# Patient Record
Sex: FEMALE | Race: WHITE | Employment: OTHER | URBAN - METROPOLITAN AREA
[De-identification: names, ages, dates, MRNs, and addresses within clinical notes are randomized per-mention and may not be internally consistent; named-entity substitution may affect disease eponyms.]

---

## 2017-02-06 ENCOUNTER — ANESTHESIA EVENT (OUTPATIENT)
Dept: SURGERY | Age: 72
DRG: 460 | End: 2017-02-06
Payer: COMMERCIAL

## 2017-02-06 ENCOUNTER — HOSPITAL ENCOUNTER (OUTPATIENT)
Dept: SURGERY | Age: 72
Discharge: HOME | End: 2017-02-06
Payer: COMMERCIAL

## 2017-02-06 VITALS
SYSTOLIC BLOOD PRESSURE: 161 MMHG | DIASTOLIC BLOOD PRESSURE: 76 MMHG | OXYGEN SATURATION: 96 % | RESPIRATION RATE: 20 BRPM | TEMPERATURE: 98 F | HEIGHT: 59 IN | HEART RATE: 96 BPM | WEIGHT: 161 LBS | BODY MASS INDEX: 32.46 KG/M2

## 2017-02-06 LAB
ANION GAP BLD CALC-SCNC: 5 MMOL/L (ref 7–16)
APPEARANCE UR: CLEAR
BACTERIA SPEC CULT: NORMAL
BASOPHILS # BLD AUTO: 0 K/UL (ref 0–0.2)
BASOPHILS # BLD: 1 % (ref 0–2)
BILIRUB UR QL: NEGATIVE
BUN SERPL-MCNC: 14 MG/DL (ref 8–23)
CALCIUM SERPL-MCNC: 9 MG/DL (ref 8.3–10.4)
CHLORIDE SERPL-SCNC: 107 MMOL/L (ref 98–107)
CO2 SERPL-SCNC: 33 MMOL/L (ref 21–32)
COLOR UR: YELLOW
CREAT SERPL-MCNC: 1.32 MG/DL (ref 0.6–1)
DIFFERENTIAL METHOD BLD: ABNORMAL
EOSINOPHIL # BLD: 0.3 K/UL (ref 0–0.8)
EOSINOPHIL NFR BLD: 4 % (ref 0.5–7.8)
ERYTHROCYTE [DISTWIDTH] IN BLOOD BY AUTOMATED COUNT: 13.9 % (ref 11.9–14.6)
GLUCOSE SERPL-MCNC: 97 MG/DL (ref 65–100)
GLUCOSE UR STRIP.AUTO-MCNC: NEGATIVE MG/DL
HCT VFR BLD AUTO: 44.2 % (ref 35.8–46.3)
HGB BLD-MCNC: 13.8 G/DL (ref 11.7–15.4)
HGB UR QL STRIP: NEGATIVE
IMM GRANULOCYTES # BLD: 0 K/UL (ref 0–0.5)
IMM GRANULOCYTES NFR BLD AUTO: 0.1 % (ref 0–5)
KETONES UR QL STRIP.AUTO: NEGATIVE MG/DL
LEUKOCYTE ESTERASE UR QL STRIP.AUTO: NEGATIVE
LYMPHOCYTES # BLD AUTO: 40 % (ref 13–44)
LYMPHOCYTES # BLD: 2.7 K/UL (ref 0.5–4.6)
MCH RBC QN AUTO: 28.3 PG (ref 26.1–32.9)
MCHC RBC AUTO-ENTMCNC: 31.2 G/DL (ref 31.4–35)
MCV RBC AUTO: 90.6 FL (ref 79.6–97.8)
MONOCYTES # BLD: 0.4 K/UL (ref 0.1–1.3)
MONOCYTES NFR BLD AUTO: 6 % (ref 4–12)
NEUTS SEG # BLD: 3.4 K/UL (ref 1.7–8.2)
NEUTS SEG NFR BLD AUTO: 49 % (ref 43–78)
NITRITE UR QL STRIP.AUTO: NEGATIVE
PH UR STRIP: 7.5 [PH] (ref 5–9)
PLATELET # BLD AUTO: 208 K/UL (ref 150–450)
PMV BLD AUTO: 10.7 FL (ref 10.8–14.1)
POTASSIUM SERPL-SCNC: 4.6 MMOL/L (ref 3.5–5.1)
PROT UR STRIP-MCNC: NEGATIVE MG/DL
RBC # BLD AUTO: 4.88 M/UL (ref 4.05–5.25)
SERVICE CMNT-IMP: NORMAL
SODIUM SERPL-SCNC: 145 MMOL/L (ref 136–145)
SP GR UR REFRACTOMETRY: 1.01 (ref 1–1.02)
UROBILINOGEN UR QL STRIP.AUTO: 0.2 EU/DL (ref 0.2–1)
WBC # BLD AUTO: 6.9 K/UL (ref 4.3–11.1)

## 2017-02-06 PROCEDURE — 81003 URINALYSIS AUTO W/O SCOPE: CPT | Performed by: ORTHOPAEDIC SURGERY

## 2017-02-06 PROCEDURE — 80048 BASIC METABOLIC PNL TOTAL CA: CPT | Performed by: ORTHOPAEDIC SURGERY

## 2017-02-06 PROCEDURE — 87641 MR-STAPH DNA AMP PROBE: CPT | Performed by: ORTHOPAEDIC SURGERY

## 2017-02-06 PROCEDURE — 77030027138 HC INCENT SPIROMETER -A

## 2017-02-06 PROCEDURE — 85025 COMPLETE CBC W/AUTO DIFF WBC: CPT | Performed by: ORTHOPAEDIC SURGERY

## 2017-02-06 NOTE — PERIOP NOTES
Patient verified name, , and surgery as listed in [facility]. TYPE  CASE:2  Orders per surgeon:  Received  Labs per surgeon:per spine protocol:  Cbc with diff, BMP, UA, nasal swab  Labs per anesthesia protocol: per protocol  EKG  :  Recent in  2016      Patient provided with handouts including guide to surgery , transfusions, pain management and hand hygiene for the family and community. Pt verbalizes understanding of all pre-op instructions . Instructed that family must be present in building at all times. Hibiclens and instructions given per hospital policy. Instructed patient to continue  previous medications as prescribed prior to surgery and  to take the following medications the day of surgery according to anesthesia guidelines : aspirin (was told by surgeon she milagros not have to stop), inhalers and nebulizer treatment (bring inhalesr DOS), omeprazole, zoloft, potassium, gabapentin       Continue all previous medications unless otherwise directed. Instructed patient to hold  the following medications prior to surgery: none      Patient verbalized understanding of all instructions and provided all medical/health information to the best of their ability.

## 2017-02-07 ENCOUNTER — APPOINTMENT (OUTPATIENT)
Dept: GENERAL RADIOLOGY | Age: 72
DRG: 460 | End: 2017-02-07
Attending: ORTHOPAEDIC SURGERY
Payer: COMMERCIAL

## 2017-02-07 ENCOUNTER — ANESTHESIA (OUTPATIENT)
Dept: SURGERY | Age: 72
DRG: 460 | End: 2017-02-07
Payer: COMMERCIAL

## 2017-02-07 ENCOUNTER — HOSPITAL ENCOUNTER (INPATIENT)
Age: 72
LOS: 4 days | Discharge: NURSING, REHAB OR LONG-TERM CARE | DRG: 460 | End: 2017-02-11
Attending: ORTHOPAEDIC SURGERY | Admitting: ORTHOPAEDIC SURGERY
Payer: COMMERCIAL

## 2017-02-07 DIAGNOSIS — K21.9 GASTROESOPHAGEAL REFLUX DISEASE WITHOUT ESOPHAGITIS: Primary | Chronic | ICD-10-CM

## 2017-02-07 DIAGNOSIS — R06.9 RESPIRATORY ABNORMALITY, UNSPECIFIED: ICD-10-CM

## 2017-02-07 DIAGNOSIS — I10 ESSENTIAL HYPERTENSION: Chronic | ICD-10-CM

## 2017-02-07 DIAGNOSIS — M48.062 LUMBAR STENOSIS WITH NEUROGENIC CLAUDICATION: ICD-10-CM

## 2017-02-07 LAB
ABO + RH BLD: NORMAL
BLOOD GROUP ANTIBODIES SERPL: NORMAL
SPECIMEN EXP DATE BLD: NORMAL

## 2017-02-07 PROCEDURE — 74011250636 HC RX REV CODE- 250/636: Performed by: ANESTHESIOLOGY

## 2017-02-07 PROCEDURE — 77030034760 HC NDL BN MAR ASPIR JAMSH STRY -B: Performed by: ORTHOPAEDIC SURGERY

## 2017-02-07 PROCEDURE — 77030032490 HC SLV COMPR SCD KNE COVD -B: Performed by: ORTHOPAEDIC SURGERY

## 2017-02-07 PROCEDURE — 77030031359 HC BLD BN MILL DISP STRY -E: Performed by: ORTHOPAEDIC SURGERY

## 2017-02-07 PROCEDURE — 74011000250 HC RX REV CODE- 250

## 2017-02-07 PROCEDURE — 77010033678 HC OXYGEN DAILY

## 2017-02-07 PROCEDURE — 0ST20ZZ RESECTION OF LUMBAR VERTEBRAL DISC, OPEN APPROACH: ICD-10-PCS | Performed by: INTERNAL MEDICINE

## 2017-02-07 PROCEDURE — 77030030163 HC BN WAX J&J -A: Performed by: ORTHOPAEDIC SURGERY

## 2017-02-07 PROCEDURE — 76060000036 HC ANESTHESIA 2.5 TO 3 HR: Performed by: ORTHOPAEDIC SURGERY

## 2017-02-07 PROCEDURE — 65270000029 HC RM PRIVATE

## 2017-02-07 PROCEDURE — 77030008703 HC TU ET UNCUF COVD -A: Performed by: ANESTHESIOLOGY

## 2017-02-07 PROCEDURE — 77030008477 HC STYL SATN SLP COVD -A: Performed by: ANESTHESIOLOGY

## 2017-02-07 PROCEDURE — 76210000017 HC OR PH I REC 1.5 TO 2 HR: Performed by: ORTHOPAEDIC SURGERY

## 2017-02-07 PROCEDURE — 77030037143 HC ROD SPN HEX XIA TI STRY -D: Performed by: ORTHOPAEDIC SURGERY

## 2017-02-07 PROCEDURE — 94760 N-INVAS EAR/PLS OXIMETRY 1: CPT

## 2017-02-07 PROCEDURE — 77030018836 HC SOL IRR NACL ICUM -A: Performed by: ORTHOPAEDIC SURGERY

## 2017-02-07 PROCEDURE — 77030021668 HC NEB PREFIL KT VYRM -A

## 2017-02-07 PROCEDURE — 74011250637 HC RX REV CODE- 250/637: Performed by: ORTHOPAEDIC SURGERY

## 2017-02-07 PROCEDURE — 77030012894: Performed by: ORTHOPAEDIC SURGERY

## 2017-02-07 PROCEDURE — 36415 COLL VENOUS BLD VENIPUNCTURE: CPT | Performed by: ANESTHESIOLOGY

## 2017-02-07 PROCEDURE — 77030014368: Performed by: ORTHOPAEDIC SURGERY

## 2017-02-07 PROCEDURE — 74011000302 HC RX REV CODE- 302: Performed by: ORTHOPAEDIC SURGERY

## 2017-02-07 PROCEDURE — 74011250636 HC RX REV CODE- 250/636

## 2017-02-07 PROCEDURE — 74011250636 HC RX REV CODE- 250/636: Performed by: ORTHOPAEDIC SURGERY

## 2017-02-07 PROCEDURE — 77030037158 HC BIT DRL SPN XIA DISP STRY -C: Performed by: ORTHOPAEDIC SURGERY

## 2017-02-07 PROCEDURE — C1713 ANCHOR/SCREW BN/BN,TIS/BN: HCPCS | Performed by: ORTHOPAEDIC SURGERY

## 2017-02-07 PROCEDURE — 77030011640 HC PAD GRND REM COVD -A: Performed by: ORTHOPAEDIC SURGERY

## 2017-02-07 PROCEDURE — 72100 X-RAY EXAM L-S SPINE 2/3 VWS: CPT

## 2017-02-07 PROCEDURE — 86900 BLOOD TYPING SEROLOGIC ABO: CPT | Performed by: ANESTHESIOLOGY

## 2017-02-07 PROCEDURE — 77030037157 HC TAP SPN MOD XIA DISP STRY -C: Performed by: ORTHOPAEDIC SURGERY

## 2017-02-07 PROCEDURE — 77030019908 HC STETH ESOPH SIMS -A: Performed by: ANESTHESIOLOGY

## 2017-02-07 PROCEDURE — 76010000172 HC OR TIME 2.5 TO 3 HR INTENSV-TIER 1: Performed by: ORTHOPAEDIC SURGERY

## 2017-02-07 PROCEDURE — 77030025623 HC BUR RND PRECIS STRY -D: Performed by: ORTHOPAEDIC SURGERY

## 2017-02-07 PROCEDURE — 77030037155 HC BLCKR SPN CAP LOK AXI STRY -B: Performed by: ORTHOPAEDIC SURGERY

## 2017-02-07 PROCEDURE — 86580 TB INTRADERMAL TEST: CPT | Performed by: ORTHOPAEDIC SURGERY

## 2017-02-07 PROCEDURE — 77030027138 HC INCENT SPIROMETER -A

## 2017-02-07 PROCEDURE — 77030020407 HC IV BLD WRMR ST 3M -A: Performed by: ANESTHESIOLOGY

## 2017-02-07 PROCEDURE — 07DR3ZZ EXTRACTION OF ILIAC BONE MARROW, PERCUTANEOUS APPROACH: ICD-10-PCS | Performed by: INTERNAL MEDICINE

## 2017-02-07 PROCEDURE — 77030014647 HC SEAL FBRN TISSL BAXT -D: Performed by: ORTHOPAEDIC SURGERY

## 2017-02-07 PROCEDURE — 77030000001 HC SPCR SPN PEEK STRY -I2: Performed by: ORTHOPAEDIC SURGERY

## 2017-02-07 PROCEDURE — 0SG30AJ FUSION OF LUMBOSACRAL JOINT WITH INTERBODY FUSION DEVICE, POSTERIOR APPROACH, ANTERIOR COLUMN, OPEN APPROACH: ICD-10-PCS | Performed by: INTERNAL MEDICINE

## 2017-02-07 PROCEDURE — 0SG30Z1 FUSION OF LUMBOSACRAL JOINT, POST APPR P COL, OPEN APPROACH: ICD-10-PCS | Performed by: INTERNAL MEDICINE

## 2017-02-07 PROCEDURE — 74011000250 HC RX REV CODE- 250: Performed by: ORTHOPAEDIC SURGERY

## 2017-02-07 PROCEDURE — 77030034850: Performed by: ORTHOPAEDIC SURGERY

## 2017-02-07 PROCEDURE — 77030034475 HC MISC IMPL SPN: Performed by: ORTHOPAEDIC SURGERY

## 2017-02-07 PROCEDURE — 77030020782 HC GWN BAIR PAWS FLX 3M -B: Performed by: ANESTHESIOLOGY

## 2017-02-07 PROCEDURE — 77030031139 HC SUT VCRL2 J&J -A: Performed by: ORTHOPAEDIC SURGERY

## 2017-02-07 DEVICE — GRAFT BNE SUB 20CC 2 4MM GRAN GROWTH FACT ALLGRFT OSTEOAMP: Type: IMPLANTABLE DEVICE | Site: SPINE LUMBAR | Status: FUNCTIONAL

## 2017-02-07 DEVICE — VITALLIUM PREBENT AND PRECUT ROD WITH HEX
Type: IMPLANTABLE DEVICE | Site: SPINE LUMBAR | Status: FUNCTIONAL
Brand: XIA 4 5

## 2017-02-07 DEVICE — POLYAXIAL CORTICAL SCREW
Type: IMPLANTABLE DEVICE | Site: SPINE LUMBAR | Status: FUNCTIONAL
Brand: XIA 4.5 SYSTEM -  XIA CT

## 2017-02-07 DEVICE — VERTEBRAL SPACER
Type: IMPLANTABLE DEVICE | Site: SPINE LUMBAR | Status: FUNCTIONAL
Brand: AVS TL

## 2017-02-07 DEVICE — BLOCKER
Type: IMPLANTABLE DEVICE | Site: SPINE LUMBAR | Status: FUNCTIONAL
Brand: XIA 4.5 SYSTEM -  XIA CT

## 2017-02-07 RX ORDER — DOCUSATE SODIUM 100 MG/1
100 CAPSULE, LIQUID FILLED ORAL 2 TIMES DAILY
Status: DISCONTINUED | OUTPATIENT
Start: 2017-02-07 | End: 2017-02-11 | Stop reason: HOSPADM

## 2017-02-07 RX ORDER — FENTANYL CITRATE 50 UG/ML
INJECTION, SOLUTION INTRAMUSCULAR; INTRAVENOUS AS NEEDED
Status: DISCONTINUED | OUTPATIENT
Start: 2017-02-07 | End: 2017-02-07 | Stop reason: HOSPADM

## 2017-02-07 RX ORDER — ACETAMINOPHEN 325 MG/1
650 TABLET ORAL EVERY 6 HOURS
Status: DISCONTINUED | OUTPATIENT
Start: 2017-02-07 | End: 2017-02-11 | Stop reason: HOSPADM

## 2017-02-07 RX ORDER — FAMOTIDINE 20 MG/1
20 TABLET, FILM COATED ORAL EVERY 24 HOURS
Status: DISCONTINUED | OUTPATIENT
Start: 2017-02-07 | End: 2017-02-11 | Stop reason: HOSPADM

## 2017-02-07 RX ORDER — GABAPENTIN 300 MG/1
300 CAPSULE ORAL 3 TIMES DAILY
Status: DISCONTINUED | OUTPATIENT
Start: 2017-02-07 | End: 2017-02-11 | Stop reason: HOSPADM

## 2017-02-07 RX ORDER — ZOLPIDEM TARTRATE 5 MG/1
5 TABLET ORAL
Status: DISCONTINUED | OUTPATIENT
Start: 2017-02-07 | End: 2017-02-11 | Stop reason: HOSPADM

## 2017-02-07 RX ORDER — SODIUM CHLORIDE 0.9 % (FLUSH) 0.9 %
5-10 SYRINGE (ML) INJECTION AS NEEDED
Status: DISCONTINUED | OUTPATIENT
Start: 2017-02-07 | End: 2017-02-11 | Stop reason: HOSPADM

## 2017-02-07 RX ORDER — BACITRACIN 50000 [IU]/1
INJECTION, POWDER, FOR SOLUTION INTRAMUSCULAR AS NEEDED
Status: DISCONTINUED | OUTPATIENT
Start: 2017-02-07 | End: 2017-02-07 | Stop reason: HOSPADM

## 2017-02-07 RX ORDER — IPRATROPIUM BROMIDE AND ALBUTEROL SULFATE 2.5; .5 MG/3ML; MG/3ML
3 SOLUTION RESPIRATORY (INHALATION)
Status: DISCONTINUED | OUTPATIENT
Start: 2017-02-07 | End: 2017-02-11 | Stop reason: HOSPADM

## 2017-02-07 RX ORDER — NITROGLYCERIN 0.4 MG/1
0.4 TABLET SUBLINGUAL
Status: DISCONTINUED | OUTPATIENT
Start: 2017-02-07 | End: 2017-02-11 | Stop reason: HOSPADM

## 2017-02-07 RX ORDER — OXYCODONE HYDROCHLORIDE 5 MG/1
5-10 TABLET ORAL
Status: DISCONTINUED | OUTPATIENT
Start: 2017-02-07 | End: 2017-02-11 | Stop reason: HOSPADM

## 2017-02-07 RX ORDER — OXYCODONE HYDROCHLORIDE 5 MG/1
5 TABLET ORAL
Status: DISCONTINUED | OUTPATIENT
Start: 2017-02-07 | End: 2017-02-07 | Stop reason: HOSPADM

## 2017-02-07 RX ORDER — CEFAZOLIN SODIUM IN 0.9 % NACL 2 G/50 ML
2 INTRAVENOUS SOLUTION, PIGGYBACK (ML) INTRAVENOUS EVERY 8 HOURS
Status: COMPLETED | OUTPATIENT
Start: 2017-02-07 | End: 2017-02-08

## 2017-02-07 RX ORDER — SODIUM CHLORIDE, SODIUM LACTATE, POTASSIUM CHLORIDE, CALCIUM CHLORIDE 600; 310; 30; 20 MG/100ML; MG/100ML; MG/100ML; MG/100ML
75 INJECTION, SOLUTION INTRAVENOUS CONTINUOUS
Status: DISPENSED | OUTPATIENT
Start: 2017-02-07 | End: 2017-02-08

## 2017-02-07 RX ORDER — SODIUM CHLORIDE, SODIUM LACTATE, POTASSIUM CHLORIDE, CALCIUM CHLORIDE 600; 310; 30; 20 MG/100ML; MG/100ML; MG/100ML; MG/100ML
100 INJECTION, SOLUTION INTRAVENOUS CONTINUOUS
Status: DISCONTINUED | OUTPATIENT
Start: 2017-02-07 | End: 2017-02-07 | Stop reason: HOSPADM

## 2017-02-07 RX ORDER — MIDAZOLAM HYDROCHLORIDE 1 MG/ML
2 INJECTION, SOLUTION INTRAMUSCULAR; INTRAVENOUS ONCE
Status: DISCONTINUED | OUTPATIENT
Start: 2017-02-07 | End: 2017-02-07 | Stop reason: HOSPADM

## 2017-02-07 RX ORDER — VANCOMYCIN HYDROCHLORIDE 1 G/20ML
INJECTION, POWDER, LYOPHILIZED, FOR SOLUTION INTRAVENOUS AS NEEDED
Status: DISCONTINUED | OUTPATIENT
Start: 2017-02-07 | End: 2017-02-07 | Stop reason: HOSPADM

## 2017-02-07 RX ORDER — LIDOCAINE HYDROCHLORIDE 20 MG/ML
INJECTION, SOLUTION EPIDURAL; INFILTRATION; INTRACAUDAL; PERINEURAL AS NEEDED
Status: DISCONTINUED | OUTPATIENT
Start: 2017-02-07 | End: 2017-02-07 | Stop reason: HOSPADM

## 2017-02-07 RX ORDER — GLYCOPYRROLATE 0.2 MG/ML
INJECTION INTRAMUSCULAR; INTRAVENOUS AS NEEDED
Status: DISCONTINUED | OUTPATIENT
Start: 2017-02-07 | End: 2017-02-07 | Stop reason: HOSPADM

## 2017-02-07 RX ORDER — POTASSIUM CHLORIDE 20 MEQ/1
20 TABLET, EXTENDED RELEASE ORAL DAILY
Status: DISCONTINUED | OUTPATIENT
Start: 2017-02-08 | End: 2017-02-11 | Stop reason: HOSPADM

## 2017-02-07 RX ORDER — OXYCODONE AND ACETAMINOPHEN 7.5; 325 MG/1; MG/1
1 TABLET ORAL
Qty: 80 TAB | Refills: 0 | Status: SHIPPED | OUTPATIENT
Start: 2017-02-07 | End: 2017-02-21

## 2017-02-07 RX ORDER — SERTRALINE HYDROCHLORIDE 50 MG/1
75 TABLET, FILM COATED ORAL DAILY
Status: DISCONTINUED | OUTPATIENT
Start: 2017-02-08 | End: 2017-02-11 | Stop reason: HOSPADM

## 2017-02-07 RX ORDER — NALOXONE HYDROCHLORIDE 0.4 MG/ML
0.4 INJECTION, SOLUTION INTRAMUSCULAR; INTRAVENOUS; SUBCUTANEOUS
Status: DISCONTINUED | OUTPATIENT
Start: 2017-02-07 | End: 2017-02-11 | Stop reason: HOSPADM

## 2017-02-07 RX ORDER — ROCURONIUM BROMIDE 10 MG/ML
INJECTION, SOLUTION INTRAVENOUS AS NEEDED
Status: DISCONTINUED | OUTPATIENT
Start: 2017-02-07 | End: 2017-02-07 | Stop reason: HOSPADM

## 2017-02-07 RX ORDER — LIDOCAINE HYDROCHLORIDE 10 MG/ML
0.1 INJECTION INFILTRATION; PERINEURAL AS NEEDED
Status: DISCONTINUED | OUTPATIENT
Start: 2017-02-07 | End: 2017-02-07 | Stop reason: HOSPADM

## 2017-02-07 RX ORDER — MORPHINE SULFATE 2 MG/ML
2 INJECTION, SOLUTION INTRAMUSCULAR; INTRAVENOUS
Status: DISCONTINUED | OUTPATIENT
Start: 2017-02-07 | End: 2017-02-11 | Stop reason: HOSPADM

## 2017-02-07 RX ORDER — ALBUTEROL SULFATE 90 UG/1
2 AEROSOL, METERED RESPIRATORY (INHALATION)
Status: DISCONTINUED | OUTPATIENT
Start: 2017-02-07 | End: 2017-02-11 | Stop reason: HOSPADM

## 2017-02-07 RX ORDER — HYDROMORPHONE HYDROCHLORIDE 2 MG/ML
0.5 INJECTION, SOLUTION INTRAMUSCULAR; INTRAVENOUS; SUBCUTANEOUS
Status: COMPLETED | OUTPATIENT
Start: 2017-02-07 | End: 2017-02-07

## 2017-02-07 RX ORDER — FENTANYL CITRATE 50 UG/ML
100 INJECTION, SOLUTION INTRAMUSCULAR; INTRAVENOUS ONCE
Status: DISCONTINUED | OUTPATIENT
Start: 2017-02-07 | End: 2017-02-07 | Stop reason: HOSPADM

## 2017-02-07 RX ORDER — PROPOFOL 10 MG/ML
INJECTION, EMULSION INTRAVENOUS AS NEEDED
Status: DISCONTINUED | OUTPATIENT
Start: 2017-02-07 | End: 2017-02-07 | Stop reason: HOSPADM

## 2017-02-07 RX ORDER — SODIUM CHLORIDE 0.9 % (FLUSH) 0.9 %
5-10 SYRINGE (ML) INJECTION EVERY 8 HOURS
Status: DISCONTINUED | OUTPATIENT
Start: 2017-02-07 | End: 2017-02-11 | Stop reason: HOSPADM

## 2017-02-07 RX ORDER — ONDANSETRON 2 MG/ML
INJECTION INTRAMUSCULAR; INTRAVENOUS AS NEEDED
Status: DISCONTINUED | OUTPATIENT
Start: 2017-02-07 | End: 2017-02-07 | Stop reason: HOSPADM

## 2017-02-07 RX ORDER — CEFAZOLIN SODIUM IN 0.9 % NACL 2 G/50 ML
2 INTRAVENOUS SOLUTION, PIGGYBACK (ML) INTRAVENOUS ONCE
Status: COMPLETED | OUTPATIENT
Start: 2017-02-07 | End: 2017-02-07

## 2017-02-07 RX ORDER — ATORVASTATIN CALCIUM 10 MG/1
20 TABLET, FILM COATED ORAL
Status: DISCONTINUED | OUTPATIENT
Start: 2017-02-07 | End: 2017-02-11 | Stop reason: HOSPADM

## 2017-02-07 RX ORDER — CYCLOBENZAPRINE HCL 10 MG
10 TABLET ORAL
Status: DISCONTINUED | OUTPATIENT
Start: 2017-02-07 | End: 2017-02-11 | Stop reason: HOSPADM

## 2017-02-07 RX ORDER — VALSARTAN 160 MG/1
160 TABLET ORAL DAILY
Status: DISCONTINUED | OUTPATIENT
Start: 2017-02-08 | End: 2017-02-11 | Stop reason: HOSPADM

## 2017-02-07 RX ORDER — DEXAMETHASONE SODIUM PHOSPHATE 4 MG/ML
INJECTION, SOLUTION INTRA-ARTICULAR; INTRALESIONAL; INTRAMUSCULAR; INTRAVENOUS; SOFT TISSUE AS NEEDED
Status: DISCONTINUED | OUTPATIENT
Start: 2017-02-07 | End: 2017-02-07 | Stop reason: HOSPADM

## 2017-02-07 RX ORDER — SODIUM CHLORIDE, SODIUM LACTATE, POTASSIUM CHLORIDE, CALCIUM CHLORIDE 600; 310; 30; 20 MG/100ML; MG/100ML; MG/100ML; MG/100ML
INJECTION, SOLUTION INTRAVENOUS
Status: DISCONTINUED | OUTPATIENT
Start: 2017-02-07 | End: 2017-02-07 | Stop reason: HOSPADM

## 2017-02-07 RX ORDER — FUROSEMIDE 40 MG/1
40 TABLET ORAL DAILY
Status: DISCONTINUED | OUTPATIENT
Start: 2017-02-08 | End: 2017-02-11 | Stop reason: HOSPADM

## 2017-02-07 RX ORDER — ATENOLOL 25 MG/1
12.5 TABLET ORAL
Status: DISCONTINUED | OUTPATIENT
Start: 2017-02-07 | End: 2017-02-11 | Stop reason: HOSPADM

## 2017-02-07 RX ORDER — ONDANSETRON 2 MG/ML
4 INJECTION INTRAMUSCULAR; INTRAVENOUS
Status: DISCONTINUED | OUTPATIENT
Start: 2017-02-07 | End: 2017-02-11 | Stop reason: HOSPADM

## 2017-02-07 RX ORDER — MIDAZOLAM HYDROCHLORIDE 1 MG/ML
2 INJECTION, SOLUTION INTRAMUSCULAR; INTRAVENOUS
Status: DISCONTINUED | OUTPATIENT
Start: 2017-02-07 | End: 2017-02-07 | Stop reason: HOSPADM

## 2017-02-07 RX ORDER — NEOSTIGMINE METHYLSULFATE 1 MG/ML
INJECTION INTRAVENOUS AS NEEDED
Status: DISCONTINUED | OUTPATIENT
Start: 2017-02-07 | End: 2017-02-07 | Stop reason: HOSPADM

## 2017-02-07 RX ADMIN — HYDROMORPHONE HYDROCHLORIDE 0.5 MG: 2 INJECTION, SOLUTION INTRAMUSCULAR; INTRAVENOUS; SUBCUTANEOUS at 17:28

## 2017-02-07 RX ADMIN — SODIUM CHLORIDE, SODIUM LACTATE, POTASSIUM CHLORIDE, AND CALCIUM CHLORIDE 100 ML/HR: 600; 310; 30; 20 INJECTION, SOLUTION INTRAVENOUS at 13:00

## 2017-02-07 RX ADMIN — SODIUM CHLORIDE, SODIUM LACTATE, POTASSIUM CHLORIDE, AND CALCIUM CHLORIDE 75 ML/HR: 600; 310; 30; 20 INJECTION, SOLUTION INTRAVENOUS at 19:56

## 2017-02-07 RX ADMIN — Medication 10 ML: at 23:35

## 2017-02-07 RX ADMIN — PROPOFOL 160 MG: 10 INJECTION, EMULSION INTRAVENOUS at 14:11

## 2017-02-07 RX ADMIN — LIDOCAINE HYDROCHLORIDE 60 MG: 20 INJECTION, SOLUTION EPIDURAL; INFILTRATION; INTRACAUDAL; PERINEURAL at 14:11

## 2017-02-07 RX ADMIN — SODIUM CHLORIDE 500 ML: 900 INJECTION, SOLUTION INTRAVENOUS at 19:56

## 2017-02-07 RX ADMIN — TUBERCULIN PURIFIED PROTEIN DERIVATIVE 5 UNITS: 5 INJECTION INTRADERMAL at 20:49

## 2017-02-07 RX ADMIN — FENTANYL CITRATE 50 MCG: 50 INJECTION, SOLUTION INTRAMUSCULAR; INTRAVENOUS at 16:12

## 2017-02-07 RX ADMIN — NEOSTIGMINE METHYLSULFATE 3 MG: 1 INJECTION INTRAVENOUS at 16:41

## 2017-02-07 RX ADMIN — CEFAZOLIN 2 G: 1 INJECTION, POWDER, FOR SOLUTION INTRAMUSCULAR; INTRAVENOUS; PARENTERAL at 14:16

## 2017-02-07 RX ADMIN — GLYCOPYRROLATE 0.4 MG: 0.2 INJECTION INTRAMUSCULAR; INTRAVENOUS at 16:41

## 2017-02-07 RX ADMIN — FENTANYL CITRATE 50 MCG: 50 INJECTION, SOLUTION INTRAMUSCULAR; INTRAVENOUS at 14:37

## 2017-02-07 RX ADMIN — CEFAZOLIN 2 G: 1 INJECTION, POWDER, FOR SOLUTION INTRAMUSCULAR; INTRAVENOUS; PARENTERAL at 23:34

## 2017-02-07 RX ADMIN — HYDROMORPHONE HYDROCHLORIDE 0.5 MG: 2 INJECTION, SOLUTION INTRAMUSCULAR; INTRAVENOUS; SUBCUTANEOUS at 17:07

## 2017-02-07 RX ADMIN — SODIUM CHLORIDE, SODIUM LACTATE, POTASSIUM CHLORIDE, CALCIUM CHLORIDE: 600; 310; 30; 20 INJECTION, SOLUTION INTRAVENOUS at 14:15

## 2017-02-07 RX ADMIN — FENTANYL CITRATE 50 MCG: 50 INJECTION, SOLUTION INTRAMUSCULAR; INTRAVENOUS at 14:59

## 2017-02-07 RX ADMIN — DOCUSATE SODIUM 100 MG: 100 CAPSULE ORAL at 20:48

## 2017-02-07 RX ADMIN — ROCURONIUM BROMIDE 10 MG: 10 INJECTION, SOLUTION INTRAVENOUS at 15:46

## 2017-02-07 RX ADMIN — HYDROMORPHONE HYDROCHLORIDE 0.5 MG: 2 INJECTION, SOLUTION INTRAMUSCULAR; INTRAVENOUS; SUBCUTANEOUS at 17:13

## 2017-02-07 RX ADMIN — FAMOTIDINE 20 MG: 20 TABLET ORAL at 20:48

## 2017-02-07 RX ADMIN — HYDROMORPHONE HYDROCHLORIDE 0.5 MG: 2 INJECTION, SOLUTION INTRAMUSCULAR; INTRAVENOUS; SUBCUTANEOUS at 17:38

## 2017-02-07 RX ADMIN — ATORVASTATIN CALCIUM 20 MG: 10 TABLET, FILM COATED ORAL at 20:48

## 2017-02-07 RX ADMIN — DEXAMETHASONE SODIUM PHOSPHATE 4 MG: 4 INJECTION, SOLUTION INTRA-ARTICULAR; INTRALESIONAL; INTRAMUSCULAR; INTRAVENOUS; SOFT TISSUE at 14:29

## 2017-02-07 RX ADMIN — FENTANYL CITRATE 100 MCG: 50 INJECTION, SOLUTION INTRAMUSCULAR; INTRAVENOUS at 14:09

## 2017-02-07 RX ADMIN — ONDANSETRON 4 MG: 2 INJECTION INTRAMUSCULAR; INTRAVENOUS at 16:28

## 2017-02-07 RX ADMIN — ROCURONIUM BROMIDE 50 MG: 10 INJECTION, SOLUTION INTRAVENOUS at 14:11

## 2017-02-07 RX ADMIN — ACETAMINOPHEN 650 MG: 325 TABLET, FILM COATED ORAL at 20:48

## 2017-02-07 RX ADMIN — GABAPENTIN 300 MG: 300 CAPSULE ORAL at 23:34

## 2017-02-07 NOTE — PROGRESS NOTES
TRANSFER - IN REPORT:    Verbal report received from [place], RN(name) on Zander Veronica  being received from [facility]) for routine post - op      Report consisted of patients Situation, Background, Assessment and   Recommendations(SBAR). Information from the following report(s) SBAR, Kardex, Intake/Output and MAR was reviewed with the receiving nurse. Opportunity for questions and clarification was provided. Assessment completed upon patients arrival to unit and care assumed.

## 2017-02-07 NOTE — ANESTHESIA PREPROCEDURE EVALUATION
Anesthetic History               Review of Systems / Medical History  Patient summary reviewed and pertinent labs reviewed    Pulmonary          Pneumonia (9/16)  Asthma : well controlled    Comments: Quit smoking 14 yrs ago   Neuro/Psych         Psychiatric history (Fibromyalgia, depression, anxiety)    Comments: neuropathy Cardiovascular    Hypertension          CAD (Minimal CAD on 2011 [facility] for chest pain) and PAD    Exercise tolerance: >4 METS  Comments: Pt has long standing CP. LHC in 2011 revealed minimal disease. Pt able to preform ADLs without difficulty. Pt has a long standing hx/o this pain. Pt has presented to the ER for this several times (per pt) and discharged home   EKG without evidence for new ischemic changes  Able to walk a fair distance without significant CP   GI/Hepatic/Renal     GERD: well controlled           Endo/Other        Obesity and arthritis     Other Findings            Physical Exam    Airway  Mallampati: II  TM Distance: 4 - 6 cm  Neck ROM: normal range of motion   Mouth opening: Normal     Cardiovascular    Rhythm: regular  Rate: normal         Dental    Dentition: Full upper dentures     Pulmonary  Breath sounds clear to auscultation               Abdominal  GI exam deferred       Other Findings            Anesthetic Plan    ASA: 3  Anesthesia type: general          Induction: Intravenous  Anesthetic plan and risks discussed with: Patient and Family      Discussed possible art line, CVP, post-op vent.

## 2017-02-07 NOTE — PROGRESS NOTES
Dual skin assessment completed with Eugenia Ontiveros RN. No areas of breakdown noted. 4x4 and tegaderm to mid back clean, dry and intact with hemovac charged.

## 2017-02-07 NOTE — ANESTHESIA POSTPROCEDURE EVALUATION
Post-Anesthesia Evaluation and Assessment    Patient: Maxwell Trotter MRN: [id number]  SSN: [id number]    YOB: 1945  Age: 66 y.o. Sex: female       Cardiovascular Function/Vital Signs  Visit Vitals    /56    Pulse (!) 51    Temp 37.1 °C (98.8 °F)    Resp 15    Ht 4' 11\" (1.499 m)    Wt 73 kg (161 lb)    SpO2 96%    BMI 32.52 kg/m2       Patient is status post general anesthesia for Procedure(s):  L4-S1 LAMINECTOMY AND FUSION WITH BONE MARROW ASPIRATE, ALLOGRAFT, AND INSTRUMENTATION, TLIF. Nausea/Vomiting: None    Postoperative hydration reviewed and adequate. Pain:  Pain Scale 1: Numeric (0 - 10) (02/07/17 1738)  Pain Intensity 1: 7 (02/07/17 1738)   Managed    Neurological Status:   Neuro (WDL): Exceptions to WDL (02/07/17 1702)  Neuro  Neurologic State: Drowsy (02/07/17 1702)  LUE Motor Response: Purposeful (02/07/17 1702)  LLE Motor Response: Purposeful (02/07/17 1702)  RUE Motor Response: Purposeful (02/07/17 1702)  RLE Motor Response: Purposeful (02/07/17 1702)   At baseline    Mental Status and Level of Consciousness: Arousable    Pulmonary Status:   O2 Device: Nasal cannula (02/07/17 1702)   Adequate oxygenation and airway patent    Complications related to anesthesia: None    Post-anesthesia assessment completed.  No concerns    Signed By: Justin Venegas MD     February 7, 2017

## 2017-02-07 NOTE — H&P
This is the 33-year-old female who I have been seeing for the last several months with a several year history of back pain and radiation to the bilateral lower extremities. This limits her ability to walk and stand. The pain as a deep ache and progresses from her back, buttocks, and into the posterior thighs. Her symptoms are better with sitting and leaning forward. She has tried many conservative efforts without relief. This has included chiropractic care, NSAIDs, pain medication, gabapentin, a lumbar brace, and most recently, epidural injections. Review of systems is negative for fever/chills. Allergies:Lisinopril;Penicillin;Tetracycline HCl    Medications:Aspirin; Atenolol (25 MG); Atorvastatin Calcium (20 MG);EpiPen 2-Jabari (0.3 MG/... MG/0.3ML); Furosemide (40 MG);Gabapentin (300 MG); OxyCODONE HCl (5 MG, Take 2 tablet(s) by mouth every 4 prn pain); Pantoprazole Sodium (40 MG); Sertraline HCl (50 MG); TraMADol HCl (50 MG, Take 1 tablet(s) by mouth 2 times a day as needed [PRN]);Valsartan (160 MG)    Physical Exam: This is a well developed well nourished adult female in no acute distress. Mood and affect are appropriate. Oriented to person, place, and time. Respirations are unlabored and there is no evidence of cyanosis    Inspection of the back reveals no evidence of rash, such as zoster. Examination of the lumbar spine reveals a relative hypolordosis, and no evidence of significant saggital plane deformity. There is exacerbation of symptoms with lumbar extension. There is not significant tenderness to palpation along the spinous processes or paraspinal musculature. The patient ambulates with a slightly crouched posture. Straight leg testing is negative. There is minimal hip irritability with internal or external rotation bilaterally.   Sensory testing reveals intact sensation to light touch and pin prick in the distribution of the L3-S1 dermatomes bilaterally, though there is subjective tingling over the buttocks and bilateral posterior thighs. Reflexes   Right Left   Quadriceps (L4) 2 2   Achilles (S1) 2 2     Ankle jerk is negative for clonus    Strength testing in the lower extremity reveals the following based on the 5 point grading scale:     HF (L2) H Ab (L5) KE (L3/4) ADF (L4) EHL (L5) A Ev (S1) APF (S1)   Right 5 5 5 5 5 5 5   Left 5 5 5 5 5 5 5       Her MRI was reviewed and reveals spondylotic stenosis from L4-S1. There is spondylolisthesis noted on radiographs at both levels. Assessment and plan: She has neurogenic claudication due to spondylolisthesis and stenosis. She has reasonably exhausted conservative efforts and has been dealing with this for several years. She is a candidate for surgery including a lumbar laminectomy and fusion L4-S1. However, she is the primary caregiver for her  and is not able to consider taking time for a surgery at this time. She was given some brochures to review regarding the surgery and we discussed the risks and benefits as is outlined in the brochures. She will discuss this over with her family and see if she can get some temporary care for her . She will let me know if she would like to proceed with surgery as outlined above.       Electronically Signed By Brian Perez MD

## 2017-02-07 NOTE — OP NOTES
[address] [address]   [phone]    OPERATIVE REPORT    Patient Bruce Casey  [id number]  1945  66 y.o. DATE OF SURGERY: 2/7/2017    SURGEON: Miracle Luna MD    ASSISTANT:  Jeanine Carmona MD    A skilled  was deemed necessary for this case due to the intimate proximity of the thecal sac and spinal nerve roots. He was there for the entire duration of the case. PREOP DIAGNOSIS: Lumbar spondylolisthesis and lumbar stenosis. POSTOP DIAGNOSIS: Lumbar spondylolisthesis and lumbar stenosis. PROCEDURE:  1. Lumbar laminectomy L4 through S1  with bilateral foraminotomies. 2. Lumbar posterolateral fusion  L4 through S1 .  3. Cortical screw instrumentation  L4 through S1 .  4. Bone marrow aspirate for allograft  5. Translumbar interbody fusion L4-L5 and L5- S1.  6. Insertion biomechanical device L4-L5 and L5- S1  7. Pace Ge dorsal osteotomy L5    ANESTHESIA: General    ESTIMATED BLOOD LOSS:  500 ml    INTRAOPERATIVE COMPLICATIONS: None. POSTOP CONDITION: Stable. IMPLANTS:   Implant Name Type Inv.  Item Serial No.  Lot No. LRB No. Used Action   bio dbm    [employer] [id number] N/A 1 Implanted   GRAFT BNE GRAN DBM 2-4MM 20ML -- OSTEOAMP - [id number]  GRAFT BNE GRAN DBM 2-4MM 20ML -- OSTEOAMP [id number]--[id number] [employer]  N/A 1 Implanted   BLOCKER SPNE CAP LCK -- MARY 4.5 CT - [id number]  BLOCKER SPNE CAP LCK -- MARY 4.5 CT  CHINYERE SPINE HOWM [id number] N/A 6 Implanted   SCR BNE DAJA POLYAXL 5.5X40MM -- MARY 4.5 CT - [id number]  SCR BNE DAJA POLYAXL 5.5X40MM -- MARY 4.5 CT  WAUKESHA CTY MENTAL HLTH [employer] SPINE HOWM [id number] N/A 1 Implanted   SCR BNE DAJA POLYAXL 5.5X35MM -- MARY 4.5 CT - [id number]  SCR BNE DAJA POLYAXL 5.5X35MM -- MARY 4.5 CT  WAUKESHA CTY MENTAL HLTH CTR SPINE HOWM [id number] N/A 5 Implanted   EVELYN SPNE W/HEX 4.5X60MM VIT -- MARY 4.5 - [id number]  EVELYN SPNE W/HEX 4.5X60MM VIT -- MARY 4.5  CHINYERE SPINE HOW [id number] N/A 2 Implanted SPACER SPNE VERT AVS 4D [id number] --  - [id number]  SPACER SPNE VERT AVS 4D 8X25 --   CHINYERE SPINE HOWM [id number] N/A 1 Implanted   SPACER SPNE VERT AVS 4D 10X30 --  - [id number]   SPACER SPNE VERT AVS 4D 10X30 --    CHINYERE SPINE HOWM [id number] N/A 1 Implanted       INDICATIONS FOR PROCEDURE: Patient has had low back pain with radiation to the buttocks and lower extremities for an extended period of time. The symptoms and exam findings were felt to be consistent with neurogenic claudication. The preoperative radiographs and other imaging confirmed showed spondylolisthesis and stenosis. Conservative measures have been exhausted as outlined. The symptoms progressed to the point where there is difficulty performing any task that requires prolonged standing or walking which interfered with activities of daily living and ability to enjoy life. In the outpatient setting the risks, benefits and potential complications of the above-listed procedure were discussed with her and an informed consent was obtained. DESCRIPTION OF PROCEDURE: After adequate induction of general anesthesia, the patient was positioned prone on the [facility] spinal table. Care was taken to pad all bony prominences. The shoulders and elbows were placed in the 90/90 position. The abdomen was allowed to hang free to decrease intraabdominal and venous pressure. The lumbar area was prepped and draped in the usual sterile fashion. Preoperative antibiotics were administered. A time out was called to confirm appropriate patient, proposed procedure and proposed incision site. With this confirmation, an incision was created in the midline of the back over the lumbar spinous processes. . Dissection was carried down through the skin and subcutaneous tissues to the level of the lumbodorsal fascia. A Kocher clamp was attached to the a spinous process and a cross-table lateral fluoroscopic image was obtained to confirm spinal level.  With this confirmation, the spinous process was marked with a Leksell rongeur and the lumbar dorsal fascia was released off of the spinous processes. The paraspinous musculature was elevated in a subperiosteal fashion in a lateral direction off of the lamina and over the the facet joints to be fused. The L4 through L5 transverse processes were exposed to their lateral tips. The sacral ala was exposed as well. All soft tissue was elevated off of the intertransverse membrane laterally in preparation for a fusion bed. With the posterior lateral dissection completed, attention was directed centrally where a Leksell rongeur was used to resect the spinous processes of L4 through S1. The 4 mm chalino was then used to thin the lamina to an egg shell like thickness. A triple-0 angled curet was used to elevate the ligamentum flavum off of its origin on the caudal surface of the L5 lamina as well as off the cephalad surface of the S1 lamina. The ligamentum flavum was elevated from the thecal sac and a plane was created in the epidural space with a Naila elevator. A 4 mm Kerrison was used to perform a central laminectomy of S1 and this was carried cephalad through L4. The central laminectomy was widened to the medial border of the pedicle at each level. With the central laminectomy completed, a 4 mm Kerrison was used to undercut the lateral recess along the entire length of the laminectomy site. Then using the [facility] elevator to retract the thecal sac and identify each of the nerve roots, partial foraminotomies were performed and each nerve was visualized and decompressed bilaterally. With this, attention was directed toward the  iliac crest where a separate fascial incision was created over the posterior-superior iliac spine. The 8 gauge needle was impacted into the posterior superior iliac spine to a depth of about 2 cm. Bone marrow aspirate was obtained and spread over the allograft bone.   The needle was removed and pressure applied over the posterior superior iliac spine. Attention was re-directed towards the lumbar wound. The rosario nerve root retractor was used to retrace the thecal sac overlying the L4 - L5  disc space. Care was taken to protect the exiting and descending nerve roots at all times. An annulotomy was then performed with a 15-blade. A complete discectomy was performed using a series of curets and rongeurs. The interbody sizing system was brought to the field and the sizing paddles were used sequentially to an appropriate fit. The endplates were prepared for fusion using the rasps. A trial interbody device was sized and inserted. Fluoroscopic images were obtained of the trial in both planes. The final PEEK implant was the opened and packed with local autograft. Additional local bone graft was placed anteriorly in the interbody space. The PEEK cage was then impacted and rotated appropriately. Again fluoroscopy was ws used to confirm cage positioning. The rosario nerve root retractor was used to retrace the thecal sac overlying the L5 - S1  disc space. Care was taken to protect the exiting and descending nerve roots at all times. An annulotomy was then performed with a 15-blade. A complete discectomy was performed using a series of curets and rongeurs. The interbody sizing system was brought to the field and the sizing paddles were used sequentially to an appropriate fit. The endplates were prepared for fusion using the rasps. A trial interbody device was sized and inserted. Fluoroscopic images were obtained of the trial in both planes. The final PEEK implant was the opened and packed with local autograft. Additional local bone graft was placed anteriorly in the interbody space. The PEEK cage was then impacted and rotated appropriately. Again fluoroscopy was ws used to confirm cage positioning.      There was felt to be residual kyphosis after impaction of the interbody cages and the bilateral L5 inferior articular facets were osteotomized completely to create an approximately 1 cm gap for later compression. The 4 mm chalino was used to decorticate the previously exposed transverse processes and lateral aspect of the facet joints and pars intra-articularis. The sacral ala was decorticated as well. The Marcelino Kissousa spinal instrumentation system was brought to the field and using a free hand intersection technique, cortical screws were placed bilaterally from L4 to S1. The medial border of the pedicle was visualized through the spinal canal to confirm no medial or inferior breech. This was felt to be satisfactory. At this point the bone marrow soaked allograft was then packed into the lateral gutters beneath the screw heads, along the decorticated transverse processes and lateral facet joints for the arthrodesis. Appropriately sized rods were then selected and bent into additional lordosis and laid into the pedicle screw heads. Compression was applied across the osteotomy site at L5. The set screws were then applied and tightened to the appropriate torque. C-arm fluoroscopy was brought in and used to obtain images to confirm appropriate hardware level and placement. This was felt to be satisfactory. With this, the wound was liberally irrigated and a hemovac drain was inserted through a separate incision in a subfascial plane. The lumbodorsal fascia was approximated with a # 1 Vicryl suture in an interrupted fashion. The subcutaneous tissue and skin were approximated in a layered fashion. Benzoin and Steri-Strips were applied. Sterile dressings were applied. The patient tolerated the procedure well and was returned to the postanesthesia care unit in stable condition. At the end of the case, all sponge, needle, and instrument counts were correct.       Shereen Mo MD

## 2017-02-07 NOTE — PROGRESS NOTES
Pre-op prayer request received. Prayer and support given to patient and family. Rev.  [employer]

## 2017-02-07 NOTE — PERIOP NOTES
TRANSFER - OUT REPORT:    Verbal report given to [facility] RN(name) on Shaun Scales  being transferred to room 703(unit) for routine post - op       Report consisted of patients Situation, Background, Assessment and   Recommendations(SBAR). Information from the following report(s) SBAR, Kardex, OR Summary, Intake/Output and MAR was reviewed with the receiving nurse. Lines:   Peripheral IV 02/24/16 Right Hand (Active)       Peripheral IV 02/07/17 Left Wrist (Active)   Site Assessment Clean, dry, & intact 2/7/2017  5:02 PM   Phlebitis Assessment 0 2/7/2017  5:02 PM   Infiltration Assessment 0 2/7/2017  5:02 PM   Dressing Status Clean, dry, & intact 2/7/2017  5:02 PM   Dressing Type Transparent;Tape 2/7/2017  5:02 PM   Hub Color/Line Status Pink; Infusing 2/7/2017  5:02 PM       Peripheral IV 02/07/17 Right Hand (Active)   Site Assessment Clean, dry, & intact 2/7/2017  5:02 PM   Phlebitis Assessment 0 2/7/2017  5:02 PM   Infiltration Assessment 0 2/7/2017  5:02 PM   Dressing Status Clean, dry, & intact 2/7/2017  5:02 PM   Dressing Type Transparent;Tape 2/7/2017  5:02 PM   Hub Color/Line Status Green;Flushed;Capped 2/7/2017  5:02 PM        Opportunity for questions and clarification was provided. Patient transported with:   O2 @ 2 liters    VTE prophylaxis orders have been written for Shaun Scales. Patient given room number and nurses name. Family updated re: pt status after security code verified.

## 2017-02-08 LAB
HCT VFR BLD AUTO: 35.8 % (ref 35.8–46.3)
HGB BLD-MCNC: 10.9 G/DL (ref 11.7–15.4)
MM INDURATION POC: 0 MM (ref 0–5)
PPD POC: NEGATIVE NEGATIVE

## 2017-02-08 PROCEDURE — 74011250636 HC RX REV CODE- 250/636: Performed by: ORTHOPAEDIC SURGERY

## 2017-02-08 PROCEDURE — 51798 US URINE CAPACITY MEASURE: CPT

## 2017-02-08 PROCEDURE — 36415 COLL VENOUS BLD VENIPUNCTURE: CPT | Performed by: ORTHOPAEDIC SURGERY

## 2017-02-08 PROCEDURE — 77030027138 HC INCENT SPIROMETER -A

## 2017-02-08 PROCEDURE — 97530 THERAPEUTIC ACTIVITIES: CPT

## 2017-02-08 PROCEDURE — 74011250637 HC RX REV CODE- 250/637: Performed by: ORTHOPAEDIC SURGERY

## 2017-02-08 PROCEDURE — 97166 OT EVAL MOD COMPLEX 45 MIN: CPT

## 2017-02-08 PROCEDURE — 85018 HEMOGLOBIN: CPT | Performed by: ORTHOPAEDIC SURGERY

## 2017-02-08 PROCEDURE — 77010033678 HC OXYGEN DAILY

## 2017-02-08 PROCEDURE — 65270000029 HC RM PRIVATE

## 2017-02-08 PROCEDURE — 97162 PT EVAL MOD COMPLEX 30 MIN: CPT

## 2017-02-08 PROCEDURE — 94760 N-INVAS EAR/PLS OXIMETRY 1: CPT

## 2017-02-08 RX ADMIN — GABAPENTIN 300 MG: 300 CAPSULE ORAL at 05:29

## 2017-02-08 RX ADMIN — DOCUSATE SODIUM 100 MG: 100 CAPSULE ORAL at 16:33

## 2017-02-08 RX ADMIN — OXYCODONE HYDROCHLORIDE 5 MG: 5 TABLET ORAL at 23:32

## 2017-02-08 RX ADMIN — ACETAMINOPHEN 650 MG: 325 TABLET, FILM COATED ORAL at 02:41

## 2017-02-08 RX ADMIN — ACETAMINOPHEN 650 MG: 325 TABLET, FILM COATED ORAL at 21:10

## 2017-02-08 RX ADMIN — SODIUM CHLORIDE 500 ML: 900 INJECTION, SOLUTION INTRAVENOUS at 03:26

## 2017-02-08 RX ADMIN — DOCUSATE SODIUM 100 MG: 100 CAPSULE ORAL at 10:06

## 2017-02-08 RX ADMIN — GABAPENTIN 300 MG: 300 CAPSULE ORAL at 21:12

## 2017-02-08 RX ADMIN — ACETAMINOPHEN 650 MG: 325 TABLET, FILM COATED ORAL at 10:06

## 2017-02-08 RX ADMIN — GABAPENTIN 300 MG: 300 CAPSULE ORAL at 15:19

## 2017-02-08 RX ADMIN — POTASSIUM CHLORIDE 20 MEQ: 20 TABLET, EXTENDED RELEASE ORAL at 10:06

## 2017-02-08 RX ADMIN — ATORVASTATIN CALCIUM 20 MG: 10 TABLET, FILM COATED ORAL at 21:11

## 2017-02-08 RX ADMIN — SERTRALINE HYDROCHLORIDE 75 MG: 50 TABLET ORAL at 10:06

## 2017-02-08 RX ADMIN — CEFAZOLIN 2 G: 1 INJECTION, POWDER, FOR SOLUTION INTRAMUSCULAR; INTRAVENOUS; PARENTERAL at 15:19

## 2017-02-08 RX ADMIN — OXYCODONE HYDROCHLORIDE 5 MG: 5 TABLET ORAL at 17:23

## 2017-02-08 RX ADMIN — OXYCODONE HYDROCHLORIDE 5 MG: 5 TABLET ORAL at 16:33

## 2017-02-08 RX ADMIN — CEFAZOLIN 2 G: 1 INJECTION, POWDER, FOR SOLUTION INTRAMUSCULAR; INTRAVENOUS; PARENTERAL at 05:29

## 2017-02-08 RX ADMIN — ACETAMINOPHEN 650 MG: 325 TABLET, FILM COATED ORAL at 15:19

## 2017-02-08 RX ADMIN — Medication 10 ML: at 21:13

## 2017-02-08 RX ADMIN — SODIUM CHLORIDE, SODIUM LACTATE, POTASSIUM CHLORIDE, AND CALCIUM CHLORIDE 75 ML/HR: 600; 310; 30; 20 INJECTION, SOLUTION INTRAVENOUS at 05:32

## 2017-02-08 RX ADMIN — Medication 10 ML: at 05:29

## 2017-02-08 RX ADMIN — FAMOTIDINE 20 MG: 20 TABLET ORAL at 21:12

## 2017-02-08 NOTE — PROGRESS NOTES
Assessment complete via flow sheet. Patient drowsy and oriented X 4. Respirations even and unlabored. Breath sounds diminished. Abd soft and non tender. Midline incision to back with dry dressing is clean, dry and intact. Hemovac drain to back with serosanguinous drainage. Richard catheter draining clear, yellow urine. Patient denies pain or nausea. IVF bolus infusing with out difficulty. SCD's and WHITNEY hose in place. Family at bedside. Patient instructed to call with needs. Bed low/locked with call light with in reach. Bed alarm in place.

## 2017-02-08 NOTE — PROGRESS NOTES
Problem: Mobility Impaired (Adult and Pediatric)  Goal: *Acute Goals and Plan of Care (Insert Text)  LTG:  (1.)Ms. Macy Runner will move from supine to sit and sit to supine , scoot up and down and roll side to side in bed with SUPERVISION within 7 day(s). (2.)Ms. Macy Runner will transfer from bed to chair and chair to bed with SUPERVISION using the least restrictive device within 7 day(s). (3.)Ms. Macy Runner will ambulate with SUPERVISION for 100 feet with the least restrictive device within 7 day(s). ________________________________________________________________________________________________      PHYSICAL THERAPY: INITIAL ASSESSMENT, AM 2/8/2017  INPATIENT: Hospital Day: 2  Payor: Radha Mir / Plan: Tracie Marrero / Product Type: Medicare /      NAME/AGE/GENDER: Zander Veronica is a 66 y.o. female            PRIMARY DIAGNOSIS: Lumbar spinal stenosis [M48.06]  Spondylolisthesis, unspecified spinal region [M43.10] <principal problem not specified> <principal problem not specified>  Procedure(s) (LRB):  L4-S1 LAMINECTOMY AND FUSION WITH BONE MARROW ASPIRATE, ALLOGRAFT, AND INSTRUMENTATION, TLIF (N/A)  1 Day Post-Op  ICD-10: Treatment Diagnosis:       · Generalized Muscle Weakness (M62.81)  · Difficulty in walking, Not elsewhere classified (R26.2)  · Low Back Pain (M54.5)   Precaution/Allergies:  Bees [hymenoptera allergenic extract]; Adhesive tape-silicones; Lescol [fluvastatin]; Lipitor [atorvastatin]; Lisinopril; Penicillin g; and Tetracycline       ASSESSMENT:      Ms. Macy Runner presents to physical therapy s/p L4-S1 laminectomy. Pt is resting in supine with BP 98/42, HR 61 and 5/10 pain. Pt agreeable to PT assessment. Pt lives in mobile home with  and is his full time caregiver, assisting with all ADLs and IADLs. Pt has rolling walker and uses it occasionally. Able to state spinal precautions without cueing. Pt able to perform log roll techniques with CGA and minimal cueing.  Pt requires minimal assist x2 to sit at EOB; /53 and HR 63. Pt stands with 2 attempts and minimal assist x2 with RW. Reports her legs feel weak. BP 93/48, HR 76 upon standing. Able to take 3 steps sideways towards HOB with RW. Further activity/chair transfers not performed today due to pt with symptomatic hypotension. Pt was returned to bed afterwards with all needs met. Ms. Ana Sharma acknowledges that she may need to go to rehabilitation facility at D/C to return to baseline function. Will continue with acute PT per plan of care. This section established at most recent assessment   PROBLEM LIST (Impairments causing functional limitations):  1. Decreased Strength  2. Decreased ADL/Functional Activities  3. Decreased Transfer Abilities  4. Decreased Ambulation Ability/Technique  5. Decreased Balance  6. Increased Pain  7. Decreased Activity Tolerance    INTERVENTIONS PLANNED: (Benefits and precautions of physical therapy have been discussed with the patient.)  1. Balance Exercise  2. Bed Mobility  3. Family Education  4. Gait Training  5. Therapeutic Activites  6. Therapeutic Exercise/Strengthening  7. Transfer Training  8. Group Therapy      TREATMENT PLAN: Frequency/Duration: twice daily for duration of hospital stay  Rehabilitation Potential For Stated Goals: GOOD      RECOMMENDED REHABILITATION/EQUIPMENT: (at time of discharge pending progress): Continue Skilled Therapy and Rehab. HISTORY:   History of Present Injury/Illness (Reason for Referral):  Per H&P, \"Patient has had low back pain with radiation to the buttocks and lower extremities for an extended period of time. The symptoms and exam findings were felt to be consistent with neurogenic claudication. The preoperative radiographs and other imaging confirmed showed spondylolisthesis and stenosis. Conservative measures have been exhausted as outlined.  The symptoms progressed to the point where there is difficulty performing any task that requires prolonged standing or walking which interfered with activities of daily living and ability to enjoy life. In the outpatient setting the risks, benefits and potential complications of the above-listed procedure were discussed with her and an informed consent was obtained. \"  Past Medical History/Comorbidities:   Ms. Huey Newberry  has a past medical history of Allergic rhinitis, cause unspecified (1/7/2015); Aortic valve disorders (1/7/2015); Arthritis; Arthropathies (1/7/2015); Asthma; Autoimmune disease ([facility] CAD (coronary artery disease); Carotid stenosis (1/7/2015); Carpal tunnel syndrome (01/07/2015); Chronic pain; Contact dermatitis and other eczema, due to unspecified cause (1/7/2015); Depression (6/30/2015); Dysuria (1/7/2015); Esophageal reflux (1/7/2015); Fibromyalgia; Former smoker; GERD (gastroesophageal reflux disease); Heart failure ([facility] Hematuria, unspecified (1/7/2015); Hypertension; Mitral valve disorders (1/7/2015); Mixed hyperlipidemia (4/21/2016); Neuropathy; Occlusion and stenosis of carotid artery without mention of cerebral infarction (1/7/2015); Osteopenia (1/7/2015); Other and unspecified hyperlipidemia (1/7/2015); Other B-complex deficiencies (1/7/2015); Other chest pain (3/7/2011); 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 She also has no past medical history of Unspecified adverse effect of anesthesia.   Ms. Huey Newberry  has a past surgical history that includes hysterectomy; tubal ligation; lap cholecystectomy; colonoscopy; hemorrhoidectomy; bladder suspension; shoulder arthroscopy (Right); gi; gi; and urological.  Social History/Living Environment:   Home Environment: Private residence  # Steps to Enter: 0  Wheelchair Ramp: Yes  One/Two Story Residence: One story  Living Alone: No  Support Systems: Spouse/Significant Other/Partner  Patient Expects to be Discharged to[de-identified] Rehabilitation facility  Current DME Used/Available at Home: Bernard Pako, rolling  Prior Level of Function/Work/Activity:  Ms. Beckie Santacruz is a full-time caregiver for her , helping with all medical needs and assisting with ADLs. Her  is currently in the hospital. Drives. She occasionally used a rolling walker when back pain increased. She has two sons that live in the area and assist as needed. Number of Personal Factors/Comorbidities that affect the Plan of Care:  Full-time caregiver  Low BP  New spinal precautions 3+: HIGH COMPLEXITY   EXAMINATION:   Most Recent Physical Functioning:   Gross Assessment:  AROM: Generally decreased, functional  Strength: Generally decreased, functional  Sensation: Impaired               Posture:  Posture (WDL): Exceptions to WDL  Posture Assessment: Trunk flexion, Rounded shoulders, Forward head  Balance:  Sitting: Impaired  Sitting - Static: Fair (occasional)  Sitting - Dynamic: Fair (occasional)  Standing: Impaired  Standing - Static: Constant support  Standing - Dynamic : Poor Bed Mobility:  Rolling: Contact guard assistance  Supine to Sit: Minimum assistance;Assist x2  Sit to Supine: Minimum assistance;Assist x2  Scooting: Minimum assistance  Wheelchair Mobility:     Transfers:  Sit to Stand: Minimum assistance;Assist x2  Stand to Sit: Minimum assistance  Gait:             Body Structures Involved:  1. Nerves  2. Bones  3. Joints  4. Muscles Body Functions Affected:  1. Sensory/Pain  2. Neuromusculoskeletal  3. Movement Related  4. Skin Related Activities and Participation Affected:  1. General Tasks and Demands  2. Mobility  3. Self Care  4.  Community, Social and [place]   Number of elements that affect the Plan of Care: 4+: HIGH COMPLEXITY   CLINICAL PRESENTATION:   Presentation: Evolving clinical presentation with changing clinical characteristics: MODERATE COMPLEXITY   CLINICAL DECISION MAKING:   [employer] AM[employer]AC[employer] 6 Clicks   Basic Mobility Inpatient Short Form  How much difficulty does the patient currently have. .. Unable A Lot A Little None   1. Turning over in bed (including adjusting bedclothes, sheets and blankets)? [ ] 1   [ ] 2   [X] 3   [ ] 4   2. Sitting down on and standing up from a chair with arms ( e.g., wheelchair, bedside commode, etc.)   [ ] 1   [ ] 2   [X] 3   [ ] 4   3. Moving from lying on back to sitting on the side of the bed? [ ] 1   [ ] 2   [X] 3   [ ] 4   How much help from another person does the patient currently need. .. Total A Lot A Little None   4. Moving to and from a bed to a chair (including a wheelchair)? [ ] 1   [X] 2   [ ] 3   [ ] 4   5. Need to walk in hospital room? [ ] 1   [X] 2   [ ] 3   [ ] 4   6. Climbing 3-5 steps with a railing? [X] 1   [ ] 2   [ ] 3   [ ] 4   © 2007, Trustees of [address] [place] [address] [address] [address], under license to [employer]. All rights reserved    Score:  Initial: 14 Most Recent: 14 (Date: 2/8/17 )     Interpretation of Tool:  Represents activities that are increasingly more difficult (i.e. Bed mobility, Transfers, Gait). Score 24 23 22-20 19-15 14-10 9-7 6       Modifier CH CI CJ CK CL CM CN         · Mobility - Walking and Moving Around:               - CURRENT STATUS:    CL - 60%-79% impaired, limited or restricted               - GOAL STATUS:           CK - 40%-59% impaired, limited or restricted               - D/C STATUS:                       ---------------To be determined---------------  Payor: Leopoldo Mick / Plan: [place] [employer] / Product Type: Medicare /       Medical Necessity:     · Patient is expected to demonstrate progress in strength, balance and functional technique to decrease assistance required with bed mobility, transfers, and ambulation. Reason for Services/Other Comments:  · Patient requires assistance with functional mobility and activity tolerance is limited by low BP. Felice Ovalle    Use of outcome tool(s) and clinical judgement create a POC that gives a: Questionable prediction of patient's progress: MODERATE COMPLEXITY                 TREATMENT:   (In addition to Assessment/Re-Assessment sessions the following treatments were rendered)   Pre-treatment Symptoms/Complaints:  \"I feel a little dizzy\"  Pain: Initial:   Pain Intensity 1: 5 (Unable to take pain meds d/t BP)  Pain Location 1: Back  Pain Orientation 1: Lower  Pain Intervention(s) 1: Nurse notified  Post Session:  6/10         Assessment/Reassessment only, no treatment provided today     Braces/Orthotics/Lines/Etc:   · IV  · mark catheter  · drain BONIFACIO  · O2 Device: Nasal cannula  Treatment/Session Assessment:    · Response to Treatment:  Pt was able to tolerate EOB sitting but drop in BP noted upon standing  · Interdisciplinary Collaboration:  · Physical Therapist  · Registered Nurse  · Discussed with OT  · After treatment position/precautions:  · Supine in bed  · Bed alarm/tab alert on  · Bed/Chair-wheels locked  · Bed in low position  · Call light within reach  · Visitors at bedside  · Compliance with Program/Exercises: compliant all of the time. · Recommendations/Intent for next treatment session: \"Next visit will focus on advancements to more challenging activities and reduction in assistance provided\".   Total Treatment Duration:  PT Patient Time In/Time Out  Time In: 0912  Time Out: [address], DPT

## 2017-02-08 NOTE — PROGRESS NOTES
Pt's BP 87/47. Pt was aroused, more alert. BP now 97/52. UOP increased from approximately 8 ml/hr to approximately 13 ml/hr.

## 2017-02-08 NOTE — PROGRESS NOTES
Problem: Self Care Deficits Care Plan (Adult)  Goal: *Acute Goals and Plan of Care (Insert Text)  1. Patient will verbalize and demonstrate understanding of spinal precautions with 100% accuracy during ADLs. 2. Patient will complete lower body bathing and dressing with set-up and adaptive equipment as needed. 3. Patient will complete functional transfers with supervision and adaptive equipment as needed. 4. Patient will complete toileting and toilet transfer with supervision. 5. Patient will complete functional mobility of household distances with CGA and adaptive equipment as needed. 6. Patient will demonstrate ability to log roll in bed with supervision and no verbal cues from therapist.     Timeframe: 7 visits        OCCUPATIONAL THERAPY: Initial Assessment 2/8/2017  INPATIENT: Hospital Day: 2  Payor: Leopoldo Cantor / Plan: Chay Licona / Product Type: Medicare /      NAME/AGE/GENDER: Darline Alvarez is a 66 y.o. female            PRIMARY DIAGNOSIS:  Lumbar spinal stenosis [M48.06]  Spondylolisthesis, unspecified spinal region [M43.10] Lumbar stenosis with neurogenic claudication Lumbar stenosis with neurogenic claudication  Procedure(s) (LRB):  L4-S1 LAMINECTOMY AND FUSION WITH BONE MARROW ASPIRATE, ALLOGRAFT, AND INSTRUMENTATION, TLIF (N/A)  1 Day Post-Op  ICD-10: Treatment Diagnosis:        · Low Back Pain (M54.5)   Precautions/Allergies:        spinal precautions   Bees [hymenoptera allergenic extract]; Adhesive tape-silicones; Lescol [fluvastatin]; Lipitor [atorvastatin]; Lisinopril; Penicillin g; and Tetracycline       ASSESSMENT:      Ms. Beckie Santacruz is a 66year old female admitted for the above procedure. Patient lives with spouse and is typically independent with ADLs. 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 She has a walker to use as needed for ambulation.  Patient standing at edge of bed with PT upon arrival. Demonstrates fair static/ poor dynamic standing balance. Able to sit edge of bed with CGA and required log roll with maximal verbal cues to return to supine. Patient's BP was 113/53 in sitting and dropped to 93/48 in standing. Patient did c/o dizziness. Patient reports pain 5/10 in back. RN aware but unable to give pain meds due to low BP. Patient is currently functioning below her baseline and is unable to perform her caregiving duties for her spouse. Will plan to follow for duration of hospital stay. This section established at most recent assessment   PROBLEM LIST (Impairments causing functional limitations):  1. Decreased Strength  2. Decreased ADL/Functional Activities  3. Decreased Transfer Abilities  4. Decreased Ambulation Ability/Technique  5. Decreased Balance  6. Increased Pain  7. Decreased Activity Tolerance  8. Increased Fatigue  9. Decreased Flexibility/Joint Mobility    INTERVENTIONS PLANNED: (Benefits and precautions of occupational therapy have been discussed with the patient.)  1. Activities of daily living training  2. Adaptive equipment training  3. Group therapy  4. Theraputic activity  5. Theraputic exercise      TREATMENT PLAN: Frequency/Duration: Follow patient 3x/ week to address above goals. Rehabilitation Potential For Stated Goals: GOOD      RECOMMENDED REHABILITATION/EQUIPMENT: (at time of discharge pending progress): Continue Skilled Therapy. OCCUPATIONAL PROFILE AND HISTORY:   History of Present Injury/Illness (Reason for Referral):  S/p above procedure   Past Medical History/Comorbidities:   Ms. Alem Adler  has a past medical history of Allergic rhinitis, cause unspecified (1/7/2015); Aortic valve disorders (1/7/2015); Arthritis; Arthropathies (1/7/2015); Asthma; Autoimmune disease ([facility] CAD (coronary artery disease); Carotid stenosis (1/7/2015); Carpal tunnel syndrome (01/07/2015);  Chronic pain; Contact dermatitis and other eczema, due to unspecified cause (1/7/2015); Depression (6/30/2015); Dysuria (1/7/2015); Esophageal reflux (1/7/2015); Fibromyalgia; Former smoker; GERD (gastroesophageal reflux disease); Heart failure ([facility] Hematuria, unspecified (1/7/2015); Hypertension; Mitral valve disorders (1/7/2015); Mixed hyperlipidemia (4/21/2016); Neuropathy; Occlusion and stenosis of carotid artery without mention of cerebral infarction (1/7/2015); Osteopenia (1/7/2015); Other and unspecified hyperlipidemia (1/7/2015); Other B-complex deficiencies (1/7/2015); Other chest pain (3/7/2011); 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 She also has no past medical history of Unspecified adverse effect of anesthesia. Ms. Jacinta Durham  has a past surgical history that includes hysterectomy; tubal ligation; lap cholecystectomy; colonoscopy; hemorrhoidectomy; bladder suspension; shoulder arthroscopy (Right); gi; gi; and urological.  Social History/Living Environment:   Home Environment: Private residence  # Steps to Enter: 0  Wheelchair Ramp: Yes  One/Two Story Residence: One story  Living Alone: No  Support Systems: Spouse/Significant Other/Partner  Patient Expects to be Discharged to[de-identified] Rehabilitation facility  Current DME Used/Available at Home: Walker, rolling  Prior Level of Function/Work/Activity:  Patient lives with spouse. 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 She helps with his ADLs including bathing. She is independent with her own ADLs and IADLs. She has a walker to use as needed.    Personal Factors:          Profession:  Caregiver    Number of Personal Factors/Comorbidities that affect the Plan of Care: Extensive review of physical, cognitive, and psychosocial performance (3+):  HIGH COMPLEXITY   ASSESSMENT OF OCCUPATIONAL PERFORMANCE[de-identified]   Activities of Daily Living: Basic ADLs (From Assessment) Complex ADLs (From Assessment)   Basic ADL  Feeding: Setup  Oral Facial Hygiene/Grooming: Setup  Bathing: Moderate assistance  Upper Body Dressing: Minimum assistance  Lower Body Dressing: Maximum assistance  Toileting: Moderate assistance Instrumental ADL  Meal Preparation: Maximum assistance  Homemaking: Maximum assistance   Grooming/Bathing/Dressing Activities of Daily Living     Cognitive Retraining  Safety/Judgement: Awareness of environment; Fall prevention                       Bed/Mat Mobility  Rolling: Contact guard assistance  Supine to Sit: Minimum assistance;Assist x2  Sit to Supine: Minimum assistance;Assist x2  Sit to Stand: Minimum assistance;Assist x2  Scooting: Minimum assistance          Most Recent Physical Functioning:   Gross Assessment:  AROM: Generally decreased, functional (BUE)  Strength: Generally decreased, functional (BUE)               Posture:  Posture (WDL): Exceptions to WDL  Posture Assessment: Trunk flexion, Rounded shoulders, Forward head  Balance:  Sitting: Impaired  Sitting - Static: Fair (occasional)  Sitting - Dynamic: Fair (occasional)  Standing: Impaired  Standing - Static: Constant support  Standing - Dynamic : Poor Bed Mobility:  Rolling: Contact guard assistance  Supine to Sit: Minimum assistance;Assist x2  Sit to Supine: Minimum assistance;Assist x2  Scooting: Minimum assistance  Wheelchair Mobility:     Transfers:  Sit to Stand: Minimum assistance;Assist x2  Stand to Sit: Minimum assistance      Observation/Orthostatic Postural Assessment:          Sittin/53  Standin/48 +dizziness              Patient Vitals for the past 6 hrs:       BP BP Patient Position SpO2 O2 Flow Rate (L/min) Pulse   17 0741 100/48 Sitting 98 % 3 l/min 62   17 0912 98/42 Supine; At rest - - 61   17 0926 113/53 Sitting - - -   17 0930 93/48 Standing - - -   17 1013 98/48 - - - -   17 1145 106/58 Sitting 95 % 3 l/min 62 Mental Status  Neurologic State: Drowsy  Orientation Level: Oriented X4  Cognition: Follows commands  Perception: Appears intact  Perseveration: No perseveration noted  Safety/Judgement: Awareness of environment, Fall prevention                               Physical Skills Involved:  1. Range of Motion  2. Balance  3. Mobility  4. Strength  5. Endurance Cognitive Skills Affected (resulting in the inability to perform in a timely and safe manner):  1. None Psychosocial Skills Affected:  1. Active Use of Coping Strategies  2. Environmental Adaptations   Number of elements that affect the Plan of Care: 5+:  HIGH COMPLEXITY   CLINICAL DECISION MAKIN [facility] Mobility Inpatient Short Form  How much help from another person does the patient currently need. .. Total A Lot A Little None   1. Putting on and taking off regular lower body clothing?   [ ] 1   [X] 2   [ ] 3   [ ] 4   2. Bathing (including washing, rinsing, drying)? [ ] 1   [X] 2   [ ] 3   [ ] 4   3. Toileting, which includes using toilet, bedpan or urinal?   [ ] 1   [X] 2   [ ] 3   [ ] 4   4. Putting on and taking off regular upper body clothing?   [ ] 1   [ ] 2   [X] 3   [ ] 4   5. Taking care of personal grooming such as brushing teeth? [ ] 1   [ ] 2   [X] 3   [ ] 4   6. Eating meals? [ ] 1   [ ] 2   [X] 3   [ ] 4   © , Trustees of [address] [place], [place] [address], under license to [employer]. All rights reserved    Score:  Initial: 15 Most Recent: X (Date: -- )     Interpretation of Tool:  Represents activities that are increasingly more difficult (i.e. Bed mobility, Transfers, Gait).        Score 24 23 22-20 19-15 14-10 9-7 6       Modifier CH CI CJ CK CL CM CN         · Self Care:               - CURRENT STATUS:    CK - 40%-59% impaired, limited or restricted               - GOAL STATUS:           CJ - 20%-39% impaired, limited or restricted               - D/C STATUS: ---------------To be determined---------------  Payor: Jamilah Oswald / Plan: [place] [employer] / Product Type: Medicare /       Medical Necessity:     · Patient demonstrates good rehab potential due to higher previous functional level. Reason for Services/Other Comments:  · Patient continues to require present interventions due to patient's inability to care for self while maintaining spinal precautions. Use of outcome tool(s) and clinical judgement create a POC that gives a: MODERATE COMPLEXITY             TREATMENT:   (In addition to Assessment/Re-Assessment sessions the following treatments were rendered)      Pre-treatment Symptoms/Complaints:  None  Pain: Initial:   Pain Intensity 1: 6  Pain Intervention(s) 1: Nurse notified (unable to have pain meds due to BP)  Post Session:  Same      Assessment/Reassessment only, no treatment provided today     Braces/Orthotics/Lines/Etc:   · IV  · drain    · O2 Device: Nasal cannula  Treatment/Session Assessment:    · Response to Treatment:  Dizziness  · Interdisciplinary Collaboration:  · Occupational Therapist  · Registered Nurse  · After treatment position/precautions:  · Supine in bed  · Bed alarm/tab alert on  · Bed/Chair-wheels locked  · Bed in low position  · Call light within reach  · RN notified  · Compliance with Program/Exercises: compliant all of the time. · Recommendations/Intent for next treatment session: \"Next visit will focus on advancements to more challenging activities and reduction in assistance provided\".   Total Treatment Duration:  OT Patient Time In/Time Out  Time In: 0926  Time Out: [address] [place]/ANUSHKA

## 2017-02-08 NOTE — PROGRESS NOTES
ORTHO PROGRESS NOTE    2017    Admit Date: 2017  Admit Diagnosis: Lumbar spinal stenosis [M48.06]  Spondylolisthesis, unspecified spinal region [M43.10]  Post Op day: 1 Day Post-Op      Subjective:     Sae Goodson is a patient who is now 1 Day Post-Op  and has complaints  of hypotension. Given iV bolus and holding opiates along with BP meds. .       Objective:     PT/OT: slowly progressing        Vital Signs:    Patient Vitals for the past 8 hrs:   BP Temp Pulse Resp SpO2   17 1013 98/48 - - - -   17 0912 98/42 - 61 - -   17 0741 100/48 98 °F (36.7 °C) 62 19 98 %   17 0531 97/52 - (!) 58 - -   17 0528 (!) 87/47 - 63 - -     Temp (24hrs), Av °F (36.7 °C), Min:97.6 °F (36.4 °C), Max:98.8 °F (37.1 °C)      LAB:    Recent Labs      17   0328  17   0955   HGB  10.9*  13.8   WBC   --   6.9   PLT   --   208       I/O:   0701 -  1900  In: 240 [P.O.:240]  Out: -    1901 -  0700  In: 2648 [I.V.:2648]  Out: 4662 [Urine:495; Drains:100]    Physical Exam:    Awake and in no acute distress. Mood and affect appropriate. Respirations unlabored and no evidence cyanosis. Calves nontender. Abdomen soft and nontender. Dressing clean/dry  No new neurologic deficit.     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    Other malaise and fatigue R53.81, R53.83    Allergic rhinitis, cause unspecified J30.9    Aortic valve disorders I35.9    Extrinsic asthma, unspecified J45.909    Respiratory abnormality, unspecified J98.9    Congestive heart failure, unspecified I50.9    Occlusion and stenosis of carotid artery without mention of cerebral infarction I65.29    Carpal tunnel syndrome G56.00    Unspecified urinary incontinence R32    Carotid stenosis I65.29    Renal insufficiency N28.9    Heart disease, unspecified I51.9    Shortness of breath R06.02    Osteopenia M85.80    Depression F32.9    RENEE (stress urinary incontinence, female) N39.3    Rectocele N81.6    Weakening of rectovaginal tissue N81.83    Mixed hyperlipidemia E78.2    Asymptomatic carotid artery stenosis I65.29    Valvular heart disease I38    Coronary artery disease involving native coronary artery of native heart without angina pectoris I25.10    On potassium wasting diuretic therapy Z79.899    Obesity (BMI 30.0-34. 9) E66.9    Lumbar stenosis with neurogenic claudication M48.06       1 Day Post-Op STATUS POST Procedure(s):  L4-S1 LAMINECTOMY AND FUSION WITH BONE MARROW ASPIRATE, ALLOGRAFT, AND INSTRUMENTATION, TLIF      Plan:     Continue PT/OT/Rehab  Discontinue: mark  Consult: none  Anticipate discharge to: HOME vs rehab   May give additional 250cc  NS bolus if BP in low 80s.  Will continue to monitor, HGB stable this am       Signed By: Romana Party, NP

## 2017-02-08 NOTE — PROGRESS NOTES
Patient's BP 87/49. Patient drowsy but arousable. Family at bedside. Dr. Eldridge Libman, on call ortho, called. Order received for H&H in the AM and 500 cc NS bolus and to avoid pain meds until BP gets better.

## 2017-02-08 NOTE — PROGRESS NOTES
Problem: Mobility Impaired (Adult and Pediatric)  Goal: *Acute Goals and Plan of Care (Insert Text)  LTG:  (1.)Ms. Mara Gamez will move from supine to sit and sit to supine , scoot up and down and roll side to side in bed with SUPERVISION within 7 day(s). (2.)Ms. Mara Gamez will transfer from bed to chair and chair to bed with SUPERVISION using the least restrictive device within 7 day(s). (3.)Ms. Mara Gamez will ambulate with SUPERVISION for 100 feet with the least restrictive device within 7 day(s). ________________________________________________________________________________________________      PHYSICAL THERAPY: Daily Note, Treatment Day: Day of Assessment and PM 2/8/2017  INPATIENT: Hospital Day: 2  Payor: Rickie Young / Plan: Easton Patel / Product Type: Medicare /      NAME/AGE/GENDER: Maxwell Trotter is a 66 y.o. female            PRIMARY DIAGNOSIS: Lumbar spinal stenosis [M48.06]  Spondylolisthesis, unspecified spinal region [M43.10] Lumbar stenosis with neurogenic claudication Lumbar stenosis with neurogenic claudication  Procedure(s) (LRB):  L4-S1 LAMINECTOMY AND FUSION WITH BONE MARROW ASPIRATE, ALLOGRAFT, AND INSTRUMENTATION, TLIF (N/A)  1 Day Post-Op  ICD-10: Treatment Diagnosis:       · Generalized Muscle Weakness (M62.81)  · Difficulty in walking, Not elsewhere classified (R26.2)  · Low Back Pain (M54.5)   Precaution/Allergies:  Bees [hymenoptera allergenic extract]; Adhesive tape-silicones; Lescol [fluvastatin]; Lipitor [atorvastatin]; Lisinopril; Penicillin g; and Tetracycline       ASSESSMENT:      Ms. Mara Gamez presents in supine with C/O increased pain 8/10. Pt eager to participate in PT session. Able to state sequencing and perform log roll technique without verbal cueing, but requires MIN assist x2 with bed mobility. Pt reports some increase in dizziness seated EOB but vitals indicate rise in BP. O2 sats 97% with NC, removed for activity.  Pt stands with MIN assist x2 and slight decrease in BP. Pt able to take a few steps towards chair but one LOB noted d/t knees buckling. Pt sat in chair for a few minutes with increased pain due to positioning. O2 sats 94% on room air. Returned pt to bed d/t large drop in BP (85/46) with MIN assist x2. Pt will benefit from continued physical therapy to increase upright tolerance, decrease pain, and improve independence with functional activity prior to D/C to rehab. This section established at most recent assessment   PROBLEM LIST (Impairments causing functional limitations):  1. Decreased Strength  2. Decreased ADL/Functional Activities  3. Decreased Transfer Abilities  4. Decreased Ambulation Ability/Technique  5. Decreased Balance  6. Increased Pain  7. Decreased Activity Tolerance    INTERVENTIONS PLANNED: (Benefits and precautions of physical therapy have been discussed with the patient.)  1. Balance Exercise  2. Bed Mobility  3. Family Education  4. Gait Training  5. Therapeutic Activites  6. Therapeutic Exercise/Strengthening  7. Transfer Training  8. Group Therapy      TREATMENT PLAN: Frequency/Duration: twice daily for duration of hospital stay  Rehabilitation Potential For Stated Goals: GOOD      RECOMMENDED REHABILITATION/EQUIPMENT: (at time of discharge pending progress): Continue Skilled Therapy and Rehab. HISTORY:   History of Present Injury/Illness (Reason for Referral):  Per H&P, \"Patient has had low back pain with radiation to the buttocks and lower extremities for an extended period of time. The symptoms and exam findings were felt to be consistent with neurogenic claudication. The preoperative radiographs and other imaging confirmed showed spondylolisthesis and stenosis. Conservative measures have been exhausted as outlined.  The symptoms progressed to the point where there is difficulty performing any task that requires prolonged standing or walking which interfered with activities of daily living and ability to enjoy life. In the outpatient setting the risks, benefits and potential complications of the above-listed procedure were discussed with her and an informed consent was obtained. \"  Past Medical History/Comorbidities:   Ms. Letha Young  has a past medical history of Allergic rhinitis, cause unspecified (1/7/2015); Aortic valve disorders (1/7/2015); Arthritis; Arthropathies (1/7/2015); Asthma; [facility] CAD (coronary artery disease); Carotid stenosis (1/7/2015); Carpal tunnel syndrome (01/07/2015); Chronic pain; Contact dermatitis and other eczema, due to unspecified cause (1/7/2015); Depression (6/30/2015); Dysuria (1/7/2015); Esophageal reflux (1/7/2015); Fibromyalgia; Former smoker; GERD (gastroesophageal reflux disease); Heart failure ([facility] Hematuria, unspecified (1/7/2015); Hypertension; Mitral valve disorders (1/7/2015); Mixed hyperlipidemia (4/21/2016); Neuropathy; Occlusion and stenosis of carotid artery without mention of cerebral infarction (1/7/2015); Osteopenia (1/7/2015); Other and unspecified hyperlipidemia (1/7/2015); Other B-complex deficiencies (1/7/2015); Other chest pain (3/7/2011); 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 She also has no past medical history of Unspecified adverse effect of anesthesia.   Ms. Letha Young  has a past surgical history that includes hysterectomy; tubal ligation; lap cholecystectomy; colonoscopy; hemorrhoidectomy; bladder suspension; shoulder arthroscopy (Right); gi; gi; and urological.  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 Her  is currently in the hospital. Drives. She occasionally used a rolling walker when back pain increased. She has two sons that live in the area and assist as needed. Number of Personal Factors/Comorbidities that affect the Plan of Care:  Full-time caregiver  Low BP  New spinal precautions 3+: HIGH COMPLEXITY   EXAMINATION:   Most Recent Physical Functioning:   Gross Assessment:  AROM: Generally decreased, functional  Strength: Generally decreased, functional  Sensation: Impaired               Posture:  Posture (WDL): Exceptions to WDL  Posture Assessment: Trunk flexion, Rounded shoulders, Forward head  Balance:  Sitting: Impaired  Sitting - Static: Fair (occasional)  Sitting - Dynamic: Fair (occasional)  Standing: Impaired  Standing - Static: Constant support  Standing - Dynamic : Poor Bed Mobility:  Rolling: Contact guard assistance  Supine to Sit: Minimum assistance;Assist x2  Sit to Supine: Minimum assistance;Assist x2  Scooting: Contact guard assistance  Wheelchair Mobility:     Transfers:  Sit to Stand: Minimum assistance;Assist x2  Stand to Sit: Contact guard assistance  Gait:     Base of Support: Widened  Speed/María: Slow;Shuffled  Gait Abnormalities: Ataxic;Decreased step clearance  Distance (ft): 3 Feet (ft)  Assistive Device: Walker, rolling  Ambulation - Level of Assistance: Moderate assistance;Assist x2       Body Structures Involved:  1. Nerves  2. Bones  3. Joints  4. Muscles Body Functions Affected:  1. Sensory/Pain  2. Neuromusculoskeletal  3. Movement Related  4. Skin Related Activities and Participation Affected:  1. General Tasks and Demands  2. Mobility  3. Self Care  4.  Community, Social and [place]   Number of elements that affect the Plan of Care: 4+: HIGH COMPLEXITY   CLINICAL PRESENTATION:   Presentation: Evolving clinical presentation with changing clinical characteristics: MODERATE COMPLEXITY   CLINICAL DECISION MAKIN [facility] Mobility Inpatient Short Form  How much difficulty does the patient currently have. .. Unable A Lot A Little None   1. Turning over in bed (including adjusting bedclothes, sheets and blankets)? [ ] 1   [ ] 2   [X] 3   [ ] 4   2. Sitting down on and standing up from a chair with arms ( e.g., wheelchair, bedside commode, etc.)   [ ] 1   [ ] 2   [X] 3   [ ] 4   3. Moving from lying on back to sitting on the side of the bed? [ ] 1   [ ] 2   [X] 3   [ ] 4   How much help from another person does the patient currently need. .. Total A Lot A Little None   4. Moving to and from a bed to a chair (including a wheelchair)? [ ] 1   [X] 2   [ ] 3   [ ] 4   5. Need to walk in hospital room? [ ] 1   [X] 2   [ ] 3   [ ] 4   6. Climbing 3-5 steps with a railing? [X] 1   [ ] 2   [ ] 3   [ ] 4   © , Trustees of [address] [place] [address], under license to [employer]. All rights reserved    Score:  Initial: 14 Most Recent: 14 (Date: 17 )     Interpretation of Tool:  Represents activities that are increasingly more difficult (i.e. Bed mobility, Transfers, Gait).        Score 24 23 22-20 19-15 14-10 9-7 6       Modifier CH CI CJ CK CL CM CN         · Mobility - Walking and Moving Around:               - CURRENT STATUS:    CL - 60%-79% impaired, limited or restricted               - GOAL STATUS:           CK - 40%-59% impaired, limited or restricted               - D/C STATUS:                       ---------------To be determined---------------  Payor: Jamilah Oswald / Plan: [place] [employer] / Product Type: Medicare /       Medical Necessity:     · Patient is expected to demonstrate progress in strength, balance and functional technique to decrease assistance required with bed mobility, transfers, and ambulation. Reason for Services/Other Comments:  · Patient requires assistance with functional mobility and activity tolerance is limited by low BP. Albertina Hammer Use of outcome tool(s) and clinical judgement create a POC that gives a: Questionable prediction of patient's progress: MODERATE COMPLEXITY                 TREATMENT:   (In addition to Assessment/Re-Assessment sessions the following treatments were rendered)   Pre-treatment Symptoms/Complaints:  \"I'm hurting a lot more but they can't give me any medicine\"  Pain: Initial:   Pain Intensity 1: 8  Pain Location 1: Back  Pain Orientation 1: Lower  Pain Intervention(s) 1: Repositioned, Nurse notified  Post Session:  8/10         Therapeutic Activity: ( 20  ):  Therapeutic activities including Bed transfers, Chair transfers, Ambulation on level ground to improve mobility, strength and balance. Required minimal tactile cues   to promote dynamic balance in standing with RW during AMB. Braces/Orthotics/Lines/Etc:   · IV, drain hemovac and room air  Treatment/Session Assessment:    · Response to Treatment:  Pt was able to tolerate EOB sitting but drop in BP noted upon standing  · Interdisciplinary Collaboration:  · Physical Therapist and Registered Nurse  · After treatment position/precautions:  · Supine in bed, Bed alarm/tab alert on, Bed/Chair-wheels locked, Bed in low position, Call light within reach and RN notified  · Compliance with Program/Exercises: compliant all of the time. · Recommendations/Intent for next treatment session: \"Next visit will focus on advancements to more challenging activities and reduction in assistance provided\".   Total Treatment Duration:  PT Patient Time In/Time Out  Time In: 1450  Time Out: 8[address] [facility], [facility]

## 2017-02-08 NOTE — PROGRESS NOTES
Dr. Erwin Ji and Ctahryn Borrero, DAJA notified of patient's BP and low urine output. No new orders received at this time.

## 2017-02-09 LAB
MM INDURATION POC: NORMAL MM (ref 0–5)
PPD POC: NORMAL NEGATIVE

## 2017-02-09 PROCEDURE — 97150 GROUP THERAPEUTIC PROCEDURES: CPT

## 2017-02-09 PROCEDURE — 51798 US URINE CAPACITY MEASURE: CPT

## 2017-02-09 PROCEDURE — 65270000029 HC RM PRIVATE

## 2017-02-09 PROCEDURE — 74011250637 HC RX REV CODE- 250/637: Performed by: ORTHOPAEDIC SURGERY

## 2017-02-09 PROCEDURE — 77030034849

## 2017-02-09 PROCEDURE — 97530 THERAPEUTIC ACTIVITIES: CPT

## 2017-02-09 PROCEDURE — 99221 1ST HOSP IP/OBS SF/LOW 40: CPT | Performed by: PHYSICAL MEDICINE & REHABILITATION

## 2017-02-09 PROCEDURE — 74011250636 HC RX REV CODE- 250/636: Performed by: ORTHOPAEDIC SURGERY

## 2017-02-09 RX ORDER — POLYETHYLENE GLYCOL 3350 17 G/17G
17 POWDER, FOR SOLUTION ORAL DAILY
Status: DISCONTINUED | OUTPATIENT
Start: 2017-02-10 | End: 2017-02-11 | Stop reason: HOSPADM

## 2017-02-09 RX ORDER — FACIAL-BODY WIPES
10 EACH TOPICAL DAILY PRN
Status: DISCONTINUED | OUTPATIENT
Start: 2017-02-09 | End: 2017-02-11 | Stop reason: HOSPADM

## 2017-02-09 RX ORDER — SENNOSIDES 8.6 MG/1
1 TABLET ORAL DAILY
Status: DISCONTINUED | OUTPATIENT
Start: 2017-02-10 | End: 2017-02-11 | Stop reason: HOSPADM

## 2017-02-09 RX ADMIN — ACETAMINOPHEN 650 MG: 325 TABLET, FILM COATED ORAL at 09:14

## 2017-02-09 RX ADMIN — POTASSIUM CHLORIDE 20 MEQ: 20 TABLET, EXTENDED RELEASE ORAL at 09:14

## 2017-02-09 RX ADMIN — ATORVASTATIN CALCIUM 20 MG: 10 TABLET, FILM COATED ORAL at 22:21

## 2017-02-09 RX ADMIN — DOCUSATE SODIUM 100 MG: 100 CAPSULE ORAL at 09:14

## 2017-02-09 RX ADMIN — SERTRALINE HYDROCHLORIDE 75 MG: 50 TABLET ORAL at 09:14

## 2017-02-09 RX ADMIN — OXYCODONE HYDROCHLORIDE 5 MG: 5 TABLET ORAL at 18:23

## 2017-02-09 RX ADMIN — ACETAMINOPHEN 650 MG: 325 TABLET, FILM COATED ORAL at 04:12

## 2017-02-09 RX ADMIN — GABAPENTIN 300 MG: 300 CAPSULE ORAL at 13:19

## 2017-02-09 RX ADMIN — ACETAMINOPHEN 650 MG: 325 TABLET, FILM COATED ORAL at 22:21

## 2017-02-09 RX ADMIN — GABAPENTIN 300 MG: 300 CAPSULE ORAL at 22:21

## 2017-02-09 RX ADMIN — OXYCODONE HYDROCHLORIDE 10 MG: 5 TABLET ORAL at 13:19

## 2017-02-09 RX ADMIN — Medication 10 ML: at 22:00

## 2017-02-09 RX ADMIN — FUROSEMIDE 40 MG: 40 TABLET ORAL at 09:14

## 2017-02-09 RX ADMIN — ACETAMINOPHEN 650 MG: 325 TABLET, FILM COATED ORAL at 15:00

## 2017-02-09 RX ADMIN — ONDANSETRON 4 MG: 2 INJECTION INTRAMUSCULAR; INTRAVENOUS at 02:21

## 2017-02-09 RX ADMIN — OXYCODONE HYDROCHLORIDE 5 MG: 5 TABLET ORAL at 09:14

## 2017-02-09 RX ADMIN — GABAPENTIN 300 MG: 300 CAPSULE ORAL at 05:48

## 2017-02-09 RX ADMIN — FAMOTIDINE 20 MG: 20 TABLET ORAL at 22:21

## 2017-02-09 RX ADMIN — DOCUSATE SODIUM 100 MG: 100 CAPSULE ORAL at 18:23

## 2017-02-09 NOTE — PROGRESS NOTES
Met with pt at bedside, pt alert and oriented x3, prior to admission pt was independent with   ADLs, driving, lives with spouse who is also currently in hospital. Has walker and cane available at home if needed, no current HH, Insurance confirmed [employer], PCP confirmed. PPD, PT/OT working with pt    [facility] consulted and Dr Chrissy Soriano in to see pt today,  Pt agreeable to [facility] will send information to start precert to [facility]. Pt ask to see if insurance with cover admission to 9th floor before making any further referrals. Care Management Interventions  PCP Verified by CM:  Yes (Dr Jerome Degree)  Mode of Transport at Discharge:  (family)  Transition of Care Consult (CM Consult): SNF, Discharge Planning  Physical Therapy Consult: Yes  Occupational Therapy Consult: Yes  Current Support Network: Lives with Spouse, Own Home  Confirm Follow Up Transport: Family  Plan discussed with Pt/Family/Caregiver: Yes  Freedom of Choice Offered: Yes  Discharge Location  Discharge Placement: Skilled nursing facility

## 2017-02-09 NOTE — DISCHARGE INSTRUCTIONS
MAY shower (may shower with dressing on)-->NO tub baths    LEAVE dressing on incision for 3 DAYS-->then may remove   (If dressing starts to fall off may re-secure with tape)    NO lifting anything heavier than 5LBS (or heavier than a gallon of milk!)    NO Bending, Lifting or Twisting    WEAR your brace if prescribed at all times EXCEPT when in bed, just going to the   bathroom, or showering    Avoid sitting more than 20 - 30 minutes at a time    NO driving until directed by your doctor    DO NOT take any NSAIDS (either prescribed or over the counter until directed    (Aleve, Ibuprofen, Mobic, etc) as this will interfere with bone healing    CALL the doctor if:  Fever >100.5  ([phone])                 Incision becomes red/ swollen/ opens up               Incision has yellow, thick drainage or an odor               Pain is not managed with prescribed medications               Excessive nausea and/or vomiting      DISCHARGE SUMMARY from Nurse    The following personal items are in your possession at time of discharge:    Dental Appliances: Uppers  Visual Aid: Glasses        Jewelry: None  Clothing: Shirt, Pants, Footwear, Undergarments  Other Valuables: None             PATIENT INSTRUCTIONS:    After general anesthesia or intravenous sedation, for 24 hours or while taking prescription Narcotics:  · Limit your activities  · Do not drive and operate hazardous machinery  · Do not make important personal or business decisions  · Do  not drink alcoholic beverages  · If you have not urinated within 8 hours after discharge, please contact your surgeon on call.     Report the following to your surgeon:  · Excessive pain, swelling, redness or odor of or around the surgical area  · Temperature over 100.5  · Nausea and vomiting lasting longer than 4 hours or if unable to take medications  · Any signs of decreased circulation or nerve impairment to extremity: change in color, persistent  numbness, tingling, coldness or increase pain  · Any questions        What to do at Home:  Recommended activity: see discharge instructions    If you experience any of the following symptoms see discharge instructions, please follow up with surgeon. *  Please give a list of your current medications to your Primary Care Provider. *  Please update this list whenever your medications are discontinued, doses are      changed, or new medications (including over-the-counter products) are added. *  Please carry medication information at all times in case of emergency situations. These are general instructions for a healthy lifestyle:    No smoking/ No tobacco products/ Avoid exposure to second hand smoke    Surgeon General's Warning:  Quitting smoking now greatly reduces serious risk to your health. Obesity, smoking, and sedentary lifestyle greatly increases your risk for illness    A healthy diet, regular physical exercise & weight monitoring are important for maintaining a healthy lifestyle    You may be retaining fluid if you have a history of heart failure or if you experience any of the following symptoms:  Weight gain of 3 pounds or more overnight or 5 pounds in a week, increased swelling in our hands or feet or shortness of breath while lying flat in bed. Please call your doctor as soon as you notice any of these symptoms; do not wait until your next office visit. Recognize signs and symptoms of STROKE:    F-face looks uneven    A-arms unable to move or move unevenly    S-speech slurred or non-existent    T-time-call 911 as soon as signs and symptoms begin-DO NOT go       Back to bed or wait to see if you get better-TIME IS BRAIN. Warning Signs of HEART ATTACK     Call 911 if you have these symptoms:   Chest discomfort. Most heart attacks involve discomfort in the center of the chest that lasts more than a few minutes, or that goes away and comes back.  It can feel like uncomfortable pressure, squeezing, fullness, or pain.   Discomfort in other areas of the upper body. Symptoms can include pain or discomfort in one or both arms, the back, neck, jaw, or stomach.  Shortness of breath with or without chest discomfort.  Other signs may include breaking out in a cold sweat, nausea, or lightheadedness. Don't wait more than five minutes to call 911 - MINUTES MATTER! Fast action can save your life. Calling 911 is almost always the fastest way to get lifesaving treatment. Emergency Medical Services staff can begin treatment when they arrive -- up to an hour sooner than if someone gets to the hospital by car. The discharge information has been reviewed with the patient. The patient verbalized understanding. Discharge medications reviewed with the patient and appropriate educational materials and side effects teaching were provided.

## 2017-02-09 NOTE — PROGRESS NOTES
Problem: Mobility Impaired (Adult and Pediatric)  Goal: *Acute Goals and Plan of Care (Insert Text)  LTG:  (1.)Ms. Richard Dahl will move from supine to sit and sit to supine , scoot up and down and roll side to side in bed with SUPERVISION within 7 day(s). (2.)Ms. Richard Dahl will transfer from bed to chair and chair to bed with SUPERVISION using the least restrictive device within 7 day(s). (3.)Ms. Richard Dahl will ambulate with SUPERVISION for 100 feet with the least restrictive device within 7 day(s). ________________________________________________________________________________________________      PHYSICAL THERAPY: Daily Note, Treatment Day: 1st and PM 2/9/2017  INPATIENT: Hospital Day: 3  Payor: [employer] / Plan: Colette Hua / Product Type: Medicare /      NAME/AGE/GENDER: Sae Goodson is a 66 y.o. female            PRIMARY DIAGNOSIS: Lumbar spinal stenosis [M48.06]  Spondylolisthesis, unspecified spinal region [M43.10] Lumbar stenosis with neurogenic claudication Lumbar stenosis with neurogenic claudication  Procedure(s) (LRB):  L4-S1 LAMINECTOMY AND FUSION WITH BONE MARROW ASPIRATE, ALLOGRAFT, AND INSTRUMENTATION, TLIF (N/A)  2 Days Post-Op  ICD-10: Treatment Diagnosis:       · Generalized Muscle Weakness (M62.81)  · Difficulty in walking, Not elsewhere classified (R26.2)  · Low Back Pain (M54.5)   Precaution/Allergies:  Bees [hymenoptera allergenic extract]; Adhesive tape-silicones; Lescol [fluvastatin]; Lipitor [atorvastatin]; Lisinopril; Penicillin g; and Tetracycline       ASSESSMENT:      Ms. Richard Dahl presents in supine. Pt eager to participate in PT session. Able to state sequencing and perform log roll technique without verbal cueing, but requires MIN assist with bed mobility. Pt stood and took several steps to the [facility] where she was able to void. She then stood and amb ~ 7' around the bed to the chair. She was instructed to call for assist back to bed in ~ 30 min.  Pt will benefit from continued physical therapy to increase upright tolerance, decrease pain, and improve independence with functional activity prior to D/C to rehab. This section established at most recent assessment   PROBLEM LIST (Impairments causing functional limitations):  1. Decreased Strength  2. Decreased ADL/Functional Activities  3. Decreased Transfer Abilities  4. Decreased Ambulation Ability/Technique  5. Decreased Balance  6. Increased Pain  7. Decreased Activity Tolerance    INTERVENTIONS PLANNED: (Benefits and precautions of physical therapy have been discussed with the patient.)  1. Balance Exercise  2. Bed Mobility  3. Family Education  4. Gait Training  5. Therapeutic Activites  6. Therapeutic Exercise/Strengthening  7. Transfer Training  8. Group Therapy      TREATMENT PLAN: Frequency/Duration: twice daily for duration of hospital stay  Rehabilitation Potential For Stated Goals: GOOD      RECOMMENDED REHABILITATION/EQUIPMENT: (at time of discharge pending progress): Continue Skilled Therapy and Rehab. HISTORY:   History of Present Injury/Illness (Reason for Referral):  Per H&P, \"Patient has had low back pain with radiation to the buttocks and lower extremities for an extended period of time. The symptoms and exam findings were felt to be consistent with neurogenic claudication. The preoperative radiographs and other imaging confirmed showed spondylolisthesis and stenosis. Conservative measures have been exhausted as outlined. The symptoms progressed to the point where there is difficulty performing any task that requires prolonged standing or walking which interfered with activities of daily living and ability to enjoy life. In the outpatient setting the risks, benefits and potential complications of the above-listed procedure were discussed with her and an informed consent was obtained. \"  Past Medical History/Comorbidities:   Ms. Carrie Yañez  has a past medical history of Allergic rhinitis, cause unspecified (1/7/2015); Aortic valve disorders (1/7/2015); Arthritis; Arthropathies (1/7/2015); Asthma; Autoimmune disease ([facility] 75[facility] CAD (coronary artery disease); Carotid stenosis (1/7/2015); Carpal tunnel syndrome (01/07/2015); Chronic pain; Contact dermatitis and other eczema, due to unspecified cause (1/7/2015); Depression (6/30/2015); Dysuria (1/7/2015); Esophageal reflux (1/7/2015); Fibromyalgia; Former smoker; GERD (gastroesophageal reflux disease); Heart failure ([facility] Hematuria, unspecified (1/7/2015); Hypertension; Mitral valve disorders (1/7/2015); Mixed hyperlipidemia (4/21/2016); Neuropathy; Occlusion and stenosis of carotid artery without mention of cerebral infarction (1/7/2015); Osteopenia (1/7/2015); Other and unspecified hyperlipidemia (1/7/2015); Other B-complex deficiencies (1/7/2015); Other chest pain (3/7/2011); 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 She also has no past medical history of Unspecified adverse effect of anesthesia. Ms. Abhay Leo  has a past surgical history that includes hysterectomy; tubal ligation; lap cholecystectomy; colonoscopy; hemorrhoidectomy; bladder suspension; shoulder arthroscopy (Right); gi; gi; and urological.  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 Her  is currently in the hospital. Drives.  She occasionally used a rolling walker when back pain increased. She has two sons that live in the area and assist as needed. Number of Personal Factors/Comorbidities that affect the Plan of Care:  Full-time caregiver  Low BP  New spinal precautions 3+: HIGH COMPLEXITY   EXAMINATION:   Most Recent Physical Functioning:   Gross Assessment:                  Posture:  Posture (WDL): Exceptions to WDL  Posture Assessment: Trunk flexion  Balance:  Sitting: Impaired  Sitting - Static: Fair (occasional)  Sitting - Dynamic: Fair (occasional)  Standing: Impaired  Standing - Static: Constant support  Standing - Dynamic : Fair (-) Bed Mobility:  Rolling: Contact guard assistance  Supine to Sit: Minimum assistance  Scooting: Minimum assistance  Wheelchair Mobility:     Transfers:  Sit to Stand: Minimum assistance  Stand to Sit: Contact guard assistance  Gait:     Base of Support: Widened  Speed/María: Shuffled; Slow  Gait Abnormalities: Ataxic;Decreased step clearance  Distance (ft): 7 Feet (ft)  Assistive Device: Walker, rolling  Ambulation - Level of Assistance: Moderate assistance       Body Structures Involved:  1. Nerves  2. Bones  3. Joints  4. Muscles Body Functions Affected:  1. Sensory/Pain  2. Neuromusculoskeletal  3. Movement Related  4. Skin Related Activities and Participation Affected:  1. General Tasks and Demands  2. Mobility  3. Self Care  4. Community, Social and Perry Vowinckel   Number of elements that affect the Plan of Care: 4+: HIGH COMPLEXITY   CLINICAL PRESENTATION:   Presentation: Evolving clinical presentation with changing clinical characteristics: MODERATE COMPLEXITY   CLINICAL DECISION MAKIN [facility] Mobility Inpatient Short Form  How much difficulty does the patient currently have. .. Unable A Lot A Little None   1. Turning over in bed (including adjusting bedclothes, sheets and blankets)? [ ] 1   [ ] 2   [X] 3   [ ] 4   2.   Sitting down on and standing up from a chair with arms ( e.g., wheelchair, bedside commode, etc.)   [ ] 1   [ ] 2   [X] 3   [ ] 4   3. Moving from lying on back to sitting on the side of the bed? [ ] 1   [ ] 2   [X] 3   [ ] 4   How much help from another person does the patient currently need. .. Total A Lot A Little None   4. Moving to and from a bed to a chair (including a wheelchair)? [ ] 1   [X] 2   [ ] 3   [ ] 4   5. Need to walk in hospital room? [ ] 1   [X] 2   [ ] 3   [ ] 4   6. Climbing 3-5 steps with a railing? [X] 1   [ ] 2   [ ] 3   [ ] 4   © 2007, Trustees of [address] [place] [address] [address] [id number], under license to [employer]. All rights reserved    Score:  Initial: 14 Most Recent: 14 (Date: 2/8/17 )     Interpretation of Tool:  Represents activities that are increasingly more difficult (i.e. Bed mobility, Transfers, Gait). Score 24 23 22-20 19-15 14-10 9-7 6       Modifier CH CI CJ CK CL CM CN         · Mobility - Walking and Moving Around:               - CURRENT STATUS:    CL - 60%-79% impaired, limited or restricted               - GOAL STATUS:           CK - 40%-59% impaired, limited or restricted               - D/C STATUS:                       ---------------To be determined---------------  Payor: Annmarie Brooks / Plan: [place] [employer] / Product Type: Medicare /       Medical Necessity:     · Patient is expected to demonstrate progress in strength, balance and functional technique to decrease assistance required with bed mobility, transfers, and ambulation. Reason for Services/Other Comments:  · Patient requires assistance with functional mobility and activity tolerance is limited by low BP. Tahir Marie    Use of outcome tool(s) and clinical judgement create a POC that gives a: Questionable prediction of patient's progress: MODERATE COMPLEXITY                 TREATMENT:      Pre-treatment Symptoms/Complaints:  \"I'm hurting a lot more but they can't give me any medicine\"  Pain: Initial:      Post Session:  /10         Therapeutic Activity: ( 10  ): Therapeutic activities including Bed transfers, Chair transfers, Ambulation on level ground to improve mobility, strength and balance. Required minimal tactile cues   to promote dynamic balance in standing with RW during AMB. Therapeutic group Exercise: ( ):  Exercises per grid below to improve mobility, strength and balance. Required min visual and verbal cues to promote proper body alignment. Progressed repetitions as indicated. Braces/Orthotics/Lines/Etc:   · IV, drain hemovac and room air  Treatment/Session Assessment:    · Response to Treatment:  Pt was able to tolerate EOB sitting but drop in BP noted upon standing  · Interdisciplinary Collaboration:  · Physical Therapy Assistant and Registered Nurse  · After treatment position/precautions:  · Up in chair, Bed alarm/tab alert on, Bed/Chair-wheels locked, Bed in low position, Call light within reach and RN notified  · Compliance with Program/Exercises: compliant all of the time. · Recommendations/Intent for next treatment session: \"Next visit will focus on advancements to more challenging activities and reduction in assistance provided\".   Total Treatment Duration:  PT Patient Time In/Time Out  Time In: 1510  Time Out: [address], PTA

## 2017-02-09 NOTE — PROGRESS NOTES
[facility] POST OP PROGRESS NOTE    2017  Admit Date: 2017  Admit Diagnosis: Lumbar spinal stenosis [M48.06]  Spondylolisthesis, unspecified spinal region [M43.10]  Procedure: Procedure(s):  L4-S1 LAMINECTOMY AND FUSION WITH BONE MARROW ASPIRATE, ALLOGRAFT, AND INSTRUMENTATION, TLIF  Post Op day: 2 Days Post-Op      Subjective:     Art Escobedo is a patient who has no complaints. Blood pressure improved. No BM or flatus. Has not ambulated yet. Objective:     Vital Signs:    Blood pressure 122/58, pulse 71, temperature 98.7 °F (37.1 °C), resp. rate 20, height 4' 11\" (1.499 m), weight 78.9 kg (174 lb), SpO2 96 %. Temp (24hrs), Av.5 °F (36.9 °C), Min:98 °F (36.7 °C), Max:99.3 °F (37.4 °C)       07 -  1900  In: 120 [P.O.:120]  Out: -    190 -  0700  In: 6095 [P.O.:360; I.V.:2498]  Out: 505 [Urine:425; Drains:80]    LAB:    Recent Labs      17   0328  17   0955   HGB  10.9*  13.8   WBC   --   6.9   PLT   --   208       Physical Exam    General:   Alert and oriented. No acute distress  Lungs:  Respirations unlabored. Extremities: No evidence of cyanosis. Calves soft, nontender. Moves both upper and lower extremities.    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    Other malaise and fatigue R53.81, R53.83    Allergic rhinitis, cause unspecified J30.9    Aortic valve disorders I35.9    Extrinsic asthma, unspecified J45.909    Respiratory abnormality, unspecified J98.9    Congestive heart failure, unspecified I50.9    Occlusion and stenosis of carotid artery without mention of cerebral infarction I65.29    Carpal tunnel syndrome G56.00    Unspecified urinary incontinence R32    Carotid stenosis I65.29    Renal insufficiency N28.9    Heart disease, unspecified I51.9    Shortness of breath R06.02    Osteopenia M85.80    Depression F32.9    RENEE (stress urinary incontinence, female) N39.3    Rectocele N81.6    Weakening of rectovaginal tissue N81.83    Mixed hyperlipidemia E78.2    Asymptomatic carotid artery stenosis I65.29    Valvular heart disease I38    Coronary artery disease involving native coronary artery of native heart without angina pectoris I25.10    On potassium wasting diuretic therapy Z79.899    Obesity (BMI 30.0-34. 9) E66.9    Lumbar stenosis with neurogenic claudication M48.06       Plan:     Continue PT - ambulate  Discontinue: drain    Consult: none   Resume Lasix.  Monitor Urine OP    Anticipate Discharge To: HOME       Signed By: Franko Montesinos PA-C

## 2017-02-09 NOTE — CONSULTS
PM&R Rehab Consult    Subjective:     Date of Consultation:  February 9, 2017    Referring Physician: Dr. Hardik Rodriguez    Patient is a 66 y.o. female who is being seen for rehab recommendations due to lumbar stenosis with neurogenic claudication and decline in mobility and self care    HPI: Mrs Ana Sharma is a pleasant debilitated 67 yo WF with long standing hx of LBP due to spinal stenosis with radiation into bilateral LEs. Her symptoms were somewhat alleviated with sitting and leaning forward but aggravated by standing and walking. She has tried several alternative treatments including NSAIDs, narcotics, bracing, SAM and chiropractic care. Imaging studies confirmed lumbar spondylolithesis as well as stenosis thus she was admitted on 2/7 and underwent;   PROCEDURE:  1. Lumbar laminectomy L4 through S1 with bilateral foraminotomies. 2. Lumbar posterolateral fusion L4 through S1 .  3. Cortical screw instrumentation L4 through S1 .  4. Bone marrow aspirate for allograft  5. Translumbar interbody fusion L4-L5 and L5- S1.  6. Insertion biomechanical device L4-L5 and L5- S1  7. Wilene Pop dorsal osteotomy L5  Performed by Dr. Edilson Metzger and Dr. Demian rGiffith. Postop she has had improvement in her radicular pain. Her current functional status includes;   Rolling: Contact guard assistance  Supine to Sit: Minimum assistance;Assist x2  Sit to Supine: Minimum assistance;Assist x2  Scooting: Contact guard assistance  Wheelchair Mobility:   Transfers:  Sit to Stand: Minimum assistance;Assist x2  Stand to Sit: Contact guard assistance  Gait:  Base of Support: Widened  Speed/María: Slow;Shuffled  Gait Abnormalities: Ataxic;Decreased step clearance  Distance (ft): 3 Feet (ft)  Assistive Device: Walker, rolling  Ambulation - Level of Assistance: Moderate assistance;Assist x2    Basic ADLs (From Assessment) Complex ADLs (From Assessment)   Basic ADL  Feeding: Setup  Oral Facial Hygiene/Grooming: Setup  Bathing:  Moderate assistance  Upper Body Dressing: Minimum assistance  Lower Body Dressing: Maximum assistance  Toileting: Moderate assistance Instrumental ADL  Meal Preparation: Maximum assistance  Homemaking: Maximum assistance       Principal Problem:    Lumbar stenosis with neurogenic claudication (2/7/2017)      Past Medical History   Diagnosis Date    Allergic rhinitis, cause unspecified 1/7/2015     pt states not bad    Aortic valve disorders 1/7/2015     pt states does not know about this diagnosis    Arthritis     Arthropathies 1/7/2015     Multiple sites    Asthma      uses inhaler prn    Autoimmune disease ([facility] [address] [facility]      fibromyalgia    CAD (coronary artery disease)      mild,  treated with med    Carotid stenosis 1/7/2015    Carpal tunnel syndrome 01/07/2015     bilat wrists-no issue now    Chronic pain      due to fibromyalga & arthritis    Contact dermatitis and other eczema, due to unspecified cause 1/7/2015     pt states no current issues    Depression 6/30/2015     managed w/med    Dysuria 1/7/2015     hx of; pt states no current problems    Esophageal reflux 1/7/2015     managed w/med    Fibromyalgia     Former smoker      1 ppd for 14 yrs; quit smoking 1981    GERD (gastroesophageal reflux disease)     Heart failure ([facility] [address]      pt states no current issues    Hematuria, unspecified 1/7/2015     hx of; pt states no current problems    Hypertension      managed w/med    Mitral valve disorders 1/7/2015     pt states has mitral valve prolapse    Mixed hyperlipidemia 4/21/2016    Neuropathy      BLE    Occlusion and stenosis of carotid artery without mention of cerebral infarction 1/7/2015     pt states not aware of this diagnosis    Osteopenia 1/7/2015    Other and unspecified hyperlipidemia 1/7/2015     managed w/med    Other B-complex deficiencies 1/7/2015     pt states does not have this diagnosis    Other chest pain 3/7/2011     pt states chest pain comes & goes.  s/p cardiac cath 2011; no stents     Other congenital anomaly of spine 2015     pt states has spinal stenosis    Other malaise and fatigue 2015    PUD (peptic ulcer disease)      years ago   [facility] Renal insufficiency 2015     pt states no current issues    Shortness of breath 2015     pt reports SOB w/exertion    Unspecified urinary incontinence 2015      Family History   Problem Relation Age of Onset    Diabetes Mother     Heart Attack Mother     Diabetes Sister     Breast Cancer Sister 79    Diabetes Sister     Heart Attack Father     Diabetes Sister     Heart Attack Sister       from heart issue age 54    Arthritis-rheumatoid Paternal Grandmother     Heart Disease Maternal Grandmother       Social History   Substance Use Topics    Smoking status: Former Smoker     Packs/day: 1.00     Years: 14.00    Smokeless tobacco: Never Used      Comment: quit smoking     Alcohol use No   Social History/Living Environment:   Home Environment: Private residence  # Steps to Enter: 0  Wheelchair Ramp: Yes  One/Two Story Residence: One story  Living Alone: No  Support Systems: Spouse/Significant Other/Partner  Patient Expects to be Discharged to[de-identified] Rehabilitation facility  Current DME Used/Available at Home: Walker, rolling  Prior Level of Function/Work/Activity:  Patient lives with spouse. 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 She helps with his ADLs including bathing. She is independent with her own ADLs and IADLs. She has a walker to use as needed. Past Surgical History   Procedure Laterality Date    Hx hysterectomy      Hx tubal ligation      Hx lap cholecystectomy      Hx colonoscopy      Hx hemorrhoidectomy      Hx bladder suspension      Hx shoulder arthroscopy Right      rotator cuff repair    Hx gi       esophageal dilatation    Hx gi       rectocele    Hx urological       bladder tac      Prior to Admission medications    Medication Sig Start Date End Date Taking?  Authorizing Provider oxyCODONE-acetaminophen (PERCOCET 7.5) 7.5-325 mg per tablet Take 1 Tab by mouth every four (4) hours as needed for Pain. Max Daily Amount: 6 Tabs. 2/7/17  Yes Soila Turpin MD   furosemide (LASIX) 40 mg tablet take 1 tablet by mouth every morning 11/7/16  Yes Sabiha Krishnamurthy NP   omeprazole (PRILOSEC) 40 mg capsule Take 40 mg by mouth every morning. Yes Historical Provider   valsartan (DIOVAN) 160 mg tablet take 1 tablet by mouth once daily  Patient taking differently: every morning. take 1 tablet by mouth once daily 11/4/16  Yes Hipolito Shelton MD   sertraline (ZOLOFT) 50 mg tablet Take 1.5 Tabs by mouth every morning. 11/4/16  Yes Hipolito Shelton MD   gabapentin (NEURONTIN) 300 mg capsule Take 1 Cap by mouth three (3) times daily. 11/4/16  Yes Hipolito Shelton MD   atenolol (TENORMIN) 25 mg tablet Take 0.5 Tabs by mouth nightly. 11/4/16  Yes Hipolito Shelton MD   aspirin delayed-release 81 mg tablet Take 81 mg by mouth every morning. Instructed to take DOS per Anesthesia guidelines. Yes Historical Provider   atorvastatin (LIPITOR) 20 mg tablet Take 1 Tab by mouth daily. Patient taking differently: Take 20 mg by mouth nightly. 1/14/16  Yes Sabiha Krishnamurthy NP   potassium chloride (K-DUR, KLOR-CON) 20 mEq tablet Take 1 Tab by mouth every morning. Patient taking differently: Take 20 mEq by mouth daily. Indications: HYPOKALEMIA PREVENTION 1/14/16  Yes Sabiha Krishnamurthy NP   albuterol-ipratropium (DUO-NEB) 2.5 mg-0.5 mg/3 ml nebu 3 mL by Nebulization route every six (6) hours as needed. 9/28/16   Sabiha Krishnamurthy NP   albuterol (PROVENTIL HFA, VENTOLIN HFA, PROAIR HFA) 90 mcg/actuation inhaler Take 2 Puffs by inhalation every six (6) hours as needed for Wheezing. Indications: ACUTE ASTHMA ATTACK 9/28/16   Sabiha Krishnamurthy NP   HYDROcodone-acetaminophen (NORCO) 5-325 mg per tablet Take 1 Tab by mouth every six (6) hours as needed for Pain. Max Daily Amount: 4 Tabs.  7/28/16   Hipolito Shelton MD zinc oxide-cod liver oil 40 % oint 30 g, vitamin a & d oint 30 g, nystatin 100,000 unit/gram crea 30 g, compound tincture of benzoin tinc 0.5 mL Apply 1 Each to affected area two (2) times a day. Patient taking differently: Apply 1 Each to affected area as needed. 16   Suzanne Segovia NP   nitroglycerin (NITROSTAT) 0.4 mg SL tablet 1 Tab by SubLINGual route every five (5) minutes as needed for Chest Pain. Patient taking differently: 0.4 mg by SubLINGual route every five (5) minutes as needed for Chest Pain. States last dose 2 months ago 3/7/11   FACUNDO Amador     Allergies   Allergen Reactions    Bees [Hymenoptera Allergenic Extract] Anaphylaxis    Adhesive Tape-Silicones Rash     Ok to use paper tape    Lescol [Fluvastatin] Cough    Lipitor [Atorvastatin] Other (comments)     High doses cause leg cramps. Able to tolerate low doses    Lisinopril Cough           Penicillin G Rash    Tetracycline Other (comments)     ULCERS IN MOUTH        Review of Systems:  A comprehensive review of systems was negative except for that written in the HPI. She denies cp, sob, n/v. Worried about  in hospital with COPD; causing anxiety. Not sleeping well. Has not had a BM post op, no abd pain  Objective:     Vitals:  Blood pressure 106/47, pulse 77, temperature 98 °F (36.7 °C), resp. rate 20, height 4' 11\" (1.499 m), weight 174 lb (78.9 kg), SpO2 98 %. Temp (24hrs), Av.5 °F (36.9 °C), Min:98 °F (36.7 °C), Max:99.3 °F (37.4 °C)      Intake and Output:   1901 -  0700  In: 2858 [P.O.:360; I.V.:2498]  Out: 505 [Urine:425; Drains:80]    Physical Exam:  General:  Alert, oriented and mood affect appropriate   Lungs:   Clear to auscultation bilaterally. Heart:  Regular rate and rhythm, S1, S2 stable, no murmur, click, rub or gallop. Abdomen:   Soft, non-tender. Bowel sounds present. No masses,  No organomegaly.    Genitourinary: Continent and PVR is stable   Neuro Muscular: NVI, strength symm 5/5, sensation intact, brisk patellar reflexes, toes down   Skin:  No rashes, lesions, or signs/symptoms or infection. Labs/Studies:  Recent Results (from the past 72 hour(s))   TYPE & SCREEN    Collection Time: 02/07/17  1:50 PM   Result Value Ref Range    Crossmatch Expiration 02/10/2017     ABO/Rh(D) A POSITIVE     Antibody screen NEG    HGB & HCT    Collection Time: 02/08/17  3:28 AM   Result Value Ref Range    HGB 10.9 (L) 11.7 - 15.4 g/dL    HCT 35.8 35.8 - 46.3 %   PLEASE READ & DOCUMENT PPD TEST IN 24 HRS    Collection Time: 02/08/17  9:29 PM   Result Value Ref Range    PPD negative Negative    mm Induration 0 mm           Bed Mobility:       Functional Assessment:  Functional Assessment  Fall in Past 12 Months: Yes  Fall With Injury: No  Decline in Gait/Transfer/Balance: Yes (comment)  Decline in Capacity to Feed/Dress/Bathe: No  Developmental Delay: No  Chewing/Swallowing Problems: No  Difficulty with Secretions: No  Speech Slurred/Thick/Garbled: No     Moses Score:        Speech Assessment:                     Ambulation:  Activity and Safety  Activity Level: Up with Assistance, Logroll  Activity: In bed  Activity Assistance: Partial (one person)  Weight Bearing Status: WBAT (Weight Bearing as Tolerated)  Repositioned: Head of bed elevated (degrees)  Patient Turned: Turns self  Assistive Device: Fall prevention device  Safety Measures: Bed/Chair alarm on, Bed/Chair-Wheels locked, Bed in low position, Call light within reach, Gripper socks, Side rails X2     Impression/Plan:     Principal Problem:    Lumbar stenosis with neurogenic claudication (2/7/2017)     Lumbar stenosis and spondylolithesis    Recommendations: Continue Acute Rehab Program  Coordination of rehab/medical care  Counseling of PM & R care issues management  Monitoring and management of medical conditions per plan of care/orders   - appropriate for [facility] but would have a $1500 copay. Patient does not believe that she can afford this.  Her situation is complicated by the fact that her  is hospitalized here at [facility].  Case management assisting  -will add Miralax and senna, suppos prn  Discussion with Family/Caregiver/Staff  Reviewed Therapies/Labs/Meds/Records    Signed By:  Malachi Sotomayor MD     February 9, 2017

## 2017-02-09 NOTE — PROGRESS NOTES
Callharinder spoke with Yaz Emery with [address] [phone], talked about co-pay for [facility] states would be approx $500.00/day for the 1st 3 days and no co-pay for additional days, pt made aware, agreeable to cont with pre cert. Therapy notes faxed to [facility] at [phone]. Will wait to hear back from Insurance. [facility] SW aware of pending insurance pre cert. Will follow up with pt and insurance in am 2/10/17.

## 2017-02-09 NOTE — PROGRESS NOTES
Problem: Mobility Impaired (Adult and Pediatric)  Goal: *Acute Goals and Plan of Care (Insert Text)  LTG:  (1.)Ms. Makenzie Kinney will move from supine to sit and sit to supine , scoot up and down and roll side to side in bed with SUPERVISION within 7 day(s). (2.)Ms. Makenzie Kinney will transfer from bed to chair and chair to bed with SUPERVISION using the least restrictive device within 7 day(s). (3.)Ms. Makenzie Kinney will ambulate with SUPERVISION for 100 feet with the least restrictive device within 7 day(s). ________________________________________________________________________________________________      PHYSICAL THERAPY: Daily Note, Treatment Day: 1st and AM 2/9/2017  INPATIENT: Hospital Day: 3  Payor: Kaleigh Oscar / Plan: Dannielle Batista / Product Type: Medicare /      NAME/AGE/GENDER: Virginia Vann is a 66 y.o. female            PRIMARY DIAGNOSIS: Lumbar spinal stenosis [M48.06]  Spondylolisthesis, unspecified spinal region [M43.10] Lumbar stenosis with neurogenic claudication Lumbar stenosis with neurogenic claudication  Procedure(s) (LRB):  L4-S1 LAMINECTOMY AND FUSION WITH BONE MARROW ASPIRATE, ALLOGRAFT, AND INSTRUMENTATION, TLIF (N/A)  2 Days Post-Op  ICD-10: Treatment Diagnosis:       · Generalized Muscle Weakness (M62.81)  · Difficulty in walking, Not elsewhere classified (R26.2)  · Low Back Pain (M54.5)   Precaution/Allergies:  Bees [hymenoptera allergenic extract]; Adhesive tape-silicones; Lescol [fluvastatin]; Lipitor [atorvastatin]; Lisinopril; Penicillin g; and Tetracycline       ASSESSMENT:      Ms. Makenzie Kinney presents in supine. Pt eager to participate in PT session. Able to state sequencing and perform log roll technique without verbal cueing, but requires MIN assist with bed mobility. Pt stood and took several steps to chair where she sat. She c/o dizziness and BP was taken 126/56. She was taken to the gym and participated in group activities. Returned to room by tech.  Pt will benefit from continued physical therapy to increase upright tolerance, decrease pain, and improve independence with functional activity prior to D/C to rehab. This section established at most recent assessment   PROBLEM LIST (Impairments causing functional limitations):  1. Decreased Strength  2. Decreased ADL/Functional Activities  3. Decreased Transfer Abilities  4. Decreased Ambulation Ability/Technique  5. Decreased Balance  6. Increased Pain  7. Decreased Activity Tolerance    INTERVENTIONS PLANNED: (Benefits and precautions of physical therapy have been discussed with the patient.)  1. Balance Exercise  2. Bed Mobility  3. Family Education  4. Gait Training  5. Therapeutic Activites  6. Therapeutic Exercise/Strengthening  7. Transfer Training  8. Group Therapy      TREATMENT PLAN: Frequency/Duration: twice daily for duration of hospital stay  Rehabilitation Potential For Stated Goals: GOOD      RECOMMENDED REHABILITATION/EQUIPMENT: (at time of discharge pending progress): Continue Skilled Therapy and Rehab. HISTORY:   History of Present Injury/Illness (Reason for Referral):  Per H&P, \"Patient has had low back pain with radiation to the buttocks and lower extremities for an extended period of time. The symptoms and exam findings were felt to be consistent with neurogenic claudication. The preoperative radiographs and other imaging confirmed showed spondylolisthesis and stenosis. Conservative measures have been exhausted as outlined. The symptoms progressed to the point where there is difficulty performing any task that requires prolonged standing or walking which interfered with activities of daily living and ability to enjoy life. In the outpatient setting the risks, benefits and potential complications of the above-listed procedure were discussed with her and an informed consent was obtained. \"  Past Medical History/Comorbidities:   Ms. Osito Ashley  has a past medical history of Allergic rhinitis, cause unspecified (1/7/2015); Aortic valve disorders (1/7/2015); Arthritis; Arthropathies (1/7/2015); Asthma; Autoimmune disease ([facility] CAD (coronary artery disease); Carotid stenosis (1/7/2015); Carpal tunnel syndrome (01/07/2015); Chronic pain; Contact dermatitis and other eczema, due to unspecified cause (1/7/2015); Depression (6/30/2015); Dysuria (1/7/2015); Esophageal reflux (1/7/2015); Fibromyalgia; Former smoker; GERD (gastroesophageal reflux disease); [facility] failure ([facility] Hematuria, unspecified (1/7/2015); Hypertension; Mitral valve disorders (1/7/2015); Mixed hyperlipidemia (4/21/2016); Neuropathy; Occlusion and stenosis of carotid artery without mention of cerebral infarction (1/7/2015); Osteopenia (1/7/2015); Other and unspecified hyperlipidemia (1/7/2015); Other B-complex deficiencies (1/7/2015); Other chest pain (3/7/2011); 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 She also has no past medical history of Unspecified adverse effect of anesthesia. Ms. Celestine Smith  has a past surgical history that includes hysterectomy; tubal ligation; lap cholecystectomy; colonoscopy; hemorrhoidectomy; bladder suspension; shoulder arthroscopy (Right); gi; gi; and urological.  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 Her  is currently in the hospital. Drives.  She occasionally used a rolling walker when back pain increased. She has two sons that live in the area and assist as needed. Number of Personal Factors/Comorbidities that affect the Plan of Care:  Full-time caregiver  Low BP  New spinal precautions 3+: HIGH COMPLEXITY   EXAMINATION:   Most Recent Physical Functioning:   Gross Assessment:                  Posture:  Posture (WDL): Exceptions to WDL  Posture Assessment: Trunk flexion  Balance:  Sitting: Impaired  Sitting - Static: Fair (occasional)  Sitting - Dynamic: Fair (occasional)  Standing: Impaired  Standing - Static: Constant support  Standing - Dynamic : Fair (-) Bed Mobility:  Rolling: Contact guard assistance  Supine to Sit: Minimum assistance  Scooting: Minimum assistance  Wheelchair Mobility:     Transfers:  Sit to Stand: Minimum assistance  Stand to Sit: Contact guard assistance  Gait:     Base of Support: Widened  Speed/María: Shuffled; Slow  Gait Abnormalities: Ataxic;Decreased step clearance  Distance (ft): 5 Feet (ft)  Assistive Device: Walker, rolling  Ambulation - Level of Assistance: Moderate assistance       Body Structures Involved:  1. Nerves  2. Bones  3. Joints  4. Muscles Body Functions Affected:  1. Sensory/Pain  2. Neuromusculoskeletal  3. Movement Related  4. Skin Related Activities and Participation Affected:  1. General Tasks and Demands  2. Mobility  3. Self Care  4. Community, Social and [place]   Number of elements that affect the Plan of Care: 4+: HIGH COMPLEXITY   CLINICAL PRESENTATION:   Presentation: Evolving clinical presentation with changing clinical characteristics: MODERATE COMPLEXITY   CLINICAL DECISION MAKIN [facility] Mobility Inpatient Short Form  How much difficulty does the patient currently have. .. Unable A Lot A Little None   1. Turning over in bed (including adjusting bedclothes, sheets and blankets)? [ ] 1   [ ] 2   [X] 3   [ ] 4   2.   Sitting down on and standing up from a chair with arms ( e.g., wheelchair, bedside commode, etc.)   [ ] 1   [ ] 2   [X] 3   [ ] 4   3. Moving from lying on back to sitting on the side of the bed? [ ] 1   [ ] 2   [X] 3   [ ] 4   How much help from another person does the patient currently need. .. Total A Lot A Little None   4. Moving to and from a bed to a chair (including a wheelchair)? [ ] 1   [X] 2   [ ] 3   [ ] 4   5. Need to walk in hospital room? [ ] 1   [X] 2   [ ] 3   [ ] 4   6. Climbing 3-5 steps with a railing? [X] 1   [ ] 2   [ ] 3   [ ] 4   © 2007, Trustees of [address] [place] [address] [address] [id number], under license to [employer]. All rights reserved    Score:  Initial: 14 Most Recent: 14 (Date: 2/8/17 )     Interpretation of Tool:  Represents activities that are increasingly more difficult (i.e. Bed mobility, Transfers, Gait). Score 24 23 22-20 19-15 14-10 9-7 6       Modifier CH CI CJ CK CL CM CN         · Mobility - Walking and Moving Around:               - CURRENT STATUS:    CL - 60%-79% impaired, limited or restricted               - GOAL STATUS:           CK - 40%-59% impaired, limited or restricted               - D/C STATUS:                       ---------------To be determined---------------  Payor: Radha Mir / Plan: [employer] / Product Type: Medicare /       Medical Necessity:     · Patient is expected to demonstrate progress in strength, balance and functional technique to decrease assistance required with bed mobility, transfers, and ambulation. Reason for Services/Other Comments:  · Patient requires assistance with functional mobility and activity tolerance is limited by low BP. [facility]    Use of outcome tool(s) and clinical judgement create a POC that gives a: Questionable prediction of patient's progress: MODERATE COMPLEXITY                 TREATMENT:      Pre-treatment Symptoms/Complaints:  \"I'm hurting a lot more but they can't give me any medicine\"  Pain: Initial:      Post Session:  /10         Therapeutic Activity: ( 10  ): Therapeutic activities including Bed transfers, Chair transfers, Ambulation on level ground to improve mobility, strength and balance. Required minimal tactile cues   to promote dynamic balance in standing with RW during AMB. Therapeutic group Exercise: ( 10):  Exercises per grid below to improve mobility, strength and balance. Required min visual and verbal cues to promote proper body alignment. Progressed repetitions as indicated. Braces/Orthotics/Lines/Etc:   · IV, drain hemovac and room air  Treatment/Session Assessment:    · Response to Treatment:  Pt was able to tolerate EOB sitting but drop in BP noted upon standing  · Interdisciplinary Collaboration:  · Physical Therapy Assistant and Registered Nurse  · After treatment position/precautions:  · Supine in bed, Bed alarm/tab alert on, Bed/Chair-wheels locked, Bed in low position, Call light within reach and RN notified  · Compliance with Program/Exercises: compliant all of the time. · Recommendations/Intent for next treatment session: \"Next visit will focus on advancements to more challenging activities and reduction in assistance provided\".   Total Treatment Duration:  PT Patient Time In/Time Out  Time In: 1105 (1130)  Time Out: 1115 (1140)     Glennda Sandhoff, PTA

## 2017-02-09 NOTE — ROUTINE PROCESS
Patient vomit x 1 undigested food. Zofran was given. Patient stated,  \"I feel better. \"     Patient has not voided. Bladder scanned twice. Less than 400 ml scanned. Patient voided 150 ml at 0618    Patient has no IV. A total of three RN attempted IV.  Will notify the IV team in the AM

## 2017-02-10 PROCEDURE — 97150 GROUP THERAPEUTIC PROCEDURES: CPT

## 2017-02-10 PROCEDURE — 74011250637 HC RX REV CODE- 250/637: Performed by: ORTHOPAEDIC SURGERY

## 2017-02-10 PROCEDURE — 65270000029 HC RM PRIVATE

## 2017-02-10 PROCEDURE — 99231 SBSQ HOSP IP/OBS SF/LOW 25: CPT | Performed by: PHYSICAL MEDICINE & REHABILITATION

## 2017-02-10 PROCEDURE — 97530 THERAPEUTIC ACTIVITIES: CPT

## 2017-02-10 PROCEDURE — 74011250637 HC RX REV CODE- 250/637: Performed by: PHYSICAL MEDICINE & REHABILITATION

## 2017-02-10 PROCEDURE — 77030012341 HC CHMB SPCR OPTC MDI VYRM -A

## 2017-02-10 RX ORDER — ATENOLOL 25 MG/1
12.5 TABLET ORAL
Status: CANCELLED | OUTPATIENT
Start: 2017-02-10

## 2017-02-10 RX ORDER — ATORVASTATIN CALCIUM 10 MG/1
20 TABLET, FILM COATED ORAL
Status: CANCELLED | OUTPATIENT
Start: 2017-02-10

## 2017-02-10 RX ORDER — IPRATROPIUM BROMIDE AND ALBUTEROL SULFATE 2.5; .5 MG/3ML; MG/3ML
3 SOLUTION RESPIRATORY (INHALATION)
Status: CANCELLED | OUTPATIENT
Start: 2017-02-10

## 2017-02-10 RX ORDER — ALBUTEROL SULFATE 90 UG/1
2 AEROSOL, METERED RESPIRATORY (INHALATION)
Status: CANCELLED | OUTPATIENT
Start: 2017-02-10

## 2017-02-10 RX ORDER — SODIUM CHLORIDE 0.9 % (FLUSH) 0.9 %
5-10 SYRINGE (ML) INJECTION AS NEEDED
Status: CANCELLED | OUTPATIENT
Start: 2017-02-10

## 2017-02-10 RX ORDER — POTASSIUM CHLORIDE 20 MEQ/1
20 TABLET, EXTENDED RELEASE ORAL DAILY
Status: CANCELLED | OUTPATIENT
Start: 2017-02-11

## 2017-02-10 RX ORDER — SENNOSIDES 8.6 MG/1
1 TABLET ORAL DAILY
Status: CANCELLED | OUTPATIENT
Start: 2017-02-11

## 2017-02-10 RX ORDER — OXYCODONE HYDROCHLORIDE 5 MG/1
5-10 TABLET ORAL
Status: CANCELLED | OUTPATIENT
Start: 2017-02-10

## 2017-02-10 RX ORDER — POLYETHYLENE GLYCOL 3350 17 G/17G
17 POWDER, FOR SOLUTION ORAL DAILY
Status: CANCELLED | OUTPATIENT
Start: 2017-02-11

## 2017-02-10 RX ORDER — FACIAL-BODY WIPES
10 EACH TOPICAL DAILY PRN
Status: CANCELLED | OUTPATIENT
Start: 2017-02-10

## 2017-02-10 RX ORDER — NALOXONE HYDROCHLORIDE 0.4 MG/ML
0.4 INJECTION, SOLUTION INTRAMUSCULAR; INTRAVENOUS; SUBCUTANEOUS
Status: CANCELLED | OUTPATIENT
Start: 2017-02-10

## 2017-02-10 RX ORDER — VALSARTAN 160 MG/1
160 TABLET ORAL DAILY
Status: CANCELLED | OUTPATIENT
Start: 2017-02-11

## 2017-02-10 RX ORDER — SODIUM CHLORIDE 0.9 % (FLUSH) 0.9 %
5-10 SYRINGE (ML) INJECTION EVERY 8 HOURS
Status: CANCELLED | OUTPATIENT
Start: 2017-02-10

## 2017-02-10 RX ORDER — NITROGLYCERIN 0.4 MG/1
0.4 TABLET SUBLINGUAL
Status: CANCELLED | OUTPATIENT
Start: 2017-02-10

## 2017-02-10 RX ORDER — FUROSEMIDE 40 MG/1
40 TABLET ORAL DAILY
Status: CANCELLED | OUTPATIENT
Start: 2017-02-11

## 2017-02-10 RX ORDER — CYCLOBENZAPRINE HCL 10 MG
10 TABLET ORAL
Status: CANCELLED | OUTPATIENT
Start: 2017-02-10

## 2017-02-10 RX ORDER — SERTRALINE HYDROCHLORIDE 50 MG/1
75 TABLET, FILM COATED ORAL DAILY
Status: CANCELLED | OUTPATIENT
Start: 2017-02-11

## 2017-02-10 RX ORDER — FAMOTIDINE 20 MG/1
20 TABLET, FILM COATED ORAL EVERY 24 HOURS
Status: CANCELLED | OUTPATIENT
Start: 2017-02-10

## 2017-02-10 RX ORDER — ZOLPIDEM TARTRATE 5 MG/1
5 TABLET ORAL
Status: CANCELLED | OUTPATIENT
Start: 2017-02-10

## 2017-02-10 RX ORDER — GABAPENTIN 300 MG/1
300 CAPSULE ORAL 3 TIMES DAILY
Status: CANCELLED | OUTPATIENT
Start: 2017-02-10

## 2017-02-10 RX ORDER — ACETAMINOPHEN 325 MG/1
650 TABLET ORAL EVERY 6 HOURS
Status: CANCELLED | OUTPATIENT
Start: 2017-02-10

## 2017-02-10 RX ORDER — DOCUSATE SODIUM 100 MG/1
100 CAPSULE, LIQUID FILLED ORAL 2 TIMES DAILY
Status: CANCELLED | OUTPATIENT
Start: 2017-02-11

## 2017-02-10 RX ADMIN — Medication 10 ML: at 04:53

## 2017-02-10 RX ADMIN — GABAPENTIN 300 MG: 300 CAPSULE ORAL at 22:25

## 2017-02-10 RX ADMIN — OXYCODONE HYDROCHLORIDE 5 MG: 5 TABLET ORAL at 15:12

## 2017-02-10 RX ADMIN — POLYETHYLENE GLYCOL 3350 17 G: 17 POWDER, FOR SOLUTION ORAL at 08:57

## 2017-02-10 RX ADMIN — CYCLOBENZAPRINE HYDROCHLORIDE 10 MG: 10 TABLET, FILM COATED ORAL at 08:56

## 2017-02-10 RX ADMIN — DOCUSATE SODIUM 100 MG: 100 CAPSULE ORAL at 17:34

## 2017-02-10 RX ADMIN — ACETAMINOPHEN 650 MG: 325 TABLET, FILM COATED ORAL at 15:12

## 2017-02-10 RX ADMIN — POTASSIUM CHLORIDE 20 MEQ: 20 TABLET, EXTENDED RELEASE ORAL at 08:56

## 2017-02-10 RX ADMIN — ATORVASTATIN CALCIUM 20 MG: 10 TABLET, FILM COATED ORAL at 22:25

## 2017-02-10 RX ADMIN — Medication 10 ML: at 22:00

## 2017-02-10 RX ADMIN — SENNOSIDES 8.6 MG: 8.6 TABLET, FILM COATED ORAL at 08:56

## 2017-02-10 RX ADMIN — GABAPENTIN 300 MG: 300 CAPSULE ORAL at 04:53

## 2017-02-10 RX ADMIN — ACETAMINOPHEN 650 MG: 325 TABLET, FILM COATED ORAL at 03:00

## 2017-02-10 RX ADMIN — FUROSEMIDE 40 MG: 40 TABLET ORAL at 08:56

## 2017-02-10 RX ADMIN — FAMOTIDINE 20 MG: 20 TABLET ORAL at 22:25

## 2017-02-10 RX ADMIN — SERTRALINE HYDROCHLORIDE 75 MG: 50 TABLET ORAL at 08:56

## 2017-02-10 RX ADMIN — Medication 5 ML: at 14:00

## 2017-02-10 RX ADMIN — ACETAMINOPHEN 650 MG: 325 TABLET, FILM COATED ORAL at 08:37

## 2017-02-10 RX ADMIN — VALSARTAN 160 MG: 160 TABLET, FILM COATED ORAL at 08:56

## 2017-02-10 RX ADMIN — OXYCODONE HYDROCHLORIDE 10 MG: 5 TABLET ORAL at 08:56

## 2017-02-10 RX ADMIN — ACETAMINOPHEN 650 MG: 325 TABLET, FILM COATED ORAL at 22:26

## 2017-02-10 RX ADMIN — DOCUSATE SODIUM 100 MG: 100 CAPSULE ORAL at 08:56

## 2017-02-10 NOTE — PROGRESS NOTES
PM&R Consult Progress Note      Patient: Alberto Cornejo  Admit Date: 2/7/2017  Admit Diagnosis: Lumbar spinal stenosis [M48.06]; Spondylolisthesis, unspecif*  Recommendations: Continue Acute Rehab Program, Coordination of rehab/medical care, Counseling of PM & R care issues management, Subacute Rehab    History/Subjective/Complaint:     C/o abdominal cramping and need for a BM. \"they gave me medicines to make me go\" . Back pain controlled, participating in group therapy    Pain 1  Pain Scale 1: Numeric (0 - 10) (Denies pain and discomfort this shift. ) (02/10/17 0518)  Pain Intensity 1: 0 (02/10/17 0518)  Patient Stated Pain Goal: 0 (02/09/17 1821)  Pain Reassessment 1: Patient sleeping (02/09/17 1430)  Pain Onset 1: post op (02/09/17 1821)  Pain Location 1: Back (02/09/17 1821)  Pain Orientation 1: Lower (02/09/17 1821)  Pain Description 1: Aching (02/09/17 1821)  Pain Intervention(s) 1: Medication (see MAR) (02/09/17 1821)     Objective:     Vitals:  Patient Vitals for the past 8 hrs:   BP Temp Pulse Resp SpO2   02/10/17 0723 123/60 98.4 °F (36.9 °C) 75 18 93 %   02/10/17 0411 113/56 97.6 °F (36.4 °C) 74 17 96 %      Intake and Output:  02/08 1901 - 02/10 0700  In: 240 [P.O.:240]  Out: 660 [Urine:600; Drains:60]    Allergies   Allergen Reactions    Bees [Hymenoptera Allergenic Extract] Anaphylaxis    Adhesive Tape-Silicones Rash     Ok to use paper tape    Lescol [Fluvastatin] Cough    Lipitor [Atorvastatin] Other (comments)     High doses cause leg cramps.  Able to tolerate low doses    Lisinopril Cough           Penicillin G Rash    Tetracycline Other (comments)     ULCERS IN MOUTH     Current Facility-Administered Medications   Medication Dose Route Frequency    bisacodyl (DULCOLAX) suppository 10 mg  10 mg Rectal DAILY PRN    senna (SENOKOT) tablet 8.6 mg  1 Tab Oral DAILY    polyethylene glycol (MIRALAX) packet 17 g  17 g Oral DAILY    albuterol (PROVENTIL HFA, VENTOLIN HFA, PROAIR HFA) inhaler 2 Puff  2 Puff Inhalation Q6H PRN    albuterol-ipratropium (DUO-NEB) 2.5 MG-0.5 MG/3 ML  3 mL Nebulization Q6H PRN    atenolol (TENORMIN) tablet 12.5 mg  12.5 mg Oral QHS    atorvastatin (LIPITOR) tablet 20 mg  20 mg Oral QHS    furosemide (LASIX) tablet 40 mg  40 mg Oral DAILY    gabapentin (NEURONTIN) capsule 300 mg  300 mg Oral TID    nitroglycerin (NITROSTAT) tablet 0.4 mg  0.4 mg SubLINGual Q5MIN PRN    potassium chloride (K-DUR, KLOR-CON) SR tablet 20 mEq  20 mEq Oral DAILY    sertraline (ZOLOFT) tablet 75 mg  75 mg Oral DAILY    valsartan (DIOVAN) tablet 160 mg  160 mg Oral DAILY    sodium chloride (NS) flush 5-10 mL  5-10 mL IntraVENous Q8H    sodium chloride (NS) flush 5-10 mL  5-10 mL IntraVENous PRN    naloxone (NARCAN) injection 0.4 mg  0.4 mg IntraVENous EVERY 2 MINUTES AS NEEDED    famotidine (PEPCID) tablet 20 mg  20 mg Oral Q24H    cyclobenzaprine (FLEXERIL) tablet 10 mg  10 mg Oral TID PRN    zolpidem (AMBIEN) tablet 5 mg  5 mg Oral QHS PRN    acetaminophen (TYLENOL) tablet 650 mg  650 mg Oral Q6H    oxyCODONE IR (ROXICODONE) tablet 5-10 mg  5-10 mg Oral Q4H PRN    morphine injection 2 mg  2 mg IntraVENous Q4H PRN    ondansetron (ZOFRAN) injection 4 mg  4 mg IntraVENous Q4H PRN    docusate sodium (COLACE) capsule 100 mg  100 mg Oral BID       Physical Exam:awake and alert  CV; rrr no m  LUNGS; cta b  ABD; soft nt bs+, no distention  EXT; no edema  Neuro; motor and sensory intact    Incision(s)/Wound(s): Wound Shoulder Right (Active)   Number of days:602       Wound Perineum (Active)   Number of days:352       Wound Perineum Left (Active)   Number of days:352       Wound Perineum Right (Active)   Number of days:352       Wound Back Mid;Lower (Active)   DRESSING STATUS Clean, dry, and intact 2/9/2017  7:15 PM   DRESSING TYPE 4 x 4;Adhesive wound closure strips (Steri-Strips) 2/9/2017  7:15 PM   Incision site well approximated?  Yes 2/9/2017 10:45 AM   Drainage Amount  Small 2/9/2017  7:15 PM   Drainage Color Serosanguinous 2/9/2017  7:15 PM   Wound Odor None 2/9/2017  7:15 PM   Periwound Skin Condition Intact; Ecchymosis 2/9/2017 10:45 AM   Dressing Type Applied 4 x 4;Transparent film 2/9/2017 10:45 AM   Procedure Tolerated Well 2/9/2017 10:45 AM   Number of days:3              Functional Assessment:  Gross Assessment  AROM: Generally decreased, functional (02/08/17 1000)  Strength: Generally decreased, functional (02/08/17 1000)  Sensation: Impaired (02/08/17 1000)     Gait  Base of Support: Widened (02/09/17 1500)  Speed/María: Shuffled; Slow (02/09/17 1500)  Gait Abnormalities: Ataxic;Decreased step clearance (02/09/17 1500)  Ambulation - Level of Assistance:  Moderate assistance (02/09/17 1500)  Distance (ft): 7 Feet (ft) (02/09/17 1600)  Assistive Device: Walker, rolling (02/09/17 1500)     Bed Mobility  Rolling: Contact guard assistance (02/09/17 1500)  Supine to Sit: Minimum assistance (02/09/17 1500)  Sit to Supine: Minimum assistance;Assist x2 (02/08/17 1500)  Scooting: Minimum assistance (02/09/17 1500)     Balance  Sitting: Impaired (02/09/17 1500)  Sitting - Static: Fair (occasional) (02/09/17 1500)  Sitting - Dynamic: Fair (occasional) (02/09/17 1500)  Standing: Impaired (02/09/17 1500)  Standing - Static: Constant support (02/09/17 1500)  Standing - Dynamic : Fair (-) (02/09/17 1500)                       Bed/Mat Mobility  Rolling: Contact guard assistance (02/09/17 1500)  Supine to Sit: Minimum assistance (02/09/17 1500)  Sit to Supine: Minimum assistance;Assist x2 (02/08/17 1500)  Sit to Stand: Minimum assistance (02/09/17 1500)  Scooting: Minimum assistance (02/09/17 1500)     Labs/Studies:  Recent Results (from the past 72 hour(s))   TYPE & SCREEN    Collection Time: 02/07/17  1:50 PM   Result Value Ref Range    Crossmatch Expiration 02/10/2017     ABO/Rh(D) A POSITIVE     Antibody screen NEG    HGB & HCT    Collection Time: 02/08/17  3:28 AM   Result Value Ref Range HGB 10.9 (L) 11.7 - 15.4 g/dL    HCT 35.8 35.8 - 46.3 %   PLEASE READ & DOCUMENT PPD TEST IN 24 HRS    Collection Time: 02/08/17  9:29 PM   Result Value Ref Range    PPD negative Negative    mm Induration 0 mm   PLEASE READ & DOCUMENT PPD TEST IN 48 HRS    Collection Time: 02/09/17  8:45 PM   Result Value Ref Range    PPD  Negative    mm Induration  mm        Assessment:     Principal Problem:    Lumbar stenosis with neurogenic claudication (2/7/2017)        Plan:     Recommendations: Continue Acute Rehab Program  Coordination of rehab/medical care  Counseling of PM & R care issues management  Monitoring and management of medical conditions per plan of care/orders; Patient is being referred out to SNFs of her choice; she cannot pay the [facility] copay required by her insurance. She has excellent rehab potential to return to independent functioning, as well as, significant reduction in pain.   Discussion with Family/Caregiver/Staff  Reviewed Therapies/Labs/Medications/Records    Signed By:  Dk Prieto MD     February 10, 2017

## 2017-02-10 NOTE — PROGRESS NOTES
Problem: Mobility Impaired (Adult and Pediatric)  Goal: *Acute Goals and Plan of Care (Insert Text)  LTG:  (1.)Ms. Tory Hopper will move from supine to sit and sit to supine , scoot up and down and roll side to side in bed with SUPERVISION within 7 day(s). (2.)Ms. Tory Hopper will transfer from bed to chair and chair to bed with SUPERVISION using the least restrictive device within 7 day(s). (3.)Ms. Tory Hopper will ambulate with SUPERVISION for 100 feet with the least restrictive device within 7 day(s). ________________________________________________________________________________________________      PHYSICAL THERAPY: Daily Note, Treatment Day: 2nd and PM 2/10/2017  INPATIENT: Hospital Day: 4  Payor: Aparna Vázquez / Plan: Mario Hernandez / Product Type: Medicare /      NAME/AGE/GENDER: Miguel Rolle is a 66 y.o. female            PRIMARY DIAGNOSIS: Lumbar spinal stenosis [M48.06]  Spondylolisthesis, unspecified spinal region [M43.10] Lumbar stenosis with neurogenic claudication Lumbar stenosis with neurogenic claudication  Procedure(s) (LRB):  L4-S1 LAMINECTOMY AND FUSION WITH BONE MARROW ASPIRATE, ALLOGRAFT, AND INSTRUMENTATION, TLIF (N/A)  3 Days Post-Op  ICD-10: Treatment Diagnosis:       · Generalized Muscle Weakness (M62.81)  · Difficulty in walking, Not elsewhere classified (R26.2)  · Low Back Pain (M54.5)   Precaution/Allergies:  Bees [hymenoptera allergenic extract]; Adhesive tape-silicones; Lescol [fluvastatin]; Lipitor [atorvastatin]; Lisinopril; Penicillin g; and Tetracycline       ASSESSMENT:      Ms. Tory Hopper presents in supine. Pt agreeable to participate in PT session. Able to state sequencing and perform log roll technique without verbal cueing, but requires MIN assist with bed mobility. Pt stood and took several steps to the [facility] where she was able to void. She then stood and amb ~ 30' and returned to the chair. She named 2/3 back precautions. She performed bilateral AP's and LAQ's.  All movements are still slow. She was instructed to call for assist back to bed in ~ 30 min. Pt will benefit from continued physical therapy to increase upright tolerance, decrease pain, and improve independence with functional activity prior to D/C to rehab. This section established at most recent assessment   PROBLEM LIST (Impairments causing functional limitations):  1. Decreased Strength  2. Decreased ADL/Functional Activities  3. Decreased Transfer Abilities  4. Decreased Ambulation Ability/Technique  5. Decreased Balance  6. Increased Pain  7. Decreased Activity Tolerance    INTERVENTIONS PLANNED: (Benefits and precautions of physical therapy have been discussed with the patient.)  1. Balance Exercise  2. Bed Mobility  3. Family Education  4. Gait Training  5. Therapeutic Activites  6. Therapeutic Exercise/Strengthening  7. Transfer Training  8. Group Therapy      TREATMENT PLAN: Frequency/Duration: twice daily for duration of hospital stay  Rehabilitation Potential For Stated Goals: GOOD      RECOMMENDED REHABILITATION/EQUIPMENT: (at time of discharge pending progress): Continue Skilled Therapy and Rehab. HISTORY:   History of Present Injury/Illness (Reason for Referral):  Per H&P, \"Patient has had low back pain with radiation to the buttocks and lower extremities for an extended period of time. The symptoms and exam findings were felt to be consistent with neurogenic claudication. The preoperative radiographs and other imaging confirmed showed spondylolisthesis and stenosis. Conservative measures have been exhausted as outlined. The symptoms progressed to the point where there is difficulty performing any task that requires prolonged standing or walking which interfered with activities of daily living and ability to enjoy life.  In the outpatient setting the risks, benefits and potential complications of the above-listed procedure were discussed with her and an informed consent was obtained. \"  Past Medical History/Comorbidities:   Ms. Giovanna Fraire  has a past medical history of Allergic rhinitis, cause unspecified (1/7/2015); Aortic valve disorders (1/7/2015); Arthritis; Arthropathies (1/7/2015); Asthma; Autoimmune disease ([facility] CAD (coronary artery disease); Carotid stenosis (1/7/2015); Carpal tunnel syndrome (01/07/2015); Chronic pain; Contact dermatitis and other eczema, due to unspecified cause (1/7/2015); Depression (6/30/2015); Dysuria (1/7/2015); Esophageal reflux (1/7/2015); Fibromyalgia; Former smoker; GERD (gastroesophageal reflux disease); Heart failure ([facility] Hematuria, unspecified (1/7/2015); Hypertension; Mitral valve disorders (1/7/2015); Mixed hyperlipidemia (4/21/2016); Neuropathy; Occlusion and stenosis of carotid artery without mention of cerebral infarction (1/7/2015); Osteopenia (1/7/2015); Other and unspecified hyperlipidemia (1/7/2015); Other B-complex deficiencies (1/7/2015); Other chest pain (3/7/2011); 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 She also has no past medical history of Unspecified adverse effect of anesthesia.   Ms. Giovanna Fraire  has a past surgical history that includes hysterectomy; tubal ligation; lap cholecystectomy; colonoscopy; hemorrhoidectomy; bladder suspension; shoulder arthroscopy (Right); gi; gi; and urological.  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 Her  is currently in the hospital. Drives. She occasionally used a rolling walker when back pain increased. She has two sons that live in the area and assist as needed. Number of Personal Factors/Comorbidities that affect the Plan of Care:  Full-time caregiver  Low BP  New spinal precautions 3+: HIGH COMPLEXITY   EXAMINATION:   Most Recent Physical Functioning:   Gross Assessment:                  Posture:  Posture (WDL): Exceptions to WDL  Posture Assessment: Trunk flexion  Balance:  Sitting: Intact  Sitting - Static: Good (unsupported)  Sitting - Dynamic: Fair (occasional)  Standing: Impaired  Standing - Static: Fair  Standing - Dynamic : Fair Bed Mobility:  Rolling: Contact guard assistance  Supine to Sit: Minimum assistance  Wheelchair Mobility:     Transfers:  Sit to Stand: Contact guard assistance  Stand to Sit: Contact guard assistance  Gait:     Base of Support: Widened  Speed/María: Shuffled; Slow  Gait Abnormalities: Ataxic;Decreased step clearance  Distance (ft): 30 Feet (ft)  Assistive Device: Walker, rolling  Ambulation - Level of Assistance: Minimal assistance       Body Structures Involved:  1. Nerves  2. Bones  3. Joints  4. Muscles Body Functions Affected:  1. Sensory/Pain  2. Neuromusculoskeletal  3. Movement Related  4. Skin Related Activities and Participation Affected:  1. General Tasks and Demands  2. Mobility  3. Self Care  4. Community, Social and [place]   Number of elements that affect the Plan of Care: 4+: HIGH COMPLEXITY   CLINICAL PRESENTATION:   Presentation: Evolving clinical presentation with changing clinical characteristics: MODERATE COMPLEXITY   CLINICAL DECISION MAKIN [facility] Mobility Inpatient Short Form  How much difficulty does the patient currently have. .. Unable A Lot A Little None   1. Turning over in bed (including adjusting bedclothes, sheets and blankets)? [ ] 1   [ ] 2   [X] 3   [ ] 4   2. Sitting down on and standing up from a chair with arms ( e.g., wheelchair, bedside commode, etc.)   [ ] 1   [ ] 2   [X] 3   [ ] 4   3. Moving from lying on back to sitting on the side of the bed? [ ] 1   [ ] 2   [X] 3   [ ] 4   How much help from another person does the patient currently need. .. Total A Lot A Little None   4. Moving to and from a bed to a chair (including a wheelchair)? [ ] 1   [X] 2   [ ] 3   [ ] 4   5. Need to walk in hospital room? [ ] 1   [X] 2   [ ] 3   [ ] 4   6. Climbing 3-5 steps with a railing? [X] 1   [ ] 2   [ ] 3   [ ] 4   © 2007, Trustees of [address] [place] [address] [address] [id number], under license to [employer]. All rights reserved    Score:  Initial: 14 Most Recent: 14 (Date: 2/8/17 )     Interpretation of Tool:  Represents activities that are increasingly more difficult (i.e. Bed mobility, Transfers, Gait). Score 24 23 22-20 19-15 14-10 9-7 6       Modifier CH CI CJ CK CL CM CN         · Mobility - Walking and Moving Around:               - CURRENT STATUS:    CL - 60%-79% impaired, limited or restricted               - GOAL STATUS:           CK - 40%-59% impaired, limited or restricted               - D/C STATUS:                       ---------------To be determined---------------  Payor: Pernell Rinne / Plan: [place] [employer] / Product Type: Medicare /       Medical Necessity:     · Patient is expected to demonstrate progress in strength, balance and functional technique to decrease assistance required with bed mobility, transfers, and ambulation. Reason for Services/Other Comments:  · Patient requires assistance with functional mobility and activity tolerance is limited by low BP. Mahamed Edmond    Use of outcome tool(s) and clinical judgement create a POC that gives a: Questionable prediction of patient's progress: MODERATE COMPLEXITY                 TREATMENT:      Pre-treatment Symptoms/Complaints:  \"I'm hurting a lot more but they can't give me any medicine\"  Pain: Initial: 2     Post Session:  6/10         Therapeutic Activity: ( 23 min  ):  Therapeutic activities including Bed transfers, Chair transfers, Ambulation on level ground to improve mobility, strength and balance. Required minimal tactile cues   to promote dynamic balance in standing with RW during AMB. Therapeutic group Exercise: ( ):  Exercises per grid below to improve mobility, strength and balance. Required min visual and verbal cues to promote proper body alignment. Progressed repetitions as indicated. Braces/Orthotics/Lines/Etc:   · IV and room air  Treatment/Session Assessment:    · Response to Treatment:  Pt was able to tolerate EOB sitting but drop in BP noted upon standing  · Interdisciplinary Collaboration:  · Physical Therapy Assistant and Registered Nurse  · After treatment position/precautions:  · Up in chair, Bed alarm/tab alert on, Bed/Chair-wheels locked, Bed in low position, Call light within reach, RN notified and Family at bedside  · Compliance with Program/Exercises: compliant all of the time. · Recommendations/Intent for next treatment session: \"Next visit will focus on advancements to more challenging activities and reduction in assistance provided\".   Total Treatment Duration:  PT Patient Time In/Time Out  Time In: 2447  Time Out: 19004 [facility]

## 2017-02-10 NOTE — PROGRESS NOTES
Problem: Mobility Impaired (Adult and Pediatric)  Goal: *Acute Goals and Plan of Care (Insert Text)  LTG:  (1.)Ms. Isamar Merritt will move from supine to sit and sit to supine , scoot up and down and roll side to side in bed with SUPERVISION within 7 day(s). (2.)Ms. Isamar Merritt will transfer from bed to chair and chair to bed with SUPERVISION using the least restrictive device within 7 day(s). (3.)Ms. Isamar Merritt will ambulate with SUPERVISION for 100 feet with the least restrictive device within 7 day(s). ________________________________________________________________________________________________      PHYSICAL THERAPY: Daily Note, Treatment Day: 2nd and AM 2/10/2017  INPATIENT: Hospital Day: 4  Payor: Bishop Figueroa / Plan: Queenie Cooper / Product Type: Medicare /      NAME/AGE/GENDER: Miracle Hernandez is a 66 y.o. female            PRIMARY DIAGNOSIS: Lumbar spinal stenosis [M48.06]  Spondylolisthesis, unspecified spinal region [M43.10] Lumbar stenosis with neurogenic claudication Lumbar stenosis with neurogenic claudication  Procedure(s) (LRB):  L4-S1 LAMINECTOMY AND FUSION WITH BONE MARROW ASPIRATE, ALLOGRAFT, AND INSTRUMENTATION, TLIF (N/A)  3 Days Post-Op  ICD-10: Treatment Diagnosis:       · Generalized Muscle Weakness (M62.81)  · Difficulty in walking, Not elsewhere classified (R26.2)  · Low Back Pain (M54.5)   Precaution/Allergies:  Bees [hymenoptera allergenic extract]; Adhesive tape-silicones; Lescol [fluvastatin]; Lipitor [atorvastatin]; Lisinopril; Penicillin g; and Tetracycline       ASSESSMENT:      Ms. Isamar Merritt presents in supine. Pt agreeable to participate in PT session. Able to state sequencing and perform log roll technique without verbal cueing, but requires MIN assist with bed mobility. Pt stood and ambulated around the bed to the w/c. She was taken to the gym and participated in group activities.  Returned to room by w/c and transferred to bed with min assist. Pt will benefit from continued physical therapy to increase upright tolerance, decrease pain, and improve independence with functional activity prior to D/C to rehab. This section established at most recent assessment   PROBLEM LIST (Impairments causing functional limitations):  1. Decreased Strength  2. Decreased ADL/Functional Activities  3. Decreased Transfer Abilities  4. Decreased Ambulation Ability/Technique  5. Decreased Balance  6. Increased Pain  7. Decreased Activity Tolerance    INTERVENTIONS PLANNED: (Benefits and precautions of physical therapy have been discussed with the patient.)  1. Balance Exercise  2. Bed Mobility  3. Family Education  4. Gait Training  5. Therapeutic Activites  6. Therapeutic Exercise/Strengthening  7. Transfer Training  8. Group Therapy      TREATMENT PLAN: Frequency/Duration: twice daily for duration of hospital stay  Rehabilitation Potential For Stated Goals: GOOD      RECOMMENDED REHABILITATION/EQUIPMENT: (at time of discharge pending progress): Continue Skilled Therapy and Rehab. HISTORY:   History of Present Injury/Illness (Reason for Referral):  Per H&P, \"Patient has had low back pain with radiation to the buttocks and lower extremities for an extended period of time. The symptoms and exam findings were felt to be consistent with neurogenic claudication. The preoperative radiographs and other imaging confirmed showed spondylolisthesis and stenosis. Conservative measures have been exhausted as outlined. The symptoms progressed to the point where there is difficulty performing any task that requires prolonged standing or walking which interfered with activities of daily living and ability to enjoy life. In the outpatient setting the risks, benefits and potential complications of the above-listed procedure were discussed with her and an informed consent was obtained. \"  Past Medical History/Comorbidities:   Ms. Judah Butterfield  has a past medical history of Allergic rhinitis, cause unspecified (1/7/2015); Aortic valve disorders (1/7/2015); Arthritis; Arthropathies (1/7/2015); Asthma; Autoimmune disease ([facility] [address] CAD (coronary artery disease); Carotid stenosis (1/7/2015); Carpal tunnel syndrome (01/07/2015); Chronic pain; Contact dermatitis and other eczema, due to unspecified cause (1/7/2015); Depression (6/30/2015); Dysuria (1/7/2015); Esophageal reflux (1/7/2015); Fibromyalgia; Former smoker; GERD (gastroesophageal reflux disease); Heart failure ([facility] [address] Hematuria, unspecified (1/7/2015); Hypertension; Mitral valve disorders (1/7/2015); Mixed hyperlipidemia (4/21/2016); Neuropathy; Occlusion and stenosis of carotid artery without mention of cerebral infarction (1/7/2015); Osteopenia (1/7/2015); Other and unspecified hyperlipidemia (1/7/2015); Other B-complex deficiencies (1/7/2015); Other chest pain (3/7/2011); 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 She also has no past medical history of Unspecified adverse effect of anesthesia. Ms. Mayra Rod  has a past surgical history that includes hysterectomy; tubal ligation; lap cholecystectomy; colonoscopy; hemorrhoidectomy; bladder suspension; shoulder arthroscopy (Right); gi; gi; and urological.  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 Her  is currently in the hospital. Drives.  She occasionally used a rolling walker when back pain increased. She has two sons that live in the area and assist as needed. Number of Personal Factors/Comorbidities that affect the Plan of Care:  Full-time caregiver  Low BP  New spinal precautions 3+: HIGH COMPLEXITY   EXAMINATION:   Most Recent Physical Functioning:   Gross Assessment:                  Posture:  Posture (WDL): Exceptions to WDL  Posture Assessment: Trunk flexion  Balance:  Sitting: Intact  Sitting - Static: Good (unsupported)  Sitting - Dynamic: Fair (occasional)  Standing: Impaired  Standing - Static: Fair  Standing - Dynamic : Fair Bed Mobility:  Rolling: Contact guard assistance  Supine to Sit: Minimum assistance  Wheelchair Mobility:     Transfers:  Sit to Stand: Contact guard assistance  Stand to Sit: Contact guard assistance  Gait:     Base of Support: Widened  Speed/María: Shuffled; Slow  Gait Abnormalities: Ataxic;Decreased step clearance  Distance (ft): 10 Feet (ft)  Assistive Device: Walker, rolling  Ambulation - Level of Assistance: Minimal assistance       Body Structures Involved:  1. Nerves  2. Bones  3. Joints  4. Muscles Body Functions Affected:  1. Sensory/Pain  2. Neuromusculoskeletal  3. Movement Related  4. Skin Related Activities and Participation Affected:  1. General Tasks and Demands  2. Mobility  3. Self Care  4. Community, Social and [place]   Number of elements that affect the Plan of Care: 4+: HIGH COMPLEXITY   CLINICAL PRESENTATION:   Presentation: Evolving clinical presentation with changing clinical characteristics: MODERATE COMPLEXITY   CLINICAL DECISION MAKIN [facility] Inpatient Short Form  How much difficulty does the patient currently have. .. Unable A Lot A Little None   1. Turning over in bed (including adjusting bedclothes, sheets and blankets)? [ ] 1   [ ] 2   [X] 3   [ ] 4   2.   Sitting down on and standing up from a chair with arms ( e.g., wheelchair, bedside commode, etc.)   [ ] 1   [ ] 2   [X] 3 [ ] 4   3. Moving from lying on back to sitting on the side of the bed? [ ] 1   [ ] 2   [X] 3   [ ] 4   How much help from another person does the patient currently need. .. Total A Lot A Little None   4. Moving to and from a bed to a chair (including a wheelchair)? [ ] 1   [X] 2   [ ] 3   [ ] 4   5. Need to walk in hospital room? [ ] 1   [X] 2   [ ] 3   [ ] 4   6. Climbing 3-5 steps with a railing? [X] 1   [ ] 2   [ ] 3   [ ] 4   © 2007, Trustees of [address] [place] [address] [address] [address], under license to [employer]. All rights reserved    Score:  Initial: 14 Most Recent: 14 (Date: 2/8/17 )     Interpretation of Tool:  Represents activities that are increasingly more difficult (i.e. Bed mobility, Transfers, Gait). Score 24 23 22-20 19-15 14-10 9-7 6       Modifier CH CI CJ CK CL CM CN         · Mobility - Walking and Moving Around:               - CURRENT STATUS:    CL - 60%-79% impaired, limited or restricted               - GOAL STATUS:           CK - 40%-59% impaired, limited or restricted               - D/C STATUS:                       ---------------To be determined---------------  Payor: Jessy Vernon / Plan: [place] [employer] / Product Type: Medicare /       Medical Necessity:     · Patient is expected to demonstrate progress in strength, balance and functional technique to decrease assistance required with bed mobility, transfers, and ambulation. Reason for Services/Other Comments:  · Patient requires assistance with functional mobility and activity tolerance is limited by low BP. Michael Livingston    Use of outcome tool(s) and clinical judgement create a POC that gives a: Questionable prediction of patient's progress: MODERATE COMPLEXITY                 TREATMENT:      Pre-treatment Symptoms/Complaints:  \"I'm hurting a lot more but they can't give me any medicine\"  Pain: Initial:      Post Session:  /10         Therapeutic Activity: ( 12 min  ):  Therapeutic activities including Bed transfers, Chair transfers, Ambulation on level ground to improve mobility, strength and balance. Required minimal tactile cues   to promote dynamic balance in standing with RW during AMB. Therapeutic group Exercise: ( 10):  Exercises per grid below to improve mobility, strength and balance. Required min visual and verbal cues to promote proper body alignment. Progressed repetitions as indicated. Date:  2/10/17 Date:   Date:     Activity/Exercise Parameters Parameters Parameters   AP's X 15 B     LAQ's X 15 B     Hip flex; marching X 15 B     Hip ABD/ADD X 15 B                             Braces/Orthotics/Lines/Etc:   · IV, drain hemovac and room air  Treatment/Session Assessment:    · Response to Treatment:  Pt was able to tolerate EOB sitting but drop in BP noted upon standing  · Interdisciplinary Collaboration:  · Physical Therapy Assistant and Registered Nurse  · After treatment position/precautions:  · Supine in bed, Bed alarm/tab alert on, Bed/Chair-wheels locked, Bed in low position, Call light within reach and RN notified  · Compliance with Program/Exercises: compliant all of the time. · Recommendations/Intent for next treatment session: \"Next visit will focus on advancements to more challenging activities and reduction in assistance provided\".   Total Treatment Duration:  PT Patient Time In/Time Out  Time In: 1048 (1020)  Time Out: 1100 (1030)     Leanna Leigh PTA

## 2017-02-10 NOTE — PROGRESS NOTES
Pt with family at bedside has decided to transition to [facility] 9th floor acute rehab and we have insurance approval for same. They can accept pt for care tomorrow Sat 2/11/17.   Pt/family are aware of insurance copay and decided to proceed with this plan of care

## 2017-02-10 NOTE — PROGRESS NOTES
New IV placement attempted by multiple RNs and vascular access RN. No success. FACUNDO Simeon notified. Patient okay to not have IV access.

## 2017-02-10 NOTE — PROGRESS NOTES
ORTHO PROGRESS NOTE    February 10, 2017    Admit Date: 2017  Admit Diagnosis: Lumbar spinal stenosis [M48.06]  Spondylolisthesis, unspecified spinal region [M43.10]  Post Op day: 3 Days Post-Op      Subjective:     Ana Al is a patient who is now 3 Days Post-Op  and has no complaints. Objective:     PT/OT: very slow to progress       Vital Signs:    Patient Vitals for the past 8 hrs:   BP Temp Pulse Resp SpO2   02/10/17 0723 123/60 98.4 °F (36.9 °C) 75 18 93 %   02/10/17 0411 113/56 97.6 °F (36.4 °C) 74 17 96 %   02/10/17 0051 95/46 98.5 °F (36.9 °C) 72 18 93 %     Temp (24hrs), Av.2 °F (36.8 °C), Min:97.6 °F (36.4 °C), Max:98.9 °F (37.2 °C)      LAB:    Recent Labs      17   0328   HGB  10.9*       I/O:      1901 - 02/10 0700  In: 240 [P.O.:240]  Out: 660 [Urine:600; Drains:60]    Physical Exam:    Awake and in no acute distress. Mood and affect appropriate. Respirations unlabored and no evidence cyanosis. Calves nontender. Abdomen soft and nontender. Dressing clean/dry  No new neurologic deficit.     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    Other malaise and fatigue R53.81, R53.83    Allergic rhinitis, cause unspecified J30.9    Aortic valve disorders I35.9    Extrinsic asthma, unspecified J45.909    Respiratory abnormality, unspecified J98.9    Congestive heart failure, unspecified I50.9    Occlusion and stenosis of carotid artery without mention of cerebral infarction I65.29    Carpal tunnel syndrome G56.00    Unspecified urinary incontinence R32    Carotid stenosis I65.29    Renal insufficiency N28.9    Heart disease, unspecified I51.9    Shortness of breath R06.02    Osteopenia M85.80    Depression F32.9    RENEE (stress urinary incontinence, female) N39.3    Rectocele N81.6    Weakening of rectovaginal tissue N81.83    Mixed hyperlipidemia E78.2    Asymptomatic carotid artery stenosis I65.29    Valvular heart disease I38    Coronary artery disease involving native coronary artery of native heart without angina pectoris I25.10    On potassium wasting diuretic therapy Z79.899    Obesity (BMI 30.0-34. 9) E66.9    Lumbar stenosis with neurogenic claudication M48.06       3 Days Post-Op STATUS POST Procedure(s):  L4-S1 LAMINECTOMY AND FUSION WITH BONE MARROW ASPIRATE, ALLOGRAFT, AND INSTRUMENTATION, TLIF      Plan:     Continue PT/OT/Rehab  Discontinue:    Consult: none  Anticipate discharge to: SNF- awaiting approval       Signed By: Erickson Pemberton NP

## 2017-02-10 NOTE — DISCHARGE SUMMARY
Discharge Summary    Patient Cris Doty  [id number]  1945  66 y.o. Admit date: 2/7/2017    Discharge date:2/10/2017    Admitting Physician: Chrissy Gaona MD    Discharge Physician: Chrissy Gaona MD    Reason for Admission: spinal stenosis,    Discharge Diagnoses:  Patient Active Problem List   Diagnosis Code    GERD (gastroesophageal reflux disease) K21.9    Asthma J45.909    Other chest pain R07.89    Contact dermatitis and other eczema, due to unspecified cause L25.9    Essential hypertension I10    Other and unspecified hyperlipidemia E78.5    Mitral valve disorders I05.9    Hematuria, unspecified R31.9    Dysuria R30.0    Anemia, unspecified D64.9    Other B-complex deficiencies E53.8    Arthropathies M12.9    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    Other malaise and fatigue R53.81, R53.83    Allergic rhinitis, cause unspecified J30.9    Aortic valve disorders I35.9    Extrinsic asthma, unspecified J45.909    Respiratory abnormality, unspecified J98.9    Congestive heart failure, unspecified I50.9    Occlusion and stenosis of carotid artery without mention of cerebral infarction I65.29    Carpal tunnel syndrome G56.00    Unspecified urinary incontinence R32    Carotid stenosis I65.29    Renal insufficiency N28.9    Heart disease, unspecified I51.9    Shortness of breath R06.02    Osteopenia M85.80    Depression F32.9    RENEE (stress urinary incontinence, female) N39.3    Rectocele N81.6    Weakening of rectovaginal tissue N81.83    Mixed hyperlipidemia E78.2    Asymptomatic carotid artery stenosis I65.29    Valvular heart disease I38    Coronary artery disease involving native coronary artery of native heart without angina pectoris I25.10    On potassium wasting diuretic therapy Z79.899    Obesity (BMI 30.0-34. 9) E66.9    Lumbar stenosis with neurogenic claudication M48.06          Surgeon: Chrissy Gaona MD    Procedure:   1. Lumbar laminectomy L4 through S1 with bilateral foraminotomies. 2. Lumbar posterolateral fusion L4 through S1 .  3. Cortical screw instrumentation L4 through S1 .  4. Bone marrow aspirate for allograft  5. Translumbar interbody fusion L4-L5 and L5- S1.  6. Insertion biomechanical device L4-L5 and L5- S1  7. Vanessa Finley dorsal osteotomy L5          Post Op complications: none    HBG at Discharge:   Recent Labs      02/08/17   0328   HGB  10.9*         Indications for admission/procedure:  Patient has had low back pain with radiation to the buttocks and lower extremities for an extended period of time. The symptoms and exam findings were felt to be consistent with neurogenic claudication. The preoperative radiographs and MRI/CT showed  stenosis. Conservative measures have been exhausted. The symptoms progressed to the point where there is difficulty performing any task that requires prolonged standing or walking. In the outpatient setting the risks, benefits and potential complications of the above-listed procedure were discussed with her and an informed consent was obtained. Hospital Course: Patient admitted to ortho floor. Antibiotics were given postop. SCD and sandra hose were in place for DVT prophylaxis. Richard catheter was discontinued on POD # 1. Patient voided normally. Patient did / did not receive blood transfusion. Patient tolerated pain medications and po diet. Issues with hypotension post operatively but improved with fluid boluses. Hemovac drain was removed on POD # 2. Dressing remained clean, dry, and intact. Physical Therapy started on the day following surgery and progressed to  assisted ambulation. Patient very slow to progress with PT, requiring additional acute rehab. Accepted to SNF. Patient remained neurologically stable throughout hospital course. Reports improvement of preoperative pain. At the time of discharge, had understanding of precautions needed following surgery.       Discharged to: SNF    Condition: Stable:    New Medications: Norco    Follow up: 2 weeks      Discharge instructions:    -Resume pre hospital diet            -Resume home medications per medical continuation form     -Follow up in office as scheduled   -Call doctor immediately if T>100.5, increased pain, swelling, drainage.   -If shortness of breath or chest pain, immediately go to ER  -Post surgical instruction sheet given to patient    Signed:  Marely Mcginnis NP  2/10/2017

## 2017-02-11 ENCOUNTER — HOSPITAL ENCOUNTER (INPATIENT)
Age: 72
LOS: 10 days | Discharge: HOME HEALTH | DRG: 092 | End: 2017-02-21
Attending: PHYSICAL MEDICINE & REHABILITATION | Admitting: PHYSICAL MEDICINE & REHABILITATION
Payer: COMMERCIAL

## 2017-02-11 VITALS
TEMPERATURE: 98.5 F | WEIGHT: 171.58 LBS | RESPIRATION RATE: 18 BRPM | BODY MASS INDEX: 34.59 KG/M2 | DIASTOLIC BLOOD PRESSURE: 49 MMHG | OXYGEN SATURATION: 97 % | SYSTOLIC BLOOD PRESSURE: 118 MMHG | HEART RATE: 68 BPM | HEIGHT: 59 IN

## 2017-02-11 DIAGNOSIS — M48.062 LUMBAR STENOSIS WITH NEUROGENIC CLAUDICATION: ICD-10-CM

## 2017-02-11 DIAGNOSIS — I10 ESSENTIAL HYPERTENSION: Chronic | ICD-10-CM

## 2017-02-11 DIAGNOSIS — K59.00 CONSTIPATION DUE TO NEUROGENIC BOWEL: Primary | ICD-10-CM

## 2017-02-11 DIAGNOSIS — D64.9 ANEMIA, UNSPECIFIED: ICD-10-CM

## 2017-02-11 DIAGNOSIS — M48.061 SPINAL STENOSIS OF LUMBAR REGION AT MULTIPLE LEVELS: ICD-10-CM

## 2017-02-11 DIAGNOSIS — Z98.1 S/P LAMINECTOMY WITH SPINAL FUSION: ICD-10-CM

## 2017-02-11 DIAGNOSIS — J45.30 MILD PERSISTENT ASTHMA WITHOUT COMPLICATION: ICD-10-CM

## 2017-02-11 DIAGNOSIS — K59.2 CONSTIPATION DUE TO NEUROGENIC BOWEL: Primary | ICD-10-CM

## 2017-02-11 DIAGNOSIS — R19.7 DIARRHEA, UNSPECIFIED TYPE: ICD-10-CM

## 2017-02-11 DIAGNOSIS — N28.9 RENAL INSUFFICIENCY: ICD-10-CM

## 2017-02-11 DIAGNOSIS — K21.9 GASTROESOPHAGEAL REFLUX DISEASE WITHOUT ESOPHAGITIS: Chronic | ICD-10-CM

## 2017-02-11 DIAGNOSIS — R06.9 RESPIRATORY ABNORMALITY, UNSPECIFIED: ICD-10-CM

## 2017-02-11 PROCEDURE — 74011250637 HC RX REV CODE- 250/637: Performed by: ORTHOPAEDIC SURGERY

## 2017-02-11 PROCEDURE — 65310000000 HC RM PRIVATE REHAB

## 2017-02-11 PROCEDURE — 74011250637 HC RX REV CODE- 250/637: Performed by: PHYSICAL MEDICINE & REHABILITATION

## 2017-02-11 PROCEDURE — 97530 THERAPEUTIC ACTIVITIES: CPT

## 2017-02-11 PROCEDURE — 94760 N-INVAS EAR/PLS OXIMETRY 1: CPT

## 2017-02-11 PROCEDURE — 77010033678 HC OXYGEN DAILY

## 2017-02-11 PROCEDURE — 99223 1ST HOSP IP/OBS HIGH 75: CPT | Performed by: PHYSICAL MEDICINE & REHABILITATION

## 2017-02-11 RX ORDER — ATENOLOL 25 MG/1
12.5 TABLET ORAL
Status: DISCONTINUED | OUTPATIENT
Start: 2017-02-11 | End: 2017-02-21 | Stop reason: HOSPADM

## 2017-02-11 RX ORDER — ATORVASTATIN CALCIUM 10 MG/1
20 TABLET, FILM COATED ORAL
Status: DISCONTINUED | OUTPATIENT
Start: 2017-02-11 | End: 2017-02-21 | Stop reason: HOSPADM

## 2017-02-11 RX ORDER — ALBUTEROL SULFATE 90 UG/1
2 AEROSOL, METERED RESPIRATORY (INHALATION)
Status: DISCONTINUED | OUTPATIENT
Start: 2017-02-11 | End: 2017-02-21 | Stop reason: HOSPADM

## 2017-02-11 RX ORDER — ZOLPIDEM TARTRATE 5 MG/1
5 TABLET ORAL
Status: DISCONTINUED | OUTPATIENT
Start: 2017-02-11 | End: 2017-02-21 | Stop reason: HOSPADM

## 2017-02-11 RX ORDER — NALOXONE HYDROCHLORIDE 0.4 MG/ML
0.4 INJECTION, SOLUTION INTRAMUSCULAR; INTRAVENOUS; SUBCUTANEOUS
Status: DISCONTINUED | OUTPATIENT
Start: 2017-02-11 | End: 2017-02-21 | Stop reason: HOSPADM

## 2017-02-11 RX ORDER — FUROSEMIDE 40 MG/1
40 TABLET ORAL DAILY
Status: DISCONTINUED | OUTPATIENT
Start: 2017-02-12 | End: 2017-02-21 | Stop reason: HOSPADM

## 2017-02-11 RX ORDER — IPRATROPIUM BROMIDE AND ALBUTEROL SULFATE 2.5; .5 MG/3ML; MG/3ML
3 SOLUTION RESPIRATORY (INHALATION)
Status: DISCONTINUED | OUTPATIENT
Start: 2017-02-11 | End: 2017-02-21 | Stop reason: HOSPADM

## 2017-02-11 RX ORDER — NITROGLYCERIN 0.4 MG/1
0.4 TABLET SUBLINGUAL
Status: DISCONTINUED | OUTPATIENT
Start: 2017-02-11 | End: 2017-02-21 | Stop reason: HOSPADM

## 2017-02-11 RX ORDER — OXYCODONE HYDROCHLORIDE 5 MG/1
5-10 TABLET ORAL
Status: DISCONTINUED | OUTPATIENT
Start: 2017-02-11 | End: 2017-02-21 | Stop reason: HOSPADM

## 2017-02-11 RX ORDER — POTASSIUM CHLORIDE 20 MEQ/1
20 TABLET, EXTENDED RELEASE ORAL DAILY
Status: DISCONTINUED | OUTPATIENT
Start: 2017-02-12 | End: 2017-02-21 | Stop reason: HOSPADM

## 2017-02-11 RX ORDER — ACETAMINOPHEN 325 MG/1
650 TABLET ORAL EVERY 6 HOURS
Status: DISCONTINUED | OUTPATIENT
Start: 2017-02-11 | End: 2017-02-21 | Stop reason: HOSPADM

## 2017-02-11 RX ORDER — SODIUM CHLORIDE 0.9 % (FLUSH) 0.9 %
5-10 SYRINGE (ML) INJECTION AS NEEDED
Status: DISCONTINUED | OUTPATIENT
Start: 2017-02-11 | End: 2017-02-11

## 2017-02-11 RX ORDER — GABAPENTIN 300 MG/1
300 CAPSULE ORAL 3 TIMES DAILY
Status: DISCONTINUED | OUTPATIENT
Start: 2017-02-11 | End: 2017-02-21 | Stop reason: HOSPADM

## 2017-02-11 RX ORDER — FAMOTIDINE 20 MG/1
20 TABLET, FILM COATED ORAL EVERY 24 HOURS
Status: DISCONTINUED | OUTPATIENT
Start: 2017-02-11 | End: 2017-02-14

## 2017-02-11 RX ORDER — FACIAL-BODY WIPES
10 EACH TOPICAL DAILY PRN
Status: DISCONTINUED | OUTPATIENT
Start: 2017-02-11 | End: 2017-02-21 | Stop reason: HOSPADM

## 2017-02-11 RX ORDER — DOCUSATE SODIUM 100 MG/1
100 CAPSULE, LIQUID FILLED ORAL 2 TIMES DAILY
Status: DISCONTINUED | OUTPATIENT
Start: 2017-02-11 | End: 2017-02-21 | Stop reason: HOSPADM

## 2017-02-11 RX ORDER — CYCLOBENZAPRINE HCL 10 MG
10 TABLET ORAL
Status: DISCONTINUED | OUTPATIENT
Start: 2017-02-11 | End: 2017-02-21 | Stop reason: HOSPADM

## 2017-02-11 RX ORDER — SODIUM CHLORIDE 0.9 % (FLUSH) 0.9 %
5-10 SYRINGE (ML) INJECTION EVERY 8 HOURS
Status: DISCONTINUED | OUTPATIENT
Start: 2017-02-11 | End: 2017-02-11

## 2017-02-11 RX ORDER — VALSARTAN 80 MG/1
160 TABLET ORAL DAILY
Status: DISCONTINUED | OUTPATIENT
Start: 2017-02-12 | End: 2017-02-21 | Stop reason: HOSPADM

## 2017-02-11 RX ORDER — SENNOSIDES 8.6 MG/1
1 TABLET ORAL DAILY
Status: DISCONTINUED | OUTPATIENT
Start: 2017-02-12 | End: 2017-02-14

## 2017-02-11 RX ORDER — POLYETHYLENE GLYCOL 3350 17 G/17G
17 POWDER, FOR SOLUTION ORAL DAILY
Status: DISCONTINUED | OUTPATIENT
Start: 2017-02-12 | End: 2017-02-13 | Stop reason: SDUPTHER

## 2017-02-11 RX ORDER — SERTRALINE HYDROCHLORIDE 50 MG/1
75 TABLET, FILM COATED ORAL DAILY
Status: DISCONTINUED | OUTPATIENT
Start: 2017-02-12 | End: 2017-02-21 | Stop reason: HOSPADM

## 2017-02-11 RX ADMIN — SENNOSIDES 8.6 MG: 8.6 TABLET, FILM COATED ORAL at 09:10

## 2017-02-11 RX ADMIN — DOCUSATE SODIUM 100 MG: 100 CAPSULE ORAL at 09:10

## 2017-02-11 RX ADMIN — ATENOLOL 12.5 MG: 25 TABLET ORAL at 22:39

## 2017-02-11 RX ADMIN — POLYETHYLENE GLYCOL 3350 17 G: 17 POWDER, FOR SOLUTION ORAL at 09:11

## 2017-02-11 RX ADMIN — SERTRALINE HYDROCHLORIDE 75 MG: 50 TABLET ORAL at 09:10

## 2017-02-11 RX ADMIN — ATORVASTATIN CALCIUM 20 MG: 10 TABLET, FILM COATED ORAL at 22:39

## 2017-02-11 RX ADMIN — POTASSIUM CHLORIDE 20 MEQ: 20 TABLET, EXTENDED RELEASE ORAL at 09:10

## 2017-02-11 RX ADMIN — GABAPENTIN 300 MG: 300 CAPSULE ORAL at 15:26

## 2017-02-11 RX ADMIN — GABAPENTIN 300 MG: 300 CAPSULE ORAL at 22:38

## 2017-02-11 RX ADMIN — VALSARTAN 160 MG: 160 TABLET, FILM COATED ORAL at 09:10

## 2017-02-11 RX ADMIN — Medication 5 ML: at 09:11

## 2017-02-11 RX ADMIN — ACETAMINOPHEN 650 MG: 325 TABLET, FILM COATED ORAL at 15:26

## 2017-02-11 RX ADMIN — OXYCODONE HYDROCHLORIDE 5 MG: 5 TABLET ORAL at 05:43

## 2017-02-11 RX ADMIN — ACETAMINOPHEN 650 MG: 325 TABLET, FILM COATED ORAL at 03:00

## 2017-02-11 RX ADMIN — GABAPENTIN 300 MG: 300 CAPSULE ORAL at 04:58

## 2017-02-11 RX ADMIN — FAMOTIDINE 20 MG: 20 TABLET ORAL at 22:39

## 2017-02-11 RX ADMIN — ACETAMINOPHEN 650 MG: 325 TABLET, FILM COATED ORAL at 09:10

## 2017-02-11 RX ADMIN — FUROSEMIDE 40 MG: 40 TABLET ORAL at 09:10

## 2017-02-11 RX ADMIN — ACETAMINOPHEN 650 MG: 325 TABLET, FILM COATED ORAL at 22:39

## 2017-02-11 RX ADMIN — Medication 10 ML: at 04:58

## 2017-02-11 RX ADMIN — OXYCODONE HYDROCHLORIDE 5 MG: 5 TABLET ORAL at 09:09

## 2017-02-11 NOTE — IP AVS SNAPSHOT
Current Discharge Medication List  
  
Take these medications at their scheduled times Dose & Instructions Dispensing Information Comments Morning Noon Evening Bedtime  
 aspirin delayed-release 81 mg tablet Your next dose is: Today, Tomorrow Other:  ____________ Dose:  81 mg Take 81 mg by mouth every morning. Instructed to take DOS per Anesthesia guidelines. Refills:  0  
     
   
   
   
  
 atenolol 25 mg tablet Commonly known as:  TENORMIN Your next dose is: Today, Tomorrow Other:  ____________ Dose:  12.5 mg Take 0.5 Tabs by mouth nightly. Quantity:  45 Tab Refills:  3  
     
   
   
   
  
 atorvastatin 20 mg tablet Commonly known as:  LIPITOR Your next dose is: Today, Tomorrow Other:  ____________ Dose:  20 mg Take 1 Tab by mouth daily. Quantity:  90 Tab Refills:  3  
     
   
   
   
  
 gabapentin 300 mg capsule Commonly known as:  NEURONTIN Your next dose is: Today, Tomorrow Other:  ____________ Dose:  300 mg Take 1 Cap by mouth three (3) times daily. Quantity:  270 Cap Refills:  3  
     
   
   
   
  
 omeprazole 40 mg capsule Commonly known as:  PRILOSEC Your next dose is: Today, Tomorrow Other:  ____________ Dose:  40 mg Take 40 mg by mouth every morning. Refills:  0  
     
   
   
   
  
 potassium chloride 20 mEq tablet Commonly known as:  K-DUR, KLOR-CON Your next dose is: Today, Tomorrow Other:  ____________ Dose:  20 mEq Take 1 Tab by mouth every morning. Quantity:  90 Tab Refills:  3  
     
   
   
   
  
 sertraline 50 mg tablet Commonly known as:  ZOLOFT Your next dose is: Today, Tomorrow Other:  ____________ Dose:  75 mg Take 1.5 Tabs by mouth every morning. Quantity:  135 Tab Refills:  3  
     
   
   
   
  
 traMADol 50 mg tablet Commonly known as:  ULTRAM  
   
Your next dose is: Today, Tomorrow Other:  ____________ Dose:  50 mg Take 1 Tab by mouth four (4) times daily. Max Daily Amount: 200 mg. Quantity:  120 Tab Refills:  0  
     
   
   
   
  
 zinc oxide-cod liver oil 40 % oint 30 g, vitamin a & d oint 30 g, nystatin 100,000 unit/gram crea 30 g, compound tincture of benzoin tinc 0.5 mL Your next dose is: Today, Tomorrow Other:  ____________ Dose:  1 Each Apply 1 Each to affected area two (2) times a day. Quantity:  500 g Refills:  3 Take these medications as needed Dose & Instructions Dispensing Information Comments Morning Noon Evening Bedtime  
 albuterol 90 mcg/actuation inhaler Commonly known as:  PROVENTIL HFA, VENTOLIN HFA, PROAIR HFA Your next dose is: Today, Tomorrow Other:  ____________ Dose:  2 Puff Take 2 Puffs by inhalation every six (6) hours as needed for Wheezing. Indications: ACUTE ASTHMA ATTACK Quantity:  1 Inhaler Refills:  3  
     
   
   
   
  
 albuterol-ipratropium 2.5 mg-0.5 mg/3 ml Nebu Commonly known as:  Latrice Foot Your next dose is: Today, Tomorrow Other:  ____________ Dose:  3 mL  
3 mL by Nebulization route every six (6) hours as needed. Quantity:  100 Nebule Refills:  1  
     
   
   
   
  
 cyclobenzaprine 10 mg tablet Commonly known as:  FLEXERIL Your next dose is: Today, Tomorrow Other:  ____________ Dose:  5 mg Take 0.5 Tabs by mouth three (3) times daily as needed for Muscle Spasm(s). Quantity:  90 Tab Refills:  0  
     
   
   
   
  
 nitroglycerin 0.4 mg SL tablet Commonly known as:  NITROSTAT Your next dose is: Today, Tomorrow Other:  ____________ Dose:  0.4 mg  
1 Tab by SubLINGual route every five (5) minutes as needed for Chest Pain. Quantity:  4 Bottle Refills:  6 oxyCODONE IR 5 mg immediate release tablet Commonly known as:  B[place]don [place]ocks Your next dose is: Today, Tomorrow Other:  ____________ Dose:  5-10 mg Take 1-2 Tabs by mouth every four (4) hours as needed. Max Daily Amount: 60 mg.  
 Quantity:  120 Tab Refills:  0 Take these medications as directed Dose & Instructions Dispensing Information Comments Morning Noon Evening Bedtime  
 furosemide 40 mg tablet Commonly known as:  LASIX Your next dose is: Today, Tomorrow Other:  ____________  
   
   
 take 1 tablet by mouth every morning Quantity:  90 Tab Refills:  3  
     
   
   
   
  
 valsartan 160 mg tablet Commonly known as:  DIOVAN Your next dose is: Today, Tomorrow Other:  ____________  
   
   
 take 1 tablet by mouth once daily Quantity:  90 Tab Refills:  3 Where to Get Your Medications These medications were sent to [address]  [address], [address] Phone:  [phone]  
  cyclobenzaprine 10 mg tablet Information about where to get these medications is not yet available ! Ask your nurse or doctor about these medications  
  oxyCODONE IR 5 mg immediate release tablet  
 traMADol 50 mg tablet

## 2017-02-11 NOTE — PROGRESS NOTES
Report called to [facility]  Discharged to [facility] for rehab  [phone]

## 2017-02-11 NOTE — PROGRESS NOTES
Got call from 9th floor rehab  \" pt is supposed to be discharged to 917\"  No D/C order , but d/c summary dictated prior  Call for d/c order  Call report to  [address] [place] [address] St

## 2017-02-11 NOTE — PROGRESS NOTES
TRANSFER - IN REPORT:    Verbal report received from Al, RN on Salbador Taylor  being received from [facility] for routine progression of care      Report consisted of patients Situation, Background, Assessment and   Recommendations(SBAR). Information from the following report(s) SBAR was reviewed with the receiving nurse. Opportunity for questions and clarification was provided. Assessment completed upon patients arrival to unit and care assumed. Admitted to room 917. A/O, No acute distress noted. Instructed to call for assistance prior to getting up. Verbalized understanding. Skin assessment completed. Small tape burns noted around incision site and multiple bruises on left arm. All other skin is intact. Denies needs or c/o at this time.

## 2017-02-11 NOTE — IP AVS SNAPSHOT
Summary of Care Report The Summary of Care report has been created to help improve care coordination. Users with access to [employer] or [employer] (Web-based application) may access additional patient information including the Discharge Summary. If you are not currently a [facility] user and need more information, please call the number listed below in the [place] [address] section and ask to be connected with Medical Records. Facility Information Name Address Phone [address] [address] [address] [phone] Patient Information Patient Name Sex  Jeffery Ramos ([id number]) Female 1945 Discharge Information Admitting Provider Service Area Unit [address] , MD / [facility] / [phone] Discharge Provider Discharge Date/Time Discharge Disposition Destination (none) 2017 Midday (Pending) [facility] (none) Patient Language Language ENGLISH [13] Problem List as of 2017  Date Reviewed: 10/5/2016 Codes Priority Class Noted - Resolved GERD (gastroesophageal reflux disease) (Chronic) ICD-10-CM: K21.9 ICD-9-CM: 530.81   3/4/2011 - Present Asthma (Chronic) ICD-10-CM: J45.909 ICD-9-CM: 493.90   3/4/2011 - Present Other chest pain ICD-10-CM: R07.89 ICD-9-CM: 786.59   3/7/2011 - Present Contact dermatitis and other eczema, due to unspecified cause ICD-10-CM: L25.9 ICD-9-CM: 692.9   2015 - Present Essential hypertension (Chronic) ICD-10-CM: I10 
ICD-9-CM: 401.9   2015 - Present Other and unspecified hyperlipidemia ICD-10-CM: E78.5 ICD-9-CM: 272.4   2015 - Present Mitral valve disorders ICD-10-CM: I05.9 ICD-9-CM: 424.0   2015 - Present Hematuria, unspecified ICD-10-CM: R31.9 ICD-9-CM: 599.70   1/7/2015 - Present Dysuria ICD-10-CM: R30.0 ICD-9-CM: 788.1   1/7/2015 - Present Anemia, unspecified ICD-10-CM: D64.9 ICD-9-CM: 285.9   1/7/2015 - Present Other B-complex deficiencies ICD-10-CM: E53.8 ICD-9-CM: 266.2   1/7/2015 - Present Arthropathies (Chronic) ICD-10-CM: M12.9 ICD-9-CM: 716.90   1/7/2015 - Present Overview Signed 1/7/2015 10:11 AM by Tony Hilario Multiple sites 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 
ICD-9-CM: 756.19   1/7/2015 - Present Other malaise and fatigue ICD-10-CM: R53.81, R53.83 ICD-9-CM: 780.79   1/7/2015 - Present Allergic rhinitis, cause unspecified ICD-10-CM: J30.9 ICD-9-CM: 477.9   1/7/2015 - Present Aortic valve disorders ICD-10-CM: I35.9 ICD-9-CM: 424.1   1/7/2015 - Present Extrinsic asthma, unspecified (Chronic) ICD-10-CM: J45.909 ICD-9-CM: 493.00   1/7/2015 - Present Respiratory abnormality, unspecified ICD-10-CM: J98.9 ICD-9-CM: 786.00   1/7/2015 - Present Congestive heart failure, unspecified ICD-10-CM: I50.9 ICD-9-CM: 428.0   1/7/2015 - Present Occlusion and stenosis of carotid artery without mention of cerebral infarction ICD-10-CM: I65.29 ICD-9-CM: 433.10   1/7/2015 - Present Carpal tunnel syndrome ICD-10-CM: G56.00 
ICD-9-CM: 354.0   1/7/2015 - Present Unspecified urinary incontinence ICD-10-CM: R32 
ICD-9-CM: 788.30   1/7/2015 - Present Carotid stenosis ICD-10-CM: I65.29 ICD-9-CM: 433.10   1/7/2015 - Present Renal insufficiency ICD-10-CM: N28.9 ICD-9-CM: 593.9   1/7/2015 - Present Heart disease, unspecified ICD-10-CM: I51.9 ICD-9-CM: 429.9   1/7/2015 - Present Shortness of breath ICD-10-CM: R06.02 
ICD-9-CM: 786.05   1/7/2015 - Present Osteopenia ICD-10-CM: M85.80 ICD-9-CM: 733.90   1/7/2015 - Present Depression (Chronic) ICD-10-CM: F32.9 ICD-9-CM: 086   6/30/2015 - Present RENEE (stress urinary incontinence, female) (Chronic) ICD-10-CM: N39.3 ICD-9-CM: 625.6   2/24/2016 - Present Rectocele ICD-10-CM: N81.6 ICD-9-CM: 618.04   2/24/2016 - Present Weakening of rectovaginal tissue ICD-10-CM: N81.83 ICD-9-CM: 618.82   2/24/2016 - Present Mixed hyperlipidemia (Chronic) ICD-10-CM: P38.4 ICD-9-CM: 272.2   4/21/2016 - Present Asymptomatic carotid artery stenosis (Chronic) ICD-10-CM: T50.06 ICD-9-CM: 433.10   11/4/2016 - Present Valvular heart disease (Chronic) ICD-10-CM: I38 
ICD-9-CM: 424.90   11/4/2016 - Present Coronary artery disease involving native coronary artery of native heart without angina pectoris (Chronic) ICD-10-CM: I25.10 ICD-9-CM: 414.01   11/4/2016 - Present On potassium wasting diuretic therapy (Chronic) ICD-10-CM: A94.277 ICD-9-CM: V58.69   11/4/2016 - Present Obesity (BMI 30.0-34.9) (Chronic) ICD-10-CM: Y91.9 ICD-9-CM: 278.00   11/4/2016 - Present Lumbar stenosis with neurogenic claudication ICD-10-CM: M48.06 
ICD-9-CM: 724.03   2/7/2017 - Present * (Principal)S/P laminectomy with spinal fusion ICD-10-CM: Z98.1 ICD-9-CM: V45.4   2/11/2017 - Present Spinal stenosis of lumbar region at multiple levels ICD-10-CM: M48.06 
ICD-9-CM: 724.02   2/11/2017 - Present You are allergic to the following Allergen Reactions Bees (Hymenoptera Allergenic Extract) Anaphylaxis Adhesive Tape-Silicones Rash Ok to use paper tape Lescol (Fluvastatin) Cough Lipitor (Atorvastatin) Other (comments) High doses cause leg cramps. Able to tolerate low doses Lisinopril Cough Penicillin G Rash Tetracycline Other (comments) ULCERS IN MOUTH Current Discharge Medication List  
  
START taking these medications Dose & Instructions Dispensing Information Comments  
 cyclobenzaprine 10 mg tablet Commonly known as:  FLEXERIL Dose:  5 mg Take 0.5 Tabs by mouth three (3) times daily as needed for Muscle Spasm(s). Quantity:  90 Tab Refills:  0 oxyCODONE IR 5 mg immediate release tablet Commonly known as:  Genoa Salen Dose:  5-10 mg Take 1-2 Tabs by mouth every four (4) hours as needed. Max Daily Amount: 60 mg.  
 Quantity:  120 Tab Refills:  0  
   
 traMADol 50 mg tablet Commonly known as:  ULTRAM  
 Dose:  50 mg Take 1 Tab by mouth four (4) times daily. Max Daily Amount: 200 mg. Quantity:  120 Tab Refills:  0 CONTINUE these medications which have CHANGED Dose & Instructions Dispensing Information Comments  
 atorvastatin 20 mg tablet Commonly known as:  LIPITOR What changed:  when to take this Dose:  20 mg Take 1 Tab by mouth daily. Quantity:  90 Tab Refills:  3  
   
 nitroglycerin 0.4 mg SL tablet Commonly known as:  NITROSTAT What changed:  additional instructions Dose:  0.4 mg  
1 Tab by SubLINGual route every five (5) minutes as needed for Chest Pain. Quantity:  4 Bottle Refills:  6  
   
 potassium chloride 20 mEq tablet Commonly known as:  K-DUR, KLOR-CON What changed:  when to take this Dose:  20 mEq Take 1 Tab by mouth every morning. Quantity:  90 Tab Refills:  3  
   
 valsartan 160 mg tablet Commonly known as:  DIOVAN What changed:   
- when to take this 
- additional instructions  
 take 1 tablet by mouth once daily Quantity:  90 Tab Refills:  3  
   
 zinc oxide-cod liver oil 40 % oint 30 g, vitamin a & d oint 30 g, nystatin 100,000 unit/gram crea 30 g, compound tincture of benzoin tinc 0.5 mL What changed:   
- when to take this 
- reasons to take this Dose:  1 Each Apply 1 Each to affected area two (2) times a day. Quantity:  500 g Refills:  3 CONTINUE these medications which have NOT CHANGED Dose & Instructions Dispensing Information Comments  
 albuterol 90 mcg/actuation inhaler Commonly known as:  PROVENTIL HFA, VENTOLIN HFA, PROAIR HFA Dose:  2 Puff Take 2 Puffs by inhalation every six (6) hours as needed for Wheezing. Indications: ACUTE ASTHMA ATTACK Quantity:  1 Inhaler Refills:  3  
   
 albuterol-ipratropium 2.5 mg-0.5 mg/3 ml Nebu Commonly known as:  Darshan Leeey Dose:  3 mL  
3 mL by Nebulization route every six (6) hours as needed. Quantity:  100 Nebule Refills:  1  
   
 aspirin delayed-release 81 mg tablet Dose:  81 mg Take 81 mg by mouth every morning. Instructed to take DOS per Anesthesia guidelines. Refills:  0  
   
 atenolol 25 mg tablet Commonly known as:  TENORMIN Dose:  12.5 mg Take 0.5 Tabs by mouth nightly. Quantity:  45 Tab Refills:  3  
   
 furosemide 40 mg tablet Commonly known as:  LASIX  
 take 1 tablet by mouth every morning Quantity:  90 Tab Refills:  3  
   
 gabapentin 300 mg capsule Commonly known as:  NEURONTIN Dose:  300 mg Take 1 Cap by mouth three (3) times daily. Quantity:  270 Cap Refills:  3  
   
 omeprazole 40 mg capsule Commonly known as:  PRILOSEC Dose:  40 mg Take 40 mg by mouth every morning. Refills:  0  
   
 sertraline 50 mg tablet Commonly known as:  ZOLOFT Dose:  75 mg Take 1.5 Tabs by mouth every morning. Quantity:  135 Tab Refills:  3 STOP taking these medications Comments  
 oxyCODONE-acetaminophen 7.5-325 mg per tablet Commonly known as:  PERCOCET 7.5 Current Immunizations Name Date Influenza Vaccine 9/4/2015, 9/29/2014 Pneumococcal Conjugate (PCV-13) 9/4/2015 TB Skin Test (PPD) Intradermal 2/7/2017 Tdap 1/1/2004 Follow-up Information Follow up With Details Comments Contact Info Mathias Dakin, NP On 3/7/2017 at 11:30am [address] [address] [phone] Emerson Gupta MD On 2/24/2017 at 9:15am [place] [address] [employer] [facility] [address] 
[phone] Discharge Instructions -no driving 
-no lifting , pushing or pulling more than 5 lbs until cleared by Dr. Page Wise assistance is you develop increasing weakness, pain, loss of bowel or bladder control, fever or chills Chart Review Routing History No Routing History on File

## 2017-02-11 NOTE — IP AVS SNAPSHOT
Jayson Contreras 
 
 
 [address] 
[phone] Patient: Ana Al MRN: [id number] ICY:6/04/5493 You are allergic to the following Allergen Reactions Bees (Hymenoptera Allergenic Extract) Anaphylaxis Adhesive Tape-Silicones Rash Ok to use paper tape Lescol (Fluvastatin) Cough Lipitor (Atorvastatin) Other (comments) High doses cause leg cramps. Able to tolerate low doses Lisinopril Cough Penicillin G Rash Tetracycline Other (comments) ULCERS IN MOUTH Recent Documentation Breastfeeding? OB Status Smoking Status No Hysterectomy Former Smoker Emergency Contacts Name Discharge Info Relation Home Work Mobile Jacquelyn Reyes  Spouse [3] [phone] MCKAYLA (CREEK) [facility] DISCHARGE CAREGIVER [3] Child [2] [phone] Sharon Nails  Other Relative [6] [phone] About your hospitalization You were admitted on:  February 11, 2017 You last received care in the:  [facility] INPATIENT REHAB UNIT You were discharged on:  February 21, 2017 Unit phone number:  [phone] Why you were hospitalized Your primary diagnosis was:  S/P Laminectomy With Spinal Fusion Your diagnoses also included:  Spinal Stenosis Of Lumbar Region At Multiple Levels, Asthma, Gerd (Gastroesophageal Reflux Disease), Essential Hypertension, On Potassium Wasting Diuretic Therapy Providers Seen During Your Hospitalizations Provider Role Specialty Primary office phone Morgan Radford MD Attending Provider Physical Medicine and Rehab [phone] Your Primary Care Physician (PCP) Primary Care Physician Office Phone Office Fax [address] [facility] [phone] Follow-up Information Follow up With Details Comments Contact Info Elif Diaz NP On 3/7/2017 at 11:30am [address] 
[address] [place] [id number] 
[phone] Safia Grady MD On 2/24/2017 at 9:15am [place] [address] [employer] [facility] [address] 
[phone] Your Appointments Tuesday March 07, 2017 11:30 AM EST Office Visit with Elif Diaz NP Internal Medicine and Diagnostics ([address]) [facility] [place]  
[address] [place] [id number] [phone] Current Discharge Medication List  
  
START taking these medications Dose & Instructions Dispensing Information Comments Morning Noon Evening Bedtime  
 cyclobenzaprine 10 mg tablet Commonly known as:  FLEXERIL Your next dose is: Today, Tomorrow Other:  _________ Dose:  5 mg Take 0.5 Tabs by mouth three (3) times daily as needed for Muscle Spasm(s). Quantity:  90 Tab Refills:  0  
     
   
   
   
  
 oxyCODONE IR 5 mg immediate release tablet Commonly known as:  Nallely Vanessa Your next dose is: Today, Tomorrow Other:  _________ Dose:  5-10 mg Take 1-2 Tabs by mouth every four (4) hours as needed. Max Daily Amount: 60 mg.  
 Quantity:  120 Tab Refills:  0  
     
   
   
   
  
 traMADol 50 mg tablet Commonly known as:  ULTRAM  
   
Your next dose is: Today, Tomorrow Other:  _________ Dose:  50 mg Take 1 Tab by mouth four (4) times daily. Max Daily Amount: 200 mg. Quantity:  120 Tab Refills:  0 CONTINUE these medications which have CHANGED Dose & Instructions Dispensing Information Comments Morning Noon Evening Bedtime  
 atorvastatin 20 mg tablet Commonly known as:  LIPITOR What changed:  when to take this Your next dose is: Today, Tomorrow Other:  _________ Dose:  20 mg Take 1 Tab by mouth daily. Quantity:  90 Tab Refills:  3  
     
   
   
   
  
 nitroglycerin 0.4 mg SL tablet Commonly known as:  NITROSTAT What changed:  additional instructions Your next dose is: Today, Tomorrow Other:  _________ Dose:  0.4 mg  
1 Tab by SubLINGual route every five (5) minutes as needed for Chest Pain. Quantity:  4 Bottle Refills:  6  
     
   
   
   
  
 potassium chloride 20 mEq tablet Commonly known as:  K-DUR, HARI-VINCENT What changed:  when to take this Your next dose is: Today, Tomorrow Other:  _________ Dose:  20 mEq Take 1 Tab by mouth every morning. Quantity:  90 Tab Refills:  3  
     
   
   
   
  
 valsartan 160 mg tablet Commonly known as:  DIOVAN What changed:   
- when to take this 
- additional instructions Your next dose is: Today, Tomorrow Other:  _________  
   
   
 take 1 tablet by mouth once daily Quantity:  90 Tab Refills:  3  
     
   
   
   
  
 zinc oxide-cod liver oil 40 % oint 30 g, vitamin a & d oint 30 g, nystatin 100,000 unit/gram crea 30 g, compound tincture of benzoin tinc 0.5 mL What changed:   
- when to take this 
- reasons to take this Your next dose is: Today, Tomorrow Other:  _________ Dose:  1 Each Apply 1 Each to affected area two (2) times a day. Quantity:  500 g Refills:  3 CONTINUE these medications which have NOT CHANGED Dose & Instructions Dispensing Information Comments Morning Noon Evening Bedtime  
 albuterol 90 mcg/actuation inhaler Commonly known as:  PROVENTIL HFA, VENTOLIN HFA, PROAIR HFA Your next dose is: Today, Tomorrow Other:  _________ Dose:  2 Puff Take 2 Puffs by inhalation every six (6) hours as needed for Wheezing. Indications: ACUTE ASTHMA ATTACK Quantity:  1 Inhaler Refills:  3  
     
   
   
   
  
 albuterol-ipratropium 2.5 mg-0.5 mg/3 ml Nebu Commonly known as:  Holly Short Your next dose is: Today, Tomorrow Other:  _________ Dose:  3 mL  
3 mL by Nebulization route every six (6) hours as needed. Quantity:  100 Nebule Refills:  1  
     
   
   
   
  
 aspirin delayed-release 81 mg tablet Your next dose is: Today, Tomorrow Other:  _________ Dose:  81 mg Take 81 mg by mouth every morning. Instructed to take DOS per Anesthesia guidelines. Refills:  0  
     
   
   
   
  
 atenolol 25 mg tablet Commonly known as:  TENORMIN Your next dose is: Today, Tomorrow Other:  _________ Dose:  12.5 mg Take 0.5 Tabs by mouth nightly. Quantity:  45 Tab Refills:  3  
     
   
   
   
  
 furosemide 40 mg tablet Commonly known as:  LASIX Your next dose is: Today, Tomorrow Other:  _________  
   
   
 take 1 tablet by mouth every morning Quantity:  90 Tab Refills:  3  
     
   
   
   
  
 gabapentin 300 mg capsule Commonly known as:  NEURONTIN Your next dose is: Today, Tomorrow Other:  _________ Dose:  300 mg Take 1 Cap by mouth three (3) times daily. Quantity:  270 Cap Refills:  3  
     
   
   
   
  
 omeprazole 40 mg capsule Commonly known as:  PRILOSEC Your next dose is: Today, Tomorrow Other:  _________ Dose:  40 mg Take 40 mg by mouth every morning. Refills:  0  
     
   
   
   
  
 sertraline 50 mg tablet Commonly known as:  ZOLOFT Your next dose is: Today, Tomorrow Other:  _________ Dose:  75 mg Take 1.5 Tabs by mouth every morning. Quantity:  135 Tab Refills:  3 STOP taking these medications   
 oxyCODONE-acetaminophen 7.5-325 mg per tablet Commonly known as:  PERCOCET 7.5 Where to Get Your Medications These medications were sent to [address]  [place], [place] - [address] [place] [address], [address] Evelina Garcia Phone:  [phone]  
  cyclobenzaprine 10 mg tablet Information on where to get these meds will be given to you by the nurse or doctor. ! Ask your nurse or doctor about these medications  
  oxyCODONE IR 5 mg immediate release tablet  
 traMADol 50 mg tablet Discharge Instructions -no driving 
-no lifting , pushing or pulling more than 5 lbs until cleared by Dr. Rivka Tom assistance is you develop increasing weakness, pain, loss of bowel or bladder control, fever or chills Discharge Orders None revoPT Announcement We are excited to announce that we are making your provider's discharge notes available to you in revoPT. You will see these notes when they are completed and signed by the physician that discharged you from your recent hospital stay. If you have any questions or concerns about any information you see in revoPT, please call the Health Information Department where you were seen or reach out to your Primary Care Provider for more information about your plan of care. Introducing [facility]! Dear Sami Mitchell: 
Thank you for requesting a revoPT account. Our records indicate that you already have an active revoPT account. You can access your account anytime at [email or web address] Did you know that you can access your hospital and ER discharge instructions at any time in revoPT? You can also review all of your test results from your hospital stay or ER visit. Additional Information If you have questions, please visit the Frequently Asked Questions section of the revoPT website at [email or web address] Remember, revoPT is NOT to be used for urgent needs. For medical emergencies, dial 911. Now available from your iPhone and Android! General Information Please provide this summary of care documentation to your next provider. Patient Signature:  ____________________________________________________________ Date:  ____________________________________________________________  
  
Gianna Coni Provider Signature:  ____________________________________________________________ Date:  ____________________________________________________________

## 2017-02-12 LAB
ERYTHROCYTE [DISTWIDTH] IN BLOOD BY AUTOMATED COUNT: 13.6 % (ref 11.9–14.6)
HCT VFR BLD AUTO: 32.7 % (ref 35.8–46.3)
HGB BLD-MCNC: 10 G/DL (ref 11.7–15.4)
MAGNESIUM SERPL-MCNC: 2.4 MG/DL (ref 1.8–2.4)
MCH RBC QN AUTO: 27.9 PG (ref 26.1–32.9)
MCHC RBC AUTO-ENTMCNC: 30.6 G/DL (ref 31.4–35)
MCV RBC AUTO: 91.3 FL (ref 79.6–97.8)
PLATELET # BLD AUTO: 244 K/UL (ref 150–450)
PMV BLD AUTO: 10.5 FL (ref 10.8–14.1)
RBC # BLD AUTO: 3.58 M/UL (ref 4.05–5.25)
WBC # BLD AUTO: 6.3 K/UL (ref 4.3–11.1)

## 2017-02-12 PROCEDURE — 83735 ASSAY OF MAGNESIUM: CPT | Performed by: PHYSICAL MEDICINE & REHABILITATION

## 2017-02-12 PROCEDURE — 74011250637 HC RX REV CODE- 250/637: Performed by: PHYSICAL MEDICINE & REHABILITATION

## 2017-02-12 PROCEDURE — 99232 SBSQ HOSP IP/OBS MODERATE 35: CPT | Performed by: PHYSICAL MEDICINE & REHABILITATION

## 2017-02-12 PROCEDURE — 77030032490 HC SLV COMPR SCD KNE COVD -B

## 2017-02-12 PROCEDURE — 85027 COMPLETE CBC AUTOMATED: CPT | Performed by: PHYSICAL MEDICINE & REHABILITATION

## 2017-02-12 PROCEDURE — 65310000000 HC RM PRIVATE REHAB

## 2017-02-12 PROCEDURE — 36415 COLL VENOUS BLD VENIPUNCTURE: CPT | Performed by: PHYSICAL MEDICINE & REHABILITATION

## 2017-02-12 RX ADMIN — ATORVASTATIN CALCIUM 20 MG: 10 TABLET, FILM COATED ORAL at 21:32

## 2017-02-12 RX ADMIN — ACETAMINOPHEN 650 MG: 325 TABLET, FILM COATED ORAL at 08:23

## 2017-02-12 RX ADMIN — OXYCODONE HYDROCHLORIDE 5 MG: 5 TABLET ORAL at 21:34

## 2017-02-12 RX ADMIN — ACETAMINOPHEN 650 MG: 325 TABLET, FILM COATED ORAL at 05:55

## 2017-02-12 RX ADMIN — FUROSEMIDE 40 MG: 40 TABLET ORAL at 08:23

## 2017-02-12 RX ADMIN — GABAPENTIN 300 MG: 300 CAPSULE ORAL at 05:55

## 2017-02-12 RX ADMIN — POLYETHYLENE GLYCOL 3350 17 G: 17 POWDER, FOR SOLUTION ORAL at 08:24

## 2017-02-12 RX ADMIN — SERTRALINE HYDROCHLORIDE 75 MG: 50 TABLET ORAL at 08:24

## 2017-02-12 RX ADMIN — FAMOTIDINE 20 MG: 20 TABLET ORAL at 21:32

## 2017-02-12 RX ADMIN — ACETAMINOPHEN 650 MG: 325 TABLET, FILM COATED ORAL at 21:31

## 2017-02-12 RX ADMIN — VALSARTAN 160 MG: 80 TABLET, FILM COATED ORAL at 08:23

## 2017-02-12 RX ADMIN — ACETAMINOPHEN 650 MG: 325 TABLET, FILM COATED ORAL at 15:00

## 2017-02-12 RX ADMIN — DOCUSATE SODIUM 100 MG: 100 CAPSULE, LIQUID FILLED ORAL at 16:00

## 2017-02-12 RX ADMIN — POTASSIUM CHLORIDE 20 MEQ: 20 TABLET, EXTENDED RELEASE ORAL at 08:23

## 2017-02-12 RX ADMIN — OXYCODONE HYDROCHLORIDE 5 MG: 5 TABLET ORAL at 00:02

## 2017-02-12 RX ADMIN — ZOLPIDEM TARTRATE 5 MG: 5 TABLET ORAL at 21:32

## 2017-02-12 RX ADMIN — DOCUSATE SODIUM 100 MG: 100 CAPSULE, LIQUID FILLED ORAL at 08:24

## 2017-02-12 RX ADMIN — ATENOLOL 12.5 MG: 25 TABLET ORAL at 21:32

## 2017-02-12 RX ADMIN — GABAPENTIN 300 MG: 300 CAPSULE ORAL at 14:00

## 2017-02-12 RX ADMIN — GABAPENTIN 300 MG: 300 CAPSULE ORAL at 21:31

## 2017-02-12 RX ADMIN — SENNOSIDES 8.6 MG: 8.6 TABLET, FILM COATED ORAL at 08:23

## 2017-02-12 NOTE — PROGRESS NOTES
[address] [facility], [address] [facility]  Tel: [phone]       SFD PROGRESS NOTE    Jesus Dugan Date: 2/11/2017    Chief Complaint : Gait dysfunction secondary to below. Admit Diagnosis: trauna to spinal core;Spinal stenosis of lumbar region at m*  Lumbar spondylolisthesis and lumbar stenosis. PROCEDURE:  1. Lumbar laminectomy L4 through S1 with bilateral foraminotomies. 2. Lumbar posterolateral fusion L4 through S1 .  3. Cortical screw instrumentation L4 through S1 .  4. Bone marrow aspirate for allograft  5. Translumbar interbody fusion L4-L5 and L5- S1.  6. Insertion biomechanical device L4-L5 and L5- S1  7. [facility] dorsal osteotomy L5  Elevated creatinine/ Renal insufficiency  Post op acute blood loss anemia  Hx of CHF  CAD (coronary artery disease)  Arthropathies  Hypertension  PUD (peptic ulcer disease)  DVT risk  pain  Acute Rehab Dx:  Gait impairment/ gait dysfunction  Debility   Mobility and ambulation deficits  Self Care/ADL deficits  Subjective     Patient seen and examined. Vss. No acute complaints. PT, OT well tolerated. Steady gains made. No new barrier to progress noted. Ambulated with CGA/ SBA, doing well. Continent of B/B.      Objective:     Current Facility-Administered Medications   Medication Dose Route Frequency    acetaminophen (TYLENOL) tablet 650 mg  650 mg Oral Q6H    cyclobenzaprine (FLEXERIL) tablet 10 mg  10 mg Oral TID PRN    docusate sodium (COLACE) capsule 100 mg  100 mg Oral BID    famotidine (PEPCID) tablet 20 mg  20 mg Oral Q24H    naloxone (NARCAN) injection 0.4 mg  0.4 mg IntraVENous EVERY 2 MINUTES AS NEEDED    oxyCODONE IR (ROXICODONE) tablet 5-10 mg  5-10 mg Oral Q4H PRN    zolpidem (AMBIEN) tablet 5 mg  5 mg Oral QHS PRN    albuterol (PROVENTIL HFA, VENTOLIN HFA, PROAIR HFA) inhaler 2 Puff  2 Puff Inhalation Q6H PRN    albuterol-ipratropium (DUO-NEB) 2.5 MG-0.5 MG/3 ML  3 mL Nebulization Q6H PRN  atenolol (TENORMIN) tablet 12.5 mg  12.5 mg Oral QHS    atorvastatin (LIPITOR) tablet 20 mg  20 mg Oral QHS    bisacodyl (DULCOLAX) suppository 10 mg  10 mg Rectal DAILY PRN    furosemide (LASIX) tablet 40 mg  40 mg Oral DAILY    gabapentin (NEURONTIN) capsule 300 mg  300 mg Oral TID    nitroglycerin (NITROSTAT) tablet 0.4 mg  0.4 mg SubLINGual Q5MIN PRN    polyethylene glycol (MIRALAX) packet 17 g  17 g Oral DAILY    potassium chloride (K-DUR, KLOR-CON) SR tablet 20 mEq  20 mEq Oral DAILY    senna (SENOKOT) tablet 8.6 mg  1 Tab Oral DAILY    sertraline (ZOLOFT) tablet 75 mg  75 mg Oral DAILY    valsartan (DIOVAN) tablet 160 mg  160 mg Oral DAILY     Review of Systems:Denies chest pain, shortness of breath, cough, headache, visual problems, abdominal pain, dysurea, calf pain. Pertinent positives are as noted in the medical records and unremarkable otherwise. Visit Vitals    /72    Pulse 73    Temp 97.8 °F (36.6 °C)    Resp 18    SpO2 98%    Breastfeeding No      Physical Exam:   Psych: Patient was oriented to person, place, and time. Without hallucinations, abnormal affect or abnormal behaviors during the examination. Patient is not suicidal.   General:  Alert, appears stated age, No acute distress. HEENT:  Normocephalic, EOMI, facial symmetry Intact. Oral mucosa pink and moist. No nasal discharge. Lungs:  CTA Bilaterally,Respiration even and unlabored   No apparent use of accessory muscles for respiration. No nasal alar flaring. Heart:  Regular rate and rhythm, S1, S2, No obvious murmur, click, rub or gallop. Genitourinary: defered   Abdomen:  Soft, non-tender to palpation in all four quadrants. Bowel sounds present. No obvious masses palpated.     Neuromuscular:  PERRL, EOMI  LUE Shoulder abduction 5/5  Elbow flexion: 5/5   Wrist extension: 5 /5  Finger flexion; 5 /5  ADMQ: 5 /5  RUE Shoulder abduction: 5 /5   Elbow flexion: 5 /5   Wrist extension: 5/5  Finger flexion: 5/5  ADMQ: /5  LLE Hip flexion: 5- /5  Knee extension: 5-/5   Ankle dorsiflexion: 5 /5  EHL: 5 /5  Ankle plantarflexion: 5/5      RLE Hip flexion: 5- /5  Knee extension: 5- /5   Ankle dorsiflexion: 5/5  EHL: 5/5  Ankle plantarflexion: 5 /5  Sensory - intact to soft touch BLE  Plantars - down going  DTR Right B 2, T 2 Knee 2, Ach 1  Left B 2, T 2, Knee 2, Ach 1   Skin:  Intact, dry, good skin turgor, age related changes present   Edema: trace BLE edema.     Incision:  covered, clean, dry, and intact                                                                                     Functional Assessment:                                [facility] Fall Risk Assessment:  Maranda Medina Fall Risk  Mobility: Ambulates or transfers with assist devices or assistance/unsteady gait (02/11/17 2018)  Mentation: Alert, oriented x 3 (02/11/17 2018)  Medication: Patient receiving anticonvulsants, sedatives(tranquilizers), psychotropics or hypnotics, hypoglycemics, narcotics, sleep aids, antihypertensives, laxatives, or diuretics (02/11/17 2018)  Elimination: Needs assistance with toileting (02/11/17 2018)  Fall History in the Past 3 Months: Before admission (02/11/17 2018)  Total Score: 4 (02/11/17 2018)     Speech Assessment:         Ambulation:        Labs/Studies:  Recent Results (from the past 72 hour(s))   PLEASE READ & DOCUMENT PPD TEST IN 48 HRS    Collection Time: 02/09/17  8:45 PM   Result Value Ref Range    PPD  Negative    mm Induration  mm   CBC W/O DIFF    Collection Time: 02/12/17  7:50 AM   Result Value Ref Range    WBC 6.3 4.3 - 11.1 K/uL    RBC 3.58 (L) 4.05 - 5.25 M/uL    HGB 10.0 (L) 11.7 - 15.4 g/dL    HCT 32.7 (L) 35.8 - 46.3 %    MCV 91.3 79.6 - 97.8 FL    MCH 27.9 26.1 - 32.9 PG    MCHC 30.6 (L) 31.4 - 35.0 g/dL    RDW 13.6 11.9 - 14.6 %    PLATELET 259 842 - 895 K/uL    MPV 10.5 (L) 10.8 - 14.1 FL   MAGNESIUM    Collection Time: 02/12/17  7:50 AM   Result Value Ref Range    Magnesium 2.4 1.8 - 2.4 mg/dL Assessment:     Problem List as of 2/12/2017  Date Reviewed: 10/5/2016          Codes Class Noted - Resolved    S/P laminectomy with spinal fusion ICD-10-CM: Z98.1  ICD-9-CM: V45.4  2/11/2017 - Present        Spinal stenosis of lumbar region at multiple levels ICD-10-CM: M48.06  ICD-9-CM: 724.02  2/11/2017 - Present        Lumbar stenosis with neurogenic claudication ICD-10-CM: M48.06  ICD-9-CM: 724.03  2/7/2017 - Present        Asymptomatic carotid artery stenosis (Chronic) ICD-10-CM: I65.29  ICD-9-CM: 433.10  11/4/2016 - Present        Valvular heart disease (Chronic) ICD-10-CM: I38  ICD-9-CM: 424.90  11/4/2016 - Present        Coronary artery disease involving native coronary artery of native heart without angina pectoris (Chronic) ICD-10-CM: I25.10  ICD-9-CM: 414.01  11/4/2016 - Present        On potassium wasting diuretic therapy (Chronic) ICD-10-CM: F47.501  ICD-9-CM: V58.69  11/4/2016 - Present        Obesity (BMI 30.0-34.9) (Chronic) ICD-10-CM: E66.9  ICD-9-CM: 278.00  11/4/2016 - Present        Mixed hyperlipidemia (Chronic) ICD-10-CM: E78.2  ICD-9-CM: 272.2  4/21/2016 - Present        RENEE (stress urinary incontinence, female) (Chronic) ICD-10-CM: N39.3  ICD-9-CM: 625.6  2/24/2016 - Present        Rectocele ICD-10-CM: N81.6  ICD-9-CM: 618.04  2/24/2016 - Present        Weakening of rectovaginal tissue ICD-10-CM: N81.83  ICD-9-CM: 618.82  2/24/2016 - Present        Depression (Chronic) ICD-10-CM: F32.9  ICD-9-CM: 673  6/30/2015 - Present        Contact dermatitis and other eczema, due to unspecified cause ICD-10-CM: L25.9  ICD-9-CM: 692.9  1/7/2015 - Present        Essential hypertension (Chronic) ICD-10-CM: I10  ICD-9-CM: 401.9  1/7/2015 - Present        Other and unspecified hyperlipidemia ICD-10-CM: E78.5  ICD-9-CM: 272.4  1/7/2015 - Present        Mitral valve disorders ICD-10-CM: I05.9  ICD-9-CM: 424.0  1/7/2015 - Present        Hematuria, unspecified ICD-10-CM: R31.9  ICD-9-CM: 599.70  1/7/2015 - Present        Dysuria ICD-10-CM: R30.0  ICD-9-CM: 788.1  1/7/2015 - Present        Anemia, unspecified ICD-10-CM: D64.9  ICD-9-CM: 285.9  1/7/2015 - Present        Other B-complex deficiencies ICD-10-CM: E53.8  ICD-9-CM: 266.2  1/7/2015 - Present        Arthropathies (Chronic) ICD-10-CM: M12.9  ICD-9-CM: 716.90  1/7/2015 - Present    Overview Signed 1/7/2015 10:11 AM by Stanley Herbert     Multiple sites             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  ICD-9-CM: 756.19  1/7/2015 - Present        Other malaise and fatigue ICD-10-CM: R53.81, R53.83  ICD-9-CM: 780.79  1/7/2015 - Present        Allergic rhinitis, cause unspecified ICD-10-CM: J30.9  ICD-9-CM: 477.9  1/7/2015 - Present        Aortic valve disorders ICD-10-CM: I35.9  ICD-9-CM: 424.1  1/7/2015 - Present        Extrinsic asthma, unspecified (Chronic) ICD-10-CM: J45.909  ICD-9-CM: 493.00  1/7/2015 - Present        Respiratory abnormality, unspecified ICD-10-CM: J98.9  ICD-9-CM: 786.00  1/7/2015 - Present        Congestive heart failure, unspecified ICD-10-CM: I50.9  ICD-9-CM: 428.0  1/7/2015 - Present        Occlusion and stenosis of carotid artery without mention of cerebral infarction ICD-10-CM: I65.29  ICD-9-CM: 433.10  1/7/2015 - Present        Carpal tunnel syndrome ICD-10-CM: G56.00  ICD-9-CM: 354.0  1/7/2015 - Present        Unspecified urinary incontinence ICD-10-CM: R32  ICD-9-CM: 788.30  1/7/2015 - Present        Carotid stenosis ICD-10-CM: I65.29  ICD-9-CM: 433.10  1/7/2015 - Present        Renal insufficiency ICD-10-CM: N28.9  ICD-9-CM: 593.9  1/7/2015 - Present        Heart disease, unspecified ICD-10-CM: I51.9  ICD-9-CM: 429.9  1/7/2015 - Present        Shortness of breath ICD-10-CM: R06.02  ICD-9-CM: 786.05  1/7/2015 - Present        Osteopenia ICD-10-CM: M85.80  ICD-9-CM: 733.90  1/7/2015 - Present        Other chest pain ICD-10-CM: R07.89  ICD-9-CM: 786.59  3/7/2011 - Present        GERD (gastroesophageal reflux disease) (Chronic) ICD-10-CM: K21.9  ICD-9-CM: 530.81  3/4/2011 - Present        Asthma (Chronic) ICD-10-CM: J45.909  ICD-9-CM: 493.90  3/4/2011 - Present              Plan:     s/p L4-S1 lumbar laminectomy, L4-5 fusion and instrumentation.   - lumbar spine precautions. TLFO when out of bed.  - continue to monitor wound for infection, bleeding, wound dehiscence.      Elevated creatinine/ Renal insufficiency- monitor     Post op acute blood loss anemia - monitor     Hx of CHF -continued on lasix, KCl. Asymptomatic, no signs of decompensation.     CAD (coronary artery disease) -      Hypertension - well controlled SBP  120s. - continue diovan, atenolol. adjust as needed.      PUD (peptic ulcer disease)- continue pepcid.      Pneumonia prophylaxis- Insentive spirometer every hour while awake     DVT risk / DVT Prophylaxis- Will require daily physician exam to assess for signs and symptoms as patient is at increased risk for of thromboembolism. Mobilization as tolerated. Intermittent pneumatic compression devices when in bed Thigh-high or knee-high thromboembolic deterrent hose when out of bed. Holding anticoagulation until ok per spine ortho.      Pain Control: stable, mild-to-moderate joint symptoms intermittently, reasonably well controlled by PRN meds. Will require regular pain assessment and comprenhensive pain management. Continue scheduled tylenol. Continued on roxicodone, neurontin. . Prn flexaril.      Wound Care: Monitor wound status daily per staff and physician. At risk for failure. Will require 24/7 rehab nursing. Keep wound clean and dry.     Potential urinary retention/ neurogenic bladder - schedule voids q6-8 hrs. Check post-void residual every shift; In and Out catheterize if post-void residual is more than 400 cc.     bowel program - senna, colace. Monitor.      GERD - resume pepcid. Time spent was 25 minutes with over 1/2 in direct patient care/examination, consultation and coordination of care.      Signed By: Silvia Wilder MD     February 12, 2017

## 2017-02-12 NOTE — PROGRESS NOTES
Care Management Interventions  PCP Verified by CM: Yes (Dr Sonia Montanez)  Mode of Transport at Discharge:  (family)  Transition of Care Consult (CM Consult):  Other ( 9th floor Acute Rehab)  Discharge Durable Medical Equipment: No  Physical Therapy Consult: Yes  Occupational Therapy Consult: Yes  Current Support Network: Lives with Spouse, Family Lives Nearby  Confirm Follow Up Transport: Family  Plan discussed with Pt/Family/Caregiver: Yes  Freedom of Choice Offered: Yes  Discharge Location  Discharge Placement: Rehab hospital/unit acute (Pt transferred to 9th floor for rehab on 2/11/17 as planned)

## 2017-02-12 NOTE — PROGRESS NOTES
Problem: Mobility Impaired (Adult and Pediatric)  Goal: *Acute Goals and Plan of Care (Insert Text)  LTG:  (1.)Ms. Huey Newberry will move from supine to sit and sit to supine , scoot up and down and roll side to side in bed with SUPERVISION within 7 day(s). (2.)Ms. Huey Newberry will transfer from bed to chair and chair to bed with SUPERVISION using the least restrictive device within 7 day(s). (3.)Ms. Huey Newberry will ambulate with SUPERVISION for 100 feet with the least restrictive device within 7 day(s). ________________________________________________________________________________________________      PHYSICAL THERAPY: Daily Note, Treatment Day: 2nd and PM 2/11/2017  INPATIENT: Hospital Day: 5  Payor: Steffen Palomo / Plan: [employer] / Product Type: Medicare /      NAME/AGE/GENDER: Rika Friedman is a 66 y.o. female            PRIMARY DIAGNOSIS: Lumbar spinal stenosis [M48.06]  Spondylolisthesis, unspecified spinal region [M43.10] Lumbar stenosis with neurogenic claudication Lumbar stenosis with neurogenic claudication  Procedure(s) (LRB):  L4-S1 LAMINECTOMY AND FUSION WITH BONE MARROW ASPIRATE, ALLOGRAFT, AND INSTRUMENTATION, TLIF (N/A)  4 Days Post-Op  ICD-10: Treatment Diagnosis:       · Generalized Muscle Weakness (M62.81)  · Difficulty in walking, Not elsewhere classified (R26.2)  · Low Back Pain (M54.5)   Precaution/Allergies:  Bees [hymenoptera allergenic extract]; Adhesive tape-silicones; Lescol [fluvastatin]; Lipitor [atorvastatin]; Lisinopril; Penicillin g; and Tetracycline       ASSESSMENT:      Ms. Huey Newberry presents in supine. Pt agreeable to participate in PT session. Able to state sequencing and perform log roll technique without verbal cueing, but requires CGA x 1 assist with bed mobility. Pt stood with CGA x 1 and ambulated for 150 feet in the room and cleaning. She named 2/3 back precautions. She returned to room in preparation for home discharge. Discharge home.       This section established at most recent assessment   PROBLEM LIST (Impairments causing functional limitations):  1. Decreased Strength  2. Decreased ADL/Functional Activities  3. Decreased Transfer Abilities  4. Decreased Ambulation Ability/Technique  5. Decreased Balance  6. Increased Pain  7. Decreased Activity Tolerance    INTERVENTIONS PLANNED: (Benefits and precautions of physical therapy have been discussed with the patient.)  1. Balance Exercise  2. Bed Mobility  3. Family Education  4. Gait Training  5. Therapeutic Activites  6. Therapeutic Exercise/Strengthening  7. Transfer Training  8. Group Therapy      TREATMENT PLAN: Frequency/Duration: twice daily for duration of hospital stay  Rehabilitation Potential For Stated Goals: GOOD      RECOMMENDED REHABILITATION/EQUIPMENT: (at time of discharge pending progress): Continue Skilled Therapy and Rehab. HISTORY:   History of Present Injury/Illness (Reason for Referral):  Per H&P, \"Patient has had low back pain with radiation to the buttocks and lower extremities for an extended period of time. The symptoms and exam findings were felt to be consistent with neurogenic claudication. The preoperative radiographs and other imaging confirmed showed spondylolisthesis and stenosis. Conservative measures have been exhausted as outlined. The symptoms progressed to the point where there is difficulty performing any task that requires prolonged standing or walking which interfered with activities of daily living and ability to enjoy life. In the outpatient setting the risks, benefits and potential complications of the above-listed procedure were discussed with her and an informed consent was obtained. \"  Past Medical History/Comorbidities:   Ms. Heron Krishnamurthy  has a past medical history of Allergic rhinitis, cause unspecified (1/7/2015); Aortic valve disorders (1/7/2015); Arthritis; Arthropathies (1/7/2015); Asthma;  [facility] CAD (coronary artery disease); Carotid stenosis (1/7/2015); Carpal tunnel syndrome (01/07/2015); Chronic pain; Contact dermatitis and other eczema, due to unspecified cause (1/7/2015); Depression (6/30/2015); Dysuria (1/7/2015); Esophageal reflux (1/7/2015); Fibromyalgia; Former smoker; GERD (gastroesophageal reflux disease); [facility] Hematuria, unspecified (1/7/2015); Hypertension; Mitral valve disorders (1/7/2015); Mixed hyperlipidemia (4/21/2016); Neuropathy; Occlusion and stenosis of carotid artery without mention of cerebral infarction (1/7/2015); Osteopenia (1/7/2015); Other and unspecified hyperlipidemia (1/7/2015); Other B-complex deficiencies (1/7/2015); Other chest pain (3/7/2011); 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 She also has no past medical history of Unspecified adverse effect of anesthesia. Ms. Tyron Lentz  has a past surgical history that includes hysterectomy; tubal ligation; lap cholecystectomy; colonoscopy; hemorrhoidectomy; bladder suspension; shoulder arthroscopy (Right); gi; gi; and urological.  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 Her  is currently in the hospital. Drives. She occasionally used a rolling walker when back pain increased. She has two sons that live in the area and assist as needed.        Number of Personal Factors/Comorbidities that affect the Plan of Care:  Full-time caregiver  Low BP  New spinal precautions 3+: HIGH COMPLEXITY   EXAMINATION:   Most Recent Physical Functioning:   Gross Assessment:                  Posture:     Balance:  Sitting: Impaired  Sitting - Static: Good (unsupported)  Sitting - Dynamic: Fair (occasional)  Standing: Impaired  Standing - Static: Fair  Standing - Dynamic : Fair Bed Mobility:  Rolling: Contact guard assistance  Supine to Sit: Contact guard assistance  Sit to Supine: Contact guard assistance  Scooting: Contact guard assistance  Wheelchair Mobility:     Transfers:  Sit to Stand: Contact guard assistance  Stand to Sit: Contact guard assistance  Bed to Chair: Contact guard assistance  Gait:     Base of Support: Center of gravity altered  Speed/María: Fluctuations; Pace decreased (<100 feet/min); Shuffled; Slow  Step Length: Left shortened;Right shortened  Swing Pattern: Left asymmetrical;Right asymmetrical  Stance: Left decreased;Right decreased;Time;Weight shift  Gait Abnormalities: Decreased step clearance;Shuffling gait; Steppage gait; Step to gait;Trunk sway increased  Distance (ft): 150 Feet (ft)  Assistive Device: Walker, rolling  Ambulation - Level of Assistance: Stand-by asssistance  Interventions: Manual cues; Safety awareness training; Tactile cues; Verbal cues; Visual/Demos       Body Structures Involved:  1. Nerves  2. Bones  3. Joints  4. Muscles Body Functions Affected:  1. Sensory/Pain  2. Neuromusculoskeletal  3. Movement Related  4. Skin Related Activities and Participation Affected:  1. General Tasks and Demands  2. Mobility  3. Self Care  4.  Community, Social and [place]   Number of elements that affect the Plan of Care: 4+: HIGH COMPLEXITY   CLINICAL PRESENTATION:   Presentation: Evolving clinical presentation with changing clinical characteristics: MODERATE COMPLEXITY   CLINICAL DECISION MAKIN [facility] Inpatient Short Form  How much difficulty does the patient currently have. .. Unable A Lot A Little None   1. Turning over in bed (including adjusting bedclothes, sheets and blankets)? [ ] 1   [ ] 2   [X] 3   [ ] 4   2. Sitting down on and standing up from a chair with arms ( e.g., wheelchair, bedside commode, etc.)   [ ] 1   [ ] 2   [X] 3   [ ] 4   3. Moving from lying on back to sitting on the side of the bed? [ ] 1   [ ] 2   [X] 3   [ ] 4   How much help from another person does the patient currently need. .. Total A Lot A Little None   4. Moving to and from a bed to a chair (including a wheelchair)? [ ] 1   [X] 2   [ ] 3   [ ] 4   5. Need to walk in hospital room? [ ] 1   [X] 2   [ ] 3   [ ] 4   6. Climbing 3-5 steps with a railing? [X] 1   [ ] 2   [ ] 3   [ ] 4   © 2007, Trustees of [address] [place] [address] [address] [address], under license to [employer]. All rights reserved    Score:  Initial: 14 Most Recent: 14 (Date: 2/8/17 )     Interpretation of Tool:  Represents activities that are increasingly more difficult (i.e. Bed mobility, Transfers, Gait). Score 24 23 22-20 19-15 14-10 9-7 6       Modifier CH CI CJ CK CL CM CN         · Mobility - Walking and Moving Around:               - CURRENT STATUS:    CL - 60%-79% impaired, limited or restricted               - GOAL STATUS:           CK - 40%-59% impaired, limited or restricted               - D/C STATUS:                       ---------------To be determined---------------  Payor: Aparna Vázquez / Plan: [employer] / Product Type: Medicare /       Medical Necessity:     · Patient is expected to demonstrate progress in strength, balance and functional technique to decrease assistance required with bed mobility, transfers, and ambulation. Reason for Services/Other Comments:  · Patient requires assistance with functional mobility and activity tolerance is limited by low BP. Mauri Hastings    Use of outcome tool(s) and clinical judgement create a POC that gives a: Questionable prediction of patient's progress: MODERATE COMPLEXITY                 TREATMENT:      Pre-treatment Symptoms/Complaints:  \"I'm ready to go home now. \"  Pain: Initial: 2  Pain Intensity 1: 0 (no specific pain complaints )  Post Session: 0/10 no specific pain complaints          Therapeutic Activity: ( 23 min  ):  Therapeutic activities including Bed transfers, Chair transfers, Ambulation on level ground to improve mobility, strength and balance. Required minimal tactile cues Manual cues; Safety awareness training; Tactile cues; Verbal cues; Visual/Demos to promote dynamic balance in standing with RW during AMB. Therapeutic group Exercise: ( ):  Exercises per grid below to improve mobility, strength and balance. Required min visual and verbal cues to promote proper body alignment. Progressed repetitions as indicated. Braces/Orthotics/Lines/Etc:   · IV and room air  Treatment/Session Assessment:    · Response to Treatment:  Improving mobility, transfers and ambulation   · Interdisciplinary Collaboration:  · Physical Therapist  · After treatment position/precautions:  · Supine in bed, Bed alarm/tab alert on, Bed/Chair-wheels locked, Bed in low position, Call light within reach, RN notified, Family at bedside and Side rails x 3  · Compliance with Program/Exercises: compliant all of the time. · Recommendations/Intent for next treatment session: \"Next visit will focus on advancements to more challenging activities and reduction in assistance provided\".   Total Treatment Duration:  PT Patient Time In/Time Out  Time In: 1430  Time Out: [facility]

## 2017-02-12 NOTE — H&P
[address] [place] [address], [address] [facility]  Tel: [phone]  Fax: [phone]      HISTORY AND PHYSICAL  IRC       Admit Date: 2/11/2017  Surgeon:  Edmonia Soulier, MD  Primary Care Physician: Huong Frias MD  Specialty Group:  Ortho/spine surgery, PMR    Chief Complaint : Gait dysfunction secondary to below. Admit Diagnosis: trauna to spinal core;Spinal stenosis of lumbar region at m*  Lumbar spondylolisthesis and lumbar stenosis. PROCEDURE:  1. Lumbar laminectomy L4 through S1 with bilateral foraminotomies. 2. Lumbar posterolateral fusion L4 through S1 .  3. Cortical screw instrumentation L4 through S1 .  4. Bone marrow aspirate for allograft  5. Translumbar interbody fusion L4-L5 and L5- S1.  6. Insertion biomechanical device L4-L5 and L5- S1  7.  Vicky Police dorsal osteotomy L5  Elevated creatinine/ Renal insufficiency  Post op acute blood loss anemia  Hx of CHF  CAD (coronary artery disease)  Arthropathies  Hypertension  PUD (peptic ulcer disease)  DVT risk  pain  Acute Rehab Dx:  Gait impairment/ gait dysfunction  Debility    Mobility and ambulation deficits  Self Care/ADL deficits    Medical Dx:  Past Medical History   Diagnosis Date    Allergic rhinitis, cause unspecified 1/7/2015     pt states not bad    Aortic valve disorders 1/7/2015     pt states does not know about this diagnosis    Arthritis     Arthropathies 1/7/2015     Multiple sites    Asthma      uses inhaler prn    Autoimmune disease ([facility] [address]      fibromyalgia    CAD (coronary artery disease)      mild,  treated with med    Carotid stenosis 1/7/2015    Carpal tunnel syndrome 01/07/2015     bilat wrists-no issue now    Chronic pain      due to fibromyalga & arthritis    Contact dermatitis and other eczema, due to unspecified cause 1/7/2015     pt states no current issues    Depression 6/30/2015     managed w/med    Dysuria 1/7/2015     hx of; pt states no current problems  Esophageal reflux 1/7/2015     managed w/med    Fibromyalgia     Former smoker      1 ppd for 14 yrs; quit smoking 1981    GERD (gastroesophageal reflux disease)     Heart failure ([facility] [address]      pt states no current issues    Hematuria, unspecified 1/7/2015     hx of; pt states no current problems    Hypertension      managed w/med    Mitral valve disorders 1/7/2015     pt states has mitral valve prolapse    Mixed hyperlipidemia 4/21/2016    Neuropathy      BLE    Occlusion and stenosis of carotid artery without mention of cerebral infarction 1/7/2015     pt states not aware of this diagnosis    Osteopenia 1/7/2015    Other and unspecified hyperlipidemia 1/7/2015     managed w/med    Other B-complex deficiencies 1/7/2015     pt states does not have this diagnosis    Other chest pain 3/7/2011     pt states chest pain comes & goes. 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 female patient at [address] [place] [address] who was admitted on 2/11/2017  by Jagjit Luther MD with below mentioned medical history ,is being seen for Physical Medicine and Rehabilitation. Maximo Varner has had progressively worsening low back pain, radiating to bilateral lower extremities due to lumbar spondylolisthesis and lumbar stenosis.  She failed conservative management and underwent a lumbar laminectomy L4 through S1 with bilateral foraminotomies, lumbar posterolateral fusion L4 through S1, cortical screw instrumentation L4 through S1, bone marrow aspirate for allograft, translumbar interbody fusion L4-L5 and L5- S1, insertion biomechanical device L4-L5 and L5- S1 and Pace Ge dorsal osteotomy L5 per Dr. Prema Villalpando MD on 2/11/2017. The patient's post operative course has been complicated by  Post op patient made WBAT BLE. Lumbar spine precautions to be followed. Patient starting to stand, taking steps, however shows significant functional deficits due to knee surgery. Physical therapy was initiated and patient was starting to mobilize. However, she shows significant functional deficits due to prolonged immobility and hospitalization. Our service was consulted for rehab needs and we recommended inpatient hospital rehabilitation is reasonable and necessary due to ongoing acute medical issues which have not changed since initial pre-admission evaluation. and rehab needs still likely best managed in IRU setting. The patient was evaluated by [facility] admissions coordinators. I reviewed the preadmission screening and have approved for admission to the [facility]. The patient was cleared for transfer to rehab today. Patient continues to have ongoing  medical issues outlined above requiring physician medical supervision and functional deficits which would benefit from continued intensive therapies. 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 Her  is currently in the hospital. Drives. She occasionally used a rolling walker when back pain increased. She has two sons that live in the area and assist as needed.        Past Medical History Diagnosis Date    Allergic rhinitis, cause unspecified 1/7/2015     pt states not bad    Aortic valve disorders 1/7/2015     pt states does not know about this diagnosis    Arthritis     Arthropathies 1/7/2015     Multiple sites    Asthma      uses inhaler prn    Autoimmune disease ([facility] [address]      fibromyalgia    CAD (coronary artery disease)      mild,  treated with med    Carotid stenosis 1/7/2015    Carpal tunnel syndrome 01/07/2015     bilat wrists-no issue now    Chronic pain      due to fibromyalga & arthritis    Contact dermatitis and other eczema, due to unspecified cause 1/7/2015     pt states no current issues    Depression 6/30/2015     managed w/med    Dysuria 1/7/2015     hx of; pt states no current problems    Esophageal reflux 1/7/2015     managed w/med    Fibromyalgia     Former smoker      1 ppd for 14 yrs; quit smoking 1981    GERD (gastroesophageal reflux disease)     Heart failure ([facility] [address]      pt states no current issues    Hematuria, unspecified 1/7/2015     hx of; pt states no current problems    Hypertension      managed w/med    Mitral valve disorders 1/7/2015     pt states has mitral valve prolapse    Mixed hyperlipidemia 4/21/2016    Neuropathy      BLE    Occlusion and stenosis of carotid artery without mention of cerebral infarction 1/7/2015     pt states not aware of this diagnosis    Osteopenia 1/7/2015    Other and unspecified hyperlipidemia 1/7/2015     managed w/med    Other B-complex deficiencies 1/7/2015     pt states does not have this diagnosis    Other chest pain 3/7/2011     pt states chest pain comes & goes.  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     pt states has spinal stenosis    Other malaise and fatigue 1/7/2015    PUD (peptic ulcer disease)      years ago   [facility] Renal insufficiency 1/7/2015     pt states no current issues    Shortness of breath 1/7/2015     pt reports SOB w/exertion    Unspecified urinary incontinence 2015      Past Surgical History   Procedure Laterality Date    Hx hysterectomy      Hx tubal ligation      Hx lap cholecystectomy      Hx colonoscopy      Hx hemorrhoidectomy      Hx bladder suspension      Hx shoulder arthroscopy Right      rotator cuff repair    Hx gi       esophageal dilatation    Hx gi       rectocele    Hx urological       bladder tac     Allergies   Allergen Reactions    Bees [Hymenoptera Allergenic Extract] Anaphylaxis    Adhesive Tape-Silicones Rash     Ok to use paper tape    Lescol [Fluvastatin] Cough    Lipitor [Atorvastatin] Other (comments)     High doses cause leg cramps.  Able to tolerate low doses    Lisinopril Cough           Penicillin G Rash    Tetracycline Other (comments)     ULCERS IN MOUTH      Family History   Problem Relation Age of Onset    Diabetes Mother     Heart Attack Mother     Diabetes Sister     Breast Cancer Sister 79    Diabetes Sister     Heart Attack Father     Diabetes Sister     Heart Attack Sister       from heart issue age 54    Arthritis-rheumatoid Paternal Grandmother     Heart Disease Maternal Grandmother       Social History   Substance Use Topics    Smoking status: Former Smoker     Packs/day: 1.00     Years: 14.00    Smokeless tobacco: Never Used      Comment: quit smoking 1981    Alcohol use No      Current Facility-Administered Medications   Medication Dose Route Frequency    acetaminophen (TYLENOL) tablet 650 mg  650 mg Oral Q6H    cyclobenzaprine (FLEXERIL) tablet 10 mg  10 mg Oral TID PRN    docusate sodium (COLACE) capsule 100 mg  100 mg Oral BID    famotidine (PEPCID) tablet 20 mg  20 mg Oral Q24H    naloxone (NARCAN) injection 0.4 mg  0.4 mg IntraVENous EVERY 2 MINUTES AS NEEDED    oxyCODONE IR (ROXICODONE) tablet 5-10 mg  5-10 mg Oral Q4H PRN    zolpidem (AMBIEN) tablet 5 mg  5 mg Oral QHS PRN    albuterol (PROVENTIL HFA, VENTOLIN HFA, PROAIR HFA) inhaler 2 Puff  2 Puff Inhalation Q6H PRN    albuterol-ipratropium (DUO-NEB) 2.5 MG-0.5 MG/3 ML  3 mL Nebulization Q6H PRN    atenolol (TENORMIN) tablet 12.5 mg  12.5 mg Oral QHS    atorvastatin (LIPITOR) tablet 20 mg  20 mg Oral QHS    bisacodyl (DULCOLAX) suppository 10 mg  10 mg Rectal DAILY PRN    [START ON 2/12/2017] furosemide (LASIX) tablet 40 mg  40 mg Oral DAILY    gabapentin (NEURONTIN) capsule 300 mg  300 mg Oral TID    nitroglycerin (NITROSTAT) tablet 0.4 mg  0.4 mg SubLINGual Q5MIN PRN    [START ON 2/12/2017] polyethylene glycol (MIRALAX) packet 17 g  17 g Oral DAILY    [START ON 2/12/2017] potassium chloride (K-DUR, KLOR-CON) SR tablet 20 mEq  20 mEq Oral DAILY    [START ON 2/12/2017] senna (SENOKOT) tablet 8.6 mg  1 Tab Oral DAILY    [START ON 2/12/2017] sertraline (ZOLOFT) tablet 75 mg  75 mg Oral DAILY    [START ON 2/12/2017] valsartan (DIOVAN) tablet 160 mg  160 mg Oral DAILY       Review of Systems:   + low back pain. Denies: fevers, chills, sweats, fatigue, malaise, anorexia, weight loss   Denies: blurry vision, loss of vision, eye pain, photophobia   Denies: hearing loss, ringing in the ears, earache, epistaxis   Denies: chest pain, palpitations, syncope, orthopnea, paroxysmal nocturnal dyspnea, claudication   Denies: dysphagia, odynophagia, nausea, vomiting, diarrhea, constipation, abdominal pain, jaundice, melena   Denies: frequency, dysuria, nocturia, urinary incontinence, stones, hematuria   Denies: polydipsia/polyuria, skin changes, temperature intolerance, unexpected weight gain   Denies:  joint swelling, muscle pain  Denies: bleeding problems, blood transfusions, bruising, pallor, swollen lymph nodes   Denies: headache, dysarthria, blurred vision, diplopia,seizure, focal deficits.       Objective:     Visit Vitals    /63 (BP 1 Location: Left arm)    Pulse 73    Temp 98 °F (36.7 °C)    Resp 18    SpO2 95%    Breastfeeding No      Intake and Output:       Physical Exam:   Psych: Patient was oriented to person, place, and time. Without hallucinations, abnormal affect or abnormal behaviors during the examination. Patient is not suicidal.   General:   Alert, appears stated age, No acute distress. HEENT:  Normocephalic, EOMI, facial symmetry  Intact. Oral mucosa pink and moist. No nasal discharge. Lungs:  CTA Bilaterally,Respiration even and unlabored   No apparent use of accessory muscles for respiration. No nasal alar flaring. Heart:  Regular rate and rhythm, S1, S2, No obvious murmur, click, rub or gallop. Genitourinary: defered   Abdomen:  Soft, non-tender to palpation in all four quadrants. Bowel sounds present. No obvious masses palpated. Neuromuscular:  PERRL, EOMI  LUE     Shoulder abduction   5/5              Elbow flexion:   5/5               Wrist extension:  5 /5              Finger flexion;  5 /5   ADMQ:  5 /5  RUE    Shoulder abduction: 5 /5                Elbow flexion: 5  /5    Wrist extension:  5/5   Finger flexion:   5/5   ADMQ:   /5  LLE     Hip flexion: 5-  /5              Knee extension:   5-/5    Ankle dorsiflexion:  5 /5   EHL:  5 /5   Ankle plantarflexion:   5/5        RLE     Hip flexion:  5- /5   Knee extension:  5- /5    Ankle dorsiflexion:   5/5   EHL:   5/5   Ankle plantarflexion:  5 /5  Sensory - intact to soft touch BLE  Plantars - down going  DTR Right  B 2, T 2  Knee 2, Ach 1           Left    B 2, T 2, Knee 2, Ach 1     Skin:  Intact, dry, good skin turgor, age related changes present   Edema: trace BLE edema.     Incision:  covered, clean, dry, and intact        Lab Review:    Recent Results (from the past 72 hour(s))   PLEASE READ & DOCUMENT PPD TEST IN 24 HRS    Collection Time: 02/08/17  9:29 PM   Result Value Ref Range    PPD negative Negative    mm Induration 0 mm   PLEASE READ & DOCUMENT PPD TEST IN 48 HRS    Collection Time: 02/09/17  8:45 PM   Result Value Ref Range    PPD  Negative    mm Induration  mm     Functional Assessment: Speech Assessment:         Ambulation:          Condition on Admission: Good      Assessment: This is an 66 YOF with PMH of debilitatin lumbar spondylolisthesis/stenosis with neurogenic claudication, is s/p L4-S1 lumbar laminectomy, L4-5 fusion and instrumentation. Patient admitted to IRU to continue acute medical rehab program.    The Post Assessment Physician Evaluation (ELVIRA) found the current functional status to be comparable with the Pre-admission Screening. The Patient is a good candidate for acute inpatient rehabilitation. Nothing since the Pre-admission screen has changed that determination. Rehabilitation Plan  The patient has shown the ability to tolerate and benefit from 3 hours of therapy daily and is being admitted to a comprehensive acute inpatient rehabilitation program consisting of at least 3 hours of combined physical and occupational therapies. Begin intensive Physical Therapy for a minimum of 1.5 hours a day, at least 5 out of 7 days per week to address bed mobility, transfers, ambulation, strengthening, balance, and endurance. Begin intensive Occupational Therapy for a minimum of 1.5 hours a day, at least 5 out of 7 days per week to address ADL ( bathing, LE dressing, toileting) and adaptive equipment as needed. The patient will also require 24-hour skilled rehabilitation nursing for bowel and bladder management, skin care for decubitus ulcer prevention , pain management and ongoing medication administration     Continue daily physician medical management:  s/p L4-S1 lumbar laminectomy, L4-5 fusion and instrumentation.   - lumbar spine precautions. TLFO when out of bed.  - continue to monitor wound for infection, bleeding, wound dehiscence. Elevated creatinine/ Renal insufficiency- monitor    Post op acute blood loss anemia - monitor    Hx of CHF -continued on lasix, KCl    CAD (coronary artery disease)    Hypertension - continue diovan, atenolol. adjust as needed.      PUD (peptic ulcer disease)- continue pepcid. Pneumonia prophylaxis- Insentive spirometer every hour while awake    DVT risk / DVT Prophylaxis- Will require daily physician exam to assess for signs and symptoms as patient is at increased risk for of thromboembolism. Mobilization as tolerated. Intermittent pneumatic compression devices when in bed Thigh-high or knee-high thromboembolic deterrent hose when out of bed. Holding anticoagulation until ok per spine ortho. Pain Control: stable, mild-to-moderate joint symptoms intermittently, reasonably well controlled by PRN meds. Will require regular pain assessment and comprenhensive pain management. Continue scheduled tylenol. Continued on roxicodone, neurontin. . Prn flexaril. Wound Care: Monitor wound status daily per staff and physician. At risk for failure. Will require 24/7 rehab nursing. Keep wound clean and dry. Potential urinary retention/ neurogenic bladder - schedule voids q6-8 hrs. Check post-void residual every shift; In and Out catheterize if post-void residual is more than 400 cc.    bowel program - senna, colace. Monitor. GERD - resume pepcid. The patient's prognosis for significant practical improvement within a reasonable period of time appears good and the estimated length of stay is 14 days and he is expected to return home with spouse and continued rehabilitation with home health therapy. Given the patient's complex neurologic/medical condition and the risk of further medical complications including: DVT, PE, skin breakdown, pneumonia due to decreased mobility, infection at surgical site , CVA, MI, cardiac arrhythmias due to HTN, increased risk of thromboembolism secondary to decreased blood volume and increased energy expenditure in a patient with known acute blood loss could potentially impact the IRF program with decreased cardiovascular efficiency, postural hypotension and cardiac arrhythmia.  For these ongoing medical issues, rehabilitation services could not be safely provided at a lower level of care such as a skilled nursing facility or nursing home.         Signed By: Silvia Wilder MD     February 11, 2017

## 2017-02-13 PROCEDURE — 74011250637 HC RX REV CODE- 250/637: Performed by: PHYSICAL MEDICINE & REHABILITATION

## 2017-02-13 PROCEDURE — 97116 GAIT TRAINING THERAPY: CPT

## 2017-02-13 PROCEDURE — 97110 THERAPEUTIC EXERCISES: CPT

## 2017-02-13 PROCEDURE — 97163 PT EVAL HIGH COMPLEX 45 MIN: CPT

## 2017-02-13 PROCEDURE — 97530 THERAPEUTIC ACTIVITIES: CPT

## 2017-02-13 PROCEDURE — 65310000000 HC RM PRIVATE REHAB

## 2017-02-13 PROCEDURE — 97535 SELF CARE MNGMENT TRAINING: CPT

## 2017-02-13 PROCEDURE — 99232 SBSQ HOSP IP/OBS MODERATE 35: CPT | Performed by: PHYSICAL MEDICINE & REHABILITATION

## 2017-02-13 PROCEDURE — 97166 OT EVAL MOD COMPLEX 45 MIN: CPT

## 2017-02-13 RX ORDER — POLYETHYLENE GLYCOL 3350 17 G/17G
17 POWDER, FOR SOLUTION ORAL DAILY
Status: DISCONTINUED | OUTPATIENT
Start: 2017-02-13 | End: 2017-02-14

## 2017-02-13 RX ADMIN — ATENOLOL 12.5 MG: 25 TABLET ORAL at 21:16

## 2017-02-13 RX ADMIN — GABAPENTIN 300 MG: 300 CAPSULE ORAL at 06:20

## 2017-02-13 RX ADMIN — GABAPENTIN 300 MG: 300 CAPSULE ORAL at 21:15

## 2017-02-13 RX ADMIN — ATORVASTATIN CALCIUM 20 MG: 10 TABLET, FILM COATED ORAL at 21:15

## 2017-02-13 RX ADMIN — ACETAMINOPHEN 650 MG: 325 TABLET, FILM COATED ORAL at 14:44

## 2017-02-13 RX ADMIN — VALSARTAN 160 MG: 80 TABLET, FILM COATED ORAL at 09:09

## 2017-02-13 RX ADMIN — POTASSIUM CHLORIDE 20 MEQ: 20 TABLET, EXTENDED RELEASE ORAL at 09:07

## 2017-02-13 RX ADMIN — SENNOSIDES 8.6 MG: 8.6 TABLET, FILM COATED ORAL at 09:07

## 2017-02-13 RX ADMIN — DOCUSATE SODIUM 100 MG: 100 CAPSULE, LIQUID FILLED ORAL at 09:08

## 2017-02-13 RX ADMIN — SERTRALINE HYDROCHLORIDE 75 MG: 50 TABLET ORAL at 09:08

## 2017-02-13 RX ADMIN — FUROSEMIDE 40 MG: 40 TABLET ORAL at 09:09

## 2017-02-13 RX ADMIN — ZOLPIDEM TARTRATE 5 MG: 5 TABLET ORAL at 21:15

## 2017-02-13 RX ADMIN — GABAPENTIN 300 MG: 300 CAPSULE ORAL at 14:44

## 2017-02-13 RX ADMIN — ACETAMINOPHEN 650 MG: 325 TABLET, FILM COATED ORAL at 09:09

## 2017-02-13 RX ADMIN — ACETAMINOPHEN 650 MG: 325 TABLET, FILM COATED ORAL at 21:15

## 2017-02-13 RX ADMIN — ACETAMINOPHEN 650 MG: 325 TABLET, FILM COATED ORAL at 03:00

## 2017-02-13 RX ADMIN — FAMOTIDINE 20 MG: 20 TABLET ORAL at 21:15

## 2017-02-13 NOTE — PROGRESS NOTES
Patient lives in her own home with her spouse. She has been his caregiver. The couple have two son who live locally and are supportive. SW to follow for needs.   Likely HH at [place]

## 2017-02-13 NOTE — PROGRESS NOTES
[facility] OCCUPATIONAL THERAPY INITIAL EVALUATION    Time In: 6546  Time Out: 3548    Precautions: Falls and Spinal    Vitals: Patient Vitals for the past 8 hrs:   Temp Pulse Resp BP SpO2   02/13/17 0810 99.3 °F (37.4 °C) 67 18 152/82 97 %         Pain: Patient rated pain 0 out of 10. History of Presenting Illness (per previous reports): Patrick Hopper has had progressively worsening low back pain, radiating to bilateral lower extremities due to lumbar spondylolisthesis and lumbar stenosis.  She failed conservative management and underwent a lumbar laminectomy L4 through S1 with bilateral foraminotomies, lumbar posterolateral fusion L4 through S1, cortical screw instrumentation L4 through S1, bone marrow aspirate for allograft, translumbar interbody fusion L4-L5 and L5- S1, insertion biomechanical device L4-L5 and L5- S1 and P[place] Ge dorsal osteotomy L5 per Dr. Latisha Mcgee MD on 2/11/2017\"    Past Medical History/ Co-morbidities:   Past Medical History   Diagnosis Date    Allergic rhinitis, cause unspecified 1/7/2015     pt states not bad    Aortic valve disorders 1/7/2015     pt states does not know about this diagnosis    Arthritis     Arthropathies 1/7/2015     Multiple sites    Asthma      uses inhaler prn    Autoimmune disease ([facility] [address]      fibromyalgia    CAD (coronary artery disease)      mild,  treated with med    Carotid stenosis 1/7/2015    Carpal tunnel syndrome 01/07/2015     bilat wrists-no issue now    Chronic pain      due to fibromyalga & arthritis    Contact dermatitis and other eczema, due to unspecified cause 1/7/2015     pt states no current issues    Depression 6/30/2015     managed w/med    Dysuria 1/7/2015     hx of; pt states no current problems    Esophageal reflux 1/7/2015     managed w/med    Fibromyalgia     Former smoker      1 ppd for 14 yrs; quit smoking 1981    GERD (gastroesophageal reflux disease)     Heart failure ([address]      pt states no current issues    Hematuria, unspecified 1/7/2015     hx of; pt states no current problems    Hypertension      managed w/med    Mitral valve disorders 1/7/2015     pt states has mitral valve prolapse    Mixed hyperlipidemia 4/21/2016    Neuropathy      BLE    Occlusion and stenosis of carotid artery without mention of cerebral infarction 1/7/2015     pt states not aware of this diagnosis    Osteopenia 1/7/2015    Other and unspecified hyperlipidemia 1/7/2015     managed w/med    Other B-complex deficiencies 1/7/2015     pt states does not have this diagnosis    Other chest pain 3/7/2011     pt states chest pain comes & goes. 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 \"    Previous Level of Function: Prior to surgery, patient was independent with all self care tasks; primary caregiver for spouse who is currently in the hospital and uses a w/c. Patient reports that she intermittently used a R/W or  SPC when back pain increased.  Patient was independent with all IADLs, driving, community tasks, medication/financial management    Home Situation    Environment Comments   House Situation Trailer/mobile home    Lives Alone No    Support Systems Child(leslie), Spouse/Significant Other/Partner    Shower Situation Tub/Shower combination    Current DME Walker, rolling, Cane, straight, Shower chair, Raised toilet seat, Commode, bedside    Lift Chair      Stairs to [employer] [address]       Rails         W/C Ramp Yes    Interior Steps        Upper Extremity Function   LUE RUE   [address]  Intact  Intact   [facility]  Intact  Intact   Light Touch       Sharp/Dull Discrimination       Hot/Cold       Proprioception       Stereognosis       9 Hole Peg Test       General Comments        KASSI LIN   General Evalutaion       AROM       Shoulder Flexion       Shoulder Extension        Shoulder Abduction       Shoulder Adduction       Elbow Flexion       Elbow Extension        Wrist Flexion/Extension              General Comments     0/5 No palpable muscle contraction  1/5 Palpable muscle contraction, no joint movement  2-/5 Less than full range of motion in gravity eliminated position  2/5 Able to complete full range of motion in gravity eliminated position  2+/5 Able to initiate movement against gravity  3-/5 More than half but not full range of motion against gravity  3/5 Able to complete full range of motion against gravity  3+/5 Completes full range of motion against gravity with minimal resistance  4-/5 Completes full range of motion against gravity with minimal-moderate resistance  4/5 Completes full range of motion against gravity with moderate resistance  4+/5 Completes full range of motion against gravity with moderate-maximum resistance  5/5 Completes full range of motion against gravity with maximum resistance      Functional Mobility   Performance Comments   Sitting: Static Good (unsupported)    Sitting: Dynamic Good (unsupported)    Standing: Static Fair    Standing: Dynamic Fair (occasional)    Supine to Sit 4 (Contact guard assistance)    Sit to Stand Assistance Contact guard assistance    Transfer Assist Score 4    Transfer Type SPT with walker      Cognition   Performance Comments   Orientation Level Oriented X4    Comprehension Level 6 Mode: Auditory  Hearing Aid:None  Glasses:Reading glasses   Expression (Native Language) 7 Mode: Verbal      Social Interaction/Pragmatics 6 medication for depression   Problem Solving 4 solves basic problems 75% of the time   Memory 4 recall 2/3 spinal precautions   Comments         Activities of Daily Living    Score Comments Self-Feeding 6 dentures   Grooming 5 Tasks completed by patient: Brushed teeth  setup   Bathing 4 Body Parts Bathe: Abdomen, Arm, left, Arm, right, Buttocks, Chest, Thigh, right, Ivy area, Thigh, left  A for B distal LE   Tub/Shower Transfer Bryan 4 Type of Shower: Shower  Adaptive  Equipment:Tub transfer bench, Grab bars and Walker   Upper Body  Dressing/Undressing 5 Items Applied:Bra (3 steps), Pullover (4 steps)  setup   Lower Body Dressing/Undressing 2 Items Applied:Elastic waist pants (3 steps), Shoe, left (1 step), Shoe, right (1 step), Sock, left (1 step), Sock, right (1 step), Underpants (3 steps)  A to thread BLE into pants/underwear and B socks and shoesoes   Toileting 3 assist for pants up   Toilet Transfer 4 CGA      Vision/Perception: Intact    Instrumental Activities of Daily Living   Performance Comments   Meal Preperation Maximum assistance    Homemaking Total assistance    Medication Management Maximum assistance    Financial Management Maximum assistance      Session: Patient sitting on toilet at start of session; agreeable to OT evaluation; patient ambulated around room and in bathroom with R/W with CGA and cues for hand placement; see FIM scores above for ADL. Provided patient with AE for spinal precautions (reacher, LH sponge, sock aid and shoe horn). Demonstrated use to patient; patient verbalized and demonstrated understanding. Interdisciplinary Communication: updated interdisciplinary communication board regarding DEE for self care tasks    Patient/Family Education: Pt was/were educated on the role of occupational therapy, on POC and on [facility] expectations. Problem List: Activity Tolerance, Safety Awareness, Strength, Sitting Balance and Abnormal Muscle Tone    Functional Limitations: ADL, IADL, Functional Transfers and Functional Mobility    Goals: Please see Care Plan    OT order received and chart reviewed. OT orders have been acknowledged.  Patient will benefit from skilled OT services to address ADL, functional transfers, UE strength, balance and activity tolerance to maximize functional performance with daily self-care tasks and functional mobility. Treatment is likely to include ADL, Balance, Strength, Activity tolerance, DME, AE and Family  training to increase independence with self-care. Patient will be seen for 1.5-2 hours of skilled OT services 5-6 days a week.      [employer], OTR/L  2/13/2017

## 2017-02-13 NOTE — PROGRESS NOTES
Josr Snowden MD,   Medical Director  [address] [facility], [address] [facility]  Tel: [phone]       [facility] PROGRESS NOTE    Dora Meneses Date: 2/11/2017  Admit Diagnosis: trauna to spinal core;Spinal stenosis of lumbar region at m*    Subjective     Feeling well. No cp/sob. No nausea but is having some abd cramping and pain. Reports +flatus but no bm since surgery ? ?? Eating well.  Back pain 4/10    Objective:     Current Facility-Administered Medications   Medication Dose Route Frequency    acetaminophen (TYLENOL) tablet 650 mg  650 mg Oral Q6H    cyclobenzaprine (FLEXERIL) tablet 10 mg  10 mg Oral TID PRN    docusate sodium (COLACE) capsule 100 mg  100 mg Oral BID    famotidine (PEPCID) tablet 20 mg  20 mg Oral Q24H    naloxone (NARCAN) injection 0.4 mg  0.4 mg IntraVENous EVERY 2 MINUTES AS NEEDED    oxyCODONE IR (ROXICODONE) tablet 5-10 mg  5-10 mg Oral Q4H PRN    zolpidem (AMBIEN) tablet 5 mg  5 mg Oral QHS PRN    albuterol (PROVENTIL HFA, VENTOLIN HFA, PROAIR HFA) inhaler 2 Puff  2 Puff Inhalation Q6H PRN    albuterol-ipratropium (DUO-NEB) 2.5 MG-0.5 MG/3 ML  3 mL Nebulization Q6H PRN    atenolol (TENORMIN) tablet 12.5 mg  12.5 mg Oral QHS    atorvastatin (LIPITOR) tablet 20 mg  20 mg Oral QHS    bisacodyl (DULCOLAX) suppository 10 mg  10 mg Rectal DAILY PRN    furosemide (LASIX) tablet 40 mg  40 mg Oral DAILY    gabapentin (NEURONTIN) capsule 300 mg  300 mg Oral TID    nitroglycerin (NITROSTAT) tablet 0.4 mg  0.4 mg SubLINGual Q5MIN PRN    polyethylene glycol (MIRALAX) packet 17 g  17 g Oral DAILY    potassium chloride (K-DUR, KLOR-CON) SR tablet 20 mEq  20 mEq Oral DAILY    senna (SENOKOT) tablet 8.6 mg  1 Tab Oral DAILY    sertraline (ZOLOFT) tablet 75 mg  75 mg Oral DAILY    valsartan (DIOVAN) tablet 160 mg  160 mg Oral DAILY     Review of Systems:Denies chest pain, shortness of breath, cough, headache, visual problems, dysurea, calf pain. +abd cramping, constipation. Pertinent positives are as noted in the medical records and unremarkable otherwise. Visit Vitals    /63    Pulse 72    Temp 98.5 °F (36.9 °C)    Resp 20    SpO2 98%    Breastfeeding No        Physical Exam:   General: Alert and age appropriately oriented. No acute cardio respiratory distress. HEENT: Normocephalic,no scleral icterus  Oral mucosa moist without cyanosis   Lungs: Clear to auscultation  bilaterally. Respiration even and unlabored   Heart: Regular rate and rhythm, S1, S2   No  murmurs, clicks, rub or gallops   Abdomen: Soft, mildly-tender, nondistended. Bowel sounds present but diminished  No organomegaly. Genitourinary: Benign . Neuromuscular:      Grossly no focal motor deficits noted. Moves ankles. Ankle dorsiflexion 5/5  Ankle plantarflexion 5/5  No sensory deficits distally. Exam limited by pain. Skin/extremity: No rashes, no erythema. No calf tenderness BLE  Wound covered.                                                                             Functional Assessment:                                Ward Yanez Fall Risk Assessment:  Ward Yanez Fall Risk  Mobility: Ambulates or transfers with assist devices or assistance/unsteady gait (02/12/17 1413)  Mentation: Alert, oriented x 3 (02/12/17 1413)  Medication: Patient receiving anticonvulsants, sedatives(tranquilizers), psychotropics or hypnotics, hypoglycemics, narcotics, sleep aids, antihypertensives, laxatives, or diuretics (02/12/17 1413)  Elimination: Needs assistance with toileting (02/12/17 1413)  Fall History in the Past 3 Months: Before admission (02/12/17 1413)  Total Score: 4 (02/12/17 1413)         Labs/Studies:  Recent Results (from the past 72 hour(s))   CBC W/O DIFF    Collection Time: 02/12/17  7:50 AM   Result Value Ref Range    WBC 6.3 4.3 - 11.1 K/uL    RBC 3.58 (L) 4.05 - 5.25 M/uL    HGB 10.0 (L) 11.7 - 15.4 g/dL    HCT 32.7 (L) 35.8 - 46.3 %    MCV 91.3 79.6 - 97.8 FL    MCH 27.9 26.1 - 32.9 PG    MCHC 30.6 (L) 31.4 - 35.0 g/dL    RDW 13.6 11.9 - 14.6 %    PLATELET 636 699 - 787 K/uL    MPV 10.5 (L) 10.8 - 14.1 FL   MAGNESIUM    Collection Time: 02/12/17  7:50 AM   Result Value Ref Range    Magnesium 2.4 1.8 - 2.4 mg/dL       Assessment:     Problem List as of 2/13/2017  Date Reviewed: 10/5/2016          Codes Class Noted - Resolved    S/P laminectomy with spinal fusion ICD-10-CM: Z98.1  ICD-9-CM: V45.4  2/11/2017 - Present        Spinal stenosis of lumbar region at multiple levels ICD-10-CM: M48.06  ICD-9-CM: 724.02  2/11/2017 - Present        Lumbar stenosis with neurogenic claudication ICD-10-CM: M48.06  ICD-9-CM: 724.03  2/7/2017 - Present        Asymptomatic carotid artery stenosis (Chronic) ICD-10-CM: I65.29  ICD-9-CM: 433.10  11/4/2016 - Present        Valvular heart disease (Chronic) ICD-10-CM: I38  ICD-9-CM: 424.90  11/4/2016 - Present        Coronary artery disease involving native coronary artery of native heart without angina pectoris (Chronic) ICD-10-CM: I25.10  ICD-9-CM: 414.01  11/4/2016 - Present        On potassium wasting diuretic therapy (Chronic) ICD-10-CM: H17.203  ICD-9-CM: V58.69  11/4/2016 - Present        Obesity (BMI 30.0-34.9) (Chronic) ICD-10-CM: E66.9  ICD-9-CM: 278.00  11/4/2016 - Present        Mixed hyperlipidemia (Chronic) ICD-10-CM: E78.2  ICD-9-CM: 272.2  4/21/2016 - Present        RENEE (stress urinary incontinence, female) (Chronic) ICD-10-CM: N39.3  ICD-9-CM: 625.6  2/24/2016 - Present        Rectocele ICD-10-CM: N81.6  ICD-9-CM: 618.04  2/24/2016 - Present        Weakening of rectovaginal tissue ICD-10-CM: N81.83  ICD-9-CM: 618.82  2/24/2016 - Present        Depression (Chronic) ICD-10-CM: F32.9  ICD-9-CM: 530  6/30/2015 - Present        Contact dermatitis and other eczema, due to unspecified cause ICD-10-CM: L25.9  ICD-9-CM: 692.9  1/7/2015 - Present        Essential hypertension (Chronic) ICD-10-CM: I10  ICD-9-CM: 401.9  1/7/2015 - Present        Other and unspecified hyperlipidemia ICD-10-CM: E78.5  ICD-9-CM: 272.4  1/7/2015 - Present        Mitral valve disorders ICD-10-CM: I05.9  ICD-9-CM: 424.0  1/7/2015 - Present        Hematuria, unspecified ICD-10-CM: R31.9  ICD-9-CM: 599.70  1/7/2015 - Present        Dysuria ICD-10-CM: R30.0  ICD-9-CM: 788.1  1/7/2015 - Present        Anemia, unspecified ICD-10-CM: D64.9  ICD-9-CM: 285.9  1/7/2015 - Present        Other B-complex deficiencies ICD-10-CM: E53.8  ICD-9-CM: 266.2  1/7/2015 - Present        Arthropathies (Chronic) ICD-10-CM: M12.9  ICD-9-CM: 716.90  1/7/2015 - Present    Overview Signed 1/7/2015 10:11 AM by Shruti Matthew     Multiple sites             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  ICD-9-CM: 756.19  1/7/2015 - Present        Other malaise and fatigue ICD-10-CM: R53.81, R53.83  ICD-9-CM: 780.79  1/7/2015 - Present        Allergic rhinitis, cause unspecified ICD-10-CM: J30.9  ICD-9-CM: 477.9  1/7/2015 - Present        Aortic valve disorders ICD-10-CM: I35.9  ICD-9-CM: 424.1  1/7/2015 - Present        Extrinsic asthma, unspecified (Chronic) ICD-10-CM: J45.909  ICD-9-CM: 493.00  1/7/2015 - Present        Respiratory abnormality, unspecified ICD-10-CM: J98.9  ICD-9-CM: 786.00  1/7/2015 - Present        Congestive heart failure, unspecified ICD-10-CM: I50.9  ICD-9-CM: 428.0  1/7/2015 - Present        Occlusion and stenosis of carotid artery without mention of cerebral infarction ICD-10-CM: I65.29  ICD-9-CM: 433.10  1/7/2015 - Present        Carpal tunnel syndrome ICD-10-CM: G56.00  ICD-9-CM: 354.0  1/7/2015 - Present        Unspecified urinary incontinence ICD-10-CM: R32  ICD-9-CM: 788.30  1/7/2015 - Present        Carotid stenosis ICD-10-CM: I65.29  ICD-9-CM: 433.10  1/7/2015 - Present        Renal insufficiency ICD-10-CM: N28.9  ICD-9-CM: 593.9  1/7/2015 - Present        Heart disease, unspecified ICD-10-CM: I51.9  ICD-9-CM: 429.9  1/7/2015 - Present        Shortness of breath ICD-10-CM: R06.02  ICD-9-CM: 786.05  1/7/2015 - Present        Osteopenia ICD-10-CM: M85.80  ICD-9-CM: 733.90  1/7/2015 - Present        Other chest pain ICD-10-CM: R07.89  ICD-9-CM: 786.59  3/7/2011 - Present        GERD (gastroesophageal reflux disease) (Chronic) ICD-10-CM: K21.9  ICD-9-CM: 530.81  3/4/2011 - Present        Asthma (Chronic) ICD-10-CM: J45.909  ICD-9-CM: 493.90  3/4/2011 - Present              Plan:   Assessment: This is an 66 YOF with PMH of debilitatin lumbar spondylolisthesis/stenosis with neurogenic claudication, is s/p L4-S1 lumbar laminectomy, L4-5 fusion and instrumentation. Patient admitted to IRU to continue acute medical rehab program.     Continue daily physician medical management:  s/p L4-S1 lumbar laminectomy, L4-5 fusion and instrumentation.   - lumbar spine precautions. TLFO when out of bed.  - continue to monitor wound for infection, bleeding, wound dehiscence.      Elevated creatinine/ Renal insufficiency- monitor; check BMP 2/14     Post op acute blood loss anemia - monitor; h/h is stable     Hx of CHF -continued on lasix, KCl     CAD (coronary artery disease)     Hypertension - continue diovan, atenolol. adjust as needed.      PUD (peptic ulcer disease)- continue pepcid.      Pneumonia prophylaxis- Insentive spirometer every hour while awake     DVT risk / DVT Prophylaxis- Will require daily physician exam to assess for signs and symptoms as patient is at increased risk for of thromboembolism. Mobilization as tolerated. Intermittent pneumatic compression devices when in bed Thigh-high or knee-high thromboembolic deterrent hose when out of bed. Holding anticoagulation until ok per spine ortho.      Pain Control: stable, mild-to-moderate joint symptoms intermittently, reasonably well controlled by PRN meds. Will require regular pain assessment and comprenhensive pain management. Continue scheduled tylenol. Continued on roxicodone, neurontin. . Prn flexaril.      Wound Care: Monitor wound status daily per staff and physician. At risk for failure. Will require 24/7 rehab nursing. Keep wound clean and dry.     Potential urinary retention/ neurogenic bladder - schedule voids q6-8 hrs. Check post-void residual every shift; In and Out catheterize if post-void residual is more than 400 cc.     bowel program - senna, colace. Monitor. 2/13 reports no Bm for over a week. Add Lactulose and miralax. Needs bm ASAP. No evidence clinically of ileus     GERD - resume pepcid. Time spent was 25 minutes with over 1/2 in direct patient care/examination, consultation and coordination of care.      Signed By: Traci Shane MD     February 13, 2017

## 2017-02-13 NOTE — PROGRESS NOTES
Dressing to lower back CDI. No drainage noted. Gait steady with walker. Assist x 1. Continent of bowel and bladder. Denies pain or discomfort at this time. Call bell within reach.

## 2017-02-13 NOTE — PROGRESS NOTES
PHYSICAL THERAPY EXAMINATION  Time In: 1110  Time Out: 1153      Patient Name: Jodene Severs  Patient Age: 66 y.o. Past Medical History:   Past Medical History   Diagnosis Date    Allergic rhinitis, cause unspecified 1/7/2015     pt states not bad    Aortic valve disorders 1/7/2015     pt states does not know about this diagnosis    Arthritis     Arthropathies 1/7/2015     Multiple sites    Asthma      uses inhaler prn    Autoimmune disease ([facility] [address]      fibromyalgia    CAD (coronary artery disease)      mild,  treated with med    Carotid stenosis 1/7/2015    Carpal tunnel syndrome 01/07/2015     bilat wrists-no issue now    Chronic pain      due to fibromyalga & arthritis    Contact dermatitis and other eczema, due to unspecified cause 1/7/2015     pt states no current issues    Depression 6/30/2015     managed w/med    Dysuria 1/7/2015     hx of; pt states no current problems    Esophageal reflux 1/7/2015     managed w/med    Fibromyalgia     Former smoker      1 ppd for 14 yrs; quit smoking 1981    GERD (gastroesophageal reflux disease)     Heart failure ([facility] [address]      pt states no current issues    Hematuria, unspecified 1/7/2015     hx of; pt states no current problems    Hypertension      managed w/med    Mitral valve disorders 1/7/2015     pt states has mitral valve prolapse    Mixed hyperlipidemia 4/21/2016    Neuropathy      BLE    Occlusion and stenosis of carotid artery without mention of cerebral infarction 1/7/2015     pt states not aware of this diagnosis    Osteopenia 1/7/2015    Other and unspecified hyperlipidemia 1/7/2015     managed w/med    Other B-complex deficiencies 1/7/2015     pt states does not have this diagnosis    Other chest pain 3/7/2011     pt states chest pain comes & goes.  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     pt states has spinal stenosis    Other malaise and fatigue 1/7/2015    PUD (peptic ulcer disease) years ago    Renal insufficiency 1/7/2015     pt states no current issues    Shortness of breath 1/7/2015     pt reports SOB w/exertion    Unspecified urinary incontinence 1/7/2015       Medical Diagnosis:  trauna to spinal core  Spinal stenosis of lumbar region at multiple levels  S/P laminectomy with spinal fusion S/P laminectomy with spinal fusion    Precautions at Admission: Spinal;Other (comment) (falls risk)    Therapy Diagnosis:   Difficulty with bed mobility  [x]     Difficulty with functional transfers  [x]     Difficulty with ambulation  [x]     Difficulty with stair negotiations  [x]       Problem List:    Decreased strength B LE  [x]     Decreased strength trunk/core  [x]     Decreased AROM   []     Decreased PROM  []    Decreased endurance  []     Decreased balance sitting  []     Decreased balance standing  [x]     Pain   [x]     Slow ambulation velocity  [x]    Decreased coordination  [x]    Decreased safety awareness  [x]      Functional Limitations:   Decreased independence with bed mobility  [x]     Decreased independence with functional transfers  [x]     Decreased independence with ambulation  [x]     Decreased independence with stair negotiation  [x]       Previous Functional Level: Prior to surgery, patient was independent with all self care tasks; primary caregiver for spouse who is currently in the hospital and uses a w/c. Patient reports that she intermittently used a R/W or  SPC when back pain increased.  Patient was independent with all IADLs, driving, community tasks, medication/financial management    Home Environment: Home Environment: Private residence  # Steps to Enter: 0  Rails to Enter: No  Wheelchair Ramp: Yes  One/Two Story Residence: One story  Living Alone: Yes  Support Systems: Family member(s)  Patient Expects to be Discharged to[de-identified] Private residence  Current DME Used/Available at Home: Walker, rolling  Tub or Shower Type: Tub/Shower combination       Patient seated in w/c upon PT arrival to begin treatment.      Outcome Measures: Vital Signs:   Patient Vitals for the past 8 hrs:   Temp Pulse Resp BP SpO2   02/13/17 0810 99.3 °F (37.4 °C) 67 18 152/82 97 %       Pain level: 5 or 6 out of 10  Pain location: back and R hip/glute  Pain interventions: patient reported that she took pain medication prior to beginning PT, pt educated on log rolling technique for bed mobility    Patient education: gait training and transfer training with RW,  Bed mobility, spinal precautions     Interdisciplinary Communication: collaborated with OT to discuss patient's goals              MMT Initial Asssessment   Right Lower Extremity Left Lower Extremity   Hip Flexion 4- 4-   Knee Extension 4 4   Knee Flexion 4- 4   Ankle Dorsiflexion 4 4+   0/5 No palpable muscle contraction  1/5 Palpable muscle contraction, no joint movement  2-/5 Less than full range of motion in gravity eliminated position  2/5 Able to complete full range of motion in gravity eliminated position  2+/5 Able to initiate movement against gravity  3-/5 More than half but not full range of motion against gravity  3/5 Able to complete full range of motion against gravity  3+/5 Completes full range of motion against gravity with minimal resistance  4-/5 Completes full range of motion against gravity with minimal-moderate resistance  4/5 Completes full range of motion against gravity with moderate resistance  4+/5 Completes full range of motion against gravity with moderate-maximum resistance  5/5 Completes full range of motion against gravity with maximum resistance     AROM: B LEs WFL    FIM SCORES Initial Assessment   Bed/Chair/Wheelchair Transfers 4   Wheelchair Mobility  (NT - primary mode of locomotion is ambulation)   Walking Lewis 2   Steps/Stairs  (NT secondary to fatigue with level ground ambulation)   PRIMARY MODE OF LOCOMOTION: Ambulation  Please see IRC Interdisciplinary Eval: Coordination/Balance Section for details regarding FIM score description. BED/CHAIR/WHEELCHAIR TRANSFERS Initial Assessment   Rolling Right 4 (Contact guard assistance)   Rolling Left 4 (Contact guard assistance)   Supine to Sit 4 (Contact guard assistance)   Sit to Stand Contact guard assistance   Sit to Supine 4 (Minimal assistance)   Transfer Assist Score 4   Transfer Type SPT with walker   Comments Patient able to perform bed mobility with log rolling secondary to spinal precautions   Car Transfer Not tested   Car Type         WHEELCHAIR MOBILITY/MANAGEMENT Initial Assessment   Able to Propel 0 feet   Functional Level  (NT - primary mode of locomotion is ambulation)   Curbs/ramps assistance required 0 (Not tested)   Wheelchair set up assistance required 0 (Not tested)   Wheelchair management         WALKING INDEPENDENCE Initial Assessment   Assistive device Walker, rolling   Ambulation assistance - level surface 4 (Contact guard assistance)   Distance 100 Feet (ft)   Functional Level 2   Comments Patient ambulated with reduced gait speed and upright posture. Patient fatigued following 100 feet of gait training. Ambulation assistance - unlevel surface  (NT secondary to increased fatigue with level ground gait)       STEPS/STAIRS Initial Assessment   Steps/Stairs ambulated 0   Rail Use     Functional Level  (NT secondary to fatigue with level ground ambulation)   Comments     Curbs/Ramps  (NT secondary to fatigue with level ground ambulation)          PHYSICAL THERAPY PLAN OF CARE    Therapy Diagnosis:   Please see table above    Order received from MD for physical therapy services and chart reviewed. Pt to be seen at least 5 times per week for at least 1.5 hours of physical therapy per day for 2 weeks. Thank you for the referral.    LTGs:  LTG 1. Patient transfer supine<>sit with  in 2 weeks  LTG 2. Patient transfer sit<>stand and perform stand pivot transfer with LRAD and modified independence/supervision in 2 weeks  LTG 3.  Patient ambulate 200 feet with LRAD and mod I  in 2 weeks  LTG 4. Patient ambulate up/down 4  steps with bilateral handrails and mod I assist in 2 weeks        Pt would benefit from skilled physical therapy in order to improve independent functional mobility within the home. Interventions may include range of motion (AROM, PROM B LE/trunk), motor function (B LE/trunk strengthening/coordination), activity tolerance (vitals, oxygen saturation levels), bed mobility training, balance activities, gait training (progressive ambulation program), and functional transfer training. Please see IRC; Interdisciplinary Eval, Care Plan, and Patient Education for further information regarding physical therapy examination and plan of care. Patient return to room at end of treatment and remained up in wheelchair with needs in reach.       Nguyen Blank, PT  2/13/2017

## 2017-02-13 NOTE — PROGRESS NOTES
PHYSICAL THERAPY DAILY NOTE   Time In: 6410  Time Out: 1432      Subjective: \"I'm motivated to get better. \" Agreeable to PT       Patient seated in w/c in therapy gym following OT awaiting PT upon PT arrival to begin treatment. Objective:  Vital Signs:   Patient Vitals for the past 8 hrs:   Temp Pulse Resp BP SpO2   02/13/17 0810 99.3 °F (37.4 °C) 67 18 152/82 97 %         Pain level: 7/10   Pain location: Bilateral hips and gluteal region, pain also along left posterior thigh. Pain interventions: Increased rest time between exercises, exercised in position with back supported. Patient education: Reviewed with patient spinal precautions. Interdisciplinary Communication: Consulted with OT about length of stay and patient's progression with ambulation. Patient left in bed with call bell in reach. Spinal, Other (comment) (fall risks)      BED/MAT MOBILITY Daily Assessment   For safety and cueing for following spinal precautions. Rolling Right : 5 (Supervision)  Rolling Left : 4 (Contact guard assistance)  Supine to Sit : 5 (Supervision)  Sit to Supine : 5 (Supervision)       TRANSFERS Daily Assessment   For safety. Transfer Type: SPT with walker  Transfer Assistance : 5 (Supervision/setup)  Sit to Stand Assistance: Contact guard assistance  Car Transfers: Not tested       GAIT Daily Assessment   Pt. increased distance of ambulation in a safe manner and managed rolling walker correctly.  Amount of Assistance: 4 (Contact guard assistance)  Distance (ft): 300 Feet (ft)  Assistive Device: Walker, rolling       BALANCE Daily Assessment    Sitting - Static: Good (unsupported)  Sitting - Dynamic: Good (unsupported)  Standing - Static: Good       LOWER EXTREMITY EXERCISES Daily Assessment    Extremity: Both  Exercise Type #1: Seated lower extremity strengthening  Sets Performed: 2  Reps Performed: 15  Level of Assist: Supervision  Exercise Type #2: Supine lower extremity strengthening  Sets Performed: 1  Reps Performed: 15  Level of Assist: Supervision     SUPINE EXERCISES Sets Reps Comments   Ankle Pumps 1 15    Heel Slides 1 15    Hip Abduction 1 15    Short Arc Quad 1 15      SEATED EXERCISES Sets Reps Comments   Ankle Pumps 2 15    Long Arc Quads 2 15    Hip Adduction/Ball Squeeze 2 15    Hip Abduction with Green Theraband 2 15    Hamstring Curls with Green Theraband 2 15             Assessment: Pt. able to increase distance with ambulation. Pt. continues to experience bouts of pain with supine exercise, pain is managed by PT assisting with altering body position prior to continuation of exercises. Pt. Will benefit from further PT services for increased tolerance to independent household ambulation. Patient returned to room at end of treatment. Patient supine in bed with head of bed elevated and bed rails up x 2. Needs placed in reach of patient. Plan of Care: Continue with POC and progress as tolerated.        Mya Bliss  2/13/2017

## 2017-02-13 NOTE — PROGRESS NOTES
OT Daily Note  Time In 1318   Time Out 1345   From 13:00-13:18; this writer transported patient via w/c to room 835 to visit with spouse; waited outside of 835 and transported patient back to 9th floor for therapy. Subjective: \"You are really working my arms. \" Agreeable to therapy. Pain: Patient did not report pain during session. Education: wheelchair parts management, medication management, UE strengthening exercises  Interdisciplinary Communication: handoff to Carrie Vallejo PT. Precautions: Spinal, Other (comment) (falls risk)    Self-Care Daily Assessment   Patient completed simulated medication management therapeutic activity; patient able to independently setup a weekly pillbox of 6 medications. Patient completed without A; not indicated to reassess. Strengthening Daily Assessment   While wearing B 1.5 lb wrist weights, patient utilized horizontal pipe tree x 10 reps x 10 sets on B UE for UE strengthening and overall activity tolerance. Patient required increased time to complete due to frequent rest breaks due to decreased UE strengthening. Continue therapeutic exercise to facilitate UE strengthening to increase independence and safety with self care tasks and functional transfers. Assessment: Patient tolerated session well, but patient is still limited by decreased activity tolerance, standing balance and BUE strength which requires skilled facilitation to successfully and safely complete ADL's and transfers. Plan: Continue OT POC. Patient ended session in w/c with Carrie Vallejo PT.      [employer], OTR/L  2/13/2017

## 2017-02-14 PROCEDURE — 99232 SBSQ HOSP IP/OBS MODERATE 35: CPT | Performed by: PHYSICAL MEDICINE & REHABILITATION

## 2017-02-14 PROCEDURE — 97110 THERAPEUTIC EXERCISES: CPT

## 2017-02-14 PROCEDURE — 97150 GROUP THERAPEUTIC PROCEDURES: CPT

## 2017-02-14 PROCEDURE — 97530 THERAPEUTIC ACTIVITIES: CPT

## 2017-02-14 PROCEDURE — 97535 SELF CARE MNGMENT TRAINING: CPT

## 2017-02-14 PROCEDURE — 65310000000 HC RM PRIVATE REHAB

## 2017-02-14 PROCEDURE — 74011250637 HC RX REV CODE- 250/637: Performed by: PHYSICAL MEDICINE & REHABILITATION

## 2017-02-14 PROCEDURE — 97116 GAIT TRAINING THERAPY: CPT

## 2017-02-14 PROCEDURE — 74011250636 HC RX REV CODE- 250/636: Performed by: PHYSICAL MEDICINE & REHABILITATION

## 2017-02-14 RX ORDER — TRAMADOL HYDROCHLORIDE 50 MG/1
50 TABLET ORAL
Status: DISCONTINUED | OUTPATIENT
Start: 2017-02-14 | End: 2017-02-17

## 2017-02-14 RX ORDER — HEPARIN SODIUM 5000 [USP'U]/ML
5000 INJECTION, SOLUTION INTRAVENOUS; SUBCUTANEOUS EVERY 8 HOURS
Status: DISCONTINUED | OUTPATIENT
Start: 2017-02-14 | End: 2017-02-21 | Stop reason: HOSPADM

## 2017-02-14 RX ORDER — LOPERAMIDE HYDROCHLORIDE 2 MG/1
2 CAPSULE ORAL
Status: DISCONTINUED | OUTPATIENT
Start: 2017-02-14 | End: 2017-02-21 | Stop reason: HOSPADM

## 2017-02-14 RX ORDER — PANTOPRAZOLE SODIUM 40 MG/1
40 TABLET, DELAYED RELEASE ORAL
Status: DISCONTINUED | OUTPATIENT
Start: 2017-02-15 | End: 2017-02-21 | Stop reason: HOSPADM

## 2017-02-14 RX ADMIN — VALSARTAN 160 MG: 80 TABLET, FILM COATED ORAL at 08:38

## 2017-02-14 RX ADMIN — GABAPENTIN 300 MG: 300 CAPSULE ORAL at 22:01

## 2017-02-14 RX ADMIN — SERTRALINE HYDROCHLORIDE 75 MG: 50 TABLET ORAL at 08:38

## 2017-02-14 RX ADMIN — POTASSIUM CHLORIDE 20 MEQ: 20 TABLET, EXTENDED RELEASE ORAL at 08:38

## 2017-02-14 RX ADMIN — TRAMADOL HYDROCHLORIDE 50 MG: 50 TABLET, FILM COATED ORAL at 15:37

## 2017-02-14 RX ADMIN — ACETAMINOPHEN 650 MG: 325 TABLET, FILM COATED ORAL at 08:37

## 2017-02-14 RX ADMIN — ATORVASTATIN CALCIUM 20 MG: 10 TABLET, FILM COATED ORAL at 22:00

## 2017-02-14 RX ADMIN — GABAPENTIN 300 MG: 300 CAPSULE ORAL at 05:43

## 2017-02-14 RX ADMIN — ACETAMINOPHEN 650 MG: 325 TABLET, FILM COATED ORAL at 22:04

## 2017-02-14 RX ADMIN — HEPARIN SODIUM 5000 UNITS: 5000 INJECTION, SOLUTION INTRAVENOUS; SUBCUTANEOUS at 23:11

## 2017-02-14 RX ADMIN — HEPARIN SODIUM 5000 UNITS: 5000 INJECTION, SOLUTION INTRAVENOUS; SUBCUTANEOUS at 17:39

## 2017-02-14 RX ADMIN — ACETAMINOPHEN 650 MG: 325 TABLET, FILM COATED ORAL at 15:02

## 2017-02-14 RX ADMIN — ATENOLOL 12.5 MG: 25 TABLET ORAL at 22:01

## 2017-02-14 RX ADMIN — FUROSEMIDE 40 MG: 40 TABLET ORAL at 08:38

## 2017-02-14 RX ADMIN — CYCLOBENZAPRINE HYDROCHLORIDE 10 MG: 10 TABLET, FILM COATED ORAL at 23:15

## 2017-02-14 RX ADMIN — TRAMADOL HYDROCHLORIDE 50 MG: 50 TABLET, FILM COATED ORAL at 22:00

## 2017-02-14 RX ADMIN — ACETAMINOPHEN 650 MG: 325 TABLET, FILM COATED ORAL at 05:43

## 2017-02-14 RX ADMIN — GABAPENTIN 300 MG: 300 CAPSULE ORAL at 15:02

## 2017-02-14 RX ADMIN — HEPARIN SODIUM 5000 UNITS: 5000 INJECTION, SOLUTION INTRAVENOUS; SUBCUTANEOUS at 08:39

## 2017-02-14 NOTE — PROGRESS NOTES
Juliana Vaughn MD,   Medical Director  [facility], [address] [facility]  Tel: [phone]       SFD PROGRESS NOTE    Tori Cargo Date: 2/11/2017  Admit Diagnosis: trauna to spinal core;Spinal stenosis of lumbar region at m*    Subjective     Very large BM yesterday. Feels much better. Pain is well controlled. Appetite poor per pt. Spirits good. Got to go visit  on 8th floor yesterday.     Objective:     Current Facility-Administered Medications   Medication Dose Route Frequency    polyethylene glycol (MIRALAX) packet 17 g  17 g Oral DAILY    sodium phosphate (FLEET'S) enema 118 mL  1 Enema Rectal PRN    acetaminophen (TYLENOL) tablet 650 mg  650 mg Oral Q6H    cyclobenzaprine (FLEXERIL) tablet 10 mg  10 mg Oral TID PRN    docusate sodium (COLACE) capsule 100 mg  100 mg Oral BID    famotidine (PEPCID) tablet 20 mg  20 mg Oral Q24H    naloxone (NARCAN) injection 0.4 mg  0.4 mg IntraVENous EVERY 2 MINUTES AS NEEDED    oxyCODONE IR (ROXICODONE) tablet 5-10 mg  5-10 mg Oral Q4H PRN    zolpidem (AMBIEN) tablet 5 mg  5 mg Oral QHS PRN    albuterol (PROVENTIL HFA, VENTOLIN HFA, PROAIR HFA) inhaler 2 Puff  2 Puff Inhalation Q6H PRN    albuterol-ipratropium (DUO-NEB) 2.5 MG-0.5 MG/3 ML  3 mL Nebulization Q6H PRN    atenolol (TENORMIN) tablet 12.5 mg  12.5 mg Oral QHS    atorvastatin (LIPITOR) tablet 20 mg  20 mg Oral QHS    bisacodyl (DULCOLAX) suppository 10 mg  10 mg Rectal DAILY PRN    furosemide (LASIX) tablet 40 mg  40 mg Oral DAILY    gabapentin (NEURONTIN) capsule 300 mg  300 mg Oral TID    nitroglycerin (NITROSTAT) tablet 0.4 mg  0.4 mg SubLINGual Q5MIN PRN    potassium chloride (K-DUR, KLOR-CON) SR tablet 20 mEq  20 mEq Oral DAILY    senna (SENOKOT) tablet 8.6 mg  1 Tab Oral DAILY    sertraline (ZOLOFT) tablet 75 mg  75 mg Oral DAILY    valsartan (DIOVAN) tablet 160 mg  160 mg Oral DAILY     Review of Systems:Denies chest pain, shortness of breath, cough, headache, visual problems, abdominal pain, dysurea, calf pain. Pertinent positives are as noted in the medical records and unremarkable otherwise. Visit Vitals    /73 (BP 1 Location: Right arm, BP Patient Position: At rest)    Pulse 84    Temp 98.3 °F (36.8 °C)    Resp 16    SpO2 95%    Breastfeeding No        Physical Exam:   General: Alert and age appropriately oriented. No acute cardio respiratory distress. HEENT: Normocephalic,no scleral icterus  Oral mucosa moist without cyanosis   Lungs: Clear to auscultation  bilaterally. Respiration even and unlabored   Heart: Regular rate and rhythm, S1, S2   No  murmurs, clicks, rub or gallops   Abdomen: Soft, non-tender, nondistended. Bowel sounds present. No organomegaly. Genitourinary: Benign . Neuromuscular:      Grossly no focal motor deficits noted. Moves ankles. Ankle dorsiflexion 5/5  Ankle plantarflexion 5/5  No sensory deficits distally. Exam limited by pain. Skin/extremity: No rashes, no erythema. No calf tenderness BLE  Wound covered.                                                                             Functional Assessment:          Balance  Sitting - Static: Good (unsupported) (02/13/17 1500)  Sitting - Dynamic: Good (unsupported) (02/13/17 1500)  Standing - Static: Good (02/13/17 1500)                     Joel Serrato Fall Risk Assessment:  Joel Serrato Fall Risk  Mobility: Ambulates or transfers with assist devices or assistance/unsteady gait (02/13/17 2130)  Mentation: Alert, oriented x 3 (02/13/17 2130)  Medication: Patient receiving anticonvulsants, sedatives(tranquilizers), psychotropics or hypnotics, hypoglycemics, narcotics, sleep aids, antihypertensives, laxatives, or diuretics (02/13/17 2130)  Elimination: Needs assistance with toileting (02/13/17 2130)  Fall History in the Past 3 Months: Before admission (02/13/17 2130)  Total Score: 4 (02/13/17 2130)     Speech Assessment: Ambulation:  Gait  Distance (ft): 300 Feet (ft) (02/13/17 1500)  Assistive Device: Walker, rolling (02/13/17 1500)     Labs/Studies:  Recent Results (from the past 72 hour(s))   CBC W/O DIFF    Collection Time: 02/12/17  7:50 AM   Result Value Ref Range    WBC 6.3 4.3 - 11.1 K/uL    RBC 3.58 (L) 4.05 - 5.25 M/uL    HGB 10.0 (L) 11.7 - 15.4 g/dL    HCT 32.7 (L) 35.8 - 46.3 %    MCV 91.3 79.6 - 97.8 FL    MCH 27.9 26.1 - 32.9 PG    MCHC 30.6 (L) 31.4 - 35.0 g/dL    RDW 13.6 11.9 - 14.6 %    PLATELET 368 572 - 004 K/uL    MPV 10.5 (L) 10.8 - 14.1 FL   MAGNESIUM    Collection Time: 02/12/17  7:50 AM   Result Value Ref Range    Magnesium 2.4 1.8 - 2.4 mg/dL       Assessment:     Problem List as of 2/14/2017  Date Reviewed: 10/5/2016          Codes Class Noted - Resolved    * (Principal)S/P laminectomy with spinal fusion ICD-10-CM: Z98.1  ICD-9-CM: V45.4  2/11/2017 - Present        Spinal stenosis of lumbar region at multiple levels ICD-10-CM: M48.06  ICD-9-CM: 724.02  2/11/2017 - Present        Lumbar stenosis with neurogenic claudication ICD-10-CM: M48.06  ICD-9-CM: 724.03  2/7/2017 - Present        Asymptomatic carotid artery stenosis (Chronic) ICD-10-CM: I65.29  ICD-9-CM: 433.10  11/4/2016 - Present        Valvular heart disease (Chronic) ICD-10-CM: I38  ICD-9-CM: 424.90  11/4/2016 - Present        Coronary artery disease involving native coronary artery of native heart without angina pectoris (Chronic) ICD-10-CM: I25.10  ICD-9-CM: 414.01  11/4/2016 - Present        On potassium wasting diuretic therapy (Chronic) ICD-10-CM: P05.140  ICD-9-CM: V58.69  11/4/2016 - Present        Obesity (BMI 30.0-34.9) (Chronic) ICD-10-CM: E66.9  ICD-9-CM: 278.00  11/4/2016 - Present        Mixed hyperlipidemia (Chronic) ICD-10-CM: J02.2  ICD-9-CM: 272.2  4/21/2016 - Present        RENEE (stress urinary incontinence, female) (Chronic) ICD-10-CM: N39.3  ICD-9-CM: 625.6  2/24/2016 - Present        Rectocele ICD-10-CM: N81.6  ICD-9-CM: 618.04  2/24/2016 - Present        Weakening of rectovaginal tissue ICD-10-CM: N81.83  ICD-9-CM: 618.82  2/24/2016 - Present        Depression (Chronic) ICD-10-CM: F32.9  ICD-9-CM: 162  6/30/2015 - Present        Contact dermatitis and other eczema, due to unspecified cause ICD-10-CM: L25.9  ICD-9-CM: 692.9  1/7/2015 - Present        Essential hypertension (Chronic) ICD-10-CM: I10  ICD-9-CM: 401.9  1/7/2015 - Present        Other and unspecified hyperlipidemia ICD-10-CM: E78.5  ICD-9-CM: 272.4  1/7/2015 - Present        Mitral valve disorders ICD-10-CM: I05.9  ICD-9-CM: 424.0  1/7/2015 - Present        Hematuria, unspecified ICD-10-CM: R31.9  ICD-9-CM: 599.70  1/7/2015 - Present        Dysuria ICD-10-CM: R30.0  ICD-9-CM: 788.1  1/7/2015 - Present        Anemia, unspecified ICD-10-CM: D64.9  ICD-9-CM: 285.9  1/7/2015 - Present        Other B-complex deficiencies ICD-10-CM: E53.8  ICD-9-CM: 266.2  1/7/2015 - Present        Arthropathies (Chronic) ICD-10-CM: M12.9  ICD-9-CM: 716.90  1/7/2015 - Present    Overview Signed 1/7/2015 10:11 AM by So Pelayo     Multiple sites             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  ICD-9-CM: 756.19  1/7/2015 - Present        Other malaise and fatigue ICD-10-CM: R53.81, R53.83  ICD-9-CM: 780.79  1/7/2015 - Present        Allergic rhinitis, cause unspecified ICD-10-CM: J30.9  ICD-9-CM: 477.9  1/7/2015 - Present        Aortic valve disorders ICD-10-CM: I35.9  ICD-9-CM: 424.1  1/7/2015 - Present        Extrinsic asthma, unspecified (Chronic) ICD-10-CM: J45.909  ICD-9-CM: 493.00  1/7/2015 - Present        Respiratory abnormality, unspecified ICD-10-CM: J98.9  ICD-9-CM: 786.00  1/7/2015 - Present        Congestive heart failure, unspecified ICD-10-CM: I50.9  ICD-9-CM: 428.0  1/7/2015 - Present        Occlusion and stenosis of carotid artery without mention of cerebral infarction ICD-10-CM: I65.29  ICD-9-CM: 433.10  1/7/2015 - Present        Carpal tunnel syndrome ICD-10-CM: G56.00  ICD-9-CM: 354.0  1/7/2015 - Present        Unspecified urinary incontinence ICD-10-CM: R32  ICD-9-CM: 788.30  1/7/2015 - Present        Carotid stenosis ICD-10-CM: I65.29  ICD-9-CM: 433.10  1/7/2015 - Present        Renal insufficiency ICD-10-CM: N28.9  ICD-9-CM: 593.9  1/7/2015 - Present        Heart disease, unspecified ICD-10-CM: I51.9  ICD-9-CM: 429.9  1/7/2015 - Present        Shortness of breath ICD-10-CM: R06.02  ICD-9-CM: 786.05  1/7/2015 - Present        Osteopenia ICD-10-CM: M85.80  ICD-9-CM: 733.90  1/7/2015 - Present        Other chest pain ICD-10-CM: R07.89  ICD-9-CM: 786.59  3/7/2011 - Present        GERD (gastroesophageal reflux disease) (Chronic) ICD-10-CM: K21.9  ICD-9-CM: 530.81  3/4/2011 - Present        Asthma (Chronic) ICD-10-CM: J45.909  ICD-9-CM: 493.90  3/4/2011 - Present               Plan:   Assessment: This is an 66 YOF with PMH of debilitatin lumbar spondylolisthesis/stenosis with neurogenic claudication, is s/p L4-S1 lumbar laminectomy, L4-5 fusion and instrumentation. Patient admitted to IRU to continue acute medical rehab program.      Continue daily physician medical management:  s/p L4-S1 lumbar laminectomy, L4-5 fusion and instrumentation.   - lumbar spine precautions. TLFO when out of bed.  - continue to monitor wound for infection, bleeding, wound dehiscence.       Elevated creatinine/ Renal insufficiency- monitor; check BMP 2/14      Post op acute blood loss anemia - monitor; h/h is stable      Hx of CHF -continued on lasix, KCl      CAD (coronary artery disease)      Hypertension - continue diovan, atenolol. adjust as needed.       PUD (peptic ulcer disease)- continue pepcid.       Pneumonia prophylaxis- Insentive spirometer every hour while awake      DVT risk / DVT Prophylaxis- Will require daily physician exam to assess for signs and symptoms as patient is at increased risk for of thromboembolism. Mobilization as tolerated.  Intermittent pneumatic compression devices when in bed Thigh-high or knee-high thromboembolic deterrent hose when out of bed. Holding anticoagulation until ok per spine ortho. Pt POD #7, will start sc Hep      Pain Control: stable, mild-to-moderate joint symptoms intermittently, reasonably well controlled by PRN meds. Will require regular pain assessment and comprenhensive pain management. Continue scheduled tylenol. Continued on roxicodone, neurontin. . Prn flexeril.       Wound Care: Monitor wound status daily per staff and physician. At risk for failure. Will require 24/7 rehab nursing. Keep wound clean and dry.      Potential urinary retention/ neurogenic bladder - schedule voids q6-8 hrs. Check post-void residual every shift; In and Out catheterize if post-void residual is more than 400 cc.; 2/14 no residuals      bowel program - senna, colace. Monitor. 2/13 reports no Bm for over a week. Add Lactulose and miralax. Needs bm ASAP. No evidence clinically of ileus      GERD - resume pepcid.             Time spent was 25 minutes with over 1/2 in direct patient care/examination, consultation and coordination of care.      Signed By: Luis Fernando Choi MD     February 14, 2017

## 2017-02-14 NOTE — PROGRESS NOTES
OT Daily Note  Time In 1116   Time Out 1157     Subjective: Patient reports doing fine; agreeable to therapy. Pain: Patient did not report pain during session. Education: wheelchair parts management, transfer training  Interdisciplinary Communication: handoff from Dahiana Doran, [id number]  [facility]. Precautions: Spinal, Other (comment) (falls risk)    Strengthening Daily Assessment   Using 5 lb aileen, patient completed 3 sets x 20 reps of I, O and V patterns with rest breaks between all sets. Patient required frequent rest breaks due to UE strengthening. Cognition Daily Assessment   Patient completed simple table top problem solving tasks; patient completed without cues or extra time. No issues noted. Self-Care Daily Assessment   Self Care Task: Toileting  Level of Assist: 4 (Minimal assistance) (CGA)  Adaptive Equipment: Grab bars        Assessment: Patient tolerated session well, but patient is still limited by decreased activity tolerance, dynamic standing balance and BUE strength which requires skilled facilitation to successfully and safely complete ADL's and transfers. Plan: Continue OT POC. Patient ended session in recliner with call remote and phone within reach.      [employer], OTR/L  2/14/2017

## 2017-02-14 NOTE — PROGRESS NOTES
PHYSICAL THERAPY DAILY NOTE  Time In: 1000  Time Out: 1046  Patient Seen For: AM;Balance activities;Gait training;Patient education; Therapeutic exercise;Transfer training    Subjective: Pt agreeable to PT. Stated that she has been nauseated and did not eat much breakfast this morning. Denies pain this AM.       Patient seated in w/c in therapy gym upon PT arrival to begin treatment. Objective: Vital Signs:   Patient Vitals for the past 8 hrs:   Temp Pulse Resp BP SpO2   02/14/17 0748 - 68 18 - -   02/14/17 0732 98.3 °F (36.8 °C) 84 16 139/73 95 %       Pain level: 0 out of 10  Pain location: n/a  Pain interventions: n/a    Patient education: gait safety when using RW, hand placement for transfers, performance of therapeutic exercises    Interdisciplinary Communication: collaborated with LASHANDA Villarreal to hand patient off to begin PT          Spinal, Other (comment) (fall risks)  GROSS ASSESSMENT Daily Assessment            BED/MAT MOBILITY Daily Assessment    Rolling Right : 0 (Not tested)  Rolling Left : 0 (Not tested)  Supine to Sit : 0 (Not tested)  Sit to Supine : 0 (Not tested)       TRANSFERS Daily Assessment    Transfer Type: SPT with walker  Transfer Assistance : 5 (Supervision/setup)  Sit to Stand Assistance: Contact guard assistance  Car Transfers: Not tested       GAIT Daily Assessment   Pt turned to look over left shoulder while conversating during ambulation, and experienced loss of balance which required physical assistance to recover from in order to prevent fall.  Amount of Assistance: 4 (Contact guard assistance)  Distance (ft): 150 Feet (ft)  Assistive Device: Walker, rolling       STEPS or STAIRS Daily Assessment    Steps/Stairs Ambulated (#): 0  Level of Assist : 0 (Not tested)       BALANCE Daily Assessment    Sitting - Static: Good (unsupported)  Sitting - Dynamic: Good (unsupported)  Standing - Static: Fair  Standing - Dynamic : Impaired       WHEELCHAIR MOBILITY Daily Assessment    Able to Propel (ft): 0 feet  Curbs/Ramps Assist Required (FIM Score): 0 (Not tested)  Wheelchair Setup Assist Required : 0 (Not tested)     LOWER EXTREMITY EXERCISES Daily Assessment    Extremity: Both  Exercise Type #1: Seated lower extremity strengthening  Sets Performed: 2  Reps Performed: 15  Level of Assist: Supervision  Exercise Type #2: Other (comment) ([employer] x 10 minutes)  Level of Assist: Supervision     SEATED EXERCISES Sets Reps Comments   Ankle Pumps 1 20    Hip Flexion 2 15    Long Arc Quads 2 15 3# cuff weights on each LE   Hip Adduction/Ball Squeeze 2 20    Hip Abduction with Blue Theraband 2 20    Hamstring Curls with Blue Theraband 2 15    Hip Extension with Blue Theraband 2 10             Assessment: Patient limited by fatigue and nausea this AM. Patient unable to ambulate equal distance as at PM treatment on 2/13/17, and required assistance to recover from stagger while ambulating with PT. Patient handed off to [facility] with patient seated in w/c in therapy gym. Plan of Care: Continue PT for continued progress.     Eden Lord, PT  2/14/2017

## 2017-02-14 NOTE — PROGRESS NOTES
02/14/17 0834   Time Spent With Patient   Time In 0834   Time Out 1001   Patient Seen For: AM;ADLs;Other (see progress notes)   Grooming   Grooming Assistance  S   Comments setup to brush teeth   Upper Body Bathing   Bathing Assistance, Upper S   Position Performed Other (comment)  (seated on tub transfer bench)   Adaptive Equipment Tub bench   Comments setup   Lower Body Bathing   Bathing Assistance, Lower  SBA   Adaptive Equipment Grab bar;Long handled sponge   Position Performed Standing; Other (comment)  (seated on tub transfer bench)   Adaptive Equipment Tub bench   Comments SBA with patient in stance for pericare   Toileting   Toileting Assistance (FIM Score) Min A   Cues Other (comment)  (assist with hygiene for thoroughness)   Adaptive Equipment Grab bars   Upper Body [place] opening bra   Comments setup   Lower Body Dressing    Dressing Assistance  CGA   Leg Crossed Method Used No   Position Performed Seated edge of bed;Standing   Adaptive Equipment Used Long handled shoe horn;Reacher;Sock aid; Walker   Don/Doff Anti-Embolic Stockings NA   Comments CGA while standing for clothing management   Functional Transfers   Toilet Transfer  Stand pivot transfer with walker;Grab bars   Amount of Assistance Required CGA   Tub or Shower Type Shower   Amount of Assistance Required CGA   Adaptive Equipment Grab bars; Tub transfer bench;Walker (comment)     S: Patient reports doing fine; Agreeable to therapy. Patient sitting EOB at start of session; performed sit<>stand from EOB with R/W with cue for hand placement with CGA; ambulated into bathroom with cues to not bear weight thru BUE on R/W; patient reported intermittent pain in pectoralis/chest region while ambulating due to increased weight bearing. Focus of session was on increasing independence and safety with self care tasks and functional transfers (see FIM scores above).   While in shower, patient reported urgency for BM; with patient standing in shower; replaced tub transfer bench with BSC; patient continent of BM and reported that she had gone several times in the last 24 hours due to laxatives. Patient did not report pain during session. Transported patient via w/c to rehab gym; patient utilized UBE x 2 sets x 5 minutes in B directions with minimal resistance with a 1 minute rest break between sets. Patient completed sustained/alternating attention task [facility] for sequencing 1-60) modified to increase complexity; patient completed without cueing while wearing B 1.5 lb wrist weights for UE strengthening. Collaborated with PT and confirmed patient is on track to reach goals as documented in the care plan. Patient tolerated session well, but activity tolerance, standing balance and BUE strength are still below baseline and requires skilled facilitation to successfully and safely complete ADL's and transfers. Patient ended session in w/c with Marilia Montana, ALEXI.       [employer], OTR/L  2/14/2017

## 2017-02-14 NOTE — PROGRESS NOTES
Subjective \"I want to get to where I can take care of myself. \"   Activity Evaluation   Strength/Endurance Patient was with no c/o tiredness or fatigue during the session. She was active prior admit. Balance Sit<->stand:  CGA with RW   Social Interaction Patient was sociable and friendly during the session. This therapist took her to visit her  on the 8th floor. Cognitive A&O X4   Comments Patient was handed off to Carlos Garcia at the end of the session. Patient's Recreational Therapy evaluation completed under the [facility] navigation tool on 2/14/17. Please see for specifics regarding plan of care and goals. Patient prefers to be called \"Pat. \" Thank you for the referral.  Adrien Yen, MIGNONS

## 2017-02-15 PROCEDURE — 74011250636 HC RX REV CODE- 250/636: Performed by: PHYSICAL MEDICINE & REHABILITATION

## 2017-02-15 PROCEDURE — 97530 THERAPEUTIC ACTIVITIES: CPT

## 2017-02-15 PROCEDURE — 74011250637 HC RX REV CODE- 250/637: Performed by: PHYSICAL MEDICINE & REHABILITATION

## 2017-02-15 PROCEDURE — 65310000000 HC RM PRIVATE REHAB

## 2017-02-15 PROCEDURE — 97116 GAIT TRAINING THERAPY: CPT

## 2017-02-15 PROCEDURE — 97535 SELF CARE MNGMENT TRAINING: CPT

## 2017-02-15 PROCEDURE — 97110 THERAPEUTIC EXERCISES: CPT

## 2017-02-15 PROCEDURE — 99232 SBSQ HOSP IP/OBS MODERATE 35: CPT | Performed by: PHYSICAL MEDICINE & REHABILITATION

## 2017-02-15 RX ADMIN — VALSARTAN 160 MG: 80 TABLET, FILM COATED ORAL at 08:30

## 2017-02-15 RX ADMIN — GABAPENTIN 300 MG: 300 CAPSULE ORAL at 20:00

## 2017-02-15 RX ADMIN — SERTRALINE HYDROCHLORIDE 75 MG: 50 TABLET ORAL at 08:31

## 2017-02-15 RX ADMIN — POTASSIUM CHLORIDE 20 MEQ: 20 TABLET, EXTENDED RELEASE ORAL at 08:33

## 2017-02-15 RX ADMIN — HEPARIN SODIUM 5000 UNITS: 5000 INJECTION, SOLUTION INTRAVENOUS; SUBCUTANEOUS at 22:55

## 2017-02-15 RX ADMIN — GABAPENTIN 300 MG: 300 CAPSULE ORAL at 06:35

## 2017-02-15 RX ADMIN — ATORVASTATIN CALCIUM 20 MG: 10 TABLET, FILM COATED ORAL at 20:00

## 2017-02-15 RX ADMIN — OXYCODONE HYDROCHLORIDE 10 MG: 5 TABLET ORAL at 10:47

## 2017-02-15 RX ADMIN — ACETAMINOPHEN 650 MG: 325 TABLET, FILM COATED ORAL at 14:40

## 2017-02-15 RX ADMIN — TRAMADOL HYDROCHLORIDE 50 MG: 50 TABLET, FILM COATED ORAL at 06:34

## 2017-02-15 RX ADMIN — ACETAMINOPHEN 650 MG: 325 TABLET, FILM COATED ORAL at 20:02

## 2017-02-15 RX ADMIN — HEPARIN SODIUM 5000 UNITS: 5000 INJECTION, SOLUTION INTRAVENOUS; SUBCUTANEOUS at 16:56

## 2017-02-15 RX ADMIN — FUROSEMIDE 40 MG: 40 TABLET ORAL at 08:33

## 2017-02-15 RX ADMIN — ACETAMINOPHEN 650 MG: 325 TABLET, FILM COATED ORAL at 03:00

## 2017-02-15 RX ADMIN — TRAMADOL HYDROCHLORIDE 50 MG: 50 TABLET, FILM COATED ORAL at 22:50

## 2017-02-15 RX ADMIN — ATENOLOL 12.5 MG: 25 TABLET ORAL at 22:00

## 2017-02-15 RX ADMIN — GABAPENTIN 300 MG: 300 CAPSULE ORAL at 13:21

## 2017-02-15 RX ADMIN — ACETAMINOPHEN 650 MG: 325 TABLET, FILM COATED ORAL at 08:32

## 2017-02-15 RX ADMIN — CYCLOBENZAPRINE HYDROCHLORIDE 10 MG: 10 TABLET, FILM COATED ORAL at 22:50

## 2017-02-15 RX ADMIN — HEPARIN SODIUM 5000 UNITS: 5000 INJECTION, SOLUTION INTRAVENOUS; SUBCUTANEOUS at 08:25

## 2017-02-15 RX ADMIN — OXYCODONE HYDROCHLORIDE 10 MG: 5 TABLET ORAL at 20:00

## 2017-02-15 RX ADMIN — PANTOPRAZOLE SODIUM 40 MG: 40 TABLET, DELAYED RELEASE ORAL at 06:40

## 2017-02-15 NOTE — PROGRESS NOTES
PHYSICAL THERAPY DAILY NOTE   Time in: 1303  Time out: 1346    Subjective: \"I'm feeling better than this morning, but still a little nauseous. \" Pt. agreeable to therapy. Objective:    Pain level: 5/10  Pain location: right posterior hip while lying supine  Pain interventions: Added pillow under bottom while performing supine exercises which caused pain to subside. Patient education: Pt. educated on safety with transfers and holding stable surface rather than rolling walker to rise to standing when performing sit to stand. Interdisciplinary Communication: Consulted with RN about pt's pain medication schedule. Spinal, Other (comment) (fall risks)  GROSS ASSESSMENT Daily Assessment            BED/MAT MOBILITY Daily Assessment   For Safety purposes. Rolling Right: CGA  Rolling Left: CGA  Supine to Sit: CGA  Sit to Supine: [facility]       TRANSFERS Daily Assessment   For safety purposes. Transfer Type: SPT with walker  Transfer Assistance: [facility]  Sit to Stand Assistance: [facility]       GAIT Daily Assessment   Pt. ambulated 100 ft CGA w/ rolling walker then requested a rest break. Pt. rested in wheelchair for 2 minutes then proceeded to walk another 100 ft before requesting final rest break.    Amount of Assitance: [facility]  Distance: 200 ft  Assistive Device: Walker, rolling        BALANCE Daily Assessment     Sitting-Static: Good  Sitting-Dynamic: Good  Standing-Static: Good         LOWER EXTREMITY EXERCISES Daily Assessment     Extremity: Both  Supine Lower Extremity strengthening exercises, Sets: 1  Reps: 15  Seated Lower Extremity strengthening exercises, Sets: 2  Reps: 15  Level of Assist: Supervision     SUPINE EXERCISES Sets Reps Comments   Ankle Pumps 1 15    Hip Abduction 1 15    Short Arc Quad 1 15    Glut sets 1 15      SEATED EXERCISES Sets Reps Comments   Ankle Pumps 2 15    Long Arc Quads 2 15    Hip Adduction/Ball Squeeze 2 15    Hip Abduction with Green Theraband 2 15    Hamstring Curls with Robin Quigley Lior 2 15          Pt. Was received from room sitting in bedside chair and returned to room upon completion of therapy and left supine in bed with call bell and needs in reach. Assessment: Pt. Intermittently demonstrated poor safety awareness with transfers. Pt. Attempted two times to perform sit-to-stand while holding rolling walker, PT corrected pt.'s approach and pt. Was able to perform transfer successfully and verbalized understanding of safety precaution. Pt. Shows improvement with ambulation and standing tolerance. Pt. Will benefit from further therapy to increase safety awareness and improve tolerance of household ambulation. Plan of Care: Continue with POC and progress as tolerated.        Grant Stevens  2/15/2017

## 2017-02-15 NOTE — PROGRESS NOTES
PHYSICAL THERAPY DAILY NOTE  Time In: 0938  Time Out: 4147  Patient Seen For: Gait training; Therapeutic exercise    Subjective: \"I didn't sleep well last night. \" Pt. agreeable to therapy. Treatment performed by Naila Scherer SPT with Dakotah Guo, PT supervising. Objective:Vital Signs: not assessed this PM by PT        Pain level: 4/10  Pain location: Lumbar spine and bilateral posterior hips. Pain interventions: Pt. Reported mild pain towards the end of ambulation which resulted in sitting in wheelchair and ceasing ambulation for this day. Pain subsided with sitting and resting. Patient education: Pt. Was educated on standing hip exercises that will strengthen hips and allow for more stability with household ambulation. Interdisciplinary Communication: Consulted with RN about pt's pain medication schedule. Pt. Received from room lying supine in bed, and returned upon completion of therapy session to her room and lying supine. Pt. Left supine with all needs in reach. Spinal, Other (comment) (fall risks)      BED/MAT MOBILITY Daily Assessment   For safety. Rolling Right : 5 (Stand-by assistance)  Rolling Left : 0 (Not tested)  Supine to Sit : 5 (Stand-by assistance)  Sit to Supine : 4 (Minimal assistance)       TRANSFERS Daily Assessment   For safety. Transfer Type: SPT with walker  Other: s  Transfer Assistance : 4 (Contact guard assistance)  Sit to Stand Assistance: Contact guard assistance  Car Transfers: Not tested       GAIT Daily Assessment   Pt. Able to ascend/descend first level of ramp in gym to prepare for home entrance. Amount of Assistance: 4 (Contact guard assistance)  Distance (ft): 175 Feet (ft)  Assistive Device: Walker, rolling       STEPS or STAIRS Daily Assessment    Steps/Stairs Ambulated (#): 0  Level of Assist : 0 (Not tested)       BALANCE Daily Assessment   Pt. Demonstrated good balance with all transfers and gait training.  Sitting - Static: Good (unsupported)  Sitting - Dynamic: Good (unsupported)  Standing - Static: Good         WHEELCHAIR MOBILITY Daily Assessment    Curbs/Ramps Assist Required (FIM Score): 0 (Not tested)  Wheelchair Setup Assist Required : 0 (Not tested)       LOWER EXTREMITY EXERCISES Daily Assessment   Pt. Performed side-stepping in parallel bars 4x each way. Extremity: Both  Exercise Type #1: Seated lower extremity strengthening  Sets Performed: 2  Reps Performed: 15  Level of Assist: Modified independent  Exercise Type #2: Standing lower extremity strengthening  Sets Performed: 4  Level of Assist: Contact guard assistance     SEATED EXERCISES Sets Reps Comments   Ankle Pumps 2 15    Long Arc Quads 2 15    Hip Adduction/Ball Squeeze 2 15    Hip Abduction with Green Theraband 2 15    Hamstring Curls with Green Theraband 2 15           Assessment: Pt. demonstrated improved tolerance to exercise this PM. Pt. had no complaints of pain with exercise in sitting this PM. Pt. tolerated side-stepping well and verbalized understanding of the benefits of strengthening of stabilization musculature. Pt. will benefit from further therapy to improve independence of household ambulation. Patient returned to room at end of treatment. Patient supine in bed with head of bed elevated and bed rails up x 2. Needs placed in reach of patient. Plan of Care: Continue with POC and progress as tolerated.        Divina Graves  2/15/2017

## 2017-02-15 NOTE — PROGRESS NOTES
Bedside shift change report given to oncoming nurse, Otoniel Carlson RN. Pt resting in bed. Assessment unchanged. Pt received Ultram 50mg po this afternoon for pain \"7\" that decreased it to a \"1\". Pt reported having 3 or 4 loose stools today. Miralax, senokot and colace were held. Pt has prn order for Imodium if needed. Dressing was changed to lower back incision today and incision line is well approximated with steristrips intact, no redness or drainage. Pt complains of some indigestion, pepcid was changed to protonix.

## 2017-02-15 NOTE — PROGRESS NOTES
02/15/17 0703   Time Spent With Patient   Time In 0703   Time Out 0734   Patient Seen For: AM;ADLs   Grooming   Grooming Assistance  S   Comments setup to comb hair and brush teeth   Upper Body Bathing   Bathing Assistance, Upper S   Position Performed Other (comment)  (seated on tub transfer bench)   Adaptive Equipment Tub bench   Comments performs while seated; set up for supplies   Lower Body Bathing   Bathing Assistance, [address] bar;Long handled sponge   Position Performed Standing; Other (comment)  (seated on tub transfer bench)   Adaptive Equipment Tub bench   Comments S while in stance to perform pericare   Upper Body [place] opening bra   Comments setup   Lower Body Dressing    Dressing Assistance  CGA   Leg Crossed Method Used No   Position Performed Seated in chair;Standing   Adaptive Equipment Used Walker;Reacher;Sock aid   Don/Doff Anti-Embolic Stockings NA   Comments intermittent CGA while in stance to ensure balance maintained   Functional Transfers   Tub or Shower Type Shower   Amount of Assistance Required SBA   Adaptive Equipment Grab bars; Tub transfer bench;Walker (comment)     S: \"I didn't sleep well last night, my pain kept me awake some. \" Agreeable to therapy. Patient semi-fowlers in bed at start of session; transitioned to sitting EOB without A; sit<>stand from EOB with R/W with S; ambulated into bathroom with close S to perform self care tasks (see FIM scores above). Focus of session was on increasing independence and safety with self care tasks and functional transfers. Patient was able to ambulate ~10 feet using a R/W with S.   Patient did not report pain during session. Collaborated with PT and confirmed patient is on track to reach goals as documented in the care plan.    Patient tolerated session well, but activity tolerance and dynamic standing balance are still below baseline and requires skilled facilitation to successfully and safely complete ADL's and transfers. Patient ended session in w/c with call remote and phone within reach.      [employer], OTR/L  2/15/2017

## 2017-02-15 NOTE — PROGRESS NOTES
Traci Shane MD,   Medical Director  [address] [facility], [address] [facility]  Tel: [phone]       [facility] PROGRESS NOTE    Americo Stake Date: 2/11/2017  Admit Diagnosis: trauna to spinal core;Spinal stenosis of lumbar region at m*    Subjective     Having more low back and buttocks pain bilaterally today. Alternating boo with ultram now which is effective.  No cp, sob, n/v    Objective:     Current Facility-Administered Medications   Medication Dose Route Frequency    heparin (porcine) injection 5,000 Units  5,000 Units SubCUTAneous Q8H    traMADol (ULTRAM) tablet 50 mg  50 mg Oral Q6H PRN    pantoprazole (PROTONIX) tablet 40 mg  40 mg Oral ACB    loperamide (IMODIUM) capsule 2 mg  2 mg Oral Q4H PRN    sodium phosphate (FLEET'S) enema 118 mL  1 Enema Rectal PRN    acetaminophen (TYLENOL) tablet 650 mg  650 mg Oral Q6H    cyclobenzaprine (FLEXERIL) tablet 10 mg  10 mg Oral TID PRN    docusate sodium (COLACE) capsule 100 mg  100 mg Oral BID    naloxone (NARCAN) injection 0.4 mg  0.4 mg IntraVENous EVERY 2 MINUTES AS NEEDED    oxyCODONE IR (ROXICODONE) tablet 5-10 mg  5-10 mg Oral Q4H PRN    zolpidem (AMBIEN) tablet 5 mg  5 mg Oral QHS PRN    albuterol (PROVENTIL HFA, VENTOLIN HFA, PROAIR HFA) inhaler 2 Puff  2 Puff Inhalation Q6H PRN    albuterol-ipratropium (DUO-NEB) 2.5 MG-0.5 MG/3 ML  3 mL Nebulization Q6H PRN    atenolol (TENORMIN) tablet 12.5 mg  12.5 mg Oral QHS    atorvastatin (LIPITOR) tablet 20 mg  20 mg Oral QHS    bisacodyl (DULCOLAX) suppository 10 mg  10 mg Rectal DAILY PRN    furosemide (LASIX) tablet 40 mg  40 mg Oral DAILY    gabapentin (NEURONTIN) capsule 300 mg  300 mg Oral TID    nitroglycerin (NITROSTAT) tablet 0.4 mg  0.4 mg SubLINGual Q5MIN PRN    potassium chloride (K-DUR, KLOR-CON) SR tablet 20 mEq  20 mEq Oral DAILY    sertraline (ZOLOFT) tablet 75 mg  75 mg Oral DAILY    valsartan (DIOVAN) tablet 160 mg 160 mg Oral DAILY     Review of Systems:Denies chest pain, shortness of breath, cough, headache, visual problems, abdominal pain, dysurea, calf pain. Pertinent positives are as noted in the medical records and unremarkable otherwise. Visit Vitals    /78    Pulse 65    Temp 98.7 °F (37.1 °C)    Resp 19    SpO2 95%    Breastfeeding No        Physical Exam:   General: Alert and age appropriately oriented. No acute cardio respiratory distress. HEENT: Normocephalic,no scleral icterus  Oral mucosa moist without cyanosis   Lungs: Clear to auscultation  bilaterally. Respiration even and unlabored   Heart: Regular rate and rhythm, S1, S2   No  murmurs, clicks, rub or gallops   Abdomen: Soft, non-tender, nondistended. Bowel sounds present. No organomegaly. Genitourinary: Benign . Neuromuscular:      Grossly no focal motor deficits noted. Moves ankles. Ankle dorsiflexion 5/5  Ankle plantarflexion 5/5  No sensory deficits distally. Exam limited by pain. Skin/extremity: No rashes, no erythema. No calf tenderness BLE  Wound covered. Functional Assessment:          Balance  Sitting - Static: Good (unsupported) (02/14/17 1000)  Sitting - Dynamic: Good (unsupported) (02/14/17 1000)  Standing - Static: Fair (02/14/17 1000)  Standing - Dynamic : Impaired (02/14/17 1000)           Toileting  Cues:  Other (comment) (assist with hygiene for thoroughness) (02/14/17 0834)  Adaptive Equipment: Grab bars (02/14/17 0834)         Maranda Medina Fall Risk Assessment:  Robyn Ray Risk  Mobility: Ambulates or transfers with assist devices or assistance/unsteady gait (02/14/17 2010)  Mentation: Alert, oriented x 3 (02/14/17 2010)  Medication: Patient receiving anticonvulsants, sedatives(tranquilizers), psychotropics or hypnotics, hypoglycemics, narcotics, sleep aids, antihypertensives, laxatives, or diuretics (02/14/17 2010)  Elimination: Needs assistance with toileting (02/14/17 2010)  Fall History in the Past 3 Months: Before admission (02/14/17 2010)  Total Score: 4 (02/14/17 2010)     Speech Assessment:         Ambulation:  Gait  Distance (ft): 150 Feet (ft) (02/14/17 1000)  Assistive Device: Walker, rolling (02/14/17 1000)     Labs/Studies:  No results found for this or any previous visit (from the past 67 hour(s)).     Assessment:     Problem List as of 2/15/2017  Date Reviewed: 10/5/2016          Codes Class Noted - Resolved    * (Principal)S/P laminectomy with spinal fusion ICD-10-CM: Z98.1  ICD-9-CM: V45.4  2/11/2017 - Present        Spinal stenosis of lumbar region at multiple levels ICD-10-CM: M48.06  ICD-9-CM: 724.02  2/11/2017 - Present        Lumbar stenosis with neurogenic claudication ICD-10-CM: M48.06  ICD-9-CM: 724.03  2/7/2017 - Present        Asymptomatic carotid artery stenosis (Chronic) ICD-10-CM: I65.29  ICD-9-CM: 433.10  11/4/2016 - Present        Valvular heart disease (Chronic) ICD-10-CM: I38  ICD-9-CM: 424.90  11/4/2016 - Present        Coronary artery disease involving native coronary artery of native heart without angina pectoris (Chronic) ICD-10-CM: I25.10  ICD-9-CM: 414.01  11/4/2016 - Present        On potassium wasting diuretic therapy (Chronic) ICD-10-CM: H63.497  ICD-9-CM: V58.69  11/4/2016 - Present        Obesity (BMI 30.0-34.9) (Chronic) ICD-10-CM: E66.9  ICD-9-CM: 278.00  11/4/2016 - Present        Mixed hyperlipidemia (Chronic) ICD-10-CM: E78.2  ICD-9-CM: 272.2  4/21/2016 - Present        RENEE (stress urinary incontinence, female) (Chronic) ICD-10-CM: N39.3  ICD-9-CM: 625.6  2/24/2016 - Present        Rectocele ICD-10-CM: N81.6  ICD-9-CM: 618.04  2/24/2016 - Present        Weakening of rectovaginal tissue ICD-10-CM: N81.83  ICD-9-CM: 618.82  2/24/2016 - Present        Depression (Chronic) ICD-10-CM: F32.9  ICD-9-CM: 396  6/30/2015 - Present        Contact dermatitis and other eczema, due to unspecified cause ICD-10-CM: L25.9  ICD-9-CM: 692.9  1/7/2015 - Present        Essential hypertension (Chronic) ICD-10-CM: I10  ICD-9-CM: 401.9  1/7/2015 - Present        Other and unspecified hyperlipidemia ICD-10-CM: E78.5  ICD-9-CM: 272.4  1/7/2015 - Present        Mitral valve disorders ICD-10-CM: I05.9  ICD-9-CM: 424.0  1/7/2015 - Present        Hematuria, unspecified ICD-10-CM: R31.9  ICD-9-CM: 599.70  1/7/2015 - Present        Dysuria ICD-10-CM: R30.0  ICD-9-CM: 788.1  1/7/2015 - Present        Anemia, unspecified ICD-10-CM: D64.9  ICD-9-CM: 285.9  1/7/2015 - Present        Other B-complex deficiencies ICD-10-CM: E53.8  ICD-9-CM: 266.2  1/7/2015 - Present        Arthropathies (Chronic) ICD-10-CM: M12.9  ICD-9-CM: 716.90  1/7/2015 - Present    Overview Signed 1/7/2015 10:11 AM by Sommer Tiwari     Multiple sites             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  ICD-9-CM: 756.19  1/7/2015 - Present        Other malaise and fatigue ICD-10-CM: R53.81, R53.83  ICD-9-CM: 780.79  1/7/2015 - Present        Allergic rhinitis, cause unspecified ICD-10-CM: J30.9  ICD-9-CM: 477.9  1/7/2015 - Present        Aortic valve disorders ICD-10-CM: I35.9  ICD-9-CM: 424.1  1/7/2015 - Present        Extrinsic asthma, unspecified (Chronic) ICD-10-CM: J45.909  ICD-9-CM: 493.00  1/7/2015 - Present        Respiratory abnormality, unspecified ICD-10-CM: J98.9  ICD-9-CM: 786.00  1/7/2015 - Present        Congestive heart failure, unspecified ICD-10-CM: I50.9  ICD-9-CM: 428.0  1/7/2015 - Present        Occlusion and stenosis of carotid artery without mention of cerebral infarction ICD-10-CM: I65.29  ICD-9-CM: 433.10  1/7/2015 - Present        Carpal tunnel syndrome ICD-10-CM: G56.00  ICD-9-CM: 354.0  1/7/2015 - Present        Unspecified urinary incontinence ICD-10-CM: R32  ICD-9-CM: 788.30  1/7/2015 - Present        Carotid stenosis ICD-10-CM: I65.29  ICD-9-CM: 433.10  1/7/2015 - Present        Renal insufficiency ICD-10-CM: N28.9  ICD-9-CM: 593.9 1/7/2015 - Present        Heart disease, unspecified ICD-10-CM: I51.9  ICD-9-CM: 429.9  1/7/2015 - Present        Shortness of breath ICD-10-CM: R06.02  ICD-9-CM: 786.05  1/7/2015 - Present        Osteopenia ICD-10-CM: M85.80  ICD-9-CM: 733.90  1/7/2015 - Present        Other chest pain ICD-10-CM: R07.89  ICD-9-CM: 786.59  3/7/2011 - Present        GERD (gastroesophageal reflux disease) (Chronic) ICD-10-CM: K21.9  ICD-9-CM: 530.81  3/4/2011 - Present        Asthma (Chronic) ICD-10-CM: J45.909  ICD-9-CM: 493.90  3/4/2011 - Present            Plan:   Assessment: This is an 66 YOF with PMH of debilitatin lumbar spondylolisthesis/stenosis with neurogenic claudication, is s/p L4-S1 lumbar laminectomy, L4-5 fusion and instrumentation. Patient admitted to IRU to continue acute medical rehab program.      Continue daily physician medical management:  s/p L4-S1 lumbar laminectomy, L4-5 fusion and instrumentation.   - lumbar spine precautions. TLFO when out of bed.  - continue to monitor wound for infection, bleeding, wound dehiscence.       Elevated creatinine/ Renal insufficiency- monitor; check BMP 2/16      Post op acute blood loss anemia - monitor; h/h is stable; recheck 2/16      Hx of CHF -continued on lasix, KCl      CAD (coronary artery disease)      Hypertension - continue diovan, atenolol. adjust as needed.       PUD (peptic ulcer disease)- continue pepcid.       Pneumonia prophylaxis- Insentive spirometer every hour while awake      DVT risk / DVT Prophylaxis- Will require daily physician exam to assess for signs and symptoms as patient is at increased risk for of thromboembolism. Mobilization as tolerated. Intermittent pneumatic compression devices when in bed Thigh-high or knee-high thromboembolic deterrent hose when out of bed. Holding anticoagulation until ok per spine ortho.  Pt POD #7, will start sc Hep 2/14      Pain Control: stable, mild-to-moderate joint symptoms intermittently, reasonably well controlled by PRN meds. Will require regular pain assessment and comprenhensive pain management. Continue scheduled tylenol. Continued on roxicodone, neurontin. . Prn flexeril. 2/15 added ultram yesterday and have asked nursing to alternate with boo      Wound Care: Monitor wound status daily per staff and physician. At risk for failure. Will require 24/7 rehab nursing. Keep wound clean and dry.      Potential urinary retention/ neurogenic bladder - schedule voids q6-8 hrs. Check post-void residual every shift; In and Out catheterize if post-void residual is more than 400 cc.; 2/14 no residuals      bowel program - senna, colace. Monitor. 2/13 reports no Bm for over a week. Add Lactulose and miralax. Needs bm ASAP. No evidence clinically of ileus; 2/15 lg BM x 3 yesterday.      GERD - resume pepcid.                      Time spent was 25 minutes with over 1/2 in direct patient care/examination, consultation and coordination of care.      Signed By: Brooke Garza MD     February 15, 2017

## 2017-02-15 NOTE — PROGRESS NOTES
Assessment completed, respiration even and unlabored, instructed to call for needs or assist , educated on risk and needs not to get up without assist, call light in reach, denies needs at present

## 2017-02-15 NOTE — PROGRESS NOTES
Patient resting up in bed. Alert and oriented with pleasant affect. Derma Crain with steri strips to back. Edema to lower extrmities +1 to +2. Lung sounds clear. S1S2, bowel sounds active. Small bruise to left ankle. Admits to numbness to toes. No complaint of pain or discomfort will continue monitor for pain. See MAR. No other verbalized needs at present time. Assessment completed. See doc flow sheet for further assessments.

## 2017-02-15 NOTE — PROGRESS NOTES
PHYSICAL THERAPY DAILY NOTE  Time In: 1030  Time Out: 1111  Patient Seen For: AM;Gait training;Patient education; Therapeutic exercise;Transfer training; Other (see progress notes)    Subjective: patient reporting she thinks she is due for pain medication. Reports she has a ramp to get into her home         Objective:Vital Signs:  Patient Vitals for the past 12 hrs:   Temp Pulse Resp BP SpO2   02/15/17 0715 98.7 °F (37.1 °C) 65 19 152/78 95 %     Pain level: 4 to 7 out of 10  Pain location:low back  Pain interventions:Pain medication, rest, positioning,body mechanics    Patient education:Bed mobility training,transfer training, gait training, fall precautions, activity pacing, spinal precautions,body mechanics Patient verbalizing understanding and demonstrating partial understanding of patient education. Recommend follow up education. Interdisciplinary Communication:Spoke with RN regarding request for pain medication. RN issued pain medication to patient    Spinal, Other (comment) (fall risks)  GROSS ASSESSMENT Daily Assessment     NA       BED/MAT MOBILITY Daily Assessment   Verbal cues for body mechanics, Increased time and effort to complete.  Increased back pain during supine<>sidelying<>sit Rolling Right : 5 (Stand-by assistance)  Rolling Left : 0 (Not tested)  Supine to Sit : 5 (Stand-by assistance)  Sit to Supine : 4 (Minimal assistance) (with LEs)       TRANSFERS Daily Assessment   Verbal cues for body mechanics Transfer Type: SPT with walker  Other: s  Transfer Assistance : 5 (Stand-by assistance)  Sit to Stand Assistance: Stand-by assistance  Car Transfers: Not tested       GAIT Daily Assessment    Amount of Assistance: 5 (Stand-by assistance)  Distance (ft): 150 Feet (ft)  Assistive Device: Walker, rolling   Gait training up/down 10 ft ramp with RW and CGA    STEPS or STAIRS Daily Assessment    Steps/Stairs Ambulated (#): 0  Level of Assist : 0 (Not tested)       BALANCE Daily Assessment    Sitting - Static: Good (unsupported)  Sitting - Dynamic: Good (unsupported)  Standing - Static: Good (with RW)  Standing - Dynamic : Impaired       WHEELCHAIR MOBILITY Daily Assessment    Curbs/Ramps Assist Required (FIM Score): 0 (Not tested)  Wheelchair Setup Assist Required : 0 (Not tested)       LOWER EXTREMITY EXERCISES Daily Assessment    Extremity: Both  Exercise Type #1: Supine lower extremity strengthening  Sets Performed: 2  Reps Performed: 10  Level of Assist: Minimal assistance (with heel slides)          Assessment: Progressing towards goals. Body mechanics with bed mobility and transfers improving. Patient to OT at end of treatment    Plan of Care: Continue with POC and progress as tolerated.      Felipe Reyes, PT  2/15/2017

## 2017-02-15 NOTE — PROGRESS NOTES
OT Daily Note  Time In 1117   Time Out 1202     Subjective: \"I'm not too good at thinking sometimes. \" Agreeable to therapy. Pain: Patient did not report pain during session  Education: wheelchair parts management, attention to task  Interdisciplinary Communication: updated interdisciplinary board  Precautions: Spinal    Activity Tolerance Daily Assessment   Patient utilized UBE x 2 sets x 5 minutes in B directions with a 2 minute rest break between sets for UE strengthening and overall activity tolerance. Continue therapeutic exercise for UE strengthening and decreased activity tolerance. Visual-Perceptual Daily Assessment   Patient completed two, simple, design replication tasks. Patient required significant time and assist to complete for accuracy. Discussed possible SLP evaluation for higher level cognitive deficits; patient agreeable. Assessment: Patient tolerated session well, but patient is still limited by decreased activity tolerance, standing balance/tolerance and BUE strength which requires skilled facilitation to successfully and safely complete ADL's and transfers. Plan: Continue OT POC. Patient ended session in recliner with call remote and phone within reach.      [employer], OTR/L  2/15/2017

## 2017-02-15 NOTE — PROGRESS NOTES
Subjective \"I am ready to work with you. \"   Activity [place]   Strength/Endurance Patient was with no c/o tiredness or fatigue during the session. Balance Sit<->stand:  CGA with RW; Patient lost her balance when she became distracted by looking over to her side. She was able to catch herself with the assistance of this therapist.   Social Interaction Patient was friendly and conversational during the session. Cognitive A&O X4   Comments Patient was assisted to her room and was left seated in the recliner with all needs within reach.      Jennifer Barrios, CTRS

## 2017-02-16 LAB
ANION GAP BLD CALC-SCNC: 7 MMOL/L (ref 7–16)
BUN SERPL-MCNC: 12 MG/DL (ref 8–23)
CALCIUM SERPL-MCNC: 8.4 MG/DL (ref 8.3–10.4)
CHLORIDE SERPL-SCNC: 109 MMOL/L (ref 98–107)
CO2 SERPL-SCNC: 29 MMOL/L (ref 21–32)
CREAT SERPL-MCNC: 0.98 MG/DL (ref 0.6–1)
ERYTHROCYTE [DISTWIDTH] IN BLOOD BY AUTOMATED COUNT: 13.5 % (ref 11.9–14.6)
GLUCOSE SERPL-MCNC: 94 MG/DL (ref 65–100)
HCT VFR BLD AUTO: 29.5 % (ref 35.8–46.3)
HEMOCCULT STL QL: NEGATIVE
HGB BLD-MCNC: 9.1 G/DL (ref 11.7–15.4)
MCH RBC QN AUTO: 27.8 PG (ref 26.1–32.9)
MCHC RBC AUTO-ENTMCNC: 30.8 G/DL (ref 31.4–35)
MCV RBC AUTO: 90.2 FL (ref 79.6–97.8)
PLATELET # BLD AUTO: 314 K/UL (ref 150–450)
PMV BLD AUTO: 9.6 FL (ref 10.8–14.1)
POTASSIUM SERPL-SCNC: 4 MMOL/L (ref 3.5–5.1)
RBC # BLD AUTO: 3.27 M/UL (ref 4.05–5.25)
SODIUM SERPL-SCNC: 145 MMOL/L (ref 136–145)
WBC # BLD AUTO: 7.2 K/UL (ref 4.3–11.1)

## 2017-02-16 PROCEDURE — 97530 THERAPEUTIC ACTIVITIES: CPT

## 2017-02-16 PROCEDURE — 65310000000 HC RM PRIVATE REHAB

## 2017-02-16 PROCEDURE — 80048 BASIC METABOLIC PNL TOTAL CA: CPT | Performed by: PHYSICAL MEDICINE & REHABILITATION

## 2017-02-16 PROCEDURE — 74011250636 HC RX REV CODE- 250/636: Performed by: PHYSICAL MEDICINE & REHABILITATION

## 2017-02-16 PROCEDURE — 74011250637 HC RX REV CODE- 250/637: Performed by: PHYSICAL MEDICINE & REHABILITATION

## 2017-02-16 PROCEDURE — 96125 COGNITIVE TEST BY HC PRO: CPT

## 2017-02-16 PROCEDURE — 97535 SELF CARE MNGMENT TRAINING: CPT

## 2017-02-16 PROCEDURE — 36415 COLL VENOUS BLD VENIPUNCTURE: CPT | Performed by: PHYSICAL MEDICINE & REHABILITATION

## 2017-02-16 PROCEDURE — 97116 GAIT TRAINING THERAPY: CPT

## 2017-02-16 PROCEDURE — 85027 COMPLETE CBC AUTOMATED: CPT | Performed by: PHYSICAL MEDICINE & REHABILITATION

## 2017-02-16 PROCEDURE — 97110 THERAPEUTIC EXERCISES: CPT

## 2017-02-16 PROCEDURE — 82272 OCCULT BLD FECES 1-3 TESTS: CPT | Performed by: PHYSICAL MEDICINE & REHABILITATION

## 2017-02-16 PROCEDURE — 99232 SBSQ HOSP IP/OBS MODERATE 35: CPT | Performed by: PHYSICAL MEDICINE & REHABILITATION

## 2017-02-16 RX ORDER — LANOLIN ALCOHOL/MO/W.PET/CERES
1 CREAM (GRAM) TOPICAL 2 TIMES DAILY WITH MEALS
Status: DISCONTINUED | OUTPATIENT
Start: 2017-02-16 | End: 2017-02-21 | Stop reason: HOSPADM

## 2017-02-16 RX ADMIN — GABAPENTIN 300 MG: 300 CAPSULE ORAL at 14:39

## 2017-02-16 RX ADMIN — OXYCODONE HYDROCHLORIDE 10 MG: 5 TABLET ORAL at 18:00

## 2017-02-16 RX ADMIN — POTASSIUM CHLORIDE 20 MEQ: 20 TABLET, EXTENDED RELEASE ORAL at 08:21

## 2017-02-16 RX ADMIN — OXYCODONE HYDROCHLORIDE 5 MG: 5 TABLET ORAL at 12:10

## 2017-02-16 RX ADMIN — ACETAMINOPHEN 650 MG: 325 TABLET, FILM COATED ORAL at 08:20

## 2017-02-16 RX ADMIN — VALSARTAN 160 MG: 80 TABLET, FILM COATED ORAL at 08:23

## 2017-02-16 RX ADMIN — GABAPENTIN 300 MG: 300 CAPSULE ORAL at 21:17

## 2017-02-16 RX ADMIN — ATENOLOL 12.5 MG: 25 TABLET ORAL at 21:17

## 2017-02-16 RX ADMIN — HEPARIN SODIUM 5000 UNITS: 5000 INJECTION, SOLUTION INTRAVENOUS; SUBCUTANEOUS at 08:17

## 2017-02-16 RX ADMIN — SERTRALINE HYDROCHLORIDE 75 MG: 50 TABLET ORAL at 08:21

## 2017-02-16 RX ADMIN — ATORVASTATIN CALCIUM 20 MG: 10 TABLET, FILM COATED ORAL at 21:16

## 2017-02-16 RX ADMIN — FERROUS SULFATE TAB 325 MG (65 MG ELEMENTAL FE) 325 MG: 325 (65 FE) TAB at 08:27

## 2017-02-16 RX ADMIN — PANTOPRAZOLE SODIUM 40 MG: 40 TABLET, DELAYED RELEASE ORAL at 05:46

## 2017-02-16 RX ADMIN — OXYCODONE HYDROCHLORIDE 5 MG: 5 TABLET ORAL at 05:52

## 2017-02-16 RX ADMIN — FERROUS SULFATE TAB 325 MG (65 MG ELEMENTAL FE) 325 MG: 325 (65 FE) TAB at 18:07

## 2017-02-16 RX ADMIN — ACETAMINOPHEN 650 MG: 325 TABLET, FILM COATED ORAL at 21:16

## 2017-02-16 RX ADMIN — HEPARIN SODIUM 5000 UNITS: 5000 INJECTION, SOLUTION INTRAVENOUS; SUBCUTANEOUS at 15:09

## 2017-02-16 RX ADMIN — ACETAMINOPHEN 650 MG: 325 TABLET, FILM COATED ORAL at 15:08

## 2017-02-16 RX ADMIN — DOCUSATE SODIUM 100 MG: 100 CAPSULE, LIQUID FILLED ORAL at 08:23

## 2017-02-16 RX ADMIN — GABAPENTIN 300 MG: 300 CAPSULE ORAL at 05:45

## 2017-02-16 RX ADMIN — ACETAMINOPHEN 650 MG: 325 TABLET, FILM COATED ORAL at 02:40

## 2017-02-16 RX ADMIN — FUROSEMIDE 40 MG: 40 TABLET ORAL at 08:23

## 2017-02-16 NOTE — PROGRESS NOTES
OT Daily Note  Time In 1300   Time Out 1343     Pain: Pt had no complaints of pain. Functional Mobility   Pt walked with S to and from the bathroom. Self-Care   Pt toileted with S. Activity Tolerance   Pt completed 5 minutes on the ergometer frontwards and backwards with moderate resistance to increase UB strength and activity tolerance for integration into ADL. Pt completed prolonged shoulder stabilization task to promote UB strength and activity tolerance for integration into IADL. Pt stood for 10 minutes and 5 minutes while engaging in a complex problem solving task. Pt was S with no LOB. Education   Hand placement during transfers     Interdisciplinary Communication: JOSHUA Art on pt's performance    Plan: Continue with POC. Pt was left with Rubens LEWIS.      Diann Rutledge OTR/L  2/16/2017

## 2017-02-16 NOTE — PROGRESS NOTES
Subjective \"My back is hurting and it hasn't been until now. \"   Activity Standing basketball toss   Strength/Endurance Patient with no c/o tiredness or fatigue. She participated well with appropriate rest breaks taken. Balance Sit<->stand:  SBA with RW   Social Interaction Patient was friendly and conversational during the session. Cognitive A&O X4   Comments Patient was handed off to [facility]  at the end of the session.      Cosme Ross, MIGNONS

## 2017-02-16 NOTE — PROGRESS NOTES
02/16/17 1320   Time Spent With Patient   Time In 0937   Time Out 1000   Mental Status   Neurologic State Alert   Orientation Level Oriented X4   Cognition Appropriate decision making   Perception Appears intact   Perseveration No perseveration noted   Safety/Judgement Fall prevention   Psychosocial   Patient Behaviors Calm   Reading Comprehension   Visual Impairment No impairment   Overall Impairment Severity None   Written Expression   Pre-Morbid Dominant Hand Unknown/unable to assess      02/16/17 1321   Verbal Expression   Primary Mode of Expression Verbal   Initiation No impairment   Automatic Speech Task No impairment   Repetition No impairment   Naming Impaired   Conversation No impairment   Speech Characteristics Word retrieval   Interfering Components Impaired thought organization   Effective Techniques Cueing;Provide extra time   Overall Impairment Mild   FIM Score (Verbal Expression) 4   Oral-Motor Structure/Motor Speech   Face No impairment   Labial No impairment   Lingual No impairment   Apraxic Characteristics None   Dysarthric Characteristics None   Intelligibility No impairment   Overall Impairment Severity None   Neuro-Linguistics/Cognitive Function   Reasoning  Abstract reasoning; Stone reasoning;Convergent thinking;Divergent thinking; Inductive reasoning;Executive function;Deductive reasoning   Organizational No impairment   Problem Solving Functional;Complex   FIM Score (Problem Solving) 5   Mathematics No impairment   FIM Score (Memory) 4   Attention  No impairment   Overall Impairment Severity Mild   Pragmatics   Pragmatics Impairment No impairment   Pragmatics Impairment Severity None   FIM Score (Pragmatics/Social Interaction) 7   Pt completed basic cognitive-linguistic evaluation with performance as follows: simple yes/no questions 5/5, complex yes/no questions 5/5, 1-2 step commands 10/10, 3 step commands 5/5, named 8 items per minute in food & animal categories, problem solving questions 4/5, reasoning questions 4/5 and recalled 2/3 items in short term memory task. Pt presents with noted cognitive-linguistic deficits in memory. 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 She will be discharged with family after discharged. Spoke with MD, she reports no further ST indicated.       Dani Jordan MA/CCC/SLP

## 2017-02-16 NOTE — PROGRESS NOTES
Matt Bolden MD,   Medical Director  [address] [facility], [address] [facility]  Tel: [phone]       SFD PROGRESS NOTE    Alok Painter Date: 2/11/2017  Admit Diagnosis: trauna to spinal core;Spinal stenosis of lumbar region at m*    Subjective     Doing well. Pain this a.m but just took pain meds. No cp, sob, n/v. Good spirits.  Bowels improved    Objective:     Current Facility-Administered Medications   Medication Dose Route Frequency    heparin (porcine) injection 5,000 Units  5,000 Units SubCUTAneous Q8H    traMADol (ULTRAM) tablet 50 mg  50 mg Oral Q6H PRN    pantoprazole (PROTONIX) tablet 40 mg  40 mg Oral ACB    loperamide (IMODIUM) capsule 2 mg  2 mg Oral Q4H PRN    sodium phosphate (FLEET'S) enema 118 mL  1 Enema Rectal PRN    acetaminophen (TYLENOL) tablet 650 mg  650 mg Oral Q6H    cyclobenzaprine (FLEXERIL) tablet 10 mg  10 mg Oral TID PRN    docusate sodium (COLACE) capsule 100 mg  100 mg Oral BID    naloxone (NARCAN) injection 0.4 mg  0.4 mg IntraVENous EVERY 2 MINUTES AS NEEDED    oxyCODONE IR (ROXICODONE) tablet 5-10 mg  5-10 mg Oral Q4H PRN    zolpidem (AMBIEN) tablet 5 mg  5 mg Oral QHS PRN    albuterol (PROVENTIL HFA, VENTOLIN HFA, PROAIR HFA) inhaler 2 Puff  2 Puff Inhalation Q6H PRN    albuterol-ipratropium (DUO-NEB) 2.5 MG-0.5 MG/3 ML  3 mL Nebulization Q6H PRN    atenolol (TENORMIN) tablet 12.5 mg  12.5 mg Oral QHS    atorvastatin (LIPITOR) tablet 20 mg  20 mg Oral QHS    bisacodyl (DULCOLAX) suppository 10 mg  10 mg Rectal DAILY PRN    furosemide (LASIX) tablet 40 mg  40 mg Oral DAILY    gabapentin (NEURONTIN) capsule 300 mg  300 mg Oral TID    nitroglycerin (NITROSTAT) tablet 0.4 mg  0.4 mg SubLINGual Q5MIN PRN    potassium chloride (K-DUR, KLOR-CON) SR tablet 20 mEq  20 mEq Oral DAILY    sertraline (ZOLOFT) tablet 75 mg  75 mg Oral DAILY    valsartan (DIOVAN) tablet 160 mg  160 mg Oral DAILY     Review of Systems:Denies chest pain, shortness of breath, cough, headache, visual problems, abdominal pain, dysurea, calf pain. Pertinent positives are as noted in the medical records and unremarkable otherwise. Visit Vitals    /62    Pulse 64    Temp 98.2 °F (36.8 °C)    Resp 18    SpO2 94%    Breastfeeding No        Physical Exam:   General: Alert and age appropriately oriented. No acute cardio respiratory distress. HEENT: Normocephalic,no scleral icterus  Oral mucosa moist without cyanosis   Lungs: Clear to auscultation  bilaterally. Respiration even and unlabored   Heart: Regular rate and rhythm, S1, S2   No  murmurs, clicks, rub or gallops   Abdomen: Soft, non-tender, nondistended. Bowel sounds present. No organomegaly. Genitourinary: Benign . Neuromuscular:      Grossly no focal motor deficits noted. Moves ankles. Ankle dorsiflexion 5/5  Ankle plantarflexion 5/5  No sensory deficits distally. Exam limited by pain. Skin/extremity: No rashes, no erythema. No calf tenderness BLE  Wound healing well. Functional Assessment:          Balance  Sitting - Static: Good (unsupported) (02/15/17 1500)  Sitting - Dynamic: Good (unsupported) (02/15/17 1500)  Standing - Static: Good (02/15/17 1500)  Standing - Dynamic : Impaired (02/15/17 1100)           Toileting  Cues:  Other (comment) (assist with hygiene for thoroughness) (02/14/17 0834)  Adaptive Equipment: Grab bars (02/15/17 1117)         [facility]:  [facility] Tom Fall Risk  Mobility: Ambulates or transfers with assist devices or assistance/unsteady gait (02/15/17 2016)  Mentation: Alert, oriented x 3 (02/15/17 2016)  Medication: Patient receiving anticonvulsants, sedatives(tranquilizers), psychotropics or hypnotics, hypoglycemics, narcotics, sleep aids, antihypertensives, laxatives, or diuretics (02/15/17 2016)  Elimination: Needs assistance with toileting (02/15/17 2016)  Fall History in the Past 3 Months: Before admission (02/15/17 2016)  Total Score: 4 (02/15/17 2016)     Speech Assessment:         Ambulation:  Gait  Distance (ft): 175 Feet (ft) (02/15/17 1500)  Assistive Device: Walker, rolling (02/15/17 1500)     Labs/Studies:  Recent Results (from the past 72 hour(s))   CBC W/O DIFF    Collection Time: 02/16/17  6:15 AM   Result Value Ref Range    WBC 7.2 4.3 - 11.1 K/uL    RBC 3.27 (L) 4.05 - 5.25 M/uL    HGB 9.1 (L) 11.7 - 15.4 g/dL    HCT 29.5 (L) 35.8 - 46.3 %    MCV 90.2 79.6 - 97.8 FL    MCH 27.8 26.1 - 32.9 PG    MCHC 30.8 (L) 31.4 - 35.0 g/dL    RDW 13.5 11.9 - 14.6 %    PLATELET 947 275 - 757 K/uL    MPV 9.6 (L) 10.8 - 09.9 FL   METABOLIC PANEL, BASIC    Collection Time: 02/16/17  6:15 AM   Result Value Ref Range    Sodium 145 136 - 145 mmol/L    Potassium 4.0 3.5 - 5.1 mmol/L    Chloride 109 (H) 98 - 107 mmol/L    CO2 29 21 - 32 mmol/L    Anion gap 7 7 - 16 mmol/L    Glucose 94 65 - 100 mg/dL    BUN 12 8 - 23 MG/DL    Creatinine 0.98 0.6 - 1.0 MG/DL    GFR est AA >60 >60 ml/min/1.73m2    GFR est non-AA 59 (L) >60 ml/min/1.73m2    Calcium 8.4 8.3 - 10.4 MG/DL       Assessment:     Problem List as of 2/16/2017  Date Reviewed: 10/5/2016          Codes Class Noted - Resolved    * (Principal)S/P laminectomy with spinal fusion ICD-10-CM: Z98.1  ICD-9-CM: V45.4  2/11/2017 - Present        Spinal stenosis of lumbar region at multiple levels ICD-10-CM: M48.06  ICD-9-CM: 724.02  2/11/2017 - Present        Lumbar stenosis with neurogenic claudication ICD-10-CM: M48.06  ICD-9-CM: 724.03  2/7/2017 - Present        Asymptomatic carotid artery stenosis (Chronic) ICD-10-CM: O24.01  ICD-9-CM: 433.10  11/4/2016 - Present        Valvular heart disease (Chronic) ICD-10-CM: I38  ICD-9-CM: 424.90  11/4/2016 - Present        Coronary artery disease involving native coronary artery of native heart without angina pectoris (Chronic) ICD-10-CM: I25.10  ICD-9-CM: 414.01 11/4/2016 - Present        On potassium wasting diuretic therapy (Chronic) ICD-10-CM: G17.338  ICD-9-CM: V58.69  11/4/2016 - Present        Obesity (BMI 30.0-34.9) (Chronic) ICD-10-CM: E66.9  ICD-9-CM: 278.00  11/4/2016 - Present        Mixed hyperlipidemia (Chronic) ICD-10-CM: E78.2  ICD-9-CM: 272.2  4/21/2016 - Present        RENEE (stress urinary incontinence, female) (Chronic) ICD-10-CM: N39.3  ICD-9-CM: 625.6  2/24/2016 - Present        Rectocele ICD-10-CM: N81.6  ICD-9-CM: 618.04  2/24/2016 - Present        Weakening of rectovaginal tissue ICD-10-CM: N81.83  ICD-9-CM: 618.82  2/24/2016 - Present        Depression (Chronic) ICD-10-CM: F32.9  ICD-9-CM: 281  6/30/2015 - Present        Contact dermatitis and other eczema, due to unspecified cause ICD-10-CM: L25.9  ICD-9-CM: 692.9  1/7/2015 - Present        Essential hypertension (Chronic) ICD-10-CM: I10  ICD-9-CM: 401.9  1/7/2015 - Present        Other and unspecified hyperlipidemia ICD-10-CM: E78.5  ICD-9-CM: 272.4  1/7/2015 - Present        Mitral valve disorders ICD-10-CM: I05.9  ICD-9-CM: 424.0  1/7/2015 - Present        Hematuria, unspecified ICD-10-CM: R31.9  ICD-9-CM: 599.70  1/7/2015 - Present        Dysuria ICD-10-CM: R30.0  ICD-9-CM: 788.1  1/7/2015 - Present        Anemia, unspecified ICD-10-CM: D64.9  ICD-9-CM: 285.9  1/7/2015 - Present        Other B-complex deficiencies ICD-10-CM: E53.8  ICD-9-CM: 266.2  1/7/2015 - Present        Arthropathies (Chronic) ICD-10-CM: M12.9  ICD-9-CM: 716.90  1/7/2015 - Present    Overview Signed 1/7/2015 10:11 AM by Manpreet French     Multiple sites             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  ICD-9-CM: 756.19  1/7/2015 - Present        Other malaise and fatigue ICD-10-CM: R53.81, R53.83  ICD-9-CM: 780.79  1/7/2015 - Present        Allergic rhinitis, cause unspecified ICD-10-CM: J30.9  ICD-9-CM: 477.9  1/7/2015 - Present        Aortic valve disorders ICD-10-CM: I35.9  ICD-9-CM: 424.1  1/7/2015 - Present Extrinsic asthma, unspecified (Chronic) ICD-10-CM: J45.909  ICD-9-CM: 493.00  1/7/2015 - Present        Respiratory abnormality, unspecified ICD-10-CM: J98.9  ICD-9-CM: 786.00  1/7/2015 - Present        Congestive heart failure, unspecified ICD-10-CM: I50.9  ICD-9-CM: 428.0  1/7/2015 - Present        Occlusion and stenosis of carotid artery without mention of cerebral infarction ICD-10-CM: I65.29  ICD-9-CM: 433.10  1/7/2015 - Present        Carpal tunnel syndrome ICD-10-CM: G56.00  ICD-9-CM: 354.0  1/7/2015 - Present        Unspecified urinary incontinence ICD-10-CM: R32  ICD-9-CM: 788.30  1/7/2015 - Present        Carotid stenosis ICD-10-CM: I65.29  ICD-9-CM: 433.10  1/7/2015 - Present        Renal insufficiency ICD-10-CM: N28.9  ICD-9-CM: 593.9  1/7/2015 - Present        Heart disease, unspecified ICD-10-CM: I51.9  ICD-9-CM: 429.9  1/7/2015 - Present        Shortness of breath ICD-10-CM: R06.02  ICD-9-CM: 786.05  1/7/2015 - Present        Osteopenia ICD-10-CM: M85.80  ICD-9-CM: 733.90  1/7/2015 - Present        Other chest pain ICD-10-CM: R07.89  ICD-9-CM: 786.59  3/7/2011 - Present        GERD (gastroesophageal reflux disease) (Chronic) ICD-10-CM: K21.9  ICD-9-CM: 530.81  3/4/2011 - Present        Asthma (Chronic) ICD-10-CM: J45.909  ICD-9-CM: 493.90  3/4/2011 - Present                       Plan:   Assessment: This is an 66 YOF with PMH of debilitatin lumbar spondylolisthesis/stenosis with neurogenic claudication, is s/p L4-S1 lumbar laminectomy, L4-5 fusion and instrumentation. Patient admitted to IRU to continue acute medical rehab program.      Continue daily physician medical management:  s/p L4-S1 lumbar laminectomy, L4-5 fusion and instrumentation.   - lumbar spine precautions. TLFO when out of bed.  - continue to monitor wound for infection, bleeding, wound dehiscence.       Elevated creatinine/ Renal insufficiency- monitor; check BMP 2/16; creatinine down from 1.32 to . 80      Post op acute blood loss anemia - monitor; h/h is stable; recheck 2/16; hbg down to 9.1 (14 preop); add iron and monitor      Hx of CHF -continued on lasix, KCl      CAD (coronary artery disease)      Hypertension - continue diovan, atenolol. adjust as needed.       PUD (peptic ulcer disease)- continue pepcid.       Pneumonia prophylaxis- Insentive spirometer every hour while awake      DVT risk / DVT Prophylaxis- Will require daily physician exam to assess for signs and symptoms as patient is at increased risk for of thromboembolism. Mobilization as tolerated. Intermittent pneumatic compression devices when in bed Thigh-high or knee-high thromboembolic deterrent hose when out of bed. Holding anticoagulation until ok per spine ortho. Pt POD #7, will start sc Hep 2/14      Pain Control: stable, mild-to-moderate joint symptoms intermittently, reasonably well controlled by PRN meds. Will require regular pain assessment and comprenhensive pain management. Continue scheduled tylenol. Continued on roxicodone, neurontin. . Prn flexeril. 2/15 added ultram yesterday and have asked nursing to alternate with boo; 2/16 improved pain control      Wound Care: Monitor wound status daily per staff and physician. At risk for failure. Will require 24/7 rehab nursing. Keep wound clean and dry.      Potential urinary retention/ neurogenic bladder - schedule voids q6-8 hrs. Check post-void residual every shift; In and Out catheterize if post-void residual is more than 400 cc.; 2/14 no residuals      bowel program - senna, colace. Monitor. 2/13 reports no Bm for over a week. Add Lactulose and miralax. Needs bm ASAP. No evidence clinically of ileus; 2/15 lg BM x 3 .      GERD - resume pepcid.         Time spent was 25 minutes with over 1/2 in direct patient care/examination, consultation and coordination of care.      Signed By: Zafar Quiroga MD     February 16, 2017

## 2017-02-16 NOTE — PROGRESS NOTES
Patient resting up in bed. Alert and oriented, but drowsy with pleasant affect. Moving all extremities. Lung sounds clear. S1S2, bowel sounds active. Assisted up to toilet. Fairly tolerated. Edema to lower extremities. Continues to admit to numbness in toes. No complaint of discomfort at present time. Repositioned up for breakfast tray. Assessment completed. No other verbalized needs at present time. See doc flow sheet for further assessments.

## 2017-02-16 NOTE — PROGRESS NOTES
PHYSICAL THERAPY DAILY NOTE  Time In: 1346  Time Out: 0270  Patient Seen For: Gait training; Therapeutic exercise    Subjective: \"My pain has decreased since earlier. \" Pt. Agreeable to therapy. Objective: Vital Signs:   Patient Vitals for the past 8 hrs:   Temp Pulse BP SpO2   02/16/17 0805 97.2 °F (36.2 °C) 64 132/60 98 %           Pain level: None reported at this time. Patient education: Patient was educated on proper technique of stair ascend/descend. Interdisciplinary Communication: Consulted with Bimal Diallo about pt's improved standing tolerance. Spinal, Other (comment) (fall risks)      BED/MAT MOBILITY Daily Assessment    Rolling Right : 0 (Not tested)  Rolling Left : 0 (Not tested)  Supine to Sit : 0 (Not tested)  Sit to Supine : 0 (Not tested)       TRANSFERS Daily Assessment   Required for safety. Transfer Type: SPT with walker  Transfer Assistance : 5 (Supervision/setup)  Sit to Stand Assistance: Supervision       GAIT Daily Assessment   Ambulation distance limited this PM due to patient reported fatigue. Amount of Assistance: 5 (Stand-by assistance)  Distance (ft): 150 Feet (ft)  Assistive Device: Walker, rolling       STEPS or STAIRS Daily Assessment   Required for safety. Pt. Demonstrated reciprocal pattern both ascending and descending stairs.   Steps/Stairs Ambulated (#): 4  Level of Assist : 4 (Contact guard assistance)  Rail Use: Both       BALANCE Daily Assessment    Sitting - Static: Good (unsupported)  Sitting - Dynamic: Good (unsupported)  Standing - Static: Good       LOWER EXTREMITY EXERCISES Daily Assessment    Extremity: Both  Exercise Type #1: Seated lower extremity strengthening  Sets Performed: 2  Reps Performed: 15  Level of Assist: Modified independent  Exercise Type #2: Standing lower extremity strengthening  Sets Performed: 4  Reps Performed: 10  Level of Assist: Contact guard assistance     Patient performed the following bilateral lower extremity exercises as follows:  SEATED EXERCISES Sets Reps Comments   Ankle Pumps 2 15    Long Arc Quads 2 15    Hip Adduction/Ball Squeeze 2 15    Hip Abduction with Green Theraband 2 15    Hamstring Curls with Green Theraband 2 15      STANDING EXERCISES Sets Reps Comments   Heel Raises 3 15    Toe Raises 3 15    Hip Abduction 3 10 Side-Stepping     Patient received from Karina Cannon in therapy gym and returned to room sitting in wheelchair with needs in reach. Assessment: Patient demonstrated improvement with ascend/descend ramp and ascended/descended 4 stairs for the first time. Patient has made good progress with standing tolerance and will benefit from further therapy to practice car transfers, ambulation over various surfaces, and improvement with independence with household ambulation. Plan of Care: Continue with POC and progress as tolerated.        Mya Bliss  2/16/2017

## 2017-02-16 NOTE — PROGRESS NOTES
02/16/17 0832   Time Spent With Patient   Time In 0832   Time Out 0913   Patient Seen For: AM;ADLs   Grooming   Grooming Assistance  S   Comments setup to brush teeth   Upper Body Bathing   Bathing Assistance, Upper S   Position Performed Other (comment)  (seated on tub transfer bench)   Adaptive Equipment Tub bench   Comments performs on tub transfer bench; set up [address] bar;Long handled sponge   Position Performed Standing; Other (comment)  (seated on tub transfer bench)   Adaptive Equipment Tub bench   Comments setup for supplies; supervision while in stance   29 Krystal Day (FIM Score) S   Cues Verbal cues provided   Adaptive Equipment Elevated seat;Grab bars   Upper Body Dressing    Dressing Assistance  S   Adaptive Equipment Front opening bra   Comments setup   Lower Body Dressing    Dressing Assistance  SBA   Leg Crossed Method Used No   Position Performed Seated edge of bed;Standing   Adaptive Equipment Used Reacher;Sock aid   Don/Doff Anti-Embolic Stockings NA   Comments SBA while standing for clothing management   Functional Transfers   Toilet Transfer  Elevated seat;Grab bars;Stand pivot transfer with walker   Amount of Assistance Required S   Tub or Shower Type Shower   Amount of Assistance Required S   Adaptive Equipment Grab bars; Tub transfer bench;Walker (comment)     S: \"I feel more steady today. \" Agreeable to therapy. Patient seated EOB at start of session; ambulated to bathroom with R/W with S. Focus of session was on increasing independence and safety with self care tasks and functional transfers (see FIM scores above). Patient was able to ambulate ~15 feet using a R/W with S.   Patient did not report pain during session. Collaborated with PT and confirmed patient is on track to reach goals as documented in the care plan.    Patient tolerated session well, but activity tolerance, dynamic standing balance and BUE strength are still below baseline and requires skilled facilitation to successfully and safely complete ADL's and transfers. Patient ended session in recliner with call remote and phone within reach.      [employer], OTR/L  2/16/2017

## 2017-02-16 NOTE — PROGRESS NOTES
PHYSICAL THERAPY DAILY NOTE  Time In: 1116  Time Out: 7744  Patient Seen For: Gait training; Therapeutic exercise    Subjective: \"I've been hurting some, and then it stopped, and now it has started again. \" Pt. Agreeable to therapy. Objective: Vital Signs:   Patient Vitals for the past 8 hrs:   Temp Pulse BP SpO2   02/16/17 0805 97.2 °F (36.2 °C) 64 132/60 98 %           Pain level: 4/10  Pain location: Posterior left hip  Pain interventions: Altered position of exercise this AM. Consulted RN about pain medication. Patient education: Educated patient on benefits of cardiovascular exercise after surgery to allow for return to pre-morbid level. Interdisciplinary Communication: Consulted with RN about pt's pain level after therapy session. Spinal, Other (comment) (fall risks)      BED/MAT MOBILITY Daily Assessment    Rolling Right : 0 (Not tested)  Rolling Left : 0 (Not tested)  Supine to Sit : 0 (Not tested)  Sit to Supine : 0 (Not tested)       TRANSFERS Daily Assessment   Required for safety. Transfer Type: SPT with walker  Transfer Assistance : 5 (Supervision/setup)  Sit to Stand Assistance: Supervision       GAIT Daily Assessment   Pt. Did not wish to ambulate further secondary to pain in left hip. Amount of Assistance: 4 (Contact guard assistance)  Distance (ft): 50 Feet (ft)  Assistive Device: Walker, rolling       BALANCE Daily Assessment    Sitting - Static: Good (unsupported)  Sitting - Dynamic: Good (unsupported)  Standing - Static: Good         LOWER EXTREMITY EXERCISES Daily Assessment   Pt. Also performed Dennise-Med for 10 minutes on level 2. Extremity: Both  Exercise Type #1: Seated lower extremity strengthening  Sets Performed: 2  Reps Performed: 15  Level of Assist: Modified independent  Exercise Type #2: Standing lower extremity strengthening  Sets Performed: 3  Reps Performed: 10  Level of Assist: Contact guard assistance     Pt.  Performed the following bilateral lower extremity exercises:  SEATED EXERCISES Sets Reps Comments   Ankle Pumps 2 15    Long Arc Quads 2 15    Hip Adduction/Ball Squeeze 2 15    Hip Abduction with Green Theraband 2 15    Hamstring Curls with Green Theraband 2 15      STANDING EXERCISES Sets Reps Comments   Heel Raises 3 15    Toe Raises 3 15    Hip Abduction 3 10 Side-stepping         Pt. Received from Recreational Therapist in therapy gym and after treatment session was returned to room and left in wheelchair with all needs in reach. Assessment: Pt. Limited by pain this treatment session. Body position and exercises were altered to allow for decreased pain. Pt. Demonstrated improvement with cardiovascular endurance and will benefit from further therapy to improve independence with safe household ambulation. Plan of Care: Continue with POC and progress as tolerated.        Misbah Ramon  2/16/2017

## 2017-02-16 NOTE — PROGRESS NOTES
Team conference; discharge scheduled for Tuesday, 2/21 with home health PT. Discussed with patient; agreeable.

## 2017-02-17 PROCEDURE — 97530 THERAPEUTIC ACTIVITIES: CPT

## 2017-02-17 PROCEDURE — 97535 SELF CARE MNGMENT TRAINING: CPT

## 2017-02-17 PROCEDURE — 65310000000 HC RM PRIVATE REHAB

## 2017-02-17 PROCEDURE — 77030011256 HC DRSG MEPILEX <16IN NO BORD MOLN -A

## 2017-02-17 PROCEDURE — 99232 SBSQ HOSP IP/OBS MODERATE 35: CPT | Performed by: PHYSICAL MEDICINE & REHABILITATION

## 2017-02-17 PROCEDURE — 97116 GAIT TRAINING THERAPY: CPT

## 2017-02-17 PROCEDURE — 74011250637 HC RX REV CODE- 250/637: Performed by: PHYSICAL MEDICINE & REHABILITATION

## 2017-02-17 PROCEDURE — 74011250636 HC RX REV CODE- 250/636: Performed by: PHYSICAL MEDICINE & REHABILITATION

## 2017-02-17 PROCEDURE — 97110 THERAPEUTIC EXERCISES: CPT

## 2017-02-17 RX ORDER — TRAMADOL HYDROCHLORIDE 50 MG/1
50 TABLET ORAL 4 TIMES DAILY
Status: DISCONTINUED | OUTPATIENT
Start: 2017-02-17 | End: 2017-02-21 | Stop reason: HOSPADM

## 2017-02-17 RX ADMIN — TRAMADOL HYDROCHLORIDE 50 MG: 50 TABLET, FILM COATED ORAL at 05:20

## 2017-02-17 RX ADMIN — ATENOLOL 12.5 MG: 25 TABLET ORAL at 21:38

## 2017-02-17 RX ADMIN — TRAMADOL HYDROCHLORIDE 50 MG: 50 TABLET, FILM COATED ORAL at 21:39

## 2017-02-17 RX ADMIN — TRAMADOL HYDROCHLORIDE 50 MG: 50 TABLET, FILM COATED ORAL at 12:15

## 2017-02-17 RX ADMIN — FUROSEMIDE 40 MG: 40 TABLET ORAL at 08:49

## 2017-02-17 RX ADMIN — CYCLOBENZAPRINE HYDROCHLORIDE 10 MG: 10 TABLET, FILM COATED ORAL at 18:18

## 2017-02-17 RX ADMIN — FERROUS SULFATE TAB 325 MG (65 MG ELEMENTAL FE) 325 MG: 325 (65 FE) TAB at 17:22

## 2017-02-17 RX ADMIN — SERTRALINE HYDROCHLORIDE 75 MG: 50 TABLET ORAL at 08:48

## 2017-02-17 RX ADMIN — ATORVASTATIN CALCIUM 20 MG: 10 TABLET, FILM COATED ORAL at 21:37

## 2017-02-17 RX ADMIN — GABAPENTIN 300 MG: 300 CAPSULE ORAL at 05:20

## 2017-02-17 RX ADMIN — ACETAMINOPHEN 650 MG: 325 TABLET, FILM COATED ORAL at 14:34

## 2017-02-17 RX ADMIN — DOCUSATE SODIUM 100 MG: 100 CAPSULE, LIQUID FILLED ORAL at 17:22

## 2017-02-17 RX ADMIN — GABAPENTIN 300 MG: 300 CAPSULE ORAL at 21:39

## 2017-02-17 RX ADMIN — OXYCODONE HYDROCHLORIDE 5 MG: 5 TABLET ORAL at 14:34

## 2017-02-17 RX ADMIN — POTASSIUM CHLORIDE 20 MEQ: 20 TABLET, EXTENDED RELEASE ORAL at 08:48

## 2017-02-17 RX ADMIN — GABAPENTIN 300 MG: 300 CAPSULE ORAL at 14:34

## 2017-02-17 RX ADMIN — HEPARIN SODIUM 5000 UNITS: 5000 INJECTION, SOLUTION INTRAVENOUS; SUBCUTANEOUS at 00:29

## 2017-02-17 RX ADMIN — ACETAMINOPHEN 650 MG: 325 TABLET, FILM COATED ORAL at 08:49

## 2017-02-17 RX ADMIN — ACETAMINOPHEN 650 MG: 325 TABLET, FILM COATED ORAL at 21:37

## 2017-02-17 RX ADMIN — DOCUSATE SODIUM 100 MG: 100 CAPSULE, LIQUID FILLED ORAL at 08:50

## 2017-02-17 RX ADMIN — HEPARIN SODIUM 5000 UNITS: 5000 INJECTION, SOLUTION INTRAVENOUS; SUBCUTANEOUS at 16:03

## 2017-02-17 RX ADMIN — TRAMADOL HYDROCHLORIDE 50 MG: 50 TABLET, FILM COATED ORAL at 17:22

## 2017-02-17 RX ADMIN — FERROUS SULFATE TAB 325 MG (65 MG ELEMENTAL FE) 325 MG: 325 (65 FE) TAB at 08:50

## 2017-02-17 RX ADMIN — HEPARIN SODIUM 5000 UNITS: 5000 INJECTION, SOLUTION INTRAVENOUS; SUBCUTANEOUS at 08:50

## 2017-02-17 RX ADMIN — PANTOPRAZOLE SODIUM 40 MG: 40 TABLET, DELAYED RELEASE ORAL at 05:20

## 2017-02-17 RX ADMIN — VALSARTAN 160 MG: 80 TABLET, FILM COATED ORAL at 08:49

## 2017-02-17 RX ADMIN — OXYCODONE HYDROCHLORIDE 5 MG: 5 TABLET ORAL at 08:51

## 2017-02-17 NOTE — PROGRESS NOTES
Subjective \"I am doing good. I am probably going to go to my son's house when I leave here and I am fine with that. \"   Activity Sewing activity   Strength/Endurance Patient tolerated the session without c/o tiredness or fatigue. Balance Sit<->stand:  CGA with RW. Patient did not lose her balance during the session. Social Interaction Patient was friendly and conversational during the session. Cognitive A&O X4   Comments Patient was assisted to her room and into the recliner chair. She was left with her lunch tray and all needs within reach. Pt D/C summary completed on this date. Please see for specifics in [address] Patient expected to be d/c'd to home on 2/21/17.   Johanna Kerr, CTRS

## 2017-02-17 NOTE — PROGRESS NOTES
PHYSICAL THERAPY DAILY NOTE  Time In: 1003  Time Out: 1046  Patient Seen For: Gait training; Therapeutic exercise    Subjective: \"I was in pain this morning but it's gotten better. \" Patient agreeable to therapy. Objective: Vital Signs:   Patient Vitals for the past 8 hrs:   Temp Pulse Resp BP SpO2   02/17/17 0716 98.7 °F (37.1 °C) 66 18 130/67 97 %           Pain level: 3/10  Pain location: Left posterior hip. Pain interventions: Altered positions with exercise, pain subsided during treatment session. Patient education: Educated patient on proper hand placement with ascend/descend stairs. Interdisciplinary Communication: Consulted with Piyush Johnson on patient's improved standing tolerance. Spinal, Other (comment) (fall risks)    BED/MAT MOBILITY Daily Assessment    Rolling Right : 0 (Not tested)  Rolling Left : 0 (Not tested)  Supine to Sit : 0 (Not tested)  Sit to Supine : 0 (Not tested)       TRANSFERS Daily Assessment   Required for safety. Transfer Type: SPT with walker  Transfer Assistance : 5 (Supervision/setup)  Sit to Stand Assistance: Supervision  Car Transfers: Not tested       GAIT Daily Assessment   Pt. Ambulated with slow edilberto and shortened step length. Amount of Assistance: 5 (Stand-by assistance)  Distance (ft): 150 Feet (ft)  Assistive Device: Walker, rolling       STEPS or STAIRS Daily Assessment   Pt. Navigated stairs with reciprocal pattern both ascending and descending. Steps/Stairs Ambulated (#): 4  Level of Assist : 5 (Stand-by assistance)  Rail Use: Both       BALANCE Daily Assessment    Sitting - Static: Good (unsupported)  Sitting - Dynamic: Good (unsupported)  Standing - Static: Good         LOWER EXTREMITY EXERCISES Daily Assessment   Patient also performed 10 minutes on Dennise-med exercise machine on gear 3.  Extremity: Both  Exercise Type #1: Seated lower extremity strengthening  Sets Performed: 2  Reps Performed: 20  Level of Assist: Supervision     Patient performed the following bilateral lower extremity exercises:    SEATED EXERCISES Sets Reps Comments   Ankle Pumps 2 20    Long Arc Quads 2 20    Hip Adduction/Ball Squeeze 2 20    Hip Abduction with Green Theraband 2 20    Hamstring Curls with Green Theraband 2 20      Patient received from room sitting in bedside chair and returned to Karina Mejias at table in therapy gym. Assessment: Patient has shown improvement with bilateral lower extremity exercises. Patient has good motivation with exercise and is very agreeable to therapy. Patient tolerated ziyad-med exercise machine well with increased gear allowing for increased lower extremity strengthening. Patient will benefit from further bilateral lower extremity strengthening to increase independence with household ambulation and safety with ascend/descend stairs independently. Plan of Care: Continue with POC and progress as tolerated.        Radha Araujo  2/17/2017

## 2017-02-17 NOTE — PROGRESS NOTES
Pt resting quietly no complaints noted respiration even and nonlabored initial assessment completed reminded pt not to get up  without assist call bell within reach

## 2017-02-17 NOTE — PROGRESS NOTES
Dressing changed to lower back. No drainage noted. Steri strips CDI. Remains continent of bowel and bladder at this time. Gait steady with walker. Lungs CTA. +bowel sounds x 4. Denies any pain or discomfort at this time.

## 2017-02-17 NOTE — PROGRESS NOTES
Pt to discharge to son's home at [address]  She will stay there until able to return her own home. HH PT to be ordered. Pt prefers [facility] as  has been using. She states that  will probably discharge home on Monday. His sister will stay with him at discharge.   Referral to be made to [facility] [place] and to [facility] per pt request.

## 2017-02-17 NOTE — PROGRESS NOTES
OT Daily Note  Time In 1044   Time Out 1115     Subjective: \"I'm worried about the cooking at home, being able to use the oven. \"   Pain: none reported  Education: modified techniques for kitchen management as related to spinal precautions  Interdisciplinary Communication: with PT regarding schedule   Precautions: Spinal  Location on arrival: handoff from PT     Self-Care Daily Assessment   Patient completed simulated activities in test kitchen practicing retrieving objects from various heights at RW level. Required verbal cues for spinal precautions. Discussed not using oven due to spinal precautions; discussed use of toaster oven and microwave as an alternative method for cooking. Also discussed having sons make meals to be frozen and use of Meals on Wheels. Recommend not using oven due to inability to safely manage while preserving spinal precautions. Activity Tolerance Daily Assessment   Patient performed reaching activity using different vertical heights and small rings with 1 UE at a time with 4 pound clothespin, working on shoulder stabilization for overhead reaching and  strengthening. Good performance with task. Will benefit from further overhead reaching tasks as patient identified being able to reach into cabinets as a goal.      Patient was left seated up in [facility] in gym for RT. Assessment: See above. Plan: Continue OT POC.      Jackelyn Moon MS, OTR/L  2/17/2017

## 2017-02-17 NOTE — PROGRESS NOTES
Gustavo Lindsey MD,   Medical Director  [address] [facility], [address] [facility]  Tel: [phone]       [facility] PROGRESS NOTE    Nicko Francois Date: 2/11/2017  Admit Diagnosis: trauna to spinal core;Spinal stenosis of lumbar region at m*    Subjective     Doing well x doesn't think pain is being controlled. Hurts when she wakes up but thinks she sleeps \"wrong\". No cp, sob, n. B/B fine. Very worried about going home. Her  may be sent home Monday and she knows she cannot care for him like she used to.  His sister is coming in to assist but she is awaiting back surgery herself    Objective:     Current Facility-Administered Medications   Medication Dose Route Frequency    traMADol (ULTRAM) tablet 50 mg  50 mg Oral QID    ferrous sulfate tablet 325 mg  1 Tab Oral BID WITH MEALS    heparin (porcine) injection 5,000 Units  5,000 Units SubCUTAneous Q8H    pantoprazole (PROTONIX) tablet 40 mg  40 mg Oral ACB    loperamide (IMODIUM) capsule 2 mg  2 mg Oral Q4H PRN    sodium phosphate (FLEET'S) enema 118 mL  1 Enema Rectal PRN    acetaminophen (TYLENOL) tablet 650 mg  650 mg Oral Q6H    cyclobenzaprine (FLEXERIL) tablet 10 mg  10 mg Oral TID PRN    docusate sodium (COLACE) capsule 100 mg  100 mg Oral BID    naloxone (NARCAN) injection 0.4 mg  0.4 mg IntraVENous EVERY 2 MINUTES AS NEEDED    oxyCODONE IR (ROXICODONE) tablet 5-10 mg  5-10 mg Oral Q4H PRN    zolpidem (AMBIEN) tablet 5 mg  5 mg Oral QHS PRN    albuterol (PROVENTIL HFA, VENTOLIN HFA, PROAIR HFA) inhaler 2 Puff  2 Puff Inhalation Q6H PRN    albuterol-ipratropium (DUO-NEB) 2.5 MG-0.5 MG/3 ML  3 mL Nebulization Q6H PRN    atenolol (TENORMIN) tablet 12.5 mg  12.5 mg Oral QHS    atorvastatin (LIPITOR) tablet 20 mg  20 mg Oral QHS    bisacodyl (DULCOLAX) suppository 10 mg  10 mg Rectal DAILY PRN    furosemide (LASIX) tablet 40 mg  40 mg Oral DAILY    gabapentin (NEURONTIN) capsule 300 mg 300 mg Oral TID    nitroglycerin (NITROSTAT) tablet 0.4 mg  0.4 mg SubLINGual Q5MIN PRN    potassium chloride (K-DUR, KLOR-CON) SR tablet 20 mEq  20 mEq Oral DAILY    sertraline (ZOLOFT) tablet 75 mg  75 mg Oral DAILY    valsartan (DIOVAN) tablet 160 mg  160 mg Oral DAILY     Review of Systems:Denies chest pain, shortness of breath, cough, headache, visual problems, abdominal pain, dysurea, calf pain. Pertinent positives are as noted in the medical records and unremarkable otherwise. Visit Vitals    /67    Pulse 66    Temp 98.7 °F (37.1 °C)    Resp 18    SpO2 97%    Breastfeeding No        Physical Exam:   General: Alert and age appropriately oriented. No acute cardio respiratory distress. HEENT: Normocephalic,no scleral icterus  Oral mucosa moist without cyanosis   Lungs: Clear to auscultation  bilaterally. Respiration even and unlabored   Heart: Regular rate and rhythm, S1, S2   No  murmurs, clicks, rub or gallops   Abdomen: Soft, non-tender, nondistended. Bowel sounds present. No organomegaly. Genitourinary: Benign . Neuromuscular:      Grossly no focal motor deficits noted. Moves ankles. Ankle dorsiflexion 5/5  Ankle plantarflexion 5/5  No sensory deficits distally. Exam limited by pain. Skin/extremity: No rashes, no erythema. No calf tenderness BLE  Wound covered.                                                                             Functional Assessment:          Balance  Sitting - Static: Good (unsupported) (02/16/17 1400)  Sitting - Dynamic: Good (unsupported) (02/16/17 1400)  Standing - Static: Good (02/16/17 1400)  Standing - Dynamic : Impaired (02/15/17 1100)           Toileting  Cues: Verbal cues provided (02/16/17 0832)  Adaptive Equipment: Elevated seat;Grab bars (02/16/17 0832)         Demi Monsalve Fall Risk Assessment:  Cinderella Lightning Risk  Mobility: Ambulates or transfers with assist devices or assistance/unsteady gait (02/16/17 4495)  Mentation: Alert, oriented x 3 (02/16/17 2245)  Medication: Patient receiving anticonvulsants, sedatives(tranquilizers), psychotropics or hypnotics, hypoglycemics, narcotics, sleep aids, antihypertensives, laxatives, or diuretics (02/16/17 2245)  Elimination: Needs assistance with toileting (02/16/17 2245)  Fall History in the Past 3 Months: Before admission (02/16/17 2245)  Total Score: 4 (02/16/17 2245)     Speech Assessment:         Ambulation:  Gait  Distance (ft): 150 Feet (ft) (02/16/17 1400)  Assistive Device: Walker, rolling (02/16/17 1400)  Rail Use: Both (02/16/17 1400)     Labs/Studies:  Recent Results (from the past 72 hour(s))   CBC W/O DIFF    Collection Time: 02/16/17  6:15 AM   Result Value Ref Range    WBC 7.2 4.3 - 11.1 K/uL    RBC 3.27 (L) 4.05 - 5.25 M/uL    HGB 9.1 (L) 11.7 - 15.4 g/dL    HCT 29.5 (L) 35.8 - 46.3 %    MCV 90.2 79.6 - 97.8 FL    MCH 27.8 26.1 - 32.9 PG    MCHC 30.8 (L) 31.4 - 35.0 g/dL    RDW 13.5 11.9 - 14.6 %    PLATELET 710 846 - 355 K/uL    MPV 9.6 (L) 10.8 - 62.0 FL   METABOLIC PANEL, BASIC    Collection Time: 02/16/17  6:15 AM   Result Value Ref Range    Sodium 145 136 - 145 mmol/L    Potassium 4.0 3.5 - 5.1 mmol/L    Chloride 109 (H) 98 - 107 mmol/L    CO2 29 21 - 32 mmol/L    Anion gap 7 7 - 16 mmol/L    Glucose 94 65 - 100 mg/dL    BUN 12 8 - 23 MG/DL    Creatinine 0.98 0.6 - 1.0 MG/DL    GFR est AA >60 >60 ml/min/1.73m2    GFR est non-AA 59 (L) >60 ml/min/1.73m2    Calcium 8.4 8.3 - 10.4 MG/DL   OCCULT BLOOD, STOOL    Collection Time: 02/16/17  2:20 PM   Result Value Ref Range    Occult blood, stool NEGATIVE  NEG         Assessment:     Problem List as of 2/17/2017  Date Reviewed: 10/5/2016          Codes Class Noted - Resolved    * (Principal)S/P laminectomy with spinal fusion ICD-10-CM: Z98.1  ICD-9-CM: V45.4  2/11/2017 - Present        Spinal stenosis of lumbar region at multiple levels ICD-10-CM: M48.06  ICD-9-CM: 724.02  2/11/2017 - Present        Lumbar stenosis with neurogenic claudication ICD-10-CM: M48.06  ICD-9-CM: 724.03  2/7/2017 - Present        Asymptomatic carotid artery stenosis (Chronic) ICD-10-CM: J08.62  ICD-9-CM: 433.10  11/4/2016 - Present        Valvular heart disease (Chronic) ICD-10-CM: I38  ICD-9-CM: 424.90  11/4/2016 - Present        Coronary artery disease involving native coronary artery of native heart without angina pectoris (Chronic) ICD-10-CM: I25.10  ICD-9-CM: 414.01  11/4/2016 - Present        On potassium wasting diuretic therapy (Chronic) ICD-10-CM: L98.146  ICD-9-CM: V58.69  11/4/2016 - Present        Obesity (BMI 30.0-34.9) (Chronic) ICD-10-CM: E66.9  ICD-9-CM: 278.00  11/4/2016 - Present        Mixed hyperlipidemia (Chronic) ICD-10-CM: E78.2  ICD-9-CM: 272.2  4/21/2016 - Present        RENEE (stress urinary incontinence, female) (Chronic) ICD-10-CM: N39.3  ICD-9-CM: 625.6  2/24/2016 - Present        Rectocele ICD-10-CM: N81.6  ICD-9-CM: 618.04  2/24/2016 - Present        Weakening of rectovaginal tissue ICD-10-CM: N81.83  ICD-9-CM: 618.82  2/24/2016 - Present        Depression (Chronic) ICD-10-CM: F32.9  ICD-9-CM: 687  6/30/2015 - Present        Contact dermatitis and other eczema, due to unspecified cause ICD-10-CM: L25.9  ICD-9-CM: 692.9  1/7/2015 - Present        Essential hypertension (Chronic) ICD-10-CM: I10  ICD-9-CM: 401.9  1/7/2015 - Present        Other and unspecified hyperlipidemia ICD-10-CM: E78.5  ICD-9-CM: 272.4  1/7/2015 - Present        Mitral valve disorders ICD-10-CM: I05.9  ICD-9-CM: 424.0  1/7/2015 - Present        Hematuria, unspecified ICD-10-CM: R31.9  ICD-9-CM: 599.70  1/7/2015 - Present        Dysuria ICD-10-CM: R30.0  ICD-9-CM: 788.1  1/7/2015 - Present        Anemia, unspecified ICD-10-CM: D64.9  ICD-9-CM: 285.9  1/7/2015 - Present        Other B-complex deficiencies ICD-10-CM: E53.8  ICD-9-CM: 266.2  1/7/2015 - Present        Arthropathies (Chronic) ICD-10-CM: M12.9  ICD-9-CM: 716.90  1/7/2015 - Present    Overview Signed 1/7/2015 10:11 AM by Marlena Haynes     Multiple sites             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  ICD-9-CM: 756.19  1/7/2015 - Present        Other malaise and fatigue ICD-10-CM: R53.81, R53.83  ICD-9-CM: 780.79  1/7/2015 - Present        Allergic rhinitis, cause unspecified ICD-10-CM: J30.9  ICD-9-CM: 477.9  1/7/2015 - Present        Aortic valve disorders ICD-10-CM: I35.9  ICD-9-CM: 424.1  1/7/2015 - Present        Extrinsic asthma, unspecified (Chronic) ICD-10-CM: J45.909  ICD-9-CM: 493.00  1/7/2015 - Present        Respiratory abnormality, unspecified ICD-10-CM: J98.9  ICD-9-CM: 786.00  1/7/2015 - Present        Congestive heart failure, unspecified ICD-10-CM: I50.9  ICD-9-CM: 428.0  1/7/2015 - Present        Occlusion and stenosis of carotid artery without mention of cerebral infarction ICD-10-CM: I65.29  ICD-9-CM: 433.10  1/7/2015 - Present        Carpal tunnel syndrome ICD-10-CM: G56.00  ICD-9-CM: 354.0  1/7/2015 - Present        Unspecified urinary incontinence ICD-10-CM: R32  ICD-9-CM: 788.30  1/7/2015 - Present        Carotid stenosis ICD-10-CM: I65.29  ICD-9-CM: 433.10  1/7/2015 - Present        Renal insufficiency ICD-10-CM: N28.9  ICD-9-CM: 593.9  1/7/2015 - Present        Heart disease, unspecified ICD-10-CM: I51.9  ICD-9-CM: 429.9  1/7/2015 - Present        Shortness of breath ICD-10-CM: R06.02  ICD-9-CM: 786.05  1/7/2015 - Present        Osteopenia ICD-10-CM: M85.80  ICD-9-CM: 733.90  1/7/2015 - Present        Other chest pain ICD-10-CM: R07.89  ICD-9-CM: 786.59  3/7/2011 - Present        GERD (gastroesophageal reflux disease) (Chronic) ICD-10-CM: K21.9  ICD-9-CM: 530.81  3/4/2011 - Present        Asthma (Chronic) ICD-10-CM: J45.909  ICD-9-CM: 493.90  3/4/2011 - Present              Plan:      Assessment: This is an 66 YOF with PMH of debilitatin lumbar spondylolisthesis/stenosis with neurogenic claudication, is s/p L4-S1 lumbar laminectomy, L4-5 fusion and instrumentation.  Patient admitted to IRU to continue acute medical rehab program.      Continue daily physician medical management:  s/p L4-S1 lumbar laminectomy, L4-5 fusion and instrumentation.   - lumbar spine precautions. TLFO when out of bed.  - continue to monitor wound for infection, bleeding, wound dehiscence.       Elevated creatinine/ Renal insufficiency- monitor; check BMP 2/16; creatinine down from 1.32 to . 80      Post op acute blood loss anemia - monitor; h/h is stable; recheck 2/16; hbg down to 9.1 (14 preop); add iron and monitor; recheck 2/19      Hx of CHF -continued on lasix, KCl      CAD (coronary artery disease)      Hypertension - continue diovan, atenolol. adjust as needed.       PUD (peptic ulcer disease)- continue pepcid.       Pneumonia prophylaxis- Insentive spirometer every hour while awake      DVT risk / DVT Prophylaxis- Will require daily physician exam to assess for signs and symptoms as patient is at increased risk for of thromboembolism. Mobilization as tolerated. Intermittent pneumatic compression devices when in bed Thigh-high or knee-high thromboembolic deterrent hose when out of bed. Holding anticoagulation until ok per spine ortho. Pt POD #7, will start sc Hep 2/14      Pain Control: stable, mild-to-moderate joint symptoms intermittently, reasonably well controlled by PRN meds. Will require regular pain assessment and comprenhensive pain management. Continue scheduled tylenol. Continued on roxicodone, neurontin. . Prn flexeril. 2/15 added ultram yesterday and have asked nursing to alternate with boo; 2/16 improved pain control; 2./17 will schedule her ultram to 50mg qid and continue boo 5-10 mg q 4 prn      Wound Care: Monitor wound status daily per staff and physician. At risk for failure. Will require 24/7 rehab nursing. Keep wound clean and dry.      Potential urinary retention/ neurogenic bladder - schedule voids q6-8 hrs. Check post-void residual every shift;  In and Out catheterize if post-void residual is more than 400 cc.; 2/14 no residuals      bowel program - senna, colace. Monitor. 2/13 reports no Bm for over a week. Add Lactulose and miralax. Needs bm ASAP. No evidence clinically of ileus; 2/15 lg BM x 3 . Dispo; will dw case mgt. Pt must be mod I to go home and CANNOT provide physical assist to  who requires care with all adls and mobility; on strict spinal precautions and I am certain that she will not follow them      GERD - resume pepcid. Time spent was 25 minutes with over 1/2 in direct patient care/examination, consultation and coordination of care.      Signed By: Jewell Rodriguez MD     February 17, 2017

## 2017-02-17 NOTE — PROGRESS NOTES
Time In 0741   Time Out 0825     Activities of Daily Living    Score Comments   Grooming 7 Tasks completed by patient: Brushed teeth  Comments: I in sitting   Bathing 6 Body Parts Bathe: Abdomen, Arm, left, Arm, right, Buttocks, Chest, Lower leg and foot, left, Lower leg and foot, right, Ivy area, Thigh, left, Thigh, right  Comments: LHS   Tub/Shower Transfer [place] 6 Type of Shower: Shower  Adaptive  Equipment:Tub transfer bench, Grab bars and Walker  Comments: RW   Upper Body  Dressing/  Undressing 6 Items Applied:Pullover (4 steps)  Comments: RW to gather clothes   Lower Body Dressing/  Undressing 6 Items Applied:Shoe, left (1 step), Shoe, right (1 step), Sock, left (1 step), Sock, right (1 step), Underpants (3 steps), Elastic waist pants (3 steps)  Comments: Sock aide; RW; reacher   Education  Gains made     Pt was sitting EOB eating breakfast and agreeable to therapy. Pt's performance with ADL is reflected in above chart. Pt was able to gather all items including appropriate adaptive equipment. Pt was left in w/c with all needs within reach. Continue with POC.      Melissa RAMSAY/L  2/17/2017

## 2017-02-17 NOTE — PROGRESS NOTES
PHYSICAL THERAPY DAILY NOTE  Time In: 1302  Time Out: 3797  Patient Seen For: Gait training; Therapeutic exercise    Subjective: \"I'm feeling ok this afternoon. \" Patient agreeable to therapy. Objective: Vital Signs:   Patient Vitals for the past 8 hrs:   Temp Pulse Resp BP SpO2   02/17/17 1510 98.3 °F (36.8 °C) 89 16 109/53 92 %           Pain level: 4/10  Pain location: Posterior left hip  Pain interventions: Consulted LOLY Marsh about pain levels. Patient education: Educated patient on benefits of rollator for ambulation. Patient was agreeable to attempting rollator next treatment session. Interdisciplinary Communication: Consulted with LOLY Marsh about patient's pain levels. Also consulted with Pfenex,  about patient's discharge to son's house rather than home alone. Spinal, Other (comment) (fall risks)    BED/MAT MOBILITY Daily Assessment   Required for safety. Rolling Right : 0 (Not tested)  Rolling Left : 5 (Supervision)  Supine to Sit : 0 (Not tested)  Sit to Supine : 5 (Supervision)       TRANSFERS Daily Assessment   Required for safety. Transfer Type: SPT with walker  Transfer Assistance : 5 (Supervision/setup)  Sit to Stand Assistance: Stand-by assistance  Car Transfers: Supervision  Car Type: Rehab Car       GAIT Daily Assessment   Patient ambulated with bilateral short step length and decreased edilberto. Patient ambulated up and down ramp with SBA.  Amount of Assistance: 5 (Stand-by assistance)  Distance (ft): 175 Feet (ft)  Assistive Device: Walker, rolling         BALANCE Daily Assessment    Sitting - Static: Good (unsupported)  Sitting - Dynamic: Good (unsupported)  Standing - Static: Good       LOWER EXTREMITY EXERCISES Daily Assessment    Extremity: Both  Exercise Type #1: Standing lower extremity strengthening  Sets Performed: 4  Reps Performed: 10  Level of Assist: Supervision     Patient performed the following bilateral lower extremity exercises:    STANDING EXERCISES Sets Reps Comments   Heel Raises 3 10    Toe Raises 3 10    Hamstring Curls 3 10    Hip Abduction 3 10 Side-stepping     Patient received from room in bedside chair with daughter-in-law present and returned to room and left supine in bed with all needs in reach. Assessment: Patient demonstrated good understanding of car transfers with good safety awareness. Patient continues to make progress with tolerance to ambulation. Patient will benefit from further therapy to improve safety awareness and independence with household ambulation. Plan of Care: Continue with POC and progress as tolerated.        Katy Pires  2/17/2017

## 2017-02-18 PROCEDURE — 97116 GAIT TRAINING THERAPY: CPT

## 2017-02-18 PROCEDURE — 97110 THERAPEUTIC EXERCISES: CPT

## 2017-02-18 PROCEDURE — 65310000000 HC RM PRIVATE REHAB

## 2017-02-18 PROCEDURE — 74011250637 HC RX REV CODE- 250/637: Performed by: PHYSICAL MEDICINE & REHABILITATION

## 2017-02-18 PROCEDURE — 74011250636 HC RX REV CODE- 250/636: Performed by: PHYSICAL MEDICINE & REHABILITATION

## 2017-02-18 PROCEDURE — 99232 SBSQ HOSP IP/OBS MODERATE 35: CPT | Performed by: PHYSICAL MEDICINE & REHABILITATION

## 2017-02-18 PROCEDURE — 97530 THERAPEUTIC ACTIVITIES: CPT

## 2017-02-18 RX ADMIN — OXYCODONE HYDROCHLORIDE 10 MG: 5 TABLET ORAL at 19:36

## 2017-02-18 RX ADMIN — DOCUSATE SODIUM 100 MG: 100 CAPSULE, LIQUID FILLED ORAL at 08:19

## 2017-02-18 RX ADMIN — TRAMADOL HYDROCHLORIDE 50 MG: 50 TABLET, FILM COATED ORAL at 17:00

## 2017-02-18 RX ADMIN — GABAPENTIN 300 MG: 300 CAPSULE ORAL at 05:00

## 2017-02-18 RX ADMIN — DOCUSATE SODIUM 100 MG: 100 CAPSULE, LIQUID FILLED ORAL at 17:00

## 2017-02-18 RX ADMIN — TRAMADOL HYDROCHLORIDE 50 MG: 50 TABLET, FILM COATED ORAL at 20:35

## 2017-02-18 RX ADMIN — FERROUS SULFATE TAB 325 MG (65 MG ELEMENTAL FE) 325 MG: 325 (65 FE) TAB at 08:20

## 2017-02-18 RX ADMIN — HEPARIN SODIUM 5000 UNITS: 5000 INJECTION, SOLUTION INTRAVENOUS; SUBCUTANEOUS at 16:27

## 2017-02-18 RX ADMIN — SERTRALINE HYDROCHLORIDE 75 MG: 50 TABLET ORAL at 08:20

## 2017-02-18 RX ADMIN — GABAPENTIN 300 MG: 300 CAPSULE ORAL at 20:34

## 2017-02-18 RX ADMIN — FUROSEMIDE 40 MG: 40 TABLET ORAL at 08:19

## 2017-02-18 RX ADMIN — ACETAMINOPHEN 650 MG: 325 TABLET, FILM COATED ORAL at 04:59

## 2017-02-18 RX ADMIN — HEPARIN SODIUM 5000 UNITS: 5000 INJECTION, SOLUTION INTRAVENOUS; SUBCUTANEOUS at 08:20

## 2017-02-18 RX ADMIN — OXYCODONE HYDROCHLORIDE 5 MG: 5 TABLET ORAL at 05:04

## 2017-02-18 RX ADMIN — ACETAMINOPHEN 650 MG: 325 TABLET, FILM COATED ORAL at 20:34

## 2017-02-18 RX ADMIN — GABAPENTIN 300 MG: 300 CAPSULE ORAL at 14:08

## 2017-02-18 RX ADMIN — ACETAMINOPHEN 650 MG: 325 TABLET, FILM COATED ORAL at 08:20

## 2017-02-18 RX ADMIN — ATENOLOL 12.5 MG: 25 TABLET ORAL at 20:34

## 2017-02-18 RX ADMIN — OXYCODONE HYDROCHLORIDE 10 MG: 5 TABLET ORAL at 00:23

## 2017-02-18 RX ADMIN — OXYCODONE HYDROCHLORIDE 5 MG: 5 TABLET ORAL at 10:18

## 2017-02-18 RX ADMIN — ATORVASTATIN CALCIUM 20 MG: 10 TABLET, FILM COATED ORAL at 20:34

## 2017-02-18 RX ADMIN — POTASSIUM CHLORIDE 20 MEQ: 20 TABLET, EXTENDED RELEASE ORAL at 08:19

## 2017-02-18 RX ADMIN — ACETAMINOPHEN 650 MG: 325 TABLET, FILM COATED ORAL at 14:08

## 2017-02-18 RX ADMIN — HEPARIN SODIUM 5000 UNITS: 5000 INJECTION, SOLUTION INTRAVENOUS; SUBCUTANEOUS at 00:14

## 2017-02-18 RX ADMIN — FERROUS SULFATE TAB 325 MG (65 MG ELEMENTAL FE) 325 MG: 325 (65 FE) TAB at 16:37

## 2017-02-18 RX ADMIN — VALSARTAN 160 MG: 80 TABLET, FILM COATED ORAL at 08:19

## 2017-02-18 RX ADMIN — PANTOPRAZOLE SODIUM 40 MG: 40 TABLET, DELAYED RELEASE ORAL at 05:00

## 2017-02-18 RX ADMIN — TRAMADOL HYDROCHLORIDE 50 MG: 50 TABLET, FILM COATED ORAL at 12:06

## 2017-02-18 RX ADMIN — TRAMADOL HYDROCHLORIDE 50 MG: 50 TABLET, FILM COATED ORAL at 08:19

## 2017-02-18 NOTE — PROGRESS NOTES
Ele Kinney MD,   Medical Director  [address] [facility], [address] [facility]  Tel: [phone]       D PROGRESS NOTE    Liliane Bourgeois Date: 2/11/2017  Admit Diagnosis: trauna to spinal core;Spinal stenosis of lumbar region at m*    Subjective     Doing well. No cp, sob, n. Pain better controlled.  may be dc on Monday and he now agrees to [facility]. Pt very happy about this. She realizes that she cannot continue to be his caretaker.      Objective:     Current Facility-Administered Medications   Medication Dose Route Frequency    traMADol (ULTRAM) tablet 50 mg  50 mg Oral QID    ferrous sulfate tablet 325 mg  1 Tab Oral BID WITH MEALS    heparin (porcine) injection 5,000 Units  5,000 Units SubCUTAneous Q8H    pantoprazole (PROTONIX) tablet 40 mg  40 mg Oral ACB    loperamide (IMODIUM) capsule 2 mg  2 mg Oral Q4H PRN    sodium phosphate (FLEET'S) enema 118 mL  1 Enema Rectal PRN    acetaminophen (TYLENOL) tablet 650 mg  650 mg Oral Q6H    cyclobenzaprine (FLEXERIL) tablet 10 mg  10 mg Oral TID PRN    docusate sodium (COLACE) capsule 100 mg  100 mg Oral BID    naloxone (NARCAN) injection 0.4 mg  0.4 mg IntraVENous EVERY 2 MINUTES AS NEEDED    oxyCODONE IR (ROXICODONE) tablet 5-10 mg  5-10 mg Oral Q4H PRN    zolpidem (AMBIEN) tablet 5 mg  5 mg Oral QHS PRN    albuterol (PROVENTIL HFA, VENTOLIN HFA, PROAIR HFA) inhaler 2 Puff  2 Puff Inhalation Q6H PRN    albuterol-ipratropium (DUO-NEB) 2.5 MG-0.5 MG/3 ML  3 mL Nebulization Q6H PRN    atenolol (TENORMIN) tablet 12.5 mg  12.5 mg Oral QHS    atorvastatin (LIPITOR) tablet 20 mg  20 mg Oral QHS    bisacodyl (DULCOLAX) suppository 10 mg  10 mg Rectal DAILY PRN    furosemide (LASIX) tablet 40 mg  40 mg Oral DAILY    gabapentin (NEURONTIN) capsule 300 mg  300 mg Oral TID    nitroglycerin (NITROSTAT) tablet 0.4 mg  0.4 mg SubLINGual Q5MIN PRN    potassium chloride (K-DUR, KLOR-CON) SR tablet 20 mEq  20 mEq Oral DAILY    sertraline (ZOLOFT) tablet 75 mg  75 mg Oral DAILY    valsartan (DIOVAN) tablet 160 mg  160 mg Oral DAILY     Review of Systems:Denies chest pain, shortness of breath, cough, headache, visual problems, abdominal pain, dysurea, calf pain. Pertinent positives are as noted in the medical records and unremarkable otherwise. Visit Vitals    /54 (BP 1 Location: Right arm, BP Patient Position: Supine)    Pulse (!) 58    Temp 97.8 °F (36.6 °C)    Resp 18    SpO2 95%    Breastfeeding No        Physical Exam:   General: Alert and age appropriately oriented. No acute cardio respiratory distress. HEENT: Normocephalic,no scleral icterus  Oral mucosa moist without cyanosis   Lungs: Clear to auscultation  bilaterally. Respiration even and unlabored   Heart: Regular rate and rhythm, S1, S2   No  murmurs, clicks, rub or gallops   Abdomen: Soft, non-tender, nondistended. Bowel sounds present. No organomegaly. Genitourinary: Benign . Neuromuscular:      Grossly no focal motor deficits noted. Moves ankles. Ankle dorsiflexion 5/5  Ankle plantarflexion 5/5  No sensory deficits distally. Exam limited by pain. Skin/extremity: No rashes, no erythema. No calf tenderness BLE  Wound covered.                                                                             Functional Assessment:          Balance  Sitting - Static: Good (unsupported) (02/18/17 0800)  Sitting - Dynamic: Good (unsupported) (02/18/17 0800)  Standing - Static: Good (02/18/17 0800)  Standing - Dynamic : Impaired (02/18/17 0800)           Toileting  Cues: Verbal cues provided (02/16/17 0832)  Adaptive Equipment: Elevated seat;Grab bars (02/16/17 0832)         Demi Monsalve Fall Risk Assessment:  Cinderella Lightning Risk  Mobility: Ambulates or transfers with assist devices or assistance/unsteady gait (02/18/17 0735)  Mentation: Alert, oriented x 3 (02/18/17 0735)  Medication: Patient receiving anticonvulsants, sedatives(tranquilizers), psychotropics or hypnotics, hypoglycemics, narcotics, sleep aids, antihypertensives, laxatives, or diuretics (02/18/17 0735)  Elimination: Needs assistance with toileting (02/18/17 0735)  Fall History in the Past 3 Months: Before admission (02/18/17 0735)  Total Score: 4 (02/18/17 0735)     Speech Assessment:         Ambulation:  Gait  Distance (ft): 200 Feet (ft) (02/18/17 0800)  Assistive Device: Dorn Olszewski, rolling (02/18/17 0800)  Rail Use: Both (02/18/17 0800)     Labs/Studies:  Recent Results (from the past 72 hour(s))   CBC W/O DIFF    Collection Time: 02/16/17  6:15 AM   Result Value Ref Range    WBC 7.2 4.3 - 11.1 K/uL    RBC 3.27 (L) 4.05 - 5.25 M/uL    HGB 9.1 (L) 11.7 - 15.4 g/dL    HCT 29.5 (L) 35.8 - 46.3 %    MCV 90.2 79.6 - 97.8 FL    MCH 27.8 26.1 - 32.9 PG    MCHC 30.8 (L) 31.4 - 35.0 g/dL    RDW 13.5 11.9 - 14.6 %    PLATELET 552 570 - 002 K/uL    MPV 9.6 (L) 10.8 - 67.5 FL   METABOLIC PANEL, BASIC    Collection Time: 02/16/17  6:15 AM   Result Value Ref Range    Sodium 145 136 - 145 mmol/L    Potassium 4.0 3.5 - 5.1 mmol/L    Chloride 109 (H) 98 - 107 mmol/L    CO2 29 21 - 32 mmol/L    Anion gap 7 7 - 16 mmol/L    Glucose 94 65 - 100 mg/dL    BUN 12 8 - 23 MG/DL    Creatinine 0.98 0.6 - 1.0 MG/DL    GFR est AA >60 >60 ml/min/1.73m2    GFR est non-AA 59 (L) >60 ml/min/1.73m2    Calcium 8.4 8.3 - 10.4 MG/DL   OCCULT BLOOD, STOOL    Collection Time: 02/16/17  2:20 PM   Result Value Ref Range    Occult blood, stool NEGATIVE  NEG         Assessment:     Problem List as of 2/18/2017  Date Reviewed: 10/5/2016          Codes Class Noted - Resolved    * (Principal)S/P laminectomy with spinal fusion ICD-10-CM: Z98.1  ICD-9-CM: V45.4  2/11/2017 - Present        Spinal stenosis of lumbar region at multiple levels ICD-10-CM: M48.06  ICD-9-CM: 724.02  2/11/2017 - Present        Lumbar stenosis with neurogenic claudication ICD-10-CM: M48.06  ICD-9-CM: 724.03  2/7/2017 - Present Asymptomatic carotid artery stenosis (Chronic) ICD-10-CM: F84.34  ICD-9-CM: 433.10  11/4/2016 - Present        Valvular heart disease (Chronic) ICD-10-CM: I38  ICD-9-CM: 424.90  11/4/2016 - Present        Coronary artery disease involving native coronary artery of native heart without angina pectoris (Chronic) ICD-10-CM: I25.10  ICD-9-CM: 414.01  11/4/2016 - Present        On potassium wasting diuretic therapy (Chronic) ICD-10-CM: K86.354  ICD-9-CM: V58.69  11/4/2016 - Present        Obesity (BMI 30.0-34.9) (Chronic) ICD-10-CM: E66.9  ICD-9-CM: 278.00  11/4/2016 - Present        Mixed hyperlipidemia (Chronic) ICD-10-CM: E78.2  ICD-9-CM: 272.2  4/21/2016 - Present        RENEE (stress urinary incontinence, female) (Chronic) ICD-10-CM: N39.3  ICD-9-CM: 625.6  2/24/2016 - Present        Rectocele ICD-10-CM: N81.6  ICD-9-CM: 618.04  2/24/2016 - Present        Weakening of rectovaginal tissue ICD-10-CM: N81.83  ICD-9-CM: 618.82  2/24/2016 - Present        Depression (Chronic) ICD-10-CM: F32.9  ICD-9-CM: 748  6/30/2015 - Present        Contact dermatitis and other eczema, due to unspecified cause ICD-10-CM: L25.9  ICD-9-CM: 692.9  1/7/2015 - Present        Essential hypertension (Chronic) ICD-10-CM: I10  ICD-9-CM: 401.9  1/7/2015 - Present        Other and unspecified hyperlipidemia ICD-10-CM: E78.5  ICD-9-CM: 272.4  1/7/2015 - Present        Mitral valve disorders ICD-10-CM: I05.9  ICD-9-CM: 424.0  1/7/2015 - Present        Hematuria, unspecified ICD-10-CM: R31.9  ICD-9-CM: 599.70  1/7/2015 - Present        Dysuria ICD-10-CM: R30.0  ICD-9-CM: 788.1  1/7/2015 - Present        Anemia, unspecified ICD-10-CM: D64.9  ICD-9-CM: 285.9  1/7/2015 - Present        Other B-complex deficiencies ICD-10-CM: E53.8  ICD-9-CM: 266.2  1/7/2015 - Present        Arthropathies (Chronic) ICD-10-CM: M12.9  ICD-9-CM: 716.90  1/7/2015 - Present    Overview Signed 1/7/2015 10:11 AM by Jamia Muniz     Multiple sites             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  ICD-9-CM: 756.19  1/7/2015 - Present        Other malaise and fatigue ICD-10-CM: R53.81, R53.83  ICD-9-CM: 780.79  1/7/2015 - Present        Allergic rhinitis, cause unspecified ICD-10-CM: J30.9  ICD-9-CM: 477.9  1/7/2015 - Present        Aortic valve disorders ICD-10-CM: I35.9  ICD-9-CM: 424.1  1/7/2015 - Present        Extrinsic asthma, unspecified (Chronic) ICD-10-CM: J45.909  ICD-9-CM: 493.00  1/7/2015 - Present        Respiratory abnormality, unspecified ICD-10-CM: J98.9  ICD-9-CM: 786.00  1/7/2015 - Present        Congestive heart failure, unspecified ICD-10-CM: I50.9  ICD-9-CM: 428.0  1/7/2015 - Present        Occlusion and stenosis of carotid artery without mention of cerebral infarction ICD-10-CM: I65.29  ICD-9-CM: 433.10  1/7/2015 - Present        Carpal tunnel syndrome ICD-10-CM: G56.00  ICD-9-CM: 354.0  1/7/2015 - Present        Unspecified urinary incontinence ICD-10-CM: R32  ICD-9-CM: 788.30  1/7/2015 - Present        Carotid stenosis ICD-10-CM: I65.29  ICD-9-CM: 433.10  1/7/2015 - Present        Renal insufficiency ICD-10-CM: N28.9  ICD-9-CM: 593.9  1/7/2015 - Present        Heart disease, unspecified ICD-10-CM: I51.9  ICD-9-CM: 429.9  1/7/2015 - Present        Shortness of breath ICD-10-CM: R06.02  ICD-9-CM: 786.05  1/7/2015 - Present        Osteopenia ICD-10-CM: M85.80  ICD-9-CM: 733.90  1/7/2015 - Present        Other chest pain ICD-10-CM: R07.89  ICD-9-CM: 786.59  3/7/2011 - Present        GERD (gastroesophageal reflux disease) (Chronic) ICD-10-CM: K21.9  ICD-9-CM: 530.81  3/4/2011 - Present        Asthma (Chronic) ICD-10-CM: J45.909  ICD-9-CM: 493.90  3/4/2011 - Present              Plan:   Assessment: This is an 66 YOF with PMH of debilitatin lumbar spondylolisthesis/stenosis with neurogenic claudication, is s/p L4-S1 lumbar laminectomy, L4-5 fusion and instrumentation.  Patient admitted to IRU to continue acute medical rehab program.      Continue daily physician medical management:  s/p L4-S1 lumbar laminectomy, L4-5 fusion and instrumentation.   - lumbar spine precautions. TLFO when out of bed.  - continue to monitor wound for infection, bleeding, wound dehiscence.       Elevated creatinine/ Renal insufficiency- monitor; check BMP 2/16; creatinine down from 1.32 to . 80      Post op acute blood loss anemia - monitor; h/h is stable; recheck 2/16; hbg down to 9.1 (14 preop); add iron and monitor; recheck 2/19      Hx of CHF -continued on lasix, KCl      CAD (coronary artery disease)      Hypertension - continue diovan, atenolol. adjust as needed.       PUD (peptic ulcer disease)- continue pepcid.       Pneumonia prophylaxis- Insentive spirometer every hour while awake      DVT risk / DVT Prophylaxis- Will require daily physician exam to assess for signs and symptoms as patient is at increased risk for of thromboembolism. Mobilization as tolerated. Intermittent pneumatic compression devices when in bed Thigh-high or knee-high thromboembolic deterrent hose when out of bed. Holding anticoagulation until ok per spine ortho. Pt POD #7, will start sc Hep 2/14      Pain Control: stable, mild-to-moderate joint symptoms intermittently, reasonably well controlled by PRN meds. Will require regular pain assessment and comprenhensive pain management. Continue scheduled tylenol. Continued on roxicodone, neurontin. . Prn flexeril. 2/15 added ultram yesterday and have asked nursing to alternate with boo; 2/16 improved pain control; 2./17 will schedule her ultram to 50mg qid and continue boo 5-10 mg q 4 prn      Wound Care: Monitor wound status daily per staff and physician. At risk for failure. Will require 24/7 rehab nursing. Keep wound clean and dry.      Potential urinary retention/ neurogenic bladder - schedule voids q6-8 hrs. Check post-void residual every shift;  In and Out catheterize if post-void residual is more than 400 cc.; 2/14 no residuals      bowel program - senna, colace. Monitor. 2/13 reports no Bm for over a week. Add Lactulose and miralax. Needs bm ASAP. No evidence clinically of ileus; 2/15 lg BM x 3 .     Dispo; will dw case mgt. Pt must be mod I to go home and CANNOT provide physical assist to  who requires care with all adls and mobility; on strict spinal precautions and I am certain that she will not follow them;2/18 her plan is to dc home with son and  will dc under family and hospice care      GERD - resume pepcid. Post op anemia; started iron. hgb 9.1; f/u in a.m 2.19              Time spent was 25 minutes with over 1/2 in direct patient care/examination, consultation and coordination of care.      Signed By: Sriram Lemus MD     February 18, 2017

## 2017-02-18 NOTE — PROGRESS NOTES
PHYSICAL THERAPY DAILY NOTE  Time In: 0827  Time Our 905  Patient Seen For: AM;Gait training;Patient education; Therapeutic exercise;Transfer training; Other (see progress notes)    Subjective: patient reporting she feels OK this AM. Reports she is going home first of next week. Reports she has a RW at home         Objective:Vital Signs:  Patient Vitals for the past 12 hrs:   Temp Pulse Resp BP SpO2   02/18/17 0708 97.8 °F (36.6 °C) (!) 58 18 125/54 95 %     Pain level:  Pain location:  Pain interventions:    Patient education: Bed mobility training,transfer training, gait training, fall precautions, activity pacing, spinal precautions, body mechanics Patient verbalizing understanding and demonstrating partial understanding of patient education. Recommend follow up education.     Interdisciplinary Communication:NA    Spinal, Other (comment) (fall risks)  GROSS ASSESSMENT Daily Assessment     NA       BED/MAT MOBILITY Daily Assessment    Rolling Right : 0 (Not tested)  Rolling Left : 0 (Not tested)  Supine to Sit : 0 (Not tested)  Sit to Supine : 0 (Not tested)       TRANSFERS Daily Assessment   Occasional verbal cues for correct body mechanics with sit<>stand Transfer Type: SPT with walker  Transfer Assistance : 5 (Supervision/setup)  Sit to Stand Assistance: Supervision  Car Transfers: Not tested       GAIT Daily Assessment    Amount of Assistance: 5 (Supervision/setup)  Distance (ft): 200 Feet (ft)  Assistive Device: Walker, rolling   Gait training up/down 20 ft ramp with SBA    STEPS or STAIRS Daily Assessment   Slow reciprocating pattern going up/down steps Steps/Stairs Ambulated (#): 4  Level of Assist : 5 (Stand-by assistance)  Rail Use: Both     Gait training up/down 6 inch step with RW and SBA with cues for managing RW up/down step and body mechanics  BALANCE Daily Assessment    Sitting - Static: Good (unsupported)  Sitting - Dynamic: Good (unsupported)  Standing - Static: Good  Standing - Dynamic : Impaired WHEELCHAIR MOBILITY Daily Assessment    Curbs/Ramps Assist Required (FIM Score): 0 (Not tested)  Wheelchair Setup Assist Required : 0 (Not tested)       LOWER EXTREMITY EXERCISES Daily Assessment    Extremity: Both  Exercise Type #1: Other (comment) (LE motomed x 10 mins at level 2)  Level of Assist: Supervision          Assessment: Progressing towards goals       Patient returned to room at end of treatment and remained up in recliner with LEs elevated and with needs in reach    Plan of Care: Continue with POC and progress as tolerated.      Toni Nathan, PT  2/18/2017

## 2017-02-19 LAB
ANION GAP BLD CALC-SCNC: 8 MMOL/L (ref 7–16)
BUN SERPL-MCNC: 12 MG/DL (ref 8–23)
CALCIUM SERPL-MCNC: 8.7 MG/DL (ref 8.3–10.4)
CHLORIDE SERPL-SCNC: 103 MMOL/L (ref 98–107)
CO2 SERPL-SCNC: 31 MMOL/L (ref 21–32)
CREAT SERPL-MCNC: 1.01 MG/DL (ref 0.6–1)
GLUCOSE SERPL-MCNC: 89 MG/DL (ref 65–100)
HCT VFR BLD AUTO: 33.6 % (ref 35.8–46.3)
HGB BLD-MCNC: 10.2 G/DL (ref 11.7–15.4)
POTASSIUM SERPL-SCNC: 4.1 MMOL/L (ref 3.5–5.1)
SODIUM SERPL-SCNC: 142 MMOL/L (ref 136–145)

## 2017-02-19 PROCEDURE — 74011250636 HC RX REV CODE- 250/636: Performed by: PHYSICAL MEDICINE & REHABILITATION

## 2017-02-19 PROCEDURE — 65310000000 HC RM PRIVATE REHAB

## 2017-02-19 PROCEDURE — 80048 BASIC METABOLIC PNL TOTAL CA: CPT | Performed by: PHYSICAL MEDICINE & REHABILITATION

## 2017-02-19 PROCEDURE — 36415 COLL VENOUS BLD VENIPUNCTURE: CPT | Performed by: PHYSICAL MEDICINE & REHABILITATION

## 2017-02-19 PROCEDURE — 99232 SBSQ HOSP IP/OBS MODERATE 35: CPT | Performed by: PHYSICAL MEDICINE & REHABILITATION

## 2017-02-19 PROCEDURE — 74011250637 HC RX REV CODE- 250/637: Performed by: PHYSICAL MEDICINE & REHABILITATION

## 2017-02-19 PROCEDURE — 85018 HEMOGLOBIN: CPT | Performed by: PHYSICAL MEDICINE & REHABILITATION

## 2017-02-19 RX ADMIN — ACETAMINOPHEN 650 MG: 325 TABLET, FILM COATED ORAL at 20:45

## 2017-02-19 RX ADMIN — OXYCODONE HYDROCHLORIDE 10 MG: 5 TABLET ORAL at 07:09

## 2017-02-19 RX ADMIN — HEPARIN SODIUM 5000 UNITS: 5000 INJECTION, SOLUTION INTRAVENOUS; SUBCUTANEOUS at 23:00

## 2017-02-19 RX ADMIN — OXYCODONE HYDROCHLORIDE 10 MG: 5 TABLET ORAL at 22:58

## 2017-02-19 RX ADMIN — FERROUS SULFATE TAB 325 MG (65 MG ELEMENTAL FE) 325 MG: 325 (65 FE) TAB at 16:38

## 2017-02-19 RX ADMIN — VALSARTAN 160 MG: 80 TABLET, FILM COATED ORAL at 08:00

## 2017-02-19 RX ADMIN — GABAPENTIN 300 MG: 300 CAPSULE ORAL at 21:00

## 2017-02-19 RX ADMIN — ACETAMINOPHEN 650 MG: 325 TABLET, FILM COATED ORAL at 08:00

## 2017-02-19 RX ADMIN — HEPARIN SODIUM 5000 UNITS: 5000 INJECTION, SOLUTION INTRAVENOUS; SUBCUTANEOUS at 00:00

## 2017-02-19 RX ADMIN — DOCUSATE SODIUM 100 MG: 100 CAPSULE, LIQUID FILLED ORAL at 17:01

## 2017-02-19 RX ADMIN — TRAMADOL HYDROCHLORIDE 50 MG: 50 TABLET, FILM COATED ORAL at 08:00

## 2017-02-19 RX ADMIN — SERTRALINE HYDROCHLORIDE 75 MG: 50 TABLET ORAL at 08:01

## 2017-02-19 RX ADMIN — PANTOPRAZOLE SODIUM 40 MG: 40 TABLET, DELAYED RELEASE ORAL at 05:24

## 2017-02-19 RX ADMIN — FUROSEMIDE 40 MG: 40 TABLET ORAL at 08:00

## 2017-02-19 RX ADMIN — POTASSIUM CHLORIDE 20 MEQ: 20 TABLET, EXTENDED RELEASE ORAL at 08:00

## 2017-02-19 RX ADMIN — FERROUS SULFATE TAB 325 MG (65 MG ELEMENTAL FE) 325 MG: 325 (65 FE) TAB at 08:00

## 2017-02-19 RX ADMIN — TRAMADOL HYDROCHLORIDE 50 MG: 50 TABLET, FILM COATED ORAL at 12:15

## 2017-02-19 RX ADMIN — TRAMADOL HYDROCHLORIDE 50 MG: 50 TABLET, FILM COATED ORAL at 17:01

## 2017-02-19 RX ADMIN — ACETAMINOPHEN 650 MG: 325 TABLET, FILM COATED ORAL at 14:01

## 2017-02-19 RX ADMIN — ATENOLOL 12.5 MG: 25 TABLET ORAL at 20:48

## 2017-02-19 RX ADMIN — HEPARIN SODIUM 5000 UNITS: 5000 INJECTION, SOLUTION INTRAVENOUS; SUBCUTANEOUS at 16:28

## 2017-02-19 RX ADMIN — DOCUSATE SODIUM 100 MG: 100 CAPSULE, LIQUID FILLED ORAL at 08:00

## 2017-02-19 RX ADMIN — HEPARIN SODIUM 5000 UNITS: 5000 INJECTION, SOLUTION INTRAVENOUS; SUBCUTANEOUS at 08:01

## 2017-02-19 RX ADMIN — CYCLOBENZAPRINE HYDROCHLORIDE 10 MG: 10 TABLET, FILM COATED ORAL at 20:46

## 2017-02-19 RX ADMIN — GABAPENTIN 300 MG: 300 CAPSULE ORAL at 14:01

## 2017-02-19 RX ADMIN — ATORVASTATIN CALCIUM 20 MG: 10 TABLET, FILM COATED ORAL at 20:45

## 2017-02-19 RX ADMIN — GABAPENTIN 300 MG: 300 CAPSULE ORAL at 05:24

## 2017-02-19 RX ADMIN — TRAMADOL HYDROCHLORIDE 50 MG: 50 TABLET, FILM COATED ORAL at 20:47

## 2017-02-19 NOTE — PROGRESS NOTES
Jayla Rosenbaum MD,   Medical Director  [address] [facility], [address] [facility]  Tel: [phone]       D PROGRESS NOTE    Ria Camara Date: 2/11/2017  Admit Diagnosis: traluis to spinal core;Spinal stenosis of lumbar region at m*    Subjective     Doing well. Pain 5/10. No cp, sob. No n/v. +bm.  Nervous about going home this week; having right lower back and buttocks spasms    Objective:     Current Facility-Administered Medications   Medication Dose Route Frequency    traMADol (ULTRAM) tablet 50 mg  50 mg Oral QID    ferrous sulfate tablet 325 mg  1 Tab Oral BID WITH MEALS    heparin (porcine) injection 5,000 Units  5,000 Units SubCUTAneous Q8H    pantoprazole (PROTONIX) tablet 40 mg  40 mg Oral ACB    loperamide (IMODIUM) capsule 2 mg  2 mg Oral Q4H PRN    sodium phosphate (FLEET'S) enema 118 mL  1 Enema Rectal PRN    acetaminophen (TYLENOL) tablet 650 mg  650 mg Oral Q6H    cyclobenzaprine (FLEXERIL) tablet 10 mg  10 mg Oral TID PRN    docusate sodium (COLACE) capsule 100 mg  100 mg Oral BID    naloxone (NARCAN) injection 0.4 mg  0.4 mg IntraVENous EVERY 2 MINUTES AS NEEDED    oxyCODONE IR (ROXICODONE) tablet 5-10 mg  5-10 mg Oral Q4H PRN    zolpidem (AMBIEN) tablet 5 mg  5 mg Oral QHS PRN    albuterol (PROVENTIL HFA, VENTOLIN HFA, PROAIR HFA) inhaler 2 Puff  2 Puff Inhalation Q6H PRN    albuterol-ipratropium (DUO-NEB) 2.5 MG-0.5 MG/3 ML  3 mL Nebulization Q6H PRN    atenolol (TENORMIN) tablet 12.5 mg  12.5 mg Oral QHS    atorvastatin (LIPITOR) tablet 20 mg  20 mg Oral QHS    bisacodyl (DULCOLAX) suppository 10 mg  10 mg Rectal DAILY PRN    furosemide (LASIX) tablet 40 mg  40 mg Oral DAILY    gabapentin (NEURONTIN) capsule 300 mg  300 mg Oral TID    nitroglycerin (NITROSTAT) tablet 0.4 mg  0.4 mg SubLINGual Q5MIN PRN    potassium chloride (K-DUR, KLOR-CON) SR tablet 20 mEq  20 mEq Oral DAILY    sertraline (ZOLOFT) tablet 75 mg  75 mg Oral DAILY    valsartan (DIOVAN) tablet 160 mg  160 mg Oral DAILY     Review of Systems:Denies chest pain, shortness of breath, cough, headache, visual problems, abdominal pain, dysurea, calf pain. Pertinent positives are as noted in the medical records and unremarkable otherwise. Visit Vitals    /68 (BP 1 Location: Right arm, BP Patient Position: At rest)    Pulse 62    Temp 98.5 °F (36.9 °C)    Resp 16    SpO2 97%    Breastfeeding No        Physical Exam:   General: Alert and age appropriately oriented. No acute cardio respiratory distress. HEENT: Normocephalic,no scleral icterus  Oral mucosa moist without cyanosis   Lungs: Clear to auscultation  bilaterally. Respiration even and unlabored   Heart: Regular rate and rhythm, S1, S2   No  murmurs, clicks, rub or gallops   Abdomen: Soft, non-tender, nondistended. Bowel sounds present. No organomegaly. Genitourinary: Benign . Neuromuscular:      Grossly no focal motor deficits noted. Moves ankles. Ankle dorsiflexion 5/5  Ankle plantarflexion 5/5  No sensory deficits distally. Skin/extremity: No rashes, no erythema. No calf tenderness BLE  Wound covered.                                                                             Functional Assessment:          Balance  Sitting - Static: Good (unsupported) (02/18/17 0800)  Sitting - Dynamic: Good (unsupported) (02/18/17 0800)  Standing - Static: Good (02/18/17 0800)  Standing - Dynamic : Impaired (02/18/17 0800)           Toileting  Cues: Verbal cues provided (02/16/17 0832)  Adaptive Equipment: Elevated seat;Grab bars (02/16/17 0832)         Alycia Sanchez Fall Risk Assessment:  Aixa Lyn Risk  Mobility: Ambulates or transfers with assist devices or assistance/unsteady gait (02/19/17 0725)  Mentation: Alert, oriented x 3 (02/19/17 0725)  Medication: Patient receiving anticonvulsants, sedatives(tranquilizers), psychotropics or hypnotics, hypoglycemics, narcotics, sleep aids, antihypertensives, laxatives, or diuretics (02/19/17 0725)  Elimination: Needs assistance with toileting (02/19/17 0725)  Fall History in the Past 3 Months: Before admission (02/19/17 0725)  Total Score: 4 (02/19/17 0725)     Speech Assessment:         Ambulation:  Gait  Distance (ft): 200 Feet (ft) (02/18/17 0800)  Assistive Device: Kaia Pronto, rolling (02/18/17 0800)  Rail Use: Both (02/18/17 0800)     Labs/Studies:  Recent Results (from the past 72 hour(s))   OCCULT BLOOD, STOOL    Collection Time: 02/16/17  2:20 PM   Result Value Ref Range    Occult blood, stool NEGATIVE  NEG     METABOLIC PANEL, BASIC    Collection Time: 02/19/17  6:45 AM   Result Value Ref Range    Sodium 142 136 - 145 mmol/L    Potassium 4.1 3.5 - 5.1 mmol/L    Chloride 103 98 - 107 mmol/L    CO2 31 21 - 32 mmol/L    Anion gap 8 7 - 16 mmol/L    Glucose 89 65 - 100 mg/dL    BUN 12 8 - 23 MG/DL    Creatinine 1.01 (H) 0.6 - 1.0 MG/DL    GFR est AA >60 >60 ml/min/1.73m2    GFR est non-AA 57 (L) >60 ml/min/1.73m2    Calcium 8.7 8.3 - 10.4 MG/DL   HGB & HCT    Collection Time: 02/19/17  6:45 AM   Result Value Ref Range    HGB 10.2 (L) 11.7 - 15.4 g/dL    HCT 33.6 (L) 35.8 - 46.3 %       Assessment:     Problem List as of 2/19/2017  Date Reviewed: 10/5/2016          Codes Class Noted - Resolved    * (Principal)S/P laminectomy with spinal fusion ICD-10-CM: Z98.1  ICD-9-CM: V45.4  2/11/2017 - Present        Spinal stenosis of lumbar region at multiple levels ICD-10-CM: M48.06  ICD-9-CM: 724.02  2/11/2017 - Present        Lumbar stenosis with neurogenic claudication ICD-10-CM: M48.06  ICD-9-CM: 724.03  2/7/2017 - Present        Asymptomatic carotid artery stenosis (Chronic) ICD-10-CM: E14.06  ICD-9-CM: 433.10  11/4/2016 - Present        Valvular heart disease (Chronic) ICD-10-CM: I38  ICD-9-CM: 424.90  11/4/2016 - Present        Coronary artery disease involving native coronary artery of native heart without angina pectoris (Chronic) ICD-10-CM: I25.10  ICD-9-CM: 414.01  11/4/2016 - Present        On potassium wasting diuretic therapy (Chronic) ICD-10-CM: G40.144  ICD-9-CM: V58.69  11/4/2016 - Present        Obesity (BMI 30.0-34.9) (Chronic) ICD-10-CM: E66.9  ICD-9-CM: 278.00  11/4/2016 - Present        Mixed hyperlipidemia (Chronic) ICD-10-CM: E78.2  ICD-9-CM: 272.2  4/21/2016 - Present        RENEE (stress urinary incontinence, female) (Chronic) ICD-10-CM: N39.3  ICD-9-CM: 625.6  2/24/2016 - Present        Rectocele ICD-10-CM: N81.6  ICD-9-CM: 618.04  2/24/2016 - Present        Weakening of rectovaginal tissue ICD-10-CM: N81.83  ICD-9-CM: 618.82  2/24/2016 - Present        Depression (Chronic) ICD-10-CM: F32.9  ICD-9-CM: 453  6/30/2015 - Present        Contact dermatitis and other eczema, due to unspecified cause ICD-10-CM: L25.9  ICD-9-CM: 692.9  1/7/2015 - Present        Essential hypertension (Chronic) ICD-10-CM: I10  ICD-9-CM: 401.9  1/7/2015 - Present        Other and unspecified hyperlipidemia ICD-10-CM: E78.5  ICD-9-CM: 272.4  1/7/2015 - Present        Mitral valve disorders ICD-10-CM: I05.9  ICD-9-CM: 424.0  1/7/2015 - Present        Hematuria, unspecified ICD-10-CM: R31.9  ICD-9-CM: 599.70  1/7/2015 - Present        Dysuria ICD-10-CM: R30.0  ICD-9-CM: 788.1  1/7/2015 - Present        Anemia, unspecified ICD-10-CM: D64.9  ICD-9-CM: 285.9  1/7/2015 - Present        Other B-complex deficiencies ICD-10-CM: E53.8  ICD-9-CM: 266.2  1/7/2015 - Present        Arthropathies (Chronic) ICD-10-CM: M12.9  ICD-9-CM: 716.90  1/7/2015 - Present    Overview Signed 1/7/2015 10:11 AM by Giovanny Morales     Multiple sites             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  ICD-9-CM: 756.19  1/7/2015 - Present        Other malaise and fatigue ICD-10-CM: R53.81, R53.83  ICD-9-CM: 780.79  1/7/2015 - Present        Allergic rhinitis, cause unspecified ICD-10-CM: J30.9  ICD-9-CM: 477.9  1/7/2015 - Present        Aortic valve disorders ICD-10-CM: I35.9  ICD-9-CM: 424.1  1/7/2015 - Present        Extrinsic asthma, unspecified (Chronic) ICD-10-CM: J45.909  ICD-9-CM: 493.00  1/7/2015 - Present        Respiratory abnormality, unspecified ICD-10-CM: J98.9  ICD-9-CM: 786.00  1/7/2015 - Present        Congestive heart failure, unspecified ICD-10-CM: I50.9  ICD-9-CM: 428.0  1/7/2015 - Present        Occlusion and stenosis of carotid artery without mention of cerebral infarction ICD-10-CM: I65.29  ICD-9-CM: 433.10  1/7/2015 - Present        Carpal tunnel syndrome ICD-10-CM: G56.00  ICD-9-CM: 354.0  1/7/2015 - Present        Unspecified urinary incontinence ICD-10-CM: R32  ICD-9-CM: 788.30  1/7/2015 - Present        Carotid stenosis ICD-10-CM: I65.29  ICD-9-CM: 433.10  1/7/2015 - Present        Renal insufficiency ICD-10-CM: N28.9  ICD-9-CM: 593.9  1/7/2015 - Present        Heart disease, unspecified ICD-10-CM: I51.9  ICD-9-CM: 429.9  1/7/2015 - Present        Shortness of breath ICD-10-CM: R06.02  ICD-9-CM: 786.05  1/7/2015 - Present        Osteopenia ICD-10-CM: M85.80  ICD-9-CM: 733.90  1/7/2015 - Present        Other chest pain ICD-10-CM: R07.89  ICD-9-CM: 786.59  3/7/2011 - Present        GERD (gastroesophageal reflux disease) (Chronic) ICD-10-CM: K21.9  ICD-9-CM: 530.81  3/4/2011 - Present        Asthma (Chronic) ICD-10-CM: J45.909  ICD-9-CM: 493.90  3/4/2011 - Present              Plan:      Assessment: This is an 66 YOF with PMH of debilitatin lumbar spondylolisthesis/stenosis with neurogenic claudication, is s/p L4-S1 lumbar laminectomy, L4-5 fusion and instrumentation. Patient admitted to IRU to continue acute medical rehab program.      Continue daily physician medical management:  s/p L4-S1 lumbar laminectomy, L4-5 fusion and instrumentation.   - lumbar spine precautions.  TLFO when out of bed.  - continue to monitor wound for infection, bleeding, wound dehiscence.       Elevated creatinine/ Renal insufficiency- monitor; check BMP 2/16; creatinine down from 1.32 to .80      Post op acute blood loss anemia - monitor; h/h is stable; recheck 2/16; hbg down to 9.1 (14 preop); add iron and monitor; recheck 2/19; 10.4      Hx of CHF -continued on lasix, KCl      CAD (coronary artery disease)      Hypertension - continue diovan, atenolol. adjust as needed.       PUD (peptic ulcer disease)- continue pepcid.       Pneumonia prophylaxis- Insentive spirometer every hour while awake      DVT risk / DVT Prophylaxis- Will require daily physician exam to assess for signs and symptoms as patient is at increased risk for of thromboembolism. Mobilization as tolerated. Intermittent pneumatic compression devices when in bed Thigh-high or knee-high thromboembolic deterrent hose when out of bed. Holding anticoagulation until ok per spine ortho. Pt POD #7, will start sc Hep 2/14      Pain Control: stable, mild-to-moderate joint symptoms intermittently, reasonably well controlled by PRN meds. Will require regular pain assessment and comprenhensive pain management. Continue scheduled tylenol. Continued on roxicodone, neurontin. . Prn flexeril. 2/15 added ultram yesterday and have asked nursing to alternate with boo; 2/16 improved pain control; 2./17 will schedule her ultram to 50mg qid and continue boo 5-10 mg q 4 prn; 2/19 add low dose flexeril      Wound Care: Monitor wound status daily per staff and physician. At risk for failure. Will require 24/7 rehab nursing. Keep wound clean and dry.      Potential urinary retention/ neurogenic bladder - schedule voids q6-8 hrs. Check post-void residual every shift; In and Out catheterize if post-void residual is more than 400 cc.; 2/14 no residuals      bowel program - senna, colace. Monitor. 2/13 reports no Bm for over a week. Add Lactulose and miralax. Needs bm ASAP. No evidence clinically of ileus; 2/15 lg BM x 3 . 2/19 no bm x 3 d agreed to miralax this a.m; asymptomatic      Dispo; will dw case mgt.  Pt must be mod I to go home and CANNOT provide physical assist to  who requires care with all adls and mobility; on strict spinal precautions and I am certain that she will not follow them;2/18 her plan is to dc home with son and  will dc under family and hospice care; DC 2/21 TO SON'S HOME      GERD - resume pepcid.      Post op anemia; started iron. hgb 9.1; f/u in a.m 2.19; improved                 Time spent was 25 minutes with over 1/2 in direct patient care/examination, consultation and coordination of care.      Signed By: Katrin Willson MD     February 19, 2017

## 2017-02-20 PROCEDURE — 97530 THERAPEUTIC ACTIVITIES: CPT

## 2017-02-20 PROCEDURE — 74011250636 HC RX REV CODE- 250/636: Performed by: PHYSICAL MEDICINE & REHABILITATION

## 2017-02-20 PROCEDURE — 74011250637 HC RX REV CODE- 250/637: Performed by: PHYSICAL MEDICINE & REHABILITATION

## 2017-02-20 PROCEDURE — 65310000000 HC RM PRIVATE REHAB

## 2017-02-20 PROCEDURE — 99232 SBSQ HOSP IP/OBS MODERATE 35: CPT | Performed by: PHYSICAL MEDICINE & REHABILITATION

## 2017-02-20 PROCEDURE — 97116 GAIT TRAINING THERAPY: CPT

## 2017-02-20 PROCEDURE — 97535 SELF CARE MNGMENT TRAINING: CPT

## 2017-02-20 PROCEDURE — 97110 THERAPEUTIC EXERCISES: CPT

## 2017-02-20 RX ORDER — OXYCODONE HYDROCHLORIDE 5 MG/1
5-10 TABLET ORAL
Qty: 120 TAB | Refills: 0 | Status: SHIPPED | OUTPATIENT
Start: 2017-02-20 | End: 2017-03-22

## 2017-02-20 RX ORDER — CYCLOBENZAPRINE HCL 10 MG
5 TABLET ORAL
Qty: 90 TAB | Refills: 0 | Status: SHIPPED | OUTPATIENT
Start: 2017-02-20 | End: 2017-03-22

## 2017-02-20 RX ORDER — TRAMADOL HYDROCHLORIDE 50 MG/1
50 TABLET ORAL 4 TIMES DAILY
Qty: 120 TAB | Refills: 0 | Status: SHIPPED | OUTPATIENT
Start: 2017-02-20 | End: 2017-03-22

## 2017-02-20 RX ADMIN — ACETAMINOPHEN 650 MG: 325 TABLET, FILM COATED ORAL at 21:18

## 2017-02-20 RX ADMIN — TRAMADOL HYDROCHLORIDE 50 MG: 50 TABLET, FILM COATED ORAL at 12:33

## 2017-02-20 RX ADMIN — GABAPENTIN 300 MG: 300 CAPSULE ORAL at 14:46

## 2017-02-20 RX ADMIN — SERTRALINE HYDROCHLORIDE 75 MG: 50 TABLET ORAL at 08:14

## 2017-02-20 RX ADMIN — FERROUS SULFATE TAB 325 MG (65 MG ELEMENTAL FE) 325 MG: 325 (65 FE) TAB at 17:28

## 2017-02-20 RX ADMIN — TRAMADOL HYDROCHLORIDE 50 MG: 50 TABLET, FILM COATED ORAL at 17:29

## 2017-02-20 RX ADMIN — FUROSEMIDE 40 MG: 40 TABLET ORAL at 08:17

## 2017-02-20 RX ADMIN — ATENOLOL 12.5 MG: 25 TABLET ORAL at 21:19

## 2017-02-20 RX ADMIN — ATORVASTATIN CALCIUM 20 MG: 10 TABLET, FILM COATED ORAL at 21:18

## 2017-02-20 RX ADMIN — POTASSIUM CHLORIDE 20 MEQ: 20 TABLET, EXTENDED RELEASE ORAL at 08:14

## 2017-02-20 RX ADMIN — DOCUSATE SODIUM 100 MG: 100 CAPSULE, LIQUID FILLED ORAL at 17:29

## 2017-02-20 RX ADMIN — HEPARIN SODIUM 5000 UNITS: 5000 INJECTION, SOLUTION INTRAVENOUS; SUBCUTANEOUS at 08:17

## 2017-02-20 RX ADMIN — OXYCODONE HYDROCHLORIDE 10 MG: 5 TABLET ORAL at 04:58

## 2017-02-20 RX ADMIN — TRAMADOL HYDROCHLORIDE 50 MG: 50 TABLET, FILM COATED ORAL at 08:16

## 2017-02-20 RX ADMIN — ACETAMINOPHEN 650 MG: 325 TABLET, FILM COATED ORAL at 14:46

## 2017-02-20 RX ADMIN — HEPARIN SODIUM 5000 UNITS: 5000 INJECTION, SOLUTION INTRAVENOUS; SUBCUTANEOUS at 17:26

## 2017-02-20 RX ADMIN — ACETAMINOPHEN 650 MG: 325 TABLET, FILM COATED ORAL at 04:53

## 2017-02-20 RX ADMIN — DOCUSATE SODIUM 100 MG: 100 CAPSULE, LIQUID FILLED ORAL at 08:14

## 2017-02-20 RX ADMIN — ACETAMINOPHEN 650 MG: 325 TABLET, FILM COATED ORAL at 08:16

## 2017-02-20 RX ADMIN — CYCLOBENZAPRINE HYDROCHLORIDE 10 MG: 10 TABLET, FILM COATED ORAL at 21:21

## 2017-02-20 RX ADMIN — ZOLPIDEM TARTRATE 5 MG: 5 TABLET ORAL at 21:19

## 2017-02-20 RX ADMIN — FERROUS SULFATE TAB 325 MG (65 MG ELEMENTAL FE) 325 MG: 325 (65 FE) TAB at 08:17

## 2017-02-20 RX ADMIN — PANTOPRAZOLE SODIUM 40 MG: 40 TABLET, DELAYED RELEASE ORAL at 04:54

## 2017-02-20 RX ADMIN — TRAMADOL HYDROCHLORIDE 50 MG: 50 TABLET, FILM COATED ORAL at 21:19

## 2017-02-20 RX ADMIN — GABAPENTIN 300 MG: 300 CAPSULE ORAL at 21:19

## 2017-02-20 RX ADMIN — VALSARTAN 160 MG: 80 TABLET, FILM COATED ORAL at 08:15

## 2017-02-20 RX ADMIN — GABAPENTIN 300 MG: 300 CAPSULE ORAL at 05:04

## 2017-02-20 NOTE — PROGRESS NOTES
Toileting   Functional Level               6   6  by Lisa Somers RN  at 02/20/17 0633    Comments               walker to steady   walker to steady     Bladder-Level of Assistance   Functional Level               6   6     Comments                    Bladder-Accident Frequency Score   Prior to Admission Accidents               7   7  by Lisa Somers RN  at 02/20/17 0633    Total Accidents                  6  by Elise Swanson, RN  at 02/13/17 1553    Comments               no accident   no accident  by Lisa Somers RN  at 02/20/17 0633    Bowel-Level of Assistance   Functional Level               6   6  by Lisa Somers RN  at 02/20/17 0633    Comments               takes bowel medication   takes bowel medication  by Lisa Somers RN  at 02/20/17 0633    Bowel-Accident Frequency   Prior to Admission Accidents               7   7  by Lisa Somers RN  at 02/20/17 0633    Bowel-Accident Frequency                 6  by Elise Swanson, RN  at 02/13/17 5533    Comments               no accidents

## 2017-02-20 NOTE — PROGRESS NOTES
OT Daily Note  Time In 1117   Time Out 1159     Subjective: \"I can't wait to go home. \" Agreeable to therapy. Pain: Patient did not report pain during session. Education: wheelchair parts management, task organization, transfer training, fall prevention  Interdisciplinary Communication: updated interdisciplinary communication board  Precautions: Spinal    Strengthening Daily Assessment   Patient utilized horizontal pole tree while wearing B 1.5 lb wrist weights x 4 sets x 10 reps patient required frequent rest breaks due to BUE fatigue. Activity Tolerance Daily Assessment   Patient utilized UBE x 2 sets x 5 minutes with mod resistance in B directions for UE strengthening and overall activity tolerance. Patient required a 1 minute rest break between sets. Visual-Perceptual Daily Assessment   Patient completed 4 block green/white block design with guidelines; required mod A for first 3 setups; cues to break task down into individual component; patient then completed 5 setups without any A or cues. Decreased higher level cognitive independence; SLP aware. Assessment: Patient tolerated session well, but patient is still limited by decreased activity tolerance and dynamic standing balance which requires skilled facilitation to successfully and safely complete ADL's and transfers. Plan: Continue OT POC. Patient ended session in recliner with call remote and phone within reach.      [employer], OTR/L  2/20/2017

## 2017-02-20 NOTE — PROGRESS NOTES
Gary Feldman MD,   Medical Director  [address] [facility], [address]  Tel: [phone]       D PROGRESS NOTE    Jesus Dugan Date: 2/11/2017  Admit Diagnosis: trauna to spinal core;Spinal stenosis of lumbar region at m*    Subjective     Pain controlled. Slept well. Anxious about dc tomorrow. Getting around well. Worried about . He was supposed to be able to be dc also tomorrow but is short of breath today, he remains on the 8th floor.   C/o dry scratchy throat when she woke up    Objective:     Current Facility-Administered Medications   Medication Dose Route Frequency    traMADol (ULTRAM) tablet 50 mg  50 mg Oral QID    ferrous sulfate tablet 325 mg  1 Tab Oral BID WITH MEALS    heparin (porcine) injection 5,000 Units  5,000 Units SubCUTAneous Q8H    pantoprazole (PROTONIX) tablet 40 mg  40 mg Oral ACB    loperamide (IMODIUM) capsule 2 mg  2 mg Oral Q4H PRN    sodium phosphate (FLEET'S) enema 118 mL  1 Enema Rectal PRN    acetaminophen (TYLENOL) tablet 650 mg  650 mg Oral Q6H    cyclobenzaprine (FLEXERIL) tablet 10 mg  10 mg Oral TID PRN    docusate sodium (COLACE) capsule 100 mg  100 mg Oral BID    naloxone (NARCAN) injection 0.4 mg  0.4 mg IntraVENous EVERY 2 MINUTES AS NEEDED    oxyCODONE IR (ROXICODONE) tablet 5-10 mg  5-10 mg Oral Q4H PRN    zolpidem (AMBIEN) tablet 5 mg  5 mg Oral QHS PRN    albuterol (PROVENTIL HFA, VENTOLIN HFA, PROAIR HFA) inhaler 2 Puff  2 Puff Inhalation Q6H PRN    albuterol-ipratropium (DUO-NEB) 2.5 MG-0.5 MG/3 ML  3 mL Nebulization Q6H PRN    atenolol (TENORMIN) tablet 12.5 mg  12.5 mg Oral QHS    atorvastatin (LIPITOR) tablet 20 mg  20 mg Oral QHS    bisacodyl (DULCOLAX) suppository 10 mg  10 mg Rectal DAILY PRN    furosemide (LASIX) tablet 40 mg  40 mg Oral DAILY    gabapentin (NEURONTIN) capsule 300 mg  300 mg Oral TID    nitroglycerin (NITROSTAT) tablet 0.4 mg  0.4 mg SubLINGual Q5MIN PRN    potassium chloride (K-DUR, KLOR-CON) SR tablet 20 mEq  20 mEq Oral DAILY    sertraline (ZOLOFT) tablet 75 mg  75 mg Oral DAILY    valsartan (DIOVAN) tablet 160 mg  160 mg Oral DAILY     Review of Systems:Denies chest pain, shortness of breath, cough, headache, visual problems, abdominal pain, dysurea, calf pain. Pertinent positives are as noted in the medical records and unremarkable otherwise. Visit Vitals    /55    Pulse (!) 55    Temp 97.8 °F (36.6 °C)    Resp 16    SpO2 94%    Breastfeeding No        Physical Exam:   General: Alert and age appropriately oriented. No acute cardio respiratory distress. HEENT: Normocephalic,no scleral icterus  Oral mucosa moist without cyanosis   Lungs: Clear to auscultation  bilaterally. Respiration even and unlabored   Heart: Regular rate and rhythm, S1, S2   No  murmurs, clicks, rub or gallops   Abdomen: Soft, non-tender, nondistended. Bowel sounds present. No organomegaly. Genitourinary: Benign . Neuromuscular:      Grossly no focal motor deficits noted. Moves ankles. Ankle dorsiflexion 5/5  Ankle plantarflexion 5/5  No sensory deficits distally. Exam limited by pain. Skin/extremity: No rashes, no erythema. No calf tenderness BLE  Wound covered.                                                                             Functional Assessment:          Balance  Sitting - Static: Good (unsupported) (02/18/17 0800)  Sitting - Dynamic: Good (unsupported) (02/18/17 0800)  Standing - Static: Good (02/18/17 0800)  Standing - Dynamic : Impaired (02/18/17 0800)           Toileting  Cues: Verbal cues provided (02/16/17 0832)  Adaptive Equipment: Elevated seat;Grab bars (02/16/17 0832)         Lilliana Samuels Fall Risk Assessment:  Tammie Soliz Risk  Mobility: Ambulates or transfers with assist devices or assistance/unsteady gait (02/19/17 2105)  Mentation: Alert, oriented x 3 (02/19/17 2105)  Medication: Patient receiving anticonvulsants, sedatives(tranquilizers), psychotropics or hypnotics, hypoglycemics, narcotics, sleep aids, antihypertensives, laxatives, or diuretics (02/19/17 2105)  Elimination: Needs assistance with toileting (02/19/17 2105)  Fall History in the Past 3 Months: Before admission (02/19/17 2105)  Total Score: 4 (02/19/17 2105)     Speech Assessment:         Ambulation:  Gait  Distance (ft): 200 Feet (ft) (02/18/17 0800)  Assistive Device: Jose Alberto Bene, rolling (02/18/17 0800)  Rail Use: Both (02/18/17 0800)     Labs/Studies:  Recent Results (from the past 72 hour(s))   METABOLIC PANEL, BASIC    Collection Time: 02/19/17  6:45 AM   Result Value Ref Range    Sodium 142 136 - 145 mmol/L    Potassium 4.1 3.5 - 5.1 mmol/L    Chloride 103 98 - 107 mmol/L    CO2 31 21 - 32 mmol/L    Anion gap 8 7 - 16 mmol/L    Glucose 89 65 - 100 mg/dL    BUN 12 8 - 23 MG/DL    Creatinine 1.01 (H) 0.6 - 1.0 MG/DL    GFR est AA >60 >60 ml/min/1.73m2    GFR est non-AA 57 (L) >60 ml/min/1.73m2    Calcium 8.7 8.3 - 10.4 MG/DL   HGB & HCT    Collection Time: 02/19/17  6:45 AM   Result Value Ref Range    HGB 10.2 (L) 11.7 - 15.4 g/dL    HCT 33.6 (L) 35.8 - 46.3 %       Assessment:     Problem List as of 2/20/2017  Date Reviewed: 10/5/2016          Codes Class Noted - Resolved    * (Principal)S/P laminectomy with spinal fusion ICD-10-CM: Z98.1  ICD-9-CM: V45.4  2/11/2017 - Present        Spinal stenosis of lumbar region at multiple levels ICD-10-CM: M48.06  ICD-9-CM: 724.02  2/11/2017 - Present        Lumbar stenosis with neurogenic claudication ICD-10-CM: M48.06  ICD-9-CM: 724.03  2/7/2017 - Present        Asymptomatic carotid artery stenosis (Chronic) ICD-10-CM: D68.53  ICD-9-CM: 433.10  11/4/2016 - Present        Valvular heart disease (Chronic) ICD-10-CM: I38  ICD-9-CM: 424.90  11/4/2016 - Present        Coronary artery disease involving native coronary artery of native heart without angina pectoris (Chronic) ICD-10-CM: I25.10  ICD-9-CM: 414.01  11/4/2016 - Present        On potassium wasting diuretic therapy (Chronic) ICD-10-CM: C45.712  ICD-9-CM: V58.69  11/4/2016 - Present        Obesity (BMI 30.0-34.9) (Chronic) ICD-10-CM: E66.9  ICD-9-CM: 278.00  11/4/2016 - Present        Mixed hyperlipidemia (Chronic) ICD-10-CM: E78.2  ICD-9-CM: 272.2  4/21/2016 - Present        RENEE (stress urinary incontinence, female) (Chronic) ICD-10-CM: N39.3  ICD-9-CM: 625.6  2/24/2016 - Present        Rectocele ICD-10-CM: N81.6  ICD-9-CM: 618.04  2/24/2016 - Present        Weakening of rectovaginal tissue ICD-10-CM: N81.83  ICD-9-CM: 618.82  2/24/2016 - Present        Depression (Chronic) ICD-10-CM: F32.9  ICD-9-CM: 788  6/30/2015 - Present        Contact dermatitis and other eczema, due to unspecified cause ICD-10-CM: L25.9  ICD-9-CM: 692.9  1/7/2015 - Present        Essential hypertension (Chronic) ICD-10-CM: I10  ICD-9-CM: 401.9  1/7/2015 - Present        Other and unspecified hyperlipidemia ICD-10-CM: E78.5  ICD-9-CM: 272.4  1/7/2015 - Present        Mitral valve disorders ICD-10-CM: I05.9  ICD-9-CM: 424.0  1/7/2015 - Present        Hematuria, unspecified ICD-10-CM: R31.9  ICD-9-CM: 599.70  1/7/2015 - Present        Dysuria ICD-10-CM: R30.0  ICD-9-CM: 788.1  1/7/2015 - Present        Anemia, unspecified ICD-10-CM: D64.9  ICD-9-CM: 285.9  1/7/2015 - Present        Other B-complex deficiencies ICD-10-CM: E53.8  ICD-9-CM: 266.2  1/7/2015 - Present        Arthropathies (Chronic) ICD-10-CM: M12.9  ICD-9-CM: 716.90  1/7/2015 - Present    Overview Signed 1/7/2015 10:11 AM by Teo Doing     Multiple sites             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  ICD-9-CM: 756.19  1/7/2015 - Present        Other malaise and fatigue ICD-10-CM: R53.81, R53.83  ICD-9-CM: 780.79  1/7/2015 - Present        Allergic rhinitis, cause unspecified ICD-10-CM: J30.9  ICD-9-CM: 477.9  1/7/2015 - Present        Aortic valve disorders ICD-10-CM: I35.9  ICD-9-CM: 424.1  1/7/2015 - Present Extrinsic asthma, unspecified (Chronic) ICD-10-CM: J45.909  ICD-9-CM: 493.00  1/7/2015 - Present        Respiratory abnormality, unspecified ICD-10-CM: J98.9  ICD-9-CM: 786.00  1/7/2015 - Present        Congestive heart failure, unspecified ICD-10-CM: I50.9  ICD-9-CM: 428.0  1/7/2015 - Present        Occlusion and stenosis of carotid artery without mention of cerebral infarction ICD-10-CM: I65.29  ICD-9-CM: 433.10  1/7/2015 - Present        Carpal tunnel syndrome ICD-10-CM: G56.00  ICD-9-CM: 354.0  1/7/2015 - Present        Unspecified urinary incontinence ICD-10-CM: R32  ICD-9-CM: 788.30  1/7/2015 - Present        Carotid stenosis ICD-10-CM: I65.29  ICD-9-CM: 433.10  1/7/2015 - Present        Renal insufficiency ICD-10-CM: N28.9  ICD-9-CM: 593.9  1/7/2015 - Present        Heart disease, unspecified ICD-10-CM: I51.9  ICD-9-CM: 429.9  1/7/2015 - Present        Shortness of breath ICD-10-CM: R06.02  ICD-9-CM: 786.05  1/7/2015 - Present        Osteopenia ICD-10-CM: M85.80  ICD-9-CM: 733.90  1/7/2015 - Present        Other chest pain ICD-10-CM: R07.89  ICD-9-CM: 786.59  3/7/2011 - Present        GERD (gastroesophageal reflux disease) (Chronic) ICD-10-CM: K21.9  ICD-9-CM: 530.81  3/4/2011 - Present        Asthma (Chronic) ICD-10-CM: J45.909  ICD-9-CM: 493.90  3/4/2011 - Present              Plan:   Assessment: This is an 66 YOF with PMH of debilitatin lumbar spondylolisthesis/stenosis with neurogenic claudication, is s/p L4-S1 lumbar laminectomy, L4-5 fusion and instrumentation. Patient admitted to IRU to continue acute medical rehab program.      Continue daily physician medical management:  s/p L4-S1 lumbar laminectomy, L4-5 fusion and instrumentation.   - lumbar spine precautions. TLFO when out of bed.  - continue to monitor wound for infection, bleeding, wound dehiscence.       Elevated creatinine/ Renal insufficiency- monitor; check BMP 2/16; creatinine down from 1.32 to . 80      Post op acute blood loss anemia - monitor; h/h is stable; recheck 2/16; hbg down to 9.1 (14 preop); add iron and monitor; recheck 2/19; 10.4      Hx of CHF -continued on lasix, KCl      CAD (coronary artery disease)      Hypertension - continue diovan, atenolol. adjust as needed.       PUD (peptic ulcer disease)- continue pepcid.       Pneumonia prophylaxis- Insentive spirometer every hour while awake      DVT risk / DVT Prophylaxis- Will require daily physician exam to assess for signs and symptoms as patient is at increased risk for of thromboembolism. Mobilization as tolerated. Intermittent pneumatic compression devices when in bed Thigh-high or knee-high thromboembolic deterrent hose when out of bed. Holding anticoagulation until ok per spine ortho. Pt POD #7, will start sc Hep 2/14      Pain Control: stable, mild-to-moderate joint symptoms intermittently, reasonably well controlled by PRN meds. Will require regular pain assessment and comprenhensive pain management. Continue scheduled tylenol. Continued on roxicodone, neurontin. . Prn flexeril. 2/15 added ultram yesterday and have asked nursing to alternate with boo; 2/16 improved pain control; 2./17 will schedule her ultram to 50mg qid and continue boo 5-10 mg q 4 prn; 2/19 add low dose flexeril      Wound Care: Monitor wound status daily per staff and physician. At risk for failure. Will require 24/7 rehab nursing. Keep wound clean and dry.      Potential urinary retention/ neurogenic bladder - schedule voids q6-8 hrs. Check post-void residual every shift; In and Out catheterize if post-void residual is more than 400 cc.; 2/14 no residuals      bowel program - senna, colace. Monitor. 2/13 reports no Bm for over a week. Add Lactulose and miralax. Needs bm ASAP. No evidence clinically of ileus; 2/15 lg BM x 3 . 2/19 no bm x 3 d agreed to miralax this a.m; asymptomatic      Dispo; will dw case mgt.  Pt must be mod I to go home and CANNOT provide physical assist to  who requires care with all adls and mobility; on strict spinal precautions and I am certain that she will not follow them;2/18 her plan is to dc home with son and  will dc under family and hospice care; DC 2/21 TO SON'S HOME      GERD - resume pepcid.       Post op anemia; started iron. hgb 9.1; f/u in a.m 2.19; improved at 10.2        Time spent was 25 minutes with over 1/2 in direct patient care/examination, consultation and coordination of care.      Signed By: Miguel Johnson MD     February 20, 2017

## 2017-02-20 NOTE — PROGRESS NOTES
PHYSICAL THERAPY DAILY NOTE  Time In: 1030  Time Out: 3439  Patient Seen For: Gait training; Therapeutic exercise    Subjective: \"Ready for me again? \" Patient agreeable to therapy. Objective: Vital Signs:   Patient Vitals for the past 8 hrs:   Temp Pulse Resp BP SpO2   02/20/17 0714 97.8 °F (36.6 °C) (!) 55 16 114/55 94 %           Pain level: 2/10  Pain location: Left lower back/posterior hip  Pain interventions: More frequent rest breaks allowing pain to subside prior to continuing with exercises. Patient education:  Educated patient on performing home exercise program and ways to set up exercises to be performed independently. Interdisciplinary Communication: Consulted with Gus Simms about patient's achievement of goals and progress made during rehab. Spinal, Other (comment) (fall risks)    BED/MAT MOBILITY Daily Assessment    Rolling Right : 0 (Not tested)  Rolling Left : 0 (Not tested)  Supine to Sit : 0 (Not tested)  Sit to Supine : 0 (Not tested)       TRANSFERS Daily Assessment   Required for safety. Transfer Type: SPT with walker  Sit to Stand Assistance: Supervision  Car Transfers: Supervision  Car Type: Rehab car       GAIT Daily Assessment   Patient ambulated with shortened step length and decreased edilberto. Patient also ambulated 16 ft ramp with verbal cues on position of rolling walker with ascend/descend.  Amount of Assistance: 6 (Modified independent)  Distance (ft): 175 Feet (ft)  Assistive Device: Walker, rolling       STEPS or STAIRS Daily Assessment    Steps/Stairs Ambulated (#): 12  Rail Use: Both       BALANCE Daily Assessment    Sitting - Static: Good (unsupported)  Sitting - Dynamic: Good (unsupported)  Standing - Static: Good       WHEELCHAIR MOBILITY Daily Assessment    Able to Propel (ft): 0 feet  Functional Level:  (NT - primary mode of locomotion is ambulation)  Curbs/Ramps Assist Required (FIM Score):  (NT - primary mode of locomotion is ambulation)  Wheelchair Setup Assist Required :  (NT - primary mode of locomotion is ambulation)  Wheelchair Management:  (NT - primary mode of locomotion is ambulation)       LOWER EXTREMITY EXERCISES Daily Assessment   Patient performed 10 minutes on Dennise-Med machine on level 2. Extremity: Both  Exercise Type #1: Seated lower extremity strengthening  Sets Performed: 1  Reps Performed: 15  Level of Assist: Modified independent  Exercise Type #2: Standing lower extremity strengthening  Sets Performed: 3  Reps Performed: 10  Level of Assist: Modified independent     SEATED EXERCISES Sets Reps Comments   Ankle Pumps 1 15    Hip Flexion 1 15    Long Arc Quads 1 15    Hip Adduction/Ball Squeeze 1 15    Hip Abduction with Green Theraband 1 15    Hamstring Curls with Green Theraband 1 15    Hip Extension with Green Theraband 1 15      STANDING EXERCISES Sets Reps Comments   Heel Raises 3 10    Toe Raises 3 10    Hip Abduction 3 10 Side-stepping     Patient was received from room sitting in bedside chair and handed-off to LASHANDA Doyle at therapy table in gym. Assessment: Patient has made good progress with exercise and standing tolerance. Patient demonstrates ability to perform multiple tasks in succession without requiring as many rest breaks in between. Patient has also demonstrated good understanding of home exercise program. Patient will benefit from further therapy to improve independence with transfers and ability to ascend/descend stairs safely. Plan of Care: Continue with POC and progress as tolerated.        Reid Pastor  2/20/2017

## 2017-02-20 NOTE — PROGRESS NOTES
PHYSICAL THERAPY DISCHARGE SUMMARY  Time In: 0830  Time Out: 0946     Precautions at discharge: Spinal    Problem List:    Decreased strength B LE  [x]     Decreased strength trunk/core  [x]     Decreased AROM   [x]     Decreased PROM  []     Decreased balance sitting  []     Decreased balance standing  [x]     Decreased endurance  [x]     Pain  []       Functional Limitations:   Decreased independence with bed mobility  []     Decreased independence with functional transfers  [x]     Decreased independence with ambulation  [x]     Decreased independence with stair negotiation  [x]          Patient seated at edge of bed upon PT arrival to begin treatment.     Outcome Measures: Vital Signs:   Patient Vitals for the past 8 hrs:   Temp Pulse Resp BP SpO2   02/20/17 0714 97.8 °F (36.6 °C) (!) 55 16 114/55 94 %       Pain level: 0 out of 10  Pain location: n/a  Pain interventions: n/a    Patient education: gait and transfer safety with RW, bed mobility while maintaining spinal precautions, car transfers, curb/ramp ascent/descent, stair training    Interdisciplinary Communication: collaborated with LASHANDA Villarreal  to discuss patient's readiness for discharge home             MMT Initial Asssessment   Right Lower Extremity Left Lower Extremity   Hip Flexion 4- 4-   Knee Extension 4 4   Knee Flexion 4- 4   Ankle Dorsiflexion 4 4+      MMT Discharge Assessment   Right Lower Extremity Left Lower Extremity   Hip Flexion 4+ 4+   Knee Extension 4+ 4+   Knee Flexion 4+ 4+   Ankle Dorsiflexion 4+ 4+   0/5 No palpable muscle contraction  1/5 Palpable muscle contraction, no joint movement  2-/5 Less than full range of motion in gravity eliminated position  2/5 Able to complete full range of motion in gravity eliminated position  2+/5 Able to initiate movement against gravity  3-/5 More than half but not full range of motion against gravity  3/5 Able to complete full range of motion against gravity  3+/5 Completes full range of motion against gravity with minimal resistance  4-/5 Completes full range of motion against gravity with minimal-moderate resistance  4/5 Completes full range of motion against gravity with moderate resistance  4+/5 Completes full range of motion against gravity with moderate-maximum resistance  5/5 Completes full range of motion against gravity with maximum resistance     AROM: B LEs WFL    FIM SCORES Initial Assessment Discharge Assessment   Bed/Chair/Wheelchair Transfers 4 6   Wheelchair Mobility  (NT - primary mode of locomotion is ambulation)  (NT - primary mode of locomotion is ambulation)   Walking [place] 2 6   Steps/Stairs  (NT secondary to fatigue with level ground ambulation) 6   PRIMARY MODE OF LOCOMOTION: Ambulation  Please see [facility] Interdisciplinary Eval: Coordination/Balance Section for details regarding FIM score description.     BED/CHAIR/WHEELCHAIR TRANSFERS Initial Assessment Discharge Assessment   Rolling Right 4 (Contact guard assistance) 7 (Independent)   Rolling Left 4 (Contact guard assistance) 7 (Independent)   Supine to Sit 4 (Contact guard assistance) 7 (Independent)   Sit to Stand Contact guard assistance Modified independent   Sit to Supine 4 (Minimal assistance) 7 (Independent)   Transfer Assist Score 4 6   Transfer Type SPT with walker SPT with walker   Comments Patient able to perform bed mobility with log rolling secondary to spinal precautions     Car Transfer Not tested Supervision   Car Type Rehab Car Rehab car       [facility] MOBILITY/MANAGEMENT Initial Assessment Discharge Assessment   Able to Propel 0 feet 0 feet   Functional Level  (NT - primary mode of locomotion is ambulation)  (NT - primary mode of locomotion is ambulation)   Curbs/ramps assistance required 0 (Not tested)  (NT - primary mode of locomotion is ambulation)   Wheelchair set up assistance required 0 (Not tested)  (NT - primary mode of locomotion is ambulation)   Wheelchair management  (NT - primary mode of locomotion is ambulation)  (NT - primary mode of locomotion is ambulation)       WALKING INDEPENDENCE Initial Assessment Discharge Assessment   Assistive device Walker, rolling Walker, rolling   Ambulation assistance - level surface 4 (Contact guard assistance) 6 (Modified independent)   Distance 100 Feet (ft) 200 Feet (ft)   Functional Level 2 6   Comments Patient ambulated with reduced gait speed and upright posture. Patient fatigued following 100 feet of gait training. Ambulation assistance - unlevel surface  (NT secondary to increased fatigue with level ground gait) 5 (Supervision/setup)       STEPS/STAIRS Initial Assessment Discharge Assessment   Steps/Stairs ambulated 0 12   Rail Use Both Both   Functional Level  (NT secondary to fatigue with level ground ambulation) 6   Comments Patient ambulated up/down stairs with B handrails and reciprocal gait pattern Patient ambulated up/down stairs with B handrails and reciprocal gait pattern   Curbs/Ramps  (NT secondary to fatigue with level ground ambulation) 6 (Modified independent)          PHYSICAL THERAPY PLAN OF CARE    LTGs: Patient met all long-term goals for mobility. See care plan for details. Pt would benefit from continued skilled physical therapy in order to improve independent functional mobility within the home with use of least restrictive device. Interventions may include range of motion (AROM, PROM B LE/trunk), motor function (B LE/trunk strengthening/coordination), activity tolerance (vitals, oxygen saturation levels), bed mobility training, balance activities, gait training (progressive ambulation program), and functional transfer training. HEP handout:  Provided to patient at discharge on 2/20/17         Therapy Recommendations upon discharge: Kendal Ron needs at discharge: n/a n/a    Please see [facility];  Interdisciplinary Eval, Care Plan, and Patient Education for further information regarding physical therapy discharge summary and plan of care. Patient returned to room at end of treatment and remained up in recliner with LEs elevated and with needs in reach.      Darrell Harrison, PT  2/20/2017

## 2017-02-20 NOTE — PROGRESS NOTES
Assessment completed, respiration even and unlabored, instructed to call for needs or assist , educated on risk and needs not to get up without assist, call light in reach, ambulates with walker and assist, back dressing intact

## 2017-02-20 NOTE — PROGRESS NOTES
Patient resting up in bed. Alert and oriented with pleasant affect. Lung sounds clear. S1S2, bowel sounds active. Incision to back with dura bond and steri strips. Denies any pain at present time. See MAR. Up to side of bed for breakfast tray. Plans for discharge tomorrow. No other verbalized needs at present time. Assessment completed. See doc flow sheet for further assessments.

## 2017-02-20 NOTE — DISCHARGE SUMMARY
REHABILITATION DISCHARGE SUMMARY     Date of admission to [facility]; 2.11.2017  Discharge Date: 2/21/2017   Orthopedic Spine Surgeon: Dr. Dona Diggs  Primary Care Physician: Dr. Maryann Reyes III    Admission Condition: fair  Discharged Condition: good    Hospital Course: The patient was admitted to the [facility] unit at [facility] on 2/11/2017 for rehab s/p spinal surgery due to spinal stenosis with pain and LE weakness. Ron De La Paz has had progressively worsening low back pain, radiating to bilateral lower extremities due to lumbar spondylolisthesis and lumbar stenosis. She failed conservative management and underwent a lumbar laminectomy L4 through S1 with bilateral foraminotomies, lumbar posterolateral fusion L4 through S1, cortical screw instrumentation L4 through S1, bone marrow aspirate for allograft, translumbar interbody fusion L4-L5 and L5- S1, insertion biomechanical device L4-L5 and L5- S1 and Pace Ge dorsal osteotomy L5 per Dr. Traci Shane MD on 2/11/2017. The patient's post operative course has been complicated by Post op patient made WBAT BLE. Lumbar spine precautions to be followed. She has done very well at [facility], her pain is better controlled with Maura 5-10 q 4hrs prn pain, as well as prn Ultram (taken infrequently) , Neurontin a 300 mg tid, and 5mg of flexeril tid prn for right lower back spasms. Her chronic htn is well controlled with diovan, tenormin and lasix, and she remains on ASA and Lipitor due to carotid and cardiac disease. Pod #7, she was placed on subcut hep for DVT prevention and we continued with the use of SCDs. Her hgb improved from 9.1-10.4 with iron sulfate. Her wound has healed well.     Rehabilitation Course:   Functional Level On Admission:   bathing - mod A, lower body dressing - max A, toileting - max A, bed mobility - mod A, transfers- min A, poor balance , ambulation- short distances withmin assist with RW   Functional Level At Discharge:   Aurora Brandt Daily Assessment   Occasional verbal cues for correct body mechanics with sit<>stand Transfer Type: SPT with walker  Transfer Assistance : 5 (Supervision/setup)  Sit to Stand Assistance: Supervision  Car Transfers: Not tested         GAIT Daily Assessment     Amount of Assistance: 5 (Supervision/setup)  Distance (ft): 200 Feet (ft)  Assistive Device: Walker, rolling   Gait training up/down 20 ft ramp with SBA     STEPS or STAIRS Daily Assessment   Slow reciprocating pattern going up/down steps Steps/Stairs Ambulated (#): 4  Level of Assist : 5 (Stand-by assistance)  Rail Use: Both      Gait training up/down 6 inch step with RW and SBA with cues for managing RW up/down step and body mechanics  -   Activities of Daily Living     Score Comments   Grooming 7 Tasks completed by patient: Brushed teeth  Comments: I in sitting   Bathing 6 Body Parts Bathe: Abdomen, Arm, left, Arm, right, Buttocks, Chest, Lower leg and foot, left, Lower leg and foot, right, Ivy area, Thigh, left, Thigh, right  Comments: S   Tub/Shower Transfer [place] 6 Type of Shower: Shower  Adaptive Equipment:Tub transfer bench, Grab bars and Walker  Comments: RW   Upper Body  Dressing/  Undressing 6 Items Applied:Pullover (4 steps)  Comments: RW to gather clothes   Lower Body Dressing/  Undressing 6 Items Applied:Shoe, left (1 step), Shoe, right (1 step), Sock, left (1 step), Sock, right (1 step), Underpants (3 steps), Elastic waist pants (3 steps)  Comments: Sock aide; RW; reacher   Education   Gains made          Past Medical History   Diagnosis Date    Allergic rhinitis, cause unspecified 1/7/2015     pt states not bad    Aortic valve disorders 1/7/2015     pt states does not know about this diagnosis    Arthritis     Arthropathies 1/7/2015     Multiple sites    Asthma      uses inhaler prn    Autoimmune disease ([facility] [address]      fibromyalgia    CAD (coronary artery disease)      mild,  treated with med    Carotid stenosis 1/7/2015    Carpal tunnel syndrome 01/07/2015     bilat wrists-no issue now    Chronic pain      due to fibromyalga & arthritis    Contact dermatitis and other eczema, due to unspecified cause 1/7/2015     pt states no current issues    Depression 6/30/2015     managed w/med    Dysuria 1/7/2015     hx of; pt states no current problems    Esophageal reflux 1/7/2015     managed w/med    Fibromyalgia     Former smoker      1 ppd for 14 yrs; quit smoking 1981    GERD (gastroesophageal reflux disease)     Heart failure ([address]      pt states no current issues    Hematuria, unspecified 1/7/2015     hx of; pt states no current problems    Hypertension      managed w/med    Mitral valve disorders 1/7/2015     pt states has mitral valve prolapse    Mixed hyperlipidemia 4/21/2016    Neuropathy      BLE    Occlusion and stenosis of carotid artery without mention of cerebral infarction 1/7/2015     pt states not aware of this diagnosis    Osteopenia 1/7/2015    Other and unspecified hyperlipidemia 1/7/2015     managed w/med    Other B-complex deficiencies 1/7/2015     pt states does not have this diagnosis    Other chest pain 3/7/2011     pt states chest pain comes & goes.  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     pt states has spinal stenosis    Other malaise and fatigue 1/7/2015    PUD (peptic ulcer disease)      years ago   Sarah Esquivel Renal insufficiency 1/7/2015     pt states no current issues    Shortness of breath 1/7/2015     pt reports SOB w/exertion    Unspecified urinary incontinence 1/7/2015      Past Surgical History   Procedure Laterality Date    Hx hysterectomy      Hx tubal ligation      Hx lap cholecystectomy      Hx colonoscopy      Hx hemorrhoidectomy      Hx bladder suspension      Hx shoulder arthroscopy Right      rotator cuff repair    Hx gi       esophageal dilatation    Hx gi       rectocele    Hx urological       bladder tac      Family History   Problem Relation Age of Onset    Diabetes Mother     Heart Attack Mother     Diabetes Sister     Breast Cancer Sister 79    Diabetes Sister     Heart Attack Father     Diabetes Sister     Heart Attack Sister       from heart issue age 54    Arthritis-rheumatoid Paternal Grandmother     Heart Disease Maternal Grandmother       Social History   Substance Use Topics    Smoking status: Former Smoker     Packs/day: 1.00     Years: 14.00    Smokeless tobacco: Never Used      Comment: quit smoking 1981    Alcohol use No     Prior to Admission medications    Medication Sig Start Date End Date Taking? Authorizing Provider   cyclobenzaprine (FLEXERIL) 10 mg tablet Take 0.5 Tabs by mouth three (3) times daily as needed for Muscle Spasm(s). 17  Yes Katharine Grimaldo MD   traMADol (ULTRAM) 50 mg tablet Take 1 Tab by mouth four (4) times daily. Max Daily Amount: 200 mg. 17  Yes Katharine Grimaldo MD   oxyCODONE IR (ROXICODONE) 5 mg immediate release tablet Take 1-2 Tabs by mouth every four (4) hours as needed. Max Daily Amount: 60 mg. 17  Yes Katharine Grimaldo MD   oxyCODONE-acetaminophen (PERCOCET 7.5) 7.5-325 mg per tablet Take 1 Tab by mouth every four (4) hours as needed for Pain. Max Daily Amount: 6 Tabs. 17   Ilda Moses MD   furosemide (LASIX) 40 mg tablet take 1 tablet by mouth every morning 16   Wenceslao Galindo NP   omeprazole (PRILOSEC) 40 mg capsule Take 40 mg by mouth every morning. Historical Provider   valsartan (DIOVAN) 160 mg tablet take 1 tablet by mouth once daily  Patient taking differently: every morning. take 1 tablet by mouth once daily 16   Stanford Flores MD   sertraline (ZOLOFT) 50 mg tablet Take 1.5 Tabs by mouth every morning. 16   Stanford Flores MD   gabapentin (NEURONTIN) 300 mg capsule Take 1 Cap by mouth three (3) times daily.  16   Stanford Flores MD   atenolol (TENORMIN) 25 mg tablet Take 0.5 Tabs by mouth nightly. 11/4/16   Kenan Castaneda MD   albuterol-ipratropium (DUO-NEB) 2.5 mg-0.5 mg/3 ml nebu 3 mL by Nebulization route every six (6) hours as needed. 9/28/16   Mathias Dakin, NP   albuterol (PROVENTIL HFA, VENTOLIN HFA, PROAIR HFA) 90 mcg/actuation inhaler Take 2 Puffs by inhalation every six (6) hours as needed for Wheezing. Indications: ACUTE ASTHMA ATTACK 9/28/16   Mathias Dakin, NP   zinc oxide-cod liver oil 40 % oint 30 g, vitamin a & d oint 30 g, nystatin 100,000 unit/gram crea 30 g, compound tincture of benzoin tinc 0.5 mL Apply 1 Each to affected area two (2) times a day. Patient taking differently: Apply 1 Each to affected area as needed. 4/21/16   Mathias Dakin, NP   aspirin delayed-release 81 mg tablet Take 81 mg by mouth every morning. Instructed to take DOS per Anesthesia guidelines. Historical Provider   atorvastatin (LIPITOR) 20 mg tablet Take 1 Tab by mouth daily. Patient taking differently: Take 20 mg by mouth nightly. 1/14/16   Mathias Dakin, NP   potassium chloride (K-DUR, KLOR-CON) 20 mEq tablet Take 1 Tab by mouth every morning. Patient taking differently: Take 20 mEq by mouth daily. Indications: HYPOKALEMIA PREVENTION 1/14/16   Mathias Dakin, NP   nitroglycerin (NITROSTAT) 0.4 mg SL tablet 1 Tab by SubLINGual route every five (5) minutes as needed for Chest Pain. Patient taking differently: 0.4 mg by SubLINGual route every five (5) minutes as needed for Chest Pain. States last dose 2 months ago 3/7/11   FACUNDO Henao     Allergies   Allergen Reactions    Bees [Hymenoptera Allergenic Extract] Anaphylaxis    Adhesive Tape-Silicones Rash     Ok to use paper tape    Lescol [Fluvastatin] Cough    Lipitor [Atorvastatin] Other (comments)     High doses cause leg cramps.  Able to tolerate low doses    Lisinopril Cough           Penicillin G Rash    Tetracycline Other (comments)     ULCERS IN MOUTH        Lab Review:   Recent Results (from the past 72 hour(s)) METABOLIC PANEL, BASIC    Collection Time: 02/19/17  6:45 AM   Result Value Ref Range    Sodium 142 136 - 145 mmol/L    Potassium 4.1 3.5 - 5.1 mmol/L    Chloride 103 98 - 107 mmol/L    CO2 31 21 - 32 mmol/L    Anion gap 8 7 - 16 mmol/L    Glucose 89 65 - 100 mg/dL    BUN 12 8 - 23 MG/DL    Creatinine 1.01 (H) 0.6 - 1.0 MG/DL    GFR est AA >60 >60 ml/min/1.73m2    GFR est non-AA 57 (L) >60 ml/min/1.73m2    Calcium 8.7 8.3 - 10.4 MG/DL   HGB & HCT    Collection Time: 02/19/17  6:45 AM   Result Value Ref Range    HGB 10.2 (L) 11.7 - 15.4 g/dL    HCT 33.6 (L) 35.8 - 46.3 %       Functional Assessment:          Balance  Sitting - Static: Good (unsupported) (02/18/17 0800)  Sitting - Dynamic: Good (unsupported) (02/18/17 0800)  Standing - Static: Good (02/18/17 0800)  Standing - Dynamic : Impaired (02/18/17 0800)           Toileting  Cues: Verbal cues provided (02/16/17 0832)  Adaptive Equipment: Elevated seat;Grab bars (02/16/17 0832)         Hal Loepz Fall Risk Assessment:  Hal Lopez Fall Risk  Mobility: Ambulates or transfers with assist devices or assistance/unsteady gait (02/19/17 2105)  Mentation: Alert, oriented x 3 (02/19/17 2105)  Medication: Patient receiving anticonvulsants, sedatives(tranquilizers), psychotropics or hypnotics, hypoglycemics, narcotics, sleep aids, antihypertensives, laxatives, or diuretics (02/19/17 2105)  Elimination: Needs assistance with toileting (02/19/17 2105)  Fall History in the Past 3 Months: Before admission (02/19/17 2105)  Total Score: 4 (02/19/17 2105)     Ambulation:  Gait  Distance (ft): 200 Feet (ft) (02/18/17 0800)  Assistive Device: [address], rolling (02/18/17 0800)  Rail Use: Both (02/18/17 0800)     Visit Vitals    /61 (BP 1 Location: Right arm, BP Patient Position: At rest)    Pulse 63    Temp 98.2 °F (36.8 °C)    Resp 17    SpO2 95%    Breastfeeding No      Intake and Output:    02/18 0701 - 02/19 1900  In: 480 [P.O.:480]  Out: -     Discharge Exam:  General: Alert, oriented and mood affect appropriate   Lungs:   Clear to auscultation bilaterally. Heart:  Regular rate and rhythm, S1, S2 stable, no murmur, click, rub or gallop. Abdomen:   Soft, non-tender. Bowel sounds present. No masses,  No organomegaly. Genitourinary: Continent    Neuro Muscular: Non focal, strength and sensation intact in the LEs. A bit of hip flexion weakness bilateral, limited by pain   Skin:  No rashes, lesions, or signs/symptoms or infection.        Incision(s): healing well,clean, dry, and intact,     Problem List as of 2/20/2017  Date Reviewed: 10/5/2016          Codes Class Noted - Resolved    * (Principal)S/P laminectomy with spinal fusion ICD-10-CM: Z98.1  ICD-9-CM: V45.4  2/11/2017 - Present        Spinal stenosis of lumbar region at multiple levels ICD-10-CM: M48.06  ICD-9-CM: 724.02  2/11/2017 - Present        Lumbar stenosis with neurogenic claudication ICD-10-CM: M48.06  ICD-9-CM: 724.03  2/7/2017 - Present        Asymptomatic carotid artery stenosis (Chronic) ICD-10-CM: I65.29  ICD-9-CM: 433.10  11/4/2016 - Present        Valvular heart disease (Chronic) ICD-10-CM: I38  ICD-9-CM: 424.90  11/4/2016 - Present        Coronary artery disease involving native coronary artery of native heart without angina pectoris (Chronic) ICD-10-CM: I25.10  ICD-9-CM: 414.01  11/4/2016 - Present        On potassium wasting diuretic therapy (Chronic) ICD-10-CM: U44.917  ICD-9-CM: V58.69  11/4/2016 - Present        Obesity (BMI 30.0-34.9) (Chronic) ICD-10-CM: E66.9  ICD-9-CM: 278.00  11/4/2016 - Present        Mixed hyperlipidemia (Chronic) ICD-10-CM: E78.2  ICD-9-CM: 272.2  4/21/2016 - Present        RENEE (stress urinary incontinence, female) (Chronic) ICD-10-CM: N39.3  ICD-9-CM: 625.6  2/24/2016 - Present        Rectocele ICD-10-CM: N81.6  ICD-9-CM: 618.04  2/24/2016 - Present        Weakening of rectovaginal tissue ICD-10-CM: N81.83  ICD-9-CM: 618.82  2/24/2016 - Present        Depression (Chronic) ICD-10-CM: F32.9  ICD-9-CM: 168  6/30/2015 - Present        Contact dermatitis and other eczema, due to unspecified cause ICD-10-CM: L25.9  ICD-9-CM: 692.9  1/7/2015 - Present        Essential hypertension (Chronic) ICD-10-CM: I10  ICD-9-CM: 401.9  1/7/2015 - Present        Other and unspecified hyperlipidemia ICD-10-CM: E78.5  ICD-9-CM: 272.4  1/7/2015 - Present        Mitral valve disorders ICD-10-CM: I05.9  ICD-9-CM: 424.0  1/7/2015 - Present        Hematuria, unspecified ICD-10-CM: R31.9  ICD-9-CM: 599.70  1/7/2015 - Present        Dysuria ICD-10-CM: R30.0  ICD-9-CM: 788.1  1/7/2015 - Present        Anemia, unspecified ICD-10-CM: D64.9  ICD-9-CM: 285.9  1/7/2015 - Present        Other B-complex deficiencies ICD-10-CM: E53.8  ICD-9-CM: 266.2  1/7/2015 - Present        Arthropathies (Chronic) ICD-10-CM: M12.9  ICD-9-CM: 716.90  1/7/2015 - Present    Overview Signed 1/7/2015 10:11 AM by Patience Velez     Multiple sites             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  ICD-9-CM: 756.19  1/7/2015 - Present        Other malaise and fatigue ICD-10-CM: R53.81, R53.83  ICD-9-CM: 780.79  1/7/2015 - Present        Allergic rhinitis, cause unspecified ICD-10-CM: J30.9  ICD-9-CM: 477.9  1/7/2015 - Present        Aortic valve disorders ICD-10-CM: I35.9  ICD-9-CM: 424.1  1/7/2015 - Present        Extrinsic asthma, unspecified (Chronic) ICD-10-CM: J45.909  ICD-9-CM: 493.00  1/7/2015 - Present        Respiratory abnormality, unspecified ICD-10-CM: J98.9  ICD-9-CM: 786.00  1/7/2015 - Present        Congestive heart failure, unspecified ICD-10-CM: I50.9  ICD-9-CM: 428.0  1/7/2015 - Present        Occlusion and stenosis of carotid artery without mention of cerebral infarction ICD-10-CM: I65.29  ICD-9-CM: 433.10  1/7/2015 - Present        Carpal tunnel syndrome ICD-10-CM: G56.00  ICD-9-CM: 354.0  1/7/2015 - Present        Unspecified urinary incontinence ICD-10-CM: R32  ICD-9-CM: 788.30  1/7/2015 - Present Carotid stenosis ICD-10-CM: I65.29  ICD-9-CM: 433.10  1/7/2015 - Present        Renal insufficiency ICD-10-CM: N28.9  ICD-9-CM: 593.9  1/7/2015 - Present        Heart disease, unspecified ICD-10-CM: I51.9  ICD-9-CM: 429.9  1/7/2015 - Present        Shortness of breath ICD-10-CM: R06.02  ICD-9-CM: 786.05  1/7/2015 - Present        Osteopenia ICD-10-CM: M85.80  ICD-9-CM: 733.90  1/7/2015 - Present        Other chest pain ICD-10-CM: R07.89  ICD-9-CM: 786.59  3/7/2011 - Present        GERD (gastroesophageal reflux disease) (Chronic) ICD-10-CM: K21.9  ICD-9-CM: 530.81  3/4/2011 - Present        Asthma (Chronic) ICD-10-CM: J45.909  ICD-9-CM: 493.90  3/4/2011 - Present              Discharge Instructions:   1. Diet: Regular Diet  2. Activity: No heavy lifting, pushing, pulling , no driving    Current Discharge Medication List      START taking these medications    Details   cyclobenzaprine (FLEXERIL) 10 mg tablet Take 0.5 Tabs by mouth three (3) times daily as needed for Muscle Spasm(s). Qty: 90 Tab, Refills: 0    Associated Diagnoses: Lumbar stenosis with neurogenic claudication      traMADol (ULTRAM) 50 mg tablet Take 1 Tab by mouth four (4) times daily. Max Daily Amount: 200 mg. Qty: 120 Tab, Refills: 0    Associated Diagnoses: S/P laminectomy with spinal fusion      oxyCODONE IR (ROXICODONE) 5 mg immediate release tablet Take 1-2 Tabs by mouth every four (4) hours as needed. Max Daily Amount: 60 mg.  Qty: 120 Tab, Refills: 0    Associated Diagnoses: Lumbar stenosis with neurogenic claudication         CONTINUE these medications which have NOT CHANGED    Details   furosemide (LASIX) 40 mg tablet take 1 tablet by mouth every morning  Qty: 90 Tab, Refills: 3    Associated Diagnoses: Essential hypertension      omeprazole (PRILOSEC) 40 mg capsule Take 40 mg by mouth every morning.       valsartan (DIOVAN) 160 mg tablet take 1 tablet by mouth once daily  Qty: 90 Tab, Refills: 3    Associated Diagnoses: Essential hypertension      sertraline (ZOLOFT) 50 mg tablet Take 1.5 Tabs by mouth every morning. Qty: 135 Tab, Refills: 3    Associated Diagnoses: Depression, unspecified depression type      gabapentin (NEURONTIN) 300 mg capsule Take 1 Cap by mouth three (3) times daily. Qty: 270 Cap, Refills: 3    Associated Diagnoses: Neuropathy      atenolol (TENORMIN) 25 mg tablet Take 0.5 Tabs by mouth nightly. Qty: 45 Tab, Refills: 3    Associated Diagnoses: Essential hypertension      albuterol-ipratropium (DUO-NEB) 2.5 mg-0.5 mg/3 ml nebu 3 mL by Nebulization route every six (6) hours as needed. Qty: 100 Nebule, Refills: 1    Associated Diagnoses: Community acquired pneumonia      albuterol (PROVENTIL HFA, VENTOLIN HFA, PROAIR HFA) 90 mcg/actuation inhaler Take 2 Puffs by inhalation every six (6) hours as needed for Wheezing. Indications: ACUTE ASTHMA ATTACK  Qty: 1 Inhaler, Refills: 3    Associated Diagnoses: Mild intermittent asthma without complication      zinc oxide-cod liver oil 40 % oint 30 g, vitamin a & d oint 30 g, nystatin 100,000 unit/gram crea 30 g, compound tincture of benzoin tinc 0.5 mL Apply 1 Each to affected area two (2) times a day. Qty: 500 g, Refills: 3    Associated Diagnoses: Unspecified urinary incontinence      aspirin delayed-release 81 mg tablet Take 81 mg by mouth every morning. Instructed to take DOS per Anesthesia guidelines. atorvastatin (LIPITOR) 20 mg tablet Take 1 Tab by mouth daily. Qty: 90 Tab, Refills: 3    Associated Diagnoses: Hyperlipidemia      potassium chloride (K-DUR, KLOR-CON) 20 mEq tablet Take 1 Tab by mouth every morning. Qty: 90 Tab, Refills: 3    Associated Diagnoses: Essential hypertension      nitroglycerin (NITROSTAT) 0.4 mg SL tablet 1 Tab by SubLINGual route every five (5) minutes as needed for Chest Pain.   Qty: 4 Bottle, Refills: 6         STOP taking these medications       oxyCODONE-acetaminophen (PERCOCET 7.5) 7.5-325 mg per tablet Comments:   Reason for Stopping:               Rehabilitation Management:   1. Devices: has eqpt already  2.  Consult: [facility] PT    Disposition: home    Follow-up with Dr. Louie Adames and Dr. VOGEL [facility] in 1-2 weeks    Signed By: Silvia Garcia MD     February 21, 2017

## 2017-02-21 ENCOUNTER — HOME HEALTH ADMISSION (OUTPATIENT)
Dept: HOME HEALTH SERVICES | Facility: HOME HEALTH | Age: 72
End: 2017-02-21
Payer: COMMERCIAL

## 2017-02-21 VITALS
SYSTOLIC BLOOD PRESSURE: 125 MMHG | HEART RATE: 62 BPM | DIASTOLIC BLOOD PRESSURE: 59 MMHG | RESPIRATION RATE: 19 BRPM | OXYGEN SATURATION: 94 % | TEMPERATURE: 98.1 F

## 2017-02-21 PROCEDURE — 74011250637 HC RX REV CODE- 250/637: Performed by: PHYSICAL MEDICINE & REHABILITATION

## 2017-02-21 PROCEDURE — 97530 THERAPEUTIC ACTIVITIES: CPT

## 2017-02-21 PROCEDURE — 97110 THERAPEUTIC EXERCISES: CPT

## 2017-02-21 PROCEDURE — 74011250636 HC RX REV CODE- 250/636: Performed by: PHYSICAL MEDICINE & REHABILITATION

## 2017-02-21 PROCEDURE — 99239 HOSP IP/OBS DSCHRG MGMT >30: CPT | Performed by: PHYSICAL MEDICINE & REHABILITATION

## 2017-02-21 PROCEDURE — 97116 GAIT TRAINING THERAPY: CPT

## 2017-02-21 RX ADMIN — HEPARIN SODIUM 5000 UNITS: 5000 INJECTION, SOLUTION INTRAVENOUS; SUBCUTANEOUS at 08:44

## 2017-02-21 RX ADMIN — TRAMADOL HYDROCHLORIDE 50 MG: 50 TABLET, FILM COATED ORAL at 08:48

## 2017-02-21 RX ADMIN — GABAPENTIN 300 MG: 300 CAPSULE ORAL at 05:45

## 2017-02-21 RX ADMIN — FUROSEMIDE 40 MG: 40 TABLET ORAL at 08:49

## 2017-02-21 RX ADMIN — POTASSIUM CHLORIDE 20 MEQ: 20 TABLET, EXTENDED RELEASE ORAL at 08:47

## 2017-02-21 RX ADMIN — HEPARIN SODIUM 5000 UNITS: 5000 INJECTION, SOLUTION INTRAVENOUS; SUBCUTANEOUS at 01:18

## 2017-02-21 RX ADMIN — TRAMADOL HYDROCHLORIDE 50 MG: 50 TABLET, FILM COATED ORAL at 12:19

## 2017-02-21 RX ADMIN — ACETAMINOPHEN 650 MG: 325 TABLET, FILM COATED ORAL at 05:48

## 2017-02-21 RX ADMIN — PANTOPRAZOLE SODIUM 40 MG: 40 TABLET, DELAYED RELEASE ORAL at 05:45

## 2017-02-21 RX ADMIN — SERTRALINE HYDROCHLORIDE 75 MG: 50 TABLET ORAL at 08:47

## 2017-02-21 RX ADMIN — FERROUS SULFATE TAB 325 MG (65 MG ELEMENTAL FE) 325 MG: 325 (65 FE) TAB at 08:46

## 2017-02-21 RX ADMIN — ACETAMINOPHEN 650 MG: 325 TABLET, FILM COATED ORAL at 08:49

## 2017-02-21 RX ADMIN — VALSARTAN 160 MG: 80 TABLET, FILM COATED ORAL at 08:48

## 2017-02-21 RX ADMIN — DOCUSATE SODIUM 100 MG: 100 CAPSULE, LIQUID FILLED ORAL at 08:47

## 2017-02-21 NOTE — PROGRESS NOTES
Patient resting asleep in bed. Alert and oriented upon assessment. Lung sounds clear. S1S2, bowel sounds active. Incision  To back with Dura bond and steri strips. No complaint of pain or discomfort at present time. Does admit to \"feeling under the weather. \" Plans for discharge today. Numbness to toes. Moving all extremities. Repositioned up in bed. Assessment completed. No other verbalized needs at present time. See doc flow sheet for further assessments.

## 2017-02-21 NOTE — PROGRESS NOTES
PHYSICAL THERAPY DAILY NOTE  Time In: 0831  Time Out: 7497  Patient Seen For: Gait training; Therapeutic exercise;Transfer training    Subjective: Patient reports feeling good this AM, though did cough some last night and woke up without her voice. \"I'm not nervous about going home. \" Patient agreeable to therapy. Objective: Vital Signs:   Patient Vitals for the past 8 hrs:   Temp Pulse Resp BP SpO2   02/21/17 0701 98.1 °F (36.7 °C) 62 19 125/59 94 %           Pain level: None reported at this time. Patient education: Educated patient on HEP and how frequent to perform exercises. Interdisciplinary Communication: Communicated with Cee  about discharge time this date for patient to contact family to arrange pick-up. Spinal, Other (comment) (fall risks)    BED/MAT MOBILITY Daily Assessment    Rolling Right : 0 (Not tested)  Rolling Left : 0 (Not tested)  Supine to Sit : 0 (Not tested)  Sit to Supine : 0 (Not tested)       TRANSFERS Daily Assessment   Required for safety. Transfer Type: SPT without device  Transfer Assistance : 6 (Modified independent)  Sit to Stand Assistance: Modified independent  Car Transfers: Supervision  Car Type: Rehab car       GAIT Daily Assessment   Patient ambulated with decreased edilberto. Amount of Assistance: 6 (Modified independent)  Distance (ft): 300 Feet (ft)  Assistive Device: Walker, rolling       BALANCE Daily Assessment    Sitting - Static: Good (unsupported)  Sitting - Dynamic: Good (unsupported)  Standing - Static: Good       LOWER EXTREMITY EXERCISES Daily Assessment   Patient also performed 10 minutes on ziyad-med level 3.  Extremity: Both  Exercise Type #1: Seated lower extremity strengthening  Sets Performed: 1  Reps Performed: 15  Level of Assist: Modified independent     Patient performed the following bilateral lower extremity exercises:    SEATED EXERCISES Sets Reps Comments   Ankle Pumps 1 15    Hip Flexion 1 15    Long Arc Quads 1 15 Hip Adduction/Ball Squeeze 1 15    Hip Abduction with Green Theraband 1 15    Hamstring Curls with Green Theraband 1 15    Hip Extension with Green Theraband 1 15      Patient was met in therapy gym after being brought from room by Omar Tovar, Rehab technician and patient was returned to room sitting in bedside chair with needs in reach. Assessment: Patient has made good progress with all aspects of therapy. Patient was not limited by pain this AM. Patient demonstrated improved tolerance to independent household ambulation and good understanding of HEP. Plan of Care: [id number] [address] home today, follow-up with home health PT.     Keaton Segura  2/21/2017

## 2017-02-21 NOTE — PROGRESS NOTES
Dischcarge instructions completed with patient. Follow up appointments and prescriptions given to patient. Patient shows verbal understanding of discharge instructions. Printed materials/education given to patient. No other verbalized needs made known upon discharge instructions.

## 2017-02-21 NOTE — PROGRESS NOTES
Care Management Interventions  PCP Verified by CM: Yes  Palliative Care Consult (Criteria: CHF and RRAT>21): No  Mode of Transport at Discharge: Other (see comment) (FAMILY CAR)  Transition of Care Consult (CM Consult): Home Health (PT)  [address] Yes  Discharge Durable Medical Equipment: No ([address])  Confirm Follow Up Transport: Family  Plan discussed with Pt/Family/Caregiver: Yes  Freedom of Choice Offered: Yes  Discharge Location  Discharge Placement: Home with home health [facility]  -  PT)     [address] [facility] REQUESTED AMEDYSIS; REFERRAL  [facility]. PT AGREEABLE TO [facility]. REFERRAL MADE. REFERRAL ALSO MADE TO [facility].

## 2017-02-21 NOTE — PROGRESS NOTES
[address]  Face to Face Encounter    Patients Name: Zander Veronica    YOB: 1945    Ordering Physician: RACHEL Arango MD      Primary Diagnosis: trauna to spinal core  Spinal stenosis of lumbar region at multiple levels  S/P laminectomy with spinal fusion    Date of Face to Face:   2/21/2017                                  Face to Face Encounter findings are related to primary reason for home care:   YES.     1. I certify that the patient needs intermittent care as follows: physical therapy: strengthening, gait/stair training and balance training    2. I certify that this patient is homebound, that is: 1) patient requires the use of a walker device, special transportation, or assistance of another to leave the home; or 2) patient's condition makes leaving the home medically contraindicated; and 3) patient has a normal inability to leave the home and leaving the home requires considerable and taxing effort. Patient may leave the home for infrequent and short duration for medical reasons, and occasional absences for non-medical reasons. Homebound status is due to the following functional limitations: Limited ambulation secondary to [facility] AND STRENGTH. Ambulation limited to 200 feet with the use of a ROLLING WALKER (assistive device). 3. I certify that this patient is under my care and that I, or a nurse practitioner or [address] [place] [address], or clinical nurse specialist, or certified nurse midwife, working with me, had a Face-to-Face Encounter that meets the physician Face-to-Face Encounter requirements.   The following are the clinical findings from the [address] [place] [address] encounter that support the need for skilled services and is a summary of the encounter: SEE MEDICAL RECORD    See attached progess note      Vena Ganser, MSW  2/21/2017      THE FOLLOWING TO BE COMPLETED BY THE COMMUNITY PHYSICIAN:    I concur with the findings described above from the [facility] encounter that this patient is homebound and in need of a skilled service.     Certifying Physician: _____________________________________      Printed Certifying Physician Name: _____________________________________    Date: _________________

## 2017-02-21 NOTE — DISCHARGE INSTRUCTIONS
-no driving  -no lifting , pushing or pulling more than 5 lbs until cleared by Dr. Ana Hernandez assistance is you develop increasing weakness, pain, loss of bowel or bladder control, fever or chills

## 2017-02-23 ENCOUNTER — HOME CARE VISIT (OUTPATIENT)
Dept: SCHEDULING | Facility: HOME HEALTH | Age: 72
End: 2017-02-23
Payer: COMMERCIAL

## 2017-02-23 VITALS
DIASTOLIC BLOOD PRESSURE: 60 MMHG | RESPIRATION RATE: 17 BRPM | SYSTOLIC BLOOD PRESSURE: 110 MMHG | OXYGEN SATURATION: 96 % | TEMPERATURE: 97.4 F | HEART RATE: 67 BPM

## 2017-02-23 PROCEDURE — 3331090002 HH PPS REVENUE DEBIT

## 2017-02-23 PROCEDURE — 3331090001 HH PPS REVENUE CREDIT

## 2017-02-23 PROCEDURE — G0151 HHCP-SERV OF PT,EA 15 MIN: HCPCS

## 2017-02-23 PROCEDURE — 400013 HH SOC

## 2017-02-24 PROCEDURE — 3331090001 HH PPS REVENUE CREDIT

## 2017-02-24 PROCEDURE — 3331090002 HH PPS REVENUE DEBIT

## 2017-02-25 PROCEDURE — 3331090001 HH PPS REVENUE CREDIT

## 2017-02-25 PROCEDURE — 3331090002 HH PPS REVENUE DEBIT

## 2017-02-26 PROCEDURE — 3331090002 HH PPS REVENUE DEBIT

## 2017-02-26 PROCEDURE — 3331090001 HH PPS REVENUE CREDIT

## 2017-02-27 ENCOUNTER — HOME CARE VISIT (OUTPATIENT)
Dept: HOME HEALTH SERVICES | Facility: HOME HEALTH | Age: 72
End: 2017-02-27
Payer: COMMERCIAL

## 2017-02-27 PROCEDURE — 3331090002 HH PPS REVENUE DEBIT

## 2017-02-27 PROCEDURE — 3331090001 HH PPS REVENUE CREDIT

## 2017-02-28 ENCOUNTER — HOME CARE VISIT (OUTPATIENT)
Dept: SCHEDULING | Facility: HOME HEALTH | Age: 72
End: 2017-02-28
Payer: COMMERCIAL

## 2017-02-28 PROCEDURE — G0157 HHC PT ASSISTANT EA 15: HCPCS

## 2017-02-28 PROCEDURE — 3331090002 HH PPS REVENUE DEBIT

## 2017-02-28 PROCEDURE — 3331090001 HH PPS REVENUE CREDIT

## 2017-03-01 PROCEDURE — 3331090002 HH PPS REVENUE DEBIT

## 2017-03-01 PROCEDURE — 3331090001 HH PPS REVENUE CREDIT

## 2017-03-02 VITALS
HEART RATE: 75 BPM | SYSTOLIC BLOOD PRESSURE: 120 MMHG | RESPIRATION RATE: 18 BRPM | TEMPERATURE: 95.1 F | DIASTOLIC BLOOD PRESSURE: 64 MMHG

## 2017-03-02 PROCEDURE — 3331090001 HH PPS REVENUE CREDIT

## 2017-03-02 PROCEDURE — 3331090002 HH PPS REVENUE DEBIT

## 2017-03-03 ENCOUNTER — HOME CARE VISIT (OUTPATIENT)
Dept: SCHEDULING | Facility: HOME HEALTH | Age: 72
End: 2017-03-03
Payer: COMMERCIAL

## 2017-03-03 PROCEDURE — G0157 HHC PT ASSISTANT EA 15: HCPCS

## 2017-03-03 PROCEDURE — 3331090002 HH PPS REVENUE DEBIT

## 2017-03-03 PROCEDURE — 3331090001 HH PPS REVENUE CREDIT

## 2017-03-04 PROCEDURE — 3331090001 HH PPS REVENUE CREDIT

## 2017-03-04 PROCEDURE — 3331090002 HH PPS REVENUE DEBIT

## 2017-03-05 PROCEDURE — 3331090002 HH PPS REVENUE DEBIT

## 2017-03-05 PROCEDURE — 3331090001 HH PPS REVENUE CREDIT

## 2017-03-06 VITALS
TEMPERATURE: 97.4 F | RESPIRATION RATE: 18 BRPM | SYSTOLIC BLOOD PRESSURE: 140 MMHG | HEART RATE: 83 BPM | DIASTOLIC BLOOD PRESSURE: 82 MMHG

## 2017-03-06 PROCEDURE — 3331090002 HH PPS REVENUE DEBIT

## 2017-03-06 PROCEDURE — 3331090001 HH PPS REVENUE CREDIT

## 2017-03-07 ENCOUNTER — HOME CARE VISIT (OUTPATIENT)
Dept: SCHEDULING | Facility: HOME HEALTH | Age: 72
End: 2017-03-07
Payer: COMMERCIAL

## 2017-03-07 PROCEDURE — G0151 HHCP-SERV OF PT,EA 15 MIN: HCPCS

## 2017-03-07 PROCEDURE — 3331090001 HH PPS REVENUE CREDIT

## 2017-03-07 PROCEDURE — 3331090002 HH PPS REVENUE DEBIT

## 2017-03-08 VITALS
TEMPERATURE: 96.3 F | RESPIRATION RATE: 18 BRPM | HEART RATE: 76 BPM | SYSTOLIC BLOOD PRESSURE: 122 MMHG | DIASTOLIC BLOOD PRESSURE: 68 MMHG

## 2017-03-08 PROCEDURE — 3331090001 HH PPS REVENUE CREDIT

## 2017-03-08 PROCEDURE — 3331090002 HH PPS REVENUE DEBIT

## 2017-03-09 PROCEDURE — 3331090001 HH PPS REVENUE CREDIT

## 2017-03-09 PROCEDURE — 3331090002 HH PPS REVENUE DEBIT

## 2017-03-10 ENCOUNTER — HOME CARE VISIT (OUTPATIENT)
Dept: SCHEDULING | Facility: HOME HEALTH | Age: 72
End: 2017-03-10
Payer: COMMERCIAL

## 2017-03-10 PROCEDURE — G0157 HHC PT ASSISTANT EA 15: HCPCS

## 2017-03-10 PROCEDURE — 3331090002 HH PPS REVENUE DEBIT

## 2017-03-10 PROCEDURE — 3331090001 HH PPS REVENUE CREDIT

## 2017-03-11 PROCEDURE — 3331090001 HH PPS REVENUE CREDIT

## 2017-03-11 PROCEDURE — 3331090002 HH PPS REVENUE DEBIT

## 2017-03-12 VITALS
RESPIRATION RATE: 16 BRPM | DIASTOLIC BLOOD PRESSURE: 62 MMHG | HEART RATE: 62 BPM | SYSTOLIC BLOOD PRESSURE: 114 MMHG | TEMPERATURE: 96.7 F

## 2017-03-12 PROCEDURE — 3331090001 HH PPS REVENUE CREDIT

## 2017-03-12 PROCEDURE — 3331090002 HH PPS REVENUE DEBIT

## 2017-03-13 ENCOUNTER — HOME CARE VISIT (OUTPATIENT)
Dept: SCHEDULING | Facility: HOME HEALTH | Age: 72
End: 2017-03-13
Payer: COMMERCIAL

## 2017-03-13 PROCEDURE — G0151 HHCP-SERV OF PT,EA 15 MIN: HCPCS

## 2017-03-13 PROCEDURE — 3331090002 HH PPS REVENUE DEBIT

## 2017-03-13 PROCEDURE — 3331090001 HH PPS REVENUE CREDIT

## 2017-03-13 PROCEDURE — 3331090003 HH PPS REVENUE ADJ

## 2017-03-14 VITALS
RESPIRATION RATE: 19 BRPM | DIASTOLIC BLOOD PRESSURE: 92 MMHG | HEART RATE: 70 BPM | TEMPERATURE: 97.1 F | SYSTOLIC BLOOD PRESSURE: 150 MMHG

## 2017-03-14 PROCEDURE — 3331090001 HH PPS REVENUE CREDIT

## 2017-03-14 PROCEDURE — 3331090002 HH PPS REVENUE DEBIT

## 2017-03-15 PROCEDURE — 3331090002 HH PPS REVENUE DEBIT

## 2017-03-15 PROCEDURE — 3331090001 HH PPS REVENUE CREDIT

## 2017-03-16 PROCEDURE — 3331090002 HH PPS REVENUE DEBIT

## 2017-03-16 PROCEDURE — 3331090001 HH PPS REVENUE CREDIT

## 2017-03-17 PROCEDURE — 3331090002 HH PPS REVENUE DEBIT

## 2017-03-17 PROCEDURE — 3331090001 HH PPS REVENUE CREDIT

## 2017-03-18 PROCEDURE — 3331090001 HH PPS REVENUE CREDIT

## 2017-03-18 PROCEDURE — 3331090002 HH PPS REVENUE DEBIT

## 2017-12-08 ENCOUNTER — HOSPITAL ENCOUNTER (OUTPATIENT)
Dept: MAMMOGRAPHY | Age: 72
Discharge: HOME | End: 2017-12-08
Attending: INTERNAL MEDICINE
Payer: COMMERCIAL

## 2017-12-08 DIAGNOSIS — Z12.31 ENCOUNTER FOR SCREENING MAMMOGRAM FOR MALIGNANT NEOPLASM OF BREAST: ICD-10-CM

## 2017-12-08 PROCEDURE — 77067 SCR MAMMO BI INCL CAD: CPT

## 2018-01-12 ENCOUNTER — HOSPITAL ENCOUNTER (OUTPATIENT)
Dept: GENERAL RADIOLOGY | Age: 73
Discharge: HOME | End: 2018-01-12
Payer: MEDICARE

## 2018-01-12 DIAGNOSIS — J45.21 MILD INTERMITTENT ASTHMATIC BRONCHITIS WITH ACUTE EXACERBATION: ICD-10-CM

## 2018-01-12 DIAGNOSIS — R05.8 PRODUCTIVE COUGH: ICD-10-CM

## 2018-01-12 PROCEDURE — 71046 X-RAY EXAM CHEST 2 VIEWS: CPT

## 2018-05-04 ENCOUNTER — HOSPITAL ENCOUNTER (OUTPATIENT)
Dept: MAMMOGRAPHY | Age: 73
Discharge: HOME | End: 2018-05-04
Attending: INTERNAL MEDICINE
Payer: MEDICARE

## 2018-05-04 DIAGNOSIS — Z78.0 POST-MENOPAUSAL: ICD-10-CM

## 2018-05-04 PROCEDURE — 77080 DXA BONE DENSITY AXIAL: CPT

## 2018-05-05 NOTE — PROGRESS NOTES
Your bone density is a little low especially in your lumbar back. I recommend you start a medication for the osteopenia. Have you ever been on fosamax or boniva or another medication for your bones? Are your reflux symptoms controlled?

## 2018-05-22 ENCOUNTER — HOSPITAL ENCOUNTER (OUTPATIENT)
Dept: LAB | Age: 73
Discharge: HOME | End: 2018-05-22
Payer: MEDICARE

## 2018-05-22 DIAGNOSIS — R19.7 DIARRHEA, UNSPECIFIED TYPE: ICD-10-CM

## 2018-05-22 LAB
ANION GAP SERPL CALC-SCNC: 5 MMOL/L
BASOPHILS # BLD: 0.1 K/UL (ref 0–0.2)
BASOPHILS NFR BLD: 1 % (ref 0–2)
BUN SERPL-MCNC: 12 MG/DL (ref 8–23)
CALCIUM SERPL-MCNC: 8.8 MG/DL (ref 8.3–10.4)
CHLORIDE SERPL-SCNC: 109 MMOL/L (ref 98–107)
CO2 SERPL-SCNC: 27 MMOL/L (ref 21–32)
CREAT SERPL-MCNC: 1.3 MG/DL (ref 0.6–1)
DIFFERENTIAL METHOD BLD: ABNORMAL
EOSINOPHIL # BLD: 0.3 K/UL (ref 0–0.8)
EOSINOPHIL NFR BLD: 4 % (ref 0.5–7.8)
ERYTHROCYTE [DISTWIDTH] IN BLOOD BY AUTOMATED COUNT: 13.7 % (ref 11.9–14.6)
GLUCOSE SERPL-MCNC: 94 MG/DL (ref 65–100)
HCT VFR BLD AUTO: 42.3 % (ref 35.8–46.3)
HGB BLD-MCNC: 13.7 G/DL (ref 11.7–15.4)
LYMPHOCYTES # BLD: 2.6 K/UL (ref 0.5–4.6)
LYMPHOCYTES NFR BLD: 41 % (ref 13–44)
MAGNESIUM SERPL-MCNC: 2.3 MG/DL (ref 1.8–2.4)
MCH RBC QN AUTO: 28.5 PG (ref 26.1–32.9)
MCHC RBC AUTO-ENTMCNC: 32.4 G/DL (ref 31.4–35)
MCV RBC AUTO: 88.1 FL (ref 79.6–97.8)
MONOCYTES # BLD: 0.7 K/UL (ref 0.1–1.3)
MONOCYTES NFR BLD: 11 % (ref 4–12)
NEUTS SEG # BLD: 2.7 K/UL (ref 1.7–8.2)
NEUTS SEG NFR BLD: 43 % (ref 43–78)
PLATELET # BLD AUTO: 241 K/UL (ref 150–450)
PMV BLD AUTO: 9.7 FL (ref 10.8–14.1)
POTASSIUM SERPL-SCNC: 4 MMOL/L (ref 3.5–5.1)
RBC # BLD AUTO: 4.8 M/UL (ref 4.05–5.25)
SODIUM SERPL-SCNC: 141 MMOL/L (ref 136–145)
WBC # BLD AUTO: 6.3 K/UL (ref 4.3–11.1)

## 2018-05-22 PROCEDURE — 36415 COLL VENOUS BLD VENIPUNCTURE: CPT | Performed by: NURSE PRACTITIONER

## 2018-05-22 PROCEDURE — 83735 ASSAY OF MAGNESIUM: CPT | Performed by: NURSE PRACTITIONER

## 2018-05-22 PROCEDURE — 85025 COMPLETE CBC W/AUTO DIFF WBC: CPT | Performed by: NURSE PRACTITIONER

## 2018-05-22 PROCEDURE — 80048 BASIC METABOLIC PNL TOTAL CA: CPT | Performed by: NURSE PRACTITIONER

## 2018-08-08 ENCOUNTER — HOSPITAL ENCOUNTER (OUTPATIENT)
Dept: GENERAL RADIOLOGY | Age: 73
Discharge: HOME | End: 2018-08-08
Payer: MEDICARE

## 2018-08-08 DIAGNOSIS — M25.571 CHRONIC PAIN OF RIGHT ANKLE: ICD-10-CM

## 2018-08-08 DIAGNOSIS — G89.29 CHRONIC PAIN OF RIGHT ANKLE: ICD-10-CM

## 2018-08-08 PROCEDURE — 73600 X-RAY EXAM OF ANKLE: CPT

## 2019-01-18 ENCOUNTER — HOSPITAL ENCOUNTER (OUTPATIENT)
Dept: MAMMOGRAPHY | Age: 74
Discharge: HOME | End: 2019-01-18
Payer: MEDICARE

## 2019-01-18 DIAGNOSIS — Z12.31 VISIT FOR SCREENING MAMMOGRAM: ICD-10-CM

## 2019-01-18 PROCEDURE — 77067 SCR MAMMO BI INCL CAD: CPT

## 2019-03-20 ENCOUNTER — HOSPITAL ENCOUNTER (OUTPATIENT)
Dept: GENERAL RADIOLOGY | Age: 74
Discharge: HOME | End: 2019-03-20
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
## 2019-03-20 DIAGNOSIS — M54.9 ACUTE MIDLINE BACK PAIN, UNSPECIFIED BACK LOCATION: ICD-10-CM

## 2019-11-08 ENCOUNTER — HOSPITAL ENCOUNTER (OUTPATIENT)
Dept: LAB | Age: 74
Discharge: HOME | End: 2019-11-08

## 2019-11-08 PROCEDURE — 88312 SPECIAL STAINS GROUP 1: CPT

## 2019-11-08 PROCEDURE — 88305 TISSUE EXAM BY PATHOLOGIST: CPT

## 2019-11-11 ENCOUNTER — HOSPITAL ENCOUNTER (OUTPATIENT)
Dept: CT IMAGING | Age: 74
Discharge: HOME | End: 2019-11-11
Attending: NURSE PRACTITIONER
Payer: COMMERCIAL

## 2019-11-11 VITALS — DIASTOLIC BLOOD PRESSURE: 82 MMHG | SYSTOLIC BLOOD PRESSURE: 188 MMHG | HEART RATE: 76 BPM | OXYGEN SATURATION: 96 %

## 2019-11-11 DIAGNOSIS — R10.13 EPIGASTRIC PAIN: ICD-10-CM

## 2019-11-11 DIAGNOSIS — K86.2 PANCREATIC CYST: ICD-10-CM

## 2019-11-11 DIAGNOSIS — K76.89 LIVER CYST: ICD-10-CM

## 2019-11-11 LAB — CREAT BLD-MCNC: 1.2 MG/DL (ref 0.8–1.5)

## 2019-11-11 PROCEDURE — 74160 CT ABDOMEN W/CONTRAST: CPT

## 2019-11-11 PROCEDURE — 74011000258 HC RX REV CODE- 258: Performed by: NURSE PRACTITIONER

## 2019-11-11 PROCEDURE — 82565 ASSAY OF CREATININE: CPT

## 2019-11-11 PROCEDURE — 74011636320 HC RX REV CODE- 636/320: Performed by: NURSE PRACTITIONER

## 2019-11-11 RX ORDER — SODIUM CHLORIDE 0.9 % (FLUSH) 0.9 %
10 SYRINGE (ML) INJECTION
Status: COMPLETED | OUTPATIENT
Start: 2019-11-11 | End: 2019-11-11

## 2019-11-11 RX ADMIN — Medication 10 ML: at 16:08

## 2019-11-11 RX ADMIN — SODIUM CHLORIDE 100 ML: 900 INJECTION, SOLUTION INTRAVENOUS at 16:08

## 2019-11-11 RX ADMIN — IOPAMIDOL 100 ML: 755 INJECTION, SOLUTION INTRAVENOUS at 16:08

## 2020-02-15 ENCOUNTER — HOSPITAL ENCOUNTER (OUTPATIENT)
Dept: MAMMOGRAPHY | Age: 75
Discharge: HOME | End: 2020-02-15
Attending: INTERNAL MEDICINE
Payer: COMMERCIAL

## 2020-02-15 DIAGNOSIS — Z12.31 VISIT FOR SCREENING MAMMOGRAM: ICD-10-CM

## 2020-02-15 PROCEDURE — 77067 SCR MAMMO BI INCL CAD: CPT

## 2020-07-23 ENCOUNTER — HOSPITAL ENCOUNTER (OUTPATIENT)
Dept: PHYSICAL THERAPY | Age: 75
Discharge: HOME | End: 2020-07-23
Payer: COMMERCIAL

## 2020-07-23 PROCEDURE — 97110 THERAPEUTIC EXERCISES: CPT

## 2020-07-23 PROCEDURE — 97161 PT EVAL LOW COMPLEX 20 MIN: CPT

## 2020-07-23 NOTE — PROGRESS NOTES
Kendra Laineyjohanne  : 1945  Primary: Kristian Finley [facility]  Secondary:  [address] [place] @ [address], [address] [place] [address]  [phone]   [phone]      OUTPATIENT PHYSICAL THERAPY: Daily Treatment Note  2020   Pain in right leg (M79.604) and Pain in left leg (M79.605), Low back pain (M54.5) and Stiffness of unspecified joint, not elsewhere classified (M25.60) and Muscle weakness (generalized) (M62.81) and Other abnormalities of gait and mobility (R26.89)     Effective Dates: 2020 TO 10/21/2020 (90 days). Frequency/Duration: 2 times a week for 90 Days  GOALS: (Goals have been discussed and agreed upon with patient.)  Short-Term Functional Goals: Time Frame: 4 weeks  1. Pt to report compliance with HEP  2. Pt to demonstrate fluid pain free trunk motion  Discharge Goals: Time Frame: 12 weeks  1. Pt to increase quad strength for sit to stand without UE support  2. Pt to increase hip strength for reciprocal gait on stairs  3. Pt to demonstrate SLS > 10 sec for safe amb all surfaces  _________________________________________________________________________  Pre-treatment Symptoms/Complaints:  Ms. Maryse Jefferson presents with chronic back/leg pain due to muscle guarding, dysfunctional breathing, faulty movement patterns.  She has loss of motion and weakness  Pain: Initial: 7/10 Post Session:  3/10   Medications Last Reviewed:  2020    Updated Objective Findings:    Observation/Orthostatic Postural Assessment:          Pt holds trunk splinted, dysfunctional breather, braces on UE's;  Pt is very jerky with movements   Palpation:          Marked paraspinal tightness   ROM:      AROM  Trunk flex 25%, B SB 50%, ext 25% - all ranges painful and guarded, jerky Tight piriformis B    Strength:      Functional weakness B hip ext, hip abd, knee ext, PF, lower abdominals     Functional Mobility:         Gait/Ambulation:  Guarded, slow, antalgic, unsteady Transfers:  Uses UE's, momentum, holds breath        Bed Mobility:  Rigid, holds breath  Balance:          Unable to perform    Tool Used: Modified Oswestry Low Back Pain Questionnaire  Score:  Initial: 17/50  Most Recent: X/50 (Date: -- )   Interpretation of Score: Each section is scored on a 0-5 scale, 5 representing the greatest disability. The scores of each section are added together for a total score of 50. See evaluation note from today     TREATMENT:   THERAPEUTIC ACTIVITY: ( see below for minutes): Therapeutic activities per grid below to improve mobility. Required moderate verbal and manual cues to improve functional mobility . THERAPEUTIC EXERCISE: (see below for minutes):  Exercises per grid below to improve strength. Required moderate verbal and manual cues to promote proper body alignment, promote proper body posture, promote proper body mechanics and promote proper body breathing techniques. Progressed resistance, range, repetitions and complexity of movement as indicated. MANUAL THERAPY: (see below for minutes): Joint mobilization and Soft tissue mobilization was utilized and necessary because of the patient's restricted joint motion, painful spasm, loss of articular motion and restricted motion of soft tissue. MODALITIES: (see below for minutes):      for pain modulation        Date: 7/23/20       Modalities:                        Manual Therapy:                Therapeutic Exercises: 30 mins       Pt education, postural education,HEP, functional breathing 15 mins       LTR X 10       Bridging  X 10       Seated trunk flex stretch X 5       Piriformis stretch B 2 x 20 sec       Sit to stand  X 5       HEP: see hand out    [facility]  Treatment/Session Summary:    · Response to Treatment:  Pt had good understanding and remigio to information presented.   · Communication/Consultation:  None today  · Equipment provided today:  None today  · Recommendations/Intent for next treatment session: Next visit will focus on stretching, strengthening, manual techniques, balance training as indicated.     Total Treatment Billable Duration:  45 mins  PT Patient Time In/Time Out  Time In: 0845  Time Out: 0930    Brennan Velásquez PT    Future Appointments   Date Time Provider Miley Sheikh   7/28/2020  8:00 AM Hanna Mcbride Frame, PT [facility]   7/30/2020  8:00 AM Hanna Mcbride Frame, PT [facility]   8/4/2020  8:00 AM Venu, Yesy Moritz, PTA [facility]   8/5/2020  9:20 AM Angelito Shore NP [facility]   8/6/2020  8:00 AM Venu, Yesy Moritz, PTA [facility]   8/11/2020  1:30 PM Venu, Yesy Moritz, PTA [facility]   8/13/2020  8:45 AM Venu, Yesy Moritz, PTA [facility]   8/19/2020  9:30 AM Reed Hanna Frame, PT [facility]   8/21/2020  8:00 AM Hanna Mcbride Frame, PT [facility]   8/24/2020  8:45 AM Venu, Yesy Moritz, PTA [facility]   8/26/2020  8:45 AM Venu, Yesy Moritz, PTA [facility]   8/31/2020  8:45 AM Venu, Yesy Moritz, PTA [facility]   9/25/2020  9:20 AM IMD LAB [facility] IMD IMD   10/2/2020 11:00 AM Emelina Travis NP [facility] IMD IMD

## 2020-07-23 NOTE — THERAPY EVALUATION
iGnger Mccormick  : 1945 [address] [facility]  [address] [place] [address].  [phone]   [phone]        OUTPATIENT PHYSICAL THERAPY:Initial Assessment 2020         ICD-10: Treatment Diagnosis: Pain in right leg (M79.604) and Pain in left leg (M79.605), Low back pain (M54.5) and Stiffness of unspecified joint, not elsewhere classified (M25.60) and Muscle weakness (generalized) (M62.81) and Other abnormalities of gait and mobility (R26.89)  Precautions/Allergies:   Bees [hymenoptera allergenic extract]; Adhesive tape-silicones; Contrast agent [iodine]; Lescol [fluvastatin]; Lipitor [atorvastatin]; Lisinopril; Penicillin g; and Tetracycline   Fall Risk Score:     Ambulatory/Rehab Services H2 Model Falls Risk Assessment    Risk Factors:       No Risk Factors Identified Ability to Rise from Chair:       (1)  Pushes up, successful in one attempt    Falls Prevention Plan:       No modifications necessary   Total: (5 or greater = High Risk): 1     [facility]. All Rights Reserved. [facility] Patent #[id number]. Federal Law prohibits the replication, distribution or use without written permission from [employer]     MD Orders: eval and treat MEDICAL/REFERRING DIAGNOSIS:  DDD (degenerative disc disease), lumbar [M51.36]  Lumbar radiculopathy [M54.16]  Arthrodesis status [Z98.1]   DATE OF ONSET: years ago  REFERRING PHYSICIAN: Krystal Patel 372: not set     INITIAL ASSESSMENT:  Ms. Mandy Berumen presents with chronic back/leg pain due to muscle guarding, dysfunctional breathing, faulty movement patterns. She has loss of motion and weakness. She will benefit from skilled PT to address these issues to return her to premorbid status. PROBLEM LIST (Impacting functional limitations):  1. Decreased Strength  2. Decreased ADL/Functional Activities  3. Decreased Transfer Abilities  4.  Decreased Ambulation Ability/Technique  5. Decreased Balance  6. Increased Pain  7. Decreased Activity Tolerance  8. Decreased Flexibility/Joint Mobility  9. Decreased [place] with Home Exercise Program INTERVENTIONS PLANNED:  1. Balance Exercise  2. Home Exercise Program (HEP)  3. Manual Therapy  4. Therapeutic Activites  5. Therapeutic Exercise/Strengthening    TREATMENT PLAN:  Effective Dates: 7/23/2020 TO 10/21/2020 (90 days). Frequency/Duration: 2 times a week for 90 Days  GOALS: (Goals have been discussed and agreed upon with patient.)  Short-Term Functional Goals: Time Frame: 4 weeks  1. Pt to report compliance with HEP  2. Pt to demonstrate fluid pain free trunk motion  Discharge Goals: Time Frame: 12 weeks  1. Pt to increase quad strength for sit to stand without UE support  2. Pt to increase hip strength for reciprocal gait on stairs  3. Pt to demonstrate SLS > 10 sec for safe amb all surfaces  Rehabilitation Potential For Stated Goals: Good  Regarding Raciel Lore Nails's therapy, I certify that the treatment plan above will be carried out by a therapist or under their direction. Thank you for this referral,  Desiree Dougherty PT       Referring Physician Signature: Imani MAGALLANES*              Date                      HISTORY:   History of Present Injury/Illness (Reason for Referral):  Pt reports back surgery 3 years ago. She had an injection in her back several weeks ago. She is in constant pain. She has pain in both legs to the knees. She doesn't do her ex's. She doesn't know of anything that will help her. Any housework makes her worse. She reports falling several times. The last time was a year ago and she fell backwards getting out of the tub. She has a walker but it is in storage. She has a rollator that she uses for long walks. Pt has significant incontinence issues.   Past Medical History/Comorbidities:   Ms. Latoya Velez  has a past medical history of Allergic rhinitis, cause unspecified (01/07/2015), Aortic valve disorders (01/07/2015), Asthma, CAD (coronary artery disease), Carotid stenosis (1/7/2015), Carpal tunnel syndrome (01/07/2015), Depression (06/30/2015), Esophageal reflux (01/07/2015), Essential hypertension, Fibromyalgia, Former smoker, GERD (gastroesophageal reflux disease), Heart failure ([facility] [address] History of peptic ulcer disease, Menopause, Mixed hyperlipidemia (4/21/2016), Neuropathy, Osteoarthritis, Osteopenia (1/7/2015), and Renal insufficiency (01/07/2015). She also has no past medical history of Unspecified adverse effect of anesthesia. Ms. Nico Coppola  has a past surgical history that includes hx hysterectomy; hx tubal ligation; hx lap cholecystectomy; hx colonoscopy; hx hemorrhoidectomy; hx bladder suspension; hx shoulder arthroscopy (Right); hx gi; hx gi; and hx urological.  Social History/Living Environment:     pt is moving to a single level home with son and daughter-in-law  Prior Level of Function/Work/Activity:  retired  Dominant Side:         RIGHT  Current Medications:    Current Outpatient Medications:     famotidine (PEPCID) 20 mg tablet, Take 1 Tab by mouth two (2) times a day. Indications: gastroesophageal reflux disease, stress ulcer prevention, Disp: 180 Tab, Rfl: 1    atorvastatin (LIPITOR) 20 mg tablet, Take 1 Tab by mouth daily. , Disp: 90 Tab, Rfl: 3    gabapentin (NEURONTIN) 300 mg capsule, Take 1 Cap by mouth three (3) times daily. , Disp: 270 Cap, Rfl: 3    losartan (COZAAR) 100 mg tablet, Take 1 Tab by mouth daily.  Indications: high blood pressure, Disp: 90 Tab, Rfl: 4    potassium chloride (K-DUR, KLOR-CON) 20 mEq tablet, Take 1 Tab by mouth every morning., Disp: 90 Tab, Rfl: 3    atenoloL (TENORMIN) 25 mg tablet, Take 0.5 Tabs by mouth nightly., Disp: 45 Tab, Rfl: 3    furosemide (LASIX) 40 mg tablet, take 1 tablet by mouth every morning, Disp: 90 Tab, Rfl: 3    nitroglycerin (NITROSTAT) 0.4 mg SL tablet, 1 Tab by SubLINGual route every five (5) minutes as needed for Chest Pain., Disp: 4 Bottle, Rfl: 6    sertraline (ZOLOFT) 50 mg tablet, Take 1.5 Tabs by mouth every morning., Disp: 135 Tab, Rfl: 4    pantoprazole (PROTONIX) 40 mg tablet, Take 1 Tab by mouth Daily (before breakfast). , Disp: 90 Tab, Rfl: 4    albuterol-ipratropium (DUO-NEB) 2.5 mg-0.5 mg/3 ml nebu, 3 mL by Nebulization route every six (6) hours as needed (asthma). , Disp: 100 Nebule, Rfl: 1    fluticasone propionate (FLONASE) 50 mcg/actuation nasal spray, 2 Sprays by Both Nostrils route nightly. (Patient taking differently: 2 Sprays by Both Nostrils route nightly. Indications: using 3 times weekly), Disp: 3 Bottle, Rfl: 4    albuterol (PROVENTIL HFA, VENTOLIN HFA, PROAIR HFA) 90 mcg/actuation inhaler, Take 2 Puffs by inhalation every six (6) hours as needed for Wheezing. Indications: Acute Asthma Attack, Disp: 1 Inhaler, Rfl: 3    loperamide (IMODIUM) 1 mg/5 mL solution, Take  by mouth four (4) times daily as needed for Diarrhea., Disp: , Rfl:     vitamin A (AQUASOL A) 10,000 unit capsule, Take 24,000 Units by mouth every other day. Indications: taking twice weekly, Disp: , Rfl:     cetirizine (ZYRTEC) 10 mg tablet, Take 1 Tab by mouth daily. (Patient taking differently: Take 10 mg by mouth daily as needed.), Disp: 30 Tab, Rfl: 3    zinc oxide-cod liver oil 40 % oint 30 g, vitamin a & d oint 30 g, nystatin 100,000 unit/gram crea 30 g, compound tincture of benzoin tinc 0.5 mL, Apply 1 Each to affected area two (2) times a day. Apply a small amount twice a day. (Patient taking differently: Apply 1 Each to affected area two (2) times a day. Apply a small amount twice a day. Indications: prn), Disp: 500 g, Rfl: 3    aspirin delayed-release 81 mg tablet, Take 81 mg by mouth every morning. Instructed to take DOS per Anesthesia guidelines.  , Disp: , Rfl:    Date Last Reviewed:  7/23/2020   # of Personal Factors/Comorbidities that affect the Plan of Care: 0: LOW COMPLEXITY   EXAMINATION: Observation/Orthostatic Postural Assessment:          Pt holds trunk splinted, dysfunctional breather, braces on UE's;  Pt is very jerky with movements   Palpation:          Marked paraspinal tightness   ROM:     AROM  Trunk flex 25%, B SB 50%, ext 25% - all ranges painful and guarded, jerky Tight piriformis B    Strength:     Functional weakness B hip ext, hip abd, knee ext, PF, lower abdominals    Special Tests:          unremarkable  Neurological Screen:        unremarkable  Functional Mobility:         Gait/Ambulation:  Guarded, slow, antalgic, unsteady        Transfers:  Uses UE's, momentum, holds breath        Bed Mobility:  Rigid, holds breath  Balance:          Unable to perform   Body Structures Involved:  1. Nerves  2. Joints  3. Muscles Body Functions Affected:  1. Sensory/Pain  2. Movement Related Activities and Participation Affected:  1. General Tasks and Demands  2. Mobility  3. Self Care  4. Domestic Life  5. Interpersonal Interactions and Relationships  6. Community, Social and [place]   # of elements that affect the Plan of Care: 1-2: LOW COMPLEXITY   CLINICAL PRESENTATION:   Presentation: Stable and uncomplicated: LOW COMPLEXITY   CLINICAL DECISION MAKING:   Tool Used: Modified Oswestry Low Back Pain Questionnaire  Score:  Initial: 17/50  Most Recent: X/50 (Date: -- )   Interpretation of Score: Each section is scored on a 0-5 scale, 5 representing the greatest disability. The scores of each section are added together for a total score of 50. Medical Necessity:   · Patient demonstrates good rehab potential due to higher previous functional level. Reason for Services/Other Comments:  · Patient continues to require present interventions due to patient's inability to function without pain.    Use of outcome tool(s) and clinical judgement create a POC that gives a: Clear prediction of patient's progress: LOW COMPLEXITY     Total Treatment Duration:  PT Patient Time In/Time Out  Time In: 0895  Time Out: 0930     Vida Mcbride, PT

## 2020-07-28 ENCOUNTER — HOSPITAL ENCOUNTER (OUTPATIENT)
Dept: PHYSICAL THERAPY | Age: 75
Discharge: HOME | End: 2020-07-28
Payer: COMMERCIAL

## 2020-07-28 ENCOUNTER — HOSPITAL ENCOUNTER (OUTPATIENT)
Dept: GENERAL RADIOLOGY | Age: 75
Discharge: HOME | End: 2020-07-28
Attending: NURSE PRACTITIONER

## 2020-07-28 DIAGNOSIS — M25.572 ACUTE LEFT ANKLE PAIN: ICD-10-CM

## 2020-07-28 PROCEDURE — 97110 THERAPEUTIC EXERCISES: CPT

## 2020-07-28 PROCEDURE — 97140 MANUAL THERAPY 1/> REGIONS: CPT

## 2020-07-28 NOTE — PROGRESS NOTES
Gibran Santoro  : 1945  Primary: Glenny Navarro [facility]  Secondary:  [address] @ [address], [place].  [phone]   [phone]      OUTPATIENT PHYSICAL THERAPY: Daily Treatment Note  2020   Pain in right leg (M79.604) and Pain in left leg (M79.605), Low back pain (M54.5) and Stiffness of unspecified joint, not elsewhere classified (M25.60) and Muscle weakness (generalized) (M62.81) and Other abnormalities of gait and mobility (R26.89)     Effective Dates: 2020 TO 10/21/2020 (90 days). Frequency/Duration: 2 times a week for 90 Days  GOALS: (Goals have been discussed and agreed upon with patient.)  Short-Term Functional Goals: Time Frame: 4 weeks  1. Pt to report compliance with HEP  2. Pt to demonstrate fluid pain free trunk motion  Discharge Goals: Time Frame: 12 weeks  1. Pt to increase quad strength for sit to stand without UE support  2. Pt to increase hip strength for reciprocal gait on stairs  3. Pt to demonstrate SLS > 10 sec for safe amb all surfaces  _________________________________________________________________________  Pre-treatment Symptoms/Complaints:  Pt states she was carry a laundry basket on Sat and she tripped because she couldn't see where she was going. She fell. She bruised her R hip and lower L leg. She reports pain on the bottom of the L foot and up the lat lower leg to the knee when she puts any weight on the leg. She states this pain is getting worse.    Pain: Initial: 3/10 at foot in sitting Post Session:  That does feel better now when I walk   Medications Last Reviewed:  2020    Updated Objective Findings:    Observation/Orthostatic Postural Assessment:          Pt holds trunk splinted, dysfunctional breather, braces on UE's;  Pt is very jerky with movements   Palpation:          Marked paraspinal tightness   ROM:      AROM  Trunk flex 25%, B SB 50%, ext 25% - all ranges painful and guarded, jerky Tight piriformis B    Strength:      Functional weakness B hip ext, hip abd, knee ext, PF, lower abdominals     Functional Mobility:         Gait/Ambulation:  Guarded, slow, antalgic, unsteady        Transfers:  Uses UE's, momentum, holds breath        Bed Mobility:  Rigid, holds breath  Balance:          Unable to perform    Tool Used: Modified Oswestry Low Back Pain Questionnaire  Score:  Initial: 17/50  Most Recent: X/50 (Date: -- )   Interpretation of Score: Each section is scored on a 0-5 scale, 5 representing the greatest disability. The scores of each section are added together for a total score of 50. TREATMENT:   THERAPEUTIC ACTIVITY: ( see below for minutes): Therapeutic activities per grid below to improve mobility. Required moderate verbal and manual cues to improve functional mobility . THERAPEUTIC EXERCISE: (see below for minutes):  Exercises per grid below to improve strength. Required moderate verbal and manual cues to promote proper body alignment, promote proper body posture, promote proper body mechanics and promote proper body breathing techniques. Progressed resistance, range, repetitions and complexity of movement as indicated. MANUAL THERAPY: (see below for minutes): Joint mobilization and Soft tissue mobilization was utilized and necessary because of the patient's restricted joint motion, painful spasm, loss of articular motion and restricted motion of soft tissue.    MODALITIES: (see below for minutes):      for pain modulation        Date: 7/23/20 7/28/20  Visit 2      Modalities:                        Manual Therapy:  10 mins      STM trunk  B S/L               Therapeutic Exercises: 30 mins 30 mins      Pt education, postural education,HEP, functional breathing 15 mins Gait mechanics      LTR X 10 X 10      Bridging  X 10 X 20      Seated trunk flex stretch X 5 X 2      Piriformis stretch B 2 x 20 sec 3 x 20 sec      Sit to stand  X 5       PROM B LE's supine      HEP: see hand out    [facility] Portal  Treatment/Session Summary:    · Response to Treatment:  Pt was hopping off L LE when she entered. Marked R paraspinal tightness. Pt had more relaxation and fluid movements after session. She was able to walk without a limp. Continue as indicated. · Communication/Consultation:  None today  · Equipment provided today:  None today  · Recommendations/Intent for next treatment session: Next visit will focus on stretching, strengthening, manual techniques, balance training as indicated.     Total Treatment Billable Duration:  40 mins  PT Patient Time In/Time Out  Time In: 0800  Time Out: 0845    David Groves, PT    Future Appointments   Date Time Provider Miley Kori   7/30/2020  8:00 AM Rochelle Mcbride, PT [facility]   8/4/2020  8:00 AM Venu, Chito Push, PTA [facility]   8/5/2020  9:20 AM DAJA Le PP PP   8/6/2020  8:00 AM Venu, Chito Push, PTA [facility]   8/11/2020  1:30 PM Venu, Chito Push, PTA [facility]   8/13/2020  8:45 AM Venu, Chito Push, PTA [facility]   8/19/2020  9:30 AM Rochelle Mcbride, PT [facility]   8/21/2020  8:00 AM Rochelle Mcbride, PT [facility]   8/24/2020  8:45 AM Venu, Chito Push, PTA [facility]   8/26/2020  8:45 AM Venu, Chito Push, PTA [facility]   8/31/2020  8:45 AM Venu, Chito Push, PTA [facility]   9/25/2020  9:20 AM IMD LAB [facility] IMD IMD   10/2/2020 11:00 AM DAJA Huff IMD IMD

## 2020-07-30 ENCOUNTER — HOSPITAL ENCOUNTER (OUTPATIENT)
Dept: PHYSICAL THERAPY | Age: 75
Discharge: HOME | End: 2020-07-30
Payer: COMMERCIAL

## 2020-07-30 NOTE — PROGRESS NOTES
Pt cancelled appt stating her doctor told her to stay off her feet for 3 days. Will continue POC at next visit next week.

## 2020-08-04 ENCOUNTER — HOSPITAL ENCOUNTER (OUTPATIENT)
Dept: PHYSICAL THERAPY | Age: 75
Discharge: HOME | End: 2020-08-04
Payer: COMMERCIAL

## 2020-08-04 PROCEDURE — 97110 THERAPEUTIC EXERCISES: CPT

## 2020-08-04 PROCEDURE — 97140 MANUAL THERAPY 1/> REGIONS: CPT

## 2020-08-04 NOTE — PROGRESS NOTES
Jared Lepe  : 1945  Primary: Oscar Kwon [facility]  Secondary:  [address] @ [facility]  [address]  [phone]   [phone]      OUTPATIENT PHYSICAL THERAPY: Daily Treatment Note  2020   Pain in right leg (M79.604) and Pain in left leg (M79.605), Low back pain (M54.5) and Stiffness of unspecified joint, not elsewhere classified (M25.60) and Muscle weakness (generalized) (M62.81) and Other abnormalities of gait and mobility (R26.89)     Effective Dates: 2020 TO 10/21/2020 (90 days). Frequency/Duration: 2 times a week for 90 Days  GOALS: (Goals have been discussed and agreed upon with patient.)  Short-Term Functional Goals: Time Frame: 4 weeks  1. Pt to report compliance with HEP  2. Pt to demonstrate fluid pain free trunk motion  Discharge Goals: Time Frame: 12 weeks  1. Pt to increase quad strength for sit to stand without UE support  2. Pt to increase hip strength for reciprocal gait on stairs  3. Pt to demonstrate SLS > 10 sec for safe amb all surfaces  _________________________________________________________________________  Pre-treatment Symptoms/Complaints:  I am doing better but my L ankle is still swollen and it hurts up the side of that leg and my foot and the R hip is still sore. I have been practicing what you taught me. Pain: Initial: 3/10  Post Session:  Less pain now.   That feels good   Medications Last Reviewed:  2020    Updated Objective Findings:    Observation/Orthostatic Postural Assessment:          Pt holds trunk splinted, dysfunctional breather, braces on UE's;  Pt is very jerky with movements   Palpation:          Marked paraspinal tightness   ROM:      AROM  Trunk flex 25%, B SB 50%, ext 25% - all ranges painful and guarded, jerky Tight piriformis B    Strength:      Functional weakness B hip ext, hip abd, knee ext, PF, lower abdominals     Functional Mobility:         Gait/Ambulation: Guarded, slow, antalgic, unsteady        Transfers:  Uses UE's, momentum, holds breath        Bed Mobility:  Rigid, holds breath  Balance:          Unable to perform    Tool Used: Modified Oswestry Low Back Pain Questionnaire  Score:  Initial: 17/50  Most Recent: X/50 (Date: -- )   Interpretation of Score: Each section is scored on a 0-5 scale, 5 representing the greatest disability. The scores of each section are added together for a total score of 50. TREATMENT:   THERAPEUTIC ACTIVITY: ( see below for minutes): Therapeutic activities per grid below to improve mobility. Required moderate verbal and manual cues to improve functional mobility . THERAPEUTIC EXERCISE: (see below for minutes):  Exercises per grid below to improve strength. Required moderate verbal and manual cues to promote proper body alignment, promote proper body posture, promote proper body mechanics and promote proper body breathing techniques. Progressed resistance, range, repetitions and complexity of movement as indicated. MANUAL THERAPY: (see below for minutes): Joint mobilization and Soft tissue mobilization was utilized and necessary because of the patient's restricted joint motion, painful spasm, loss of articular motion and restricted motion of soft tissue.    MODALITIES: (see below for minutes):      for pain modulation        Date: 7/23/20 7/28/20  Visit 2 8/4/20  Visit 3     Modalities:                        Manual Therapy:  10 mins 10 mins     STM trunk  B S/L  B S/L             Therapeutic Exercises: 30 mins 30 mins 30 mins     Pt education, postural education,HEP, functional breathing 15 mins Gait mechanics      LTR X 10 X 10 X 10     Bridging  X 10 X 20 X 30     Seated trunk flex stretch X 5 X 2 X 2     Piriformis stretch B 2 x 20 sec 3 x 20 sec 3 x 20 sec     Sit to stand  X 5       PROM B LE's   supine supine     HEP: see hand out    [facility]  Treatment/Session Summary:    · Response to Treatment:  Pt is compliant with HEP and progressing well. Continue as indicated and remigio. · Communication/Consultation:  None today  · Equipment provided today:  None today  · Recommendations/Intent for next treatment session: Next visit will focus on stretching, strengthening, manual techniques, balance training as indicated.     Total Treatment Billable Duration:  40 mins  PT Patient Time In/Time Out  Time In: 0800  Time Out: 0845    Wilder Guzman, PT    Future Appointments   Date Time Provider Miley Sheikh   8/5/2020  9:20 AM DAJA Price PP PP   8/6/2020  8:00 AM Gomez Mcbride [place], PT [facility]   8/11/2020  1:30 PM VenuGarland vaughn, [facility]   8/13/2020  8:45 AM Garland Lea, PTA [facility]   8/19/2020  9:30 AM [place], PT [facility]   8/21/2020  8:00 AM [place], PT [facility]   8/24/2020  8:45 AM VenuGarland vaughnner, PTA [facility]   8/26/2020  8:45 AM VenuGarland vaughnner, PTA [facility]   8/31/2020  8:45 AM VenuGarland vaughn, PTA [facility]   9/25/2020  9:20 AM IMD LAB [facility] IMD IMD   10/2/2020 11:00 AM DAJA De La Garza IMD IMD

## 2020-08-05 ENCOUNTER — HOSPITAL ENCOUNTER (OUTPATIENT)
Dept: GENERAL RADIOLOGY | Age: 75
Discharge: HOME | End: 2020-08-05
Payer: COMMERCIAL

## 2020-08-05 DIAGNOSIS — R06.02 SOB (SHORTNESS OF BREATH): ICD-10-CM

## 2020-08-05 PROCEDURE — 71046 X-RAY EXAM CHEST 2 VIEWS: CPT

## 2020-08-06 ENCOUNTER — HOSPITAL ENCOUNTER (OUTPATIENT)
Dept: PHYSICAL THERAPY | Age: 75
Discharge: HOME | End: 2020-08-06
Payer: COMMERCIAL

## 2020-08-06 PROCEDURE — 97140 MANUAL THERAPY 1/> REGIONS: CPT

## 2020-08-06 PROCEDURE — 97110 THERAPEUTIC EXERCISES: CPT

## 2020-08-06 NOTE — PROGRESS NOTES
Marquez Burch  : 1945  Primary: Corazon Davis [facility]  Secondary:  [address] @ [address], [address] [place] [address]  [phone]   [phone]      OUTPATIENT PHYSICAL THERAPY: Daily Treatment Note  2020   Pain in right leg (M79.604) and Pain in left leg (M79.605), Low back pain (M54.5) and Stiffness of unspecified joint, not elsewhere classified (M25.60) and Muscle weakness (generalized) (M62.81) and Other abnormalities of gait and mobility (R26.89)     Effective Dates: 2020 TO 10/21/2020 (90 days). Frequency/Duration: 2 times a week for 90 Days  GOALS: (Goals have been discussed and agreed upon with patient.)  Short-Term Functional Goals: Time Frame: 4 weeks  1. Pt to report compliance with HEP  2. Pt to demonstrate fluid pain free trunk motion  Discharge Goals: Time Frame: 12 weeks  1. Pt to increase quad strength for sit to stand without UE support  2. Pt to increase hip strength for reciprocal gait on stairs  3. Pt to demonstrate SLS > 10 sec for safe amb all surfaces  _________________________________________________________________________  Pre-treatment Symptoms/Complaints: The ankle is still swollen but not painful. The hip is less tender. My back is doing good except sweeping and washing dishes.   Pain: Initial: 1/10  Post Session:  I feel fine now   Medications Last Reviewed:  2020    Updated Objective Findings:    Observation/Orthostatic Postural Assessment:          Pt holds trunk splinted, dysfunctional breather, braces on UE's;  Pt is very jerky with movements   Palpation:          Marked paraspinal tightness   ROM:      AROM  Trunk flex 25%, B SB 50%, ext 25% - all ranges painful and guarded, jerky Tight piriformis B    Strength:      Functional weakness B hip ext, hip abd, knee ext, PF, lower abdominals     Functional Mobility:         Gait/Ambulation:  Guarded, slow, antalgic, unsteady        Transfers:  Uses UE's, momentum, holds breath        Bed Mobility:  Rigid, holds breath  Balance:          Unable to perform    Tool Used: Modified Oswestry Low Back Pain Questionnaire  Score:  Initial: 17/50  Most Recent: X/50 (Date: -- )   Interpretation of Score: Each section is scored on a 0-5 scale, 5 representing the greatest disability. The scores of each section are added together for a total score of 50. TREATMENT:   THERAPEUTIC ACTIVITY: ( see below for minutes): Therapeutic activities per grid below to improve mobility. Required moderate verbal and manual cues to improve functional mobility . THERAPEUTIC EXERCISE: (see below for minutes):  Exercises per grid below to improve strength. Required moderate verbal and manual cues to promote proper body alignment, promote proper body posture, promote proper body mechanics and promote proper body breathing techniques. Progressed resistance, range, repetitions and complexity of movement as indicated. MANUAL THERAPY: (see below for minutes): Joint mobilization and Soft tissue mobilization was utilized and necessary because of the patient's restricted joint motion, painful spasm, loss of articular motion and restricted motion of soft tissue.    MODALITIES: (see below for minutes):      for pain modulation        Date: 7/23/20 7/28/20  Visit 2 8/4/20  Visit 3 8/6/20  Visit 4       Modalities:                                 Manual Therapy:  10 mins 10 mins 10 mins       STM trunk  B S/L  B S/L B S/L                  Therapeutic Exercises: 30 mins 30 mins 30 mins 30 mins       Pt education, postural education,HEP, functional breathing 15 mins Gait mechanics  straddle stance for prolonged standing       LTR X 10 X 10 X 10 X 10       Bridging  X 10 X 20 X 30 X 30       Seated trunk flex stretch X 5 X 2 X 2 X 2       Piriformis stretch B 2 x 20 sec 3 x 20 sec 3 x 20 sec 3 x 20 sec       Sit to stand  X 5          PROM B LE's   supine supine supine       HEP: see hand out    [facility] Portal  Treatment/Session Summary:    · Response to Treatment:  Pt had good understanding of straddle stance for standing activities. She is progressing well and is compliant with HEP. Continue POC as indicated. · Communication/Consultation:  None today  · Equipment provided today:  None today  · Recommendations/Intent for next treatment session: Next visit will focus on stretching, strengthening, manual techniques, balance training as indicated.     Total Treatment Billable Duration:  40 mins  PT Patient Time In/Time Out  Time In: 0800  Time Out: 0845    Moises Castillo PT    Future Appointments   Date Time Provider Miley Sheikh   8/11/2020  1:30 PM Kumar Stephens PTA [facility]   8/13/2020  8:45 AM [facility]   8/19/2020  9:30 AM Maury [facility]bride, PT [facility]   8/21/2020  8:00 AM Maury Mcbride, PT [facility]   8/24/2020  8:45 AM Zenaida Lea, PTA [facility]   8/26/2020  8:45 AM Zenaida Lea, PTA [facility]   8/31/2020  8:45 AM Zenaida Lea, PTA [facility]   9/25/2020  9:20 AM IMD LAB [facility] IMD IMD   10/2/2020 11:00 AM Beverly Hawk NP [facility] IMD IMD

## 2020-08-11 ENCOUNTER — HOSPITAL ENCOUNTER (OUTPATIENT)
Dept: PHYSICAL THERAPY | Age: 75
Discharge: HOME | End: 2020-08-11
Payer: COMMERCIAL

## 2020-08-11 PROCEDURE — 97140 MANUAL THERAPY 1/> REGIONS: CPT

## 2020-08-11 PROCEDURE — 97110 THERAPEUTIC EXERCISES: CPT

## 2020-08-11 NOTE — PROGRESS NOTES
Babak Priest  : 1945  Primary: [facility]  Secondary:  [address] [facility] @ [address] [place] [address].  [phone]   [phone]      OUTPATIENT PHYSICAL THERAPY: Daily Treatment Note  2020   Pain in right leg (M79.604) and Pain in left leg (M79.605), Low back pain (M54.5) and Stiffness of unspecified joint, not elsewhere classified (M25.60) and Muscle weakness (generalized) (M62.81) and Other abnormalities of gait and mobility (R26.89)     Effective Dates: 2020 TO 10/21/2020 (90 days). Frequency/Duration: 2 times a week for 90 Days  GOALS: (Goals have been discussed and agreed upon with patient.)  Short-Term Functional Goals: Time Frame: 4 weeks  1. Pt to report compliance with HEP  2. Pt to demonstrate fluid pain free trunk motion  Discharge Goals: Time Frame: 12 weeks  1. Pt to increase quad strength for sit to stand without UE support  2. Pt to increase hip strength for reciprocal gait on stairs  3. Pt to demonstrate SLS > 10 sec for safe amb all surfaces  _________________________________________________________________________  Pre-treatment Symptoms/Complaints: I have pretty much been pain free over the weekend. I practiced standing differently, it helped some.   Pain: Initial: 0/10 Post Session:  No pain with exercises   Medications Last Reviewed:  2020    Updated Objective Findings:    Observation/Orthostatic Postural Assessment:          Pt holds trunk splinted, dysfunctional breather, braces on UE's;  Pt is very jerky with movements   Palpation:          Marked paraspinal tightness   ROM:      AROM  Trunk flex 25%, B SB 50%, ext 25% - all ranges painful and guarded, jerky Tight piriformis B    Strength:      Functional weakness B hip ext, hip abd, knee ext, PF, lower abdominals     Functional Mobility:         Gait/Ambulation:  Guarded, slow, antalgic, unsteady        Transfers:  Uses UE's, momentum, holds breath        Bed Mobility:  Rigid, holds breath  Balance:          Unable to perform    Tool Used: Modified Oswestry Low Back Pain Questionnaire  Score:  Initial: 17/50  Most Recent: X/50 (Date: -- )   Interpretation of Score: Each section is scored on a 0-5 scale, 5 representing the greatest disability. The scores of each section are added together for a total score of 50. TREATMENT:   THERAPEUTIC ACTIVITY: ( see below for minutes): Therapeutic activities per grid below to improve mobility. Required moderate verbal and manual cues to improve functional mobility . THERAPEUTIC EXERCISE: (see below for minutes):  Exercises per grid below to improve strength. Required moderate verbal and manual cues to promote proper body alignment, promote proper body posture, promote proper body mechanics and promote proper body breathing techniques. Progressed resistance, range, repetitions and complexity of movement as indicated. MANUAL THERAPY: (see below for minutes): Joint mobilization and Soft tissue mobilization was utilized and necessary because of the patient's restricted joint motion, painful spasm, loss of articular motion and restricted motion of soft tissue.    MODALITIES: (see below for minutes):      for pain modulation        Date: 7/23/20 7/28/20  Visit 2 8/4/20  Visit 3 8/6/20  Visit 4 8/11/20 Visit 5      Modalities:                                 Manual Therapy:  10 mins 10 mins 10 mins 15 min      STM trunk  B S/L  B S/L B S/L B s/l, R hamstrings                 Therapeutic Exercises: 30 mins 30 mins 30 mins 30 mins 25 min      Pt education, postural education,HEP, functional breathing 15 mins Gait mechanics  straddle stance for prolonged standing review      LTR X 10 X 10 X 10 X 10 X 10      Bridging  X 10 X 20 X 30 X 30 X 30       Seated trunk flex stretch X 5 X 2 X 2 X 2 X 5      Piriformis stretch B 2 x 20 sec 3 x 20 sec 3 x 20 sec 3 x 20 sec 3 x 20 sec      Sit to stand  X 5    X 10      PROM B LE's   supine supine supine supine      HEP: see hand out    [facility] Portal  Treatment/Session Summary:    · Response to Treatment:  Pt demonstrates good effort during exercises and reports she is adhering to HEP. She experiences some cramps in R hamstrings while in supine, cramps alleviated by PROM followed by active exercises. Cont as indicated. · Communication/Consultation:  None today  · Equipment provided today:  None today  · Recommendations/Intent for next treatment session: Next visit will focus on stretching, strengthening, manual techniques, balance training as indicated.     Total Treatment Billable Duration:  45 mins  PT Patient Time In/Time Out  Time In: 1230  Time Out: 0115    Rainer Troncoso PTA    Future Appointments   Date Time Provider Miley Kori   8/13/2020  8:45 AM VenuMarisela Gary, PTA [facility]   8/19/2020  9:30 AM Sheridan Mcbride [place], PT [facility]   8/21/2020  8:00 AM Sheridan Mcbride Rad, PT [facility]   8/24/2020  8:45 AM Venu Marlise Gary, PTA [facility]   8/26/2020  8:45 AM Venu, Marlise Gary, PTA [facility]   8/31/2020  8:45 AM Venu Marlise Gary, PTA [facility]   8/31/2020  1:00 PM CON[address]IDATED DRIVE THRU [facility] IMD IMD   9/2/2020 11:00 AM [facility] PHONE APPOINTMENT ELLEN PALMA OR PRE A   9/25/2020  9:20 AM IMD LAB [facility] IMD IMD   10/2/2020 11:00 AM DAJA Roland IMD IMD

## 2020-08-13 ENCOUNTER — HOSPITAL ENCOUNTER (OUTPATIENT)
Dept: PHYSICAL THERAPY | Age: 75
Discharge: HOME | End: 2020-08-13
Payer: COMMERCIAL

## 2020-08-19 ENCOUNTER — HOSPITAL ENCOUNTER (OUTPATIENT)
Dept: PHYSICAL THERAPY | Age: 75
Discharge: HOME | End: 2020-08-19
Payer: COMMERCIAL

## 2020-08-19 PROCEDURE — 97110 THERAPEUTIC EXERCISES: CPT

## 2020-08-19 PROCEDURE — 97140 MANUAL THERAPY 1/> REGIONS: CPT

## 2020-08-19 NOTE — PROGRESS NOTES
Ryan Mckoy  : 1945  Primary: Florencia Castro [facility]  Secondary:  [facility] @ [address], [address] [place] [address]  [phone]   [phone]      OUTPATIENT PHYSICAL THERAPY: Daily Treatment Note  2020   Pain in right leg (M79.604) and Pain in left leg (M79.605), Low back pain (M54.5) and Stiffness of unspecified joint, not elsewhere classified (M25.60) and Muscle weakness (generalized) (M62.81) and Other abnormalities of gait and mobility (R26.89)     Effective Dates: 2020 TO 10/21/2020 (90 days). Frequency/Duration: 2 times a week for 90 Days  GOALS: (Goals have been discussed and agreed upon with patient.)  Short-Term Functional Goals: Time Frame: 4 weeks  1. Pt to report compliance with HEP  2. Pt to demonstrate fluid pain free trunk motion  Discharge Goals: Time Frame: 12 weeks  1. Pt to increase quad strength for sit to stand without UE support  2. Pt to increase hip strength for reciprocal gait on stairs  3. Pt to demonstrate SLS > 10 sec for safe amb all surfaces  _________________________________________________________________________  Pre-treatment Symptoms/Complaints: My back has been good. I am doing like you said when I stand and that is doing good. I can walk now without stumbling too.   Pain: Initial: 0/10 Post Session:  No pain with exercises   Medications Last Reviewed:  2020    Updated Objective Findings:    Observation/Orthostatic Postural Assessment:          Pt holds trunk splinted, dysfunctional breather, braces on UE's;  Pt is very jerky with movements   Palpation:          Marked paraspinal tightness   ROM:      AROM  Trunk flex 25%, B SB 50%, ext 25% - all ranges painful and guarded, jerky Tight piriformis B    Strength:      Functional weakness B hip ext, hip abd, knee ext, PF, lower abdominals     Functional Mobility:         Gait/Ambulation:  Guarded, slow, antalgic, unsteady        Transfers:  Uses UE's, momentum, holds breath        Bed Mobility:  Rigid, holds breath  Balance:          Unable to perform    Tool Used: Modified Oswestry Low Back Pain Questionnaire  Score:  Initial: 17/50  Most Recent: X/50 (Date: -- )   Interpretation of Score: Each section is scored on a 0-5 scale, 5 representing the greatest disability. The scores of each section are added together for a total score of 50. TREATMENT:   THERAPEUTIC ACTIVITY: ( see below for minutes): Therapeutic activities per grid below to improve mobility. Required moderate verbal and manual cues to improve functional mobility . THERAPEUTIC EXERCISE: (see below for minutes):  Exercises per grid below to improve strength. Required moderate verbal and manual cues to promote proper body alignment, promote proper body posture, promote proper body mechanics and promote proper body breathing techniques. Progressed resistance, range, repetitions and complexity of movement as indicated. MANUAL THERAPY: (see below for minutes): Joint mobilization and Soft tissue mobilization was utilized and necessary because of the patient's restricted joint motion, painful spasm, loss of articular motion and restricted motion of soft tissue.    MODALITIES: (see below for minutes):      for pain modulation        Date: 7/23/20 7/28/20  Visit 2 8/4/20  Visit 3 8/6/20  Visit 4 8/11/20 Visit 5 8/19/20  Visit 6     Modalities:                                 Manual Therapy:  10 mins 10 mins 10 mins 15 min 10 mins     STM trunk  B S/L  B S/L B S/L B s/l, R hamstrings B S/L                Therapeutic Exercises: 30 mins 30 mins 30 mins 30 mins 25 min 35 mins     Pt education, postural education,HEP, functional breathing 15 mins Gait mechanics  straddle stance for prolonged standing review      LTR X 10 X 10 X 10 X 10 X 10 X 10     Bridging  X 10 X 20 X 30 X 30 X 30  X 30     Seated trunk flex stretch X 5 X 2 X 2 X 2 X 5 X 3     Clams B      X 30     Piriformis stretch B 2 x 20 sec 3 x 20 sec 3 x 20 sec 3 x 20 sec 3 x 20 sec Supine 3 x 20 sec     Sit to stand  X 5    X 10 X 20     PROM B LE's   supine supine supine supine supine     Calf raises B      X 15     Feet together, trunk rotations eyes open and closed      SBA      Tandem stance B      CGA x 1     HEP: see hand out    [facility] Portal  Treatment/Session Summary:    · Response to Treatment: Pt continues to report no pain for more than a week. She is compliant with all suggestions. Will assess for D/C at next visit. · Communication/Consultation:  None today  · Equipment provided today:  None today  · Recommendations/Intent for next treatment session: Next visit will focus on stretching, strengthening, manual techniques, balance training as indicated.     Total Treatment Billable Duration:  45 mins  PT Patient Time In/Time Out  Time In: 0930  Time Out: Argenis Benz [id number], PT    Future Appointments   Date Time Provider Miley Sheikh   8/21/2020  8:00 AM Taniya Mcbride, ALEXI [facility]   8/24/2020  8:45 AM Stacy Lea PTA [facility]   8/26/2020  8:45 AM Stacy Lea PTA [facility]   8/31/2020  8:45 AM Stacy Lea PTA [facility]   8/31/2020  1:00 PM [address] DRIVE THRU [facility] IMD IMD   9/2/2020 11:00 AM [facility] PHONE APPOINTMENT ELLEN PALMA OR PRE A   9/25/2020  9:20 AM IMD LAB [facility] IMD IMD   10/2/2020 11:00 AM DAJA Gusman IMD IMD

## 2020-08-21 ENCOUNTER — HOSPITAL ENCOUNTER (OUTPATIENT)
Dept: PHYSICAL THERAPY | Age: 75
Discharge: HOME | End: 2020-08-21
Payer: COMMERCIAL

## 2020-08-21 PROCEDURE — 97140 MANUAL THERAPY 1/> REGIONS: CPT

## 2020-08-21 PROCEDURE — 97110 THERAPEUTIC EXERCISES: CPT

## 2020-08-21 NOTE — THERAPY DISCHARGE
Brandie Carroll  : 1945  Primary: [facility]  Secondary:  [address] @ [address], [place].  [phone]   [phone]      OUTPATIENT PHYSICAL THERAPY: Daily Treatment Note and Discharge Summary  2020   Pain in right leg (M79.604) and Pain in left leg (M79.605), Low back pain (M54.5) and Stiffness of unspecified joint, not elsewhere classified (M25.60) and Muscle weakness (generalized) (M62.81) and Other abnormalities of gait and mobility (R26.89)     Effective Dates: 2020 TO 10/21/2020 (90 days). Frequency/Duration: 2 times a week for 90 Days  GOALS: (Goals have been discussed and agreed upon with patient.)  Short-Term Functional Goals: Time Frame: 4 weeks  1. Pt to report compliance with HEP - MET  2. Pt to demonstrate fluid pain free trunk motion - MET  Discharge Goals: Time Frame: 12 weeks  1. Pt to increase quad strength for sit to stand without UE support - MET  2. Pt to increase hip strength for reciprocal gait on stairs - MET  3. Pt to demonstrate SLS > 10 sec for safe amb all surfaces - MET  _________________________________________________________________________  Pre-treatment Symptoms/Complaints:  I still feel good. I am comfortable continuing with my home ex's. Pain: Initial: 0/10 Post Session:  No pain with exercises   Medications Last Reviewed:  2020    Updated Objective Findings:    Observation/Orthostatic Postural Assessment:          Palpation:          mod R paraspinal tightnes  ROM:      AROM  Trunk WNL and pain free    Strength:      WFL     Functional Mobility:         Gait/Ambulation:  WNL        Transfers:   WNL        Bed Mobility:  fluid  Balance:    L 30 sec, R 15 sec          Tool Used: Modified Oswestry Low Back Pain Questionnaire  Score:  Initial:   Most Recent:  (Date: 20 )   Interpretation of Score: Each section is scored on a 0-5 scale, 5 representing the greatest disability. The scores of each section are added together for a total score of 50. TREATMENT:   THERAPEUTIC ACTIVITY: ( see below for minutes): Therapeutic activities per grid below to improve mobility. Required moderate verbal and manual cues to improve functional mobility . THERAPEUTIC EXERCISE: (see below for minutes):  Exercises per grid below to improve strength. Required moderate verbal and manual cues to promote proper body alignment, promote proper body posture, promote proper body mechanics and promote proper body breathing techniques. Progressed resistance, range, repetitions and complexity of movement as indicated. MANUAL THERAPY: (see below for minutes): Joint mobilization and Soft tissue mobilization was utilized and necessary because of the patient's restricted joint motion, painful spasm, loss of articular motion and restricted motion of soft tissue.    MODALITIES: (see below for minutes):      for pain modulation        Date: 7/23/20 7/28/20  Visit 2 8/4/20  Visit 3 8/6/20  Visit 4 8/11/20 Visit 5 8/19/20  Visit 6 8/21/20  Visit 7    Modalities:                                 Manual Therapy:  10 mins 10 mins 10 mins 15 min 10 mins 10 mins    STM trunk  B S/L  B S/L B S/L B s/l, R hamstrings B S/L B S/L               Therapeutic Exercises: 30 mins 30 mins 30 mins 30 mins 25 min 35 mins 35 mins    Pt education, postural education,HEP, functional breathing 15 mins Gait mechanics  straddle stance for prolonged standing review      LTR X 10 X 10 X 10 X 10 X 10 X 10 X 10    Bridging  X 10 X 20 X 30 X 30 X 30  X 30 X 30    Seated trunk flex stretch X 5 X 2 X 2 X 2 X 5 X 3 X 3    Clams B      X 30 X 30    Piriformis stretch B 2 x 20 sec 3 x 20 sec 3 x 20 sec 3 x 20 sec 3 x 20 sec Supine 3 x 20 sec Supine   3 x 20 sec    Open book stretch B       X 5    Sit to stand  X 5    X 10 X 20 X 30    PROM B LE's   supine supine supine supine supine supine    Calf raises B      X 15 X 20    Feet together, trunk rotations eyes open and closed      SBA  SBA    Tandem stance B      CGA x 1 CGA x 1    HEP: see hand out    [facility] Portal  Treatment/Session Summary:    · Response to Treatment: Pt has done well and is ready to continue independently as she has been pain free more than a week. She will call with any questions.   · Communication/Consultation:  None today  · Equipment provided today:  None today    Total Treatment Billable Duration:  45 mins  PT Patient Time In/Time Out  Time In: 0800  Time Out: 0845    Celeste Gray, ALEXI    Future Appointments   Date Time Provider Miley Sheikh   8/24/2020  8:45 AM Vini Torres PTA [facility]   8/26/2020  8:45 AM Edy Lea Imus, PTA [facility]   8/31/2020  8:45 AM Edy Lea Imus, PTA [facility]   8/31/2020  1:00 PM [address] THRU [facility] IMD IMD   9/2/2020 11:00 AM [facility] PHONE APPOINTMENT [facility]LLEN PALMA OR PRE A   9/25/2020  9:20 AM IMD LAB [facility] IMD IMD   10/2/2020 11:00 AM Alexandra Kingston NP [facility] IMD IMD

## 2020-08-24 ENCOUNTER — APPOINTMENT (OUTPATIENT)
Dept: PHYSICAL THERAPY | Age: 75
End: 2020-08-24
Payer: COMMERCIAL

## 2020-08-26 ENCOUNTER — APPOINTMENT (OUTPATIENT)
Dept: PHYSICAL THERAPY | Age: 75
End: 2020-08-26
Payer: COMMERCIAL

## 2020-08-31 ENCOUNTER — APPOINTMENT (OUTPATIENT)
Dept: PHYSICAL THERAPY | Age: 75
End: 2020-08-31
Payer: COMMERCIAL

## 2020-09-08 ENCOUNTER — ANESTHESIA EVENT (OUTPATIENT)
Dept: ENDOSCOPY | Age: 75
End: 2020-09-08
Payer: COMMERCIAL

## 2020-09-09 ENCOUNTER — HOSPITAL ENCOUNTER (OUTPATIENT)
Age: 75
Setting detail: OUTPATIENT SURGERY
Discharge: HOME | End: 2020-09-09
Attending: INTERNAL MEDICINE | Admitting: INTERNAL MEDICINE
Payer: COMMERCIAL

## 2020-09-09 ENCOUNTER — ANESTHESIA (OUTPATIENT)
Dept: ENDOSCOPY | Age: 75
End: 2020-09-09
Payer: COMMERCIAL

## 2020-09-09 VITALS
OXYGEN SATURATION: 96 % | WEIGHT: 166 LBS | HEIGHT: 59 IN | SYSTOLIC BLOOD PRESSURE: 144 MMHG | TEMPERATURE: 97.6 F | BODY MASS INDEX: 33.47 KG/M2 | DIASTOLIC BLOOD PRESSURE: 68 MMHG | HEART RATE: 54 BPM | RESPIRATION RATE: 14 BRPM

## 2020-09-09 PROCEDURE — 76040000025: Performed by: INTERNAL MEDICINE

## 2020-09-09 PROCEDURE — 74011000250 HC RX REV CODE- 250: Performed by: REGISTERED NURSE

## 2020-09-09 PROCEDURE — 74011250636 HC RX REV CODE- 250/636: Performed by: ANESTHESIOLOGY

## 2020-09-09 PROCEDURE — 77030008969: Performed by: INTERNAL MEDICINE

## 2020-09-09 PROCEDURE — 74011250636 HC RX REV CODE- 250/636: Performed by: REGISTERED NURSE

## 2020-09-09 PROCEDURE — 76060000031 HC ANESTHESIA FIRST 0.5 HR: Performed by: INTERNAL MEDICINE

## 2020-09-09 RX ORDER — PROPOFOL 10 MG/ML
INJECTION, EMULSION INTRAVENOUS AS NEEDED
Status: DISCONTINUED | OUTPATIENT
Start: 2020-09-09 | End: 2020-09-09 | Stop reason: HOSPADM

## 2020-09-09 RX ORDER — SODIUM CHLORIDE, SODIUM LACTATE, POTASSIUM CHLORIDE, CALCIUM CHLORIDE 600; 310; 30; 20 MG/100ML; MG/100ML; MG/100ML; MG/100ML
100 INJECTION, SOLUTION INTRAVENOUS CONTINUOUS
Status: DISCONTINUED | OUTPATIENT
Start: 2020-09-09 | End: 2020-09-09 | Stop reason: HOSPADM

## 2020-09-09 RX ORDER — PROPOFOL 10 MG/ML
INJECTION, EMULSION INTRAVENOUS
Status: DISCONTINUED | OUTPATIENT
Start: 2020-09-09 | End: 2020-09-09 | Stop reason: HOSPADM

## 2020-09-09 RX ORDER — LIDOCAINE HYDROCHLORIDE 20 MG/ML
INJECTION, SOLUTION EPIDURAL; INFILTRATION; INTRACAUDAL; PERINEURAL AS NEEDED
Status: DISCONTINUED | OUTPATIENT
Start: 2020-09-09 | End: 2020-09-09 | Stop reason: HOSPADM

## 2020-09-09 RX ORDER — SODIUM CHLORIDE, SODIUM LACTATE, POTASSIUM CHLORIDE, CALCIUM CHLORIDE 600; 310; 30; 20 MG/100ML; MG/100ML; MG/100ML; MG/100ML
25 INJECTION, SOLUTION INTRAVENOUS CONTINUOUS
Status: DISCONTINUED | OUTPATIENT
Start: 2020-09-09 | End: 2020-09-09 | Stop reason: HOSPADM

## 2020-09-09 RX ADMIN — SODIUM CHLORIDE, SODIUM LACTATE, POTASSIUM CHLORIDE, AND CALCIUM CHLORIDE 100 ML/HR: 600; 310; 30; 20 INJECTION, SOLUTION INTRAVENOUS at 12:30

## 2020-09-09 RX ADMIN — PROPOFOL 50 MG: 10 INJECTION, EMULSION INTRAVENOUS at 14:01

## 2020-09-09 RX ADMIN — LIDOCAINE HYDROCHLORIDE 50 MG: 20 INJECTION, SOLUTION EPIDURAL; INFILTRATION; INTRACAUDAL; PERINEURAL at 14:01

## 2020-09-09 RX ADMIN — PROPOFOL 100 MCG/KG/MIN: 10 INJECTION, EMULSION INTRAVENOUS at 14:01

## 2020-09-09 NOTE — DISCHARGE INSTRUCTIONS
Gastrointestinal Esophagogastroduodenoscopy (EGD/EUS) - Upper Exam Discharge Instructions    1. Call Dr. Mil Gilmore at [phone] for any problems or questions. 2. Contact the doctor's office for follow up appointment as directed. 3. Medication may cause drowsiness for several hours, therefore:  · Do not drive or operate machinery for remainder of the day. · No alcohol today. · Do not make any important or legal decisions for 24 hours. · Do not sign any legal documents for 24 hours. 5. Ordinarily, you may resume regular diet and activity after exam unless otherwise specified by your physician. 6. For mild soreness in your throat you may use Cepacol throat lozenges or warm salt-water gargles as needed. Any additional instructions:   Plan:  1. Resume diet and current medications. 2. Return to office in 1-2 months for ongoing care. Instructions given to Asiya Bradyterry and other family members.

## 2020-09-09 NOTE — ROUTINE PROCESS
VSS. No complaints noted. Education given and reviewed with son who voiced understanding. Pt wheeled out via wheelchair by Josias Richards.

## 2020-09-09 NOTE — OP NOTES
Gastroenterology Procedure Note              Procedure:  Endoscopic ultrasound with Doppler     Date of Procedure:  9/9/2020    Patient:  Alvie Litten     1945    Indication:  Pancreatic lesions    Sedation:  MAC    Pre-Procedure Physical Exam:    Mental status:  alert and oriented  Airway:  normal oropharyngeal airway and neck mobility  CV:  regular rate and rhythm  Respiratory:  clear to auscultation    Procedure:  A History and Physical has been performed, and patient medication allergies have been  reviewed. Risks of perforation, hemorrhage, adverse drug reaction, and aspiration were discussed. Informed consent was obtained for the procedure, including sedation. The patient was placed in the left lateral decubitus position. The heart rate, oxygen saturations, blood pressure, and response to care were monitored throughout the procedure. The linear echoendoscope was passed through the mouth and advanced under direct vision to the distal duodenum. As the scope was slowly withdrawn, detailed endoscopic images were obtained from the surrounding organs. The patient tolerated the procedure well. Findings:     ENDOSCOPIC FINDINGS:  Limited views of the mucosa with the echoendoscope reveals no gross abnormalities of the stomach or proximal duodenum. The ampulla is well-visualized endoscopically and appears normal.    PANCREAS:  The pancreas is well-visualized from head to tail. There is no evidence of chronic pancreatitis. There are 2 simple cysts in the body the pancreas. One measures 7 x 6 mm and the other measures 5 x 5 mm. There are 3 cysts in the tail of the pancreas measuring 8 x 7 mm, 4 x 4 mm and 3 x 3 mm. All of the cysts were simple without mural nodules or internal debris. The 2 largest visibly communicate with the main pancreatic duct. The main pancreatic duct is of normal caliber with a smooth, regular course, measuring up to 2 mm in the head, 2 mm in the body and 1 mm in the tail. Pancreas divisum is not seen. BILIARY TREE: The gallbladder is absent. The common bile duct is well-visualized from its insertion in the ampulla to the bifurcation of right and left hepatic ducts. It is non-dilated, measuring up to 7 mm maximally with a gradual taper down to the level of the ampulla. There are no intraductal stones, sludge or debris. The ampulla appears normal endosonographically. OTHER ORGANS:  Views of the left lobe of the liver demonstrate no solid mass lesions. There is no ascites in the upper abdomen. The left adrenal gland appears normal. The major vascular structures including the portal vein, splenic vessels and celiac artery appear unremarkable. There are no pathologically enlarged posterior mediastinal or upper abdominal lymph nodes. Specimen:  No    Estimated Blood Loss:  None    Implant:  None           Impression:    Pancreatic cysts. 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 No worrisome features are seen. Ongoing radiographic surveillance is indicated. Plan:  1. Resume diet and current medications. 2. MRCP in 12 months for surveillance of pancreatic cysts. 3. Return to office in 1-2 months for ongoing care.     Signed:  Magalene Rubinstein, MD  9/9/2020  2:16 PM

## 2020-09-09 NOTE — ANESTHESIA PREPROCEDURE EVALUATION
Anesthetic History               Review of Systems / Medical History  Patient summary reviewed and pertinent labs reviewed    Pulmonary          Pneumonia (9/16)  Asthma : well controlled    Comments: Quit smoking 14 yrs ago   Neuro/Psych         Psychiatric history (Fibromyalgia, depression, anxiety)    Comments: neuropathy Cardiovascular    Hypertension          CAD (Minimal CAD on 2011 [facility] for chest pain) and PAD    Exercise tolerance: >4 METS  Comments: Pt has long standing CP. LHC in 2011 revealed minimal disease. Pt able to preform ADLs without difficulty. Pt has a long standing hx/o this pain.  Pt has presented to the ER for this several times (per pt) and discharged home   EKG without evidence for new ischemic changes  Able to walk a fair distance without significant CP   GI/Hepatic/Renal     GERD: well controlled           Endo/Other        Obesity and arthritis     Other Findings              Physical Exam    Airway  Mallampati: II  TM Distance: 4 - 6 cm  Neck ROM: normal range of motion   Mouth opening: Normal     Cardiovascular    Rhythm: regular  Rate: normal         Dental    Dentition: Full upper dentures     Pulmonary  Breath sounds clear to auscultation               Abdominal  GI exam deferred       Other Findings            Anesthetic Plan    ASA: 3  Anesthesia type: total IV anesthesia          Induction: Intravenous  Anesthetic plan and risks discussed with: Patient

## 2020-09-09 NOTE — H&P
History and Physical for Endoscopic Ultrasound             Date: 2020     History of Present Illness:  Patient presents to undergo endoscopic ultrasound for evaluation of a pancreatic mass. Patient denies any dysphagia, diarrhea, constipation, tenesmus, or GI bleeding.         Past Medical History:   Diagnosis Date    Allergic rhinitis, cause unspecified 2015    Aortic valve disorders 2015    Asthma     uses inhaler prn    CAD (coronary artery disease)     mild,  treated with med    Carotid stenosis 2015    Carpal tunnel syndrome 2015    bilat wrists-no issue now    Depression 2015    Esophageal reflux 2015    Essential hypertension     Fibromyalgia     Former smoker     1 ppd for 14 yrs; quit smoking     GERD (gastroesophageal reflux disease)     Heart failure ([facility] [address]     History of peptic ulcer disease     Menopause     Mixed hyperlipidemia 2016    Neuropathy     BLE    Osteoarthritis     Osteopenia 2015    Renal insufficiency 2015     Past Surgical History:   Procedure Laterality Date    HX BLADDER REPAIR      bladder tac    HX BLADDER SUSPENSION      HX COLONOSCOPY      HX GI      esophageal dilatation    HX HEMORRHOIDECTOMY      HX HYSTERECTOMY      HX LAP CHOLECYSTECTOMY      HX RECTOCELE REPAIR      HX ROTATOR CUFF REPAIR Right     HX TUBAL LIGATION        Family History   Problem Relation Age of Onset    Diabetes Mother     Heart Attack Mother     Diabetes Sister     Breast Cancer Sister 79    Diabetes Sister     Heart Attack Father     Diabetes Sister     Heart Attack Sister          from heart issue age 54    Arthritis-rheumatoid Paternal Grandmother     Heart Disease Maternal Grandmother      Social History     Tobacco Use    Smoking status: Former Smoker     Packs/day: 1.00     Years: 14.00     Pack years: 14.00    Smokeless tobacco: Never Used    Tobacco comment: quit smoking    Substance Use Topics    Alcohol use: No     Alcohol/week: 0.0 standard drinks        Allergies   Allergen Reactions    Bees [Hymenoptera Allergenic Extract] Anaphylaxis    Adhesive Tape-Silicones Rash     Ok to use paper tape    Contrast Agent [Iodine] Hives    Lescol [Fluvastatin] Cough    Lipitor [Atorvastatin] Other (comments)     High doses cause leg cramps. Able to tolerate low doses    Lisinopril Cough           Penicillin G Rash    Tetracycline Other (comments)     ULCERS IN MOUTH     No current facility-administered medications for this encounter. Current Outpatient Medications   Medication Sig    fluticasone furoate-vilanteroL (Breo Ellipta) 100-25 mcg/dose inhaler Take 1 Puff by inhalation daily. RINSE MOUTH WELL AFTER USE    atorvastatin (LIPITOR) 20 mg tablet Take 1 Tab by mouth daily. (Patient taking differently: Take 20 mg by mouth nightly.)    gabapentin (NEURONTIN) 300 mg capsule Take 1 Cap by mouth three (3) times daily.  losartan (COZAAR) 100 mg tablet Take 1 Tab by mouth daily. Indications: high blood pressure    potassium chloride (K-DUR, KLOR-CON) 20 mEq tablet Take 1 Tab by mouth every morning.  atenoloL (TENORMIN) 25 mg tablet Take 0.5 Tabs by mouth nightly.  furosemide (LASIX) 40 mg tablet take 1 tablet by mouth every morning    nitroglycerin (NITROSTAT) 0.4 mg SL tablet 1 Tab by SubLINGual route every five (5) minutes as needed for Chest Pain.  sertraline (ZOLOFT) 50 mg tablet Take 1.5 Tabs by mouth every morning.  pantoprazole (PROTONIX) 40 mg tablet Take 1 Tab by mouth Daily (before breakfast).  fluticasone propionate (FLONASE) 50 mcg/actuation nasal spray 2 Sprays by Both Nostrils route nightly. (Patient taking differently: 2 Sprays by Both Nostrils route nightly. Indications: using 3 times weekly)    loperamide (IMODIUM) 1 mg/5 mL solution Take  by mouth four (4) times daily as needed for Diarrhea.     vitamin A (AQUASOL A) 10,000 unit capsule Take 24,000 Units by mouth every other day. Indications: taking twice weekly    cetirizine (ZYRTEC) 10 mg tablet Take 1 Tab by mouth daily. (Patient taking differently: Take 10 mg by mouth daily as needed.)    zinc oxide-cod liver oil 40 % oint 30 g, vitamin a & d oint 30 g, nystatin 100,000 unit/gram crea 30 g, compound tincture of benzoin tinc 0.5 mL Apply 1 Each to affected area two (2) times a day. Apply a small amount twice a day. (Patient taking differently: Apply 1 Each to affected area two (2) times a day. Apply a small amount twice a day. Indications: prn)    aspirin delayed-release 81 mg tablet Take 81 mg by mouth every morning. Instructed to take DOS per Anesthesia guidelines. Review of Systems:  A detailed 10 organ review of systems is obtained with pertinent positives as listed in the History of Present Illness. All others are negative. Objective:     Physical Exam:  There were no vitals taken for this visit. General:  Alert and oriented. Heart: Regular rate and rhythm  Lungs:  Clear to auscultation bilaterally  Abdomen: Soft, nontender, nondistended    Impression/Plan:     Proceed with EUS as planned. I have discussed with the patient the technique, benefits, alternatives, and risks of the procedure, including medication reaction, immediate or delayed bleeding, or perforation of the gastrointestinal tract.     Signed By: Susie Busch MD     September 9, 2020

## 2020-09-09 NOTE — ANESTHESIA POSTPROCEDURE EVALUATION
Procedure(s):  ENDOSCOPIC ULTRASOUND (EUS) WITH FNA /BMI 34. total IV anesthesia    Anesthesia Post Evaluation      Multimodal analgesia: multimodal analgesia not used between 6 hours prior to anesthesia start to PACU discharge  Patient location during evaluation: PACU  Patient participation: complete - patient participated  Level of consciousness: awake and alert  Pain management: adequate  Airway patency: patent  Anesthetic complications: no  Cardiovascular status: hemodynamically stable  Respiratory status: acceptable  Hydration status: acceptable        INITIAL Post-op Vital signs:   Vitals Value Taken Time   /68 9/9/2020  2:49 PM   Temp 36.4 °C (97.6 °F) 9/9/2020  2:19 PM   Pulse 58 9/9/2020  2:53 PM   Resp 14 9/9/2020  2:49 PM   SpO2 97 % 9/9/2020  2:51 PM   Vitals shown include unvalidated device data.

## 2020-11-11 ENCOUNTER — HOSPITAL ENCOUNTER (OUTPATIENT)
Dept: MAMMOGRAPHY | Age: 75
Discharge: HOME | End: 2020-11-11
Attending: NURSE PRACTITIONER
Payer: COMMERCIAL

## 2020-11-11 DIAGNOSIS — M85.89 OSTEOPENIA OF MULTIPLE SITES: ICD-10-CM

## 2020-11-11 PROCEDURE — 77080 DXA BONE DENSITY AXIAL: CPT

## 2020-11-15 NOTE — PROGRESS NOTES
Your bone density has declined slightly. Have you been on a medication for the bone density? Discuss at appointment.

## 2021-01-11 ENCOUNTER — HOSPITAL ENCOUNTER (OUTPATIENT)
Dept: LAB | Age: 76
Discharge: HOME | End: 2021-01-11
Payer: MEDICAID

## 2021-01-11 DIAGNOSIS — E78.2 MIXED HYPERLIPIDEMIA: ICD-10-CM

## 2021-01-11 DIAGNOSIS — I65.23 ASYMPTOMATIC BILATERAL CAROTID ARTERY STENOSIS: ICD-10-CM

## 2021-01-11 DIAGNOSIS — I10 ESSENTIAL HYPERTENSION: Chronic | ICD-10-CM

## 2021-01-11 DIAGNOSIS — Z79.899 ON POTASSIUM WASTING DIURETIC THERAPY: ICD-10-CM

## 2021-01-11 DIAGNOSIS — E55.9 VITAMIN D DEFICIENCY: ICD-10-CM

## 2021-01-11 LAB
25(OH)D3 SERPL-MCNC: 13 NG/ML (ref 30–100)
ALBUMIN SERPL-MCNC: 3.5 G/DL (ref 3.2–4.6)
ALBUMIN/GLOB SERPL: 1 {RATIO} (ref 1.2–3.5)
ALP SERPL-CCNC: 79 U/L (ref 50–136)
ALT SERPL-CCNC: 22 U/L (ref 12–65)
ANION GAP SERPL CALC-SCNC: 3 MMOL/L (ref 7–16)
AST SERPL-CCNC: 20 U/L (ref 15–37)
BASOPHILS # BLD: 0.1 K/UL (ref 0–0.2)
BASOPHILS NFR BLD: 1 % (ref 0–2)
BILIRUB SERPL-MCNC: 0.5 MG/DL (ref 0.2–1.1)
BUN SERPL-MCNC: 18 MG/DL (ref 8–23)
CALCIUM SERPL-MCNC: 8.8 MG/DL (ref 8.3–10.4)
CHLORIDE SERPL-SCNC: 108 MMOL/L (ref 98–107)
CHOLEST SERPL-MCNC: 143 MG/DL
CO2 SERPL-SCNC: 29 MMOL/L (ref 21–32)
CREAT SERPL-MCNC: 1.28 MG/DL (ref 0.6–1)
DIFFERENTIAL METHOD BLD: ABNORMAL
EOSINOPHIL # BLD: 0.3 K/UL (ref 0–0.8)
EOSINOPHIL NFR BLD: 4 % (ref 0.5–7.8)
ERYTHROCYTE [DISTWIDTH] IN BLOOD BY AUTOMATED COUNT: 14.1 % (ref 11.9–14.6)
GLOBULIN SER CALC-MCNC: 3.6 G/DL (ref 2.3–3.5)
GLUCOSE SERPL-MCNC: 111 MG/DL (ref 65–100)
HCT VFR BLD AUTO: 42.7 % (ref 35.8–46.3)
HDLC SERPL-MCNC: 57 MG/DL (ref 40–60)
HDLC SERPL: 2.5 {RATIO}
HGB BLD-MCNC: 13.3 G/DL (ref 11.7–15.4)
IMM GRANULOCYTES # BLD AUTO: 0 K/UL (ref 0–0.5)
IMM GRANULOCYTES NFR BLD AUTO: 0 % (ref 0–5)
LDLC SERPL CALC-MCNC: 50 MG/DL
LIPID PROFILE,FLP: ABNORMAL
LYMPHOCYTES # BLD: 2.5 K/UL (ref 0.5–4.6)
LYMPHOCYTES NFR BLD: 41 % (ref 13–44)
MAGNESIUM SERPL-MCNC: 2.6 MG/DL (ref 1.8–2.4)
MCH RBC QN AUTO: 27.8 PG (ref 26.1–32.9)
MCHC RBC AUTO-ENTMCNC: 31.1 G/DL (ref 31.4–35)
MCV RBC AUTO: 89.1 FL (ref 79.6–97.8)
MONOCYTES # BLD: 0.5 K/UL (ref 0.1–1.3)
MONOCYTES NFR BLD: 9 % (ref 4–12)
NEUTS SEG # BLD: 2.8 K/UL (ref 1.7–8.2)
NEUTS SEG NFR BLD: 45 % (ref 43–78)
NRBC # BLD: 0 K/UL (ref 0–0.2)
PLATELET # BLD AUTO: 227 K/UL (ref 150–450)
PMV BLD AUTO: 10.9 FL (ref 9.4–12.3)
POTASSIUM SERPL-SCNC: 3.9 MMOL/L (ref 3.5–5.1)
PROT SERPL-MCNC: 7.1 G/DL (ref 6.3–8.2)
RBC # BLD AUTO: 4.79 M/UL (ref 4.05–5.2)
SODIUM SERPL-SCNC: 140 MMOL/L (ref 136–145)
TRIGL SERPL-MCNC: 180 MG/DL (ref 35–150)
URATE SERPL-MCNC: 5.2 MG/DL (ref 2.6–6)
VLDLC SERPL CALC-MCNC: 36 MG/DL (ref 6–23)
WBC # BLD AUTO: 6.1 K/UL (ref 4.3–11.1)

## 2021-01-11 PROCEDURE — 36415 COLL VENOUS BLD VENIPUNCTURE: CPT

## 2021-01-11 PROCEDURE — 85025 COMPLETE CBC W/AUTO DIFF WBC: CPT

## 2021-01-11 PROCEDURE — 80061 LIPID PANEL: CPT

## 2021-01-11 PROCEDURE — 80053 COMPREHEN METABOLIC PANEL: CPT

## 2021-01-11 PROCEDURE — 83735 ASSAY OF MAGNESIUM: CPT

## 2021-01-11 PROCEDURE — 82306 VITAMIN D 25 HYDROXY: CPT

## 2021-01-11 PROCEDURE — 83516 IMMUNOASSAY NONANTIBODY: CPT

## 2021-01-11 PROCEDURE — 84550 ASSAY OF BLOOD/URIC ACID: CPT

## 2021-01-11 PROCEDURE — 83921 ORGANIC ACID SINGLE QUANT: CPT

## 2021-01-13 LAB — PCA AB SER-ACNC: 1 UNITS (ref 0–20)

## 2021-01-15 LAB
Lab: NORMAL
METHYLMALONATE SERPL-SCNC: 356 NMOL/L (ref 0–378)

## 2021-01-15 NOTE — PROGRESS NOTES
The methylmalonic acid is upper limits of normal.  The parietal cell antibodies are normal.  You can switch to Vitamin B12 5,000 mcg daily.

## 2021-01-25 ENCOUNTER — TRANSCRIBE ORDER (OUTPATIENT)
Dept: SCHEDULING | Age: 76
End: 2021-01-25

## 2021-01-25 DIAGNOSIS — Z12.31 SCREENING MAMMOGRAM FOR HIGH-RISK PATIENT: Primary | ICD-10-CM

## 2021-02-16 ENCOUNTER — HOSPITAL ENCOUNTER (OUTPATIENT)
Dept: MAMMOGRAPHY | Age: 76
Discharge: HOME | End: 2021-02-16
Attending: NURSE PRACTITIONER

## 2021-02-16 DIAGNOSIS — Z12.31 SCREENING MAMMOGRAM FOR HIGH-RISK PATIENT: ICD-10-CM

## 2021-02-25 ENCOUNTER — HOSPITAL ENCOUNTER (OUTPATIENT)
Dept: MAMMOGRAPHY | Age: 76
Discharge: HOME | End: 2021-02-25
Attending: NURSE PRACTITIONER
Payer: MEDICARE

## 2021-02-25 DIAGNOSIS — N64.4 BREAST PAIN: ICD-10-CM

## 2021-02-25 PROCEDURE — 77066 DX MAMMO INCL CAD BI: CPT

## 2021-04-20 ENCOUNTER — HOSPITAL ENCOUNTER (OUTPATIENT)
Dept: GENERAL RADIOLOGY | Age: 76
Discharge: HOME | End: 2021-04-20

## 2021-04-20 DIAGNOSIS — R05.9 COUGH: ICD-10-CM

## 2021-07-28 ENCOUNTER — HOSPITAL ENCOUNTER (OUTPATIENT)
Dept: GENERAL RADIOLOGY | Age: 76
Discharge: HOME | End: 2021-07-28

## 2021-07-28 DIAGNOSIS — M25.552 ACUTE HIP PAIN, LEFT: ICD-10-CM

## 2021-07-28 DIAGNOSIS — M54.50 CHRONIC BILATERAL LOW BACK PAIN WITHOUT SCIATICA: ICD-10-CM

## 2021-07-28 DIAGNOSIS — G89.29 CHRONIC BILATERAL LOW BACK PAIN WITHOUT SCIATICA: ICD-10-CM

## 2021-07-31 NOTE — PROGRESS NOTES
The hip looks good. The back is chronic degenerative changes with some narrowing in the L1-L2 and L2-L3 areas. Recommend physical therapy. We can refer you back to ortho if you would like.

## 2021-08-02 NOTE — PROGRESS NOTES
Patient notified of results and instructions, per Elise's note, and voiced understanding. Per pt request referral to POA placed.

## 2021-08-03 ENCOUNTER — TRANSCRIBE ORDER (OUTPATIENT)
Dept: SCHEDULING | Age: 76
End: 2021-08-03

## 2021-08-03 DIAGNOSIS — D49.0 NEOPLASM OF UNSPECIFIED BEHAVIOR OF DIGESTIVE SYSTEM: Primary | ICD-10-CM

## 2021-09-10 ENCOUNTER — HOSPITAL ENCOUNTER (OUTPATIENT)
Dept: MRI IMAGING | Age: 76
Discharge: HOME | End: 2021-09-10
Attending: NURSE PRACTITIONER
Payer: MEDICARE

## 2021-09-10 DIAGNOSIS — D49.0 NEOPLASM OF UNSPECIFIED BEHAVIOR OF DIGESTIVE SYSTEM: ICD-10-CM

## 2021-09-10 PROCEDURE — 74181 MRI ABDOMEN W/O CONTRAST: CPT

## 2021-10-21 ENCOUNTER — ANESTHESIA EVENT (OUTPATIENT)
Dept: ENDOSCOPY | Age: 76
DRG: 640 | End: 2021-10-21
Payer: MEDICARE

## 2021-10-21 RX ORDER — SODIUM CHLORIDE, SODIUM LACTATE, POTASSIUM CHLORIDE, CALCIUM CHLORIDE 600; 310; 30; 20 MG/100ML; MG/100ML; MG/100ML; MG/100ML
100 INJECTION, SOLUTION INTRAVENOUS CONTINUOUS
Status: CANCELLED | OUTPATIENT
Start: 2021-10-21

## 2021-10-21 NOTE — PROGRESS NOTES
Confirmed scheduled procedure with patient. Informed patient of time of arrival, entry location, and  policy. Opportunity for questions provided. No concerns at this time.

## 2021-10-22 ENCOUNTER — ANESTHESIA (OUTPATIENT)
Dept: ENDOSCOPY | Age: 76
DRG: 640 | End: 2021-10-22
Payer: MEDICARE

## 2021-10-22 ENCOUNTER — HOSPITAL ENCOUNTER (OUTPATIENT)
Age: 76
Setting detail: OUTPATIENT SURGERY
Discharge: HOME | DRG: 640 | End: 2021-10-22
Attending: INTERNAL MEDICINE | Admitting: INTERNAL MEDICINE
Payer: MEDICARE

## 2021-10-22 VITALS
SYSTOLIC BLOOD PRESSURE: 177 MMHG | BODY MASS INDEX: 32.59 KG/M2 | OXYGEN SATURATION: 96 % | DIASTOLIC BLOOD PRESSURE: 76 MMHG | WEIGHT: 166 LBS | HEIGHT: 60 IN | TEMPERATURE: 96.8 F | HEART RATE: 62 BPM | RESPIRATION RATE: 16 BRPM

## 2021-10-22 PROCEDURE — 77030013991 HC SNR POLYP ENDOSC BSC -A: Performed by: INTERNAL MEDICINE

## 2021-10-22 PROCEDURE — 74011250636 HC RX REV CODE- 250/636: Performed by: ANESTHESIOLOGY

## 2021-10-22 PROCEDURE — 76040000025: Performed by: INTERNAL MEDICINE

## 2021-10-22 PROCEDURE — 74011000250 HC RX REV CODE- 250: Performed by: REGISTERED NURSE

## 2021-10-22 PROCEDURE — 76060000032 HC ANESTHESIA 0.5 TO 1 HR: Performed by: INTERNAL MEDICINE

## 2021-10-22 PROCEDURE — 0DBK8ZX EXCISION OF ASCENDING COLON, VIA NATURAL OR ARTIFICIAL OPENING ENDOSCOPIC, DIAGNOSTIC: ICD-10-PCS | Performed by: INTERNAL MEDICINE

## 2021-10-22 PROCEDURE — 88305 TISSUE EXAM BY PATHOLOGIST: CPT

## 2021-10-22 PROCEDURE — 0W3P8ZZ CONTROL BLEEDING IN GASTROINTESTINAL TRACT, VIA NATURAL OR ARTIFICIAL OPENING ENDOSCOPIC: ICD-10-PCS | Performed by: INTERNAL MEDICINE

## 2021-10-22 PROCEDURE — 77030021593 HC FCPS BIOP ENDOSC BSC -A: Performed by: INTERNAL MEDICINE

## 2021-10-22 PROCEDURE — 2709999900 HC NON-CHARGEABLE SUPPLY: Performed by: INTERNAL MEDICINE

## 2021-10-22 PROCEDURE — 74011250636 HC RX REV CODE- 250/636: Performed by: REGISTERED NURSE

## 2021-10-22 RX ORDER — LIDOCAINE HYDROCHLORIDE 20 MG/ML
INJECTION, SOLUTION EPIDURAL; INFILTRATION; INTRACAUDAL; PERINEURAL AS NEEDED
Status: DISCONTINUED | OUTPATIENT
Start: 2021-10-22 | End: 2021-10-22 | Stop reason: HOSPADM

## 2021-10-22 RX ORDER — PROPOFOL 10 MG/ML
INJECTION, EMULSION INTRAVENOUS
Status: DISCONTINUED | OUTPATIENT
Start: 2021-10-22 | End: 2021-10-22 | Stop reason: HOSPADM

## 2021-10-22 RX ORDER — PROPOFOL 10 MG/ML
INJECTION, EMULSION INTRAVENOUS AS NEEDED
Status: DISCONTINUED | OUTPATIENT
Start: 2021-10-22 | End: 2021-10-22 | Stop reason: HOSPADM

## 2021-10-22 RX ORDER — HYDRALAZINE HYDROCHLORIDE 20 MG/ML
INJECTION INTRAMUSCULAR; INTRAVENOUS AS NEEDED
Status: DISCONTINUED | OUTPATIENT
Start: 2021-10-22 | End: 2021-10-22 | Stop reason: HOSPADM

## 2021-10-22 RX ORDER — SODIUM CHLORIDE, SODIUM LACTATE, POTASSIUM CHLORIDE, CALCIUM CHLORIDE 600; 310; 30; 20 MG/100ML; MG/100ML; MG/100ML; MG/100ML
100 INJECTION, SOLUTION INTRAVENOUS CONTINUOUS
Status: DISCONTINUED | OUTPATIENT
Start: 2021-10-22 | End: 2021-10-22 | Stop reason: HOSPADM

## 2021-10-22 RX ADMIN — LIDOCAINE HYDROCHLORIDE 60 MG: 20 INJECTION, SOLUTION EPIDURAL; INFILTRATION; INTRACAUDAL; PERINEURAL at 08:18

## 2021-10-22 RX ADMIN — SODIUM CHLORIDE, SODIUM LACTATE, POTASSIUM CHLORIDE, AND CALCIUM CHLORIDE 100 ML/HR: 600; 310; 30; 20 INJECTION, SOLUTION INTRAVENOUS at 07:11

## 2021-10-22 RX ADMIN — PROPOFOL 100 MCG/KG/MIN: 10 INJECTION, EMULSION INTRAVENOUS at 08:21

## 2021-10-22 RX ADMIN — HYDRALAZINE HYDROCHLORIDE 5 MG: 20 INJECTION INTRAMUSCULAR; INTRAVENOUS at 08:27

## 2021-10-22 RX ADMIN — PROPOFOL 40 MG: 10 INJECTION, EMULSION INTRAVENOUS at 08:21

## 2021-10-22 NOTE — PROGRESS NOTES
Spoke to Dr. Damir Wilson, Anesthesiologist regarding BP of 177/76. He states that it is okay to discharge pt at this time.

## 2021-10-22 NOTE — ROUTINE PROCESS
Patient discharged. Passing flatus. Tolerating po fluids. No acute distress noted. Escorted to car, with family by Trung Lopez RN.

## 2021-10-22 NOTE — ANESTHESIA PREPROCEDURE EVALUATION
Anesthetic History               Review of Systems / Medical History  Patient summary reviewed and pertinent labs reviewed    Pulmonary          Smoker (Former smoker. Quit 1981.)  Asthma : well controlled       Neuro/Psych         Psychiatric history (Depression and Anxiety)    Comments: Neuropathy Cardiovascular    Hypertension  Valvular problems/murmurs: mitral insufficiency        CAD (Minimal CAD on 2011 [facility] for chest pain) and PAD    Exercise tolerance: >4 METS  Comments: Pt has long standing CP. LHC in 2011 revealed minimal disease. Pt able to preform ADLs without difficulty. Pt has a long standing hx/o this pain.  Pt has presented to the ER for this several times (per pt) and discharged home   EKG without evidence for new ischemic changes  Able to walk a fair distance without significant CP   GI/Hepatic/Renal     GERD: well controlled           Endo/Other        Obesity and arthritis     Other Findings   Comments: Fibromyalgia         Physical Exam    Airway  Mallampati: II  TM Distance: 4 - 6 cm  Neck ROM: normal range of motion   Mouth opening: Normal     Cardiovascular    Rhythm: regular  Rate: normal         Dental    Dentition: Full upper dentures     Pulmonary  Breath sounds clear to auscultation               Abdominal  GI exam deferred       Other Findings            Anesthetic Plan    ASA: 3  Anesthesia type: total IV anesthesia          Induction: Intravenous  Anesthetic plan and risks discussed with: Patient

## 2021-10-22 NOTE — DISCHARGE INSTRUCTIONS
Gastrointestinal Colonoscopy/Flexible Sigmoidoscopy - Lower Exam Discharge Instructions  1. Call Dr. aVrsha Campos at [phone] for any problems or questions. 2. Contact the doctors office for follow up appointment as directed  3. Medication may cause drowsiness for several hours, therefore:  · Do not drive or operate machinery for reminder of the day. · No alcohol today. · Do not make any important or legal decisions for 24 hours. · Do not sign any legal documents for 24 hours. 4. Ordinarily, you may resume regular diet and activity after exam unless otherwise specified by your physician. 5. Because of air put into your colon during exam, you may experience some abdominal distension, relieved by the passage of gas, for several hours. 6. Contact your physician if you have any of the following:  · Excessive amount of bleeding - large amount when having a bowel movement. Occasional specks of blood with bowel movement would not be unusual.  · Severe abdominal pain  · Fever or Chills  7. Polyp Removal - follow these additional instructions  · Take Metamucil - 1 tablespoon in juice every morning for 3 days, as needed for constipation. · No Aspirin, Advil, Aleve, Nuprin, Ibuprofen, or medications that contain these drugs for 2 weeks. Any additional instructions:    1. Follow up pathology results. 2. Repeat colonoscopy for surveillance based on pathology results. 3. Return to office in 3 months. Instructions given to Haugan Person and other family members.

## 2021-10-22 NOTE — PROGRESS NOTES
Discharge instructions explained to pt and son at this time. Questions answered. Voiced understanding.

## 2021-10-22 NOTE — ANESTHESIA POSTPROCEDURE EVALUATION
Procedure(s):  COLONOSCOPY/ BMI 34  ENDOSCOPIC POLYPECTOMY  COLON BIOPSY. total IV anesthesia    Anesthesia Post Evaluation      Multimodal analgesia: multimodal analgesia used between 6 hours prior to anesthesia start to PACU discharge  Patient location during evaluation: bedside  Patient participation: complete - patient participated  Level of consciousness: awake  Pain management: adequate  Airway patency: patent  Anesthetic complications: no  Cardiovascular status: acceptable  Respiratory status: spontaneous ventilation and acceptable  Hydration status: acceptable  Post anesthesia nausea and vomiting:  none      INITIAL Post-op Vital signs:   Vitals Value Taken Time   /82 10/22/21 0926   Temp 36 °C (96.8 °F) 10/22/21 0851   Pulse 62 10/22/21 0929   Resp 16 10/22/21 0921   SpO2 94 % 10/22/21 0930   Vitals shown include unvalidated device data.

## 2021-10-22 NOTE — OP NOTES
Procedure:  Colonoscopy    Date of Procedure:  10/22/2021    Patient:  Peg Vázquez     1945    Indication:  History of colon polyps     Sedation:  MAC    Pre-Procedure Exam:    Mental status:  alert and oriented  Airway:  normal oropharyngeal airway and neck mobility  CV:  regular rate and rhythm   Respiratory:  clear to auscultation    Procedure:  A History and Physical has been performed, and patient medication allergies have been reviewed. Risks of perforation, hemorrhage, adverse drug reaction, and aspiration were discussed. Informed consent was obtained for the procedure, including sedation. The patient was placed in the left lateral decubitus position. The heart rate, oxygen saturations, blood pressure, and response to care were monitored throughout the procedure. After performing a rectal exam, the colonoscope was passed through the anus and advanced under direct vision to the cecum, identified by appendiceal orifice and ileocecal valve. The quality of prep was adequate. A careful inspection was made as the colonoscope was withdrawn, including a retroflexed view of the rectum. The patient tolerated the procedure well. Findings:     ANUS:  Anal exam reveals no masses or hemorrhoids. RECTUM:  Internal hemorrhoids were seen in the rectum. Immediately proximal to the dentate line there is a mucosal prominence. This is very friable. Biopsies were obtained using a cold biopsy forceps. Due to persistent oozing of blood, the site was cauterized to attain hemostasis. SIGMOID COLON:  The mucosa is normal with good vascular pattern and without ulcers, diverticula, and polyps. DESCENDING COLON:  The mucosa is normal with good vascular pattern and without ulcers, diverticula, and polyps. SPLENIC FLEXURE:  The splenic flexure is normal.   TRANSVERSE COLON:  The mucosa is normal with good vascular pattern and without ulcers, diverticula, and polyps.    HEPATIC FLEXURE:  The hepatic flexure is normal.   ASCENDING COLON:  One 10 mm sessile polyp was removed using a hot snare. CECUM:  The appendiceal orifice appears normal. The ileocecal valve appears normal. A 5 mm AVM with contact bleeding was cauterized using the tip of the snare. TERMINAL ILEUM:  The terminal ileum was not entered. Specimen:  Yes    Estimated Blood Loss:  Minimal    Implant:  None           Impression:    Internal hemorrhoids. Abnormal mucosal prominence in the rectum. Colon polyp. Cecal AVM. Plan:  1. Follow up pathology results. 2. Repeat colonoscopy for surveillance based on pathology results. 3. Return to office in 3 months.     Signed:  Cornel Hawkins MD  10/22/2021  8:10 AM

## 2021-10-22 NOTE — H&P
History and Physical for Colonoscopy             Date: 10/22/2021     History of Present Illness:  Patient presents to undergo colonoscopy.         Past Medical History:   Diagnosis Date    Allergic rhinitis, cause unspecified 2015    Aortic valve disorders 2015    Asthma     uses inhaler prn    CAD (coronary artery disease)     mild,  treated with med    Carotid stenosis 2015    Carpal tunnel syndrome 2015    bilat wrists-no issue now    Depression 2015    Esophageal reflux 2015    Essential hypertension     Fibromyalgia     Former smoker     1 ppd for 14 yrs; quit smoking     GERD (gastroesophageal reflux disease)     Heart failure ([address]     last EF 55-60%    History of peptic ulcer disease     Menopause     Mixed hyperlipidemia 2016    Neuropathy     BLE    Osteoarthritis     Osteopenia 2015    Renal insufficiency 2015     Past Surgical History:   Procedure Laterality Date    HX BLADDER REPAIR      bladder tac    HX BLADDER SUSPENSION      HX COLONOSCOPY      HX GI      esophageal dilatation    HX HEMORRHOIDECTOMY      HX HYSTERECTOMY      HX LAP CHOLECYSTECTOMY      HX RECTOCELE REPAIR      HX ROTATOR CUFF REPAIR Right     HX TUBAL LIGATION        Family History   Problem Relation Age of Onset    Diabetes Mother     Heart Attack Mother     Diabetes Sister     Breast Cancer Sister 79    Diabetes Sister     Heart Attack Father     Diabetes Sister     Heart Attack Sister          from heart issue age 54    Arthritis-rheumatoid Paternal Grandmother     Heart Disease Maternal Grandmother      Social History     Tobacco Use    Smoking status: Former Smoker     Packs/day: 1.00     Years: 14.00     Pack years: 14.00    Smokeless tobacco: Never Used    Tobacco comment: quit smoking    Substance Use Topics    Alcohol use: No     Alcohol/week: 0.0 standard drinks        Allergies   Allergen Reactions    Bees [Hymenoptera Allergenic Extract] Anaphylaxis    Adhesive Tape-Silicones Rash     Ok to use paper tape    Contrast Agent [Iodine] Hives    Lescol [Fluvastatin] Cough    Lipitor [Atorvastatin] Other (comments)     High doses cause leg cramps. Able to tolerate low doses    Lisinopril Cough           Penicillin G Rash    Tetracycline Other (comments)     ULCERS IN MOUTH     Current Facility-Administered Medications   Medication Dose Route Frequency    lactated Ringers infusion  100 mL/hr IntraVENous CONTINUOUS        Review of Systems:  A detailed 10 organ review of systems is obtained with pertinent positives as listed in the History of Present Illness. All others are negative. Objective:     Physical Exam:  Visit Vitals  BP (!) 157/70   Pulse 62   Temp 98.5 °F (36.9 °C)   Resp 18   Ht 5' (1.524 m)   Wt 75.3 kg (166 lb)   SpO2 97%   Breastfeeding No   BMI 32.42 kg/m²      General:  Alert and oriented. Heart: Regular rate and rhythm  Lungs:  Clear to auscultation bilaterally  Abdomen: Soft, nontender, nondistended    Impression/Plan:     Proceed with colonoscopy as planned. I have discussed with the patient the technique, benefits, alternatives, and risks of these procedures, including medication reaction, immediate or delayed bleeding, or perforation of the gastrointestinal tract.      Signed By: Ramana Corona MD     October 22, 2021

## 2021-10-22 NOTE — PROGRESS NOTES
Bedside report received from [facility]. Care assumed at this time.

## 2021-10-25 ENCOUNTER — APPOINTMENT (OUTPATIENT)
Dept: ULTRASOUND IMAGING | Age: 76
DRG: 640 | End: 2021-10-25
Attending: EMERGENCY MEDICINE
Payer: MEDICARE

## 2021-10-25 ENCOUNTER — HOSPITAL ENCOUNTER (INPATIENT)
Age: 76
LOS: 3 days | Discharge: HOME | DRG: 640 | End: 2021-10-28
Attending: EMERGENCY MEDICINE | Admitting: INTERNAL MEDICINE
Payer: MEDICARE

## 2021-10-25 ENCOUNTER — APPOINTMENT (OUTPATIENT)
Dept: GENERAL RADIOLOGY | Age: 76
DRG: 640 | End: 2021-10-25
Attending: EMERGENCY MEDICINE
Payer: MEDICARE

## 2021-10-25 ENCOUNTER — APPOINTMENT (OUTPATIENT)
Dept: CT IMAGING | Age: 76
DRG: 640 | End: 2021-10-25
Attending: EMERGENCY MEDICINE
Payer: MEDICARE

## 2021-10-25 DIAGNOSIS — M79.662 BILATERAL CALF PAIN: ICD-10-CM

## 2021-10-25 DIAGNOSIS — I48.0 PAROXYSMAL ATRIAL FIBRILLATION (HCC): ICD-10-CM

## 2021-10-25 DIAGNOSIS — I25.10 CORONARY ARTERY DISEASE INVOLVING NATIVE CORONARY ARTERY OF NATIVE HEART WITHOUT ANGINA PECTORIS: Chronic | ICD-10-CM

## 2021-10-25 DIAGNOSIS — I50.9 CONGESTIVE HEART FAILURE, UNSPECIFIED HF CHRONICITY, UNSPECIFIED HEART FAILURE TYPE (HCC): ICD-10-CM

## 2021-10-25 DIAGNOSIS — I10 ESSENTIAL HYPERTENSION: Chronic | ICD-10-CM

## 2021-10-25 DIAGNOSIS — E78.2 MIXED HYPERLIPIDEMIA: Chronic | ICD-10-CM

## 2021-10-25 DIAGNOSIS — M79.661 BILATERAL CALF PAIN: ICD-10-CM

## 2021-10-25 DIAGNOSIS — R79.89 ELEVATED BRAIN NATRIURETIC PEPTIDE (BNP) LEVEL: ICD-10-CM

## 2021-10-25 PROBLEM — I48.91 A-FIB (HCC): Status: ACTIVE | Noted: 2021-10-25

## 2021-10-25 LAB
ALBUMIN SERPL-MCNC: 3.2 G/DL (ref 3.2–4.6)
ALBUMIN/GLOB SERPL: 0.8 {RATIO} (ref 1.2–3.5)
ALP SERPL-CCNC: 68 U/L (ref 50–136)
ALT SERPL-CCNC: 77 U/L (ref 12–65)
ANION GAP SERPL CALC-SCNC: 6 MMOL/L (ref 7–16)
AST SERPL-CCNC: 28 U/L (ref 15–37)
BASOPHILS # BLD: 0.1 K/UL (ref 0–0.2)
BASOPHILS NFR BLD: 1 % (ref 0–2)
BILIRUB SERPL-MCNC: 0.8 MG/DL (ref 0.2–1.1)
BNP SERPL-MCNC: 4228 PG/ML
BUN SERPL-MCNC: 12 MG/DL (ref 8–23)
CALCIUM SERPL-MCNC: 9.2 MG/DL (ref 8.3–10.4)
CHLORIDE SERPL-SCNC: 111 MMOL/L (ref 98–107)
CO2 SERPL-SCNC: 27 MMOL/L (ref 21–32)
CREAT SERPL-MCNC: 1.08 MG/DL (ref 0.6–1)
D DIMER PPP FEU-MCNC: 2.55 UG/ML(FEU)
DIFFERENTIAL METHOD BLD: ABNORMAL
EOSINOPHIL # BLD: 0.3 K/UL (ref 0–0.8)
EOSINOPHIL NFR BLD: 3 % (ref 0.5–7.8)
ERYTHROCYTE [DISTWIDTH] IN BLOOD BY AUTOMATED COUNT: 15.3 % (ref 11.9–14.6)
GLOBULIN SER CALC-MCNC: 3.9 G/DL (ref 2.3–3.5)
GLUCOSE SERPL-MCNC: 93 MG/DL (ref 65–100)
HCT VFR BLD AUTO: 39.8 % (ref 35.8–46.3)
HGB BLD-MCNC: 12 G/DL (ref 11.7–15.4)
IMM GRANULOCYTES # BLD AUTO: 0.1 K/UL (ref 0–0.5)
IMM GRANULOCYTES NFR BLD AUTO: 1 % (ref 0–5)
LYMPHOCYTES # BLD: 2.4 K/UL (ref 0.5–4.6)
LYMPHOCYTES NFR BLD: 29 % (ref 13–44)
MCH RBC QN AUTO: 28 PG (ref 26.1–32.9)
MCHC RBC AUTO-ENTMCNC: 30.2 G/DL (ref 31.4–35)
MCV RBC AUTO: 92.8 FL (ref 79.6–97.8)
MONOCYTES # BLD: 0.6 K/UL (ref 0.1–1.3)
MONOCYTES NFR BLD: 7 % (ref 4–12)
NEUTS SEG # BLD: 4.9 K/UL (ref 1.7–8.2)
NEUTS SEG NFR BLD: 59 % (ref 43–78)
NRBC # BLD: 0 K/UL (ref 0–0.2)
PLATELET # BLD AUTO: 243 K/UL (ref 150–450)
PMV BLD AUTO: 10.8 FL (ref 9.4–12.3)
POTASSIUM SERPL-SCNC: 3.6 MMOL/L (ref 3.5–5.1)
PROT SERPL-MCNC: 7.1 G/DL (ref 6.3–8.2)
RBC # BLD AUTO: 4.29 M/UL (ref 4.05–5.2)
SODIUM SERPL-SCNC: 144 MMOL/L (ref 136–145)
TROPONIN-HIGH SENSITIVITY: 11.5 PG/ML (ref 0–14)
TROPONIN-HIGH SENSITIVITY: 11.6 PG/ML (ref 0–14)
WBC # BLD AUTO: 8.3 K/UL (ref 4.3–11.1)

## 2021-10-25 PROCEDURE — 80053 COMPREHEN METABOLIC PANEL: CPT

## 2021-10-25 PROCEDURE — 96374 THER/PROPH/DIAG INJ IV PUSH: CPT

## 2021-10-25 PROCEDURE — 99285 EMERGENCY DEPT VISIT HI MDM: CPT

## 2021-10-25 PROCEDURE — 83880 ASSAY OF NATRIURETIC PEPTIDE: CPT

## 2021-10-25 PROCEDURE — 94762 N-INVAS EAR/PLS OXIMTRY CONT: CPT

## 2021-10-25 PROCEDURE — 74011250636 HC RX REV CODE- 250/636: Performed by: INTERNAL MEDICINE

## 2021-10-25 PROCEDURE — 84484 ASSAY OF TROPONIN QUANT: CPT

## 2021-10-25 PROCEDURE — 93970 EXTREMITY STUDY: CPT

## 2021-10-25 PROCEDURE — 85379 FIBRIN DEGRADATION QUANT: CPT

## 2021-10-25 PROCEDURE — 74011250636 HC RX REV CODE- 250/636: Performed by: EMERGENCY MEDICINE

## 2021-10-25 PROCEDURE — 74011250637 HC RX REV CODE- 250/637: Performed by: INTERNAL MEDICINE

## 2021-10-25 PROCEDURE — 74011250637 HC RX REV CODE- 250/637: Performed by: EMERGENCY MEDICINE

## 2021-10-25 PROCEDURE — 93005 ELECTROCARDIOGRAM TRACING: CPT | Performed by: EMERGENCY MEDICINE

## 2021-10-25 PROCEDURE — 65270000029 HC RM PRIVATE

## 2021-10-25 PROCEDURE — 94761 N-INVAS EAR/PLS OXIMETRY MLT: CPT

## 2021-10-25 PROCEDURE — 71045 X-RAY EXAM CHEST 1 VIEW: CPT

## 2021-10-25 PROCEDURE — 85025 COMPLETE CBC W/AUTO DIFF WBC: CPT

## 2021-10-25 RX ORDER — CARVEDILOL 3.12 MG/1
3.12 TABLET ORAL 2 TIMES DAILY WITH MEALS
Status: DISCONTINUED | OUTPATIENT
Start: 2021-10-26 | End: 2021-10-28 | Stop reason: HOSPADM

## 2021-10-25 RX ORDER — ASPIRIN 81 MG/1
81 TABLET ORAL DAILY
Status: DISCONTINUED | OUTPATIENT
Start: 2021-10-26 | End: 2021-10-28 | Stop reason: HOSPADM

## 2021-10-25 RX ORDER — ISOSORBIDE DINITRATE 10 MG/1
5 TABLET ORAL 3 TIMES DAILY
Status: DISCONTINUED | OUTPATIENT
Start: 2021-10-25 | End: 2021-10-26

## 2021-10-25 RX ORDER — SODIUM CHLORIDE 0.9 % (FLUSH) 0.9 %
5-10 SYRINGE (ML) INJECTION AS NEEDED
Status: DISCONTINUED | OUTPATIENT
Start: 2021-10-25 | End: 2021-10-28 | Stop reason: HOSPADM

## 2021-10-25 RX ORDER — ACETAMINOPHEN 325 MG/1
650 TABLET ORAL
Status: COMPLETED | OUTPATIENT
Start: 2021-10-25 | End: 2021-10-25

## 2021-10-25 RX ORDER — NITROGLYCERIN 0.4 MG/1
0.4 TABLET SUBLINGUAL
Status: DISCONTINUED | OUTPATIENT
Start: 2021-10-25 | End: 2021-10-28 | Stop reason: HOSPADM

## 2021-10-25 RX ORDER — FUROSEMIDE 10 MG/ML
20 INJECTION INTRAMUSCULAR; INTRAVENOUS EVERY 12 HOURS
Status: DISCONTINUED | OUTPATIENT
Start: 2021-10-25 | End: 2021-10-27

## 2021-10-25 RX ORDER — SODIUM CHLORIDE 0.9 % (FLUSH) 0.9 %
5-10 SYRINGE (ML) INJECTION EVERY 8 HOURS
Status: DISCONTINUED | OUTPATIENT
Start: 2021-10-25 | End: 2021-10-28 | Stop reason: HOSPADM

## 2021-10-25 RX ORDER — ONDANSETRON 2 MG/ML
4 INJECTION INTRAMUSCULAR; INTRAVENOUS
Status: DISCONTINUED | OUTPATIENT
Start: 2021-10-25 | End: 2021-10-28 | Stop reason: HOSPADM

## 2021-10-25 RX ORDER — PANTOPRAZOLE SODIUM 40 MG/1
40 TABLET, DELAYED RELEASE ORAL
Status: DISCONTINUED | OUTPATIENT
Start: 2021-10-26 | End: 2021-10-28 | Stop reason: HOSPADM

## 2021-10-25 RX ORDER — HYDRALAZINE HYDROCHLORIDE 10 MG/1
10 TABLET, FILM COATED ORAL 3 TIMES DAILY
Status: DISCONTINUED | OUTPATIENT
Start: 2021-10-25 | End: 2021-10-26

## 2021-10-25 RX ORDER — FUROSEMIDE 10 MG/ML
40 INJECTION INTRAMUSCULAR; INTRAVENOUS
Status: COMPLETED | OUTPATIENT
Start: 2021-10-25 | End: 2021-10-25

## 2021-10-25 RX ORDER — GUAIFENESIN 100 MG/5ML
324 LIQUID (ML) ORAL
Status: COMPLETED | OUTPATIENT
Start: 2021-10-25 | End: 2021-10-25

## 2021-10-25 RX ORDER — ENOXAPARIN SODIUM 100 MG/ML
1 INJECTION SUBCUTANEOUS EVERY 12 HOURS
Status: DISCONTINUED | OUTPATIENT
Start: 2021-10-25 | End: 2021-10-27

## 2021-10-25 RX ADMIN — ASPIRIN 324 MG: 81 TABLET, CHEWABLE ORAL at 17:57

## 2021-10-25 RX ADMIN — HYDRALAZINE HYDROCHLORIDE 10 MG: 10 TABLET, FILM COATED ORAL at 22:21

## 2021-10-25 RX ADMIN — ISOSORBIDE DINITRATE 5 MG: 10 TABLET ORAL at 22:21

## 2021-10-25 RX ADMIN — ENOXAPARIN SODIUM 80 MG: 100 INJECTION SUBCUTANEOUS at 22:21

## 2021-10-25 RX ADMIN — NITROGLYCERIN 1 INCH: 20 OINTMENT TOPICAL at 17:57

## 2021-10-25 RX ADMIN — FUROSEMIDE 40 MG: 10 INJECTION, SOLUTION INTRAMUSCULAR; INTRAVENOUS at 17:58

## 2021-10-25 RX ADMIN — FUROSEMIDE 20 MG: 10 INJECTION, SOLUTION INTRAMUSCULAR; INTRAVENOUS at 22:21

## 2021-10-25 RX ADMIN — ACETAMINOPHEN 650 MG: 325 TABLET ORAL at 20:18

## 2021-10-25 NOTE — ED NOTES
Pt does not have AC Iv at this time. No veins noted on both AC areas. Pt will need US guided IV. MD and charge RN aware.

## 2021-10-25 NOTE — ED TRIAGE NOTES
Patient arrives ambulatory to triage with mask in place. Patient reports LLE swelling for 1 week. Reports right leg with some swelling. Reports painful LLE. Reports hx heart failure and takes diuretic as prescribed. Reports having virtual visit with pcp and being told they were concerned for DVT/heart failure.

## 2021-10-26 ENCOUNTER — APPOINTMENT (OUTPATIENT)
Dept: NON INVASIVE DIAGNOSTICS | Age: 76
DRG: 640 | End: 2021-10-26
Attending: INTERNAL MEDICINE
Payer: MEDICARE

## 2021-10-26 ENCOUNTER — APPOINTMENT (OUTPATIENT)
Dept: ULTRASOUND IMAGING | Age: 76
DRG: 640 | End: 2021-10-26
Attending: INTERNAL MEDICINE
Payer: MEDICARE

## 2021-10-26 ENCOUNTER — APPOINTMENT (OUTPATIENT)
Dept: CT IMAGING | Age: 76
DRG: 640 | End: 2021-10-26
Attending: INTERNAL MEDICINE
Payer: MEDICARE

## 2021-10-26 PROBLEM — M79.669 CALF PAIN: Status: ACTIVE | Noted: 2021-10-26

## 2021-10-26 PROBLEM — I34.0 MODERATE MITRAL REGURGITATION: Status: ACTIVE | Noted: 2021-10-26

## 2021-10-26 PROBLEM — R79.89 ELEVATED BRAIN NATRIURETIC PEPTIDE (BNP) LEVEL: Status: ACTIVE | Noted: 2021-10-26

## 2021-10-26 LAB
ANION GAP SERPL CALC-SCNC: 9 MMOL/L (ref 7–16)
ATRIAL RATE: 300 BPM
ATRIAL RATE: 76 BPM
BASOPHILS # BLD: 0.1 K/UL (ref 0–0.2)
BASOPHILS NFR BLD: 1 % (ref 0–2)
BUN SERPL-MCNC: 11 MG/DL (ref 8–23)
CALCIUM SERPL-MCNC: 9.3 MG/DL (ref 8.3–10.4)
CALCULATED R AXIS, ECG10: -29 DEGREES
CALCULATED R AXIS, ECG10: -37 DEGREES
CALCULATED T AXIS, ECG11: 35 DEGREES
CALCULATED T AXIS, ECG11: 36 DEGREES
CHLORIDE SERPL-SCNC: 108 MMOL/L (ref 98–107)
CO2 SERPL-SCNC: 28 MMOL/L (ref 21–32)
CREAT SERPL-MCNC: 1.06 MG/DL (ref 0.6–1)
DIAGNOSIS, 93000: NORMAL
DIAGNOSIS, 93000: NORMAL
DIFFERENTIAL METHOD BLD: ABNORMAL
ECHO AO ASC DIAM: 3.2 CM
ECHO AO ROOT DIAM: 3.1 CM
ECHO AR MAX VEL PISA: 386 CM/S
ECHO AV AREA PEAK VELOCITY: 1.7 CM2
ECHO AV AREA VTI: 1.62 CM2
ECHO AV AREA/BSA PEAK VELOCITY: 1 CM2/M2
ECHO AV AREA/BSA VTI: 0.9 CM2/M2
ECHO AV MEAN GRADIENT: 7 MMHG
ECHO AV PEAK GRADIENT: 13 MMHG
ECHO AV PEAK VELOCITY: 178 CM/S
ECHO AV REGURGITANT PHT: 518 MS
ECHO AV VTI: 42.6 CM
ECHO EST RA PRESSURE: 3 MMHG
ECHO LA AREA 2C: 24.7 CM2
ECHO LA AREA 4C: 22 CM2
ECHO LA MAJOR AXIS: 5.98 CM
ECHO LA MINOR AXIS: 3.48 CM
ECHO LV E' LATERAL VELOCITY: 6.6 CM/S
ECHO LV E' SEPTAL VELOCITY: 9.52 CM/S
ECHO LV EDV A2C: 77.6 CM3
ECHO LV EDV A4C: 77 CM3
ECHO LV ESV A2C: 15.1 CM3
ECHO LV ESV A4C: 23.8 CM3
ECHO LV INTERNAL DIMENSION DIASTOLIC: 5.03 CM (ref 3.9–5.3)
ECHO LV INTERNAL DIMENSION SYSTOLIC: 3.34 CM
ECHO LV IVSD: 1.01 CM (ref 0.6–0.9)
ECHO LV MASS 2D: 171.6 G (ref 67–162)
ECHO LV MASS INDEX 2D: 99.8 G/M2 (ref 43–95)
ECHO LV POSTERIOR WALL DIASTOLIC: 0.89 CM (ref 0.6–0.9)
ECHO LVOT DIAM: 1.8 CM
ECHO LVOT PEAK GRADIENT: 6 MMHG
ECHO LVOT VTI: 27.2 CM
ECHO MV E DECELERATION TIME (DT): 271 MS
ECHO MV E VELOCITY: 164 CM/S
ECHO MV E/E' LATERAL: 24.85
ECHO MV E/E' RATIO (AVERAGED): 21.04
ECHO MV E/E' SEPTAL: 17.23
ECHO MV REGURGITANT RADIUS PISA: 0.6 CM
ECHO MV REGURGITANT VTIA: 160 CM
ECHO RIGHT VENTRICULAR SYSTOLIC PRESSURE (RVSP): 44 MMHG
ECHO RV INTERNAL DIMENSION: 2.6 CM
ECHO RV TAPSE: 2.26 CM (ref 1.5–2)
ECHO TV REGURGITANT MAX VELOCITY: 322 CM/S
ECHO TV REGURGITANT PEAK GRADIENT: 41 MMHG
EOSINOPHIL # BLD: 0.3 K/UL (ref 0–0.8)
EOSINOPHIL NFR BLD: 4 % (ref 0.5–7.8)
ERYTHROCYTE [DISTWIDTH] IN BLOOD BY AUTOMATED COUNT: 15.1 % (ref 11.9–14.6)
GLUCOSE BLD STRIP.AUTO-MCNC: 99 MG/DL (ref 65–100)
GLUCOSE SERPL-MCNC: 100 MG/DL (ref 65–100)
HCT VFR BLD AUTO: 38.9 % (ref 35.8–46.3)
HGB BLD-MCNC: 12.1 G/DL (ref 11.7–15.4)
IMM GRANULOCYTES # BLD AUTO: 0 K/UL (ref 0–0.5)
IMM GRANULOCYTES NFR BLD AUTO: 1 % (ref 0–5)
LYMPHOCYTES # BLD: 2.6 K/UL (ref 0.5–4.6)
LYMPHOCYTES NFR BLD: 30 % (ref 13–44)
MAGNESIUM SERPL-MCNC: 2.3 MG/DL (ref 1.8–2.4)
MCH RBC QN AUTO: 28.7 PG (ref 26.1–32.9)
MCHC RBC AUTO-ENTMCNC: 31.1 G/DL (ref 31.4–35)
MCV RBC AUTO: 92.4 FL (ref 79.6–97.8)
MONOCYTES # BLD: 0.6 K/UL (ref 0.1–1.3)
MONOCYTES NFR BLD: 7 % (ref 4–12)
NEUTS SEG # BLD: 5.1 K/UL (ref 1.7–8.2)
NEUTS SEG NFR BLD: 58 % (ref 43–78)
NRBC # BLD: 0 K/UL (ref 0–0.2)
PLATELET # BLD AUTO: 257 K/UL (ref 150–450)
PMV BLD AUTO: 10.4 FL (ref 9.4–12.3)
POTASSIUM SERPL-SCNC: 3.6 MMOL/L (ref 3.5–5.1)
Q-T INTERVAL, ECG07: 436 MS
Q-T INTERVAL, ECG07: 450 MS
QRS DURATION, ECG06: 80 MS
QRS DURATION, ECG06: 80 MS
QTC CALCULATION (BEZET), ECG08: 438 MS
QTC CALCULATION (BEZET), ECG08: 497 MS
RBC # BLD AUTO: 4.21 M/UL (ref 4.05–5.2)
SERVICE CMNT-IMP: NORMAL
SODIUM SERPL-SCNC: 145 MMOL/L (ref 136–145)
VENTRICULAR RATE, ECG03: 57 BPM
VENTRICULAR RATE, ECG03: 78 BPM
WBC # BLD AUTO: 8.7 K/UL (ref 4.3–11.1)

## 2021-10-26 PROCEDURE — 71260 CT THORAX DX C+: CPT

## 2021-10-26 PROCEDURE — 74011000250 HC RX REV CODE- 250: Performed by: INTERNAL MEDICINE

## 2021-10-26 PROCEDURE — 99222 1ST HOSP IP/OBS MODERATE 55: CPT | Performed by: INTERNAL MEDICINE

## 2021-10-26 PROCEDURE — 2709999900 HC NON-CHARGEABLE SUPPLY

## 2021-10-26 PROCEDURE — 83735 ASSAY OF MAGNESIUM: CPT

## 2021-10-26 PROCEDURE — 85025 COMPLETE CBC W/AUTO DIFF WBC: CPT

## 2021-10-26 PROCEDURE — 97161 PT EVAL LOW COMPLEX 20 MIN: CPT

## 2021-10-26 PROCEDURE — C8929 TTE W OR WO FOL WCON,DOPPLER: HCPCS

## 2021-10-26 PROCEDURE — 74011250637 HC RX REV CODE- 250/637: Performed by: INTERNAL MEDICINE

## 2021-10-26 PROCEDURE — 93922 UPR/L XTREMITY ART 2 LEVELS: CPT

## 2021-10-26 PROCEDURE — 80048 BASIC METABOLIC PNL TOTAL CA: CPT

## 2021-10-26 PROCEDURE — 74011250637 HC RX REV CODE- 250/637: Performed by: HOSPITALIST

## 2021-10-26 PROCEDURE — 74011250636 HC RX REV CODE- 250/636: Performed by: INTERNAL MEDICINE

## 2021-10-26 PROCEDURE — 74011250636 HC RX REV CODE- 250/636: Performed by: FAMILY MEDICINE

## 2021-10-26 PROCEDURE — 65270000029 HC RM PRIVATE

## 2021-10-26 PROCEDURE — 74011000258 HC RX REV CODE- 258: Performed by: INTERNAL MEDICINE

## 2021-10-26 PROCEDURE — 74011000636 HC RX REV CODE- 636: Performed by: INTERNAL MEDICINE

## 2021-10-26 PROCEDURE — 82962 GLUCOSE BLOOD TEST: CPT

## 2021-10-26 PROCEDURE — 97166 OT EVAL MOD COMPLEX 45 MIN: CPT

## 2021-10-26 PROCEDURE — 93005 ELECTROCARDIOGRAM TRACING: CPT | Performed by: INTERNAL MEDICINE

## 2021-10-26 PROCEDURE — 74011636637 HC RX REV CODE- 636/637: Performed by: FAMILY MEDICINE

## 2021-10-26 PROCEDURE — 86580 TB INTRADERMAL TEST: CPT | Performed by: INTERNAL MEDICINE

## 2021-10-26 PROCEDURE — 36415 COLL VENOUS BLD VENIPUNCTURE: CPT

## 2021-10-26 PROCEDURE — 74011250637 HC RX REV CODE- 250/637: Performed by: FAMILY MEDICINE

## 2021-10-26 PROCEDURE — 97535 SELF CARE MNGMENT TRAINING: CPT

## 2021-10-26 PROCEDURE — 97530 THERAPEUTIC ACTIVITIES: CPT

## 2021-10-26 PROCEDURE — 77030038269 HC DRN EXT URIN PURWCK BARD -A

## 2021-10-26 RX ORDER — SERTRALINE HYDROCHLORIDE 50 MG/1
75 TABLET, FILM COATED ORAL
Status: DISCONTINUED | OUTPATIENT
Start: 2021-10-26 | End: 2021-10-28 | Stop reason: HOSPADM

## 2021-10-26 RX ORDER — DIPHENHYDRAMINE HCL 25 MG
50 CAPSULE ORAL
Status: COMPLETED | OUTPATIENT
Start: 2021-10-26 | End: 2021-10-26

## 2021-10-26 RX ORDER — MORPHINE SULFATE 2 MG/ML
1 INJECTION, SOLUTION INTRAMUSCULAR; INTRAVENOUS ONCE
Status: COMPLETED | OUTPATIENT
Start: 2021-10-26 | End: 2021-10-26

## 2021-10-26 RX ORDER — SODIUM CHLORIDE 0.9 % (FLUSH) 0.9 %
10 SYRINGE (ML) INJECTION
Status: COMPLETED | OUTPATIENT
Start: 2021-10-26 | End: 2021-10-26

## 2021-10-26 RX ORDER — LOSARTAN POTASSIUM 50 MG/1
100 TABLET ORAL DAILY
Status: DISCONTINUED | OUTPATIENT
Start: 2021-10-26 | End: 2021-10-28 | Stop reason: HOSPADM

## 2021-10-26 RX ORDER — HYDRALAZINE HYDROCHLORIDE 20 MG/ML
10 INJECTION INTRAMUSCULAR; INTRAVENOUS
Status: DISCONTINUED | OUTPATIENT
Start: 2021-10-26 | End: 2021-10-28 | Stop reason: HOSPADM

## 2021-10-26 RX ORDER — ACETAMINOPHEN 325 MG/1
650 TABLET ORAL
Status: DISCONTINUED | OUTPATIENT
Start: 2021-10-26 | End: 2021-10-28 | Stop reason: HOSPADM

## 2021-10-26 RX ORDER — ATORVASTATIN CALCIUM 20 MG/1
20 TABLET, FILM COATED ORAL
Status: DISCONTINUED | OUTPATIENT
Start: 2021-10-26 | End: 2021-10-26

## 2021-10-26 RX ORDER — ROPINIROLE 2 MG/1
2 TABLET, FILM COATED ORAL
Status: DISCONTINUED | OUTPATIENT
Start: 2021-10-26 | End: 2021-10-28 | Stop reason: HOSPADM

## 2021-10-26 RX ORDER — POTASSIUM CHLORIDE 20 MEQ/1
40 TABLET, EXTENDED RELEASE ORAL
Status: COMPLETED | OUTPATIENT
Start: 2021-10-26 | End: 2021-10-26

## 2021-10-26 RX ADMIN — IOPAMIDOL 80 ML: 755 INJECTION, SOLUTION INTRAVENOUS at 14:52

## 2021-10-26 RX ADMIN — Medication 10 ML: at 14:52

## 2021-10-26 RX ADMIN — FUROSEMIDE 20 MG: 10 INJECTION, SOLUTION INTRAMUSCULAR; INTRAVENOUS at 21:42

## 2021-10-26 RX ADMIN — ENOXAPARIN SODIUM 80 MG: 100 INJECTION SUBCUTANEOUS at 10:24

## 2021-10-26 RX ADMIN — POTASSIUM CHLORIDE 40 MEQ: 20 TABLET, EXTENDED RELEASE ORAL at 01:17

## 2021-10-26 RX ADMIN — ACETAMINOPHEN 650 MG: 325 TABLET ORAL at 19:13

## 2021-10-26 RX ADMIN — ROPINIROLE HYDROCHLORIDE 2 MG: 2 TABLET, FILM COATED ORAL at 01:17

## 2021-10-26 RX ADMIN — SERTRALINE 75 MG: 50 TABLET, FILM COATED ORAL at 08:26

## 2021-10-26 RX ADMIN — ONDANSETRON 4 MG: 2 INJECTION INTRAMUSCULAR; INTRAVENOUS at 08:25

## 2021-10-26 RX ADMIN — PERFLUTREN 1 ML: 6.52 INJECTION, SUSPENSION INTRAVENOUS at 15:11

## 2021-10-26 RX ADMIN — LOSARTAN POTASSIUM 100 MG: 50 TABLET, FILM COATED ORAL at 08:27

## 2021-10-26 RX ADMIN — SODIUM CHLORIDE 100 ML: 900 INJECTION, SOLUTION INTRAVENOUS at 14:52

## 2021-10-26 RX ADMIN — PANTOPRAZOLE SODIUM 40 MG: 40 TABLET, DELAYED RELEASE ORAL at 08:26

## 2021-10-26 RX ADMIN — TUBERCULIN PURIFIED PROTEIN DERIVATIVE 5 UNITS: 5 INJECTION INTRADERMAL at 09:00

## 2021-10-26 RX ADMIN — ROPINIROLE HYDROCHLORIDE 2 MG: 2 TABLET, FILM COATED ORAL at 21:42

## 2021-10-26 RX ADMIN — PREDNISONE 50 MG: 20 TABLET ORAL at 13:54

## 2021-10-26 RX ADMIN — ASPIRIN 81 MG: 81 TABLET ORAL at 08:26

## 2021-10-26 RX ADMIN — PREDNISONE 50 MG: 10 TABLET ORAL at 08:25

## 2021-10-26 RX ADMIN — Medication 10 ML: at 21:43

## 2021-10-26 RX ADMIN — PREDNISONE 50 MG: 20 TABLET ORAL at 02:23

## 2021-10-26 RX ADMIN — ENOXAPARIN SODIUM 80 MG: 100 INJECTION SUBCUTANEOUS at 21:42

## 2021-10-26 RX ADMIN — MORPHINE SULFATE 1 MG: 2 INJECTION, SOLUTION INTRAMUSCULAR; INTRAVENOUS at 00:45

## 2021-10-26 RX ADMIN — FUROSEMIDE 20 MG: 10 INJECTION, SOLUTION INTRAMUSCULAR; INTRAVENOUS at 08:25

## 2021-10-26 RX ADMIN — Medication 10 ML: at 06:18

## 2021-10-26 RX ADMIN — DIPHENHYDRAMINE HYDROCHLORIDE 50 MG: 25 CAPSULE ORAL at 13:56

## 2021-10-26 NOTE — PROGRESS NOTES
Hourly rounds completed. Patient is alert and oriented. Sat up in chair for part of shift. C/o nausea and pain this AM, medicated per MAR, no c/o pain or nausea at this time. Pt denies needs at this time. Bed in low position, locked and call light/personal items within reach. Will continue to monitor and report to night shift nurse.

## 2021-10-26 NOTE — CONSULTS
Pt consulted for right leg MARLINE occlusion. No open wounds, rest pain or claudication. Leg cramps have resolved. Ultrasound duplex reviewed:    Study Result    Narrative & Impression   History: Peripheral arterial disease.     FINDINGS:     Duplex Doppler arterial ultrasound was performed of the lower extremity arterial  system bilaterally. Color flow, grayscale, and spectral analysis was performed.     Biphasic waveforms present bilaterally. However, occluded right anterior tibial  artery.     Resting ankle-brachial index on the right 1.0. Resting ankle-brachial index on  the left 1.1.     Toe index on the right 0.84. Toe index on the left 0.92. Pulse volume  recordings in the great toes reveals unremarkable waveforms.        IMPRESSION     Occluded right anterior tibial artery. Continue ASA and continue elevating lower extremities. Follow-up with our office as an outpatient PRN.     Elisa Escudero NP/ Dr. Mauro De La Garza

## 2021-10-26 NOTE — PROGRESS NOTES
ACUTE OT GOALS:  (Developed with and agreed upon by patient and/or caregiver.)  1. Pt will toilet with SBA   2. Pt will complete functional mobility for ADLs with SBA  3. Pt will complete lower body dressing with SBA using AE as needed  4. Pt will complete grooming and hygiene at sink with SBA  5. Pt will demonstrate independence with HEP to promote increased BUE strength and functional use for ADLs  6. Pt will tolerate 23 minutes functional activity with min or fewer rest breaks to promote increased endurance for ADLs  7. Pt will independently demonstrate/ verbalize 2+ energy conservation techniques to promote increased endurance for ADLs      Timeframe: 7 days      OCCUPATIONAL THERAPY ASSESSMENT: Initial Assessment and Daily Note OT Treatment Day # 1    Marcela Billings is a 64 y.o. female   PRIMARY DIAGNOSIS: <principal problem not specified>  CHF (congestive heart failure) (HCC) [I50.9]       Reason for Referral:  Generalized weakness, LE edema, CHF  ICD-10: Treatment Diagnosis: Generalized Muscle Weakness (M62.81)  INPATIENT: Payor: [employer] MEDICARE / Plan: [facility] [employer] MEDICARE HMO / Product Type: Managed Care Medicare /   ASSESSMENT:     REHAB RECOMMENDATIONS:   Recommendation to date pending progress:  [address] [place] [address]  Equipment:    None     PRIOR LEVEL OF FUNCTION:  (Prior to Hospitalization)  INITIAL/CURRENT LEVEL OF FUNCTION:  (Based on today's evaluation)   Bathing:   Independent  Dressing:   Independent  Feeding/Grooming:   Independent  Toileting:   Independent  Functional Mobility:   Modified Independent Bathing:   Minimal Assistance  Dressing:   Minimal Assistance  Feeding/Grooming:   Contact Guard Assistance  Toileting:   Contact Guard Assistance  Functional Mobility:   Contact Guard Assistance     ASSESSMENT:  Ms. Isa Simpson presented generally weak with deficits in mobility, balance, strength, and endurance impacting ADLs.  Pt initially required min X2 to stand and for mobility using RW, progressed to CGA with time up and moving as stiffness in legs released. Pt completed LB dressing task with CGA, ADLs at sink with CGA. Pt is below her functional baseline and would benefit from skilled OT services to address deficits. SUBJECTIVE:   Ms. Hayley Aguilar states, \"it feels good to be up. \"    SOCIAL HISTORY/LIVING ENVIRONMENT: Lives with dtr in law and son, independent w/ ADLs and most IADLs. Occasionally uses quad cane but normally does not use an AD. WIS w/ shower chair. Cane, QC, rollator, RW.    Home Environment: Private residence  # Steps to Enter: 6  One/Two Story Residence: One story  Living Alone: No  Support Systems: Child(leslie), Other Family Member(s)    OBJECTIVE:     PAIN: VITAL SIGNS: LINES/DRAINS:   Pre Treatment: Pain Screen  Pain Scale 1: Numeric (0 - 10)  Pain Intensity 1: 0  Post Treatment: 0   O2 Device: None (Room air)     GROSS EVALUATION:  BUE Within Functional Limits Abnormal/ Functional Abnormal/ Non-Functional (see comments) Not Tested Comments:   AROM [x] [] [] []    PROM [] [] [] []    Strength [] [x] [] []    Balance [] [x] [] []    Posture [] [] [] []    Sensation [] [] [] []    Coordination [x] [] [] []    Tone [] [] [] []    Edema [] [] [] [] BLE   Activity Tolerance [] [x] [] []     [] [] [] []      COGNITION/  PERCEPTION: Intact Impaired   (see comments) Comments:   Orientation [x] []    Vision [x] []    Hearing [x] []    Judgment/ Insight [x] []    Attention [x] []    Memory [x] []    Command Following [x] []    Emotional Regulation [x] []     [] []      ACTIVITIES OF DAILY LIVING: I Mod I S SBA CGA Min Mod Max Total NT Comments   BASIC ADLs:              Bathing/ Showering [] [] [] [] [] [] [] [] [] []    Toileting [] [] [] [] [] [] [] [] [] []    Dressing [] [] [] [] [x] [] [] [] [] []    Feeding [] [] [] [] [] [] [] [] [] []    Grooming [] [] [] [] [x] [] [] [] [] []    Personal Device Care [] [] [] [] [] [] [] [] [] []    Functional Mobility [] [] [] [] [x] [] [] [] [] []    I=Independent, Mod I=Modified Independent, S=Supervision, SBA=Standby Assistance, CGA=Contact Guard Assistance,   Min=Minimal Assistance, Mod=Moderate Assistance, Max=Maximal Assistance, Total=Total Assistance, NT=Not Tested    MOBILITY: I Mod I S SBA CGA Min Mod Max Total  NT x2 Comments:   Supine to sit [] [] [] [] [x] [] [] [] [] [] []    Sit to supine [] [] [] [] [] [] [] [] [] [] []    Sit to stand [] [] [] [] [] [x] [] [] [] [] [x]    Bed to chair [] [] [] [] [x] [] [] [] [] [] []    I=Independent, Mod I=Modified Independent, S=Supervision, SBA=Standby Assistance, CGA=Contact Guard Assistance,   Min=Minimal Assistance, Mod=Moderate Assistance, Max=Maximal Assistance, Total=Total Assistance, NT=Not [facility] 6 Clicks   Daily Activity Inpatient Short Form        How much help from another person does the patient currently need. .. Total A Lot A Little None   1. Putting on and taking off regular lower body clothing? [] 1   [] 2   [x] 3   [] 4   2. Bathing (including washing, rinsing, drying)? [] 1   [] 2   [x] 3   [] 4   3. Toileting, which includes using toilet, bedpan or urinal?   [] 1   [] 2   [x] 3   [] 4   4. Putting on and taking off regular upper body clothing? [] 1   [] 2   [x] 3   [] 4   5. Taking care of personal grooming such as brushing teeth? [] 1   [] 2   [] 3   [x] 4   6. Eating meals? [] 1   [] 2   [] 3   [x] 4   © 2007, Trustees of [address] [place] [address] [address] [id number], under license to [employer] [employer]. All rights reserved     Score:  Initial: 20 Most Recent: X (Date: -- )   Interpretation of Tool:  Represents activities that are increasingly more difficult (i.e. Bed mobility, Transfers, Gait).     PLAN:   FREQUENCY/DURATION: OT Plan of Care: 3 times/week for duration of hospital stay or until stated goals are met, whichever comes first.    PROBLEM LIST:   (Skilled intervention is medically necessary to address:)  1. Decreased ADL/Functional Activities  2. Decreased Activity Tolerance  3. Decreased Balance  4. Decreased Strength   INTERVENTIONS PLANNED:   (Benefits and precautions of occupational therapy have been discussed with the patient.)  1. Self Care Training  2. Therapeutic Activity  3. Therapeutic Exercise/HEP  4. Neuromuscular Re-education  5. Education     TREATMENT:     EVALUATION: Moderate Complexity : (Untimed Charge)    TREATMENT:   ($$ Self Care/Home Management: 8-22 mins    )  Co-Treatment PT/OT necessary due to patient's decreased overall endurance/tolerance levels, as well as need for high level skilled assistance to complete functional transfers/mobility and functional tasks  Self Care (10 Minutes): Self care including Lower Body Dressing and Grooming to increase independence.     TREATMENT GRID:  N/A    AFTER TREATMENT POSITION/PRECAUTIONS:  Chair, Needs within reach, RN notified and Visitors at bedside    INTERDISCIPLINARY COLLABORATION:  RN/PCT and PT/PTA    TOTAL TREATMENT DURATION:  OT Patient Time In/Time Out  Time In: 1128  Time Out: [address] [facility]

## 2021-10-26 NOTE — ED NOTES
TRANSFER - OUT REPORT:    Verbal report given to RN on Adra Beams  being transferred to floor for routine progression of care       Report consisted of patients Situation, Background, Assessment and   Recommendations(SBAR). Information from the following report(s) SBAR, ED Summary and MAR was reviewed with the receiving nurse. Lines:   Peripheral IV 10/25/21 Right; Inner Wrist (Active)   Site Assessment Clean, dry, & intact 10/25/21 1505   Phlebitis Assessment 0 10/25/21 1505   Infiltration Assessment 0 10/25/21 1505   Dressing Status Clean, dry, & intact 10/25/21 1505   Dressing Type 4 X 4 10/25/21 1505   Hub Color/Line Status Blue 10/25/21 1505        Opportunity for questions and clarification was provided.       Patient transported with:   [employer]

## 2021-10-26 NOTE — PROGRESS NOTES
OT orders received, chart reviewed, and evaluation attempted. Pt pending workup to r/o PE. Will hold evaluation and follow up as schedule/ pt's status allows.     Chuyita Suazo, OT

## 2021-10-26 NOTE — PROGRESS NOTES
Hospitalist Progress Note   Admit Date:  10/25/2021  5:17 PM   Name:  Erin Medel   Age:  64 y.o. Sex:  female  :  1945   MRN:  [id number]   Room:  [facility]/    Presenting Complaint: Leg Swelling    Reason(s) for Admission: CHF (congestive heart failure) [facility]) [I50.9]     Hospital Course & Interval History:   Ms. Lucía Key is ia 65 y/o WF with a h/o CAD, MDD, HTN, fibromyalgia and CHF (details unknown) who presented to the ER on 10/25 with increasing b/l LE edema and increased SOB. Recently took a trip to the beach and symptoms seemed to develop about 1 week later. Labs are unremarkable aside from pBNP 4200. CXR showed cardiomegaly, otherwise no infiltrate or edema. EKG showed atrial fibrillation with slow VR (rate in the 50s). She was admitted for diuresis. There was some concern for PE given elevated d-dimer so she has been started on treatment dose Lovenox, CT chest for PE is pending due to her contrast allergy, protocol for this has been ordered. Cardiology consulted. Echo ordered. Subjective (10/26/21): In bed, c/o headache. Daughter present. LE edema started while on vacation. Had colonoscopy last Friday and has had headache since then. BPs at home range 160s or higher on average. Diuresing well so far, says her breathing is better today. On RA, hungry. No further chest pain. No N/V/D, abdo pain. Assessment & Plan:   # Suspected acute CHF exacerbation   - EF normal in , no DD, mod MR, mild LAE, mild TR/AR.   - No echo since to compare. Con't IV Lasix, Is/Os, hold BB with bradycardia as below, resume home ARB and stop hydralazine/nitrate. Net neg 750cc recorded (however per nursing notes in ER I see at least 1600cc urine documented). Cardiology to see. # Elevated d-dimer   - On treatment dose Lovenox. No DVT on b/l LE US. CT chest for PE is pending contrast pre-medication protocol. Overall low suspicion for PE. # Uncontrolled HTN   - Peak /92. Resume ARB, con't diuresis.  Need better control overall. # Headache   - If not improved with better BP control will CT head    # Atrial fibrillation   - Hold BB with bradycardia. On BID Lovenox. # Moderate mitral regurgitation   - Noted on echo from 2011. TTE ordered here, pending. # GERD   - PPI    Update: CT, TTE, arterial US back. CT neg for PE, + cardiomegaly and enlarged PA. Echo normal EF, MONICA, mild PAH. Bilateral LE arterial US shows an occluded R anterior tibial artery. I went back and discussed all results with patient and her daughter. Her leg pain and cramping have resolved. Both legs and feet have 2+ pulses and are warm to touch. She is already on Lovenox and baby ASA, will ask for Vascular input tomorrow. Dispo/Discharge Planning: Pending. PPD, therapy consults. Diet:  ADULT DIET Regular; Low Fat/Low Chol/High Fiber/ISAIAH  DVT PPx: BID Lovenox  Code status: Full Code    Hospital Problems as of 10/26/2021 Date Reviewed: 10/26/2021        Codes Class Noted - Resolved POA    Moderate mitral regurgitation ICD-10-CM: I34.0  ICD-9-CM: 424.0  10/26/2021 - Present Unknown        * (Principal) CHF (congestive heart failure) ([facility] ICD-10-CM: I50.9  ICD-9-CM: 428.0  10/25/2021 - Present Unknown        A-fib ([facility] ICD-10-CM: I48.91  ICD-9-CM: 427.31  10/25/2021 - Present Yes        Coronary artery disease involving native coronary artery of native heart without angina pectoris (Chronic) ICD-10-CM: I25.10  ICD-9-CM: 414.01  11/4/2016 - Present Yes        Mixed hyperlipidemia (Chronic) ICD-10-CM: B81.1  ICD-9-CM: 272.2  4/21/2016 - Present Yes        Depression (Chronic) ICD-10-CM: F32. A  ICD-9-CM: 417  6/30/2015 - Present Yes        Essential hypertension (Chronic) ICD-10-CM: I10  ICD-9-CM: 401.9  1/7/2015 - Present Yes        GERD (gastroesophageal reflux disease) (Chronic) ICD-10-CM: K21.9  ICD-9-CM: 530.81  3/4/2011 - Present Yes              Objective:     Patient Vitals for the past 24 hrs:   Temp Pulse Resp BP SpO2   10/26/21 0735 97.7 °F (36.5 °C) (!) 55 16 (!) 174/74 95 %   10/26/21 0341 97.6 °F (36.4 °C) (!) 58 20 (!) 161/70 93 %   10/26/21 0006 98 °F (36.7 °C) 65 18 (!) 161/69 95 %   10/25/21 2303  61  (!) 156/72 95 %   10/25/21 2232 98.6 °F (37 °C) (!) 59 18 (!) 148/67 96 %   10/25/21 2203  (!) 56 17 (!) 150/71 94 %   10/25/21 2132  60  (!) 158/72 96 %   10/25/21 2103  60  (!) 168/71 93 %   10/25/21 2032  (!) 59  (!) 156/74 96 %   10/25/21 2003  (!) 59  (!) 169/70 96 %   10/25/21 1937  (!) 58  (!) 172/73 95 %   10/25/21 1927 98.7 °F (37.1 °C) (!) 57 16 (!) 165/86 95 %   10/25/21 1902    (!) 165/86 92 %   10/25/21 1731     96 %   10/25/21 1727  68   96 %   10/25/21 1726    (!) 210/92    10/25/21 1456 98.7 °F (37.1 °C) 63 16 (!) 161/84 98 %     Oxygen Therapy  O2 Sat (%): 95 % (10/26/21 0735)  Pulse via Oximetry: 62 beats per minute (10/25/21 2303)  O2 Device: None (Room air) (10/25/21 2232)    Estimated body mass index is 31.22 kg/m² as calculated from the following:    Height as of this encounter: 5' (1.524 m). Weight as of this encounter: 72.5 kg (159 lb 13.3 oz). Intake/Output Summary (Last 24 hours) at 10/26/2021 0809  Last data filed at 10/26/2021 0352  Gross per 24 hour   Intake    Output 750 ml   Net -750 ml         Physical Exam:   Blood pressure (!) 174/74, pulse (!) 55, temperature 97.7 °F (36.5 °C), resp. rate 16, height 5' (1.524 m), weight 72.5 kg (159 lb 13.3 oz), SpO2 95 %. General:    Well nourished. No overt distress. Head:  Normocephalic, atraumatic  Eyes:  Sclerae appear normal.  Pupils equally round. ENT:  Nares appear normal, no drainage. Moist oral mucosa  Neck:  No restricted ROM. Trachea midline   CV:   Irreg irreg, mild bradycardia. No m/r/g. No jugular venous distension. Lungs:   CTAB. No wheezing, rhonchi, or rales. Respirations even, unlabored. RA O2. Abdomen: Bowel sounds present. Soft, nontender, nondistended. Extremities: No cyanosis or clubbing.  Trace to 1+ b/l LE pitting edema. Skin:     No rashes and normal coloration. Warm and dry. Neuro:  CN II-XII grossly intact. Sensation intact  Psych:  Normal mood and affect. A&Ox3    I have reviewed ordered lab tests and independently visualized imaging below:    Last 24hr Labs:  Recent Results (from the past 24 hour(s))   CBC WITH AUTOMATED DIFF    Collection Time: 10/25/21  3:03 PM   Result Value Ref Range    WBC 8.3 4.3 - 11.1 K/uL    RBC 4.29 4.05 - 5.2 M/uL    HGB 12.0 11.7 - 15.4 g/dL    HCT 39.8 35.8 - 46.3 %    MCV 92.8 79.6 - 97.8 FL    MCH 28.0 26.1 - 32.9 PG    MCHC 30.2 (L) 31.4 - 35.0 g/dL    RDW 15.3 (H) 11.9 - 14.6 %    PLATELET 856 708 - 582 K/uL    MPV 10.8 9.4 - 12.3 FL    ABSOLUTE NRBC 0.00 0.0 - 0.2 K/uL    DF AUTOMATED      NEUTROPHILS 59 43 - 78 %    LYMPHOCYTES 29 13 - 44 %    MONOCYTES 7 4.0 - 12.0 %    EOSINOPHILS 3 0.5 - 7.8 %    BASOPHILS 1 0.0 - 2.0 %    IMMATURE GRANULOCYTES 1 0.0 - 5.0 %    ABS. NEUTROPHILS 4.9 1.7 - 8.2 K/UL    ABS. LYMPHOCYTES 2.4 0.5 - 4.6 K/UL    ABS. MONOCYTES 0.6 0.1 - 1.3 K/UL    ABS. EOSINOPHILS 0.3 0.0 - 0.8 K/UL    ABS. BASOPHILS 0.1 0.0 - 0.2 K/UL    ABS. IMM. GRANS. 0.1 0.0 - 0.5 K/UL   METABOLIC PANEL, COMPREHENSIVE    Collection Time: 10/25/21  3:03 PM   Result Value Ref Range    Sodium 144 136 - 145 mmol/L    Potassium 3.6 3.5 - 5.1 mmol/L    Chloride 111 (H) 98 - 107 mmol/L    CO2 27 21 - 32 mmol/L    Anion gap 6 (L) 7 - 16 mmol/L    Glucose 93 65 - 100 mg/dL    BUN 12 8 - 23 MG/DL    Creatinine 1.08 (H) 0.6 - 1.0 MG/DL    GFR est AA >60 >60 ml/min/1.73m2    GFR est non-AA 52 (L) >60 ml/min/1.73m2    Calcium 9.2 8.3 - 10.4 MG/DL    Bilirubin, total 0.8 0.2 - 1.1 MG/DL    ALT (SGPT) 77 (H) 12 - 65 U/L    AST (SGOT) 28 15 - 37 U/L    Alk.  phosphatase 68 50 - 136 U/L    Protein, total 7.1 6.3 - 8.2 g/dL    Albumin 3.2 3.2 - 4.6 g/dL    Globulin 3.9 (H) 2.3 - 3.5 g/dL    A-G Ratio 0.8 (L) 1.2 - 3.5     NT-PRO BNP    Collection Time: 10/25/21  3:03 PM Result Value Ref Range    NT pro-BNP 4,228 (H) <450 PG/ML   EKG, 12 LEAD, INITIAL    Collection Time: 10/25/21  3:04 PM   Result Value Ref Range    Ventricular Rate 57 BPM    Atrial Rate 300 BPM    QRS Duration 80 ms    Q-T Interval 450 ms    QTC Calculation (Bezet) 438 ms    Calculated R Axis -37 degrees    Calculated T Axis 35 degrees    Diagnosis       Atrial fibrillation with slow ventricular response  Left axis deviation  Possible Anterior infarct (cited on or before 18-DEC-2016)  Abnormal ECG  When compared with ECG of 18-DEC-2016 11:33,  Atrial fibrillation has replaced Sinus rhythm  Confirmed by Zac Littlejohn ([id number]) on 10/26/2021 7:09:04 AM     D DIMER    Collection Time: 10/25/21  5:53 PM   Result Value Ref Range    D DIMER 2.55 (H) <0.56 ug/ml(FEU)   TROPONIN-HIGH SENSITIVITY    Collection Time: 10/25/21  5:53 PM   Result Value Ref Range    Troponin-High Sensitivity 11.5 0 - 14 pg/mL   TROPONIN-HIGH SENSITIVITY    Collection Time: 10/25/21  7:46 PM   Result Value Ref Range    Troponin-High Sensitivity 11.6 0 - 14 pg/mL   GLUCOSE, POC    Collection Time: 10/26/21  1:06 AM   Result Value Ref Range    Glucose (POC) 99 65 - 100 mg/dL    [facility]    CBC WITH AUTOMATED DIFF    Collection Time: 10/26/21  1:24 AM   Result Value Ref Range    WBC 8.7 4.3 - 11.1 K/uL    RBC 4.21 4.05 - 5.2 M/uL    HGB 12.1 11.7 - 15.4 g/dL    HCT 38.9 35.8 - 46.3 %    MCV 92.4 79.6 - 97.8 FL    MCH 28.7 26.1 - 32.9 PG    MCHC 31.1 (L) 31.4 - 35.0 g/dL    RDW 15.1 (H) 11.9 - 14.6 %    PLATELET 509 139 - 860 K/uL    MPV 10.4 9.4 - 12.3 FL    ABSOLUTE NRBC 0.00 0.0 - 0.2 K/uL    DF AUTOMATED      NEUTROPHILS 58 43 - 78 %    LYMPHOCYTES 30 13 - 44 %    MONOCYTES 7 4.0 - 12.0 %    EOSINOPHILS 4 0.5 - 7.8 %    BASOPHILS 1 0.0 - 2.0 %    IMMATURE GRANULOCYTES 1 0.0 - 5.0 %    ABS. NEUTROPHILS 5.1 1.7 - 8.2 K/UL    ABS. LYMPHOCYTES 2.6 0.5 - 4.6 K/UL    ABS. MONOCYTES 0.6 0.1 - 1.3 K/UL    ABS.  EOSINOPHILS 0.3 0.0 - 0.8 K/UL    ABS. BASOPHILS 0.1 0.0 - 0.2 K/UL    ABS. IMM. GRANS. 0.0 0.0 - 0.5 K/UL   MAGNESIUM    Collection Time: 10/26/21  1:24 AM   Result Value Ref Range    Magnesium 2.3 1.8 - 2.4 mg/dL       All Micro Results     None          Other Studies:  XR CHEST PORT    Result Date: 10/25/2021  EXAM: CHEST X-RAY, 1 VIEW INDICATION: Shortness of breath, leg swelling, concern for CHF COMPARISON: Chest x-ray 4/20/2021 TECHNIQUE: Single AP view of the chest was obtained. FINDINGS: The lungs are clear and the pulmonary vasculature is normal. No pneumothorax or pleural effusion. The cardiomediastinal silhouette is enlarged. The osseous structures are unremarkable. 1.  No radiographic evidence of acute cardiopulmonary disease. No jose pulmonary edema. 2.  Enlarged cardiac silhouette. DUPLEX LOWER EXT VENOUS BILAT    Result Date: 10/25/2021  Bilateral lower extremity venous ultrasound INDICATION:  Pain and swelling, Doppler ultrasound of both lower extremities was performed. FINDINGS:  There is normal flow in the greater saphenous, common femoral, superficial femoral, and popliteal veins. Normal compression and augmentation is demonstrated. The proximal calf veins are also patent.      No evidence of deep venous thrombosis in either lower extremity       Current Meds:  Current Facility-Administered Medications   Medication Dose Route Frequency    rOPINIRole (REQUIP) tablet 2 mg  2 mg Oral QHS    hydrALAZINE (APRESOLINE) 20 mg/mL injection 10 mg  10 mg IntraVENous Q6H PRN    predniSONE (DELTASONE) tablet 50 mg  50 mg Oral ONCE    Followed by   Carlson predniSONE (DELTASONE) tablet 50 mg  50 mg Oral ONCE    diphenhydrAMINE (BENADRYL) capsule 50 mg  50 mg Oral ONCE PRN    acetaminophen (TYLENOL) tablet 650 mg  650 mg Oral Q4H PRN    atorvastatin (LIPITOR) tablet 20 mg  20 mg Oral QHS    sertraline (ZOLOFT) tablet 75 mg  75 mg Oral 7am    losartan (COZAAR) tablet 100 mg  100 mg Oral DAILY    tuberculin injection 5 Units  5 Units IntraDERMal ONCE    sodium chloride (NS) flush 5-10 mL  5-10 mL IntraVENous Q8H    sodium chloride (NS) flush 5-10 mL  5-10 mL IntraVENous PRN    [Held by provider] carvediloL (COREG) tablet 3.125 mg  3.125 mg Oral BID WITH MEALS    ondansetron (ZOFRAN) injection 4 mg  4 mg IntraVENous Q6H PRN    furosemide (LASIX) injection 20 mg  20 mg IntraVENous Q12H    nitroglycerin (NITROSTAT) tablet 0.4 mg  0.4 mg SubLINGual Q5MIN PRN    aspirin delayed-release tablet 81 mg  81 mg Oral DAILY    enoxaparin (LOVENOX) injection 80 mg  1 mg/kg SubCUTAneous Q12H    pantoprazole (PROTONIX) tablet 40 mg  40 mg Oral ACB       Signed:  Elvira Vivar MD    Part of this note may have been written by using a voice dictation software. The note has been proof read but may still contain some grammatical/other typographical errors.

## 2021-10-26 NOTE — PROGRESS NOTES
Date of Outreach Update:  Shannon Lopez was seen and assessed. Previous Outreach assessment has been reviewed. Re-assessment post rapid due to Leg-Cramps. Patient asleep upon arrival and in no acute distress at this time. Will continue to follow up per outreach protocol.     Signed By:   Alvarado Nelson RN    October 26, 2021 3:52 AM

## 2021-10-26 NOTE — PROGRESS NOTES
CM met with patient and patient's daughter Gerda Peacock [phone] at bedside to complete assessment. Patient presented alert and oriented. Demographic information verified by the patient. The patient lives with her son Tommy Esposito [phone] and daughter in law Gerda Peacock [phone] in a one story home with 5 steps a the main entrance. Patient confirmed at baseline, she is independent with completing ADL's and drives. DME: [facility] Cousin  Patient uses a cane occasionally. Patient receives her prescription medications from [facility] and via mail from [facility]. Patient voiced no difficulty with obtaining medications in the community. Discharge planning PT/OT has been consulted. Patient confirmed a history of [facility] services. Patient denies any history of REHAB. HH has been recommended. CM will identify if the patient would like [facility] services arranged, prior to discharge. CM continues to follow plan of care. Care Management Interventions  PCP Verified by CM: Yes  Mode of Transport at Discharge:  Other (see comment) (Family )  Transition of Care Consult (CM Consult): Discharge Planning  Discharge Durable Medical Equipment: No  Physical Therapy Consult: Yes  Occupational Therapy Consult: Yes  Speech Therapy Consult: No  Support Systems: Child(leslie) (Patient resides with son Tommy Esposito [phone].)  Confirm Follow Up Transport: Self   Resource Information Provided?: No  Discharge Location  Discharge Placement: Unable to determine at this time

## 2021-10-26 NOTE — PROGRESS NOTES
ACUTE PHYSICAL THERAPY GOALS:  (Developed with and agreed upon by patient and/or caregiver.)  (1.) Sue Santos will move from supine to sit and sit to supine , scoot up and down and roll side to side with MODIFIED INDEPENDENCE within 7 treatment day(s). (2.) Sue Santos will transfer from bed to chair and chair to bed with MODIFIED INDEPENDENCE using the least restrictive device within 7 treatment day(s). (3.) Sue Santos will ambulate with MODIFIED INDEPENDENCE for 500+ feet with the least restrictive device within 7 treatment day(s). (4.) Sue Santos will perform standing static and dynamic balance activities x 25 minutes with MODIFIED INDEPENDENCE to improve safety within 7 treatment day(s). (5.) Sue Santos will ascend and descend 5 stairs using one hand rail(s) with MODIFIED INDEPENDENCE to improve functional mobility and safety within 7 treatment day(s). (6.) Sue Santos will perform therapeutic exercises x 20 min for HEP with INDEPENDENCE to improve strength, endurance, and functional mobility within 7 treatment day(s).      PHYSICAL THERAPY ASSESSMENT: Initial Assessment PT Treatment Day # 1    Sue Santos is a 64 y.o. female   PRIMARY DIAGNOSIS: <principal problem not specified>  CHF (congestive heart failure) ([facility] [I50.9]     Reason for Referral:  ICD-10: Treatment Diagnosis: Difficulty in walking, Not elsewhere classified (R26.2)  Other abnormalities of gait and mobility (R26.89)  INPATIENT: Payor: [employer] MEDICARE / Plan: [facility] MEDICARE HMO / Product Type: Managed Care Medicare /     ASSESSMENT:     REHAB RECOMMENDATIONS:   Recommendation to date pending progress:  [address] [place] [address]  Equipment:    None (pt already has RW, rollator, SPC, quad cane, walk in shower with seat)     PRIOR LEVEL OF FUNCTION:  (Prior to Hospitalization) INITIAL/CURRENT LEVEL OF FUNCTION:  (Most Recently Demonstrated)   Bed Mobility:   Independent  Sit to Stand:   Independent  Transfers:   Independent  Gait/Mobility:   Independent Bed Mobility:   Contact Guard Assistance  Sit to Stand:   Minimal Assistance x 2  Transfers:   Minimal Assistance  Gait/Mobility:   Minimal Assistance progressing to CGA     ASSESSMENT:  Ms. Isa Simpson is a 64year old F who presents to hospital with painful swellingin her LLE; US BLE negative for DVT, CT chest pending. PMH includes heart failure. This date pt performs mobility including bed mobility, sitting balance activities, sit<> stand transfers, standing balance activities, and ambulation in room with initially Min Ax2, but progressing to [address]. Pt with heaviness/tightness in BLE limiting her mobility initially, but as she continued to mobilize she progressed and became steadier on her feet with increased ease of motion of her BLE. Pt presents as functioning below her baseline, with deficits in mobility including transfers, gait, balance, and activity tolerance. Pt will benefit from skilled therapy services to address stated deficits to promote return to highest level of function, independence, and safety. Will continue to follow. SUBJECTIVE:   Ms. Isa Simpson states, \"I am not used to just lying in bed. \"    SOCIAL HISTORY/LIVING ENVIRONMENT: Lives with her son and daughter-in-law. Typically active and independent at baseline. Has a cane she uses PRN if her hip and/or olga k hurts. Endorses 3 falls in the past 6 months.   Home Environment: Private residence  # Steps to Enter: 6  One/Two Story Residence: One story  Living Alone: No  Support Systems: Child(leslie), Other Family Member(s)  OBJECTIVE:     PAIN: VITAL SIGNS: LINES/DRAINS:   Pre Treatment: Pain Screen  Pain Scale 1: Numeric (0 - 10)  Pain Intensity 1: 0  Post Treatment: 0/10 Vital Signs  O2 Device: None (Room air) Purewick  O2 Device: None (Room air)     GROSS EVALUATION:  BLE Within Functional Limits Abnormal/ Functional Abnormal/ Non-Functional (see comments) Not Tested Comments:   AROM [x] [] [] []    PROM [x] [] [] []    Strength [x] [] [] []    Balance [] [x] [] []  initially poor standing; progressed to fair   Posture [] [x] [] []  forward head,rounded shoulders   Sensation [x] [] [] []    Coordination [x] [] [] []    Tone [] [] [] [x]    Edema [] [] [] [x]    Activity Tolerance [] [x] [] []     [] [] [] []      COGNITION/  PERCEPTION: Intact Impaired   (see comments) Comments:   Orientation [x] []    Vision [x] []    Hearing [x] []    Command Following [x] []    Safety Awareness [x] []     [] []      MOBILITY: I Mod I S SBA CGA Min Mod Max Total  NT x2 Comments:   Bed Mobility    Rolling [] [] [] [] [x] [] [] [] [] [] []    Supine to Sit [] [] [] [] [x] [] [] [] [] [] []    Scooting [] [] [] [] [x] [] [] [] [] [] []    Sit to Supine [] [] [] [] [x] [] [] [] [] [] []    Transfers    Sit to Stand [] [] [] [] [x] [x] [] [] [] [] []    Bed to Chair [] [] [] [] [x] [x] [] [] [] [] []    Stand to Sit [] [] [] [] [x] [x] [] [] [] [] []    I=Independent, Mod I=Modified Independent, S=Supervision, SBA=Standby Assistance, CGA=Contact Guard Assistance,   Min=Minimal Assistance, Mod=Moderate Assistance, Max=Maximal Assistance, Total=Total Assistance, NT=Not Tested  GAIT: I Mod I S SBA CGA Min Mod Max Total  NT x2 Comments:   Level of Assistance [] [] [] [] [x] [x] [] [] [] [] []    Distance 30'    DME Rolling Walker    Gait Quality Short, shuffled steps. Improved as time progressed    Weightbearing Status N/A     I=Independent, Mod I=Modified Independent, S=Supervision, SBA=Standby Assistance, CGA=Contact Guard Assistance,   Min=Minimal Assistance, Mod=Moderate Assistance, Max=Maximal Assistance, Total=Total Assistance, NT=Not [facility]       How much difficulty does the patient currently have. .. Unable A Lot A Little None   1. Turning over in bed (including adjusting bedclothes, sheets and blankets)?    [] 1   [] 2   [x] 3   [] 4   2. Sitting down on and standing up from a chair with arms ( e.g., wheelchair, bedside commode, etc.)   [] 1   [] 2   [x] 3   [] 4   3. Moving from lying on back to sitting on the side of the bed? [] 1   [] 2   [x] 3   [] 4   How much help from another person does the patient currently need. .. Total A Lot A Little None   4. Moving to and from a bed to a chair (including a wheelchair)? [] 1   [] 2   [x] 3   [] 4   5. Need to walk in hospital room? [] 1   [] 2   [x] 3   [] 4   6. Climbing 3-5 steps with a railing? [] 1   [] 2   [x] 3   [] 4   © 2007, Trustees of [facility], under license to [employer]. All rights reserved     Score:  Initial: 18 Most Recent: X (Date: -- )    Interpretation of Tool:  Represents activities that are increasingly more difficult (i.e. Bed mobility, Transfers, Gait). PLAN:   FREQUENCY/DURATION: PT Plan of Care: 3 times/week for duration of hospital stay or until stated goals are met, whichever comes first.    PROBLEM LIST:   (Skilled intervention is medically necessary to address:)  1. Decreased Activity Tolerance  2. Decreased AROM/PROM  3. Decreased Balance  4. Decreased Coordination  5. Decreased Gait Ability  6. Decreased Strength  7. Decreased Transfer Abilities  8. Increased Pain   INTERVENTIONS PLANNED:   (Benefits and precautions of physical therapy have been discussed with the patient.)  1. Therapeutic Activity  2. Therapeutic Exercise/HEP  3. Neuromuscular Re-education  4. Gait Training  5. Education     TREATMENT:     EVALUATION: Low Complexity : (Untimed Charge)    TREATMENT:   ($$ Therapeutic Activity: 8-22 mins    )  Therapeutic Activity (10 Minutes): Therapeutic activity included Scooting, Lateral Scooting, Transfer Training, Ambulation on level ground, Sitting balance  and Standing balance to improve functional Mobility, Strength, ROM and Activity tolerance.     TREATMENT GRID:  N/A    AFTER TREATMENT POSITION/PRECAUTIONS:  Chair, Needs within reach, RN notified and Visitors at bedside    INTERDISCIPLINARY COLLABORATION:  RN/PCT, PT/PTA and OT/[place]    TOTAL TREATMENT DURATION:  PT Patient Time In/Time Out  Time In: 1128  Time Out: Crissy Mccormack., PT

## 2021-10-26 NOTE — H&P
[facility] Cardiology Initial Cardiac Evaluation                 Date of  Admission: 10/25/2021  5:17 PM     Primary Care Physician: Vandana Draper NP  Primary Cardiologist: None  Referring Physician: Dr Isabela Mao  Attending Physician: Dr Seema Maki    CC: FAUSTO Bryson is a 64 y.o. female admitted for CHF (congestive heart failure) ([facility] [address] [I50.9]. She has a h/o fibromyalgia, htn and asthma, has chronic SOB on and off which has been worse for the past 2-3 days corresponding with increased LE edema and LLE pain. She had LHC in 3-2021 showing minimal CAD w nml EF. She traveled recently, has no h/o DVT or PE, but has had new sudden onset of B calf pain x 2-3 days. PCP did a virtual visit and thought she may have CHF vs DVT and started diuretics without improvement in symptoms. She presented to the ER with severe calf pain, increased RAMÍREZ and orthopnea x 2-3 days w increased LE edema and calf pain but no change in weight, intermittent transient chest pain which she currently can't describe due to her severe calf pain. In ER WBC 8.7, hgb 12, platelets 514, , K 3.6, cr 1.08, mag 2.3, HS trop 11/5 and 11.6, pBNP 4228, CXR no acute disease, EKG a fib w rate 56 w NSST/T wave changes, d-dimer 2.55. LE ultrasound negative for DVT. Pending CT chest for PE but allergic to contrast and being premedicated. She had a rapid response called last night bc of severe calf pain, was given pain medications which made her nauseated. O2 sats stable on RA. She denies dizziness, syncope or palpitations but is difficult to obtain history from due to severe calf pain. No cough, F/C, no h/o CVA or TIA, no bleeding. /74. Started on therapeutic lovenox. Currently calf pain is 10/10, radiating from one calf to the other. Soc: 1 ppd x 10 years quit in 1970  FH:  Mom w MI at age 76  COVID: Vaccinated x 2    Patient Active Problem List   Diagnosis Code    GERD (gastroesophageal reflux disease) K21.9    Essential hypertension I10    Mitral valve disorders(424.0) I05.9    Arthropathies M12.9    Allergic rhinitis J30.9    Aortic valve disorders I35.9    Extrinsic asthma J45.909    Congestive heart failure, unspecified I50.9    Occlusion and stenosis of carotid artery without mention of cerebral infarction I65.29    Carpal tunnel syndrome G56.00    Renal insufficiency N28.9    Osteopenia of multiple sites M85.89    Depression F32. A    RENEE (stress urinary incontinence, female) N39.3    Rectocele N81.6    Weakening of rectovaginal tissue N81.83    Mixed hyperlipidemia E78.2    Asymptomatic carotid artery stenosis I65.29    Valvular heart disease I38    Coronary artery disease involving native coronary artery of native heart without angina pectoris I25.10    On potassium wasting diuretic therapy Z79.899    Obesity (BMI 30.0-34. 9) E66.9    Lumbar stenosis with neurogenic claudication M48.062    S/P laminectomy with spinal fusion Z98.1    Spinal stenosis of lumbar region at multiple levels M48.061    CHF (congestive heart failure) ([facility]) I50.9    A-fib ([facility]) I48.91    Moderate mitral regurgitation I34.0       Past Medical History:   Diagnosis Date    Allergic rhinitis, cause unspecified 01/07/2015    Aortic valve disorders 01/07/2015    Asthma     uses inhaler prn    CAD (coronary artery disease)     mild,  treated with med    Carotid stenosis 1/7/2015    Carpal tunnel syndrome 01/07/2015    bilat wrists-no issue now    Depression 06/30/2015    Esophageal reflux 01/07/2015    Essential hypertension     Fibromyalgia     Former smoker     1 ppd for 14 yrs; quit smoking 1981    GERD (gastroesophageal reflux disease)     Heart failure ([facility] [address]     last EF 55-60%    History of peptic ulcer disease     Menopause     Mixed hyperlipidemia 4/21/2016    Neuropathy     BLE    Osteoarthritis     Osteopenia 1/7/2015    Renal insufficiency 01/07/2015      Past Surgical History:   Procedure Laterality Date  HX BLADDER REPAIR      bladder tac    HX BLADDER SUSPENSION      HX COLONOSCOPY      HX GI      esophageal dilatation    HX HEMORRHOIDECTOMY      HX HYSTERECTOMY      HX LAP CHOLECYSTECTOMY      HX RECTOCELE REPAIR      HX ROTATOR CUFF REPAIR Right     HX TUBAL LIGATION       Allergies   Allergen Reactions    Bees [Hymenoptera Allergenic Extract] Anaphylaxis    Adhesive Tape-Silicones Rash     Ok to use paper tape    Contrast Agent [Iodine] Hives    Lescol [Fluvastatin] Cough    Lipitor [Atorvastatin] Other (comments)     High doses cause leg cramps.  Able to tolerate low doses    Lisinopril Cough           Penicillin G Rash    Tetracycline Other (comments)     ULCERS IN MOUTH      Family History   Problem Relation Age of Onset    Diabetes Mother     Heart Attack Mother     Diabetes Sister     Breast Cancer Sister 79    Diabetes Sister     Heart Attack Father     Diabetes Sister     Heart Attack Sister          from heart issue age 54    Arthritis-rheumatoid Paternal Grandmother     Heart Disease Maternal Grandmother       Social History     Tobacco Use    Smoking status: Former Smoker     Packs/day: 1.00     Years: 14.00     Pack years: 14.00    Smokeless tobacco: Never Used    Tobacco comment: quit smoking    Substance Use Topics    Alcohol use: No     Alcohol/week: 0.0 standard drinks        Current Facility-Administered Medications   Medication Dose Route Frequency    rOPINIRole (REQUIP) tablet 2 mg  2 mg Oral QHS    hydrALAZINE (APRESOLINE) 20 mg/mL injection 10 mg  10 mg IntraVENous Q6H PRN    predniSONE (DELTASONE) tablet 50 mg  50 mg Oral ONCE    Followed by   Fabiola Cheng predniSONE (DELTASONE) tablet 50 mg  50 mg Oral ONCE    diphenhydrAMINE (BENADRYL) capsule 50 mg  50 mg Oral ONCE PRN    acetaminophen (TYLENOL) tablet 650 mg  650 mg Oral Q4H PRN    atorvastatin (LIPITOR) tablet 20 mg  20 mg Oral QHS    sertraline (ZOLOFT) tablet 75 mg  75 mg Oral 7am    losartan (COZAAR) tablet 100 mg  100 mg Oral DAILY    tuberculin injection 5 Units  5 Units IntraDERMal ONCE    sodium chloride (NS) flush 5-10 mL  5-10 mL IntraVENous Q8H    sodium chloride (NS) flush 5-10 mL  5-10 mL IntraVENous PRN    [Held by provider] carvediloL (COREG) tablet 3.125 mg  3.125 mg Oral BID WITH MEALS    ondansetron (ZOFRAN) injection 4 mg  4 mg IntraVENous Q6H PRN    furosemide (LASIX) injection 20 mg  20 mg IntraVENous Q12H    nitroglycerin (NITROSTAT) tablet 0.4 mg  0.4 mg SubLINGual Q5MIN PRN    aspirin delayed-release tablet 81 mg  81 mg Oral DAILY    enoxaparin (LOVENOX) injection 80 mg  1 mg/kg SubCUTAneous Q12H    pantoprazole (PROTONIX) tablet 40 mg  40 mg Oral ACB       Review of Symptoms:  General: no weight change,  + weakness, no fever or chills  Skin: no rashes, lumps, or other skin changes  HEENT: no headache, dizziness, lightheadedness, vision changes, hearing changes, tinnitus, vertigo, sinus pressure/pain, bleeding gums, sore throat, or hoarseness  Neck: no swollen glands, goiter, pain or stiffness  Respiratory: no cough, sputum, hemoptysis, + dyspnea, wheezing  Cardiovascular: + as per HPI  Gastrointestinal: + GERD  Urinary: no frequency, urgency , hematuria, burning/pain with urination, recent flank pain, polyuria, nocturia, or difficulty urinating  Peripheral Vascular: + calf swelling   Musculoskeletal: + calf pain   Psychiatric: no depression or excessive stress  Neurological: no sensory or motor loss, seizures, syncope, tremors, numbness, no dementia  Hematologic: no anemia, easy bruising or bleeding  Endocrine: no thyroid problems, heat or cold intolerance, excessive sweating, polyuria, polydipsia, no  diabetes.        Physical Exam  Vitals:    10/25/21 2303 10/26/21 0006 10/26/21 0341 10/26/21 0735   BP: (!) 156/72 (!) 161/69 (!) 161/70 (!) 174/74   Pulse: 61 65 (!) 58 (!) 55   Resp:  18 20 16   Temp:  98 °F (36.7 °C) 97.6 °F (36.4 °C) 97.7 °F (36.5 °C)   SpO2: 95% 95% 93% 95%   Weight:   72.5 kg (159 lb 13.3 oz)    Height:           Physical Exam:  General: Well Developed, Well Nourished, No Acute Distress  Head: normocephalic atraumatic   ENT: pupils equal and round, no abnormalities noted  Neck: supple, no JVD, no carotid bruits  Heart: S1S2 irregular   Lungs: Clear throughout auscultation bilaterally without adventitious sounds  Abd: soft, nontender, nondistended, with good bowel sounds  Ext: warm, trace B edema, L calf spasm, both calves tender w light palpation   Skin: warm and dry  Psychiatric: Appears to be in pain   Neurologic: Normal muscle tone      Labs:   Recent Labs     10/26/21  0124 10/25/21  1503   NA  --  144   K  --  3.6   MG 2.3  --    BUN  --  12   CREA  --  1.08*   GLU  --  93   WBC 8.7 8.3   HGB 12.1 12.0   HCT 38.9 39.8    243        Assessment/Plan:     Assessment:   CHF (congestive heart failure) (HCC) - elevated pBNP, historically normal EF, check echo, symptoms difficult to ascertain due to severe calf pain causing her to have \"difficulty thinking\", assess response to IV lasix, check echo    Calf pain- ultrasound negative for DVT, on therapeutic lovenox, check CT chest today     Essential hypertension (1/7/2015)- coreg, cozaar    Mixed hyperlipidemia (4/21/2016)- statin    Coronary artery disease involving native coronary artery of native heart without angina pectoris (11/4/2016)- minimal disease on [facility] in 2011, chest pain not c/w angina, normal HS trop    A-fib ([address] (10/25/2021)- new dx, asymptomatic, cont therapeutic lovenox and change to a NOAC, rate controlled    Moderate mitral regurgitation (10/26/2021)- check echo    Thank you very much for this referral. We appreciate the opportunity to participate in this patient's care. We will follow along with above stated plan.     Mariel Gallardo PA-C  Consulting MD: Chetan Bernard

## 2021-10-26 NOTE — H&P
Hospitalist History and Physical   Admit Date:  10/25/2021  5:17 PM   Name:  Vale Sánchez   Age:  64 y.o. Sex:  female  :  1945   MRN:  [id number]   Room:  [facility]/    Presenting Complaint: Leg Swelling    Reason(s) for Admission: CHF (congestive heart failure) ([facility] [I50.9]     History of Present Illness:   Vale Sánchez is a 64 y.o. female with a history significant for coronary artery disease depression hypertension fibromyalgia she also has a history of heart failure that she denies with last known EF of 55 to 60%. The patient has been relatively well. She traveled to [facility] on 2021 and had no issues.  she noticed bilateral leg swelling. She has episodes of recurrent chest pain that she attributes to angina. Patient states nitroglycerin as needed and is on atenolol at home. Over the last day or 2 she has noticed shortness of breath. she had a virtual visit with her PCP and they recommended that she come to the hospital to be evaluated for a DVT versus heart failure. In the ER the patient was noted to have an enlarged cardiac silhouette on her chest x-ray as well she was noted to have an elevated BNP. Doppler studies for DVT was negative she was given IV Lasix. Her shortness of breath is improved with diuresis. She was concerning for PE and is unable to have a CT a secondary to contrast allergy and he needs to be premedicated. He is also noted to have atrial fibrillation without RVR which is new for her. The hospitalist service has been asked to admit for further management and treatment    The patient denies any fever chills nausea vomiting diarrhea constipation. She endorses shortness of breath and chest pain chest pain is currently relieved. Shortness of breath is improved    Review of Systems:  10 systems reviewed and negative except as noted in HPI. Assessment & Plan:      Active Problems:    CHF (congestive heart failure) ([facility] (10/25/2021)  Concern for patient has a history documented in her chart but denies last known EF was normal of 55 to 60%  We will continue diuresis for CHF protocols  Daily weights  2D echo  Appropriate CHF medications  Cardiology consult in a.m. New onset atrial fibrillation without RVR  Patient is rate controlled  She will be placed on Lovenox full dose for now    Concern for PE  This seems less likely  But the new onset A. Fib is concerning  We will place her on contrast allergy protocol  And arrange for CT chest in the a.m. Follow-up 2D echo    Continue other appropriate home meds  Further work-up and management based on her clinical course        Dispo/Discharge Planning:   TBD.   Anticipate discharge in 48 to 72 hours depending on clinical course    Diet: ADULT DIET Regular; Low Fat/Low Chol/High Fiber/2 gm Na  VTE ppx: Lovenox   code status: Full    Hospital Problems as of 10/25/2021 Date Reviewed: 9/27/2021        Codes Class Noted - Resolved POA    CHF (congestive heart failure) ([facility] ICD-10-CM: I50.9  ICD-9-CM: 428.0  10/25/2021 - Present Unknown              Past Medical History:   Diagnosis Date    Allergic rhinitis, cause unspecified 01/07/2015    Aortic valve disorders 01/07/2015    Asthma     uses inhaler prn    CAD (coronary artery disease)     mild,  treated with med    Carotid stenosis 1/7/2015    Carpal tunnel syndrome 01/07/2015    bilat wrists-no issue now    Depression 06/30/2015    Esophageal reflux 01/07/2015    Essential hypertension     Fibromyalgia     Former smoker     1 ppd for 14 yrs; quit smoking 1981    GERD (gastroesophageal reflux disease)     Heart failure ([facility] [address]     last EF 55-60%    History of peptic ulcer disease     Menopause     Mixed hyperlipidemia 4/21/2016    Neuropathy     BLE    Osteoarthritis     Osteopenia 1/7/2015    Renal insufficiency 01/07/2015     Past Surgical History:   Procedure Laterality Date    HX BLADDER REPAIR      bladder tac    HX BLADDER SUSPENSION      HX COLONOSCOPY      HX GI      esophageal dilatation    HX HEMORRHOIDECTOMY      HX HYSTERECTOMY      HX LAP CHOLECYSTECTOMY      HX RECTOCELE REPAIR      HX ROTATOR CUFF REPAIR Right     HX TUBAL LIGATION        Allergies   Allergen Reactions    Bees [Hymenoptera Allergenic Extract] Anaphylaxis    Adhesive Tape-Silicones Rash     Ok to use paper tape    Contrast Agent [Iodine] Hives    Lescol [Fluvastatin] Cough    Lipitor [Atorvastatin] Other (comments)     High doses cause leg cramps. Able to tolerate low doses    Lisinopril Cough           Penicillin G Rash    Tetracycline Other (comments)     ULCERS IN MOUTH      Social History     Tobacco Use    Smoking status: Former Smoker     Packs/day: 1.00     Years: 14.00     Pack years: 14.00    Smokeless tobacco: Never Used    Tobacco comment: quit smoking    Substance Use Topics    Alcohol use: No     Alcohol/week: 0.0 standard drinks      Family History   Problem Relation Age of Onset    Diabetes Mother     Heart Attack Mother     Diabetes Sister     Breast Cancer Sister 79    Diabetes Sister     Heart Attack Father     Diabetes Sister     Heart Attack Sister          from heart issue age 54    Arthritis-rheumatoid Paternal Grandmother     Heart Disease Maternal Grandmother       Family history reviewed and negative except as noted above.     Immunization History   Administered Date(s) Administered    COVID-19, PFIZER, MRNA, LNP-S, PF, 30MCG/0.3ML DOSE 2021, 2021    Influenza High Dose Vaccine PF 2018, 2018, 09/15/2019, 2021    Influenza Vaccine 2014, 2015, 09/10/2017    Influenza, Quadrivalent, Adjuvanted (>65 Yrs FLUAD QUAD 71454) 10/02/2020    Pneumococcal Conjugate (PCV-13) 2015    Pneumococcal Polysaccharide (PPSV-23) 10/25/2017    TB Skin Test (PPD) Intradermal 2017    Tdap 2004    Zoster Recombinant 2020     PTA Medications:  Current Outpatient Medications   Medication Instructions    albuterol (PROVENTIL HFA, VENTOLIN HFA, PROAIR HFA) 90 mcg/actuation inhaler 2 Puffs, Inhalation, EVERY 6 HOURS AS NEEDED    albuterol (PROVENTIL VENTOLIN) 2.5 mg, Nebulization, EVERY 6 HOURS AS NEEDED    ascorbic acid (vitamin C) (VITAMIN C) 250 mg, Oral, DAILY    aspirin delayed-release 81 mg, Oral, 7AM, Instructed to take DOS per Anesthesia guidelines.  atenoloL (TENORMIN) 12.5 mg, Oral, EVERY BEDTIME    atorvastatin (LIPITOR) 20 mg, Oral, DAILY    Ca-D3-mag-zinc--delmy-boron (Caltrate 600-D Plus Minerals) 600 mg calcium- 800 unit-40 mg chew 1 Tablet, Oral, 2 TIMES DAILY    cetirizine (ZYRTEC) 10 mg, Oral, DAILY    cyanocobalamin (VITAMIN B12) 500 mcg, Oral, DAILY    ergocalciferol (ERGOCALCIFEROL) 1,250 mcg (50,000 unit) capsule One capsule by mouth weekly for 4 weeks then one monthly.     fluticasone furoate-vilanteroL (Breo Ellipta) 100-25 mcg/dose inhaler 1 Puff, Inhalation, DAILY, RINSE MOUTH WELL AFTER USE    fluticasone propionate (FLONASE) 50 mcg/actuation nasal spray 2 Sprays, Both Nostrils, EVERY BEDTIME    furosemide (LASIX) 40 mg tablet take 1 tablet by mouth every morning    gabapentin (NEURONTIN) 300 mg, Oral, 3 TIMES DAILY    loperamide (IMODIUM) 1 mg/5 mL solution Oral, 4 TIMES DAILY AS NEEDED    losartan (COZAAR) 100 mg, Oral, DAILY    nitroglycerin (NITROSTAT) 0.4 mg, SubLINGual, EVERY 5 MIN AS NEEDED    pantoprazole (PROTONIX) 40 mg, Oral, DAILY BEFORE BREAKFAST    potassium chloride (K-DUR, KLOR-CON) 20 mEq tablet 20 mEq, Oral, 7AM    sertraline (ZOLOFT) 75 mg, Oral, 7AM    vitamin A (AQUASOL A) 24,000 Units, Oral, EVERY OTHER DAY       Objective:     Patient Vitals for the past 24 hrs:   Temp Pulse Resp BP SpO2   10/25/21 2103  60  (!) 168/71 93 %   10/25/21 2032  (!) 59  (!) 156/74 96 %   10/25/21 2003  (!) 59  (!) 169/70 96 %   10/25/21 1937  (!) 58  (!) 172/73 95 %   10/25/21 1927 98.7 °F (37.1 °C) (!) 57 16 (!) 165/86 95 %   10/25/21 1902    (!) 165/86 92 %   10/25/21 1731     96 %   10/25/21 1727  68   96 %   10/25/21 1726    (!) 210/92    10/25/21 1456 98.7 °F (37.1 °C) 63 16 (!) 161/84 98 %     Oxygen Therapy  O2 Sat (%): 93 % (10/25/21 2103)  Pulse via Oximetry: 60 beats per minute (10/25/21 2103)  O2 Device: None (Room air) (10/25/21 1927)    Estimated body mass index is 32.42 kg/m² as calculated from the following:    Height as of this encounter: 5' (1.524 m). Weight as of this encounter: 75.3 kg (166 lb). No intake or output data in the 24 hours ending 10/25/21 2133      Physical Exam:    Blood pressure (!) 168/71, pulse 60, temperature 98.7 °F (37.1 °C), resp. rate 16, height 5' (1.524 m), weight 75.3 kg (166 lb), SpO2 93 %. General:    Well nourished. In mild cardiopulmonary distress. Short of breath for her but improved  Head:  Normocephalic, atraumatic  Eyes:  Sclerae appear normal.  Pupils equally round. ENT:  Nares appear normal, no drainage. Moist oral mucosa  Neck:  No restricted ROM. Trachea midline   CV:   RRR. No m/r/g. No jugular venous distension. Lungs:   Air entry equal bilaterally diminished in the bases. Rubs auscultated no wheezing, rhonchi, or rales. Respirations even, unlabored  Abdomen: Bowel sounds present. Soft, nontender, nondistended. Extremities: No cyanosis or clubbing. Bilateral pitting pedal edema improved after Lasix per the patient and her family member at bedside  Skin:     No rashes and normal coloration. Warm and dry. Neuro:  CN II-XII grossly intact. Sensation intact. A&Ox3  Psych:  Normal mood and affect.       I have reviewed ordered lab tests and independently visualized imaging below:    Last 24hr Labs:  Recent Results (from the past 24 hour(s))   CBC WITH AUTOMATED DIFF    Collection Time: 10/25/21  3:03 PM   Result Value Ref Range    WBC 8.3 4.3 - 11.1 K/uL    RBC 4.29 4.05 - 5.2 M/uL    HGB 12.0 11.7 - 15.4 g/dL    HCT 39.8 35.8 - 46.3 %    MCV 92.8 79.6 - 97.8 FL    MCH 28.0 26.1 - 32.9 PG    MCHC 30.2 (L) 31.4 - 35.0 g/dL    RDW 15.3 (H) 11.9 - 14.6 %    PLATELET 821 314 - 135 K/uL    MPV 10.8 9.4 - 12.3 FL    ABSOLUTE NRBC 0.00 0.0 - 0.2 K/uL    DF AUTOMATED      NEUTROPHILS 59 43 - 78 %    LYMPHOCYTES 29 13 - 44 %    MONOCYTES 7 4.0 - 12.0 %    EOSINOPHILS 3 0.5 - 7.8 %    BASOPHILS 1 0.0 - 2.0 %    IMMATURE GRANULOCYTES 1 0.0 - 5.0 %    ABS. NEUTROPHILS 4.9 1.7 - 8.2 K/UL    ABS. LYMPHOCYTES 2.4 0.5 - 4.6 K/UL    ABS. MONOCYTES 0.6 0.1 - 1.3 K/UL    ABS. EOSINOPHILS 0.3 0.0 - 0.8 K/UL    ABS. BASOPHILS 0.1 0.0 - 0.2 K/UL    ABS. IMM. GRANS. 0.1 0.0 - 0.5 K/UL   METABOLIC PANEL, COMPREHENSIVE    Collection Time: 10/25/21  3:03 PM   Result Value Ref Range    Sodium 144 136 - 145 mmol/L    Potassium 3.6 3.5 - 5.1 mmol/L    Chloride 111 (H) 98 - 107 mmol/L    CO2 27 21 - 32 mmol/L    Anion gap 6 (L) 7 - 16 mmol/L    Glucose 93 65 - 100 mg/dL    BUN 12 8 - 23 MG/DL    Creatinine 1.08 (H) 0.6 - 1.0 MG/DL    GFR est AA >60 >60 ml/min/1.73m2    GFR est non-AA 52 (L) >60 ml/min/1.73m2    Calcium 9.2 8.3 - 10.4 MG/DL    Bilirubin, total 0.8 0.2 - 1.1 MG/DL    ALT (SGPT) 77 (H) 12 - 65 U/L    AST (SGOT) 28 15 - 37 U/L    Alk.  phosphatase 68 50 - 136 U/L    Protein, total 7.1 6.3 - 8.2 g/dL    Albumin 3.2 3.2 - 4.6 g/dL    Globulin 3.9 (H) 2.3 - 3.5 g/dL    A-G Ratio 0.8 (L) 1.2 - 3.5     NT-PRO BNP    Collection Time: 10/25/21  3:03 PM   Result Value Ref Range    NT pro-BNP 4,228 (H) <450 PG/ML   EKG, 12 LEAD, INITIAL    Collection Time: 10/25/21  3:04 PM   Result Value Ref Range    Ventricular Rate 57 BPM    Atrial Rate 300 BPM    QRS Duration 80 ms    Q-T Interval 450 ms    QTC Calculation (Bezet) 438 ms    Calculated R Axis -37 degrees    Calculated T Axis 35 degrees    Diagnosis       Atrial fibrillation with slow ventricular response  Left axis deviation  Possible Anterior infarct (cited on or before 18-DEC-2016)  Abnormal ECG  When compared with ECG of 18-DEC-2016 11:33,  Atrial fibrillation has replaced Sinus rhythm     D DIMER    Collection Time: 10/25/21  5:53 PM   Result Value Ref Range    D DIMER 2.55 (H) <0.56 ug/ml(FEU)   TROPONIN-HIGH SENSITIVITY    Collection Time: 10/25/21  5:53 PM   Result Value Ref Range    Troponin-High Sensitivity 11.5 0 - 14 pg/mL   TROPONIN-HIGH SENSITIVITY    Collection Time: 10/25/21  7:46 PM   Result Value Ref Range    Troponin-High Sensitivity 11.6 0 - 14 pg/mL       All Micro Results     None          Other Studies:  XR CHEST PORT    Result Date: 10/25/2021  EXAM: CHEST X-RAY, 1 VIEW INDICATION: Shortness of breath, leg swelling, concern for CHF COMPARISON: Chest x-ray 4/20/2021 TECHNIQUE: Single AP view of the chest was obtained. FINDINGS: The lungs are clear and the pulmonary vasculature is normal. No pneumothorax or pleural effusion. The cardiomediastinal silhouette is enlarged. The osseous structures are unremarkable. 1.  No radiographic evidence of acute cardiopulmonary disease. No jose pulmonary edema. 2.  Enlarged cardiac silhouette. DUPLEX LOWER EXT VENOUS BILAT    Result Date: 10/25/2021  Bilateral lower extremity venous ultrasound INDICATION:  Pain and swelling, Doppler ultrasound of both lower extremities was performed. FINDINGS:  There is normal flow in the greater saphenous, common femoral, superficial femoral, and popliteal veins. Normal compression and augmentation is demonstrated. The proximal calf veins are also patent.      No evidence of deep venous thrombosis in either lower extremity         Medications Administered     acetaminophen (TYLENOL) tablet 650 mg     Admin Date  10/25/2021 Action  Given Dose  650 mg Route  Oral Administered By  Kalee Jj RN          aspirin chewable tablet 324 mg     Admin Date  10/25/2021 Action  Given Dose  324 mg Route  Oral Administered By  Purvis Hammans, RN          furosemide (LASIX) injection 40 mg     Admin Date  10/25/2021 Action  Given Dose  40 mg Route  IntraVENous Administered By  Purvis Hammans, RN          nitroglycerin (NITROBID) 2 % ointment 1 Inch     Admin Date  10/25/2021 Action  Given Dose  1 Inch Route  Topical Administered By  Purvis Hammans, RN                  Signed:  Joanna Dominguez MD    Part of this note may have been written by using a voice dictation software. The note has been proof read but may still contain some grammatical/other typographical errors.

## 2021-10-26 NOTE — PROGRESS NOTES
Physical Therapy Note:    Physical therapy evaluation orders received and chart reviewed. Patient scheduled for CT to rule out PE at 1500 today. Discussed with RN and ordering physician. MD states pt is cleared medically to mobilize and work with therapy. Will plan to see today as schedule permits/pt availability allows.      Thank you,  Perry Luna, PT, DPT

## 2021-10-26 NOTE — PROGRESS NOTES
Hourly rounds complete this shift,  patient c/o:10/10 Leg pain, rapid called. Patient re-assessed, no acute distress at this time  bed in low, locked position, call light and bedside table within reach,  all needs met. Will continue to monitor Report to day shift nurse.

## 2021-10-26 NOTE — PROGRESS NOTES
10/26/21 0030   Dual Skin Pressure Injury Assessment   Dual Skin Pressure Injury Assessment X   Second Care Provider (Based on [address] [place] [address]) LOLY Mari  (RLL Biopsy, 2 spots on face frozen)

## 2021-10-27 PROBLEM — E87.70 FLUID OVERLOAD: Status: ACTIVE | Noted: 2021-10-27

## 2021-10-27 PROBLEM — I27.81 COR PULMONALE (CHRONIC) (HCC): Status: ACTIVE | Noted: 2021-10-27

## 2021-10-27 LAB
ALBUMIN SERPL-MCNC: 2.9 G/DL (ref 3.2–4.6)
ALBUMIN/GLOB SERPL: 0.7 {RATIO} (ref 1.2–3.5)
ALP SERPL-CCNC: 62 U/L (ref 50–136)
ALT SERPL-CCNC: 49 U/L (ref 12–65)
ANION GAP SERPL CALC-SCNC: 7 MMOL/L (ref 7–16)
AST SERPL-CCNC: 21 U/L (ref 15–37)
BILIRUB SERPL-MCNC: 0.5 MG/DL (ref 0.2–1.1)
BUN SERPL-MCNC: 17 MG/DL (ref 8–23)
CALCIUM SERPL-MCNC: 9 MG/DL (ref 8.3–10.4)
CHLORIDE SERPL-SCNC: 104 MMOL/L (ref 98–107)
CO2 SERPL-SCNC: 29 MMOL/L (ref 21–32)
CREAT SERPL-MCNC: 1.18 MG/DL (ref 0.6–1)
GLOBULIN SER CALC-MCNC: 3.9 G/DL (ref 2.3–3.5)
GLUCOSE SERPL-MCNC: 134 MG/DL (ref 65–100)
MM INDURATION POC: 0 MM (ref 0–5)
POTASSIUM SERPL-SCNC: 3.5 MMOL/L (ref 3.5–5.1)
PPD POC: NEGATIVE NEGATIVE
PROT SERPL-MCNC: 6.8 G/DL (ref 6.3–8.2)
SODIUM SERPL-SCNC: 140 MMOL/L (ref 136–145)

## 2021-10-27 PROCEDURE — 74011250636 HC RX REV CODE- 250/636: Performed by: INTERNAL MEDICINE

## 2021-10-27 PROCEDURE — 77030038269 HC DRN EXT URIN PURWCK BARD -A

## 2021-10-27 PROCEDURE — 74011250637 HC RX REV CODE- 250/637: Performed by: INTERNAL MEDICINE

## 2021-10-27 PROCEDURE — 80053 COMPREHEN METABOLIC PANEL: CPT

## 2021-10-27 PROCEDURE — 36415 COLL VENOUS BLD VENIPUNCTURE: CPT

## 2021-10-27 PROCEDURE — 99232 SBSQ HOSP IP/OBS MODERATE 35: CPT | Performed by: INTERNAL MEDICINE

## 2021-10-27 PROCEDURE — 97535 SELF CARE MNGMENT TRAINING: CPT

## 2021-10-27 PROCEDURE — 65270000029 HC RM PRIVATE

## 2021-10-27 PROCEDURE — 97530 THERAPEUTIC ACTIVITIES: CPT

## 2021-10-27 PROCEDURE — 74011250637 HC RX REV CODE- 250/637: Performed by: HOSPITALIST

## 2021-10-27 PROCEDURE — 2709999900 HC NON-CHARGEABLE SUPPLY

## 2021-10-27 RX ORDER — ENOXAPARIN SODIUM 100 MG/ML
40 INJECTION SUBCUTANEOUS EVERY 12 HOURS
Status: DISCONTINUED | OUTPATIENT
Start: 2021-10-27 | End: 2021-10-27

## 2021-10-27 RX ORDER — SPIRONOLACTONE 25 MG/1
25 TABLET ORAL DAILY
Status: DISCONTINUED | OUTPATIENT
Start: 2021-10-27 | End: 2021-10-28 | Stop reason: HOSPADM

## 2021-10-27 RX ORDER — FUROSEMIDE 40 MG/1
40 TABLET ORAL DAILY
Status: DISCONTINUED | OUTPATIENT
Start: 2021-10-28 | End: 2021-10-28 | Stop reason: HOSPADM

## 2021-10-27 RX ORDER — ENOXAPARIN SODIUM 100 MG/ML
40 INJECTION SUBCUTANEOUS EVERY 24 HOURS
Status: DISCONTINUED | OUTPATIENT
Start: 2021-10-27 | End: 2021-10-28

## 2021-10-27 RX ADMIN — Medication 10 ML: at 05:00

## 2021-10-27 RX ADMIN — SPIRONOLACTONE 25 MG: 25 TABLET ORAL at 11:29

## 2021-10-27 RX ADMIN — ROPINIROLE HYDROCHLORIDE 2 MG: 2 TABLET, FILM COATED ORAL at 21:42

## 2021-10-27 RX ADMIN — FUROSEMIDE 20 MG: 10 INJECTION, SOLUTION INTRAMUSCULAR; INTRAVENOUS at 08:12

## 2021-10-27 RX ADMIN — SERTRALINE 75 MG: 50 TABLET, FILM COATED ORAL at 08:12

## 2021-10-27 RX ADMIN — ASPIRIN 81 MG: 81 TABLET ORAL at 08:12

## 2021-10-27 RX ADMIN — ENOXAPARIN SODIUM 40 MG: 100 INJECTION SUBCUTANEOUS at 15:42

## 2021-10-27 RX ADMIN — PANTOPRAZOLE SODIUM 40 MG: 40 TABLET, DELAYED RELEASE ORAL at 05:00

## 2021-10-27 RX ADMIN — Medication 10 ML: at 22:00

## 2021-10-27 RX ADMIN — ONDANSETRON 4 MG: 2 INJECTION INTRAMUSCULAR; INTRAVENOUS at 23:31

## 2021-10-27 RX ADMIN — LOSARTAN POTASSIUM 100 MG: 50 TABLET, FILM COATED ORAL at 08:12

## 2021-10-27 NOTE — PROGRESS NOTES
ACUTE OT GOALS:  (Developed with and agreed upon by patient and/or caregiver.)  1. Pt will toilet with SBA   2. Pt will complete functional mobility for ADLs with SBA  3. Pt will complete lower body dressing with SBA using AE as needed  4. Pt will complete grooming and hygiene at sink with SBA  5. Pt will demonstrate independence with HEP to promote increased BUE strength and functional use for ADLs  6. Pt will tolerate 23 minutes functional activity with min or fewer rest breaks to promote increased endurance for ADLs  7. Pt will independently demonstrate/ verbalize 2+ energy conservation techniques to promote increased endurance for ADLs    OCCUPATIONAL THERAPY: Daily Note OT Treatment Day # 2    Lilia Savage is a 64 y.o. female   PRIMARY DIAGNOSIS: <principal problem not specified>  CHF (congestive heart failure) ([facility] [I50.9]       Payor: Leslie Coe / Plan: [address] [place] [address] HMO / Product Type: Managed Care Medicare /   ASSESSMENT:     REHAB RECOMMENDATIONS: CURRENT LEVEL OF FUNCTION:  (Most Recently Demonstrated)   Recommendation to date pending progress:  [address] [place] [address] Therapy  Equipment:    None Bathing:   Contact Guard Assistance  Dressing:   Contact Guard Assistance  Feeding/Grooming:   Contact Guard Assistance  Toileting:   Contact Guard Assistance  Functional Mobility:   Contact Guard Assistance     ASSESSMENT:  Ms. Oswald Sims remains limited by generalized weakness and decreased endurance. Pt completed ADLs at sink with CGA, mobility in room and bathroom with CGA using RW. Pt practiced walking to/ from doorway to promote increased endurance and mobility for ADLs. Pt fatigued quickly with activity and required seated rest break to tolerate. Pt educated on energy conservation techniques and verbalized good understanding. Pt is progressing towards goals, continue POC.       SUBJECTIVE:   Ms. Oswald Sims states, \"I'm feeling better today\"    SOCIAL HISTORY/LIVING ENVIRONMENT:   Home Environment: Private residence  # Steps to Enter: 6  One/Two Story Residence: One story  Living Alone: No  Support Systems: Child(leslie) (Patient resides with laxmi Delgado [phone].)    OBJECTIVE:     PAIN: VITAL SIGNS: LINES/DRAINS:   Pre Treatment: Pain Screen  Pain Scale 1: Numeric (0 - 10)  Pain Intensity 1: 0  Post Treatment: 0   Purewick  O2 Device: None (Room air)     ACTIVITIES OF DAILY LIVING: I Mod I S SBA CGA Min Mod Max Total NT Comments   BASIC ADLs:              Bathing/ Showering [] [] [] [] [] [] [] [] [] []    Toileting [] [] [] [] [x] [] [] [] [] []    Dressing [] [] [] [] [] [] [] [] [] []    Feeding [] [] [] [] [] [] [] [] [] []    Grooming [] [] [] [] [x] [] [] [] [] []    Personal Device Care [] [] [] [] [] [] [] [] [] []    Functional Mobility [] [] [] [] [x] [] [] [] [] []    I=Independent, Mod I=Modified Independent, S=Supervision, SBA=Standby Assistance, CGA=Contact Guard Assistance,   Min=Minimal Assistance, Mod=Moderate Assistance, Max=Maximal Assistance, Total=Total Assistance, NT=Not Tested    MOBILITY: I Mod I S SBA CGA Min Mod Max Total  NT x2 Comments:   Supine to sit [] [] [] [] [] [] [] [] [] [] []    Sit to supine [] [] [] [] [] [] [] [] [] [] []    Sit to stand [] [] [] [] [x] [] [] [] [] [] []    Bed to chair [] [] [] [] [x] [] [] [] [] [] []    I=Independent, Mod I=Modified Independent, S=Supervision, SBA=Standby Assistance, CGA=Contact Guard Assistance,   Min=Minimal Assistance, Mod=Moderate Assistance, Max=Maximal Assistance, Total=Total Assistance, NT=Not Tested    PLAN:   FREQUENCY/DURATION: OT Plan of Care: 3 times/week for duration of hospital stay or until stated goals are met, whichever comes first.    TREATMENT:   TREATMENT:   ($$ Self Care/Home Management: 8-22 mins$$ Therapeutic Activity: 8-22 mins   )  Therapeutic Activity (10 Minutes):  Therapeutic activity included Transfer Training, Ambulation on level ground and Standing balance to improve functional Mobility, Strength and Activity tolerance. Self Care (13 Minutes): Self care including Toileting, Grooming and Energy Conservation Training to increase independence and decrease level of assistance required.     TREATMENT GRID:  N/A    AFTER TREATMENT POSITION/PRECAUTIONS:  Chair, Needs within reach, RN notified and Visitors at bedside    INTERDISCIPLINARY COLLABORATION:  RN/PCT    TOTAL TREATMENT DURATION:  OT Patient Time In/Time Out  Time In: 1110  Time Out: [place] [address], [place]

## 2021-10-27 NOTE — PROGRESS NOTES
Hourly rounds done. Pt denies pain, nausea, vomiting. UOP yellow/straw, clear. No BM this shift. All needs met at this time.

## 2021-10-27 NOTE — PROGRESS NOTES
ACUTE PHYSICAL THERAPY GOALS:  (Developed with and agreed upon by patient and/or caregiver.)  (1.) Maryse Cornea will move from supine to sit and sit to supine , scoot up and down and roll side to side with MODIFIED INDEPENDENCE within 7 treatment day(s). (2.) Maryse Cornea will transfer from bed to chair and chair to bed with MODIFIED INDEPENDENCE using the least restrictive device within 7 treatment day(s). (3.) Maryse Cornea will ambulate with MODIFIED INDEPENDENCE for 500+ feet with the least restrictive device within 7 treatment day(s). (4.) Dot Cornea will perform standing static and dynamic balance activities x 25 minutes with MODIFIED INDEPENDENCE to improve safety within 7 treatment day(s). (5.) Maryse Cornea will ascend and descend 5 stairs using one hand rail(s) with MODIFIED INDEPENDENCE to improve functional mobility and safety within 7 treatment day(s). (6.) Maryse Cornea will perform therapeutic exercises x 20 min for HEP with INDEPENDENCE to improve strength, endurance, and functional mobility within 7 treatment day(s). PHYSICAL THERAPY: Daily Note and PM Treatment Day # 2    Maryse Tinajero is a 64 y.o. female   PRIMARY DIAGNOSIS: <principal problem not specified>  CHF (congestive heart failure) ([facility] [I50.9]     ASSESSMENT:     REHAB RECOMMENDATIONS: CURRENT LEVEL OF FUNCTION:  (Most Recently Demonstrated)   Recommendation to date pending progress:  [address] [place] [address] Therapy  Equipment:    None (pt already has RW, rollator, SPC, quad cane, walk in shower with seat) Bed Mobility:   Contact Guard Assistance  Sit to Stand:   Contact Guard Assistance  Transfers:   Contact Guard Assistance  Gait/Mobility:   Contact Guard Assistance     ASSESSMENT:  Ms. Fouzia Leyva is a 64year old F who presents to hospital with painful swellingin her LLE; US BLE negative for DVT, CT chest negative for acute PE, US duplex indicates occluded R MARLINE.  PMH includes heart failure. This date pt performs mobility including bed mobility, sitting balance activities, sit<> stand transfers, standing balance activities, and ambulation in room and hallway with CGA. Pt reporting less discomfort in BLE. Does endorse fatigue, educated on activity pacing. Assisted back to bed CGA. Good progress today, with less assist required with transfers, and improved balance control. Pt presents as functioning below her baseline, with deficits in mobility including transfers, gait, balance, and activity tolerance. Pt will benefit from skilled therapy services to address stated deficits to promote return to highest level of function, independence, and safety. Will continue to follow. SUBJECTIVE:   Ms. Enzo Woodall states, \"I am just a little tired this afternoon, I've been up all day. \"    SOCIAL HISTORY/ LIVING ENVIRONMENT: Lives with her son and daughter-in-law. Typically active and independent at baseline. Has a cane she uses PRN if her hip and/or olga k hurts. Endorses 3 falls in the past 6 months.   Home Environment: Private residence  # Steps to Enter: 6  One/Two Story Residence: One story  Living Alone: No  Support Systems: Child(leslie) (Patient resides with son Danielle Avila [phone].)  OBJECTIVE:     PAIN: VITAL SIGNS: LINES/DRAINS:   Pre Treatment: Pain Screen  Pain Scale 1: Numeric (0 - 10)  Pain Intensity 1: 0  Post Treatment: 0/10 Vital Signs  O2 Device: None (Room air) Purewick  O2 Device: None (Room air)     MOBILITY: I Mod I S SBA CGA Min Mod Max Total  NT x2 Comments:   Bed Mobility    Rolling [] [] [] [] [x] [] [] [] [] [] []    Supine to Sit [] [] [] [] [x] [] [] [] [] [] []    Scooting [] [] [] [] [x] [] [] [] [] [] []    Sit to Supine [] [] [] [] [x] [] [] [] [] [] []    Transfers    Sit to Stand [] [] [] [] [x] [] [] [] [] [] []    Bed to Chair [] [] [] [] [x] [] [] [] [] [] []    Stand to Sit [] [] [] [] [x] [] [] [] [] [] []    I=Independent, Mod I=Modified Independent, S=Supervision, SBA=Standby Assistance, CGA=Contact Guard Assistance,   Min=Minimal Assistance, Mod=Moderate Assistance, Max=Maximal Assistance, Total=Total Assistance, NT=Not Tested    BALANCE: Good Fair+ Fair Fair- Poor NT Comments   Sitting Static [x] [] [] [] [] []    Sitting Dynamic [x] [] [] [] [] []              Standing Static [] [x] [] [] [] []    Standing Dynamic [] [] [x] [] [] []      GAIT: I Mod I S SBA CGA Min Mod Max Total  NT x2 Comments:   Level of Assistance [] [] [] [] [x] [] [] [] [] [] []    Distance 76'    DME Rolling Walker    Gait Quality Slow, shuffled steps    Weightbearing  Status N/A     I=Independent, Mod I=Modified Independent, S=Supervision, SBA=Standby Assistance, CGA=Contact Guard Assistance,   Min=Minimal Assistance, Mod=Moderate Assistance, Max=Maximal Assistance, Total=Total Assistance, NT=Not Tested    PLAN:   FREQUENCY/DURATION: PT Plan of Care: 3 times/week for duration of hospital stay or until stated goals are met, whichever comes first.  TREATMENT:     TREATMENT:   ($$ Therapeutic Activity: 8-22 mins    )  Therapeutic Activity (20 Minutes): Therapeutic activity included Sit to Supine, Transfer Training, Ambulation on level ground, Sitting balance  and Standing balance to improve functional Mobility, Strength and Activity tolerance.     TREATMENT GRID:  N/A    AFTER TREATMENT POSITION/PRECAUTIONS:  Bed, Needs within reach, RN notified and Visitors at bedside    INTERDISCIPLINARY COLLABORATION:  RN/PCT and PT/PTA    TOTAL TREATMENT DURATION:  PT Patient Time In/Time Out  Time In: 1435  Time Out: [address], PT

## 2021-10-27 NOTE — PROGRESS NOTES
[facility] CARDIOLOGY PROGRESS NOTE           10/27/2021 8:09 AM    Admit Date: 10/25/2021      Subjective:   Breathing better    Review of Systems   Constitutional: Negative for fever. Respiratory: Negative for cough. Cardiovascular: Negative for chest pain. Genitourinary: Negative for dysuria. Skin: Negative for rash. Neurological: Negative for dizziness. Objective:      Vitals:    10/27/21 0210 10/27/21 0451 10/27/21 0454 10/27/21 0739   BP:   (!) 153/62 (!) 174/70   Pulse: 62  65 (!) 59   Resp:   16 16   Temp:   98.3 °F (36.8 °C) 98.3 °F (36.8 °C)   SpO2:   94% 97%   Weight:  167 lb 8.8 oz (76 kg) 167 lb 15.9 oz (76.2 kg)    Height:             Physical Exam  HENT:      Head: Normocephalic. Nose: Nose normal.      Mouth/Throat:      Mouth: Mucous membranes are moist.   Eyes:      Extraocular Movements: Extraocular movements intact. Cardiovascular:      Rate and Rhythm: Normal rate. Rhythm irregular. Pulmonary:      Breath sounds: No stridor. Abdominal:      Palpations: There is no mass. Musculoskeletal:         General: No tenderness. Skin:     Coloration: Skin is not pale. Neurological:      General: No focal deficit present. Mental Status: She is alert.          Data Review:   Recent Labs     10/26/21  0124 10/25/21  1503    144   K 3.6 3.6   MG 2.3  --    BUN 11 12   CREA 1.06* 1.08*    93   WBC 8.7 8.3   HGB 12.1 12.0   HCT 38.9 39.8    243         Intake/Output Summary (Last 24 hours) at 10/27/2021 0809  Last data filed at 10/27/2021 0457  Gross per 24 hour   Intake 480 ml   Output 1650 ml   Net -1170 ml     Current Facility-Administered Medications   Medication Dose Route Frequency    enoxaparin (LOVENOX) injection 40 mg  40 mg SubCUTAneous Q12H    rOPINIRole (REQUIP) tablet 2 mg  2 mg Oral QHS    hydrALAZINE (APRESOLINE) 20 mg/mL injection 10 mg  10 mg IntraVENous Q6H PRN    acetaminophen (TYLENOL) tablet 650 mg  650 mg Oral Q4H PRN    sertraline (ZOLOFT) tablet 75 mg  75 mg Oral 7am    losartan (COZAAR) tablet 100 mg  100 mg Oral DAILY    tuberculin injection 5 Units  5 Units IntraDERMal ONCE    sodium chloride (NS) flush 5-10 mL  5-10 mL IntraVENous Q8H    sodium chloride (NS) flush 5-10 mL  5-10 mL IntraVENous PRN    [Held by provider] carvediloL (COREG) tablet 3.125 mg  3.125 mg Oral BID WITH MEALS    ondansetron (ZOFRAN) injection 4 mg  4 mg IntraVENous Q6H PRN    furosemide (LASIX) injection 20 mg  20 mg IntraVENous Q12H    nitroglycerin (NITROSTAT) tablet 0.4 mg  0.4 mg SubLINGual Q5MIN PRN    aspirin delayed-release tablet 81 mg  81 mg Oral DAILY    pantoprazole (PROTONIX) tablet 40 mg  40 mg Oral ACB     · Contrast used: DEFINITY. · LV: Estimated LVEF is 60 - 65%. Normal cavity size, wall thickness and systolic function (ejection fraction normal). · TV: Mild tricuspid valve regurgitation is present. Right Ventricular Arterial Pressure (RVSP) is 44 mmHg. Pulmonary hypertension found to be mild. · LA: Moderately dilated left atrium. · RA: Moderately dilated right atrium. · AV: Mild aortic valve regurgitation is present. · MV: Mild mitral valve regurgitation is present. Assessment/Plan:     64 y.o. female history of atrial fibrillation newly diagnosed previous history of nonobstructive coronary disease uncontrolled hypertension admitted for dyspnea elevated BNP. Dyspnea likely multifactorial EF 60 to 65% improvement with intravenous diuretics. Patient is on oral Lasix as an outpatient. Additional options due to elevated pulmonary pressures on echocardiogram with uncontrolled hypertension would be aldosterone antagonist.   APRIS work up to be considered as well. Atrial fibrillation [id number] Score for Atrial Fibrillation Stroke Risk 4 currently on Lovenox every 12 hours which may be transition to novel anticoagulant or warfarin. Patient asymptomatic and carvedilol being held due to bradycardia.   The patient may have indications for device-based therapy in the future. Hypertension uncontrolled on losartan 100 mg daily add Aldactone.   Careful monitoring of BMP       Fernando Higgins MD  10/27/2021 8:09 AM

## 2021-10-27 NOTE — PROGRESS NOTES
Hospitalist Progress Note   Admit Date:  10/25/2021  5:17 PM   Name:  Lilia Savage   Age:  64 y.o. Sex:  female  :  1945   MRN:  [id number]   Room:  [facility]/    Presenting Complaint: Leg Swelling    Reason(s) for Admission: CHF (congestive heart failure) [facility]) [I50.9]     Hospital Course & Interval History:   Ms. Oswald Sims is ia 65 y/o WF with a h/o CAD, MDD, HTN, fibromyalgia and CHF (details unknown) who presented to the ER on 10/25 with increasing b/l LE edema and increased SOB. Recently took a trip to the beach and symptoms seemed to develop about 1 week later. Labs are unremarkable aside from pBNP 4200. CXR showed cardiomegaly, otherwise no infiltrate or edema. EKG showed atrial fibrillation with slow VR (rate in the 50s). She was admitted for diuresis. There was some concern for PE given elevated d-dimer so she has been started on treatment dose Lovenox, CT chest for PE is pending due to her contrast allergy, protocol for this has been ordered. Cardiology consulted. Echo showed pulmonary HTN. CT Chest was unremarkable, no PE. Bilateral LE arterial US shows an occluded R anterior tibial artery - vascular surgery with no intervention. Subjective (10/27/21): Feels much improved today. LE edema improving. Leg cramps resolved. HA resolved. BPs improved. On RA. No further chest pain. No N/V/D, abdo pain. Assessment & Plan:   # Fluid overload and BLE edema with Cor pulmonale with pulmonary HTN   - EF normal in , no DD, mod MR, mild LAE, mild TR/AR.   - No echo since to compare. Con't IV Lasix, Is/Os, hold BB with bradycardia as below, resume home ARB and stop hydralazine/nitrate. Net neg 750cc recorded (however per nursing notes in ER I see at least 1600cc urine documented). Cardiology to see. # Elevated d-dimer   - CT Chest unremarkable. On prophylactic Lovenox. No DVT on b/l LE US. CT chest with no PE. # Uncontrolled HTN   - Peak /92. Resume ARB, con't diuresis.  Need better control overall. # Headache   - Much improved with HTN control    # Atrial fibrillation   - Hold BB with bradycardia. Cardiology following. # Moderate mitral regurgitation   - Noted on echo from 2011. TTE with continued MR. # GERD   - PPI      Dispo/Discharge Planning:Home with Fabian Liriano tomorrow. therapy consults. Diet:  ADULT DIET Regular; Low Fat/Low Chol/High Fiber/ISAIAH  DVT PPx: Lovenox  Code status: Full Code    Hospital Problems as of 10/27/2021 Date Reviewed: 10/26/2021        Codes Class Noted - Resolved POA    Cor pulmonale (chronic) (HCC) ICD-10-CM: I27.81  ICD-9-CM: 416.9  10/27/2021 - Present Yes        * (Principal) Fluid overload ICD-10-CM: E87.70  ICD-9-CM: 276.69  10/27/2021 - Present Yes        Moderate mitral regurgitation ICD-10-CM: I34.0  ICD-9-CM: 424.0  10/26/2021 - Present Yes        Elevated brain natriuretic peptide (BNP) level ICD-10-CM: R79.89  ICD-9-CM: 790.99  10/26/2021 - Present Yes        Calf pain ICD-10-CM: Q61.262  ICD-9-CM: 729.5  10/26/2021 - Present Yes        CHF (congestive heart failure) ([facility] ICD-10-CM: I50.9  ICD-9-CM: 428.0  10/25/2021 - Present         A-fib ([facility] ICD-10-CM: I48.91  ICD-9-CM: 427.31  10/25/2021 - Present Yes        Coronary artery disease involving native coronary artery of native heart without angina pectoris (Chronic) ICD-10-CM: I25.10  ICD-9-CM: 414.01  11/4/2016 - Present Yes        Mixed hyperlipidemia (Chronic) ICD-10-CM: X11.8  ICD-9-CM: 272.2  4/21/2016 - Present Yes        Depression (Chronic) ICD-10-CM: F32. A  ICD-9-CM: 292  6/30/2015 - Present Yes        Essential hypertension (Chronic) ICD-10-CM: I10  ICD-9-CM: 401.9  1/7/2015 - Present Yes        GERD (gastroesophageal reflux disease) (Chronic) ICD-10-CM: K21.9  ICD-9-CM: 530.81  3/4/2011 - Present Yes              Objective:     Patient Vitals for the past 24 hrs:   Temp Pulse Resp BP SpO2   10/27/21 1524 98.2 °F (36.8 °C) (!) 57 20 (!) 156/63 97 %   10/27/21 1127 98.2 °F (36.8 °C) 72 16 (!) 156/57 94 %   10/27/21 0739 98.3 °F (36.8 °C) (!) 59 16 (!) 174/70 97 %   10/27/21 0454 98.3 °F (36.8 °C) 65 16 (!) 153/62 94 %   10/27/21 0210  62      10/26/21 2345 98.3 °F (36.8 °C) 66 16 (!) 145/70 92 %   10/26/21 2020  70      10/26/21 1940 98.4 °F (36.9 °C) 68 19 (!) 153/62 93 %     Oxygen Therapy  O2 Sat (%): 97 % (10/27/21 1524)  Pulse via Oximetry: 62 beats per minute (10/25/21 2303)  O2 Device: None (Room air) (10/27/21 1435)    Estimated body mass index is 32.81 kg/m² as calculated from the following:    Height as of this encounter: 5' (1.524 m). Weight as of this encounter: 76.2 kg (167 lb 15.9 oz). Intake/Output Summary (Last 24 hours) at 10/27/2021 1618  Last data filed at 10/27/2021 0800  Gross per 24 hour   Intake 480 ml   Output 1200 ml   Net -720 ml         Physical Exam:   Blood pressure (!) 156/63, pulse (!) 57, temperature 98.2 °F (36.8 °C), resp. rate 20, height 5' (1.524 m), weight 76.2 kg (167 lb 15.9 oz), SpO2 97 %. General:    Well nourished. No overt distress. Head:  Normocephalic, atraumatic  Eyes:  Sclerae appear normal.  Pupils equally round. ENT:  Nares appear normal, no drainage. Moist oral mucosa  Neck:  No restricted ROM. Trachea midline   CV:   Irreg irreg, mild bradycardia. No m/r/g. No jugular venous distension. Lungs:   CTAB. No wheezing, rhonchi, or rales. Respirations even, unlabored. RA O2. Abdomen: Bowel sounds present. Soft, nontender, nondistended. Extremities: No cyanosis or clubbing. Trace to 1+ b/l LE pitting edema. Skin:     No rashes and normal coloration. Warm and dry. Neuro:  CN II-XII grossly intact. Sensation intact  Psych:  Normal mood and affect.   A&Ox3    I have reviewed ordered lab tests and independently visualized imaging below:    Last 24hr Labs:  Recent Results (from the past 24 hour(s))   PLEASE READ & DOCUMENT PPD TEST IN 24 HRS    Collection Time: 10/27/21  8:12 AM   Result Value Ref Range    PPD Negative Negative mm Induration 0 0 - 5 mm   METABOLIC PANEL, COMPREHENSIVE    Collection Time: 10/27/21  8:22 AM   Result Value Ref Range    Sodium 140 136 - 145 mmol/L    Potassium 3.5 3.5 - 5.1 mmol/L    Chloride 104 98 - 107 mmol/L    CO2 29 21 - 32 mmol/L    Anion gap 7 7 - 16 mmol/L    Glucose 134 (H) 65 - 100 mg/dL    BUN 17 8 - 23 MG/DL    Creatinine 1.18 (H) 0.6 - 1.0 MG/DL    GFR est AA 57 (L) >60 ml/min/1.73m2    GFR est non-AA 47 (L) >60 ml/min/1.73m2    Calcium 9.0 8.3 - 10.4 MG/DL    Bilirubin, total 0.5 0.2 - 1.1 MG/DL    ALT (SGPT) 49 12 - 65 U/L    AST (SGOT) 21 15 - 37 U/L    Alk. phosphatase 62 50 - 136 U/L    Protein, total 6.8 6.3 - 8.2 g/dL    Albumin 2.9 (L) 3.2 - 4.6 g/dL    Globulin 3.9 (H) 2.3 - 3.5 g/dL    A-G Ratio 0.7 (L) 1.2 - 3.5         All Micro Results     None          Other Studies:  No results found.     Current Meds:  Current Facility-Administered Medications   Medication Dose Route Frequency    spironolactone (ALDACTONE) tablet 25 mg  25 mg Oral DAILY    [START ON 10/28/2021] furosemide (LASIX) tablet 40 mg  40 mg Oral DAILY    enoxaparin (LOVENOX) injection 40 mg  40 mg SubCUTAneous Q24H    rOPINIRole (REQUIP) tablet 2 mg  2 mg Oral QHS    hydrALAZINE (APRESOLINE) 20 mg/mL injection 10 mg  10 mg IntraVENous Q6H PRN    acetaminophen (TYLENOL) tablet 650 mg  650 mg Oral Q4H PRN    sertraline (ZOLOFT) tablet 75 mg  75 mg Oral 7am    losartan (COZAAR) tablet 100 mg  100 mg Oral DAILY    sodium chloride (NS) flush 5-10 mL  5-10 mL IntraVENous Q8H    sodium chloride (NS) flush 5-10 mL  5-10 mL IntraVENous PRN    [Held by provider] carvediloL (COREG) tablet 3.125 mg  3.125 mg Oral BID WITH MEALS    ondansetron (ZOFRAN) injection 4 mg  4 mg IntraVENous Q6H PRN    nitroglycerin (NITROSTAT) tablet 0.4 mg  0.4 mg SubLINGual Q5MIN PRN    aspirin delayed-release tablet 81 mg  81 mg Oral DAILY    pantoprazole (PROTONIX) tablet 40 mg  40 mg Oral ACB       Signed:  Miguel Barrientos, DO    Part of this note may have been written by using a voice dictation software. The note has been proof read but may still contain some grammatical/other typographical errors.

## 2021-10-27 NOTE — DISCHARGE INSTRUCTIONS
Patient Education        Heart Failure: Care Instructions  Your Care Instructions     Heart failure occurs when your heart does not pump as much blood as the body needs. Failure does not mean that the heart has stopped pumping but rather that it is not pumping as well as it should. Over time, this causes fluid buildup in your lungs and other parts of your body. Fluid buildup can cause shortness of breath, fatigue, swollen ankles, and other problems. By taking medicines regularly, reducing sodium (salt) in your diet, checking your weight every day, and making lifestyle changes, you can feel better and live longer. Follow-up care is a key part of your treatment and safety. Be sure to make and go to all appointments, and call your doctor if you are having problems. It's also a good idea to know your test results and keep a list of the medicines you take. How can you care for yourself at home? Medicines    · Be safe with medicines. Take your medicines exactly as prescribed. Call your doctor if you think you are having a problem with your medicine.     · Do not take any vitamins, over-the-counter medicine, or herbal products without talking to your doctor first. Danisha Lo not take ibuprofen (Advil or Motrin) and naproxen (Aleve) without talking to your doctor first. They could make your heart failure worse.     · You may take some of the following medicine. ? Angiotensin-converting enzyme inhibitors (ACEIs) or angiotensin II receptor blockers (ARBs) reduce the heart's workload, lower blood pressure, and reduce swelling. Taking an ACEI or ARB may lower your chance of needing to be hospitalized. ? Beta-blockers can slow heart rate, decrease blood pressure, and improve your condition. Taking a beta-blocker may lower your chance of needing to be hospitalized. ? Diuretics, also called water pills, reduce swelling. You will get more details on the specific medicines your doctor prescribes.   Diet    · Your doctor may suggest that you limit sodium. Your doctor can tell you how much sodium is right for you. An example is less than 3,000 mg a day. This includes all the salt you eat in cooking or in packaged foods. People get most of their sodium from processed foods. Fast food and restaurant meals also tend to be very high in sodium.     · Ask your doctor how much liquid you can drink each day. You may have to limit liquids. Weight    · Weigh yourself without clothing at the same time each day. Record your weight. Call your doctor if you have a sudden weight gain, such as more than 2 to 3 pounds in a day or 5 pounds in a week. (Your doctor may suggest a different range of weight gain.) A sudden weight gain may mean that your heart failure is getting worse. Activity level    · Start light exercise (if your doctor says it is okay). Even if you can only do a small amount, exercise will help you get stronger, have more energy, and manage your weight and your stress. Walking is an easy way to get exercise. Start out by walking a little more than you did before. Bit by bit, increase the amount you walk.     · When you exercise, watch for signs that your heart is working too hard. You are pushing yourself too hard if you cannot talk while you are exercising. If you become short of breath or dizzy or have chest pain, stop, sit down, and rest.     · If you feel \"wiped out\" the day after you exercise, walk slower or for a shorter distance until you can work up to a better pace.     · Get enough rest at night. Sleeping with 1 or 2 pillows under your upper body and head may help you breathe easier. Lifestyle changes    · Do not smoke. Smoking can make a heart condition worse. If you need help quitting, talk to your doctor about stop-smoking programs and medicines. These can increase your chances of quitting for good.  Quitting smoking may be the most important step you can take to protect your heart.     · Limit alcohol to 2 drinks a day for men and 1 drink a day for women. Too much alcohol can cause health problems.     · Avoid getting sick from colds and the flu. Get a pneumococcal vaccine shot. If you have had one before, ask your doctor whether you need another dose. Get a flu shot each year. If you must be around people with colds or the flu, wash your hands often. When should you call for help? Call 911  if you have symptoms of sudden heart failure such as:    · You have severe trouble breathing.     · You cough up pink, foamy mucus.     · You have a new irregular or rapid heartbeat. Call your doctor now or seek immediate medical care if:    · You have new or increased shortness of breath.     · You are dizzy or lightheaded, or you feel like you may faint.     · You have sudden weight gain, such as more than 2 to 3 pounds in a day or 5 pounds in a week. (Your doctor may suggest a different range of weight gain.)     · You have increased swelling in your legs, ankles, or feet.     · You are suddenly so tired or weak that you cannot do your usual activities. Watch closely for changes in your health, and be sure to contact your doctor if you develop new symptoms. Where can you learn more? Go to http://www.gray.com/  Enter K488 in the search box to learn more about \"Heart Failure: Care Instructions. \"  Current as of: April 29, 2021               Content Version: 13.0  © 8632-2185 [employer]. Care instructions adapted under license by [employer] (which disclaims liability or warranty for this information). If you have questions about a medical condition or this instruction, always ask your healthcare professional. [place] [address] any warranty or liability for your use of this information.

## 2021-10-27 NOTE — ROUTINE PROCESS
CHF teaching /introduction completed to pt/ FM @ BS. Planners @ BS and scale @ home. Emphasis to report worsening dyspnea, excessive inhaler usage.   Verbalize understandinmins    Cardiac diet/ FR  Palliative care: done

## 2021-10-27 NOTE — PROGRESS NOTES
END OF SHIFT NOTE: Patient alert and oriented.       INTAKE/OUTPUT  10/26 0701 - 10/27 0700  In: 480 [P.O.:480]  Out: 1650 [Urine:1650]  Voiding: YES  Catheter: NO  Color: clear  Drain:   External Urinary Catheter 10/25/21 (Active)   Site Assessment Clean, dry, & intact 10/27/21 0123   Repositioned No 10/27/21 0123   Perineal Care Yes 10/27/21 0800   Wick Changed Yes 10/27/21 0800   Suction Canister/Tubing Changed Yes 10/27/21 0800   Urine Output (mL) 150 ml 10/27/21 0800       DIET  regular    VITAL SIGNS  Patient Vitals for the past 12 hrs:   Temp Pulse Resp BP SpO2   10/27/21 1524 98.2 °F (36.8 °C) (!) 57 20 (!) 156/63 97 %   10/27/21 1127 98.2 °F (36.8 °C) 72 16 (!) 156/57 94 %   10/27/21 0739 98.3 °F (36.8 °C) (!) 59 16 (!) 174/70 97 %       Pain Assessment  Pain Intensity 1: 0 (10/27/21 1547)  Pain Location 1: Head  Pain Intervention(s) 1: Medication (see MAR)  Patient Stated Pain Goal: 0            Yessi Barrios

## 2021-10-28 ENCOUNTER — HOME HEALTH ADMISSION (OUTPATIENT)
Dept: HOME HEALTH SERVICES | Facility: HOME HEALTH | Age: 76
End: 2021-10-28
Payer: MEDICARE

## 2021-10-28 VITALS
DIASTOLIC BLOOD PRESSURE: 83 MMHG | OXYGEN SATURATION: 93 % | BODY MASS INDEX: 33.76 KG/M2 | WEIGHT: 171.96 LBS | SYSTOLIC BLOOD PRESSURE: 144 MMHG | TEMPERATURE: 98 F | RESPIRATION RATE: 16 BRPM | HEIGHT: 60 IN | HEART RATE: 75 BPM

## 2021-10-28 LAB
MM INDURATION POC: 0 MM (ref 0–5)
PPD POC: NEGATIVE NEGATIVE

## 2021-10-28 PROCEDURE — 99231 SBSQ HOSP IP/OBS SF/LOW 25: CPT | Performed by: INTERNAL MEDICINE

## 2021-10-28 PROCEDURE — 2709999900 HC NON-CHARGEABLE SUPPLY

## 2021-10-28 PROCEDURE — 74011250637 HC RX REV CODE- 250/637: Performed by: INTERNAL MEDICINE

## 2021-10-28 RX ORDER — SPIRONOLACTONE 25 MG/1
25 TABLET ORAL DAILY
Qty: 30 TABLET | Refills: 0 | Status: SHIPPED | OUTPATIENT
Start: 2021-10-29 | End: 2021-11-03 | Stop reason: SDUPTHER

## 2021-10-28 RX ADMIN — LOSARTAN POTASSIUM 100 MG: 50 TABLET, FILM COATED ORAL at 08:53

## 2021-10-28 RX ADMIN — SERTRALINE 75 MG: 50 TABLET, FILM COATED ORAL at 07:13

## 2021-10-28 RX ADMIN — Medication 10 ML: at 06:00

## 2021-10-28 RX ADMIN — FUROSEMIDE 40 MG: 40 TABLET ORAL at 08:54

## 2021-10-28 RX ADMIN — ASPIRIN 81 MG: 81 TABLET ORAL at 08:54

## 2021-10-28 RX ADMIN — SPIRONOLACTONE 25 MG: 25 TABLET ORAL at 08:54

## 2021-10-28 RX ADMIN — PANTOPRAZOLE SODIUM 40 MG: 40 TABLET, DELAYED RELEASE ORAL at 05:26

## 2021-10-28 NOTE — PROGRESS NOTES
Problem: Falls - Risk of  Goal: *Absence of Falls  Description: Document Hayley Lezama Fall Risk and appropriate interventions in the flowsheet.   Outcome: Progressing Towards Goal  Note: Fall Risk Interventions:  Mobility Interventions: Patient to call before getting OOB         Medication Interventions: Patient to call before getting OOB    Elimination Interventions: Patient to call for help with toileting needs    History of Falls Interventions: Room close to nurse's station         Problem: Patient Education: Go to Patient Education Activity  Goal: Patient/Family Education  Outcome: Progressing Towards Goal     Problem: Patient Education: Go to Patient Education Activity  Goal: Patient/Family Education  Outcome: Progressing Towards Goal

## 2021-10-28 NOTE — PROGRESS NOTES
CM met with patient to discuss discharge planning. Patient recommended for [facility] therapy. The patient is in agreement with [facility] therapy and has requested [facility]. CM will place referral this day. CM continues to follow.

## 2021-10-28 NOTE — PROGRESS NOTES
The patient is medically stable for discharge. Patient to discharge home this day with [facility]. CM notified Adriana and Maximo with [facility] of referral. Patient's grandson to transport the patient home. Patient denies any discharge/supportive care needs. CM remains available. Care Management Interventions  PCP Verified by CM: Yes  Mode of Transport at Discharge: Other (see comment) (Family )  Transition of Care Consult (CM Consult): Discharge Planning, [address]: Yes  Discharge Durable Medical Equipment: No  Physical Therapy Consult: Yes  Occupational Therapy Consult: Yes  Speech Therapy Consult: No  Support Systems: Child(leslie) (Patient resides with son Thu Leiva [phone].)  Confirm Follow Up Transport: Self  The Plan for Transition of Care is Related to the Following Treatment Goals : Return to Baseline.    The Patient and/or Patient Representative was Provided with a Choice of Provider and Agrees with the Discharge Plan?: Yes  The [employer] Information Provided?: No  Discharge Location  Discharge Placement: Home with [facility] ([facility]

## 2021-10-28 NOTE — PROGRESS NOTES
Patient alert and verbal x 4. Patient resting. Bed in low position and wheels locked, call light within reach. Hourly rounds completed this shift. VSS. No c/o pain. IV site SL. Had one episode of emesis, was given PRN with effectiveness.

## 2021-10-28 NOTE — PROGRESS NOTES
PIV removed, discharge teaching given. Pt denies any further needs. Prescription printed and given to pt.

## 2021-10-28 NOTE — ED PROVIDER NOTES
The history is provided by the patient. Leg Swelling   This is a new problem. The current episode started more than 2 days ago. The problem occurs constantly. The problem has been gradually worsening. Pain location: left leg. Pertinent negatives include no numbness, no tingling and no back pain. The symptoms are aggravated by palpation. She has tried nothing for the symptoms. There has been no history of extremity trauma. Breathing Problem  This is a new problem. The average episode lasts 1 week. The problem occurs continuously. The problem has been gradually worsening. Associated symptoms include cough, chest pain, leg pain and leg swelling. Pertinent negatives include no fever, no headaches, no coryza, no rhinorrhea, no sore throat, no sputum production, no vomiting and no abdominal pain. It is unknown what precipitated the problem. She has tried nothing for the symptoms.         Past Medical History:   Diagnosis Date    Allergic rhinitis, cause unspecified 01/07/2015    Aortic valve disorders 01/07/2015    Asthma     uses inhaler prn    CAD (coronary artery disease)     mild,  treated with med    Carotid stenosis 1/7/2015    Carpal tunnel syndrome 01/07/2015    bilat wrists-no issue now    Depression 06/30/2015    Esophageal reflux 01/07/2015    Essential hypertension     Fibromyalgia     Former smoker     1 ppd for 14 yrs; quit smoking 1981    GERD (gastroesophageal reflux disease)     Heart failure ([address]     last EF 55-60%    History of peptic ulcer disease     Menopause     Mixed hyperlipidemia 4/21/2016    Neuropathy     BLE    Osteoarthritis     Osteopenia 1/7/2015    Renal insufficiency 01/07/2015       Past Surgical History:   Procedure Laterality Date    COLONOSCOPY N/A 10/22/2021    COLONOSCOPY/ BMI 34 performed by Claudell Echevaria, MD at [facility] ENDOSCOPY    HX [address]      bladder tac    HX BLADDER SUSPENSION      HX COLONOSCOPY      HX GI      esophageal dilatation    HX HEMORRHOIDECTOMY      HX HYSTERECTOMY      HX LAP CHOLECYSTECTOMY      HX RECTOCELE REPAIR      HX ROTATOR CUFF REPAIR Right     HX TUBAL LIGATION           Family History:   Problem Relation Age of Onset    Diabetes Mother     Heart Attack Mother     Diabetes Sister     Breast Cancer Sister 79    Diabetes Sister     Heart Attack Father     Diabetes Sister     Heart Attack Sister          from heart issue age 54    Arthritis-rheumatoid Paternal Grandmother     Heart Disease Maternal Grandmother        Social History     Socioeconomic History    Marital status:      Spouse name: Sam Candelario Number of children: 3    Years of education: 15 years   Carlson Highest education level: Not on file   Occupational History    Occupation: Retired   Tobacco Use    Smoking status: Former Smoker     Packs/day: 1.00     Years: 14.00     Pack years: 14.00    Smokeless tobacco: Never Used    Tobacco comment: quit smoking    Substance and Sexual Activity    Alcohol use: No     Alcohol/week: 0.0 standard drinks    Drug use: No    Sexual activity: Yes     Partners: Male   Other Topics Concern     Service No    Blood Transfusions No    Caffeine Concern Yes    Occupational Exposure No    Hobby Hazards No    Sleep Concern No    Stress Concern Yes     Comment: Taking care of her     Weight Concern No    Special Diet No    Back Care Yes     Comment: pt has DDD    Exercise No    Bike Helmet No    Seat Belt Yes    Self-Exams Yes   Social History Narrative    Not on file     Social Determinants of Health     Financial Resource Strain:     Difficulty of Paying Living Expenses:    Food Insecurity:     Worried About Running Out of Food in the Last Year:     Ran Out of Food in the Last Year:    Transportation Needs:     Lack of Transportation (Medical):      Lack of Transportation (Non-Medical):    Physical Activity:     Days of Exercise per Week:     Minutes of Exercise per Session:    Stress:     Feeling of Stress :    Social Connections:     Frequency of Communication with Friends and Family:     Frequency of Social Gatherings with Friends and Family:     Attends [employer] Services:     Active Member of Clubs or Organizations:     Attends Club or Organization Meetings:     Marital Status:    Intimate Partner Violence:     Fear of Current or Ex-Partner:     Emotionally Abused:     Physically Abused:     Sexually Abused: ALLERGIES: Bees [hymenoptera allergenic extract], Adhesive tape-silicones, Contrast agent [iodine], Lescol [fluvastatin], Lipitor [atorvastatin], Lisinopril, Penicillin g, and Tetracycline    Review of Systems   Constitutional: Negative for chills and fever. HENT: Negative for congestion, rhinorrhea and sore throat. Eyes: Negative for photophobia and redness. Respiratory: Positive for cough. Negative for sputum production and shortness of breath. Cardiovascular: Positive for chest pain and leg swelling. Gastrointestinal: Negative for abdominal pain, diarrhea, nausea and vomiting. Endocrine: Negative for polydipsia and polyuria. Genitourinary: Negative for dysuria and hematuria. Musculoskeletal: Negative for back pain and myalgias. Neurological: Negative for tingling, weakness, numbness and headaches. All other systems reviewed and are negative. Vitals:    10/27/21 0739 10/27/21 1127 10/27/21 1524 10/27/21 1926   BP: (!) 174/70 (!) 156/57 (!) 156/63 (!) 152/64   Pulse: (!) 59 72 (!) 57 77   Resp: 16 16 20 20   Temp: 98.3 °F (36.8 °C) 98.2 °F (36.8 °C) 98.2 °F (36.8 °C) 98.4 °F (36.9 °C)   SpO2: 97% 94% 97% 93%   Weight:       Height:                Physical Exam  Vitals and nursing note reviewed. Constitutional:       General: She is not in acute distress. Appearance: She is well-developed. HENT:      Head: Normocephalic.       Nose: Nose normal.      Mouth/Throat:      Mouth: Mucous membranes are moist.   Eyes: Pupils: Pupils are equal, round, and reactive to light. Cardiovascular:      Rate and Rhythm: Normal rate and regular rhythm. Heart sounds: Normal heart sounds. Pulmonary:      Effort: Pulmonary effort is normal.      Breath sounds: Normal breath sounds. No rales. Abdominal:      General: There is no distension. Palpations: Abdomen is soft. There is no mass. Tenderness: There is no abdominal tenderness. There is no guarding or rebound. Musculoskeletal:         General: Swelling and tenderness ( left calf) present. Normal range of motion. Right lower leg: Edema present. Left lower leg: Edema present. Lymphadenopathy:      Cervical: No cervical adenopathy. Skin:     General: Skin is warm and dry. Neurological:      Mental Status: She is alert and oriented to person, place, and time. MDM  Number of Diagnoses or Management Options  Diagnosis management comments: No dvt. New onset chf present.  Admit for chf and possible PE protocoCT with allergy protocol for Iv contrast dye to hospitalist.        Amount and/or Complexity of Data Reviewed  Clinical lab tests: ordered and reviewed  Tests in the radiology section of CPT®: ordered and reviewed  Tests in the medicine section of CPT®: ordered and reviewed  Review and summarize past medical records: yes  Discuss the patient with other providers: yes  Independent visualization of images, tracings, or specimens: yes    Risk of Complications, Morbidity, and/or Mortality  Presenting problems: high  Diagnostic procedures: low  Management options: moderate    Patient Progress  Patient progress: stable         Procedures

## 2021-10-28 NOTE — PROGRESS NOTES
10/28/2021 11:45 AM    Admit Date: 10/25/2021    Admit Diagnosis: CHF (congestive heart failure) ([facility]) [I50.9]      Subjective:   No cp or sob      Objective:      Visit Vitals  BP (!) 144/83 (BP 1 Location: Right upper arm)   Pulse 69   Temp 98 °F (36.7 °C)   Resp 16   Ht 5' (1.524 m)   Wt 171 lb 15.3 oz (78 kg)   SpO2 93%   BMI 33.58 kg/m²       Physical Exam:  Gallito Irene, Well Nourished, No Acute Distress, Alert & Oriented x 3, appropriate mood. Neck- supple, no JVD  CV- regular rate and rhythm no MRG  Lung- clear bilaterally  Abd- soft, nontender, nondistended  Ext- no edema bilaterally. Skin- warm and dry        Data Review:   Recent Labs     10/27/21  0822 10/26/21  0124 10/26/21  0124      < > 145   K 3.5   < > 3.6   BUN 17   < > 11   CREA 1.18*   < > 1.06*   WBC  --   --  8.7   HGB  --   --  12.1   HCT  --   --  38.9   PLT  --   --  257    < > = values in this interval not displayed. Assessment/Plan:     Principal Problem:    Fluid overload (10/27/2021)Improved with current therapy. Will continue medications\  Heddy Devoid for dc with us     Active Problems:    GERD (gastroesophageal reflux disease) (3/4/2011)      Essential hypertension (1/7/2015)Improved with current therapy.  Will continue medications        Depression (6/30/2015)      Mixed hyperlipidemia (4/21/2016)      Coronary artery disease involving native coronary artery of native heart without angina pectoris (11/4/2016)      A-fib ([address] (10/25/2021)      Moderate mitral regurgitation (10/26/2021)      Elevated brain natriuretic peptide (BNP) level (10/26/2021)      Calf pain (10/26/2021)      Cor pulmonale (chronic) ([address] (10/27/2021)

## 2021-10-28 NOTE — PROGRESS NOTES
Pt is a/o x 4, on RA. Family at bedside. Pt denies any pain or discomfort at this time. C/o heartburn earlier during the shift. Provider notified, no new orders. Pt ambulated without assistance to the bathroom. Pt consumed her lunch meal and tolerated it well. Will continue to monitor and provide care.

## 2021-10-29 ENCOUNTER — HOME CARE VISIT (OUTPATIENT)
Dept: HOME HEALTH SERVICES | Facility: HOME HEALTH | Age: 76
End: 2021-10-29
Payer: MEDICARE

## 2021-10-29 ENCOUNTER — HOME CARE VISIT (OUTPATIENT)
Dept: SCHEDULING | Facility: HOME HEALTH | Age: 76
End: 2021-10-29
Payer: MEDICARE

## 2021-10-29 VITALS
DIASTOLIC BLOOD PRESSURE: 80 MMHG | TEMPERATURE: 97.7 F | HEART RATE: 66 BPM | SYSTOLIC BLOOD PRESSURE: 128 MMHG | RESPIRATION RATE: 17 BRPM | OXYGEN SATURATION: 96 %

## 2021-10-29 PROCEDURE — 400018 HH-NO PAY CLAIM PROCEDURE

## 2021-10-29 PROCEDURE — 3331090002 HH PPS REVENUE DEBIT

## 2021-10-29 PROCEDURE — 3331090001 HH PPS REVENUE CREDIT

## 2021-10-29 PROCEDURE — 400013 HH SOC

## 2021-10-29 PROCEDURE — G0151 HHCP-SERV OF PT,EA 15 MIN: HCPCS

## 2021-10-29 NOTE — REMOTE MONITORING
CHF F/U call, pT CALLED ME re: ANTICOAGULANT @ dc, SUGGESTED SHE CALL ON CALL. Denied any other issues or concerns. Advised to report worsening dyspnea.

## 2021-10-29 NOTE — DISCHARGE SUMMARY
Hospitalist Discharge Summary   Admit Date:  10/25/2021  5:17 PM   DC Note date: 10/28/2021  Name:  Belia Chong   Age:  64 y.o. Sex:  female  :  1945   MRN:  [id number]   Room:  [facility]  PCP:  Caprice Conte NP    Presenting Complaint: Leg Swelling    Initial Admission Diagnosis: CHF (congestive heart failure) ([facility] [I50.9]     Problem List for this Hospitalization:  Hospital Problems as of 10/28/2021 Date Reviewed: 10/26/2021        Codes Class Noted - Resolved POA    Cor pulmonale (chronic) ([facility] ICD-10-CM: I27.81  ICD-9-CM: 416.9  10/27/2021 - Present Yes        * (Principal) Fluid overload ICD-10-CM: E87.70  ICD-9-CM: 276.69  10/27/2021 - Present Yes        Moderate mitral regurgitation ICD-10-CM: I34.0  ICD-9-CM: 424.0  10/26/2021 - Present Yes        Elevated brain natriuretic peptide (BNP) level ICD-10-CM: R79.89  ICD-9-CM: 790.99  10/26/2021 - Present Yes        Calf pain ICD-10-CM: M79.669  ICD-9-CM: 729.5  10/26/2021 - Present Yes        CHF (congestive heart failure) ([facility] ICD-10-CM: I50.9  ICD-9-CM: 428.0  10/25/2021 - Present         A-fib ([facility] ICD-10-CM: I48.91  ICD-9-CM: 427.31  10/25/2021 - Present Yes        Coronary artery disease involving native coronary artery of native heart without angina pectoris (Chronic) ICD-10-CM: I25.10  ICD-9-CM: 414.01  2016 - Present Yes        Mixed hyperlipidemia (Chronic) ICD-10-CM: S00.0  ICD-9-CM: 272.2  2016 - Present Yes        Depression (Chronic) ICD-10-CM: F32. A  ICD-9-CM: 046  2015 - Present Yes        Essential hypertension (Chronic) ICD-10-CM: I10  ICD-9-CM: 401.9  2015 - Present Yes        GERD (gastroesophageal reflux disease) (Chronic) ICD-10-CM: K21.9  ICD-9-CM: 530.81  3/4/2011 - Present Yes            Did Patient have Sepsis (YES OR NO): no    Hospital Course:  Ms. Dayami Alejandro is ia 65 y/o WF with a h/o CAD, MDD, HTN, fibromyalgia and CHF (details unknown) who presented to the ER on 10/25 with increasing b/l LE edema and increased SOB. Recently took a trip to the beach and symptoms seemed to develop about 1 week later. Labs are unremarkable aside from pBNP 4200. CXR showed cardiomegaly, otherwise no infiltrate or edema. EKG showed atrial fibrillation with slow VR (rate in the 50s). She was admitted for diuresis. There was some concern for PE given elevated d-dimer so she has been started on treatment dose Lovenox, CT chest for PE is pending due to her contrast allergy, protocol for this has been ordered. Cardiology consulted. Echo showed pulmonary HTN. CT Chest was unremarkable, no PE. Bilateral LE arterial US shows an occluded R anterior tibial artery - vascular surgery with no intervention. # Fluid overload and BLE edema with Cor pulmonale with pulmonary HTN              - Cardiology has seen   - Echo with EF 60%   - Diuresing well with lasix/aldactone - continue at discharge   - instructed to weight daily by cardiology and parameters for Physician call and med adjustments.      # Elevated d-dimer              -CT chest negative for PE, LE Duplex neg for PE     # HTN              - improved control with cozaar, diuretics. BB on hold for bradycardia     # Atrial fibrillation              - Hold BB with bradycardia.    - Starting eliquis at discharge - patient made aware of bleeding risks and to notify provider of uncontrolled bleeding, BRBPR, melena, dizziness.      # Moderate mitral regurgitation              - Noted on echo from 2011. TTE with continued MR.     # GERD              - PPI         Disposition: Home or Self Care  Diet: No diet orders on file  Code Status: Prior    Follow Up Orders: Follow-up Appointments   Procedures    FOLLOW UP VISIT Appointment in: One Week 1 week with PCP, 2-3 weeks Cardiology     1 week with PCP, 2-3 weeks Cardiology     Standing Status:   Standing     Number of Occurrences:   1     Order Specific Question:   Appointment in     Answer:    One Week       Follow-up Information     Follow up With Specialties Details Why Wandra Aase Dr[address] Artemisa Rias [address] [facility]    Anabel Vann, DAJA Internal Medicine   [address] Klevin Last [phone] [facility] staff to contact patient within 48 hours of discharge regarding home care visits. [phone] [facility] [phone]  [phone]          Follow up labs/diagnostics (ultimately defer to outpatient provider):  PCP in 1 week, Cardiology in 2-3 weeks. Time spent in patient discharge and coordination 32 minutes. Plan was discussed with patient, RN, Cardiology. All questions answered. Patient was stable at time of discharge. Instructions given to call a physician or return if any concerns. Discharge Info:   Discharge Medication List as of 10/28/2021  1:50 PM      START taking these medications    Details   spironolactone (ALDACTONE) 25 mg tablet Take 1 Tablet by mouth daily. , Print, Disp-30 Tablet, R-0         CONTINUE these medications which have NOT CHANGED    Details   albuterol (PROVENTIL VENTOLIN) 2.5 mg /3 mL (0.083 %) nebu 3 mL by Nebulization route every six (6) hours as needed for Wheezing., Normal, Disp-120 Each, R-11      cyanocobalamin (VITAMIN B12) 500 mcg tablet Take 500 mcg by mouth daily. , Historical Med      ascorbic acid, vitamin C, (Vitamin C) 250 mg tablet Take 250 mg by mouth daily. , Historical Med      pantoprazole (PROTONIX) 40 mg tablet Take 1 Tablet by mouth Daily (before breakfast). , Normal, Disp-90 Tablet, R-3      ergocalciferol (ERGOCALCIFEROL) 1,250 mcg (50,000 unit) capsule One capsule by mouth weekly for 4 weeks then one monthly.   Indications: vitamin D deficiency (high dose therapy), Normal, Disp-6 Capsule, R-2      Ca-D3-mag-zinc--delmy-boron (Caltrate 600-D Plus Minerals) 600 mg calcium- 800 unit-40 mg chew Take 1 Tablet by mouth two (2) times a day. Indications: post-menopausal osteoporosis prevention, OTC      losartan (COZAAR) 100 mg tablet Take 1 Tablet by mouth daily. Indications: high blood pressure, Normal, Disp-90 Tablet, R-3      furosemide (LASIX) 40 mg tablet take 1 tablet by mouth every morning, Normal, Disp-90 Tablet, R-3      albuterol (PROVENTIL HFA, VENTOLIN HFA, PROAIR HFA) 90 mcg/actuation inhaler Take 2 Puffs by inhalation every six (6) hours as needed for Wheezing. Indications: asthma attack, Normal, Disp-1 Inhaler, R-3      sertraline (Zoloft) 50 mg tablet Take 1.5 Tabs by mouth every morning., Normal, Disp-135 Tab, R-4      gabapentin (NEURONTIN) 300 mg capsule Take 1 Cap by mouth three (3) times daily. , Normal, Disp-270 Cap, R-3      fluticasone furoate-vilanteroL (Breo Ellipta) 100-25 mcg/dose inhaler Take 1 Puff by inhalation daily. RINSE MOUTH WELL AFTER USE, Normal, Disp-1 Inhaler,R-11      nitroglycerin (NITROSTAT) 0.4 mg SL tablet 1 Tab by SubLINGual route every five (5) minutes as needed for Chest Pain., Normal, Disp-4 Bottle, R-6      fluticasone propionate (FLONASE) 50 mcg/actuation nasal spray 2 Sprays by Both Nostrils route nightly., Normal**Patient requests 90 days supply**Disp-3 Bottle, R-4      vitamin A (AQUASOL A) 10,000 unit capsule Take 24,000 Units by mouth every other day. Indications: taking 3 times weekly, Historical Med      cetirizine (ZYRTEC) 10 mg tablet Take 1 Tab by mouth daily. , Normal, Disp-30 Tab, R-3      aspirin delayed-release 81 mg tablet Take 81 mg by mouth every morning. Instructed to take DOS per Anesthesia guidelines.  , Historical Med         STOP taking these medications       potassium chloride (K-DUR, KLOR-CON) 20 mEq tablet Comments:   Reason for Stopping:         atorvastatin (LIPITOR) 20 mg tablet Comments:   Reason for Stopping:         atenoloL (TENORMIN) 25 mg tablet Comments:   Reason for Stopping:         loperamide (IMODIUM) 1 mg/5 mL solution Comments:   Reason for Stopping:               Procedures done this admission:  * No surgery found *    Consults this admission:  IP CONSULT TO VASCULAR SURGERY    Echocardiogram/EKG results:  Results from Hospital Encounter encounter on 10/25/21    ECHO ADULT COMPLETE    Interpretation Summary  · Contrast used: DEFINITY. · LV: Estimated LVEF is 60 - 65%. Normal cavity size, wall thickness and systolic function (ejection fraction normal). · TV: Mild tricuspid valve regurgitation is present. Right Ventricular Arterial Pressure (RVSP) is 44 mmHg. Pulmonary hypertension found to be mild. · LA: Moderately dilated left atrium. · RA: Moderately dilated right atrium. · AV: Mild aortic valve regurgitation is present. · MV: Mild mitral valve regurgitation is present.        EKG Results     Procedure 720 Value Units Date/Time    EKG, 12 LEAD, SUBSEQUENT [[id number]] Collected: 10/26/21 1018    Order Status: Completed Updated: 10/26/21 1344     Ventricular Rate 78 BPM      Atrial Rate 76 BPM      QRS Duration 80 ms      Q-T Interval 436 ms      QTC Calculation (Bezet) 497 ms      Calculated R Axis -29 degrees      Calculated T Axis 36 degrees      Diagnosis --     Atrial fibrillation  Nonspecific ST abnormality  Prolonged QT  Abnormal ECG  When compared with ECG of 26-OCT-2021 10:17,  No significant change was found  Confirmed by Rita Hankins ([id number]) on 10/26/2021 1:44:03 PM      EKG [[id number]] Collected: 10/25/21 1504    Order Status: Completed Updated: 10/26/21 0709     Ventricular Rate 57 BPM      Atrial Rate 300 BPM      QRS Duration 80 ms      Q-T Interval 450 ms      QTC Calculation (Bezet) 438 ms      Calculated R Axis -37 degrees      Calculated T Axis 35 degrees      Diagnosis --     Atrial fibrillation with slow ventricular response  Left axis deviation  Possible Anterior infarct (cited on or before 18-DEC-2016)  Abnormal ECG  When compared with ECG of 18-DEC-2016 11:33,  Atrial fibrillation has replaced Sinus rhythm  Confirmed by Ranjit Escobedo ([id number]) on 10/26/2021 7:09:04 AM            Diagnostic Imaging/Tests:   DUPLEX LOW EXT ARTERIES WITH TSERING    Result Date: 10/26/2021  History: Peripheral arterial disease. FINDINGS: Duplex Doppler arterial ultrasound was performed of the lower extremity arterial system bilaterally. Color flow, grayscale, and spectral analysis was performed. Biphasic waveforms present bilaterally. However, occluded right anterior tibial artery. Resting ankle-brachial index on the right 1.0. Resting ankle-brachial index on the left 1.1. Toe index on the right 0.84. Toe index on the left 0.92. Pulse volume recordings in the great toes reveals unremarkable waveforms. Occluded right anterior tibial artery. XR CHEST PORT    Result Date: 10/25/2021  EXAM: CHEST X-RAY, 1 VIEW INDICATION: Shortness of breath, leg swelling, concern for CHF COMPARISON: Chest x-ray 4/20/2021 TECHNIQUE: Single AP view of the chest was obtained. FINDINGS: The lungs are clear and the pulmonary vasculature is normal. No pneumothorax or pleural effusion. The cardiomediastinal silhouette is enlarged. The osseous structures are unremarkable. 1.  No radiographic evidence of acute cardiopulmonary disease. No jose pulmonary edema. 2.  Enlarged cardiac silhouette. DUPLEX LOWER EXT VENOUS BILAT    Result Date: 10/25/2021  Bilateral lower extremity venous ultrasound INDICATION:  Pain and swelling, Doppler ultrasound of both lower extremities was performed. FINDINGS:  There is normal flow in the greater saphenous, common femoral, superficial femoral, and popliteal veins. Normal compression and augmentation is demonstrated. The proximal calf veins are also patent. No evidence of deep venous thrombosis in either lower extremity     ECHO ADULT COMPLETE    Result Date: 10/26/2021  · Contrast used: DEFINITY. · LV: Estimated LVEF is 60 - 65%.  Normal cavity size, wall thickness and systolic function (ejection fraction normal). · TV: Mild tricuspid valve regurgitation is present. Right Ventricular Arterial Pressure (RVSP) is 44 mmHg. Pulmonary hypertension found to be mild. · LA: Moderately dilated left atrium. · RA: Moderately dilated right atrium. · AV: Mild aortic valve regurgitation is present. · MV: Mild mitral valve regurgitation is present. CT CHEST PULMONARY EMBOLISM    Result Date: 10/26/2021  CT OF THE CHEST WITH INTRAVENOUS CONTRAST, 10/26/2021 Indication: Shortness of breath, elevated d-dimer, left leg pain and left leg swelling. Comparison: CT chest without contrast 6/17/2010 Technique:   2.5 mm axial scans from above the aortic arch to the lung bases following the uneventful administration of 70 mL of Isovue-370. Intravenous contrast was given to evaluate for pulmonary embolism. All CT scans performed at this facility use one or all of the following: Automated exposure control, adjustment of the mA and/or kVp according to patient's size, iterative reconstruction. Findings: The base of the neck is unremarkable in appearance. No axillary, mediastinal, or hilar lymphadenopathy is seen. The thoracic aorta is normal in caliber. Opacification of the pulmonary arteries is good. No abnormal filling defects are seen to suggest pulmonary embolism. The heart appears moderately enlarged. There is additional enlargement of pulmonary artery demonstrating a diameter greater than that of the adjacent ascending aorta. Evaluation with lung windows demonstrates no significant infiltrate. No pleural effusion is seen. Lungs are expanded without evidence for pneumothorax. No acute osseous abnormality is seen. Limited evaluation of the upper abdomen demonstrates no acute abnormality. 1.  No evidence for pulmonary embolism. 2. Moderate cardiomegaly. This can be an early indicator for heart failure in the correct clinical setting. 3. Enlarged pulmonary artery which can indicator for pulmonary artery hypertension. This report was made using voice transcription. Despite my best efforts to avoid any, transcription errors may persist. If there is any question about the accuracy of the report or need for clarification, then please call [phone], or text me through perfectserv for clarification or correction.        All Micro Results     None          Labs: Results:       BMP, Mg, Phos Recent Labs     10/27/21  0822 10/26/21  0124    145   K 3.5 3.6    108*   CO2 29 28   AGAP 7 9   BUN 17 11   CREA 1.18* 1.06*   CA 9.0 9.3   * 100   MG  --  2.3      CBC Recent Labs     10/26/21  0124   WBC 8.7   RBC 4.21   HGB 12.1   HCT 38.9      GRANS 58   LYMPH 30   EOS 4   MONOS 7   BASOS 1   IG 1   ANEU 5.1   ABL 2.6   GARRET 0.3   ABM 0.6   ABB 0.1   AIG 0.0      LFT Recent Labs     10/27/21  0822   ALT 49   AP 62   TP 6.8   ALB 2.9*   GLOB 3.9*   AGRAT 0.7*      Cardiac Testing Lab Results   Component Value Date/Time     03/05/2011 06:48 AM    BNP 91 03/04/2011 09:41 PM    Troponin-I <0.05 03/04/2011 09:41 PM    Troponin-I, Qt. <0.04 (L) 03/05/2011 04:50 PM    Troponin-I, Qt. <0.04 (L) 03/05/2011 12:30 PM    Troponin-I, Qt. <0.04 (L) 03/05/2011 06:48 AM      Coagulation Tests Lab Results   Component Value Date/Time    Prothrombin time 11.1 (H) 03/05/2011 06:48 AM    INR 1.1 03/05/2011 06:48 AM    aPTT 44.5 (H) 03/07/2011 05:01 AM    aPTT 39.1 (H) 03/06/2011 03:10 PM    aPTT 46.9 (H) 03/06/2011 09:22 AM      A1c Lab Results   Component Value Date/Time    Hemoglobin A1c 5.6 01/15/2018 09:44 AM    Hemoglobin A1c 6.0 (H) 10/28/2016 07:40 AM    Hemoglobin A1c, External 6.0 12/04/2014 12:00 AM      Lipid Panel Lab Results   Component Value Date/Time    Cholesterol, total 117 07/21/2021 08:21 AM    HDL Cholesterol 55 07/21/2021 08:21 AM    LDL, calculated 46 07/21/2021 08:21 AM    LDL, calculated 50 01/11/2021 12:19 PM    VLDL, calculated 16 07/21/2021 08:21 AM    VLDL, calculated 36 (H) 01/11/2021 12:19 PM Triglyceride 84 07/21/2021 08:21 AM    CHOL/HDL Ratio 2.5 01/11/2021 12:19 PM      Thyroid Panel Lab Results   Component Value Date/Time    TSH 1.720 04/13/2021 09:37 AM    TSH 1.490 01/04/2021 09:31 AM    T4, Free 0.98 05/24/2019 10:30 AM    T4, Free 1.02 01/15/2018 09:44 AM        Most Recent UA Lab Results   Component Value Date/Time    Color Yellow 10/06/2017 12:27 PM    Appearance Cloudy (A) 10/06/2017 12:27 PM    Specific gravity 1.015 02/06/2017 10:05 AM    pH (UA) 7.0 10/06/2017 12:27 PM    Protein NEGATIVE  02/06/2017 10:05 AM    Glucose NEGATIVE  02/06/2017 10:05 AM    Ketone Negative 10/06/2017 12:27 PM    Bilirubin Negative 10/06/2017 12:27 PM    Blood Trace (A) 10/06/2017 12:27 PM    Urobilinogen 0.2 02/06/2017 10:05 AM    Nitrites Positive (A) 10/06/2017 12:27 PM    Leukocyte Esterase 3+ (A) 10/06/2017 12:27 PM    WBC >30 (A) 10/06/2017 12:27 PM    RBC 0-2 10/06/2017 12:27 PM    Epithelial cells 0-10 10/06/2017 12:27 PM    Bacteria Few 10/06/2017 12:27 PM          All Labs from Last 24 Hrs:  No results found for this or any previous visit (from the past 24 hour(s)). Current Med List in Hospital:   No current facility-administered medications for this encounter. Current Outpatient Medications   Medication Sig    [START ON 10/29/2021] spironolactone (ALDACTONE) 25 mg tablet Take 1 Tablet by mouth daily.  cyanocobalamin (VITAMIN B12) 500 mcg tablet Take 500 mcg by mouth daily.  ascorbic acid, vitamin C, (Vitamin C) 250 mg tablet Take 250 mg by mouth daily.  pantoprazole (PROTONIX) 40 mg tablet Take 1 Tablet by mouth Daily (before breakfast).  ergocalciferol (ERGOCALCIFEROL) 1,250 mcg (50,000 unit) capsule One capsule by mouth weekly for 4 weeks then one monthly. Indications: vitamin D deficiency (high dose therapy)    Ca-D3-mag-zinc--delmy-boron (Caltrate 600-D Plus Minerals) 600 mg calcium- 800 unit-40 mg chew Take 1 Tablet by mouth two (2) times a day.  Indications: post-menopausal osteoporosis prevention    losartan (COZAAR) 100 mg tablet Take 1 Tablet by mouth daily. Indications: high blood pressure    furosemide (LASIX) 40 mg tablet take 1 tablet by mouth every morning    albuterol (PROVENTIL HFA, VENTOLIN HFA, PROAIR HFA) 90 mcg/actuation inhaler Take 2 Puffs by inhalation every six (6) hours as needed for Wheezing. Indications: asthma attack    sertraline (Zoloft) 50 mg tablet Take 1.5 Tabs by mouth every morning.  gabapentin (NEURONTIN) 300 mg capsule Take 1 Cap by mouth three (3) times daily.  fluticasone furoate-vilanteroL (Breo Ellipta) 100-25 mcg/dose inhaler Take 1 Puff by inhalation daily. RINSE MOUTH WELL AFTER USE    fluticasone propionate (FLONASE) 50 mcg/actuation nasal spray 2 Sprays by Both Nostrils route nightly. (Patient taking differently: 2 Sprays by Both Nostrils route nightly. Indications: using 3 times weekly)    vitamin A (AQUASOL A) 10,000 unit capsule Take 24,000 Units by mouth every other day. Indications: taking 3 times weekly    cetirizine (ZYRTEC) 10 mg tablet Take 1 Tab by mouth daily. (Patient taking differently: Take 10 mg by mouth daily as needed.)    aspirin delayed-release 81 mg tablet Take 81 mg by mouth every morning. Instructed to take DOS per Anesthesia guidelines.  albuterol (PROVENTIL VENTOLIN) 2.5 mg /3 mL (0.083 %) nebu 3 mL by Nebulization route every six (6) hours as needed for Wheezing. (Patient not taking: Reported on 10/26/2021)    nitroglycerin (NITROSTAT) 0.4 mg SL tablet 1 Tab by SubLINGual route every five (5) minutes as needed for Chest Pain. Allergies   Allergen Reactions    Bees [Hymenoptera Allergenic Extract] Anaphylaxis    Adhesive Tape-Silicones Rash     Ok to use paper tape    Contrast Agent [Iodine] Hives    Lescol [Fluvastatin] Cough    Lipitor [Atorvastatin] Other (comments)     High doses cause leg cramps.  Able to tolerate low doses (<20mg/day)    Lisinopril Cough           Penicillin G Rash    Tetracycline Other (comments)     ULCERS IN MOUTH     Immunization History   Administered Date(s) Administered    COVID-19, PFIZER, MRNA, LNP-S, PF, 30MCG/0.3ML DOSE 02/27/2021, 03/22/2021    Influenza High Dose Vaccine PF 08/02/2018, 08/31/2018, 09/15/2019, 09/01/2021    Influenza Vaccine 09/29/2014, 09/04/2015, 09/10/2017    Influenza, Quadrivalent, Adjuvanted (>65 Yrs FLUAD QUAD 62995) 10/02/2020    Pneumococcal Conjugate (PCV-13) 09/04/2015    Pneumococcal Polysaccharide (PPSV-23) 10/25/2017    TB Skin Test (PPD) Intradermal 02/07/2017, 10/26/2021    Tdap 01/01/2004    Zoster Recombinant 08/06/2020       Recent Vital Data:  Patient Vitals for the past 24 hrs:   Temp Pulse Resp BP SpO2   10/28/21 1150  75      10/28/21 1059 98 °F (36.7 °C) 69 16 (!) 144/83 93 %   10/28/21 0717 97.6 °F (36.4 °C) 67 16 (!) 153/73 94 %   10/28/21 0339 98.6 °F (37 °C) (!) 58 20 (!) 145/69 93 %   10/27/21 2333 98.1 °F (36.7 °C) 60 20 (!) 174/77 91 %     Oxygen Therapy  O2 Sat (%): 93 % (10/28/21 1059)  Pulse via Oximetry: 62 beats per minute (10/25/21 2303)  O2 Device: None (Room air) (10/27/21 1435)    Estimated body mass index is 33.58 kg/m² as calculated from the following:    Height as of this encounter: 5' (1.524 m). Weight as of this encounter: 78 kg (171 lb 15.3 oz). Intake/Output Summary (Last 24 hours) at 10/28/2021 2023  Last data filed at 10/28/2021 1239  Gross per 24 hour   Intake 300 ml   Output 850 ml   Net -550 ml         Physical Exam:    General:    Well nourished. No overt distress  Head:  Normocephalic, atraumatic  Eyes:  Sclerae appear normal.  Pupils equally round. HENT:  Nares appear normal, no drainage. Moist mucous membranes  Neck:  No restricted ROM. Trachea midline  CV:   Irregularly irregular,   No m/r/g. No JVD  Lungs:   CTAB. No wheezing, rhonchi, or rales. Even, unlabored  Abdomen:   Soft, nontender, nondistended. Extremities: Warm and dry.   No cyanosis or clubbing. No edema. Skin:     No rashes. Normal coloration  Neuro:  CN II-XII grossly intact. Psych:  Normal mood and affect. Signed:  Jairo Lamar DO    Part of this note may have been written by using a voice dictation software. The note has been proof read but may still contain some grammatical/other typographical errors.

## 2021-10-30 ENCOUNTER — APPOINTMENT (OUTPATIENT)
Dept: GENERAL RADIOLOGY | Age: 76
End: 2021-10-30
Attending: EMERGENCY MEDICINE
Payer: MEDICARE

## 2021-10-30 ENCOUNTER — HOSPITAL ENCOUNTER (EMERGENCY)
Age: 76
Discharge: HOME | End: 2021-10-30
Attending: EMERGENCY MEDICINE
Payer: MEDICARE

## 2021-10-30 VITALS
DIASTOLIC BLOOD PRESSURE: 70 MMHG | RESPIRATION RATE: 17 BRPM | HEART RATE: 60 BPM | SYSTOLIC BLOOD PRESSURE: 160 MMHG | TEMPERATURE: 98.9 F | OXYGEN SATURATION: 94 %

## 2021-10-30 DIAGNOSIS — R07.89 ATYPICAL CHEST PAIN: Primary | ICD-10-CM

## 2021-10-30 LAB
ALBUMIN SERPL-MCNC: 2.9 G/DL (ref 3.2–4.6)
ALBUMIN/GLOB SERPL: 0.8 {RATIO} (ref 1.2–3.5)
ALP SERPL-CCNC: 61 U/L (ref 50–136)
ALT SERPL-CCNC: 39 U/L (ref 12–65)
ANION GAP SERPL CALC-SCNC: 4 MMOL/L (ref 7–16)
AST SERPL-CCNC: 25 U/L (ref 15–37)
BASOPHILS # BLD: 0.1 K/UL (ref 0–0.2)
BASOPHILS NFR BLD: 1 % (ref 0–2)
BILIRUB SERPL-MCNC: 0.4 MG/DL (ref 0.2–1.1)
BUN SERPL-MCNC: 22 MG/DL (ref 8–23)
CALCIUM SERPL-MCNC: 8.4 MG/DL (ref 8.3–10.4)
CHLORIDE SERPL-SCNC: 107 MMOL/L (ref 98–107)
CO2 SERPL-SCNC: 30 MMOL/L (ref 21–32)
CREAT SERPL-MCNC: 1.37 MG/DL (ref 0.6–1)
DIFFERENTIAL METHOD BLD: ABNORMAL
EOSINOPHIL # BLD: 0.3 K/UL (ref 0–0.8)
EOSINOPHIL NFR BLD: 3 % (ref 0.5–7.8)
ERYTHROCYTE [DISTWIDTH] IN BLOOD BY AUTOMATED COUNT: 14.9 % (ref 11.9–14.6)
GLOBULIN SER CALC-MCNC: 3.7 G/DL (ref 2.3–3.5)
GLUCOSE SERPL-MCNC: 128 MG/DL (ref 65–100)
HCT VFR BLD AUTO: 44.1 % (ref 35.8–46.3)
HGB BLD-MCNC: 13.9 G/DL (ref 11.7–15.4)
IMM GRANULOCYTES # BLD AUTO: 0.1 K/UL (ref 0–0.5)
IMM GRANULOCYTES NFR BLD AUTO: 1 % (ref 0–5)
LIPASE SERPL-CCNC: 237 U/L (ref 73–393)
LYMPHOCYTES # BLD: 3.2 K/UL (ref 0.5–4.6)
LYMPHOCYTES NFR BLD: 33 % (ref 13–44)
MAGNESIUM SERPL-MCNC: 2.5 MG/DL (ref 1.8–2.4)
MCH RBC QN AUTO: 29 PG (ref 26.1–32.9)
MCHC RBC AUTO-ENTMCNC: 31.5 G/DL (ref 31.4–35)
MCV RBC AUTO: 91.9 FL (ref 79.6–97.8)
MONOCYTES # BLD: 0.9 K/UL (ref 0.1–1.3)
MONOCYTES NFR BLD: 9 % (ref 4–12)
NEUTS SEG # BLD: 5.2 K/UL (ref 1.7–8.2)
NEUTS SEG NFR BLD: 54 % (ref 43–78)
NRBC # BLD: 0 K/UL (ref 0–0.2)
PLATELET # BLD AUTO: 335 K/UL (ref 150–450)
PMV BLD AUTO: 10 FL (ref 9.4–12.3)
POTASSIUM SERPL-SCNC: 3.4 MMOL/L (ref 3.5–5.1)
PROT SERPL-MCNC: 6.6 G/DL (ref 6.3–8.2)
RBC # BLD AUTO: 4.8 M/UL (ref 4.05–5.2)
SODIUM SERPL-SCNC: 141 MMOL/L (ref 136–145)
TROPONIN-HIGH SENSITIVITY: 13.3 PG/ML (ref 0–14)
TROPONIN-HIGH SENSITIVITY: 13.7 PG/ML (ref 0–14)
WBC # BLD AUTO: 9.8 K/UL (ref 4.3–11.1)

## 2021-10-30 PROCEDURE — 93005 ELECTROCARDIOGRAM TRACING: CPT | Performed by: EMERGENCY MEDICINE

## 2021-10-30 PROCEDURE — 99285 EMERGENCY DEPT VISIT HI MDM: CPT

## 2021-10-30 PROCEDURE — 71046 X-RAY EXAM CHEST 2 VIEWS: CPT

## 2021-10-30 PROCEDURE — 3331090002 HH PPS REVENUE DEBIT

## 2021-10-30 PROCEDURE — 3331090001 HH PPS REVENUE CREDIT

## 2021-10-30 PROCEDURE — 80053 COMPREHEN METABOLIC PANEL: CPT

## 2021-10-30 PROCEDURE — 74011250637 HC RX REV CODE- 250/637: Performed by: EMERGENCY MEDICINE

## 2021-10-30 PROCEDURE — 83690 ASSAY OF LIPASE: CPT

## 2021-10-30 PROCEDURE — 83735 ASSAY OF MAGNESIUM: CPT

## 2021-10-30 PROCEDURE — 85025 COMPLETE CBC W/AUTO DIFF WBC: CPT

## 2021-10-30 PROCEDURE — 84484 ASSAY OF TROPONIN QUANT: CPT

## 2021-10-30 RX ORDER — SODIUM CHLORIDE 0.9 % (FLUSH) 0.9 %
5-10 SYRINGE (ML) INJECTION AS NEEDED
Status: DISCONTINUED | OUTPATIENT
Start: 2021-10-30 | End: 2021-10-31 | Stop reason: HOSPADM

## 2021-10-30 RX ORDER — SODIUM CHLORIDE 0.9 % (FLUSH) 0.9 %
5-10 SYRINGE (ML) INJECTION EVERY 8 HOURS
Status: DISCONTINUED | OUTPATIENT
Start: 2021-10-30 | End: 2021-10-31 | Stop reason: HOSPADM

## 2021-10-30 RX ORDER — ACETAMINOPHEN 500 MG
1000 TABLET ORAL
Status: COMPLETED | OUTPATIENT
Start: 2021-10-30 | End: 2021-10-30

## 2021-10-30 RX ORDER — TRAMADOL HYDROCHLORIDE 50 MG/1
50 TABLET ORAL
Status: COMPLETED | OUTPATIENT
Start: 2021-10-30 | End: 2021-10-30

## 2021-10-30 RX ADMIN — TRAMADOL HYDROCHLORIDE 50 MG: 50 TABLET, COATED ORAL at 22:51

## 2021-10-30 RX ADMIN — ACETAMINOPHEN 1000 MG: 500 TABLET ORAL at 21:42

## 2021-10-30 NOTE — ED TRIAGE NOTES
Pt to ED POV ambulatory to triage with cane without difficulty for left sided chest pain. Pt states this pain began approx 1 hour ago while she was laying down. Pt states shortness of breath and that the pain is a pressure. Pt was recently discharged from this facility for an admission from her CHF and a fib. Pt states compliance with medications and states no ASA or NTG prior to arrival. Pt breathing unlabored, even respirations, no o2 atthis time. Pt masked pta.

## 2021-10-31 LAB
ATRIAL RATE: 340 BPM
CALCULATED R AXIS, ECG10: -41 DEGREES
CALCULATED T AXIS, ECG11: 57 DEGREES
DIAGNOSIS, 93000: NORMAL
Q-T INTERVAL, ECG07: 386 MS
QRS DURATION, ECG06: 80 MS
QTC CALCULATION (BEZET), ECG08: 450 MS
VENTRICULAR RATE, ECG03: 82 BPM

## 2021-10-31 PROCEDURE — 3331090002 HH PPS REVENUE DEBIT

## 2021-10-31 PROCEDURE — 3331090001 HH PPS REVENUE CREDIT

## 2021-10-31 NOTE — ED NOTES
I have reviewed discharge instructions with the patient. The patient verbalized understanding. Patient left ED via Discharge Method: ambulatory to Home with family/self. Opportunity for questions and clarification provided. Patient given 0 scripts. No e-sign. To continue your aftercare when you leave the hospital, you may receive an automated call from our care team to check in on how you are doing. This is a free service and part of our promise to provide the best care and service to meet your aftercare needs.  If you have questions, or wish to unsubscribe from this service please call [phone]. Thank you for Choosing our [facility].

## 2021-10-31 NOTE — ED PROVIDER NOTES
70-year-old lady presents with concerns about pain in the left side of her chest that started earlier this evening. She notes that she was discharged from the hospital a few days ago after new onset atrial fibrillation. She has been started on Eliquis as well as a couple of other medicines. She says she has no known history of coronary artery disease and has never had a stent. With her chest pain she denies any shortness of breath or difficulty breathing. She had no fevers or chills and no cough. No nausea, vomiting, or diarrhea. The pain does radiate towards her left shoulder and left arm. Elements of this note were created using speech recognition software. As such, errors of speech recognition may be present.            Past Medical History:   Diagnosis Date    Allergic rhinitis, cause unspecified 01/07/2015    Aortic valve disorders 01/07/2015    Asthma     uses inhaler prn    CAD (coronary artery disease)     mild,  treated with med    Carotid stenosis 1/7/2015    Carpal tunnel syndrome 01/07/2015    bilat wrists-no issue now    Depression 06/30/2015    Esophageal reflux 01/07/2015    Essential hypertension     Fibromyalgia     Former smoker     1 ppd for 14 yrs; quit smoking 1981    GERD (gastroesophageal reflux disease)     Heart failure ([facility] [address]     last EF 55-60%    History of peptic ulcer disease     Menopause     Mixed hyperlipidemia 4/21/2016    Neuropathy     BLE    Osteoarthritis     Osteopenia 1/7/2015    Renal insufficiency 01/07/2015       Past Surgical History:   Procedure Laterality Date    COLONOSCOPY N/A 10/22/2021    COLONOSCOPY/ BMI 34 performed by Rut Sandhu MD at [address] [facility] [address] [place] [address]      bladder tac    HX BLADDER SUSPENSION      HX COLONOSCOPY      HX GI      esophageal dilatation    HX HEMORRHOIDECTOMY      HX HYSTERECTOMY      HX LAP CHOLECYSTECTOMY      HX RECTOCELE REPAIR      HX ROTATOR CUFF REPAIR Right     HX TUBAL LIGATION Family History:   Problem Relation Age of Onset   [facility] Diabetes Mother     Heart Attack Mother     Diabetes Sister     Breast Cancer Sister 79    Diabetes Sister     Heart Attack Father     Diabetes Sister     Heart Attack Sister          from heart issue age 54    Arthritis-rheumatoid Paternal Grandmother     Heart Disease Maternal Grandmother        Social History     Socioeconomic History    Marital status:      Spouse name: Valerie Heller Number of children: 3    Years of education: 15 years    Highest education level: Not on file   Occupational History    Occupation: Retired   Tobacco Use    Smoking status: Former Smoker     Packs/day: 1.00     Years: 14.00     Pack years: 14.00    Smokeless tobacco: Never Used    Tobacco comment: quit smoking    Substance and Sexual Activity    Alcohol use: No     Alcohol/week: 0.0 standard drinks    Drug use: No    Sexual activity: Yes     Partners: Male   Other Topics Concern     Service No    Blood Transfusions No    Caffeine Concern Yes    Occupational Exposure No    Hobby Hazards No    Sleep Concern No    Stress Concern Yes     Comment: Taking care of her     Weight Concern No    Special Diet No    Back Care Yes     Comment: pt has DDD    Exercise No    Bike Helmet No    Seat Belt Yes    Self-Exams Yes   Social History Narrative    Not on file     Social Determinants of Health     Financial Resource Strain:     Difficulty of Paying Living Expenses:    Food Insecurity:     Worried About Running Out of Food in the Last Year:     Ran Out of Food in the Last Year:    Transportation Needs:     Lack of Transportation (Medical):      Lack of Transportation (Non-Medical):    Physical Activity:     Days of Exercise per Week:     Minutes of Exercise per Session:    Stress:     Feeling of Stress :    Social Connections:     Frequency of Communication with Friends and Family:     Frequency of Social Gatherings with Friends and Family:     Attends Jain Services:     Active Member of Clubs or Organizations:     Attends Club or Organization Meetings:     Marital Status:    Intimate Partner Violence:     Fear of Current or Ex-Partner:     Emotionally Abused:     Physically Abused:     Sexually Abused: ALLERGIES: Bees [hymenoptera allergenic extract], Adhesive tape-silicones, Contrast agent [iodine], Lescol [fluvastatin], Lipitor [atorvastatin], Lisinopril, Penicillin g, and Tetracycline    Review of Systems   Constitutional: Negative for chills, diaphoresis and fever. HENT: Negative for congestion, rhinorrhea and sore throat. Eyes: Negative for redness and visual disturbance. Respiratory: Positive for chest tightness. Negative for cough, shortness of breath and wheezing. Cardiovascular: Positive for chest pain. Negative for palpitations. Gastrointestinal: Negative for abdominal pain, blood in stool, diarrhea, nausea and vomiting. Endocrine: Negative for polydipsia and polyuria. Genitourinary: Negative for dysuria and hematuria. Musculoskeletal: Negative for arthralgias, myalgias and neck stiffness. Skin: Negative for rash. Allergic/Immunologic: Negative for environmental allergies and food allergies. Neurological: Negative for dizziness, weakness and headaches. Hematological: Negative for adenopathy. Does not bruise/bleed easily. Psychiatric/Behavioral: Negative for confusion and sleep disturbance. The patient is not nervous/anxious. Vitals:    10/30/21 1955 10/30/21 2015 10/30/21 2017 10/30/21 2022   BP: (!) 160/80  (!) 156/70    Pulse: 76 74 77    Resp: 18 17     Temp: 98.3 °F (36.8 °C)      SpO2: 95% 95% 95% 96%            Physical Exam  Vitals and nursing note reviewed. Constitutional:       General: She is not in acute distress. Appearance: She is well-developed. She is not toxic-appearing. HENT:      Head: Normocephalic and atraumatic.    Eyes: General: No scleral icterus. Right eye: No discharge. Left eye: No discharge. Conjunctiva/sclera: Conjunctivae normal.      Pupils: Pupils are equal, round, and reactive to light. Cardiovascular:      Rate and Rhythm: Normal rate and regular rhythm. Heart sounds: Normal heart sounds. Pulmonary:      Effort: Pulmonary effort is normal. No respiratory distress. Breath sounds: Normal breath sounds. No wheezing or rales. Chest:      Chest wall: No tenderness. Abdominal:      General: Bowel sounds are normal. There is no distension. Palpations: Abdomen is soft. Tenderness: There is no guarding or rebound. Musculoskeletal:         General: No tenderness. Normal range of motion. Cervical back: Normal range of motion. No rigidity. Lymphadenopathy:      Cervical: No cervical adenopathy. Skin:     General: Skin is warm and dry. Neurological:      General: No focal deficit present. Mental Status: She is alert and oriented to person, place, and time. Psychiatric:         Mood and Affect: Mood normal.         Behavior: Behavior normal.          MDM  Number of Diagnoses or Management Options  Diagnosis management comments: EKG review by ER doctor:  Atrial fibrillation  No acute ischemic ST segment changes  No QTC prolongation  Rate of:82    ED Course as of Oct 30 2238   Sat Oct 30, 2021   2237 Patient's repeat troponin is negative. I do not think she is having acute coronary syndrome.   I will discharge her home.    [AC]      ED Course User Index  [AC] Negin Corbin MD       Procedures

## 2021-11-01 ENCOUNTER — TELEPHONE (OUTPATIENT)
Dept: HOME HEALTH SERVICES | Facility: HOME HEALTH | Age: 76
End: 2021-11-01

## 2021-11-01 PROCEDURE — 3331090001 HH PPS REVENUE CREDIT

## 2021-11-01 PROCEDURE — 3331090002 HH PPS REVENUE DEBIT

## 2021-11-01 NOTE — TELEPHONE ENCOUNTER
[address] [place] [address] Sean Walter Pharmacist consult. Mrs. Chu Galicia was discharged from [facility] with CHF. I explained my part of the [facility] team.  She said the doctor in the hospital had told her she would take Eliquis, but not on discharge med list.  She called MD and was told she needed to take. She said it was 5mg. twice a day. She had been given a coupon for it, but when she took to the drugstore, she was told a written prescription had to accompany the coupon. She called her MD back and was told she would be given a 30 day supply if she could come get from the office. I reviewed her hospital record to see what had happened. The order for the Eliquis was written by the MD at [phone] on 10/28 and she was discharged at around 1700 on 10/28. I think this new order did not make it onto the AVS since it was never given. I explained the importance of taking per orders. I reminded her of her MD appointment tomorrow. I gave her my cell phone number and asked her to call with any medication questions. She said OK to call back.

## 2021-11-02 ENCOUNTER — HOME CARE VISIT (OUTPATIENT)
Dept: SCHEDULING | Facility: HOME HEALTH | Age: 76
End: 2021-11-02
Payer: MEDICARE

## 2021-11-02 VITALS
DIASTOLIC BLOOD PRESSURE: 68 MMHG | SYSTOLIC BLOOD PRESSURE: 110 MMHG | HEART RATE: 76 BPM | RESPIRATION RATE: 16 BRPM | TEMPERATURE: 97.8 F | OXYGEN SATURATION: 100 %

## 2021-11-02 PROCEDURE — 3331090002 HH PPS REVENUE DEBIT

## 2021-11-02 PROCEDURE — 3331090001 HH PPS REVENUE CREDIT

## 2021-11-02 PROCEDURE — G0157 HHC PT ASSISTANT EA 15: HCPCS

## 2021-11-02 PROCEDURE — G0299 HHS/HOSPICE OF RN EA 15 MIN: HCPCS

## 2021-11-03 ENCOUNTER — HOSPITAL ENCOUNTER (OUTPATIENT)
Dept: LAB | Age: 76
Discharge: HOME | End: 2021-11-03
Payer: MEDICARE

## 2021-11-03 DIAGNOSIS — M54.50 CHRONIC BILATERAL LOW BACK PAIN WITHOUT SCIATICA: ICD-10-CM

## 2021-11-03 DIAGNOSIS — Z79.899 ON POTASSIUM WASTING DIURETIC THERAPY: ICD-10-CM

## 2021-11-03 DIAGNOSIS — M25.552 ACUTE HIP PAIN, LEFT: ICD-10-CM

## 2021-11-03 DIAGNOSIS — I10 ESSENTIAL HYPERTENSION: ICD-10-CM

## 2021-11-03 DIAGNOSIS — M85.89 OSTEOPENIA OF MULTIPLE SITES: ICD-10-CM

## 2021-11-03 DIAGNOSIS — E78.2 MIXED HYPERLIPIDEMIA: ICD-10-CM

## 2021-11-03 DIAGNOSIS — E55.9 VITAMIN D DEFICIENCY: ICD-10-CM

## 2021-11-03 DIAGNOSIS — G89.29 CHRONIC BILATERAL LOW BACK PAIN WITHOUT SCIATICA: ICD-10-CM

## 2021-11-03 LAB
ALBUMIN SERPL-MCNC: 3.6 G/DL (ref 3.2–4.6)
ALBUMIN/GLOB SERPL: 0.9 {RATIO} (ref 1.2–3.5)
ALP SERPL-CCNC: 70 U/L (ref 50–136)
ALT SERPL-CCNC: 39 U/L (ref 12–65)
ANION GAP SERPL CALC-SCNC: 3 MMOL/L (ref 7–16)
AST SERPL-CCNC: 23 U/L (ref 15–37)
BASOPHILS # BLD: 0.1 K/UL (ref 0–0.2)
BASOPHILS NFR BLD: 1 % (ref 0–2)
BILIRUB SERPL-MCNC: 0.5 MG/DL (ref 0.2–1.1)
BUN SERPL-MCNC: 24 MG/DL (ref 8–23)
CALCIUM SERPL-MCNC: 9.2 MG/DL (ref 8.3–10.4)
CHLORIDE SERPL-SCNC: 106 MMOL/L (ref 98–107)
CHOLEST SERPL-MCNC: 153 MG/DL
CO2 SERPL-SCNC: 30 MMOL/L (ref 21–32)
CREAT SERPL-MCNC: 1.36 MG/DL (ref 0.6–1)
DIFFERENTIAL METHOD BLD: ABNORMAL
EOSINOPHIL # BLD: 0.2 K/UL (ref 0–0.8)
EOSINOPHIL NFR BLD: 3 % (ref 0.5–7.8)
ERYTHROCYTE [DISTWIDTH] IN BLOOD BY AUTOMATED COUNT: 14.7 % (ref 11.9–14.6)
GLOBULIN SER CALC-MCNC: 4 G/DL (ref 2.3–3.5)
GLUCOSE SERPL-MCNC: 97 MG/DL (ref 65–100)
HCT VFR BLD AUTO: 46.1 % (ref 35.8–46.3)
HDLC SERPL-MCNC: 59 MG/DL (ref 40–60)
HDLC SERPL: 2.6 {RATIO}
HGB BLD-MCNC: 13.9 G/DL (ref 11.7–15.4)
IMM GRANULOCYTES # BLD AUTO: 0.1 K/UL (ref 0–0.5)
IMM GRANULOCYTES NFR BLD AUTO: 1 % (ref 0–5)
LDLC SERPL CALC-MCNC: 56.4 MG/DL
LYMPHOCYTES # BLD: 2.8 K/UL (ref 0.5–4.6)
LYMPHOCYTES NFR BLD: 31 % (ref 13–44)
MAGNESIUM SERPL-MCNC: 2.6 MG/DL (ref 1.8–2.4)
MCH RBC QN AUTO: 27.5 PG (ref 26.1–32.9)
MCHC RBC AUTO-ENTMCNC: 30.2 G/DL (ref 31.4–35)
MCV RBC AUTO: 91.3 FL (ref 79.6–97.8)
MONOCYTES # BLD: 0.8 K/UL (ref 0.1–1.3)
MONOCYTES NFR BLD: 9 % (ref 4–12)
NEUTS SEG # BLD: 5 K/UL (ref 1.7–8.2)
NEUTS SEG NFR BLD: 56 % (ref 43–78)
NRBC # BLD: 0 K/UL (ref 0–0.2)
PLATELET # BLD AUTO: 322 K/UL (ref 150–450)
PMV BLD AUTO: 11.1 FL (ref 9.4–12.3)
POTASSIUM SERPL-SCNC: 4.1 MMOL/L (ref 3.5–5.1)
PROT SERPL-MCNC: 7.6 G/DL (ref 6.3–8.2)
RBC # BLD AUTO: 5.05 M/UL (ref 4.05–5.2)
SODIUM SERPL-SCNC: 139 MMOL/L (ref 136–145)
TRIGL SERPL-MCNC: 188 MG/DL (ref 35–150)
URATE SERPL-MCNC: 5.8 MG/DL (ref 2.6–6)
VLDLC SERPL CALC-MCNC: 37.6 MG/DL (ref 6–23)
WBC # BLD AUTO: 9 K/UL (ref 4.3–11.1)

## 2021-11-03 PROCEDURE — 80053 COMPREHEN METABOLIC PANEL: CPT

## 2021-11-03 PROCEDURE — 80061 LIPID PANEL: CPT

## 2021-11-03 PROCEDURE — 3331090001 HH PPS REVENUE CREDIT

## 2021-11-03 PROCEDURE — 84550 ASSAY OF BLOOD/URIC ACID: CPT

## 2021-11-03 PROCEDURE — 3331090002 HH PPS REVENUE DEBIT

## 2021-11-03 PROCEDURE — 85025 COMPLETE CBC W/AUTO DIFF WBC: CPT

## 2021-11-03 PROCEDURE — 83735 ASSAY OF MAGNESIUM: CPT

## 2021-11-03 PROCEDURE — 36415 COLL VENOUS BLD VENIPUNCTURE: CPT

## 2021-11-04 ENCOUNTER — HOME CARE VISIT (OUTPATIENT)
Dept: SCHEDULING | Facility: HOME HEALTH | Age: 76
End: 2021-11-04
Payer: MEDICARE

## 2021-11-04 VITALS
RESPIRATION RATE: 16 BRPM | DIASTOLIC BLOOD PRESSURE: 70 MMHG | SYSTOLIC BLOOD PRESSURE: 118 MMHG | TEMPERATURE: 97.4 F | HEART RATE: 72 BPM | OXYGEN SATURATION: 97 %

## 2021-11-04 VITALS
SYSTOLIC BLOOD PRESSURE: 100 MMHG | RESPIRATION RATE: 18 BRPM | HEART RATE: 72 BPM | DIASTOLIC BLOOD PRESSURE: 74 MMHG | TEMPERATURE: 98 F | OXYGEN SATURATION: 99 %

## 2021-11-04 VITALS
SYSTOLIC BLOOD PRESSURE: 106 MMHG | DIASTOLIC BLOOD PRESSURE: 68 MMHG | OXYGEN SATURATION: 97 % | TEMPERATURE: 97.9 F | HEART RATE: 81 BPM | RESPIRATION RATE: 17 BRPM

## 2021-11-04 PROCEDURE — 3331090002 HH PPS REVENUE DEBIT

## 2021-11-04 PROCEDURE — G0152 HHCP-SERV OF OT,EA 15 MIN: HCPCS

## 2021-11-04 PROCEDURE — G0157 HHC PT ASSISTANT EA 15: HCPCS

## 2021-11-04 PROCEDURE — 3331090001 HH PPS REVENUE CREDIT

## 2021-11-05 ENCOUNTER — HOME CARE VISIT (OUTPATIENT)
Dept: SCHEDULING | Facility: HOME HEALTH | Age: 76
End: 2021-11-05
Payer: MEDICARE

## 2021-11-05 VITALS
SYSTOLIC BLOOD PRESSURE: 128 MMHG | RESPIRATION RATE: 16 BRPM | OXYGEN SATURATION: 98 % | TEMPERATURE: 97.8 F | HEART RATE: 72 BPM | DIASTOLIC BLOOD PRESSURE: 72 MMHG

## 2021-11-05 PROCEDURE — 3331090002 HH PPS REVENUE DEBIT

## 2021-11-05 PROCEDURE — 3331090001 HH PPS REVENUE CREDIT

## 2021-11-05 PROCEDURE — G0299 HHS/HOSPICE OF RN EA 15 MIN: HCPCS

## 2021-11-06 PROCEDURE — 3331090001 HH PPS REVENUE CREDIT

## 2021-11-06 PROCEDURE — 3331090002 HH PPS REVENUE DEBIT

## 2021-11-07 PROCEDURE — 3331090001 HH PPS REVENUE CREDIT

## 2021-11-07 PROCEDURE — 3331090002 HH PPS REVENUE DEBIT

## 2021-11-08 ENCOUNTER — TELEPHONE (OUTPATIENT)
Dept: HOME HEALTH SERVICES | Facility: HOME HEALTH | Age: 76
End: 2021-11-08

## 2021-11-08 PROCEDURE — 3331090002 HH PPS REVENUE DEBIT

## 2021-11-08 PROCEDURE — 3331090001 HH PPS REVENUE CREDIT

## 2021-11-08 NOTE — TELEPHONE ENCOUNTER
Mrs. Queenie Morales said she was doing OK today. She said no medication additions or changes. She had no medication questions at this time. I asked her to call me with any medication questions or issues.

## 2021-11-09 ENCOUNTER — HOME CARE VISIT (OUTPATIENT)
Dept: SCHEDULING | Facility: HOME HEALTH | Age: 76
End: 2021-11-09
Payer: MEDICARE

## 2021-11-09 VITALS
TEMPERATURE: 97.5 F | DIASTOLIC BLOOD PRESSURE: 70 MMHG | OXYGEN SATURATION: 100 % | HEART RATE: 60 BPM | SYSTOLIC BLOOD PRESSURE: 118 MMHG | RESPIRATION RATE: 16 BRPM

## 2021-11-09 PROCEDURE — 3331090002 HH PPS REVENUE DEBIT

## 2021-11-09 PROCEDURE — G0299 HHS/HOSPICE OF RN EA 15 MIN: HCPCS

## 2021-11-09 PROCEDURE — G0157 HHC PT ASSISTANT EA 15: HCPCS

## 2021-11-09 PROCEDURE — 3331090001 HH PPS REVENUE CREDIT

## 2021-11-10 PROCEDURE — 3331090002 HH PPS REVENUE DEBIT

## 2021-11-10 PROCEDURE — 3331090001 HH PPS REVENUE CREDIT

## 2021-11-11 ENCOUNTER — HOME CARE VISIT (OUTPATIENT)
Dept: SCHEDULING | Facility: HOME HEALTH | Age: 76
End: 2021-11-11
Payer: MEDICARE

## 2021-11-11 VITALS
RESPIRATION RATE: 16 BRPM | OXYGEN SATURATION: 98 % | SYSTOLIC BLOOD PRESSURE: 128 MMHG | DIASTOLIC BLOOD PRESSURE: 80 MMHG | HEART RATE: 60 BPM | TEMPERATURE: 97.5 F

## 2021-11-11 PROCEDURE — 3331090002 HH PPS REVENUE DEBIT

## 2021-11-11 PROCEDURE — G0157 HHC PT ASSISTANT EA 15: HCPCS

## 2021-11-11 PROCEDURE — 3331090001 HH PPS REVENUE CREDIT

## 2021-11-12 ENCOUNTER — HOME CARE VISIT (OUTPATIENT)
Dept: SCHEDULING | Facility: HOME HEALTH | Age: 76
End: 2021-11-12
Payer: MEDICARE

## 2021-11-12 VITALS
HEART RATE: 73 BPM | RESPIRATION RATE: 17 BRPM | DIASTOLIC BLOOD PRESSURE: 74 MMHG | TEMPERATURE: 97.8 F | SYSTOLIC BLOOD PRESSURE: 126 MMHG | OXYGEN SATURATION: 97 %

## 2021-11-12 PROCEDURE — 3331090001 HH PPS REVENUE CREDIT

## 2021-11-12 PROCEDURE — G0299 HHS/HOSPICE OF RN EA 15 MIN: HCPCS

## 2021-11-12 PROCEDURE — 3331090002 HH PPS REVENUE DEBIT

## 2021-11-13 PROCEDURE — 3331090001 HH PPS REVENUE CREDIT

## 2021-11-13 PROCEDURE — 3331090002 HH PPS REVENUE DEBIT

## 2021-11-14 PROCEDURE — 3331090001 HH PPS REVENUE CREDIT

## 2021-11-14 PROCEDURE — 3331090002 HH PPS REVENUE DEBIT

## 2021-11-15 PROCEDURE — 3331090001 HH PPS REVENUE CREDIT

## 2021-11-15 PROCEDURE — 3331090002 HH PPS REVENUE DEBIT

## 2021-11-16 ENCOUNTER — HOME CARE VISIT (OUTPATIENT)
Dept: SCHEDULING | Facility: HOME HEALTH | Age: 76
End: 2021-11-16
Payer: MEDICARE

## 2021-11-16 VITALS
OXYGEN SATURATION: 98 % | RESPIRATION RATE: 16 BRPM | TEMPERATURE: 97.5 F | HEART RATE: 68 BPM | SYSTOLIC BLOOD PRESSURE: 110 MMHG | DIASTOLIC BLOOD PRESSURE: 62 MMHG

## 2021-11-16 VITALS
DIASTOLIC BLOOD PRESSURE: 74 MMHG | TEMPERATURE: 98.2 F | SYSTOLIC BLOOD PRESSURE: 132 MMHG | HEART RATE: 74 BPM | OXYGEN SATURATION: 98 % | RESPIRATION RATE: 17 BRPM

## 2021-11-16 PROCEDURE — 3331090001 HH PPS REVENUE CREDIT

## 2021-11-16 PROCEDURE — G0157 HHC PT ASSISTANT EA 15: HCPCS

## 2021-11-16 PROCEDURE — G0299 HHS/HOSPICE OF RN EA 15 MIN: HCPCS

## 2021-11-16 PROCEDURE — 3331090002 HH PPS REVENUE DEBIT

## 2021-11-17 ENCOUNTER — TELEPHONE (OUTPATIENT)
Dept: HOME HEALTH SERVICES | Facility: HOME HEALTH | Age: 76
End: 2021-11-17

## 2021-11-17 VITALS
SYSTOLIC BLOOD PRESSURE: 128 MMHG | HEART RATE: 81 BPM | RESPIRATION RATE: 17 BRPM | TEMPERATURE: 97.9 F | OXYGEN SATURATION: 97 % | DIASTOLIC BLOOD PRESSURE: 72 MMHG

## 2021-11-17 PROCEDURE — 3331090002 HH PPS REVENUE DEBIT

## 2021-11-17 PROCEDURE — 3331090001 HH PPS REVENUE CREDIT

## 2021-11-18 ENCOUNTER — HOME CARE VISIT (OUTPATIENT)
Dept: SCHEDULING | Facility: HOME HEALTH | Age: 76
End: 2021-11-18
Payer: MEDICARE

## 2021-11-18 VITALS
DIASTOLIC BLOOD PRESSURE: 80 MMHG | HEART RATE: 74 BPM | TEMPERATURE: 97.5 F | OXYGEN SATURATION: 97 % | SYSTOLIC BLOOD PRESSURE: 128 MMHG | RESPIRATION RATE: 16 BRPM

## 2021-11-18 PROCEDURE — G0157 HHC PT ASSISTANT EA 15: HCPCS

## 2021-11-18 PROCEDURE — 3331090001 HH PPS REVENUE CREDIT

## 2021-11-18 PROCEDURE — 3331090002 HH PPS REVENUE DEBIT

## 2021-11-19 ENCOUNTER — HOME CARE VISIT (OUTPATIENT)
Dept: SCHEDULING | Facility: HOME HEALTH | Age: 76
End: 2021-11-19
Payer: MEDICARE

## 2021-11-19 PROCEDURE — G0299 HHS/HOSPICE OF RN EA 15 MIN: HCPCS

## 2021-11-19 PROCEDURE — 3331090001 HH PPS REVENUE CREDIT

## 2021-11-19 PROCEDURE — 3331090002 HH PPS REVENUE DEBIT

## 2021-11-19 NOTE — PROGRESS NOTES
Patient contacted and informed of procedure time and arrival time. Patient informed that their  is required to remain in the hospital for the duration of the procedure.  All questions answered

## 2021-11-20 PROCEDURE — 3331090001 HH PPS REVENUE CREDIT

## 2021-11-20 PROCEDURE — 3331090002 HH PPS REVENUE DEBIT

## 2021-11-21 PROCEDURE — 3331090001 HH PPS REVENUE CREDIT

## 2021-11-21 PROCEDURE — 3331090002 HH PPS REVENUE DEBIT

## 2021-11-22 ENCOUNTER — ANESTHESIA EVENT (OUTPATIENT)
Dept: ENDOSCOPY | Age: 76
End: 2021-11-22
Payer: MEDICARE

## 2021-11-22 ENCOUNTER — HOSPITAL ENCOUNTER (OUTPATIENT)
Age: 76
Setting detail: OUTPATIENT SURGERY
Discharge: HOME | End: 2021-11-22
Attending: INTERNAL MEDICINE | Admitting: INTERNAL MEDICINE
Payer: MEDICARE

## 2021-11-22 ENCOUNTER — ANESTHESIA (OUTPATIENT)
Dept: ENDOSCOPY | Age: 76
End: 2021-11-22
Payer: MEDICARE

## 2021-11-22 VITALS
HEIGHT: 60 IN | BODY MASS INDEX: 31.02 KG/M2 | DIASTOLIC BLOOD PRESSURE: 56 MMHG | WEIGHT: 158 LBS | HEART RATE: 59 BPM | OXYGEN SATURATION: 96 % | SYSTOLIC BLOOD PRESSURE: 130 MMHG | TEMPERATURE: 98 F | RESPIRATION RATE: 15 BRPM

## 2021-11-22 PROCEDURE — 74011250636 HC RX REV CODE- 250/636: Performed by: ANESTHESIOLOGY

## 2021-11-22 PROCEDURE — 3331090002 HH PPS REVENUE DEBIT

## 2021-11-22 PROCEDURE — 2709999900 HC NON-CHARGEABLE SUPPLY: Performed by: INTERNAL MEDICINE

## 2021-11-22 PROCEDURE — 74011000250 HC RX REV CODE- 250: Performed by: NURSE ANESTHETIST, CERTIFIED REGISTERED

## 2021-11-22 PROCEDURE — 3331090001 HH PPS REVENUE CREDIT

## 2021-11-22 PROCEDURE — 76060000031 HC ANESTHESIA FIRST 0.5 HR: Performed by: INTERNAL MEDICINE

## 2021-11-22 PROCEDURE — 76040000025: Performed by: INTERNAL MEDICINE

## 2021-11-22 PROCEDURE — 74011250636 HC RX REV CODE- 250/636: Performed by: NURSE ANESTHETIST, CERTIFIED REGISTERED

## 2021-11-22 RX ORDER — SODIUM CHLORIDE, SODIUM LACTATE, POTASSIUM CHLORIDE, CALCIUM CHLORIDE 600; 310; 30; 20 MG/100ML; MG/100ML; MG/100ML; MG/100ML
100 INJECTION, SOLUTION INTRAVENOUS CONTINUOUS
Status: CANCELLED | OUTPATIENT
Start: 2021-11-22

## 2021-11-22 RX ORDER — LIDOCAINE HYDROCHLORIDE 20 MG/ML
INJECTION, SOLUTION EPIDURAL; INFILTRATION; INTRACAUDAL; PERINEURAL AS NEEDED
Status: DISCONTINUED | OUTPATIENT
Start: 2021-11-22 | End: 2021-11-22 | Stop reason: HOSPADM

## 2021-11-22 RX ORDER — SODIUM CHLORIDE, SODIUM LACTATE, POTASSIUM CHLORIDE, CALCIUM CHLORIDE 600; 310; 30; 20 MG/100ML; MG/100ML; MG/100ML; MG/100ML
100 INJECTION, SOLUTION INTRAVENOUS CONTINUOUS
Status: DISCONTINUED | OUTPATIENT
Start: 2021-11-22 | End: 2021-11-22 | Stop reason: HOSPADM

## 2021-11-22 RX ORDER — PROPOFOL 10 MG/ML
INJECTION, EMULSION INTRAVENOUS AS NEEDED
Status: DISCONTINUED | OUTPATIENT
Start: 2021-11-22 | End: 2021-11-22 | Stop reason: HOSPADM

## 2021-11-22 RX ADMIN — PROPOFOL 30 MG: 10 INJECTION, EMULSION INTRAVENOUS at 11:03

## 2021-11-22 RX ADMIN — SODIUM CHLORIDE, SODIUM LACTATE, POTASSIUM CHLORIDE, AND CALCIUM CHLORIDE 100 ML/HR: 600; 310; 30; 20 INJECTION, SOLUTION INTRAVENOUS at 10:38

## 2021-11-22 RX ADMIN — LIDOCAINE HYDROCHLORIDE 40 MG: 20 INJECTION, SOLUTION EPIDURAL; INFILTRATION; INTRACAUDAL; PERINEURAL at 11:03

## 2021-11-22 NOTE — H&P
Gastroenterology Outpatient History and Physical    Patient: Chris Mack    Physician: Grisel Brandt MD    Vital Signs: Blood pressure (!) 157/67, pulse (!) 59, temperature 98.1 °F (36.7 °C), resp. rate 16, height 5' (1.524 m), weight 71.7 kg (158 lb), SpO2 97 %. Allergies: Allergies   Allergen Reactions    Bees [Hymenoptera Allergenic Extract] Anaphylaxis    Adhesive Tape-Silicones Rash     Ok to use paper tape    Contrast Agent [Iodine] Hives    Lescol [Fluvastatin] Cough    Lipitor [Atorvastatin] Other (comments)     High doses cause leg cramps.  Able to tolerate low doses (<20mg/day)    Lisinopril Cough           Penicillin G Rash    Tetracycline Other (comments)     ULCERS IN MOUTH       Chief Complaint: dysphagia    History of Present Illness: 64 yr old pt of Dr Virgie Cordova with hx of A fib on Eliquis, IPMN, esophageal stricture (upper) with dilation in 2019 here for follow-up egd and dilation    Justification for Procedure: egd with dilation    History:  Past Medical History:   Diagnosis Date    Allergic rhinitis, cause unspecified 01/07/2015    Aortic valve disorders 01/07/2015    Arrhythmia     a-fib    Asthma     uses inhaler prn    CAD (coronary artery disease)     mild,  treated with med; NO MI, NO stents, NO CABG     Carotid stenosis 1/7/2015    Carpal tunnel syndrome 01/07/2015    bilat wrists-no issue now    Congestive heart failure ([address]     COVID-19 vaccine series completed 03/22/2021    [employer]; 2/27/2021    Depression 06/30/2015    controlled     Esophageal reflux 01/07/2015    controlled with medication     Essential hypertension     controlled with medication    Fibromyalgia     Former smoker     1 ppd for 14 yrs; quit smoking 1981    GERD (gastroesophageal reflux disease)     controlled with medication     Heart failure ([address]     last EF 60-65% echo done 10/26/2021    History of EKG 11/03/2021    a fib; patient started on eliquis BID and ASA 81 mg per [employer]  History of peptic ulcer disease     resolved with protonix     Hx of echocardiogram 10/26/2021    EF 60-65%     Menopause     Mixed hyperlipidemia 2016    Neuropathy     BLE; gabapentin     Osteoarthritis     Osteopenia 2015    Renal insufficiency 2015      Past Surgical History:   Procedure Laterality Date    COLONOSCOPY N/A 10/22/2021    COLONOSCOPY/ BMI 34 performed by Rut [facility] at [facility] ENDOSCOPY    HX BLADDER REPAIR      bladder tac    HX BLADDER SUSPENSION      HX COLONOSCOPY      HX GI      esophageal dilatation    HX HEMORRHOIDECTOMY      HX HYSTERECTOMY      HX LAP CHOLECYSTECTOMY      HX RECTOCELE REPAIR      HX ROTATOR CUFF REPAIR Right     HX TUBAL LIGATION        Social History     Socioeconomic History    Marital status:      Spouse name: Marivel Denise Number of children: 3    Years of education: 15 years   Occupational History    Occupation: Retired   Tobacco Use    Smoking status: Former Smoker     Packs/day: 1.00     Years: 14.00     Pack years: 14.00     Quit date:      Years since quittin.9    Smokeless tobacco: Never Used    Tobacco comment: quit smoking    Vaping Use    Vaping Use: Never used   Substance and Sexual Activity    Alcohol use: No     Alcohol/week: 0.0 standard drinks    Drug use: No    Sexual activity: Yes     Partners: Male   Other Topics Concern     Service No    Blood Transfusions No    Caffeine Concern Yes    Occupational Exposure No    Hobby Hazards No    Sleep Concern No    Stress Concern Yes     Comment: Taking care of her     Weight Concern No    Special Diet No    Back Care Yes     Comment: pt has DDD    Exercise No    Bike Helmet No    Seat Belt Yes    Self-Exams Yes      Family History   Problem Relation Age of Onset    Diabetes Mother     Heart Attack Mother     Diabetes Sister     Breast Cancer Sister 79    Diabetes Sister     Heart Attack Father     Diabetes Sister  Heart Attack Sister          from heart issue age 50    Arthritis-rheumatoid Paternal Grandmother     Heart Disease Maternal Grandmother        Medications:   Prior to Admission medications    Medication Sig Start Date End Date Taking? Authorizing Provider   spironolactone (ALDACTONE) 25 mg tablet Take 1 Tablet by mouth daily. 11/3/21  Yes Raul Donis DO   ergocalciferol (ERGOCALCIFEROL) 1,250 mcg (50,000 unit) capsule One capsule by mouth weekly for 4 weeks then one monthly. Indications: vitamin D deficiency (high dose therapy) 21  Yes Farzana GIPSON NP   pantoprazole (PROTONIX) 40 mg tablet Take 1 Tablet by mouth Daily (before breakfast). 21  Yes Farzana GIPSON NP   sertraline (Zoloft) 50 mg tablet Take 1.5 Tablets by mouth every morning. 21  Yes Farzana GIPSON NP   albuterol (PROVENTIL VENTOLIN) 2.5 mg /3 mL (0.083 %) nebu 3 mL by Nebulization route every six (6) hours as needed for Wheezing. 21  Yes Lawrence Monreal NP   cyanocobalamin (VITAMIN B12) 500 mcg tablet Take 500 mcg by mouth daily. Yes Provider, Historical   Ca-D3-mag-zinc--delmy-boron (Caltrate 600-D Plus Minerals) 600 mg calcium- 800 unit-40 mg chew Take 1 Tablet by mouth two (2) times a day. Indications: post-menopausal osteoporosis prevention 21  Yes Farzana GIPSON NP   losartan (COZAAR) 100 mg tablet Take 1 Tablet by mouth daily. Indications: high blood pressure 21  Yes Farzana GIPSON NP   furosemide (LASIX) 40 mg tablet take 1 tablet by mouth every morning  Patient taking differently: Take 40 mg by mouth daily. take 1 tablet by mouth every morning 21  Yes Desire Anderson NP   albuterol (PROVENTIL HFA, VENTOLIN HFA, PROAIR HFA) 90 mcg/actuation inhaler Take 2 Puffs by inhalation every six (6) hours as needed for Wheezing. Indications: asthma attack 21  Yes Farzana GIPSON NP   gabapentin (NEURONTIN) 300 mg capsule Take 1 Cap by mouth three (3) times daily.  1/11/21 Yes Delvis GIPSON NP   fluticasone furoate-vilanteroL (Breo Ellipta) 100-25 mcg/dose inhaler Take 1 Puff by inhalation daily. RINSE MOUTH WELL AFTER USE 8/5/20  Yes Elly Smith NP   fluticasone propionate (FLONASE) 50 mcg/actuation nasal spray 2 Sprays by Both Nostrils route nightly. Patient taking differently: 2 Sprays by Both Nostrils route nightly. Indications: using 3 times weekly 8/30/19  Yes Delvis GIPSON NP   vitamin A (AQUASOL A) 10,000 unit capsule Take 24,000 Units by mouth every other day. Indications: taking 3 times weekly   Yes Provider, Historical   cetirizine (ZYRTEC) 10 mg tablet Take 1 Tab by mouth daily. Patient taking differently: Take 10 mg by mouth daily as needed for Allergies or Rhinitis. 1/12/18  Yes Delvis GIPSON NP   aspirin delayed-release 81 mg tablet Take 81 mg by mouth every morning. Yes Provider, Historical   apixaban (Eliquis) 5 mg tablet Take 1 Tablet by mouth two (2) times a day. 11/3/21   Chloe Yan,    mupirocin (BACTROBAN) 2 % ointment APPLY TOPICALLY TO THE AFFECTED AREA TWICE DAILY AFTER CLEANING FOR 14 DAYS 10/19/21   Provider, Historical   ascorbic acid, vitamin C, (Vitamin C) 250 mg tablet Take 250 mg by mouth daily. Provider, Historical   nitroglycerin (NITROSTAT) 0.4 mg SL tablet 1 Tab by SubLINGual route every five (5) minutes as needed for Chest Pain. Patient taking differently: 1 Tablet by SubLINGual route every five (5) minutes as needed for Chest Pain. Call 911 after taking 3. 9/25/19   Gemma Simmonds, NP       Physical Exam:   General: no distress   HEENT: Head: Normocephalic, no lesions, without obvious abnormality.    Heart: regular rate and rhythm, S1, S2 normal, no murmur, click, rub or gallop   Lungs: chest clear, no wheezing, rales, normal symmetric air entry   Abdominal: Bowel sounds are normal, liver is not enlarged, spleen is not enlarged   Neurological: Grossly normal   Extremities: extremities normal, atraumatic, no cyanosis or edema     Findings/Diagnosis: dysphagia sec to stricture    Plan of Care/Planned Procedure: egd dilation

## 2021-11-22 NOTE — PROCEDURES
Esophagogastroduodenoscopy    DATE of PROCEDURE: 11/22/2021    MEDICATIONS ADMINISTERED: MAC    INSTRUMENT: GIF    PROCEDURE:  After obtaining informed consent, the patient was placed in the left lateral position and sedated. The endoscope was advanced under direct vision without difficulty. The esophagus, stomach (including retroflexed views) and duodenum were evaluated. The patient was taken to the recovery area in stable condition. FINDINGS:  ESOPHAGUS:  No stricture noted on egd. Given history of dysphagia and prior upper esophageal benign stricture history serial dilation performed with 52 and 54Fr santos dilators without resistance. Repeat egd revealed mild heme and rent at UES c/w successful dilation. STOMACH: Normal  DUODENUM: Normal    Estimated blood loss: 0-minimal     PLAN:  1. Soft diet today  2. Resume Eliquis tomorrow  3.  Repeat dilation as needed    Oni Gibson MD  [facility], [address]

## 2021-11-22 NOTE — ANESTHESIA PREPROCEDURE EVALUATION
Anesthetic History               Review of Systems / Medical History  Patient summary reviewed and pertinent labs reviewed    Pulmonary          Smoker (Former smoker. Quit 1981.)  Asthma : well controlled       Neuro/Psych         Psychiatric history (Depression and Anxiety)    Comments: Neuropathy Cardiovascular    Hypertension  Valvular problems/murmurs: mitral insufficiency        CAD (Minimal CAD on 2011 [facility] for chest pain) and PAD    Exercise tolerance: >4 METS  Comments: 10/26/21 Preserved EF    Pt has long standing CP. LHC in 2011 revealed minimal disease. Pt able to preform ADLs without difficulty. Pt has a long standing hx/o this pain.  Pt has presented to the ER for this several times (per pt) and discharged home   EKG without evidence for new ischemic changes  Able to walk a fair distance without significant CP   GI/Hepatic/Renal     GERD: well controlled           Endo/Other        Obesity and arthritis     Other Findings   Comments: Fibromyalgia           Physical Exam    Airway  Mallampati: II  TM Distance: 4 - 6 cm  Neck ROM: normal range of motion   Mouth opening: Normal     Cardiovascular    Rhythm: regular  Rate: normal         Dental    Dentition: Full upper dentures     Pulmonary  Breath sounds clear to auscultation               Abdominal  GI exam deferred       Other Findings            Anesthetic Plan    ASA: 3  Anesthesia type: total IV anesthesia          Induction: Intravenous  Anesthetic plan and risks discussed with: Patient

## 2021-11-22 NOTE — DISCHARGE INSTRUCTIONS
Gastrointestinal Esophagogastroduodenoscopy (EGD) - Upper Exam Discharge Instructions    1. Call Dr. Myla Plasencia at [phone] for any problems or questions. 2. Contact the doctor's office for follow up appointment as directed. 3. Medication may cause drowsiness for several hours, therefore:  · Do not drive or operate machinery for remainder of the day. · No alcohol today. · Do not make any important or legal decisions for 24 hours. · Do not sign any legal documents for 24 hours. 5. Ordinarily, you may resume regular diet and activity after exam unless otherwise   specified by your physician. 6. For mild soreness in your throat you may use Cepacol throat lozenges or warm   salt-water gargles as needed.     Any additional instructions:    Restart eliquis tomorrow

## 2021-11-22 NOTE — ANESTHESIA POSTPROCEDURE EVALUATION
Procedure(s):  ESOPHAGOGASTRODUODENOSCOPY (EGD)/ BMI 32   ESOPHAGEAL DILATION.     total IV anesthesia    Anesthesia Post Evaluation      Multimodal analgesia: multimodal analgesia used between 6 hours prior to anesthesia start to PACU discharge  Patient location during evaluation: bedside  Patient participation: complete - patient participated  Level of consciousness: awake and responsive to light touch  Pain management: adequate  Airway patency: patent  Anesthetic complications: no  Cardiovascular status: acceptable, hemodynamically stable, blood pressure returned to baseline and stable  Respiratory status: acceptable, unassisted, spontaneous ventilation and nonlabored ventilation  Hydration status: acceptable        INITIAL Post-op Vital signs:   Vitals Value Taken Time   /58 11/22/21 1113   Temp 36.7 °C (98 °F) 11/22/21 1112   Pulse 61 11/22/21 1113   Resp 15 11/22/21 1112   SpO2 100 % 11/22/21 1113

## 2021-11-23 VITALS
DIASTOLIC BLOOD PRESSURE: 70 MMHG | SYSTOLIC BLOOD PRESSURE: 124 MMHG | TEMPERATURE: 97.9 F | HEART RATE: 79 BPM | OXYGEN SATURATION: 98 % | RESPIRATION RATE: 17 BRPM

## 2021-11-23 PROCEDURE — 3331090002 HH PPS REVENUE DEBIT

## 2021-11-23 PROCEDURE — 3331090001 HH PPS REVENUE CREDIT

## 2021-11-24 ENCOUNTER — HOME CARE VISIT (OUTPATIENT)
Dept: SCHEDULING | Facility: HOME HEALTH | Age: 76
End: 2021-11-24
Payer: MEDICARE

## 2021-11-24 VITALS
OXYGEN SATURATION: 96 % | TEMPERATURE: 97.5 F | DIASTOLIC BLOOD PRESSURE: 62 MMHG | HEART RATE: 71 BPM | SYSTOLIC BLOOD PRESSURE: 110 MMHG | RESPIRATION RATE: 16 BRPM

## 2021-11-24 VITALS
DIASTOLIC BLOOD PRESSURE: 60 MMHG | RESPIRATION RATE: 18 BRPM | TEMPERATURE: 97.7 F | SYSTOLIC BLOOD PRESSURE: 92 MMHG | HEART RATE: 79 BPM | OXYGEN SATURATION: 94 %

## 2021-11-24 PROCEDURE — G0157 HHC PT ASSISTANT EA 15: HCPCS

## 2021-11-24 PROCEDURE — G0299 HHS/HOSPICE OF RN EA 15 MIN: HCPCS

## 2021-11-24 PROCEDURE — 3331090001 HH PPS REVENUE CREDIT

## 2021-11-24 PROCEDURE — 3331090002 HH PPS REVENUE DEBIT

## 2021-11-25 PROCEDURE — 3331090002 HH PPS REVENUE DEBIT

## 2021-11-25 PROCEDURE — 3331090001 HH PPS REVENUE CREDIT

## 2021-11-26 ENCOUNTER — HOME CARE VISIT (OUTPATIENT)
Dept: SCHEDULING | Facility: HOME HEALTH | Age: 76
End: 2021-11-26
Payer: MEDICARE

## 2021-11-26 VITALS
OXYGEN SATURATION: 99 % | DIASTOLIC BLOOD PRESSURE: 72 MMHG | SYSTOLIC BLOOD PRESSURE: 122 MMHG | TEMPERATURE: 97.4 F | HEART RATE: 68 BPM | RESPIRATION RATE: 16 BRPM

## 2021-11-26 PROCEDURE — 3331090002 HH PPS REVENUE DEBIT

## 2021-11-26 PROCEDURE — G0157 HHC PT ASSISTANT EA 15: HCPCS

## 2021-11-26 PROCEDURE — 3331090001 HH PPS REVENUE CREDIT

## 2021-11-27 PROCEDURE — 3331090002 HH PPS REVENUE DEBIT

## 2021-11-27 PROCEDURE — 3331090001 HH PPS REVENUE CREDIT

## 2021-11-28 PROCEDURE — 400018 HH-NO PAY CLAIM PROCEDURE

## 2021-11-28 PROCEDURE — 3331090001 HH PPS REVENUE CREDIT

## 2021-11-28 PROCEDURE — 3331090002 HH PPS REVENUE DEBIT

## 2021-11-29 ENCOUNTER — HOME CARE VISIT (OUTPATIENT)
Dept: SCHEDULING | Facility: HOME HEALTH | Age: 76
End: 2021-11-29
Payer: MEDICARE

## 2021-11-29 VITALS
HEART RATE: 80 BPM | OXYGEN SATURATION: 98 % | DIASTOLIC BLOOD PRESSURE: 60 MMHG | RESPIRATION RATE: 14 BRPM | TEMPERATURE: 98.3 F | SYSTOLIC BLOOD PRESSURE: 94 MMHG

## 2021-11-29 PROCEDURE — 400013 HH SOC

## 2021-11-29 PROCEDURE — 3331090001 HH PPS REVENUE CREDIT

## 2021-11-29 PROCEDURE — G0151 HHCP-SERV OF PT,EA 15 MIN: HCPCS

## 2021-11-29 PROCEDURE — 3331090002 HH PPS REVENUE DEBIT

## 2021-11-30 ENCOUNTER — HOME CARE VISIT (OUTPATIENT)
Dept: SCHEDULING | Facility: HOME HEALTH | Age: 76
End: 2021-11-30
Payer: MEDICARE

## 2021-11-30 PROCEDURE — G0299 HHS/HOSPICE OF RN EA 15 MIN: HCPCS

## 2021-11-30 PROCEDURE — 3331090002 HH PPS REVENUE DEBIT

## 2021-11-30 PROCEDURE — 3331090001 HH PPS REVENUE CREDIT

## 2021-12-01 VITALS
HEART RATE: 80 BPM | TEMPERATURE: 97.9 F | RESPIRATION RATE: 17 BRPM | OXYGEN SATURATION: 97 % | SYSTOLIC BLOOD PRESSURE: 102 MMHG | DIASTOLIC BLOOD PRESSURE: 58 MMHG

## 2021-12-01 PROCEDURE — 3331090001 HH PPS REVENUE CREDIT

## 2021-12-01 PROCEDURE — 3331090002 HH PPS REVENUE DEBIT

## 2021-12-02 PROCEDURE — 3331090002 HH PPS REVENUE DEBIT

## 2021-12-02 PROCEDURE — 3331090001 HH PPS REVENUE CREDIT

## 2021-12-03 PROCEDURE — 3331090001 HH PPS REVENUE CREDIT

## 2021-12-03 PROCEDURE — 3331090002 HH PPS REVENUE DEBIT

## 2021-12-04 PROCEDURE — 3331090002 HH PPS REVENUE DEBIT

## 2021-12-04 PROCEDURE — 3331090001 HH PPS REVENUE CREDIT

## 2021-12-05 PROCEDURE — 3331090001 HH PPS REVENUE CREDIT

## 2021-12-05 PROCEDURE — 3331090002 HH PPS REVENUE DEBIT

## 2021-12-06 PROCEDURE — 3331090002 HH PPS REVENUE DEBIT

## 2021-12-06 PROCEDURE — 3331090001 HH PPS REVENUE CREDIT

## 2021-12-07 ENCOUNTER — HOME CARE VISIT (OUTPATIENT)
Dept: SCHEDULING | Facility: HOME HEALTH | Age: 76
End: 2021-12-07
Payer: MEDICARE

## 2021-12-07 PROCEDURE — G0299 HHS/HOSPICE OF RN EA 15 MIN: HCPCS

## 2021-12-07 PROCEDURE — 3331090001 HH PPS REVENUE CREDIT

## 2021-12-07 PROCEDURE — 3331090002 HH PPS REVENUE DEBIT

## 2021-12-08 VITALS
RESPIRATION RATE: 16 BRPM | OXYGEN SATURATION: 93 % | TEMPERATURE: 97.6 F | SYSTOLIC BLOOD PRESSURE: 124 MMHG | DIASTOLIC BLOOD PRESSURE: 74 MMHG | HEART RATE: 58 BPM

## 2021-12-08 PROCEDURE — 3331090002 HH PPS REVENUE DEBIT

## 2021-12-08 PROCEDURE — 3331090001 HH PPS REVENUE CREDIT

## 2021-12-09 PROCEDURE — 3331090001 HH PPS REVENUE CREDIT

## 2021-12-09 PROCEDURE — 3331090002 HH PPS REVENUE DEBIT

## 2021-12-10 PROCEDURE — 3331090001 HH PPS REVENUE CREDIT

## 2021-12-10 PROCEDURE — 3331090002 HH PPS REVENUE DEBIT

## 2021-12-11 PROCEDURE — 3331090002 HH PPS REVENUE DEBIT

## 2021-12-11 PROCEDURE — 3331090001 HH PPS REVENUE CREDIT

## 2021-12-12 PROCEDURE — 3331090002 HH PPS REVENUE DEBIT

## 2021-12-12 PROCEDURE — 3331090001 HH PPS REVENUE CREDIT

## 2021-12-13 PROCEDURE — 3331090001 HH PPS REVENUE CREDIT

## 2021-12-13 PROCEDURE — 3331090002 HH PPS REVENUE DEBIT

## 2021-12-14 ENCOUNTER — HOME CARE VISIT (OUTPATIENT)
Dept: SCHEDULING | Facility: HOME HEALTH | Age: 76
End: 2021-12-14
Payer: MEDICARE

## 2021-12-14 VITALS
TEMPERATURE: 97.8 F | DIASTOLIC BLOOD PRESSURE: 68 MMHG | RESPIRATION RATE: 17 BRPM | SYSTOLIC BLOOD PRESSURE: 112 MMHG | HEART RATE: 74 BPM | OXYGEN SATURATION: 97 %

## 2021-12-14 PROCEDURE — G0299 HHS/HOSPICE OF RN EA 15 MIN: HCPCS

## 2021-12-14 PROCEDURE — 3331090001 HH PPS REVENUE CREDIT

## 2021-12-14 PROCEDURE — 3331090002 HH PPS REVENUE DEBIT

## 2022-03-02 ENCOUNTER — HOSPITAL ENCOUNTER (OUTPATIENT)
Dept: LAB | Age: 77
Discharge: HOME | End: 2022-03-02
Payer: MEDICARE

## 2022-03-02 DIAGNOSIS — G25.81 RLS (RESTLESS LEGS SYNDROME): ICD-10-CM

## 2022-03-02 PROBLEM — R29.818 SUSPECTED SLEEP APNEA: Status: ACTIVE | Noted: 2022-03-02

## 2022-03-02 PROBLEM — G47.34 NOCTURNAL HYPOXEMIA: Status: ACTIVE | Noted: 2022-03-02

## 2022-03-02 LAB — FERRITIN SERPL-MCNC: 68 NG/ML (ref 8–388)

## 2022-03-02 PROCEDURE — 36415 COLL VENOUS BLD VENIPUNCTURE: CPT

## 2022-03-02 PROCEDURE — 82728 ASSAY OF FERRITIN: CPT

## 2022-03-02 NOTE — PROGRESS NOTES
The patient's ferritin level was borderline low. Recommend she take ferrous sulfate 325 mg with breakfast daily for a month. Can stop after 1 month if leg movements don't improve with the treatment. Can continue until next appt if she notices gradual improvement with iron.     Sumi Cunningham MD

## 2022-03-18 PROBLEM — M79.669 CALF PAIN: Status: ACTIVE | Noted: 2021-10-26

## 2022-03-18 PROBLEM — M48.061 SPINAL STENOSIS OF LUMBAR REGION AT MULTIPLE LEVELS: Status: ACTIVE | Noted: 2017-02-11

## 2022-03-18 PROBLEM — R29.818 SUSPECTED SLEEP APNEA: Status: ACTIVE | Noted: 2022-03-02

## 2022-03-19 PROBLEM — I50.9 CHF (CONGESTIVE HEART FAILURE) (HCC): Status: ACTIVE | Noted: 2021-10-25

## 2022-03-19 PROBLEM — G47.34 NOCTURNAL HYPOXEMIA: Status: ACTIVE | Noted: 2022-03-02

## 2022-03-19 PROBLEM — Z98.1 S/P LAMINECTOMY WITH SPINAL FUSION: Status: ACTIVE | Noted: 2017-02-11

## 2022-03-19 PROBLEM — I34.0 MODERATE MITRAL REGURGITATION: Status: ACTIVE | Noted: 2021-10-26

## 2022-03-19 PROBLEM — I48.91 A-FIB (HCC): Status: ACTIVE | Noted: 2021-10-25

## 2022-03-19 PROBLEM — M48.062 LUMBAR STENOSIS WITH NEUROGENIC CLAUDICATION: Status: ACTIVE | Noted: 2017-02-07

## 2022-03-19 PROBLEM — G25.81 RLS (RESTLESS LEGS SYNDROME): Status: ACTIVE | Noted: 2022-03-02

## 2022-03-19 PROBLEM — I27.81 COR PULMONALE (CHRONIC) (HCC): Status: ACTIVE | Noted: 2021-10-27

## 2022-03-20 PROBLEM — E87.70 FLUID OVERLOAD: Status: ACTIVE | Noted: 2021-10-27

## 2022-03-20 PROBLEM — R79.89 ELEVATED BRAIN NATRIURETIC PEPTIDE (BNP) LEVEL: Status: ACTIVE | Noted: 2021-10-26

## 2022-04-08 ENCOUNTER — HOSPITAL ENCOUNTER (OUTPATIENT)
Dept: SLEEP MEDICINE | Age: 77
Discharge: HOME | End: 2022-04-08
Payer: MEDICARE

## 2022-04-08 PROCEDURE — 95810 POLYSOM 6/> YRS 4/> PARAM: CPT

## 2022-05-05 ENCOUNTER — HOSPITAL ENCOUNTER (OUTPATIENT)
Dept: SLEEP MEDICINE | Age: 77
Discharge: HOME | End: 2022-05-05
Payer: MEDICARE

## 2022-05-05 PROCEDURE — 95811 POLYSOM 6/>YRS CPAP 4/> PARM: CPT

## 2022-05-25 DIAGNOSIS — E78.2 MIXED HYPERLIPIDEMIA: Primary | ICD-10-CM

## 2022-05-25 DIAGNOSIS — I25.10 CORONARY ARTERY DISEASE INVOLVING NATIVE CORONARY ARTERY OF NATIVE HEART WITHOUT ANGINA PECTORIS: ICD-10-CM

## 2022-05-25 DIAGNOSIS — I10 ESSENTIAL HYPERTENSION: ICD-10-CM

## 2022-05-25 DIAGNOSIS — Z79.899 ON POTASSIUM WASTING DIURETIC THERAPY: ICD-10-CM

## 2022-06-26 ENCOUNTER — HOSPITAL ENCOUNTER (OUTPATIENT)
Dept: SLEEP CENTER | Age: 77
Discharge: HOME | End: 2022-06-29
Payer: MEDICARE

## 2022-06-26 PROCEDURE — 95811 POLYSOM 6/>YRS CPAP 4/> PARM: CPT

## 2022-06-28 NOTE — PROGRESS NOTES
OT DISCHARGE REPORT    Time In: 0745  Time Out: 2176    COMPREHENSION MODE Initial Assessment Discharge Assessment 2/20/2017   Score 6 6     EXPRESSION Initial Assessment Discharge Assessment 2/20/2017   Primary Mode of Expression Verbal Verbal   Score 7 7   Comments         SOCIAL INTERACTION/ PRAGMATICS Initial Assessment Discharge Assessment 2/20/2017   Score 6 6   Comments medication for depression medication     PROBLEM SOLVING Initial Assessment Discharge Assessment 2/20/2017   Score 4 5   Comments solves basic problems 75% of the time Solves complex problems with cues,     MEMORY Initial Assessment Discharge Assessment 2/20/2017   Score 4 6   Comments recall 2/3 spinal precautions Recognizes people often seen, daily routines, and executes requests with additional time     EATING Initial Assessment Discharge Assessment 2/20/2017   Functional Level 6 7   Comments dentures       GROOMING Initial Assessment Discharge Assessment 2/20/2017   Functional Level 5 7   Tasks completed by patient Brushed teeth Brushed hair;Brushed teeth; Washed face   Comments setup       BATHING Initial Assessment Discharge Assessment 2/20/2017   Functional Level 4 6   Body parts patient bathed Abdomen, Arm, left, Arm, right, Buttocks, Chest, Thigh, right, Ivy area, Thigh, left Abdomen;Arm, left;Arm, right;Buttocks; Chest;Lower leg and foot, left; Lower leg and foot, right;Ivy area; Thigh, left; Thigh, right   Comments A for B distal LE performs while seated on tub transfer bench     TUB/SHOWER TRANSFER INDEPENDENCE Initial Assessment Discharge Assessment 2/20/2017   Score 4 6  Type of Shower: Shower  Adaptive  Equipment:Tub transfer bench, Grab bars and Walker   Comments CGA grab bars for stability     UPPER BODY DRESSING/UNDRESSING Initial Assessment Discharge Assessment 2/20/2017   Functional Level 5 7   Items applied/Steps completed Bra (3 steps), Pullover (4 steps) Bra (3 steps); Pullover (4 steps)   Comments setup       LOWER BODY Patient: Kike Kong    Procedure: Procedure(s):  CLOSED REDUCTION, FRACTURE, HAND, WITH PERCUTANEOUS PINNING       Anesthesia Type:  General    Note:     Postop Pain Control: Uneventful            Sign Out: Well controlled pain   PONV: No   Neuro/Psych: Uneventful            Sign Out: Acceptable/Baseline neuro status   Airway/Respiratory: Uneventful            Sign Out: Acceptable/Baseline resp. status   CV/Hemodynamics: Uneventful            Sign Out: Acceptable CV status; No obvious hypovolemia; No obvious fluid overload   Other NRE: NONE   DID A NON-ROUTINE EVENT OCCUR? No           Last vitals:  Vitals Value Taken Time   /68 06/27/22 1745   Temp 36.4  C (97.5  F) 06/27/22 1745   Pulse 76 06/27/22 1756   Resp 17 06/27/22 1745   SpO2 96 % 06/27/22 1756   Vitals shown include unvalidated device data.    Electronically Signed By: Porter Kong MD  June 27, 2022  8:33 PM   DRESSING/UNDRESSING Initial Assessment Discharge Assessment 2/20/2017   Functional Level 2 6   Items applied/Steps completed Elastic waist pants (3 steps), Shoe, left (1 step), Shoe, right (1 step), Sock, left (1 step), Sock, right (1 step), Underpants (3 steps) Elastic waist pants (3 steps); Shoe, left (1 step); Shoe, right (1 step); Sock, left (1 step); Sock, right (1 step); Underpants (3 steps)   Comments A to thread BLE into pants/underwear and B socks and shoesoes reacher, sock aid, R/W     TOILETING Initial Assessment Discharge Assessment 2/20/2017   Functional Level 3 6   Comments assist for pants up grab bars for stability     TOILET TRANSFER INDEPENDENCE Initial Assessment Discharge Assessment 2/20/2017   Transfer score 4 6   Comments CGA grab bars for stability     Plan of Care: Please see Care Plan for progress towards goals during stay  Precautions at Discharge: Falls and Spinal  Discharge Location: Son's home in   Safety/Supervision Recommendations/Functional Level:   n/a    Recommended Continuing Therapy: Other (comment) (no follow up OT needs at this time)  Residual Deficits: see AM OT note  Progress over LOS: Patient has made functional gains with self care tasks and functional transfers during stay; patient is performing at mod I and is close to baseline. Summary of Session: Patient seated EOB at start of session (see above for FIM scores). Patient did not report pain during session and did not require any cueing to maintain spinal precautions. Patient ended session sitting EOB with Ben Munoz RN, present.     [employer], OTR/L  2/20/2017

## 2022-07-12 ENCOUNTER — OFFICE VISIT (OUTPATIENT)
Dept: ORTHOPEDIC SURGERY | Age: 77
End: 2022-07-12
Payer: MEDICARE

## 2022-07-12 DIAGNOSIS — N18.30 STAGE 3 CHRONIC KIDNEY DISEASE, UNSPECIFIED WHETHER STAGE 3A OR 3B CKD (HCC): ICD-10-CM

## 2022-07-12 DIAGNOSIS — M25.512 LEFT SHOULDER PAIN, UNSPECIFIED CHRONICITY: ICD-10-CM

## 2022-07-12 DIAGNOSIS — M19.012 ARTHRITIS OF LEFT GLENOHUMERAL JOINT: Primary | ICD-10-CM

## 2022-07-12 DIAGNOSIS — I50.22 CHRONIC SYSTOLIC (CONGESTIVE) HEART FAILURE (HCC): ICD-10-CM

## 2022-07-12 PROCEDURE — 4004F PT TOBACCO SCREEN RCVD TLK: CPT | Performed by: ORTHOPAEDIC SURGERY

## 2022-07-12 PROCEDURE — 99214 OFFICE O/P EST MOD 30 MIN: CPT | Performed by: ORTHOPAEDIC SURGERY

## 2022-07-12 PROCEDURE — G8428 CUR MEDS NOT DOCUMENT: HCPCS | Performed by: ORTHOPAEDIC SURGERY

## 2022-07-12 PROCEDURE — G8399 PT W/DXA RESULTS DOCUMENT: HCPCS | Performed by: ORTHOPAEDIC SURGERY

## 2022-07-12 PROCEDURE — 1123F ACP DISCUSS/DSCN MKR DOCD: CPT | Performed by: ORTHOPAEDIC SURGERY

## 2022-07-12 PROCEDURE — G8419 CALC BMI OUT NRM PARAM NOF/U: HCPCS | Performed by: ORTHOPAEDIC SURGERY

## 2022-07-12 PROCEDURE — 1090F PRES/ABSN URINE INCON ASSESS: CPT | Performed by: ORTHOPAEDIC SURGERY

## 2022-07-12 RX ORDER — TRIAMCINOLONE ACETONIDE 40 MG/ML
40 INJECTION, SUSPENSION INTRA-ARTICULAR; INTRAMUSCULAR ONCE
Status: COMPLETED | OUTPATIENT
Start: 2022-07-12 | End: 2022-07-12

## 2022-07-12 RX ADMIN — TRIAMCINOLONE ACETONIDE 40 MG: 40 INJECTION, SUSPENSION INTRA-ARTICULAR; INTRAMUSCULAR at 11:49

## 2022-07-12 NOTE — PROGRESS NOTES
[address] [place] [address] Court, [address] [facility]  [phone]    Patient Name:  Agustina Bazan  YOB: 1945    Pursuant to the emergency declaration under the [facility] waiver authority and the [employer] Act, this Virtual  Visit was conducted, with patient's consent, to reduce the patient's risk of exposure to COVID-19 and provide continuity of care for an established patient. Telehealth encounter is a billable service, with coverage as determined by the insurance carrier. Services were provided through a video synchronous discussion virtually to substitute for in-person clinic visit. Agustina Bazan is a 64 y.o. female who was seen by synchronous (real-time) audio-video technology on 7/19/2022. Consent:  She and/or her healthcare decision maker is aware that this patient-initiated Telehealth encounter is a billable service, with coverage as determined by her insurance carrier. She is aware that she may receive a bill and has provided verbal consent to proceed: Yes        Office Visit 7/19/2022    CHIEF COMPLAINT:    Chief Complaint   Patient presents with    Sleep Study     Results         HISTORY OF PRESENT ILLNESS:  Patient is being seen today via virtual visit. Since patient's last visit, she had a PSG on 4/8/22 with AHI 11.6 events per hour including eight obstructive apneas, one mixed apnea and 73 hypopneas. REM AHI was 41 events in our. Lowest oxygen saturation was 69% with SPO2 less than 89% for total 24.1 minutes of the test. She then had a CPAP titration study but with increased pressures she developed central apneas and further hypoxemia. She then had a BiPAP titration study on 6/26 which revealed an optimal pressure of 15/10 cm. Oxygen at 2 L had to be added into BiPAP to resolve hypoxemia.  After discussion of sleep apnea and hypoxemia, she is willing to try therapy. At last visit, she was started on requip 0.5-1 mg for restless legs. She only took it for about a month and stopped taking it. She doesn't feel it is needed. She took an iron supplement for a month and has stopped this as well. Ferritin was 68.      PSG 4/8/22    CPAP titration 5/5/22      Bipap titration 6/26/22            Past Medical History:   Diagnosis Date    Allergic rhinitis, cause unspecified 01/07/2015    Aortic valve disorders 01/07/2015    Arrhythmia     a-fib    Asthma     uses inhaler prn    CAD (coronary artery disease)     mild,  treated with med; NO MI, NO stents, NO CABG     Carotid stenosis 1/7/2015    Carpal tunnel syndrome 01/07/2015    bilat wrists-no issue now    Congestive heart failure ([address]     COVID-19 vaccine series completed 03/22/2021    [employer]; 2/27/2021    Depression 06/30/2015    controlled     Esophageal reflux 01/07/2015    controlled with medication     Essential hypertension     controlled with medication    Fibromyalgia     Former smoker     1 ppd for 14 yrs; quit smoking 1981    GERD (gastroesophageal reflux disease)     controlled with medication     Heart failure ([address]     last EF 60-65% echo done 10/26/2021    History of EKG 11/03/2021    a fib; patient started on eliquis BID and ASA 81 mg per [facility] Card[facility]    History of peptic ulcer disease     resolved with protonix     Hx of echocardiogram 10/26/2021    EF 60-65%     Menopause     Mixed hyperlipidemia 4/21/2016    Neuropathy     BLE; gabapentin     Osteoarthritis     Osteopenia 1/7/2015    Renal insufficiency 01/07/2015       Patient Active Problem List   Diagnosis    Weakening of rectovaginal tissue    On potassium wasting diuretic therapy    Extrinsic asthma    Carpal tunnel syndrome    Calf pain    Suspected sleep apnea    Renal insufficiency    Spinal stenosis of lumbar region at multiple levels    Depression    BRANDON (stress urinary incontinence, female)    S/P laminectomy with spinal fusion Arthropathies    Osteopenia of multiple sites    Asymptomatic carotid artery stenosis    Coronary artery disease involving native coronary artery of native heart without angina pectoris    Rectocele    GERD (gastroesophageal reflux disease)    Mixed hyperlipidemia    CHF (congestive heart failure) ([facility])    RLS (restless legs syndrome)    Nocturnal hypoxemia    Moderate mitral regurgitation    A-fib ([facility])    Valvular heart disease    Cor pulmonale (chronic) ([facility])    Lumbar stenosis with neurogenic claudication    Fluid overload    Obesity (BMI 30.0-34. 9)    Elevated brain natriuretic peptide (BNP) level    Allergic rhinitis    Essential hypertension    Chronic renal disease, stage III ([facility]) [[id number]]    Chronic systolic (congestive) heart failure           Past Surgical History:   Procedure Laterality Date    BLADDER REPAIR      bladder tac    BLADDER SUSPENSION      CHOLECYSTECTOMY, LAPAROSCOPIC      COLONOSCOPY N/A 10/22/2021    COLONOSCOPY/ BMI 34 performed by Claudia Hull MD at [facility] ENDOSCOPY    COLONOSCOPY      GI      esophageal dilatation    HEMORRHOID SURGERY      HYSTERECTOMY (CERVIX STATUS UNKNOWN)      RECTOCELE REPAIR      ROTATOR CUFF REPAIR Right     TUBAL LIGATION             Social History     Socioeconomic History    Marital status:       Spouse name: Not on file    Number of children: Not on file    Years of education: 14 years    Highest education level: Not on file   Occupational History    Not on file   Tobacco Use    Smoking status: Former     Packs/day: 1.00     Types: Cigarettes     Quit date: 1981     Years since quittin.5    Smokeless tobacco: Never    Tobacco comments:     Quit smoking: quit smoking    Substance and Sexual Activity    Alcohol use: No     Alcohol/week: 0.0 standard drinks    Drug use: No    Sexual activity: Not on file   Other Topics Concern    Not on file   Social History Narrative    Not on file     Social Determinants of Health     Financial Resource Strain: Not on file   Food Insecurity: Not on file   Transportation Needs: Not on file   Physical Activity: Not on file   Stress: Not on file   Social Connections: Not on file   Intimate Partner Violence: Not on file   Housing Stability: Not on file         Family History   Problem Relation Age of Onset    Heart Disease Maternal Grandmother     Rheum Arthritis Paternal Grandmother     Heart Attack Sister          from heart issue age 54    Diabetes Mother     Diabetes Sister     Diabetes Sister     Breast Cancer Sister 79    Diabetes Sister     Heart Attack Father     Heart Attack Mother          Allergies   Allergen Reactions    Wasp Venom Protein Anaphylaxis    Atorvastatin Other (See Comments)     High doses cause leg cramps. Able to tolerate low doses (<20mg/day)    Iodine Hives    Lisinopril Other (See Comments)    Tetracycline Other (See Comments)     ULCERS IN MOUTH    Penicillin G Rash         Current Outpatient Medications   Medication Sig    albuterol sulfate  (90 Base) MCG/ACT inhaler Inhale 2 puffs into the lungs every 6 hours as needed    albuterol (PROVENTIL) (2.5 MG/3ML) 0.083% nebulizer solution Inhale 2.5 mg into the lungs every 6 hours as needed    apixaban (ELIQUIS) 5 MG TABS tablet Take 5 mg by mouth 2 times daily    ascorbic acid (VITAMIN C) 250 MG tablet Take 250 mg by mouth daily    aspirin 81 MG EC tablet Take 81 mg by mouth    Calcium Carbonate-Vitamin D (CALCIUM-VITAMIN D) 600-125 MG-UNIT TABS Take by mouth    cetirizine (ZYRTEC) 10 MG tablet Take 10 mg by mouth daily    cyanocobalamin 500 MCG tablet Take 500 mcg by mouth daily    ergocalciferol (ERGOCALCIFEROL) 1.25 MG (63868 UT) capsule One capsule by mouth weekly for 4 weeks then one monthly.  Indications: vitamin D deficiency (high dose therapy)    fluticasone-vilanterol (BREO ELLIPTA) 100-25 MCG/INH AEPB inhaler Inhale 1 puff into the lungs daily    fluticasone (FLONASE) 50 MCG/ACT nasal spray 2 sprays by Nasal route furosemide (LASIX) 40 MG tablet The details of the medication are not available because there are pending changes by a home health clinician.    gabapentin (NEURONTIN) 300 MG capsule Take 300 mg by mouth 3 times daily. losartan (COZAAR) 100 MG tablet Take 100 mg by mouth daily    metoprolol succinate (TOPROL XL) 25 MG extended release tablet Take 25 mg by mouth daily    nitroGLYCERIN (NITROSTAT) 0.4 MG SL tablet Place 0.4 mg under the tongue    pantoprazole (PROTONIX) 40 MG tablet Take 40 mg by mouth every morning (before breakfast)    sertraline (ZOLOFT) 50 MG tablet Take 75 mg by mouth    spironolactone (ALDACTONE) 25 MG tablet Take 25 mg by mouth daily    eszopiclone (LUNESTA) 1 MG TABS Take on arrival to the sleep center for the polysomnography to limit insomnia. Indications: difficulty sleeping (Patient not taking: Reported on 7/19/2022)    rOPINIRole (REQUIP) 0.5 MG tablet Take 1-2 tablets about 2 hours before intended bedtime as directed. (Patient not taking: Reported on 7/19/2022)    vitamin A 3 MG (33438 UT) capsule Take 24,000 Units by mouth every other day     No current facility-administered medications for this visit. REVIEW OF SYSTEMS:   CONSTITUTIONAL:   There is no history of fever, chills, night sweats. CARDIAC:   No chest pain, pressure, discomfort, palpitations, orthopnea, murmurs, or edema. GI:   No dysphagia, heartburn reflux, nausea/vomiting, diarrhea, abdominal pain, or bleeding. NEURO:   There is no history of AMS, persistent headache, decreased level of consciousness, seizures, or motor or sensory deficits. VIRTUAL EXAM  PHYSICAL EXAMINATION:  [ INSTRUCTIONS:  \"[x]\" Indicates a positive item  \"[]\" Indicates a negative item   Vital Signs: (As obtained by patient/caregiver at home)  There were no vitals taken for this visit.      Constitutional: [x] Appears well-developed and well-nourished [x] No apparent distress      [] Abnormal     Mental status: [x] Alert and awake  [x] Oriented to person/place/time [x] Able to follow commands    [] Abnormal -     Eyes:   EOM    [x]  Normal    [] Abnormal -   Sclera  [x]  Normal    [] Abnormal -          Discharge [x]  None visible   [] Abnormal -    HENT: [x] Normocephalic, atraumatic  [] Abnormal -  [x] Mouth/Throat: Mucous membranes are moist    External Ears [x] Normal  [] Abnormal -    Neck: [x] No visualized mass [] Abnormal -     Pulmonary/Chest: [x] Respiratory effort normal   [x] No visualized signs of difficulty breathing or respiratory distress        [] Abnormal -      Musculoskeletal:   [x] Normal gait with no signs of ataxia         [x] Normal range of motion of neck        [] Abnormal -     Neurological:        [x] No Facial Asymmetry (Cranial nerve 7 motor function) (limited exam due to video visit)          [x] No gaze palsy        [] Abnormal -         Skin:        [x] No significant exanthematous lesions or discoloration noted on facial skin         [] Abnormal -            Psychiatric:       [x] Normal Affect [] Abnormal -       [x] No Hallucinations    Other pertinent observable physical exam findings:-      ASSESSMENT:  (Medical Decision Making)      Diagnosis Orders   1. QUINTIN (obstructive sleep apnea)  DME - DURABLE MEDICAL EQUIPMENT   Requiring bipap therapy. She is willing to start pap therapy   The pathophysiology of obstructive sleep apnea was reviewed with the patient. It's potential to promote severe neurologic, cardiac, pulmonary, and gastrointestinal problems was discussed. Specifically, the increased incidence of hypertension, coronary artery disease, congestive heart failure, pulmonary hypertension, gastroesophageal reflux, pathologic hypersomnolence, memory loss, and glucose intolerance was related to the consequences of hypoxemia, hypercapnia, airway obstruction, and sympathetic overdrive. We also discussed the ability of nasal CPAP to correct these abnormalities through maintenance of a patent airway. Therapeutic options including surgery, oral appliances, and weight loss were also reviewed. 2. Nocturnal hypoxemia  DME - DURABLE MEDICAL EQUIPMENT   Requiring 2L oxygen into therapy. The importance of treating severe hypoxemia discussed with patient     3. RLS- she doesn't feel pharmacological treatment is needed. She states her symptoms are controlled. PLAN:  Start bipap 15/10 cm with 2 L oxygen into therapy  Nightly use encouraged  Set up will need to be within a week- Mhm notified     Follow up in 4 months or sooner if needed     Orders Placed This Encounter   Procedures    DME - [address]  Phone: [facility] [employer] [address] [address]  Dept: [phone]      Patient Name: Naomy Pereira  : 1945  Gender: female  Address: [place] [address]  Patient phone number: [phone] (home)       Primary Insurance: Payor: [employer] / Plan: [employer] HMO / Product Type: *No Product type* /   Subscriber ID: [id number] - (Medicare Managed)      AMB Supply Order  Order Details     DME Location:    Order Date: 2022   There were no encounter diagnoses.           (  X   )New Set-Up     Bipap machine + oxygen    (     ) CPAP Unit  (     ) Auto CPAP Unit  ( x    ) BiLevel Unit  (     ) Auto BiLevel Unit  (     ) ASV   (     ) Bilevel ST    (   x  ) Oxygen Concentrator         Length of need: 12 months    Pressure: 15/10  cmH20 with 2 L oxygen into therapy   EPR:      Starting Ramp Pressure:   cm H20  Ramp Time: min      Patient had a diagnostic Apnea Hypopnea Index (AHI) of :      *SUPPLIES* Replace all as needed, or per coverage guidelines     Masks Type:    (  x   ) -Full Face Mask (1 per 3 mon)  ( x    ) -Full Mask (1 per month) Interface/Cushion      (     ) -Nasal Mask (1 per 3 mon)  (     ) - Nasal Mask (1 per month) Interface/Cushion  (     ) -Pillow (2 per mon)  (     ) -Hlbqvpfan (1 per 6 mon)      _________________________________________________________________          Other Supplies:    (  X   )-Dewbsssp (1 per 6 mon)  ( X    )-Tgjaib Tubing (1 per 3 mon)  (  X   )- Disposable Filter (2 per mon)  (   X  )-Losexm Humidifier (1 per year)     (  x   )-Howzbeqoi (sometimes used with Full Face Mask) (1 per 6 mos)  (     )-Tubing-without heat (1 per 3 mos)  ( X   )-Non-Disposable Filter (1 per 6 mos)  (   x  )-Water Chamber (1 per 6 mos)  (     )-Humidifier non-heated (1 per 5 yrs)      Signed Date: 7/19/2022  Electronically Signed By: BERNA Birch CNP     No orders of the defined types were placed in this encounter. Collaborating Physician: Dr. Gisella Hoffman I spent at least 20 minutes with this established patient, and >50% of the time was spent counseling and/or coordinating care regarding nava.     BERNA Birch CNP  Electronically signed

## 2022-07-12 NOTE — PROGRESS NOTES
Name: Hermes Arnold  YOB: 1945  Gender: female  MRN: [id number]      CC: Shoulder Pain (left shoulder pain)       HPI: Hermes Arnold is a 64 y.o. female who presents with Shoulder Pain (left shoulder pain)  . Ms. Sade Stokes is a new patient to me who has seen Kendra Boston NP in the past. She was evaluated about 5 months ago and given a glenohumeral injection by Shawn Menjivar. She has chronic left shoulder pain and has difficulty lifting the arm over shoulder level. She has had no other treatment besides the injection on the left shoulder, but has reportedly had an arthroscopy on the right shoulder by Dr. Obie Stokes years ago. The injection that Shawn Menjivar gave her provided her with one month of pain relief. She is not interested in any surgical treatment due to other medical conditions. ROS/Meds/PSH/PMH/FH/SH: I personally reviewed the patients standard intake form. Below are the pertinents    Tobacco:  reports that she quit smoking about 41 years ago. She smoked 1.00 pack per day. She has never used smokeless tobacco.  Diabetes: none  Other: CKD, CHF, AFib    Physical Examination:  General: no acute distress  Lungs: breathing easily  CV: regular rhythm by pulse  Left Shoulder: Pain and crepitus with passive range of motion of the shoulder. I can only take her to about 90 degrees of flexion. She only has about 10 degrees of external rotation. Unable to test rotator cuff strength due to pain. Imaging:   I reviewed plain radiographs of her left shoulder from February 18, 2022 which show advanced degenerative changes with osteophyte formation and potentially AVN with some subchondral sclerosis of the humeral head. Appears to be a normal glenohumeral relationship  All imaging interpreted by myself Mark Anthony Villatoro MD independent of radiology review        Assessment:     ICD-10-CM    1. Arthritis of left glenohumeral joint  M19.012    2.  Left shoulder pain, unspecified chronicity  M25.512 US ARTHR/ASP/INJ MAJOR JNT/BURSA LEFT     triamcinolone acetonide (KENALOG-40) injection 40 mg   3. Chronic systolic (congestive) heart failure  I50.22    4. Stage 3 chronic kidney disease, unspecified whether stage 3a or 3b CKD ([facility])  N18.30        Plan:   Advanced arthritis of the glenohumeral joint we discussed definitive care would be total shoulder arthroplasty. We discussed referral to my partner Dr. Mary Noble for surgical consultation. She prefers to try 1 more injection. She does have congestive heart failure but has been well managed on medications over the past several months and is on Eliquis for her atrial fibrillation. I think she would likely be a surgical candidate as listed cardiac clearance. If she has return of symptoms then she should be scheduled to see Dr. Mary Noble for surgical consultation at her next visit. If she wants to continue nonoperative management and happy to see her back for an injection    After informed verbal consent was obtained the left shoulder glenohumeral joint was injected under ultrasound guidance in the longitudinal axis with 6 cc of 0.25% Marcaine and 1 cc of 40 mg Kenalog. Needle was localized to the glenohumeral joint and the capsule was seen to clearly distend. Images were saved to the ultrasound machine. The patient tolerated the injection well and was given postinjection flare precautions              Mark Anthony Lemon MD, [address] [facility]

## 2022-07-19 ENCOUNTER — TELEMEDICINE (OUTPATIENT)
Dept: SLEEP MEDICINE | Age: 77
End: 2022-07-19
Payer: MEDICARE

## 2022-07-19 DIAGNOSIS — G47.33 OSA (OBSTRUCTIVE SLEEP APNEA): Primary | ICD-10-CM

## 2022-07-19 DIAGNOSIS — G47.34 NOCTURNAL HYPOXEMIA: ICD-10-CM

## 2022-07-19 DIAGNOSIS — G25.81 RLS (RESTLESS LEGS SYNDROME): ICD-10-CM

## 2022-07-19 PROCEDURE — 1123F ACP DISCUSS/DSCN MKR DOCD: CPT | Performed by: NURSE PRACTITIONER

## 2022-07-19 PROCEDURE — G8427 DOCREV CUR MEDS BY ELIG CLIN: HCPCS | Performed by: NURSE PRACTITIONER

## 2022-07-19 PROCEDURE — 1090F PRES/ABSN URINE INCON ASSESS: CPT | Performed by: NURSE PRACTITIONER

## 2022-07-19 PROCEDURE — G8399 PT W/DXA RESULTS DOCUMENT: HCPCS | Performed by: NURSE PRACTITIONER

## 2022-07-19 PROCEDURE — 99213 OFFICE O/P EST LOW 20 MIN: CPT | Performed by: NURSE PRACTITIONER

## 2022-07-19 ASSESSMENT — SLEEP AND FATIGUE QUESTIONNAIRES
HOW LIKELY ARE YOU TO NOD OFF OR FALL ASLEEP WHILE SITTING INACTIVE IN A PUBLIC PLACE: 0
ESS TOTAL SCORE: 8
HOW LIKELY ARE YOU TO NOD OFF OR FALL ASLEEP WHILE SITTING AND READING: 3
HOW LIKELY ARE YOU TO NOD OFF OR FALL ASLEEP WHILE LYING DOWN TO REST IN THE AFTERNOON WHEN CIRCUMSTANCES PERMIT: 2
HOW LIKELY ARE YOU TO NOD OFF OR FALL ASLEEP WHILE SITTING QUIETLY AFTER LUNCH WITHOUT ALCOHOL: 0
HOW LIKELY ARE YOU TO NOD OFF OR FALL ASLEEP WHEN YOU ARE A PASSENGER IN A CAR FOR AN HOUR WITHOUT A BREAK: 0
HOW LIKELY ARE YOU TO NOD OFF OR FALL ASLEEP WHILE WATCHING TV: 3
HOW LIKELY ARE YOU TO NOD OFF OR FALL ASLEEP IN A CAR, WHILE STOPPED FOR A FEW MINUTES IN TRAFFIC: 0
HOW LIKELY ARE YOU TO NOD OFF OR FALL ASLEEP WHILE SITTING AND TALKING TO SOMEONE: 0

## 2022-08-02 ENCOUNTER — OFFICE VISIT (OUTPATIENT)
Dept: CARDIOLOGY CLINIC | Age: 77
End: 2022-08-02
Payer: MEDICARE

## 2022-08-02 VITALS
SYSTOLIC BLOOD PRESSURE: 118 MMHG | HEIGHT: 60 IN | WEIGHT: 156 LBS | DIASTOLIC BLOOD PRESSURE: 60 MMHG | HEART RATE: 60 BPM | BODY MASS INDEX: 30.63 KG/M2

## 2022-08-02 DIAGNOSIS — I50.32 CHRONIC HEART FAILURE WITH PRESERVED EJECTION FRACTION (HCC): ICD-10-CM

## 2022-08-02 DIAGNOSIS — I48.0 PAROXYSMAL ATRIAL FIBRILLATION (HCC): ICD-10-CM

## 2022-08-02 DIAGNOSIS — R07.89 NON-CARDIAC CHEST PAIN: ICD-10-CM

## 2022-08-02 DIAGNOSIS — E78.2 MIXED HYPERLIPIDEMIA: Primary | ICD-10-CM

## 2022-08-02 DIAGNOSIS — I10 ESSENTIAL HYPERTENSION: ICD-10-CM

## 2022-08-02 PROCEDURE — 1123F ACP DISCUSS/DSCN MKR DOCD: CPT | Performed by: INTERNAL MEDICINE

## 2022-08-02 PROCEDURE — 1036F TOBACCO NON-USER: CPT | Performed by: INTERNAL MEDICINE

## 2022-08-02 PROCEDURE — G8427 DOCREV CUR MEDS BY ELIG CLIN: HCPCS | Performed by: INTERNAL MEDICINE

## 2022-08-02 PROCEDURE — G8417 CALC BMI ABV UP PARAM F/U: HCPCS | Performed by: INTERNAL MEDICINE

## 2022-08-02 PROCEDURE — G8399 PT W/DXA RESULTS DOCUMENT: HCPCS | Performed by: INTERNAL MEDICINE

## 2022-08-02 PROCEDURE — 1090F PRES/ABSN URINE INCON ASSESS: CPT | Performed by: INTERNAL MEDICINE

## 2022-08-02 PROCEDURE — 99214 OFFICE O/P EST MOD 30 MIN: CPT | Performed by: INTERNAL MEDICINE

## 2022-08-02 RX ORDER — ROSUVASTATIN CALCIUM 10 MG/1
10 TABLET, COATED ORAL DAILY
Qty: 30 TABLET | Refills: 11 | Status: SHIPPED | OUTPATIENT
Start: 2022-08-02

## 2022-08-02 ASSESSMENT — ENCOUNTER SYMPTOMS
WHEEZING: 1
COUGH: 1
SHORTNESS OF BREATH: 0
HEMATOCHEZIA: 0

## 2022-08-02 NOTE — PROGRESS NOTES
[address] [facility], [address] [place] [address], [address] [place] [address], [address] [place] [address]      Patient:  Blaise Dawson  1945     SUBJECTIVE:  Blaise Dawson is a  64 y.o. female seen for a follow up visit regarding the following:     Chief Complaint   Patient presents with    Atrial Fibrillation     CC: follow up A Fib      HPI:   64 y.o. female  with a history of newly diagnosed atrial fibrillation, CAD, HTN, HLD is here for follow-up. Patient was last seen in office on 1/24/22 , since then reports that she has had a few episodes of chest pain, worse with supine. No exertional chest pain. No chest pain with walking up stairs or incline. Feels like pressure , lasts <1 min. Not associated with exertion. Some wheezing and coughing as well, has started CPAP. Struggling she reports to get used to machine. No ER visits or hospitalizations. Reports that she has had shoulder pain. Cardiovascular Testing:  - SPECT 1/13/2022: Normal perfusion, not gated, low risk scan  - Echo 10/26/21: LVEF 60-65 %, RV normal, Valves: mild AI, mild MR, mild TR, RVSP 44 mmHg.  -2011: Nonobstructive CAD    Past medical history, past surgical history, family history, social history, and medications were all reviewed with the patient today and updated as necessary.          Patient Active Problem List    Diagnosis Date Noted    Chronic renal disease, stage III [facility]) [[id number]] 07/12/2022     Priority: Medium    Chronic systolic (congestive) heart failure 07/12/2022     Priority: Medium    Suspected sleep apnea 03/02/2022    RLS (restless legs syndrome) 03/02/2022    Nocturnal hypoxemia 03/02/2022    Cor pulmonale (chronic) (HCC) 10/27/2021    Fluid overload 10/27/2021    Calf pain 10/26/2021    Moderate mitral regurgitation 10/26/2021    Elevated brain natriuretic peptide (BNP) level 10/26/2021    CHF (congestive heart failure) ([facility] 10/25/2021    A-fib ([facility] 10/25/2021    Spinal stenosis of lumbar region at multiple levels 02/11/2017 S/P laminectomy with spinal fusion 2017    Lumbar stenosis with neurogenic claudication 2017    On potassium wasting diuretic therapy 2016    Asymptomatic carotid artery stenosis 2016    Coronary artery disease involving native coronary artery of native heart without angina pectoris 2016    Valvular heart disease 2016    Obesity (BMI 30.0-34.9) 2016    Mixed hyperlipidemia 2016    Weakening of rectovaginal tissue 2016    BRANDON (stress urinary incontinence, female) 2016    Rectocele 2016    Depression 2015    Extrinsic asthma 2015    Carpal tunnel syndrome 2015    Renal insufficiency 2015    Arthropathies 2015     Multiple sites        Osteopenia of multiple sites 2015    Allergic rhinitis 2015    Essential hypertension 2015    GERD (gastroesophageal reflux disease) 2011       Family History   Problem Relation Age of Onset    Heart Disease Maternal Grandmother     Rheum Arthritis Paternal Grandmother     Heart Attack Sister          from heart issue age 54    Diabetes Mother     Diabetes Sister     Diabetes Sister     Breast Cancer Sister 79    Diabetes Sister     Heart Attack Father     Heart Attack Mother        Social History     Tobacco Use    Smoking status: Former     Packs/day: 1.00     Types: Cigarettes     Quit date: 1981     Years since quittin.6    Smokeless tobacco: Never    Tobacco comments:     Quit smoking: quit smoking    Substance Use Topics    Alcohol use: No     Alcohol/week: 0.0 standard drinks       Review of Systems   Constitutional: Negative for chills and fever. Cardiovascular:  Positive for chest pain (as per HPI). Negative for dyspnea on exertion, irregular heartbeat, leg swelling and palpitations. Respiratory:  Positive for cough and wheezing. Negative for shortness of breath. Gastrointestinal:  Negative for hematochezia.    Genitourinary: Negative for hematuria. Neurological:  Negative for dizziness and light-headedness. PHYSICAL EXAM:    /60   Pulse 60   Ht 5' (1.524 m)   Wt 156 lb (70.8 kg)   BMI 30.47 kg/m²     Physical Exam  Vitals reviewed. Constitutional:       General: She is not in acute distress. Appearance: She is not ill-appearing, toxic-appearing or diaphoretic. HENT:      Head: Normocephalic and atraumatic. Cardiovascular:      Rate and Rhythm: Normal rate and regular rhythm. Heart sounds: Normal heart sounds. No murmur heard. No friction rub. No gallop. Pulmonary:      Effort: Pulmonary effort is normal. No respiratory distress. Breath sounds: Normal breath sounds. No stridor. No wheezing or rales. Abdominal:      General: Abdomen is flat. There is no distension. Palpations: Abdomen is soft. Tenderness: There is no abdominal tenderness. Musculoskeletal:         General: No swelling. Right lower leg: No edema. Left lower leg: No edema. Skin:     General: Skin is warm and dry. Neurological:      Mental Status: She is alert and oriented to person, place, and time. Psychiatric:         Mood and Affect: Mood normal.         Thought Content: Thought content normal.         Judgment: Judgment normal.       Medical problems, medical history, and test results were reviewed with the patient today. No results found for this or any previous visit (from the past 168 hour(s)).       Current Outpatient Medications:     rosuvastatin (CRESTOR) 10 MG tablet, Take 1 tablet by mouth in the morning., Disp: 30 tablet, Rfl: 11    albuterol sulfate  (90 Base) MCG/ACT inhaler, Inhale 2 puffs into the lungs every 6 hours as needed, Disp: , Rfl:     albuterol (PROVENTIL) (2.5 MG/3ML) 0.083% nebulizer solution, Inhale 2.5 mg into the lungs every 6 hours as needed, Disp: , Rfl:     apixaban (ELIQUIS) 5 MG TABS tablet, Take 5 mg by mouth 2 times daily, Disp: , Rfl:     aspirin 81 MG EC tablet, Take 81 mg by mouth, Disp: , Rfl:     Calcium Carbonate-Vitamin D (CALCIUM-VITAMIN D) 600-125 MG-UNIT TABS, Take by mouth, Disp: , Rfl:     cetirizine (ZYRTEC) 10 MG tablet, Take 10 mg by mouth daily, Disp: , Rfl:     cyanocobalamin 500 MCG tablet, Take 500 mcg by mouth daily, Disp: , Rfl:     ergocalciferol (ERGOCALCIFEROL) 1.25 MG (29375 UT) capsule, One capsule by mouth weekly for 4 weeks then one monthly. Indications: vitamin D deficiency (high dose therapy), Disp: , Rfl:     fluticasone-vilanterol (BREO ELLIPTA) 100-25 MCG/INH AEPB inhaler, Inhale 1 puff into the lungs daily, Disp: , Rfl:     fluticasone (FLONASE) 50 MCG/ACT nasal spray, 2 sprays by Nasal route, Disp: , Rfl:     furosemide (LASIX) 40 MG tablet, The details of the medication are not available because there are pending changes by a home health clinician., Disp: , Rfl:     gabapentin (NEURONTIN) 300 MG capsule, Take 300 mg by mouth 3 times daily. , Disp: , Rfl:     losartan (COZAAR) 100 MG tablet, Take 100 mg by mouth daily, Disp: , Rfl:     metoprolol succinate (TOPROL XL) 25 MG extended release tablet, Take 25 mg by mouth daily, Disp: , Rfl:     nitroGLYCERIN (NITROSTAT) 0.4 MG SL tablet, Place 0.4 mg under the tongue, Disp: , Rfl:     pantoprazole (PROTONIX) 40 MG tablet, Take 40 mg by mouth every morning (before breakfast), Disp: , Rfl:     sertraline (ZOLOFT) 50 MG tablet, Take 75 mg by mouth, Disp: , Rfl:     spironolactone (ALDACTONE) 25 MG tablet, Take 25 mg by mouth daily, Disp: , Rfl:     vitamin A 3 MG (19322 UT) capsule, Take 24,000 Units by mouth every other day, Disp: , Rfl:     ascorbic acid (VITAMIN C) 250 MG tablet, Take 250 mg by mouth daily, Disp: , Rfl:     eszopiclone (LUNESTA) 1 MG TABS, Take on arrival to the sleep center for the polysomnography to limit insomnia.  Indications: difficulty sleeping (Patient not taking: Reported on 7/19/2022), Disp: , Rfl:      ASSESSMENT/PLAN:    Cardiovascular Testing:  - SPECT 1/13/2022: Normal perfusion, not gated, low risk scan  - Echo 10/26/21: LVEF 60-65 %, RV normal, Valves: mild AI, mild MR, mild TR, RVSP 44 mmHg. - 2011: Nonobstructive CAD     1. Paroxysmal atrial fibrillation (HCC)  - Controlled on Eliquis,   - Tolerating metoprolol  - underwent EGD with dilation on 11/22/21  - as patient is now nearly asymptomatic from A Fib perspective will plan for rate control strategy at this time , LVEF preserved, no ischemia on SPECT. 2. Essential hypertension  -Controlled at this time     3. Heart failure with preserved ejection fraction, unspecified HF chronicity ([address]  -Appears euvolemic  - On Spironolactone, recently K normal range May 2022.   - on lasix. Toprol XL 25 once daily     4. Non cardiac chest pain  - not exertional , recent SPECT negative  - continue risk factor modification     5. Mixed Hyperlipidemia  - The 10-year ASCVD risk score (Laron Huitron., et al., 2013) is: 24.4%  - Start Rosuvastatin 10     Recommend recheck CMP and lipids with PCP 8/26/22. Problem List Items Addressed This Visit          Circulatory    A-fib ([address]    Relevant Medications    rosuvastatin (CRESTOR) 10 MG tablet    Essential hypertension       Other    Mixed hyperlipidemia - Primary    Relevant Medications    rosuvastatin (CRESTOR) 10 MG tablet     Other Visit Diagnoses       Non-cardiac chest pain        Chronic heart failure with preserved ejection fraction (HCC)        Relevant Medications    rosuvastatin (CRESTOR) 10 MG tablet            Instructed patient go to ER or call 911/EMS should symptoms recur or worsen. Patient has been instructed and agrees to call our office with any issues or other concerns related to their cardiac condition(s) and/or complaint(s). Return in about 6 months (around 2/2/2023).     Mg El,   8/2/2022 1:24 PM

## 2022-08-10 ENCOUNTER — TELEPHONE (OUTPATIENT)
Dept: SLEEP MEDICINE | Age: 77
End: 2022-08-10

## 2022-08-11 NOTE — TELEPHONE ENCOUNTER
Patient states she got her equipment from [address] [place] [address] and informed her to contact the company for mask refit. She voices understanding.

## 2022-08-23 DIAGNOSIS — I25.10 CORONARY ARTERY DISEASE INVOLVING NATIVE CORONARY ARTERY OF NATIVE HEART WITHOUT ANGINA PECTORIS: ICD-10-CM

## 2022-08-23 DIAGNOSIS — E78.2 MIXED HYPERLIPIDEMIA: ICD-10-CM

## 2022-08-23 DIAGNOSIS — Z79.899 ON POTASSIUM WASTING DIURETIC THERAPY: ICD-10-CM

## 2022-08-23 DIAGNOSIS — I10 ESSENTIAL HYPERTENSION: ICD-10-CM

## 2022-08-23 LAB
ALBUMIN SERPL-MCNC: 3.9 G/DL (ref 3.2–4.6)
ALBUMIN/GLOB SERPL: 1.2 {RATIO} (ref 1.2–3.5)
ALP SERPL-CCNC: 56 U/L (ref 50–136)
ALT SERPL-CCNC: 19 U/L (ref 12–65)
ANION GAP SERPL CALC-SCNC: ABNORMAL MMOL/L (ref 7–16)
AST SERPL-CCNC: 17 U/L (ref 15–37)
BASOPHILS # BLD: 0.1 K/UL (ref 0–0.2)
BASOPHILS NFR BLD: 1 % (ref 0–2)
BILIRUB SERPL-MCNC: 0.5 MG/DL (ref 0.2–1.1)
BUN SERPL-MCNC: 27 MG/DL (ref 8–23)
CALCIUM SERPL-MCNC: 9.5 MG/DL (ref 8.3–10.4)
CHLORIDE SERPL-SCNC: 108 MMOL/L (ref 98–107)
CHOLEST SERPL-MCNC: 131 MG/DL
CO2 SERPL-SCNC: 31 MMOL/L (ref 21–32)
CREAT SERPL-MCNC: 1.4 MG/DL (ref 0.6–1)
DIFFERENTIAL METHOD BLD: ABNORMAL
EOSINOPHIL # BLD: 0.3 K/UL (ref 0–0.8)
EOSINOPHIL NFR BLD: 3 % (ref 0.5–7.8)
ERYTHROCYTE [DISTWIDTH] IN BLOOD BY AUTOMATED COUNT: 14.2 % (ref 11.9–14.6)
GLOBULIN SER CALC-MCNC: 3.2 G/DL (ref 2.3–3.5)
GLUCOSE SERPL-MCNC: 86 MG/DL (ref 65–100)
HCT VFR BLD AUTO: 44.4 % (ref 35.8–46.3)
HDLC SERPL-MCNC: 59 MG/DL (ref 40–60)
HDLC SERPL: 2.2 {RATIO}
HGB BLD-MCNC: 13.5 G/DL (ref 11.7–15.4)
IMM GRANULOCYTES # BLD AUTO: 0 K/UL (ref 0–0.5)
IMM GRANULOCYTES NFR BLD AUTO: 0 % (ref 0–5)
LDLC SERPL CALC-MCNC: 46.8 MG/DL
LYMPHOCYTES # BLD: 2.7 K/UL (ref 0.5–4.6)
LYMPHOCYTES NFR BLD: 33 % (ref 13–44)
MAGNESIUM SERPL-MCNC: 2.7 MG/DL (ref 1.8–2.4)
MCH RBC QN AUTO: 28 PG (ref 26.1–32.9)
MCHC RBC AUTO-ENTMCNC: 30.4 G/DL (ref 31.4–35)
MCV RBC AUTO: 91.9 FL (ref 79.6–97.8)
MONOCYTES # BLD: 0.7 K/UL (ref 0.1–1.3)
MONOCYTES NFR BLD: 8 % (ref 4–12)
NEUTS SEG # BLD: 4.5 K/UL (ref 1.7–8.2)
NEUTS SEG NFR BLD: 55 % (ref 43–78)
NRBC # BLD: 0 K/UL (ref 0–0.2)
PLATELET # BLD AUTO: 247 K/UL (ref 150–450)
PMV BLD AUTO: 10.6 FL (ref 9.4–12.3)
POTASSIUM SERPL-SCNC: 4.4 MMOL/L (ref 3.5–5.1)
PROT SERPL-MCNC: 7.1 G/DL (ref 6.3–8.2)
RBC # BLD AUTO: 4.83 M/UL (ref 4.05–5.2)
SODIUM SERPL-SCNC: 137 MMOL/L (ref 136–145)
TRIGL SERPL-MCNC: 126 MG/DL (ref 35–150)
URATE SERPL-MCNC: 5.5 MG/DL (ref 2.6–6)
VLDLC SERPL CALC-MCNC: 25.2 MG/DL (ref 6–23)
WBC # BLD AUTO: 8.2 K/UL (ref 4.3–11.1)

## 2022-09-01 ENCOUNTER — TELEMEDICINE (OUTPATIENT)
Dept: INTERNAL MEDICINE CLINIC | Facility: CLINIC | Age: 77
End: 2022-09-01
Payer: MEDICARE

## 2022-09-01 DIAGNOSIS — I10 ESSENTIAL HYPERTENSION: Primary | Chronic | ICD-10-CM

## 2022-09-01 DIAGNOSIS — E78.2 MIXED HYPERLIPIDEMIA: ICD-10-CM

## 2022-09-01 DIAGNOSIS — Z79.899 ON POTASSIUM WASTING DIURETIC THERAPY: ICD-10-CM

## 2022-09-01 DIAGNOSIS — R29.6 RECURRENT FALLS WHILE WALKING: ICD-10-CM

## 2022-09-01 DIAGNOSIS — E53.8 LOW VITAMIN B12 LEVEL: ICD-10-CM

## 2022-09-01 PROBLEM — I50.22 CHRONIC SYSTOLIC (CONGESTIVE) HEART FAILURE (HCC): Chronic | Status: ACTIVE | Noted: 2022-07-12

## 2022-09-01 PROCEDURE — G8399 PT W/DXA RESULTS DOCUMENT: HCPCS | Performed by: NURSE PRACTITIONER

## 2022-09-01 PROCEDURE — G8427 DOCREV CUR MEDS BY ELIG CLIN: HCPCS | Performed by: NURSE PRACTITIONER

## 2022-09-01 PROCEDURE — 1123F ACP DISCUSS/DSCN MKR DOCD: CPT | Performed by: NURSE PRACTITIONER

## 2022-09-01 PROCEDURE — 1090F PRES/ABSN URINE INCON ASSESS: CPT | Performed by: NURSE PRACTITIONER

## 2022-09-01 PROCEDURE — 99213 OFFICE O/P EST LOW 20 MIN: CPT | Performed by: NURSE PRACTITIONER

## 2022-09-01 RX ORDER — POTASSIUM CHLORIDE 750 MG/1
20 TABLET, FILM COATED, EXTENDED RELEASE ORAL DAILY
COMMUNITY

## 2022-09-01 ASSESSMENT — PATIENT HEALTH QUESTIONNAIRE - PHQ9
1. LITTLE INTEREST OR PLEASURE IN DOING THINGS: 0
SUM OF ALL RESPONSES TO PHQ QUESTIONS 1-9: 0
9. THOUGHTS THAT YOU WOULD BE BETTER OFF DEAD, OR OF HURTING YOURSELF: 0
SUM OF ALL RESPONSES TO PHQ QUESTIONS 1-9: 0
SUM OF ALL RESPONSES TO PHQ QUESTIONS 1-9: 0
10. IF YOU CHECKED OFF ANY PROBLEMS, HOW DIFFICULT HAVE THESE PROBLEMS MADE IT FOR YOU TO DO YOUR WORK, TAKE CARE OF THINGS AT HOME, OR GET ALONG WITH OTHER PEOPLE: 0
5. POOR APPETITE OR OVEREATING: 0
SUM OF ALL RESPONSES TO PHQ9 QUESTIONS 1 & 2: 0
3. TROUBLE FALLING OR STAYING ASLEEP: 0
2. FEELING DOWN, DEPRESSED OR HOPELESS: 0
8. MOVING OR SPEAKING SO SLOWLY THAT OTHER PEOPLE COULD HAVE NOTICED. OR THE OPPOSITE, BEING SO FIGETY OR RESTLESS THAT YOU HAVE BEEN MOVING AROUND A LOT MORE THAN USUAL: 0
SUM OF ALL RESPONSES TO PHQ QUESTIONS 1-9: 0
4. FEELING TIRED OR HAVING LITTLE ENERGY: 0
7. TROUBLE CONCENTRATING ON THINGS, SUCH AS READING THE NEWSPAPER OR WATCHING TELEVISION: 0
6. FEELING BAD ABOUT YOURSELF - OR THAT YOU ARE A FAILURE OR HAVE LET YOURSELF OR YOUR FAMILY DOWN: 0

## 2022-09-01 NOTE — PROGRESS NOTES
Purvi Howell (:  1945) is a Established patient, here for evaluation of the following:    Assessment & Plan   Below is the assessment and plan developed based on review of pertinent history, physical exam, labs, studies, and medications. 1. Essential hypertension  -     CBC with Auto Differential; Future  -     Comprehensive Metabolic Panel; Future  -     TSH; Future  -     Uric Acid; Future  -     Magnesium; Future  2. On potassium wasting diuretic therapy  -     Comprehensive Metabolic Panel; Future  -     Uric Acid; Future  -     Magnesium; Future  3. Mixed hyperlipidemia  -     Comprehensive Metabolic Panel; Future  -     Lipid Panel; Future  4. Recurrent falls while walking  5. Low vitamin B12 level  -     Methylmalonic Acid, Serum; Future    Home BP is within normal limits. No problem with meds. Balance issue re falling or mechanical or diuretic? Will ask home health for physical therapy. Increase fluid by one glass of water daily. Vitamin B12 low in past -- re-evaluate. Return in about 3 months (around 2022) for Follow-Up, HTN, HLD, with labs. Subjective   Cholesterol Problem  The history is provided by the patient and medical records. This is a chronic problem. Episode onset: approx since . The problem occurs daily. The problem has not changed since onset. Pertinent negatives include no chest pain, no headaches and no shortness of breath. Nothing aggravates the symptoms. The symptoms are relieved by medications (Atorvastatin 20mg). Hypertension   The history is provided by the patient and medical records. This is a chronic problem. Episode onset: approx since . The problem has not changed since onset. Associated symptoms include dizziness (\"at times\"). Pertinent negatives include no chest pain, no palpitations, no malaise/fatigue, no blurred vision, no headaches, no tinnitus, no shortness of breath, no nausea and no vomiting.  Risk factors include family history, obesity, hypertension and postmenopause. GERD  The history is provided by the patient and medical records. This is a recurrent problem. Episode onset: Remote. The problem occurs every several days. The problem has not changed (Since medication started) since onset. Pertinent negatives include no chest pain, no headaches and no shortness of breath. The symptoms are aggravated by stress and eating. The symptoms are relieved by medications (PPI therapy). Treatments tried: As stated. The treatment provided significant relief. Increase number of falls to 3 times. Feeling off balance. Review of Systems   HENT:  Negative for sneezing. Respiratory:  Negative for cough, chest tightness, shortness of breath and wheezing. Cardiovascular:  Positive for leg swelling (chronic). Negative for chest pain and palpitations. Gastrointestinal:  Negative for abdominal pain. Musculoskeletal:  Positive for arthralgias (knees, hips). Skin:  Negative for color change. Allergic/Immunologic: Negative for environmental allergies. Neurological:  Negative for dizziness and headaches. Hematological:  Does not bruise/bleed easily. Psychiatric/Behavioral:  The patient is not nervous/anxious.          Objective   Patient-Reported Vitals  No data recorded     Physical Exam  [INSTRUCTIONS:  \"[x]\" Indicates a positive item  \"[]\" Indicates a negative item  -- DELETE ALL ITEMS NOT EXAMINED]    Constitutional: [x] Appears well-developed and well-nourished [x] No apparent distress      [] Abnormal -     Mental status: [x] Alert and awake  [x] Oriented to person/place/time [x] Able to follow commands    [] Abnormal -     Eyes:   EOM    [x]  Normal    [] Abnormal -   Sclera  [x]  Normal    [] Abnormal -          Discharge [x]  None visible   [] Abnormal -     HENT: [x] Normocephalic, atraumatic  [] Abnormal -   [x] Mouth/Throat: Mucous membranes are moist    External Ears [x] Normal  [] Abnormal -    Neck: [x] No visualized mass [] Abnormal -     Pulmonary/Chest: [x] Respiratory effort normal   [x] No visualized signs of difficulty breathing or respiratory distress        [] Abnormal -      Musculoskeletal:   [x] Normal gait with no signs of ataxia         [x] Normal range of motion of neck        [] Abnormal -     Neurological:        [x] No Facial Asymmetry (Cranial nerve 7 motor function) (limited exam due to video visit)          [x] No gaze palsy        [] Abnormal -          Skin:        [x] No significant exanthematous lesions or discoloration noted on facial skin         [] Abnormal -            Psychiatric:       [x] Normal Affect [] Abnormal -        [x] No Hallucinations    Other pertinent observable physical exam findings:-         On this date 9/1/2022 I have spent 20 minutes reviewing previous notes, test results and face to face (virtual) with the patient discussing the diagnosis and importance of compliance with the treatment plan as well as documenting on the day of the visit. Susi Hernandez, was evaluated through a synchronous (real-time) audio-video encounter. The patient (or guardian if applicable) is aware that this is a billable service, which includes applicable co-pays. This Virtual Visit was conducted with patient's (and/or legal guardian's) consent. The visit was conducted pursuant to the emergency declaration under the [facility], [facility] authority and the [employer] Act. Patient identification was verified, and a caregiver was present when appropriate. The patient was located at Home: [place] [address]. Provider was located at [facility] ([address] [place], [place] [address]t): [address] Vic Danielson [address],  [address].         --BERNA Zamora - DORINA

## 2022-09-14 RX ORDER — FLUTICASONE FUROATE AND VILANTEROL 100; 25 UG/1; UG/1
POWDER RESPIRATORY (INHALATION)
Qty: 1 EACH | Refills: 11 | Status: SHIPPED | OUTPATIENT
Start: 2022-09-14 | End: 2022-09-30

## 2022-09-16 ENCOUNTER — TELEPHONE (OUTPATIENT)
Dept: CARDIOLOGY CLINIC | Age: 77
End: 2022-09-16

## 2022-09-30 ENCOUNTER — OFFICE VISIT (OUTPATIENT)
Dept: PULMONOLOGY | Age: 77
End: 2022-09-30
Payer: MEDICARE

## 2022-09-30 VITALS
HEART RATE: 57 BPM | WEIGHT: 161 LBS | SYSTOLIC BLOOD PRESSURE: 120 MMHG | TEMPERATURE: 98 F | OXYGEN SATURATION: 99 % | BODY MASS INDEX: 31.61 KG/M2 | HEIGHT: 60 IN | DIASTOLIC BLOOD PRESSURE: 80 MMHG | RESPIRATION RATE: 20 BRPM

## 2022-09-30 DIAGNOSIS — J30.1 SEASONAL ALLERGIC RHINITIS DUE TO POLLEN: ICD-10-CM

## 2022-09-30 DIAGNOSIS — J45.30 MILD PERSISTENT ASTHMA WITHOUT COMPLICATION: Primary | ICD-10-CM

## 2022-09-30 PROCEDURE — 99214 OFFICE O/P EST MOD 30 MIN: CPT | Performed by: INTERNAL MEDICINE

## 2022-09-30 PROCEDURE — G8417 CALC BMI ABV UP PARAM F/U: HCPCS | Performed by: INTERNAL MEDICINE

## 2022-09-30 PROCEDURE — 1036F TOBACCO NON-USER: CPT | Performed by: INTERNAL MEDICINE

## 2022-09-30 PROCEDURE — G8399 PT W/DXA RESULTS DOCUMENT: HCPCS | Performed by: INTERNAL MEDICINE

## 2022-09-30 PROCEDURE — 1123F ACP DISCUSS/DSCN MKR DOCD: CPT | Performed by: INTERNAL MEDICINE

## 2022-09-30 PROCEDURE — 1090F PRES/ABSN URINE INCON ASSESS: CPT | Performed by: INTERNAL MEDICINE

## 2022-09-30 PROCEDURE — G8427 DOCREV CUR MEDS BY ELIG CLIN: HCPCS | Performed by: INTERNAL MEDICINE

## 2022-09-30 RX ORDER — AZELASTINE 1 MG/ML
1 SPRAY, METERED NASAL 2 TIMES DAILY
Qty: 1 EACH | Refills: 11 | Status: SHIPPED | OUTPATIENT
Start: 2022-09-30

## 2022-09-30 RX ORDER — FLUTICASONE PROPIONATE AND SALMETEROL 232; 14 UG/1; UG/1
1 POWDER, METERED RESPIRATORY (INHALATION) 2 TIMES DAILY
Qty: 1 EACH | Refills: 11 | Status: SHIPPED | OUTPATIENT
Start: 2022-09-30

## 2022-09-30 NOTE — PROGRESS NOTES
Patient Name:  Pasquale Ramirez                             YOB: 1945  MRN: [id number]                                              Office Visit 2022    ASSESSMENT AND PLAN:  (Medical Decision Making)    Chinedu Moran was seen today for asthma. Diagnoses and all orders for this visit:    Mild persistent asthma without complication: Mostly seasonal persistent symptoms in the winter. She has been doing well the summer and fall and only using her albuterol about once a week. She has not been able to afford the [facility] and we will change his over to air duo and she can fill with good Rx. We also discussed about talking with her insurance or the pharmacist about other options that might even be cheaper. While I wrote the air duo for twice daily I kept it as high dose and she can actually just try it once daily and see if this will be adequate to control her symptoms. Follow-up in 6 months to review symptoms check spirometry and adjust therapy as needed. -     Fluticasone-Salmeterol,sensor, (Catie Hamel) 232-14 MCG/ACT AEPB; Inhale 1 puff into the lungs in the morning and at bedtime    Seasonal allergic rhinitis due to pollen: Taken off of Zyrtec and Flonase after starting Eliquis. Not really sure that there was any clear reason for this, but can try Astelin for ongoing rhinitis. -     azelastine (ASTELIN) 0.1 % nasal spray; 1 spray by Nasal route 2 times daily Use in each nostril as directed    Orders Placed This Encounter   Medications    Fluticasone-Salmeterol,sensor, (AIRDUO DIGIHALER) 232-14 MCG/ACT AEPB     Sig: Inhale 1 puff into the lungs in the morning and at bedtime     Dispense:  1 each     Refill:  11    azelastine (ASTELIN) 0.1 % nasal spray     Si spray by Nasal route 2 times daily Use in each nostril as directed     Dispense:  1 each     Refill:  11     No orders of the defined types were placed in this encounter.     Follow-up and Dispositions    Return in about 6 months Skin:  Skin color normal. No rashes or lesions     Neurologic:  A&Ox3     DIAGNOSTIC TESTS:                                                                                    LABS:   Lab Results   Component Value Date/Time    WBC 8.2 08/23/2022 07:21 AM    HGB 13.5 08/23/2022 07:21 AM    HCT 44.4 08/23/2022 07:21 AM     08/23/2022 07:21 AM    TSH 3.390 02/04/2022 01:56 PM    ESR 6 12/13/2019 12:37 PM     Imaging: I performed an independent interpretation of the patient's images. CXR: 08/05/2020                     XR SHOULDER LEFT (MIN 2 VIEWS) 02/18/2022    Narrative  AUTOMATIC ADMINISTRATIVE RESULT    The result for this exam can be found in the Progress note in the chart. Impression  :  See Progress note in the chart. CT Chest:   CT CHEST PULMONARY EMBOLISM W CONTRAST 10/26/2021    Narrative  CT OF THE CHEST WITH INTRAVENOUS CONTRAST, 10/26/2021    Indication: Shortness of breath, elevated d-dimer, left leg pain and left leg  swelling. Comparison: CT chest without contrast 6/17/2010    Technique:   2.5 mm axial scans from above the aortic arch to the lung bases  following the uneventful administration of 70 mL of Isovue-370. Intravenous  contrast was given to evaluate for pulmonary embolism. All CT scans performed at  this facility use one or all of the following: Automated exposure control,  adjustment of the mA and/or kVp according to patient's size, iterative  reconstruction. Findings: The base of the neck is unremarkable in appearance. No axillary, mediastinal,  or hilar lymphadenopathy is seen. The thoracic aorta is normal in caliber. Opacification of the pulmonary arteries is good. No abnormal filling defects  are seen to suggest pulmonary embolism. The heart appears moderately enlarged. There is additional enlargement of pulmonary artery demonstrating a diameter  greater than that of the adjacent ascending aorta.     Evaluation with lung windows demonstrates no significant infiltrate. No pleural  effusion is seen. Lungs are expanded without evidence for pneumothorax. No  acute osseous abnormality is seen. Limited evaluation of the upper abdomen demonstrates no acute abnormality. Impression  1. No evidence for pulmonary embolism. 2. Moderate cardiomegaly. This can be an early indicator for heart failure in  the correct clinical setting. 3. Enlarged pulmonary artery which can indicator for pulmonary artery  hypertension. This report was made using voice transcription. Despite my best efforts to avoid  any, transcription errors may persist. If there is any question about the  accuracy of the report or need for clarification, then please call [phone], or text me through [employer] for clarification or correction. Nuclear Medicine: No results found for this or any previous visit from the past 3650 days. PFTs: 08/05/2020: normal spirometry        No flowsheet data found. No results found for this or any previous visit. No results found for this or any previous visit. FeNO: No results found for this or any previous visit. FeNO and Likelihood of Eosinophilic Asthma   Unlikely Intermediate Likely   <25 ppb 25-50 ppb >50ppb   Exercise Oximetry:  Echo:   TRANSTHORACIC ECHOCARDIOGRAM (TTE) COMPLETE (CONTRAST/BUBBLE/3D PRN) 10/26/2021    Narrative  This is a summary report. The complete report is available in the patient's medical record. If you cannot access the medical record, please contact the sending organization for a detailed fax or copy. · Contrast used: DEFINITY. · LV: Estimated LVEF is 60 - 65%. Normal cavity size, wall thickness and systolic function (ejection fraction normal). · TV: Mild tricuspid valve regurgitation is present. Right Ventricular Arterial Pressure (RVSP) is 44 mmHg. Pulmonary hypertension found to be mild. · LA: Moderately dilated left atrium. · RA: Moderately dilated right atrium.   · AV: Mild aortic valve regurgitation is present. · MV: Mild mitral valve regurgitation is present. [facility] Reference Info:                                                                                                                Past Medical History:   Diagnosis Date    Allergic rhinitis, cause unspecified 2015    Aortic valve disorders 2015    Arrhythmia     a-fib    Asthma     uses inhaler prn    CAD (coronary artery disease)     mild,  treated with med; NO MI, NO stents, NO CABG     Carotid stenosis 2015    Carpal tunnel syndrome 2015    bilat wrists-no issue now    Congestive heart failure ([address]     COVID-19 vaccine series completed 2021    Pfizer; 2021    Depression 2015    controlled     Esophageal reflux 2015    controlled with medication     Essential hypertension     controlled with medication    Fibromyalgia     Former smoker     1 ppd for 14 yrs; quit smoking     GERD (gastroesophageal reflux disease)     controlled with medication     Heart failure ([address]     last EF 60-65% echo done 10/26/2021    History of EKG 2021    a fib; patient started on eliquis BID and ASA 81 mg per [facility]    History of peptic ulcer disease     resolved with protonix     Hx of echocardiogram 10/26/2021    EF 60-65%     Menopause     Mixed hyperlipidemia 2016    Neuropathy     BLE; gabapentin     Osteoarthritis     Osteopenia 2015    Renal insufficiency 2015        Tobacco Use      Smoking status: Former        Packs/day: 1.00        Years: 10.00        Pack years: 10        Types: Cigarettes        Quit date: 1981        Years since quittin.7      Smokeless tobacco: Never      Tobacco comments: Quit smoking: quit smoking     Allergies   Allergen Reactions    Wasp Venom Protein Anaphylaxis    Atorvastatin Other (See Comments)     High doses cause leg cramps.  Able to tolerate low doses (<20mg/day)    Iodine Hives    Lisinopril Other (See Comments)    Tetracycline Other (See Comments)     ULCERS IN MOUTH    Penicillin G Rash     Current Outpatient Medications   Medication Instructions    albuterol (PROVENTIL) 2.5 mg, Inhalation, EVERY 6 HOURS PRN    albuterol sulfate  (90 Base) MCG/ACT inhaler 2 puffs, Inhalation, EVERY 6 HOURS PRN    apixaban (ELIQUIS) 5 mg, Oral, 2 TIMES DAILY    ascorbic acid (VITAMIN C) 250 mg, Oral, DAILY    aspirin 81 mg, Oral    azelastine (ASTELIN) 0.1 % nasal spray 1 spray, Nasal, 2 TIMES DAILY, Use in each nostril as directed    Calcium Carbonate-Vitamin D (CALCIUM-VITAMIN D) 600-125 MG-UNIT TABS Oral    cyanocobalamin 500 mcg, Oral, DAILY    diclofenac sodium (VOLTAREN) 2 g, Topical, 4 TIMES DAILY    ergocalciferol (ERGOCALCIFEROL) 1.25 MG (07635 UT) capsule One capsule by mouth weekly for 4 weeks then one monthly.  Indications: vitamin D deficiency (high dose therapy)    Fluticasone-Salmeterol,sensor, (AIRDUO DIGIHALER) 232-14 MCG/ACT AEPB 1 puff, Inhalation, 2 times daily    furosemide (LASIX) 40 MG tablet The details of the medication are not available because there are pending changes by a home health clinician.    gabapentin (NEURONTIN) 300 mg, Oral, 3 TIMES DAILY    losartan (COZAAR) 100 mg, Oral, DAILY    metoprolol succinate (TOPROL XL) 25 mg, Oral, DAILY    nitroGLYCERIN (NITROSTAT) 0.4 mg, SubLINGual    pantoprazole (PROTONIX) 40 mg, Oral, DAILY BEFORE BREAKFAST    potassium chloride (KLOR-CON) 10 MEQ extended release tablet 20 mEq, Oral, DAILY    rosuvastatin (CRESTOR) 10 mg, Oral, DAILY    sertraline (ZOLOFT) 75 mg, Oral    spironolactone (ALDACTONE) 25 mg, Oral, DAILY

## 2022-10-02 ENCOUNTER — HOME HEALTH ADMISSION (OUTPATIENT)
Dept: HOME HEALTH SERVICES | Facility: HOME HEALTH | Age: 77
End: 2022-10-02

## 2022-10-02 ASSESSMENT — ENCOUNTER SYMPTOMS
COUGH: 0
CHEST TIGHTNESS: 0
WHEEZING: 0
COLOR CHANGE: 0
SHORTNESS OF BREATH: 0
ABDOMINAL PAIN: 0

## 2022-10-07 ENCOUNTER — TELEPHONE (OUTPATIENT)
Dept: PULMONOLOGY | Age: 77
End: 2022-10-07

## 2022-10-07 NOTE — TELEPHONE ENCOUNTER
AirDuo was denied by patients insurance. The drug you asked for is not listed in your preferred drug list (formulary). The preferred drug(s), you may not have tried, are: Advair Diskus powder for inhalation, Flovent Diskus powder for inhalation, Flovent HFA aerosol inhaler, Symbicort HFA aerosol inhaler, and Wixela Inhub powder for inhalation. Please e-scribe to patient pharmacy.

## 2022-10-10 ENCOUNTER — TELEPHONE (OUTPATIENT)
Dept: PULMONOLOGY | Age: 77
End: 2022-10-10

## 2022-11-03 ENCOUNTER — APPOINTMENT (OUTPATIENT)
Dept: GENERAL RADIOLOGY | Age: 77
End: 2022-11-03
Payer: MEDICARE

## 2022-11-03 ENCOUNTER — HOSPITAL ENCOUNTER (EMERGENCY)
Age: 77
Discharge: HOME | End: 2022-11-03
Attending: EMERGENCY MEDICINE | Admitting: EMERGENCY MEDICINE
Payer: MEDICARE

## 2022-11-03 VITALS
BODY MASS INDEX: 31.25 KG/M2 | SYSTOLIC BLOOD PRESSURE: 135 MMHG | DIASTOLIC BLOOD PRESSURE: 75 MMHG | TEMPERATURE: 99.6 F | HEART RATE: 70 BPM | OXYGEN SATURATION: 96 % | RESPIRATION RATE: 19 BRPM | WEIGHT: 160 LBS

## 2022-11-03 DIAGNOSIS — J10.1 INFLUENZA A: Primary | ICD-10-CM

## 2022-11-03 LAB
FLUAV AG NPH QL IA: POSITIVE
FLUBV AG NPH QL IA: NEGATIVE
SARS-COV-2 RDRP RESP QL NAA+PROBE: NOT DETECTED
SOURCE: NORMAL
SPECIMEN SOURCE: ABNORMAL

## 2022-11-03 PROCEDURE — 87804 INFLUENZA ASSAY W/OPTIC: CPT

## 2022-11-03 PROCEDURE — 87635 SARS-COV-2 COVID-19 AMP PRB: CPT

## 2022-11-03 PROCEDURE — 99284 EMERGENCY DEPT VISIT MOD MDM: CPT

## 2022-11-03 PROCEDURE — 71046 X-RAY EXAM CHEST 2 VIEWS: CPT

## 2022-11-03 ASSESSMENT — ENCOUNTER SYMPTOMS
COUGH: 1
RHINORRHEA: 1
SHORTNESS OF BREATH: 0
GASTROINTESTINAL NEGATIVE: 1
ALLERGIC/IMMUNOLOGIC NEGATIVE: 1
SORE THROAT: 0
WHEEZING: 0
EYES NEGATIVE: 1
EYE DISCHARGE: 0

## 2022-11-03 ASSESSMENT — PAIN - FUNCTIONAL ASSESSMENT: PAIN_FUNCTIONAL_ASSESSMENT: 0-10

## 2022-11-03 NOTE — ED PROVIDER NOTES
Emergency Department Provider Note                   PCP:                BERNA Arriaga NP               Age: 76 y.o. Sex: female       ICD-10-CM    1. Influenza A  J10.1           DISPOSITION Decision To Discharge 11/03/2022 04:54:59 PM        MDM     Amount and/or Complexity of Data Reviewed  Clinical lab tests: reviewed  Tests in the radiology section of CPT®: reviewed    Risk of Complications, Morbidity, and/or Mortality  Presenting problems: moderate  Diagnostic procedures: moderate  Management options: moderate  General comments: Patient's influenza A is positive. Chest x-ray does not show any acute changes. No pneumonia. She is currently taking an antibiotic and prednisone. She has a nebulizer at home that she can utilize. She is ambulatory, well-hydrated and looks good. She does have a primary care physician to follow-up with. She was encouraged to drink plenty of fluids, rest and return to the ED if worsening in any way. She is stable for discharge and ambulatory out of the ED without difficulty at this time. Her  is driving her home. Patient Progress  Patient progress: stable             Orders Placed This Encounter   Procedures    Rapid influenza A/B antigens    COVID-19, Rapid    XR CHEST (2 VW)        Medications - No data to display    Discharge Medication List as of 11/3/2022  5:05 PM           Akin Tobias is a 76 y.o. female who presents to the Emergency Department with chief complaint of    Chief Complaint   Patient presents with    Shortness of Breath      Patient is here with fever, rhinorrhea, congestion, body aches and not feeling well for 5 days. No nausea and vomiting. No chest pain, shortness of breath, abdominal pain, trouble with urination or bowel movements, dizziness, weakness, dyspnea on exertion, swelling/tingling or weakness to arms and legs. She ambulated to the room without difficulty and is well hydrated.   Patient has asthma and does have nebulizers and an inhaler at home. She was seen in urgent care on Sunday and prescribed an antibiotic and prednisone which has not really helped. The history is provided by the patient. Cough  Cough characteristics:  Dry and non-productive  Sputum characteristics:  Nondescript  Severity:  Moderate  Onset quality:  Gradual  Duration:  5 days  Timing:  Constant  Progression:  Unchanged  Chronicity:  New  Context: sick contacts    Relieved by:  Nothing  Worsened by:  Nothing  Ineffective treatments:  None tried  Associated symptoms: chills, fever, myalgias and rhinorrhea    Associated symptoms: no chest pain, no diaphoresis, no ear fullness, no ear pain, no eye discharge, no headaches, no shortness of breath, no sore throat, no weight loss and no wheezing        Review of Systems   Constitutional:  Positive for chills and fever. Negative for diaphoresis and weight loss. HENT:  Positive for rhinorrhea. Negative for ear pain and sore throat. Eyes: Negative. Negative for discharge. Respiratory:  Positive for cough. Negative for shortness of breath and wheezing. Cardiovascular: Negative. Negative for chest pain. Gastrointestinal: Negative. Endocrine: Negative. Genitourinary: Negative. Musculoskeletal:  Positive for myalgias. Skin: Negative. Allergic/Immunologic: Negative. Neurological: Negative. Negative for headaches. Hematological: Negative. Psychiatric/Behavioral: Negative. All other systems reviewed and are negative.     Past Medical History:   Diagnosis Date    Allergic rhinitis, cause unspecified 01/07/2015    Aortic valve disorders 01/07/2015    Arrhythmia     a-fib    Asthma     uses inhaler prn    CAD (coronary artery disease)     mild,  treated with med; NO MI, NO stents, NO CABG     Carotid stenosis 1/7/2015    Carpal tunnel syndrome 01/07/2015    bilat wrists-no issue now    [facility] failure [facility] [address]     COVID-19 vaccine series completed 03/22/2021 Pfizer; 2021    Depression 2015    controlled     Esophageal reflux 2015    controlled with medication     Essential hypertension     controlled with medication    Fibromyalgia     Former smoker     1 ppd for 14 yrs; quit smoking     GERD (gastroesophageal reflux disease)     controlled with medication     Heart failure ([address]     last EF 60-65% echo done 10/26/2021    History of EKG 2021    a fib; patient started on eliquis BID and ASA 81 mg per [facility] History of peptic ulcer disease     resolved with protonix     Hx of echocardiogram 10/26/2021    EF 60-65%     Menopause     Mixed hyperlipidemia 2016    Neuropathy     BLE; gabapentin     Osteoarthritis     Osteopenia 2015    Renal insufficiency 2015        Past Surgical History:   Procedure Laterality Date    BLADDER REPAIR      bladder tac    BLADDER SUSPENSION      CHOLECYSTECTOMY, LAPAROSCOPIC      COLONOSCOPY N/A 10/22/2021    COLONOSCOPY/ BMI 34 performed by [facility] ENDOSCOPY    COLONOSCOPY      GI      esophageal dilatation    HEMORRHOID SURGERY      HYSTERECTOMY (CERVIX STATUS UNKNOWN)      RECTOCELE REPAIR      ROTATOR CUFF REPAIR Right     TUBAL LIGATION          Family History   Problem Relation Age of Onset    Heart Disease Maternal Grandmother     Rheum Arthritis Paternal Grandmother     Heart Attack Sister          from heart issue age 54    Diabetes Mother     Diabetes Sister     Diabetes Sister     Breast Cancer Sister 79    Diabetes Sister     Heart Attack Father     Heart Attack Mother         Social History     Socioeconomic History    Marital status:      Years of education: 14 years   Tobacco Use    Smoking status: Former     Packs/day: 1.00     Years: 10.00     Pack years: 10.00     Types: Cigarettes     Quit date: 1981     Years since quittin.8    Smokeless tobacco: Never    Tobacco comments:     Quit smoking: quit smoking 1981   Substance and Sexual Activity    Alcohol use: No     Alcohol/week: 0.0 standard drinks    Drug use: No         Wasp venom protein, Atorvastatin, Iodine, Lisinopril, Tetracycline, and Penicillin g     Discharge Medication List as of 11/3/2022  5:05 PM        CONTINUE these medications which have NOT CHANGED    Details   Fluticasone-Salmeterol,sensor, (AIRDUO DIGIHALER) 232-14 MCG/ACT AEPB Inhale 1 puff into the lungs in the morning and at bedtime, Disp-1 each, R-11Print      azelastine (ASTELIN) 0.1 % nasal spray 1 spray by Nasal route 2 times daily Use in each nostril as directed, Disp-1 each, R-11Print      diclofenac sodium (VOLTAREN) 1 % GEL Apply 2 g topically 4 times daily, Topical, 4 TIMES DAILY Starting Thu 5/19/2022, Historical Med      potassium chloride (KLOR-CON) 10 MEQ extended release tablet Take 20 mEq by mouth dailyHistorical Med      rosuvastatin (CRESTOR) 10 MG tablet Take 1 tablet by mouth in the morning., Disp-30 tablet, R-11Normal      albuterol sulfate  (90 Base) MCG/ACT inhaler Inhale 2 puffs into the lungs every 6 hours as neededHistorical Med      albuterol (PROVENTIL) (2.5 MG/3ML) 0.083% nebulizer solution Inhale 2.5 mg into the lungs every 6 hours as neededHistorical Med      apixaban (ELIQUIS) 5 MG TABS tablet Take 5 mg by mouth 2 times dailyHistorical Med      ascorbic acid (VITAMIN C) 250 MG tablet Take 250 mg by mouth dailyHistorical Med      aspirin 81 MG EC tablet Take 81 mg by mouthHistorical Med      Calcium Carbonate-Vitamin D (CALCIUM-VITAMIN D) 600-125 MG-UNIT TABS Take by mouthHistorical Med      cyanocobalamin 500 MCG tablet Take 500 mcg by mouth dailyHistorical Med      ergocalciferol (ERGOCALCIFEROL) 1.25 MG (51043 UT) capsule One capsule by mouth weekly for 4 weeks then one monthly.  Indications: vitamin D deficiency (high dose therapy)Historical Med      furosemide (LASIX) 40 MG tablet The details of the medication are not available because there are pending changes by a home health clinician. Historical Med      gabapentin (NEURONTIN) 300 MG capsule Take 300 mg by mouth 3 times daily. Historical Med      losartan (COZAAR) 100 MG tablet Take 100 mg by mouth dailyHistorical Med      metoprolol succinate (TOPROL XL) 25 MG extended release tablet Take 25 mg by mouth dailyHistorical Med      nitroGLYCERIN (NITROSTAT) 0.4 MG SL tablet Place 0.4 mg under the tongueHistorical Med      pantoprazole (PROTONIX) 40 MG tablet Take 40 mg by mouth every morning (before breakfast)Historical Med      sertraline (ZOLOFT) 50 MG tablet Take 75 mg by mouthHistorical Med      spironolactone (ALDACTONE) 25 MG tablet Take 25 mg by mouth dailyHistorical Med              Vitals signs and nursing note reviewed. Patient Vitals for the past 4 hrs:   Temp Pulse Resp BP SpO2   11/03/22 1353 99.6 °F (37.6 °C) 70 19 135/75 96 %          Physical Exam  Vitals and nursing note reviewed. Constitutional:       Appearance: Normal appearance. HENT:      Head: Normocephalic and atraumatic. Right Ear: External ear normal.      Left Ear: External ear normal.      Nose: Nose normal.      Mouth/Throat:      Mouth: Mucous membranes are moist.   Eyes:      Extraocular Movements: Extraocular movements intact. Conjunctiva/sclera: Conjunctivae normal.      Pupils: Pupils are equal, round, and reactive to light. Cardiovascular:      Rate and Rhythm: Normal rate and regular rhythm. Pulses: Normal pulses. Heart sounds: Normal heart sounds. Pulmonary:      Effort: Pulmonary effort is normal.      Breath sounds: Examination of the right-upper field reveals wheezing. Examination of the left-upper field reveals wheezing. Examination of the right-middle field reveals wheezing. Examination of the left-middle field reveals wheezing. Examination of the right-lower field reveals wheezing. Examination of the left-lower field reveals wheezing. Wheezing present. No rhonchi. Chest:      Chest wall: No tenderness. Abdominal:      General: Abdomen is flat. Palpations: Abdomen is soft. Musculoskeletal:         General: Normal range of motion. Cervical back: Normal range of motion. Skin:     General: Skin is warm. Capillary Refill: Capillary refill takes less than 2 seconds. Neurological:      General: No focal deficit present. Mental Status: She is alert and oriented to person, place, and time. Mental status is at baseline. Psychiatric:         Mood and Affect: Mood normal.         Behavior: Behavior normal.         Thought Content: Thought content normal.         Judgment: Judgment normal.        Procedures    Results for orders placed or performed during the hospital encounter of 11/03/22   Rapid influenza A/B antigens    Specimen: Nasal Washing   Result Value Ref Range    Influenza A Ag Positive (A) NEG      Influenza B Ag Negative NEG      Source Nasopharyngeal     COVID-19, Rapid    Specimen: Nasopharyngeal   Result Value Ref Range    Source NASAL      SARS-CoV-2, Rapid Not detected NOTD     XR CHEST (2 VW)    Narrative    XR CHEST (2 VW) 11/3/2022 3:27 PM     HISTORY: Cough, body aches and sore throat. Wheezing and shortness of breath. COMPARISON: Chest x-ray 10/30/2021. AP and lateral views of the chest were obtained. Impression    The lungs are clear. The heart size is normal in size. No  pneumothorax. No pleural effusions. XR CHEST (2 VW)   Final Result   The lungs are clear. The heart size is normal in size. No   pneumothorax. No pleural effusions. Voice dictation software was used during the making of this note. This software is not perfect and grammatical and other typographical errors may be present. This note has not been completely proofread for errors.      CARL Amezcua  11/03/22 [id number]

## 2022-11-03 NOTE — ED TRIAGE NOTES
Sick since Saturday. Seen UC  on Sunday and was given Abx and prednisone. Pt has cough, body aches, sore throat , wheezing, shob, weakness.  Flu swab negative

## 2022-11-03 NOTE — ED NOTES
Discharge instructions reviewed with pt. Pt verbalized understanding. Pt ambulatory to exit in stable condition.      Juan Carlos Lynn RN  11/03/22 [id number]

## 2022-11-04 ENCOUNTER — CARE COORDINATION (OUTPATIENT)
Dept: CARE COORDINATION | Facility: CLINIC | Age: 77
End: 2022-11-04

## 2022-11-04 NOTE — CARE COORDINATION
Outreached to patient for CCM assessment of ED visit on 11/3/2022 for POSITIVE influenza A flu   Patient very congested and not feeling very well at this time  Encouraged patient to get plenty of rest and fluids and to return to ED for any acute distress   Patient caneled her VV with PCP today due to in ED yesterday   Very appreciative of my call   Will f/u with patient again next week   Ambulatory Care Coordination Note  11/4/2022    ACC: Nicki Espinal RN, BSN, [facility] [phone]    Ambulatory Care Coordination Assessment    Care Coordination Protocol  Referral from Primary Care Provider: No  Week 1 - Initial Assessment     Do you have all of your prescriptions and are they filled?: Yes  Barriers to medication adherence: None  Are you able to afford your medications?: Yes  How often do you have trouble taking your medications the way you have been told to take them?: I always take them as prescribed. Thinking about your patient's physical health needs, are there any symptoms or problems (risk indicators) you are unsure about that require further investigation?: No identified areas of uncertainly or problems already being investigated   Are the patients physical health problems impacting on their mental well-being?: No identified areas of concern   Are there any problems with your patients lifestyle behaviors (alcohol, drugs, diet, exercise) that are impacting on physical or mental well-being?: No identified areas of concern   Do you have any other concerns about your patients mental well-being?  How would you rate their severity and impact on the patient?: No identified areas of concern   How would you rate their home environment in terms of safety and stability (including domestic violence, insecure housing, neighbor harassment)?: Consistently safe, supportive, stable, no identified problems   How do daily activities impact on the patient's well-being? (include current or anticipated unemployment, work, caregiving, access to transportation or other): No identified problems or perceived positive benefits   How would you rate their social network (family, work, friends)?: Good participation with social networks   How would you rate their financial resources (including ability to afford all required medical care)?: Financially secure, resources adequate, no identified problems   How wells does the patient now understand their health and well-being (symptoms, signs or risk factors) and what they need to do to manage their health?: Reasonable to good understanding and already engages in managing health or is willing to undertake better management   How well do you think your patient can engage in healthcare discussions? (Barriers include language, deafness, aphasia, alcohol or drug problems, learning difficulties, concentration): Clear and open communication, no identified barriers   Do other services need to be involved to help this patient?: Other care/services in place and adequate   Are current services involved with this patient well-coordinated? (Include coordination with other services you are now recommendation): All required care/services in place and well-coordinated   Suggested Interventions and Community Resources                  Prior to Admission medications    Medication Sig Start Date End Date Taking?  Authorizing Provider   Fluticasone-Salmeterol,sensor, ([facility]) 067-21 MCG/ACT AEPB Inhale 1 puff into the lungs in the morning and at bedtime 9/30/22  Yes Malini Lockett MD   azelastine (ASTELIN) 0.1 % nasal spray 1 spray by Nasal route 2 times daily Use in each nostril as directed 9/30/22  Yes Malini Lockett MD   diclofenac sodium (VOLTAREN) 1 % GEL Apply 2 g topically 4 times daily 5/19/22  Yes Historical Provider, MD   potassium chloride (KLOR-CON) 10 MEQ extended release tablet Take 20 mEq by mouth daily   Yes Historical Provider, MD   rosuvastatin (CRESTOR) 10 MG tablet Take 1 tablet by mouth in the morning. 8/2/22  Yes Ramana Ochoa, DO   albuterol sulfate  (90 Base) MCG/ACT inhaler Inhale 2 puffs into the lungs every 6 hours as needed 4/20/21  Yes Ar Automatic Reconciliation   albuterol (PROVENTIL) (2.5 MG/3ML) 0.083% nebulizer solution Inhale 2.5 mg into the lungs every 6 hours as needed 9/27/21  Yes Ar Automatic Reconciliation   apixaban (ELIQUIS) 5 MG TABS tablet Take 5 mg by mouth 2 times daily 11/3/21  Yes Ar Automatic Reconciliation   ascorbic acid (VITAMIN C) 250 MG tablet Take 250 mg by mouth daily   Yes Ar Automatic Reconciliation   aspirin 81 MG EC tablet Take 81 mg by mouth   Yes Ar Automatic Reconciliation   Calcium Carbonate-Vitamin D (CALCIUM-VITAMIN D) 600-125 MG-UNIT TABS Take by mouth   Yes Ar Automatic Reconciliation   cyanocobalamin 500 MCG tablet Take 500 mcg by mouth daily   Yes Ar Automatic Reconciliation   ergocalciferol (ERGOCALCIFEROL) 1.25 MG (98073 UT) capsule One capsule by mouth weekly for 4 weeks then one monthly. Indications: vitamin D deficiency (high dose therapy) 11/2/21  Yes Ar Automatic Reconciliation   furosemide (LASIX) 40 MG tablet The details of the medication are not available because there are pending changes by a home health clinician. 6/24/21  Yes Ar Automatic Reconciliation   gabapentin (NEURONTIN) 300 MG capsule Take 300 mg by mouth 3 times daily.  1/11/21  Yes Ar Automatic Reconciliation   losartan (COZAAR) 100 MG tablet Take 100 mg by mouth daily 6/24/21  Yes Ar Automatic Reconciliation   metoprolol succinate (TOPROL XL) 25 MG extended release tablet Take 25 mg by mouth daily 12/27/21  Yes Ar Automatic Reconciliation   nitroGLYCERIN (NITROSTAT) 0.4 MG SL tablet Place 0.4 mg under the tongue 2/10/22  Yes Ar Automatic Reconciliation   pantoprazole (PROTONIX) 40 MG tablet Take 40 mg by mouth every morning (before breakfast) 11/2/21  Yes Ar Automatic Reconciliation   sertraline (ZOLOFT) 50 MG tablet Take 75 mg by mouth 11/2/21 Yes Ar Automatic Reconciliation   spironolactone (ALDACTONE) 25 MG tablet Take 25 mg by mouth daily 11/3/21  Yes Ar Automatic Reconciliation       Future Appointments   Date Time Provider Sunil Cramer   2/6/2023 11:30 AM Lupe Albarado DO [facility] AM[facility]   4/13/2023 10:00 AM Estela Jones MD [facility] AM[facility]      and   General Assessment    Do you have any symptoms that are causing concern?: Yes  Progression since Onset: Unchanged  Reported Symptoms: Fatigue, Congestion, Cough, Fever, Pain

## 2022-11-10 ENCOUNTER — CARE COORDINATION (OUTPATIENT)
Dept: CARE COORDINATION | Facility: CLINIC | Age: 77
End: 2022-11-10

## 2022-11-15 NOTE — CARE COORDINATION
Attempted outreach to patient for f/u CCM assessment of ED visit on 11/3/2022 for POSITIVE influenza A flu   UTR and left HIPPA compliant message  Will attempt another outreach

## 2022-12-02 NOTE — TELEPHONE ENCOUNTER
MEDICATION REFILL REQUEST      Name of Medication:  Eliquis  Dose:  5 mg  Frequency:  twice a day   Quantity:    Days' supply:  80      Pharmacy Name/Location:  did not leave

## 2022-12-02 NOTE — TELEPHONE ENCOUNTER
Requested Prescriptions     Pending Prescriptions Disp Refills    apixaban (ELIQUIS) 5 MG TABS tablet 180 tablet 3     Sig: Take 1 tablet by mouth 2 times daily

## 2022-12-13 ENCOUNTER — OFFICE VISIT (OUTPATIENT)
Dept: ORTHOPEDIC SURGERY | Age: 77
End: 2022-12-13

## 2022-12-13 VITALS — HEIGHT: 60 IN | WEIGHT: 160 LBS | BODY MASS INDEX: 31.41 KG/M2

## 2022-12-13 DIAGNOSIS — M19.012 ARTHRITIS OF LEFT GLENOHUMERAL JOINT: Primary | ICD-10-CM

## 2022-12-13 DIAGNOSIS — M25.512 LEFT SHOULDER PAIN, UNSPECIFIED CHRONICITY: ICD-10-CM

## 2022-12-13 RX ORDER — TRIAMCINOLONE ACETONIDE 40 MG/ML
40 INJECTION, SUSPENSION INTRA-ARTICULAR; INTRAMUSCULAR ONCE
Status: COMPLETED | OUTPATIENT
Start: 2022-12-13 | End: 2022-12-13

## 2022-12-13 RX ADMIN — TRIAMCINOLONE ACETONIDE 40 MG: 40 INJECTION, SUSPENSION INTRA-ARTICULAR; INTRAMUSCULAR at 15:54

## 2022-12-13 NOTE — PROGRESS NOTES
Name: Lorri Goel  YOB: 1945  Gender: female  MRN: [id number]      CC: Injections (Left GH injections)       HPI: Lorri Goel is a 76 y.o. female who returns for follow up on left [address] [facility] [address] injection 07/12 she got good relief from this until about a month ago. She wants to delay surgery as long as possible. Physical Examination:  General: no acute distress  Lungs: breathing easily  CV: regular rhythm by pulse  Left Shoulder: Pain and crepitus upon right range of motion of the shoulder she lets me get her to about 110 degrees passively 90 actively with pain. Assessment:     ICD-10-CM    1. Arthritis of left glenohumeral joint  M19.012 triamcinolone acetonide (KENALOG-40) injection 40 mg     US ARTHR/ASP/INJ MAJOR JNT/BURSA LEFT      2. Left shoulder pain, unspecified chronicity  M25.512 triamcinolone acetonide (KENALOG-40) injection 40 mg     US ARTHR/ASP/INJ MAJOR JNT/BURSA LEFT          Plan:   Glenohumeral arthritis we discussed again definitive care would be total shoulder arthroplasty she wants to continue nonoperative management as long she gets good benefit from the cortisone injections. She elected to proceed with repeat injection today  After informed verbal consent was obtained the left shoulder glenohumeral joint was injected under ultrasound guidance in the longitudinal axis with 6 cc of 0.25% Marcaine and 1 cc of 40 mg Kenalog. Needle was localized to the glenohumeral joint and the capsule was seen to clearly distend. Images were saved to the ultrasound machine. The patient tolerated the injection well and was given postinjection flare precautions             Mark Anthony Fonseca MD, [facility]

## 2023-01-05 DIAGNOSIS — E78.2 MIXED HYPERLIPIDEMIA: ICD-10-CM

## 2023-01-05 DIAGNOSIS — E53.8 LOW VITAMIN B12 LEVEL: ICD-10-CM

## 2023-01-05 DIAGNOSIS — Z79.899 ON POTASSIUM WASTING DIURETIC THERAPY: ICD-10-CM

## 2023-01-05 DIAGNOSIS — I10 ESSENTIAL HYPERTENSION: Chronic | ICD-10-CM

## 2023-01-05 LAB
ALBUMIN SERPL-MCNC: 3.8 G/DL (ref 3.2–4.6)
ALBUMIN/GLOB SERPL: 1.2 (ref 0.4–1.6)
ALP SERPL-CCNC: 53 U/L (ref 50–136)
ALT SERPL-CCNC: 18 U/L (ref 12–65)
ANION GAP SERPL CALC-SCNC: 0 MMOL/L (ref 2–11)
AST SERPL-CCNC: 16 U/L (ref 15–37)
BASOPHILS # BLD: 0.1 K/UL (ref 0–0.2)
BASOPHILS NFR BLD: 1 % (ref 0–2)
BILIRUB SERPL-MCNC: 0.6 MG/DL (ref 0.2–1.1)
BUN SERPL-MCNC: 28 MG/DL (ref 8–23)
CALCIUM SERPL-MCNC: 9.1 MG/DL (ref 8.3–10.4)
CHLORIDE SERPL-SCNC: 108 MMOL/L (ref 101–110)
CHOLEST SERPL-MCNC: 125 MG/DL
CO2 SERPL-SCNC: 33 MMOL/L (ref 21–32)
CREAT SERPL-MCNC: 1.7 MG/DL (ref 0.6–1)
DIFFERENTIAL METHOD BLD: ABNORMAL
EOSINOPHIL # BLD: 0.3 K/UL (ref 0–0.8)
EOSINOPHIL NFR BLD: 4 % (ref 0.5–7.8)
ERYTHROCYTE [DISTWIDTH] IN BLOOD BY AUTOMATED COUNT: 13.9 % (ref 11.9–14.6)
GLOBULIN SER CALC-MCNC: 3.1 G/DL (ref 2.8–4.5)
GLUCOSE SERPL-MCNC: 92 MG/DL (ref 65–100)
HCT VFR BLD AUTO: 41.9 % (ref 35.8–46.3)
HDLC SERPL-MCNC: 68 MG/DL (ref 40–60)
HDLC SERPL: 1.8
HGB BLD-MCNC: 12.6 G/DL (ref 11.7–15.4)
IMM GRANULOCYTES # BLD AUTO: 0 K/UL (ref 0–0.5)
IMM GRANULOCYTES NFR BLD AUTO: 0 % (ref 0–5)
LDLC SERPL CALC-MCNC: 46.4 MG/DL
LYMPHOCYTES # BLD: 2 K/UL (ref 0.5–4.6)
LYMPHOCYTES NFR BLD: 28 % (ref 13–44)
MAGNESIUM SERPL-MCNC: 3 MG/DL (ref 1.8–2.4)
MCH RBC QN AUTO: 28.5 PG (ref 26.1–32.9)
MCHC RBC AUTO-ENTMCNC: 30.1 G/DL (ref 31.4–35)
MCV RBC AUTO: 94.8 FL (ref 82–102)
MONOCYTES # BLD: 0.6 K/UL (ref 0.1–1.3)
MONOCYTES NFR BLD: 9 % (ref 4–12)
NEUTS SEG # BLD: 4.2 K/UL (ref 1.7–8.2)
NEUTS SEG NFR BLD: 58 % (ref 43–78)
NRBC # BLD: 0 K/UL (ref 0–0.2)
PLATELET # BLD AUTO: 200 K/UL (ref 150–450)
PMV BLD AUTO: 10.5 FL (ref 9.4–12.3)
POTASSIUM SERPL-SCNC: 4.6 MMOL/L (ref 3.5–5.1)
PROT SERPL-MCNC: 6.9 G/DL (ref 6.3–8.2)
RBC # BLD AUTO: 4.42 M/UL (ref 4.05–5.2)
SODIUM SERPL-SCNC: 141 MMOL/L (ref 133–143)
TRIGL SERPL-MCNC: 53 MG/DL (ref 35–150)
TSH, 3RD GENERATION: 1.73 UIU/ML (ref 0.36–3.74)
URATE SERPL-MCNC: 5.9 MG/DL (ref 2.6–6)
VLDLC SERPL CALC-MCNC: 10.6 MG/DL (ref 6–23)
WBC # BLD AUTO: 7.2 K/UL (ref 4.3–11.1)

## 2023-01-08 LAB — METHYLMALONATE SERPL-SCNC: 157 NMOL/L (ref 0–378)

## 2023-01-12 ENCOUNTER — OFFICE VISIT (OUTPATIENT)
Dept: INTERNAL MEDICINE CLINIC | Facility: CLINIC | Age: 78
End: 2023-01-12
Payer: MEDICARE

## 2023-01-12 VITALS
HEART RATE: 63 BPM | DIASTOLIC BLOOD PRESSURE: 68 MMHG | SYSTOLIC BLOOD PRESSURE: 132 MMHG | OXYGEN SATURATION: 98 % | WEIGHT: 152.6 LBS | BODY MASS INDEX: 29.8 KG/M2

## 2023-01-12 DIAGNOSIS — Z79.899 ON POTASSIUM WASTING DIURETIC THERAPY: Chronic | ICD-10-CM

## 2023-01-12 DIAGNOSIS — I25.10 CORONARY ARTERY DISEASE INVOLVING NATIVE CORONARY ARTERY OF NATIVE HEART WITHOUT ANGINA PECTORIS: ICD-10-CM

## 2023-01-12 DIAGNOSIS — F32.0 CURRENT MILD EPISODE OF MAJOR DEPRESSIVE DISORDER WITHOUT PRIOR EPISODE (HCC): Primary | ICD-10-CM

## 2023-01-12 DIAGNOSIS — I48.0 PAROXYSMAL ATRIAL FIBRILLATION (HCC): ICD-10-CM

## 2023-01-12 DIAGNOSIS — Z79.01 CURRENT USE OF LONG TERM ANTICOAGULATION: ICD-10-CM

## 2023-01-12 DIAGNOSIS — N18.30 STAGE 3 CHRONIC KIDNEY DISEASE, UNSPECIFIED WHETHER STAGE 3A OR 3B CKD (HCC): Chronic | ICD-10-CM

## 2023-01-12 DIAGNOSIS — I10 ESSENTIAL HYPERTENSION: Chronic | ICD-10-CM

## 2023-01-12 DIAGNOSIS — E78.2 MIXED HYPERLIPIDEMIA: Chronic | ICD-10-CM

## 2023-01-12 PROBLEM — M48.062 LUMBAR STENOSIS WITH NEUROGENIC CLAUDICATION: Status: ACTIVE | Noted: 2017-02-07

## 2023-01-12 PROBLEM — M48.061 SPINAL STENOSIS OF LUMBAR REGION AT MULTIPLE LEVELS: Status: ACTIVE | Noted: 2017-02-11

## 2023-01-12 PROBLEM — R79.89 ELEVATED BRAIN NATRIURETIC PEPTIDE (BNP) LEVEL: Status: ACTIVE | Noted: 2021-10-26

## 2023-01-12 PROBLEM — M79.669 CALF PAIN: Status: ACTIVE | Noted: 2021-10-26

## 2023-01-12 PROBLEM — I34.0 MODERATE MITRAL REGURGITATION: Status: ACTIVE | Noted: 2021-10-26

## 2023-01-12 PROCEDURE — G8417 CALC BMI ABV UP PARAM F/U: HCPCS | Performed by: NURSE PRACTITIONER

## 2023-01-12 PROCEDURE — 3074F SYST BP LT 130 MM HG: CPT | Performed by: NURSE PRACTITIONER

## 2023-01-12 PROCEDURE — 99214 OFFICE O/P EST MOD 30 MIN: CPT | Performed by: NURSE PRACTITIONER

## 2023-01-12 PROCEDURE — G8399 PT W/DXA RESULTS DOCUMENT: HCPCS | Performed by: NURSE PRACTITIONER

## 2023-01-12 PROCEDURE — 1090F PRES/ABSN URINE INCON ASSESS: CPT | Performed by: NURSE PRACTITIONER

## 2023-01-12 PROCEDURE — G8484 FLU IMMUNIZE NO ADMIN: HCPCS | Performed by: NURSE PRACTITIONER

## 2023-01-12 PROCEDURE — G8427 DOCREV CUR MEDS BY ELIG CLIN: HCPCS | Performed by: NURSE PRACTITIONER

## 2023-01-12 PROCEDURE — 1036F TOBACCO NON-USER: CPT | Performed by: NURSE PRACTITIONER

## 2023-01-12 PROCEDURE — 1123F ACP DISCUSS/DSCN MKR DOCD: CPT | Performed by: NURSE PRACTITIONER

## 2023-01-12 PROCEDURE — 3078F DIAST BP <80 MM HG: CPT | Performed by: NURSE PRACTITIONER

## 2023-01-12 RX ORDER — BENZONATATE 100 MG/1
CAPSULE ORAL
COMMUNITY
Start: 2023-01-09 | End: 2023-01-16

## 2023-01-12 ASSESSMENT — PATIENT HEALTH QUESTIONNAIRE - PHQ9
7. TROUBLE CONCENTRATING ON THINGS, SUCH AS READING THE NEWSPAPER OR WATCHING TELEVISION: 0
9. THOUGHTS THAT YOU WOULD BE BETTER OFF DEAD, OR OF HURTING YOURSELF: 0
SUM OF ALL RESPONSES TO PHQ QUESTIONS 1-9: 3
SUM OF ALL RESPONSES TO PHQ QUESTIONS 1-9: 3
5. POOR APPETITE OR OVEREATING: 0
1. LITTLE INTEREST OR PLEASURE IN DOING THINGS: 1
10. IF YOU CHECKED OFF ANY PROBLEMS, HOW DIFFICULT HAVE THESE PROBLEMS MADE IT FOR YOU TO DO YOUR WORK, TAKE CARE OF THINGS AT HOME, OR GET ALONG WITH OTHER PEOPLE: 1
SUM OF ALL RESPONSES TO PHQ QUESTIONS 1-9: 3
4. FEELING TIRED OR HAVING LITTLE ENERGY: 1
2. FEELING DOWN, DEPRESSED OR HOPELESS: 1
6. FEELING BAD ABOUT YOURSELF - OR THAT YOU ARE A FAILURE OR HAVE LET YOURSELF OR YOUR FAMILY DOWN: 0
SUM OF ALL RESPONSES TO PHQ QUESTIONS 1-9: 3
SUM OF ALL RESPONSES TO PHQ9 QUESTIONS 1 & 2: 2
3. TROUBLE FALLING OR STAYING ASLEEP: 0
8. MOVING OR SPEAKING SO SLOWLY THAT OTHER PEOPLE COULD HAVE NOTICED. OR THE OPPOSITE, BEING SO FIGETY OR RESTLESS THAT YOU HAVE BEEN MOVING AROUND A LOT MORE THAN USUAL: 0

## 2023-01-12 NOTE — PROGRESS NOTES
PROGRESS NOTE    SUBJECTIVE:   Zeynep Carrington is a 77 y.o. female seen for a follow up visit for   Chief Complaint   Patient presents with    Follow-up Chronic Condition     Follow up with labs     Cholesterol Problem  This is a chronic problem. Episode onset: approx since 2011. Treatment:  rosuvastatin -- tolerating well.    Hypertension   This is a chronic problem. Episode onset: approx since 2001. Risk factors include family history, obesity, hypertension and postmenopause. Treatment:    GERD  This is a recurrent problem. Episode onset: Remote.  Treatments tried: pantoprazole. -- occasional breakthrough reflux.       Reviewed and updated this visit by provider:  Tobacco  Allergies  Meds  Problems  Med Hx  Surg Hx  Fam Hx         Review of Systems   Constitutional:  Negative for fatigue and fever.   HENT:  Negative for sneezing.    Respiratory:  Positive for cough. Negative for chest tightness, shortness of breath and wheezing.    Cardiovascular:  Positive for leg swelling (chronic). Negative for chest pain and palpitations.   Gastrointestinal:  Negative for abdominal pain.   Musculoskeletal:  Positive for arthralgias (knees, hips).   Skin:  Negative for color change.   Allergic/Immunologic: Negative for environmental allergies.   Neurological:  Negative for dizziness and headaches.   Hematological:  Does not bruise/bleed easily.   Psychiatric/Behavioral:  The patient is not nervous/anxious.       OBJECTIVE:    /68 (Site: Left Upper Arm, Position: Sitting, Cuff Size: Small Adult)   Pulse 63   Wt 152 lb 9.6 oz (69.2 kg)   SpO2 98%   BMI 29.80 kg/m²      Physical Exam  Vitals and nursing note reviewed.   Constitutional:       Appearance: Normal appearance.   HENT:      Head: Normocephalic.      Mouth/Throat:      Lips: Pink.      Mouth: Mucous membranes are moist.   Eyes:      General: Lids are normal.   Neck:      Vascular: No carotid bruit.   Cardiovascular:      Rate and Rhythm: Normal rate.  Heart sounds: Normal heart sounds. Pulmonary:      Effort: Pulmonary effort is normal.      Breath sounds: Wheezing (faint expiratory wheeze) present. Musculoskeletal:      Cervical back: Neck supple. Right lower leg: No edema. Left lower leg: No edema. Skin:     General: Skin is warm and dry. Neurological:      Mental Status: She is alert and oriented to person, place, and time. Mental status is at baseline.       Gait: Gait normal.   Psychiatric:         Mood and Affect: Mood normal.         Behavior: Behavior normal.      Recent Results (from the past 672 hour(s))   Magnesium    Collection Time: 01/05/23  9:39 AM   Result Value Ref Range    Magnesium 3.0 (H) 1.8 - 2.4 mg/dL   Uric Acid    Collection Time: 01/05/23  9:39 AM   Result Value Ref Range    Uric Acid 5.9 2.6 - 6.0 MG/DL   Methylmalonic Acid, Serum    Collection Time: 01/05/23  9:39 AM   Result Value Ref Range    Methylmalonic Acid 157 0 - 378 nmol/L   TSH    Collection Time: 01/05/23  9:39 AM   Result Value Ref Range    TSH, 3RD GENERATION 1.730 0.358 - 3.740 uIU/mL   Lipid Panel    Collection Time: 01/05/23  9:39 AM   Result Value Ref Range    Cholesterol, Total 125 <200 MG/DL    Triglycerides 53 35 - 150 MG/DL    HDL 68 (H) 40 - 60 MG/DL    LDL Calculated 46.4 <100 MG/DL    VLDL Cholesterol Calculated 10.6 6.0 - 23.0 MG/DL    Chol/HDL Ratio 1.8     Comprehensive Metabolic Panel    Collection Time: 01/05/23  9:39 AM   Result Value Ref Range    Sodium 141 133 - 143 mmol/L    Potassium 4.6 3.5 - 5.1 mmol/L    Chloride 108 101 - 110 mmol/L    CO2 33 (H) 21 - 32 mmol/L    Anion Gap 0 (L) 2 - 11 mmol/L    Glucose 92 65 - 100 mg/dL    BUN 28 (H) 8 - 23 MG/DL    Creatinine 1.70 (H) 0.6 - 1.0 MG/DL    Est, Glom Filt Rate 30 (L) >60 ml/min/1.73m2    Calcium 9.1 8.3 - 10.4 MG/DL    Total Bilirubin 0.6 0.2 - 1.1 MG/DL    ALT 18 12 - 65 U/L    AST 16 15 - 37 U/L    Alk Phosphatase 53 50 - 136 U/L    Total Protein 6.9 6.3 - 8.2 g/dL    Albumin 3.8 3.2 - 4.6 g/dL    Globulin 3.1 2.8 - 4.5 g/dL    Albumin/Globulin Ratio 1.2 0.4 - 1.6     CBC with Auto Differential    Collection Time: 01/05/23  9:39 AM   Result Value Ref Range    WBC 7.2 4.3 - 11.1 K/uL    RBC 4.42 4.05 - 5.2 M/uL    Hemoglobin 12.6 11.7 - 15.4 g/dL    Hematocrit 41.9 35.8 - 46.3 %    MCV 94.8 82 - 102 FL    MCH 28.5 26.1 - 32.9 PG    MCHC 30.1 (L) 31.4 - 35.0 g/dL    RDW 13.9 11.9 - 14.6 %    Platelets 501 245 - 134 K/uL    MPV 10.5 9.4 - 12.3 FL    nRBC 0.00 0.0 - 0.2 K/uL    Differential Type AUTOMATED      Seg Neutrophils 58 43 - 78 %    Lymphocytes 28 13 - 44 %    Monocytes 9 4.0 - 12.0 %    Eosinophils % 4 0.5 - 7.8 %    Basophils 1 0.0 - 2.0 %    Immature Granulocytes 0 0.0 - 5.0 %    Segs Absolute 4.2 1.7 - 8.2 K/UL    Absolute Lymph # 2.0 0.5 - 4.6 K/UL    Absolute Mono # 0.6 0.1 - 1.3 K/UL    Absolute Eos # 0.3 0.0 - 0.8 K/UL    Basophils Absolute 0.1 0.0 - 0.2 K/UL    Absolute Immature Granulocyte 0.0 0.0 - 0.5 K/UL       ASSESSMENT and PLAN    1. Current mild episode of major depressive disorder without prior episode (HCC)  -     sertraline (ZOLOFT) 50 MG tablet; Take 1.5 tablets by mouth daily, Disp-135 tablet, R-3Normal  Has breakthrough depression occasionally but overall controlled. 2. Essential hypertension  -     CBC with Auto Differential; Future  -     TSH; Future  BP is well controlled. 3. On potassium wasting diuretic therapy  -     Comprehensive Metabolic Panel; Future  -     Magnesium; Future  -     Uric Acid; Future  Encouraged to increase water by a glass per day. BUN and creatinine are slightly elevated above baseline. 4. Stage 3 chronic kidney disease, unspecified whether stage 3a or 3b CKD (HCC)  -     CBC with Auto Differential; Future  -     Comprehensive Metabolic Panel; Future  -     Magnesium; Future  -     Uric Acid; Future  Creatinine is elevated to 1.7. Encouraged to drink extra glass of water per day.   5. Mixed hyperlipidemia  -     Comprehensive Metabolic Panel; Future  -     Lipid Panel; Future  LDL cholesterol is well controlled at target of less than 70.  6. Coronary artery disease involving native coronary artery of native heart without angina pectoris  -     Lipid Panel; Future  As above  7. Paroxysmal atrial fibrillation (HCC)  -     Comprehensive Metabolic Panel; Future  -     TSH; Future  -     Magnesium; Future  Unable to detect irregularity today  8. Current use of long term anticoagulation  -     CBC with Auto Differential; Future  Compliant to therapy. Return in about 4 months (around 5/12/2023) for Follow-Up, HTN, HLD, with labs. current treatment plan is effective, no change in therapy  lab results and schedule of future lab studies reviewed with patient  reviewed diet, exercise and weight control  cardiovascular risk and specific lipid/LDL goals reviewed  reviewed medications and side effects in detail      BERNA Vick NP    Dictated using voice recognition software.  Proofread, but unrecognized voice recognition errors may exist.

## 2023-01-12 NOTE — PATIENT INSTRUCTIONS
Recent Results (from the past 672 hour(s))   Magnesium    Collection Time: 01/05/23  9:39 AM   Result Value Ref Range    Magnesium 3.0 (H) 1.8 - 2.4 mg/dL   Uric Acid    Collection Time: 01/05/23  9:39 AM   Result Value Ref Range    Uric Acid 5.9 2.6 - 6.0 MG/DL   Methylmalonic Acid, Serum    Collection Time: 01/05/23  9:39 AM   Result Value Ref Range    Methylmalonic Acid 157 0 - 378 nmol/L   TSH    Collection Time: 01/05/23  9:39 AM   Result Value Ref Range    TSH, 3RD GENERATION 1.730 0.358 - 3.740 uIU/mL   Lipid Panel    Collection Time: 01/05/23  9:39 AM   Result Value Ref Range    Cholesterol, Total 125 <200 MG/DL    Triglycerides 53 35 - 150 MG/DL    HDL 68 (H) 40 - 60 MG/DL    LDL Calculated 46.4 <100 MG/DL    VLDL Cholesterol Calculated 10.6 6.0 - 23.0 MG/DL    Chol/HDL Ratio 1.8     Comprehensive Metabolic Panel    Collection Time: 01/05/23  9:39 AM   Result Value Ref Range    Sodium 141 133 - 143 mmol/L    Potassium 4.6 3.5 - 5.1 mmol/L    Chloride 108 101 - 110 mmol/L    CO2 33 (H) 21 - 32 mmol/L    Anion Gap 0 (L) 2 - 11 mmol/L    Glucose 92 65 - 100 mg/dL    BUN 28 (H) 8 - 23 MG/DL    Creatinine 1.70 (H) 0.6 - 1.0 MG/DL    Est, Glom Filt Rate 30 (L) >60 ml/min/1.73m2    Calcium 9.1 8.3 - 10.4 MG/DL    Total Bilirubin 0.6 0.2 - 1.1 MG/DL    ALT 18 12 - 65 U/L    AST 16 15 - 37 U/L    Alk Phosphatase 53 50 - 136 U/L    Total Protein 6.9 6.3 - 8.2 g/dL    Albumin 3.8 3.2 - 4.6 g/dL    Globulin 3.1 2.8 - 4.5 g/dL    Albumin/Globulin Ratio 1.2 0.4 - 1.6     CBC with Auto Differential    Collection Time: 01/05/23  9:39 AM   Result Value Ref Range    WBC 7.2 4.3 - 11.1 K/uL    RBC 4.42 4.05 - 5.2 M/uL    Hemoglobin 12.6 11.7 - 15.4 g/dL    Hematocrit 41.9 35.8 - 46.3 %    MCV 94.8 82 - 102 FL    MCH 28.5 26.1 - 32.9 PG    MCHC 30.1 (L) 31.4 - 35.0 g/dL    RDW 13.9 11.9 - 14.6 %    Platelets 526 141 - 948 K/uL    MPV 10.5 9.4 - 12.3 FL    nRBC 0.00 0.0 - 0.2 K/uL    Differential Type AUTOMATED      Seg Neutrophils 58 43 - 78 %    Lymphocytes 28 13 - 44 %    Monocytes 9 4.0 - 12.0 %    Eosinophils % 4 0.5 - 7.8 %    Basophils 1 0.0 - 2.0 %    Immature Granulocytes 0 0.0 - 5.0 %    Segs Absolute 4.2 1.7 - 8.2 K/UL    Absolute Lymph # 2.0 0.5 - 4.6 K/UL    Absolute Mono # 0.6 0.1 - 1.3 K/UL    Absolute Eos # 0.3 0.0 - 0.8 K/UL    Basophils Absolute 0.1 0.0 - 0.2 K/UL    Absolute Immature Granulocyte 0.0 0.0 - 0.5 K/UL       Low salt diet. Limit sodium to 2000 mg per day.

## 2023-01-23 ENCOUNTER — TELEPHONE (OUTPATIENT)
Dept: PULMONOLOGY | Age: 78
End: 2023-01-23

## 2023-01-23 ENCOUNTER — TELEMEDICINE (OUTPATIENT)
Dept: PULMONOLOGY | Age: 78
End: 2023-01-23
Payer: MEDICARE

## 2023-01-23 DIAGNOSIS — J40 BRONCHITIS: Primary | ICD-10-CM

## 2023-01-23 DIAGNOSIS — J45.41 MODERATE PERSISTENT ASTHMA WITH EXACERBATION: ICD-10-CM

## 2023-01-23 DIAGNOSIS — I27.20 PULMONARY HYPERTENSION (HCC): ICD-10-CM

## 2023-01-23 PROCEDURE — G8427 DOCREV CUR MEDS BY ELIG CLIN: HCPCS | Performed by: INTERNAL MEDICINE

## 2023-01-23 PROCEDURE — 1123F ACP DISCUSS/DSCN MKR DOCD: CPT | Performed by: INTERNAL MEDICINE

## 2023-01-23 PROCEDURE — G8399 PT W/DXA RESULTS DOCUMENT: HCPCS | Performed by: INTERNAL MEDICINE

## 2023-01-23 PROCEDURE — 99214 OFFICE O/P EST MOD 30 MIN: CPT | Performed by: INTERNAL MEDICINE

## 2023-01-23 PROCEDURE — 1090F PRES/ABSN URINE INCON ASSESS: CPT | Performed by: INTERNAL MEDICINE

## 2023-01-23 RX ORDER — PREDNISONE 10 MG/1
TABLET ORAL
Qty: 23 TABLET | Refills: 0 | Status: SHIPPED | OUTPATIENT
Start: 2023-01-23 | End: 2023-02-04

## 2023-01-23 RX ORDER — LEVOFLOXACIN 500 MG/1
500 TABLET, FILM COATED ORAL DAILY
Qty: 5 TABLET | Refills: 0 | Status: SHIPPED | OUTPATIENT
Start: 2023-01-23 | End: 2023-01-25 | Stop reason: SINTOL

## 2023-01-23 NOTE — TELEPHONE ENCOUNTER
TRIAGE CALL      Complaint: cough/wheezing  Cough: yes  Productive:  says she cannot get anything up  Bloody Sputum:  no  Increased SOB/Wheezing:  yes  Duration: 1 wk   Fever/Chills: chill  OTC Meds tried: no      Her sister was  tested covid positive a week ago. She tested negative a wk ago. She cannot get a deep breathe in.  . She says she is wheezing really bad

## 2023-01-23 NOTE — PROGRESS NOTES
Patient Name:  Aruna King                               YOB: 1945  Virtual Visit 1/23/2023    Aruna King, was evaluated through a synchronous (real-time) audio-video encounter. The patient (or guardian if applicable) is aware that this is a billable service, which includes applicable co-pays. This Virtual Visit was conducted with patient's (and/or legal guardian's) consent. The visit was conducted pursuant to the emergency declaration under the [facility], [address] [place] [address] authority and the [employer] Act. Patient identification was verified, and a caregiver was present when appropriate. The patient was located at Home: [place] [address]. Provider was located at [facility] ([place] [address]): [address] [facility] [address] Charlie Mei [address] [facility],  [address] [facility]. [employer] was the HIPAA-compliant platform used today. ASSESSMENT AND PLAN:  (Medical Decision Making)    1. Bronchitis  Levaquin provided in addition to continue bronchodilators  - XR CHEST PA LAT (2 VIEWS); Future    2. Moderate persistent asthma with exacerbation  To be honest, given her prior spirometry I am not convinced this is an asthma exacerbation and it may well be pulmonary edema. We will obtain a chest x-ray to help clarify this. In the meantime she will be treated with a steroid taper, increased albuterol nebulizers and antibiotics for bronchitis. She needs a firmer diagnosis and would aim to get complete PFTs when she is less exacerbated. 3.  Pulmonary hypertension  Needs follow up now that patient complies with BiPAP. Will arrange when not acutely ill. Orders Placed This Encounter   Medications    predniSONE (DELTASONE) 10 MG tablet     Sig: Take 4 tablets by mouth daily for 3 days, THEN 2 tablets daily for 3 days, THEN 1 tablet daily for 3 days, THEN 0.5 tablets daily for 3 days.      Dispense: 23 tablet     Refill:  0    levoFLOXacin (LEVAQUIN) 500 MG tablet     Sig: Take 1 tablet by mouth daily for 7 days     Dispense:  5 tablet     Refill:  0     No orders of the defined types were placed in this encounter. Follow-up and Dispositions    Return in about 4 weeks (around 2023). Rachel Bolton MD    Total time for encounter on day of encounter was 25 minutes. This time includes chart prep, review of tests/procedures, review of other provider's notes, documentation and counseling patient regarding disease process and medications. ___________________________________________________________________         ______      REASON FOR VISIT:   Chief Complaint   Patient presents with    Cough    Wheezing       HISTORY OF PRESENT ILLNESS:    Ms. Iron Oconnell is a 76 y.o. female with a PMH of QUINTIN on BiPAP 15/10 w 2lpm of O2, suspected PH (enlarged PA on CT, RVSP 44), possible asthma who is seen at [facility] today for  coughing, wheezing and shortness of breath for several weeks. COVID negative in early January, though son was ill recently. She improved after , having the flu, and returned to feeling well before began developing symptoms of shortness of breath coughing and wheezing in early January. In the past Mrs. Betito Salinas has been followed by Triny Woodall in our office for asthma, although spirometry was repeatedly normal.    She went to PCP 23 and was evaluated and told to follow up with us. She has used albuterol nebulizer occasionally. Tobacco Use      Smoking status: Former        Packs/day: 1.00        Years: 10.00        Pack years: 10        Types: Cigarettes        Quit date: 1981        Years since quittin.0      Smokeless tobacco: Never      Tobacco comments: Quit smoking: quit smoking     REVIEW OF SYSTEMS:   10 point review of systems is negative except as reported in HPI. PHYSICAL EXAM:   There were no vitals filed for this visit.   There is no height or weight on file to calculate BMI. General Appearance: The patient is pleasant and in no respiratory distress. HEENT: Conjunctivae unremarkable. Neck/Lymphatic:  Symmetrical.  Trachea midline. Lungs:  Normal respiratory effort with symmetrical lung expansion. No accessory muscle use  Extremity:  No edema, clubbing or cyanosis  Musculoskeletal:  No weakness, normal muscle tone and bulk. Neuro: The patient is alert and oriented to person, place, and time. Memory appears intact and mood is normal.  No gross sensorimotor deficits are present. DIAGNOSTIC TESTS:                                                                                                                        Pulmonary Function Testing:   Date    8/5/2020        FVC    1.71 (80%)        FEV1    1.41 (88%)        FEV1/FVC    0.83        No results found for this or any previous visit. No results found for this or any previous visit. LABS:   Lab Results   Component Value Date/Time    WBC 7.2 01/05/2023 09:39 AM    HGB 12.6 01/05/2023 09:39 AM    HCT 41.9 01/05/2023 09:39 AM     01/05/2023 09:39 AM    TSH 3.390 02/04/2022 01:56 PM    ESR 6 12/13/2019 12:37 PM     Imaging: I performed an independent interpretation of the patient's images. CXR:   XR CHEST STANDARD TWO VW 11/03/2022    Narrative  XR CHEST (2 VW) 11/3/2022 3:27 PM    HISTORY: Cough, body aches and sore throat. Wheezing and shortness of breath. COMPARISON: Chest x-ray 10/30/2021. AP and lateral views of the chest were obtained. Impression  The lungs are clear. The heart size is normal in size. No  pneumothorax. No pleural effusions. CT Chest:   CT CHEST PULMONARY EMBOLISM W CONTRAST 10/26/2021    Narrative  CT OF THE CHEST WITH INTRAVENOUS CONTRAST, 10/26/2021    Indication: Shortness of breath, elevated d-dimer, left leg pain and left leg  swelling.     Comparison: CT chest without contrast 6/17/2010    Technique:   2.5 mm axial scans from above the aortic arch to the lung bases  following the uneventful administration of 70 mL of Isovue-370. Intravenous  contrast was given to evaluate for pulmonary embolism. All CT scans performed at  this facility use one or all of the following: Automated exposure control,  adjustment of the mA and/or kVp according to patient's size, iterative  reconstruction. Findings: The base of the neck is unremarkable in appearance. No axillary, mediastinal,  or hilar lymphadenopathy is seen. The thoracic aorta is normal in caliber. Opacification of the pulmonary arteries is good. No abnormal filling defects  are seen to suggest pulmonary embolism. The heart appears moderately enlarged. There is additional enlargement of pulmonary artery demonstrating a diameter  greater than that of the adjacent ascending aorta. Evaluation with lung windows demonstrates no significant infiltrate. No pleural  effusion is seen. Lungs are expanded without evidence for pneumothorax. No  acute osseous abnormality is seen. Limited evaluation of the upper abdomen demonstrates no acute abnormality. Impression  1. No evidence for pulmonary embolism. 2. Moderate cardiomegaly. This can be an early indicator for heart failure in  the correct clinical setting. 3. Enlarged pulmonary artery which can indicator for pulmonary artery  hypertension. This report was made using voice transcription. Despite my best efforts to avoid  any, transcription errors may persist. If there is any question about the  accuracy of the report or need for clarification, then please call [phone], or text me through [employer]serv for clarification or correction. Nuclear Medicine: No results found for this or any previous visit from the past 3650 days.       TTE 10/26/2021   LV EF 60 to 61%  Diastolic function   RV    Peak TRV 3.24   RVSP 44 mmHg   COMMENTS Mild mitral valve regurgitation         REFERENCE INFO: Past Medical History:   Diagnosis Date    Allergic rhinitis, cause unspecified 01/07/2015    Aortic valve disorders 01/07/2015    Arrhythmia     a-fib    Asthma     uses inhaler prn    CAD (coronary artery disease)     mild,  treated with med; NO MI, NO stents, NO CABG     Carotid stenosis 1/7/2015    Carpal tunnel syndrome 01/07/2015    bilat wrists-no issue now    Congestive heart failure ([address]     COVID-19 vaccine series completed 03/22/2021    Pfizer; 2/27/2021    Depression 06/30/2015    controlled     Esophageal reflux 01/07/2015    controlled with medication     Essential hypertension     controlled with medication    Fibromyalgia     Former smoker     1 ppd for 14 yrs; quit smoking 1981    GERD (gastroesophageal reflux disease)     controlled with medication     Heart failure ([address]     last EF 60-65% echo done 10/26/2021    History of EKG 11/03/2021    a fib; patient started on eliquis BID and ASA 81 mg per [address] [facility]    History of peptic ulcer disease     resolved with protonix     Hx of echocardiogram 10/26/2021    EF 60-65%     Menopause     Mixed hyperlipidemia 4/21/2016    Neuropathy     BLE; gabapentin     Osteoarthritis     Osteopenia 1/7/2015    Renal insufficiency 01/07/2015     Allergies   Allergen Reactions    Wasp Venom Protein Anaphylaxis    Atorvastatin Other (See Comments)     High doses cause leg cramps.  Able to tolerate low doses (<20mg/day)    Iodine Hives    Lisinopril Other (See Comments)    Tetracycline Other (See Comments)     ULCERS IN MOUTH    Penicillin G Rash     Current Outpatient Medications   Medication Instructions    albuterol (PROVENTIL) 2.5 mg, Inhalation, EVERY 6 HOURS PRN    albuterol sulfate  (90 Base) MCG/ACT inhaler 2 puffs, Inhalation, EVERY 6 HOURS PRN    apixaban (ELIQUIS) 5 mg, Oral, 2 TIMES DAILY    ascorbic acid (VITAMIN C) 250 mg, Oral, DAILY    aspirin 81 mg, Oral    Calcium Carbonate-Vitamin D (CALCIUM-VITAMIN D) 600-125 MG-UNIT TABS Oral    cyanocobalamin 500 mcg, Oral, DAILY    diclofenac sodium (VOLTAREN) 2 g, Topical, 4 TIMES DAILY    ergocalciferol (ERGOCALCIFEROL) 1.25 MG (70917 UT) capsule One capsule by mouth weekly for 4 weeks then one monthly. Indications: vitamin D deficiency (high dose therapy)    Fluticasone-Salmeterol,sensor, (AIRDUO DIGIHALER) 232-14 MCG/ACT AEPB 1 puff, Inhalation, 2 times daily    furosemide (LASIX) 40 mg, Oral, DAILY, The details of the medication are not available because there are pending changes by a home health clinician.    gabapentin (NEURONTIN) 300 mg, Oral, 3 TIMES DAILY    levoFLOXacin (LEVAQUIN) 500 mg, Oral, DAILY    losartan (COZAAR) 100 mg, Oral, DAILY    metoprolol succinate (TOPROL XL) 25 mg, Oral, DAILY    nitroGLYCERIN (NITROSTAT) 0.4 mg, SubLINGual    pantoprazole (PROTONIX) 40 mg, Oral, DAILY BEFORE BREAKFAST    predniSONE (DELTASONE) 10 MG tablet Take 4 tablets by mouth daily for 3 days, THEN 2 tablets daily for 3 days, THEN 1 tablet daily for 3 days, THEN 0.5 tablets daily for 3 days.     rosuvastatin (CRESTOR) 10 mg, Oral, DAILY    sertraline (ZOLOFT) 75 mg, Oral, DAILY    spironolactone (ALDACTONE) 25 mg, Oral, DAILY

## 2023-01-23 NOTE — TELEPHONE ENCOUNTER
Last seen: 9/30/22  Hx: asthma, seasonal allergic rhinitis, CAD, CKD, GERD, depression    Patient call reporting coughing & wheezing, unable to produce sputum, recent exposure to family member w/ COVID. Contacted to review s/s, started over a week ago; no sinus pressure or congestion; albuterol nebulizer not helping at all; slight edema at hands, continues to take lasix daily; was feeling sick prior to COVID exposure; does not feel like a normal exacerbation; wheezing is much worse when laying down. Worked in for virtual office visit this afternoon.

## 2023-01-23 NOTE — Clinical Note
Changed the follow up plan. She just needs a new provider rather than Ola Scheuermann, and a new workup.

## 2023-01-24 ENCOUNTER — HOSPITAL ENCOUNTER (OUTPATIENT)
Dept: GENERAL RADIOLOGY | Age: 78
Discharge: HOME | End: 2023-01-27
Payer: MEDICARE

## 2023-01-24 DIAGNOSIS — J40 BRONCHITIS: ICD-10-CM

## 2023-01-24 PROCEDURE — 71046 X-RAY EXAM CHEST 2 VIEWS: CPT

## 2023-01-25 ENCOUNTER — TELEPHONE (OUTPATIENT)
Dept: PULMONOLOGY | Age: 78
End: 2023-01-25

## 2023-01-25 RX ORDER — DOXYCYCLINE HYCLATE 100 MG
100 TABLET ORAL 2 TIMES DAILY
Qty: 14 TABLET | Refills: 0 | Status: SHIPPED | OUTPATIENT
Start: 2023-01-25 | End: 2023-02-01

## 2023-01-25 NOTE — TELEPHONE ENCOUNTER
Last seen: 1/23/23 virtual   Hx: asthma, PAH    Seen virtually on Monday, started levaquin for bronchitis; call today w/ report of tingling in hands & burning in her feet; has never taking levquin before, has taking prednisone before; no problems going to the bathroom, coughing is improving; has only used nebulizer once daily, encouraged to use several times a day; able to check O2 sats staying in the low 90s. Confirmed pharmacy on file & they will notify patient of any new Rx.

## 2023-01-25 NOTE — TELEPHONE ENCOUNTER
Direct conversation w/ fidelia Askew to change abx to doxycycline. Electronic Rx sent to pharmacy on file.

## 2023-01-25 NOTE — TELEPHONE ENCOUNTER
Patient had a  visit with Dr. Mayra Estrada on Monday. Started on Levaquin 500mg and prednisone on Tuesday.   She has tingling in her hands and burning in her feet since starting these

## 2023-01-31 PROBLEM — Z79.01 CURRENT USE OF LONG TERM ANTICOAGULATION: Status: ACTIVE | Noted: 2023-01-31

## 2023-01-31 ASSESSMENT — ENCOUNTER SYMPTOMS
COLOR CHANGE: 0
CHEST TIGHTNESS: 0
WHEEZING: 0
SHORTNESS OF BREATH: 0
COUGH: 1
ABDOMINAL PAIN: 0

## 2023-02-06 ENCOUNTER — OFFICE VISIT (OUTPATIENT)
Dept: CARDIOLOGY CLINIC | Age: 78
End: 2023-02-06
Payer: COMMERCIAL

## 2023-02-06 VITALS
DIASTOLIC BLOOD PRESSURE: 74 MMHG | HEIGHT: 60 IN | SYSTOLIC BLOOD PRESSURE: 120 MMHG | WEIGHT: 151 LBS | HEART RATE: 66 BPM | BODY MASS INDEX: 29.64 KG/M2

## 2023-02-06 DIAGNOSIS — N17.9 AKI (ACUTE KIDNEY INJURY) (HCC): ICD-10-CM

## 2023-02-06 DIAGNOSIS — I10 ESSENTIAL HYPERTENSION: Primary | ICD-10-CM

## 2023-02-06 DIAGNOSIS — I50.32 CHRONIC HEART FAILURE WITH PRESERVED EJECTION FRACTION (HCC): ICD-10-CM

## 2023-02-06 DIAGNOSIS — R07.89 NON-CARDIAC CHEST PAIN: ICD-10-CM

## 2023-02-06 DIAGNOSIS — I48.0 PAROXYSMAL ATRIAL FIBRILLATION (HCC): ICD-10-CM

## 2023-02-06 DIAGNOSIS — E78.2 MIXED HYPERLIPIDEMIA: ICD-10-CM

## 2023-02-06 LAB
ANION GAP SERPL CALC-SCNC: 8 MMOL/L (ref 2–11)
BUN SERPL-MCNC: 36 MG/DL (ref 8–23)
CALCIUM SERPL-MCNC: 9 MG/DL (ref 8.3–10.4)
CHLORIDE SERPL-SCNC: 108 MMOL/L (ref 101–110)
CO2 SERPL-SCNC: 26 MMOL/L (ref 21–32)
CREAT SERPL-MCNC: 1.7 MG/DL (ref 0.6–1)
GLUCOSE SERPL-MCNC: 83 MG/DL (ref 65–100)
POTASSIUM SERPL-SCNC: 4.2 MMOL/L (ref 3.5–5.1)
SODIUM SERPL-SCNC: 142 MMOL/L (ref 133–143)

## 2023-02-06 PROCEDURE — 93000 ELECTROCARDIOGRAM COMPLETE: CPT | Performed by: INTERNAL MEDICINE

## 2023-02-06 PROCEDURE — 99214 OFFICE O/P EST MOD 30 MIN: CPT | Performed by: INTERNAL MEDICINE

## 2023-02-06 PROCEDURE — 1123F ACP DISCUSS/DSCN MKR DOCD: CPT | Performed by: INTERNAL MEDICINE

## 2023-02-06 PROCEDURE — 3074F SYST BP LT 130 MM HG: CPT | Performed by: INTERNAL MEDICINE

## 2023-02-06 PROCEDURE — 3078F DIAST BP <80 MM HG: CPT | Performed by: INTERNAL MEDICINE

## 2023-02-06 ASSESSMENT — ENCOUNTER SYMPTOMS
GASTROINTESTINAL NEGATIVE: 1
NAUSEA: 0
ABDOMINAL PAIN: 0
COUGH: 1
SHORTNESS OF BREATH: 0
VOMITING: 0

## 2023-02-06 NOTE — PROGRESS NOTES
[address] [facility], [address] [place] [address], [address] [place] [address], [address] [place] [address]      Patient:  Maisha Stewart  1945         SUBJECTIVE:  Maisha Stewart is a  76 y.o. female seen for a follow up visit regarding the following:     Chief Complaint   Patient presents with    Hyperlipidemia    Hypertension    Congestive Heart Failure    Irregular Heart Beat   . CC: follow up HTN, A Fib      HPI:   76 y.o. female  with a history of paroxysmal atrial fibrillation, CAD, HTN, HLD is here for follow-up. Patient was last seen in office on 8/2/22 , since then reports that she has been doing ok. Has been treated for bronchitis recently. No chest pain episodes. Rare palpitations, short episodes. Worse while on No ER visits or hospitalizations. Eliquis is expensive she reports. Taking medications as directed without missing doses. No prolonged episodes of palpitations irregular heartbeats. No other complaints at this time. Cardiovascular Testing:   - SPECT 1/13/2022: Normal perfusion, not gated, low risk scan  - Echo 10/26/21: LVEF 60-65 %, RV normal, Valves: mild AI, mild MR, mild TR, RVSP 44 mmHg. - Cath 2011: Nonobstructive CAD    Past medical history, past surgical history, family history, social history, and medications were all reviewed with the patient today and updated as necessary.          Patient Active Problem List    Diagnosis Date Noted    Current use of long term anticoagulation 01/31/2023     Priority: Medium    Chronic renal disease, stage III [facility]) [[id number]] 07/12/2022     Priority: Medium    Chronic systolic (congestive) heart failure 07/12/2022     Priority: Medium    Suspected sleep apnea 03/02/2022    RLS (restless legs syndrome) 03/02/2022    Nocturnal hypoxemia 03/02/2022    Cor pulmonale (chronic) (HCC) 10/27/2021    Fluid overload 10/27/2021    Calf pain 10/26/2021    Moderate mitral regurgitation 10/26/2021    Elevated brain natriuretic peptide (BNP) level 10/26/2021    Paroxysmal atrial fibrillation ([facility] 10/25/2021    Spinal stenosis of lumbar region at multiple levels 2017    S/P laminectomy with spinal fusion 2017    Lumbar stenosis with neurogenic claudication 2017    On potassium wasting diuretic therapy 2016    Asymptomatic carotid artery stenosis 2016    Coronary artery disease involving native coronary artery of native heart without angina pectoris 2016    Obesity (BMI 30.0-34.9) 2016    Mixed hyperlipidemia 2016    Weakening of rectovaginal tissue 2016    BRANDON (stress urinary incontinence, female) 2016    Rectocele 2016    Depression 2015    Mild persistent asthma without complication     Carpal tunnel syndrome 2015    Renal insufficiency 2015    Arthropathies 2015     Multiple sites      Formatting of this note might be different from the original.  Multiple sites      Osteopenia of multiple sites 2015    Congestive heart failure ([facility] 2015    Aortic valve disorder 2015    Allergic rhinitis 2015    Essential hypertension 2015    GERD (gastroesophageal reflux disease) 2011       Family History   Problem Relation Age of Onset    Heart Disease Maternal Grandmother     Rheum Arthritis Paternal Grandmother     Heart Attack Sister          from heart issue age 54    Diabetes Mother     Diabetes Sister     Diabetes Sister     Breast Cancer Sister 79    Diabetes Sister     Heart Attack Father     Heart Attack Mother        Social History     Tobacco Use    Smoking status: Former     Packs/day: 1.00     Years: 10.00     Pack years: 10.00     Types: Cigarettes     Quit date: 1981     Years since quittin.1    Smokeless tobacco: Never    Tobacco comments:     Quit smoking: quit smoking    Substance Use Topics    Alcohol use: No     Alcohol/week: 0.0 standard drinks     Review of Systems   Constitutional: Negative.  Negative for chills and fever.   Cardiovascular:  Positive for irregular heartbeat (brief as per HPI) and palpitations (as per HPI). Negative for chest pain, dyspnea on exertion, leg swelling, near-syncope and syncope. Respiratory:  Positive for cough (with recent bronchitis). Negative for shortness of breath. Hematologic/Lymphatic: Negative. Negative for bleeding problem. Does not bruise/bleed easily. Gastrointestinal: Negative. Negative for abdominal pain, nausea and vomiting. Genitourinary:  Negative for hematuria. Neurological: Negative. Negative for dizziness and light-headedness. PHYSICAL EXAM:    /74   Pulse 66   Ht 5' (1.524 m)   Wt 151 lb (68.5 kg)   BMI 29.49 kg/m²     Physical Exam  Vitals reviewed. Constitutional:       General: She is not in acute distress. Appearance: She is not ill-appearing, toxic-appearing or diaphoretic. HENT:      Head: Normocephalic and atraumatic. Cardiovascular:      Rate and Rhythm: Normal rate and regular rhythm. Heart sounds: Normal heart sounds. No murmur heard. No friction rub. No gallop. Pulmonary:      Effort: Pulmonary effort is normal. No respiratory distress. Breath sounds: Normal breath sounds. No stridor. No wheezing or rales. Musculoskeletal:      Right lower leg: No edema. Left lower leg: No edema. Skin:     General: Skin is warm and dry. Coloration: Skin is not jaundiced or pale. Neurological:      Mental Status: She is alert and oriented to person, place, and time. Mental status is at baseline. Psychiatric:         Mood and Affect: Mood normal.         Behavior: Behavior normal.         Judgment: Judgment normal.       Medical problems, medical history, and test results were reviewed with the patient today. No results found for this or any previous visit (from the past 168 hour(s)).       Current Outpatient Medications:     sertraline (ZOLOFT) 50 MG tablet, Take 1.5 tablets by mouth daily, Disp: 135 tablet, Rfl: 3    apixaban (ELIQUIS) 5 MG TABS tablet, Take 1 tablet by mouth 2 times daily, Disp: 180 tablet, Rfl: 3    Fluticasone-Salmeterol,sensor, (AIRDUO DIGIHALER) 232-14 MCG/ACT AEPB, Inhale 1 puff into the lungs in the morning and at bedtime, Disp: 1 each, Rfl: 11    diclofenac sodium (VOLTAREN) 1 % GEL, Apply 2 g topically 4 times daily, Disp: , Rfl:     rosuvastatin (CRESTOR) 10 MG tablet, Take 1 tablet by mouth in the morning., Disp: 30 tablet, Rfl: 11    albuterol sulfate  (90 Base) MCG/ACT inhaler, Inhale 2 puffs into the lungs every 6 hours as needed, Disp: , Rfl:     albuterol (PROVENTIL) (2.5 MG/3ML) 0.083% nebulizer solution, Inhale 2.5 mg into the lungs every 6 hours as needed, Disp: , Rfl:     ascorbic acid (VITAMIN C) 250 MG tablet, Take 250 mg by mouth daily, Disp: , Rfl:     aspirin 81 MG EC tablet, Take 81 mg by mouth, Disp: , Rfl:     Calcium Carbonate-Vitamin D (CALCIUM-VITAMIN D) 600-125 MG-UNIT TABS, Take by mouth, Disp: , Rfl:     cyanocobalamin 500 MCG tablet, Take 500 mcg by mouth daily, Disp: , Rfl:     ergocalciferol (ERGOCALCIFEROL) 1.25 MG (97641 UT) capsule, One capsule by mouth weekly for 4 weeks then one monthly. Indications: vitamin D deficiency (high dose therapy), Disp: , Rfl:     furosemide (LASIX) 40 MG tablet, Take 40 mg by mouth daily The details of the medication are not available because there are pending changes by a home health clinician., Disp: , Rfl:     gabapentin (NEURONTIN) 300 MG capsule, Take 300 mg by mouth 3 times daily. , Disp: , Rfl:     losartan (COZAAR) 100 MG tablet, Take 100 mg by mouth daily, Disp: , Rfl:     metoprolol succinate (TOPROL XL) 25 MG extended release tablet, Take 25 mg by mouth daily, Disp: , Rfl:     nitroGLYCERIN (NITROSTAT) 0.4 MG SL tablet, Place 0.4 mg under the tongue, Disp: , Rfl:     pantoprazole (PROTONIX) 40 MG tablet, Take 40 mg by mouth every morning (before breakfast), Disp: , Rfl:     spironolactone (ALDACTONE) 25 MG tablet, Take 25 mg by mouth daily, Disp: , Rfl:      ASSESSMENT/PLAN:    Cardiovascular Testing:  - SPECT 1/13/2022: Normal perfusion, not gated, low risk scan  - Echo 10/26/21: LVEF 60-65 %, RV normal, Valves: mild AI, mild MR, mild TR, RVSP 44 mmHg. - 2011: Nonobstructive CAD     1. Paroxysmal atrial fibrillation (HCC)  - Controlled on Eliquis, pt assistance information for cost given today. Warfarin alternative if Eliquis is cost prohibitive. - Tolerating metoprolol  - in sinus , asymptomatic   - EKG personally reviewed in office today demonstrates Sinus  Rhythm    First degree A-V block , RATE 66 BPM, ABNORMAL ECG. 2. Essential hypertension  -Controlled at this time     3. Heart failure with preserved ejection fraction, unspecified HF chronicity (HCC)  -Appears euvolemic  - On Spironolactone, Lasix. Toprol XL 25 once daily      4. Mixed Hyperlipidemia  -Continue rosuvastatin 10   -Labs from PCP dated 1/5/2023 personally reviewed, total cholesterol 125, LDL 46, HDL 68. LFTs within normal range. 5. JOHANNY  - Cr elevated on labs 1/5/23, recheck today to see if diuretics needs to be adjusted. Problem List Items Addressed This Visit          Circulatory    Essential hypertension - Primary (Chronic)    Relevant Orders    EKG 12 Lead (Completed)    Paroxysmal atrial fibrillation (HCC)       Other    Mixed hyperlipidemia (Chronic)     Other Visit Diagnoses       Chronic heart failure with preserved ejection fraction ([facility]        Non-cardiac chest pain                Instructed patient go to ER or call 911/EMS should symptoms recur or worsen. Patient has been instructed and agrees to call our office with any issues or other concerns related to their cardiac condition(s) and/or complaint(s). No follow-ups on file.     Yas Santoro DO  2/6/2023 11:26 AM

## 2023-02-07 NOTE — RESULT ENCOUNTER NOTE
Please call the patient regarding their result. Kidney labs suggest mild dehydration, unchanged compared to 1 month ago. Should increase your fluid intake a little bit over the next 2 to 3 days.

## 2023-02-08 ENCOUNTER — TELEPHONE (OUTPATIENT)
Dept: CARDIOLOGY CLINIC | Age: 78
End: 2023-02-08

## 2023-02-08 NOTE — TELEPHONE ENCOUNTER
----- Message from Awa Petersen DO sent at 2/7/2023 12:20 PM EST -----  Please call the patient regarding their result. Kidney labs suggest mild dehydration, unchanged compared to 1 month ago. Should increase your fluid intake a little bit over the next 2 to 3 days.

## 2023-02-21 ENCOUNTER — OFFICE VISIT (OUTPATIENT)
Dept: PULMONOLOGY | Age: 78
End: 2023-02-21
Payer: COMMERCIAL

## 2023-02-21 VITALS
SYSTOLIC BLOOD PRESSURE: 108 MMHG | DIASTOLIC BLOOD PRESSURE: 66 MMHG | OXYGEN SATURATION: 94 % | HEART RATE: 64 BPM | BODY MASS INDEX: 29.84 KG/M2 | HEIGHT: 60 IN | WEIGHT: 152 LBS | TEMPERATURE: 98.2 F

## 2023-02-21 DIAGNOSIS — I27.20 MILD PULMONARY HYPERTENSION (HCC): ICD-10-CM

## 2023-02-21 DIAGNOSIS — G47.33 OSA (OBSTRUCTIVE SLEEP APNEA): ICD-10-CM

## 2023-02-21 DIAGNOSIS — J45.30 MILD PERSISTENT ASTHMA WITHOUT COMPLICATION: Primary | ICD-10-CM

## 2023-02-21 DIAGNOSIS — G47.34 NOCTURNAL HYPOXEMIA: ICD-10-CM

## 2023-02-21 PROBLEM — I48.0 PAROXYSMAL ATRIAL FIBRILLATION (HCC): Chronic | Status: ACTIVE | Noted: 2021-10-25

## 2023-02-21 PROBLEM — Z79.01 CURRENT USE OF LONG TERM ANTICOAGULATION: Chronic | Status: ACTIVE | Noted: 2023-01-31

## 2023-02-21 PROBLEM — I27.81 COR PULMONALE (CHRONIC) (HCC): Chronic | Status: ACTIVE | Noted: 2021-10-27

## 2023-02-21 PROBLEM — I34.0 MODERATE MITRAL REGURGITATION: Chronic | Status: ACTIVE | Noted: 2021-10-26

## 2023-02-21 PROCEDURE — 1123F ACP DISCUSS/DSCN MKR DOCD: CPT | Performed by: NURSE PRACTITIONER

## 2023-02-21 PROCEDURE — 3078F DIAST BP <80 MM HG: CPT | Performed by: NURSE PRACTITIONER

## 2023-02-21 PROCEDURE — 3074F SYST BP LT 130 MM HG: CPT | Performed by: NURSE PRACTITIONER

## 2023-02-21 PROCEDURE — 99214 OFFICE O/P EST MOD 30 MIN: CPT | Performed by: NURSE PRACTITIONER

## 2023-02-21 RX ORDER — ALBUTEROL SULFATE 2.5 MG/3ML
2.5 SOLUTION RESPIRATORY (INHALATION) EVERY 6 HOURS PRN
Qty: 120 EACH | Refills: 11 | Status: SHIPPED | OUTPATIENT
Start: 2023-02-21

## 2023-02-21 RX ORDER — ALBUTEROL SULFATE 90 UG/1
AEROSOL, METERED RESPIRATORY (INHALATION)
Qty: 18 G | Refills: 11 | Status: SHIPPED | OUTPATIENT
Start: 2023-02-21

## 2023-02-21 ASSESSMENT — ENCOUNTER SYMPTOMS
SHORTNESS OF BREATH: 0
COUGH: 1
HEMOPTYSIS: 0
SPUTUM PRODUCTION: 0
WHEEZING: 1

## 2023-02-21 NOTE — PROGRESS NOTES
She is a 76year old female seen today for follow-up of mild - moderate asthma with recent exacerbation, history of tobacco use, 14 pack years, quit in 1981, bronchitis, pulmonary hypertension, QUINTIN, now on BiPAP. She developed central apneas and worsening hypoxia on CPAP titration, had subsequent BiPAP titration study 6/26/2022 which demonstrated optimal pressure of 15/2 cm and supplemental oxygen was required at 2 L/min. I am seeing her today for the first time, extensive record review was performed. Previously seen by Rosie Orantes NP and Dr. Aliyah Kwan. DIAGNOSTICS:    Chest CT 10/2021-negative for PE. Moderate cardiomegaly. Enlarged pulmonary artery. Echo 10/2021-EF 60-65%, RVSP  at 44 mmHg, mild AR, mild MR. Myocardial perfusion study 1/2022-normal perfusion.   Spirometry 8/5/2020-normal.

## 2023-02-21 NOTE — PROGRESS NOTES
[address] [facility]. [address], [address] [facility]  [phone]    Patient Name:  Sherrie Muir    YOB: 1945    Office Visit 2/21/2023      CHIEF COMPLAINT:      Chief Complaint   Patient presents with    Asthma    Follow-up    Other     bronchitis         ASSESSMENT:   (Medical Decision Making)                                                                                                                                          Encounter Diagnoses   Name Primary? Mild persistent asthma without complication Yes    Nocturnal hypoxemia     QUINTIN (obstructive sleep apnea)     Mild pulmonary hypertension ([address]      She reports interval improvement symptomatically with treatment for bronchitis. She completed antibiotics and prednisone as prescribed. She continues to require albuterol MDI on average of 2-3 times per week for wheezing and cough. She reports longstanding history of asthma, previously maintained on albuterol  but does report intermittent exacerbations for which she has required steroids. Spirometry at her initial visit was normal.  I think that we can hold on CPFT's for now and simply repeat spirometry at her next visit. She did not continue air duo as previously prescribed by Dr. Shaw New, reports that she was unsure if it was helpful. Reviewed her technique during today's visit and it was likely suboptimal.  She still has air duo supply and is agreeable to resuming it. She is now on BiPAP/oxygen at 2 L/min with sleep with good response. She reports decreased fatigue and daytime hypersomnolence. Followed in the sleep center. RVSP was estimated 44 mm on prior echo 2021. Dr. Murphy Bryson advised repeat echo to assess for improvement now that QUINTIN/nocturnal hypoxia has been treated. She is agreeable to having the study. PLAN:     Resume airduo, 1 inhalation twice daily, rinse mouth after use.     Albuterol inhaler or nebulizer 4 times daily as needed. Keep appt with Dr Isiah Mast in April, with spirometry. Echocardiogram at [facility] (as recommended by Dr Narayan Blackwood to follow up of mild pulmonary hypertension now that sleep apnea and nocturnal hypoxia are treated). I will call her with result. Follow-up and Dispositions    Return for appt with Dr Isiah Mast as scheduled in April, with spirometry, asthma/bronchitis, QUINTIN. Orders Placed This Encounter    Ambulatory referral to Cardiology     Referral Priority:   Routine     Referral Type: Other     Referral Reason:   Specialty Services Required     Number of Visits Requested:   1    albuterol sulfate HFA (PROVENTIL;VENTOLIN;PROAIR) 108 (90 Base) MCG/ACT inhaler     Si puffs 4 times daily if needed for shortness of breath or wheezing     Dispense:  18 g     Refill:  11    albuterol (PROVENTIL) (2.5 MG/3ML) 0.083% nebulizer solution     Sig: Take 3 mLs by nebulization every 6 hours as needed for Wheezing or Shortness of Breath     Dispense:  120 each     Refill:  11             BERNA KOO - ROMELIA    Total  time spent with patient - 48 min. Collaborating MD: Dr. Emilie Monroy:    She is a very pleasant 76year old female seen today for follow-up of mild - moderate asthma with recent exacerbation, history of tobacco use, 14 pack years, quit in [address], bronchitis, pulmonary hypertension, QUINTIN, now on BiPAP. I am seeing her today for the first time, extensive record review was performed. Previously seen by Mg Morocho NP and Dr. Isiah Mast. History is also notable for heart failure with preserved EF, PAF, on anticoagulant, CAD. She is followed by Dr. Milena Thompson. She developed central apneas and worsening hypoxia on CPAP titration, had subsequent BiPAP titration study 2022 which demonstrated optimal pressure of 15/2 cm and supplemental oxygen was required at 2 L/min. Today, she reports interval improvement. Cough has decreased.   She has occasional wheezing, including at night. She is using albuterol on average of 2-3 times per week with some response. States that she did not continue air duo as prescribed previously as she was unsure of benefit. Reviewed inhaler technique today and was likely suboptimal and doubtful she was receiving adequate dose. She reports nightly compliance with BiPAP/oxygen, interval improvement in fatigue and daytime hypersomnolence. DIAGNOSTICS:    Chest CT 10/2021-negative for PE. Moderate cardiomegaly. Enlarged pulmonary artery. Echo 10/2021-EF 60-65%, RVSP estimated at 44 mmHg, mild AR, mild MR. Myocardial perfusion study 1/2022-normal perfusion. Spirometry 8/5/2020-normal.  CPAP titration 5/2022-QUINTIN, CPAP titration revealed inadequate improvement of sleep disordered breathing, oxygen saturation worsened as CPAP levels increased. Bilevel titration 6/2022-improvement of sleep disordered breathing at 15/10 cm, oxygen corrected at 2 L/min. CXR 1/23/2023-lungs are clear. Heart is normal in size.    _____________________________________________________________      REVIEW OF SYSTEMS:    Review of Systems   Constitutional: Negative for chills and fever. Cardiovascular:  Negative for irregular heartbeat. Respiratory:  Positive for cough and wheezing. Negative for hemoptysis, shortness of breath and sputum production. PHYSICAL EXAM:    Vitals:    02/21/23 1514   BP: 108/66   Pulse: 64   Temp: 98.2 °F (36.8 °C)   TempSrc: Skin   SpO2: 94%   Weight: 152 lb (68.9 kg)   Height: 5' (1.524 m)        Body mass index is 29.69 kg/m². GENERAL APPEARANCE:  The patient is normal weight and in no respiratory distress. HEENT:  PERRL. Conjunctivae unremarkable. NECK/LYMPHATIC:   Symmetrical with no elevation of jugular venous pulsation. Trachea midline. LUNGS:   Normal respiratory effort with symmetrical lung expansion.    Breath sounds - decreased, very faint end expiratory wheeze, otherwise, lungs are clear.    HEART:   There is a normal rate and regular rhythm. No murmur, rub, or gallop. There is no edema in the lower extremities. NEURO:  The patient is alert and oriented to person, place, and time. Memory appears intact and mood is normal.  No gross sensorimotor deficits are present. DIAGNOSTIC TESTS: Studies were personally reviewed by me and discussed with the patient. CXR:      XR CHEST (2 VW) 01/24/2023      AP and lateral views of the chest were obtained. Impression  The lungs are clear. The heart size is normal in size. No  pneumothorax. No pleural effusions. Degenerative bilateral shoulder joint  changes are stable.       Past Medical History:   Diagnosis Date    Allergic rhinitis, cause unspecified 01/07/2015    Aortic valve disorders 01/07/2015    Arrhythmia     a-fib    Asthma     uses inhaler prn    CAD (coronary artery disease)     mild,  treated with med; NO MI, NO stents, NO CABG     Carotid stenosis 1/7/2015    Carpal tunnel syndrome 01/07/2015    bilat wrists-no issue now    Congestive heart failure ([address]     COVID-19 vaccine series completed 03/22/2021    Say Eisenberg; 2/27/2021    Depression 06/30/2015    controlled     Esophageal reflux 01/07/2015    controlled with medication     Essential hypertension     controlled with medication    Fibromyalgia     Former smoker     1 ppd for 14 yrs; quit smoking 1981    GERD (gastroesophageal reflux disease)     controlled with medication     Heart failure ([address]     last EF 60-65% echo done 10/26/2021    History of EKG 11/03/2021    a fib; patient started on eliquis BID and ASA 81 mg per [facility]    History of peptic ulcer disease     resolved with protonix     Hx of echocardiogram 10/26/2021    EF 60-65%     Menopause     Mixed hyperlipidemia 4/21/2016    Neuropathy     BLE; gabapentin     Osteoarthritis     Osteopenia 1/7/2015    Renal insufficiency 01/07/2015       Patient Active Problem List   Diagnosis    Weakening of rectovaginal tissue    On potassium wasting diuretic therapy    Mild persistent asthma without complication    Carpal tunnel syndrome    Calf pain    Suspected sleep apnea    Renal insufficiency    Spinal stenosis of lumbar region at multiple levels    Depression    BRANDON (stress urinary incontinence, female)    S/P laminectomy with spinal fusion    Arthropathies    Osteopenia of multiple sites    Asymptomatic carotid artery stenosis    Coronary artery disease involving native coronary artery of native heart without angina pectoris    Rectocele    GERD (gastroesophageal reflux disease)    Mixed hyperlipidemia    Congestive heart failure (HCC)    RLS (restless legs syndrome)    Nocturnal hypoxemia    Moderate mitral regurgitation    Paroxysmal atrial fibrillation (HCC)    Aortic valve disorder    Cor pulmonale (chronic) (HCC)    Lumbar stenosis with neurogenic claudication    Fluid overload    Obesity (BMI 30.0-34. 9)    Elevated brain natriuretic peptide (BNP) level    Allergic rhinitis    Essential hypertension    Chronic renal disease, stage III (HCC) [661292]    Chronic systolic (congestive) heart failure    Current use of long term anticoagulation       Past Surgical History:   Procedure Laterality Date    BLADDER REPAIR      bladder tac    BLADDER SUSPENSION      CHOLECYSTECTOMY, LAPAROSCOPIC      COLONOSCOPY N/A 10/22/2021    COLONOSCOPY/ BMI 34 performed by Ish Walsh MD at [facility] ENDOSCOPY    COLONOSCOPY      GI      esophageal dilatation    HEMORRHOID SURGERY      HYSTERECTOMY (CERVIX STATUS UNKNOWN)      RECTOCELE REPAIR      ROTATOR CUFF REPAIR Right     TUBAL LIGATION           Social History     Socioeconomic History    Marital status:       Spouse name: None    Number of children: None    Years of education: 14 years    Highest education level: None   Tobacco Use    Smoking status: Former     Packs/day: 1.00     Years: 10.00     Pack years: 10.00     Types: Cigarettes     Quit date: 1/1/1981 Years since quittin.1    Smokeless tobacco: Never    Tobacco comments:     Quit smoking: quit smoking    Substance and Sexual Activity    Alcohol use: No     Alcohol/week: 0.0 standard drinks    Drug use: No       Family History   Problem Relation Age of Onset    Heart Disease Maternal Grandmother     Rheum Arthritis Paternal Grandmother     Heart Attack Sister          from heart issue age 55    Diabetes Mother     Diabetes Sister     Diabetes Sister     Breast Cancer Sister 70    Diabetes Sister     Heart Attack Father     Heart Attack Mother        Allergies   Allergen Reactions    Wasp Venom Protein Anaphylaxis    Atorvastatin Other (See Comments)     High doses cause leg cramps. Able to tolerate low doses (<20mg/day)    Iodine Hives    Lisinopril Other (See Comments)    Tetracycline Other (See Comments)     ULCERS IN MOUTH    Penicillin G Rash       Current Outpatient Medications   Medication Sig    sertraline (ZOLOFT) 50 MG tablet Take 1.5 tablets by mouth daily    apixaban (ELIQUIS) 5 MG TABS tablet Take 1 tablet by mouth 2 times daily    Fluticasone-Salmeterol,sensor, (AIRDUO DIGIHALER) 232-14 MCG/ACT AEPB Inhale 1 puff into the lungs in the morning and at bedtime    diclofenac sodium (VOLTAREN) 1 % GEL Apply 2 g topically 4 times daily    rosuvastatin (CRESTOR) 10 MG tablet Take 1 tablet by mouth in the morning.    albuterol sulfate  (90 Base) MCG/ACT inhaler Inhale 2 puffs into the lungs every 6 hours as needed    albuterol (PROVENTIL) (2.5 MG/3ML) 0.083% nebulizer solution Inhale 2.5 mg into the lungs every 6 hours as needed    ascorbic acid (VITAMIN C) 250 MG tablet Take 250 mg by mouth daily    aspirin 81 MG EC tablet Take 81 mg by mouth    Calcium Carbonate-Vitamin D (CALCIUM-VITAMIN D) 600-125 MG-UNIT TABS Take by mouth    cyanocobalamin 500 MCG tablet Take 500 mcg by mouth daily    ergocalciferol (ERGOCALCIFEROL) 1.25 MG (78009 UT) capsule One capsule by mouth weekly for 4  weeks then one monthly. Indications: vitamin D deficiency (high dose therapy)    furosemide (LASIX) 40 MG tablet Take 40 mg by mouth daily The details of the medication are not available because there are pending changes by a home health clinician.    gabapentin (NEURONTIN) 300 MG capsule Take 300 mg by mouth 3 times daily. losartan (COZAAR) 100 MG tablet Take 100 mg by mouth daily    metoprolol succinate (TOPROL XL) 25 MG extended release tablet Take 25 mg by mouth daily    nitroGLYCERIN (NITROSTAT) 0.4 MG SL tablet Place 0.4 mg under the tongue    pantoprazole (PROTONIX) 40 MG tablet Take 40 mg by mouth every morning (before breakfast)    spironolactone (ALDACTONE) 25 MG tablet Take 25 mg by mouth daily     No current facility-administered medications for this visit. Over 50% of today's office visit was spent in face to face time reviewing test results, prognosis, importance of compliance, education about disease process, benefits of medications, instructions for management of acute symptoms, and follow up plans. Electronically signed. Dictated using voice recognition software.   Proof read but unrecognized errors may exist.

## 2023-02-21 NOTE — PATIENT INSTRUCTIONS
Resume airduo, 1 inhalation twice daily, rinse mouth after use.    Albuterol inhaler or nebulizer 4 times daily as needed.    Keep appt with Dr Dee in April, with spirometry.      Echocardiogram at [facility] Card[facility] (as recommended by Dr Garcia to follow up of mild pulmonary hypertension now that sleep apnea and nocturnal hypoxia are treated).  I will call her with result.

## 2023-03-01 RX ORDER — FUROSEMIDE 40 MG/1
TABLET ORAL
Qty: 90 TABLET | OUTPATIENT
Start: 2023-03-01

## 2023-03-01 RX ORDER — LOSARTAN POTASSIUM 100 MG/1
TABLET ORAL
Qty: 90 TABLET | OUTPATIENT
Start: 2023-03-01

## 2023-03-03 DIAGNOSIS — I10 ESSENTIAL HYPERTENSION: Primary | ICD-10-CM

## 2023-03-03 DIAGNOSIS — I10 ESSENTIAL (PRIMARY) HYPERTENSION: ICD-10-CM

## 2023-03-03 RX ORDER — FUROSEMIDE 40 MG/1
40 TABLET ORAL DAILY
Qty: 90 TABLET | Refills: 3 | Status: SHIPPED | OUTPATIENT
Start: 2023-03-03

## 2023-03-03 RX ORDER — LOSARTAN POTASSIUM 100 MG/1
100 TABLET ORAL DAILY
Qty: 90 TABLET | Refills: 3 | Status: SHIPPED | OUTPATIENT
Start: 2023-03-03

## 2023-03-13 RX ORDER — METOPROLOL SUCCINATE 25 MG/1
TABLET, EXTENDED RELEASE ORAL
Qty: 90 TABLET | OUTPATIENT
Start: 2023-03-13

## 2023-03-28 ENCOUNTER — TELEPHONE (OUTPATIENT)
Dept: CARDIOLOGY CLINIC | Age: 78
End: 2023-03-28

## 2023-03-28 NOTE — TELEPHONE ENCOUNTER
----- Message from Rah Kuhn DO sent at 3/27/2023  5:33 PM EDT -----  Please call the patient regarding their echocardiogram result. Heart squeezing function is normal on echocardiogram, appears there has been some worsening of her mitral valve leaking. Now moderate to severe. Will need to be monitored periodically. Continue current medications. Please get her an appointment in the next 2 weeks in office, thank you.

## 2023-03-31 ENCOUNTER — OFFICE VISIT (OUTPATIENT)
Dept: CARDIOLOGY CLINIC | Age: 78
End: 2023-03-31
Payer: COMMERCIAL

## 2023-03-31 VITALS
SYSTOLIC BLOOD PRESSURE: 138 MMHG | BODY MASS INDEX: 29.86 KG/M2 | HEART RATE: 68 BPM | HEIGHT: 60 IN | DIASTOLIC BLOOD PRESSURE: 70 MMHG | WEIGHT: 152.1 LBS

## 2023-03-31 DIAGNOSIS — E78.2 MIXED HYPERLIPIDEMIA: ICD-10-CM

## 2023-03-31 DIAGNOSIS — R00.2 HEART PALPITATIONS: ICD-10-CM

## 2023-03-31 DIAGNOSIS — I50.32 CHRONIC HEART FAILURE WITH PRESERVED EJECTION FRACTION (HCC): ICD-10-CM

## 2023-03-31 DIAGNOSIS — I48.0 PAROXYSMAL ATRIAL FIBRILLATION (HCC): Primary | ICD-10-CM

## 2023-03-31 DIAGNOSIS — I34.0 SEVERE MITRAL REGURGITATION BY PRIOR ECHOCARDIOGRAM: ICD-10-CM

## 2023-03-31 DIAGNOSIS — I10 ESSENTIAL HYPERTENSION: ICD-10-CM

## 2023-03-31 DIAGNOSIS — N17.9 AKI (ACUTE KIDNEY INJURY) (HCC): ICD-10-CM

## 2023-03-31 PROCEDURE — 99214 OFFICE O/P EST MOD 30 MIN: CPT | Performed by: INTERNAL MEDICINE

## 2023-03-31 PROCEDURE — 3078F DIAST BP <80 MM HG: CPT | Performed by: INTERNAL MEDICINE

## 2023-03-31 PROCEDURE — 1123F ACP DISCUSS/DSCN MKR DOCD: CPT | Performed by: INTERNAL MEDICINE

## 2023-03-31 PROCEDURE — 3075F SYST BP GE 130 - 139MM HG: CPT | Performed by: INTERNAL MEDICINE

## 2023-03-31 ASSESSMENT — ENCOUNTER SYMPTOMS
GASTROINTESTINAL NEGATIVE: 1
SHORTNESS OF BREATH: 1

## 2023-03-31 NOTE — PROGRESS NOTES
puffs 4 times daily if needed for shortness of breath or wheezing, Disp: 18 g, Rfl: 11    albuterol (PROVENTIL) (2.5 MG/3ML) 0.083% nebulizer solution, Take 3 mLs by nebulization every 6 hours as needed for Wheezing or Shortness of Breath, Disp: 120 each, Rfl: 11    sertraline (ZOLOFT) 50 MG tablet, Take 1.5 tablets by mouth daily, Disp: 135 tablet, Rfl: 3    apixaban (ELIQUIS) 5 MG TABS tablet, Take 1 tablet by mouth 2 times daily, Disp: 180 tablet, Rfl: 3    Fluticasone-Salmeterol,sensor, (AIRDUO DIGIHALER) 232-14 MCG/ACT AEPB, Inhale 1 puff into the lungs in the morning and at bedtime, Disp: 1 each, Rfl: 11    diclofenac sodium (VOLTAREN) 1 % GEL, Apply 2 g topically 4 times daily, Disp: , Rfl:     rosuvastatin (CRESTOR) 10 MG tablet, Take 1 tablet by mouth in the morning., Disp: 30 tablet, Rfl: 11    ascorbic acid (VITAMIN C) 250 MG tablet, Take 250 mg by mouth daily, Disp: , Rfl:     aspirin 81 MG EC tablet, Take 81 mg by mouth, Disp: , Rfl:     Calcium Carbonate-Vitamin D (CALCIUM-VITAMIN D) 600-125 MG-UNIT TABS, Take by mouth, Disp: , Rfl:     cyanocobalamin 500 MCG tablet, Take 500 mcg by mouth daily, Disp: , Rfl:     ergocalciferol (ERGOCALCIFEROL) 1.25 MG (18469 UT) capsule, One capsule by mouth weekly for 4 weeks then one monthly. Indications: vitamin D deficiency (high dose therapy), Disp: , Rfl:     gabapentin (NEURONTIN) 300 MG capsule, Take 300 mg by mouth 3 times daily. , Disp: , Rfl:     metoprolol succinate (TOPROL XL) 25 MG extended release tablet, Take 25 mg by mouth daily, Disp: , Rfl:     nitroGLYCERIN (NITROSTAT) 0.4 MG SL tablet, Place 0.4 mg under the tongue, Disp: , Rfl:     pantoprazole (PROTONIX) 40 MG tablet, Take 40 mg by mouth every morning (before breakfast), Disp: , Rfl:     spironolactone (ALDACTONE) 25 MG tablet, Take 25 mg by mouth daily, Disp: , Rfl:      ASSESSMENT/PLAN:      There are no diagnoses linked to this encounter.     Cardiovascular Testing:  - SPECT 1/13/2022: Normal

## 2023-04-10 RX ORDER — SPIRONOLACTONE 25 MG/1
TABLET ORAL
Qty: 90 TABLET | OUTPATIENT
Start: 2023-04-10

## 2023-04-13 PROBLEM — G47.33 OSA TREATED WITH BIPAP: Status: ACTIVE | Noted: 2022-03-02

## 2023-04-13 NOTE — PROGRESS NOTES
Patient pre-assessment complete for [facility] scheduled for 23, arrival time 0700 for IV hydration. Patient verified using . Patient instructed to bring a list of all home medications on the day of procedure. NPO status reinforced. Patient informed to take a full dose aspirin 325mg  or 81 mg x 4 on the day of procedure. Patient instructed to HOLD lasix, eliquis and spironolactone. Instructed they can take all other medications excluding vitamins & supplements. Patient verbalizes understanding of all instructions & denies any questions at this time.

## 2023-04-14 ENCOUNTER — HOSPITAL ENCOUNTER (OUTPATIENT)
Age: 78
Setting detail: OUTPATIENT SURGERY
Discharge: HOME | End: 2023-04-14
Attending: INTERNAL MEDICINE | Admitting: INTERNAL MEDICINE
Payer: MEDICARE

## 2023-04-14 VITALS
TEMPERATURE: 97.8 F | WEIGHT: 156 LBS | DIASTOLIC BLOOD PRESSURE: 81 MMHG | RESPIRATION RATE: 16 BRPM | SYSTOLIC BLOOD PRESSURE: 138 MMHG | BODY MASS INDEX: 30.63 KG/M2 | HEART RATE: 52 BPM | OXYGEN SATURATION: 93 % | HEIGHT: 60 IN

## 2023-04-14 DIAGNOSIS — R06.09 DYSPNEA ON EXERTION: ICD-10-CM

## 2023-04-14 LAB
ANION GAP SERPL CALC-SCNC: ABNORMAL MMOL/L (ref 2–11)
BUN SERPL-MCNC: 19 MG/DL (ref 8–23)
CALCIUM SERPL-MCNC: 9.2 MG/DL (ref 8.3–10.4)
CHLORIDE SERPL-SCNC: 109 MMOL/L (ref 101–110)
CO2 SERPL-SCNC: 29 MMOL/L (ref 21–32)
CREAT SERPL-MCNC: 1.5 MG/DL (ref 0.6–1)
ECHO BSA: 1.73 M2
EKG ATRIAL RATE: 56 BPM
EKG DIAGNOSIS: NORMAL
EKG P AXIS: 68 DEGREES
EKG P-R INTERVAL: 304 MS
EKG Q-T INTERVAL: 448 MS
EKG QRS DURATION: 82 MS
EKG QTC CALCULATION (BAZETT): 432 MS
EKG R AXIS: -29 DEGREES
EKG T AXIS: 46 DEGREES
EKG VENTRICULAR RATE: 56 BPM
GLUCOSE SERPL-MCNC: 102 MG/DL (ref 65–100)
POTASSIUM SERPL-SCNC: 4.6 MMOL/L (ref 3.5–5.1)
SODIUM SERPL-SCNC: 137 MMOL/L (ref 133–143)

## 2023-04-14 PROCEDURE — C1760 CLOSURE DEV, VASC: HCPCS | Performed by: INTERNAL MEDICINE

## 2023-04-14 PROCEDURE — C1894 INTRO/SHEATH, NON-LASER: HCPCS | Performed by: INTERNAL MEDICINE

## 2023-04-14 PROCEDURE — 99152 MOD SED SAME PHYS/QHP 5/>YRS: CPT | Performed by: INTERNAL MEDICINE

## 2023-04-14 PROCEDURE — 6360000004 HC RX CONTRAST MEDICATION: Performed by: INTERNAL MEDICINE

## 2023-04-14 PROCEDURE — 6360000002 HC RX W HCPCS: Performed by: INTERNAL MEDICINE

## 2023-04-14 PROCEDURE — 93460 R&L HRT ART/VENTRICLE ANGIO: CPT | Performed by: INTERNAL MEDICINE

## 2023-04-14 PROCEDURE — 2709999900 HC NON-CHARGEABLE SUPPLY: Performed by: INTERNAL MEDICINE

## 2023-04-14 PROCEDURE — 99153 MOD SED SAME PHYS/QHP EA: CPT | Performed by: INTERNAL MEDICINE

## 2023-04-14 PROCEDURE — 2580000003 HC RX 258: Performed by: INTERNAL MEDICINE

## 2023-04-14 PROCEDURE — 80048 BASIC METABOLIC PNL TOTAL CA: CPT

## 2023-04-14 PROCEDURE — 93005 ELECTROCARDIOGRAM TRACING: CPT | Performed by: INTERNAL MEDICINE

## 2023-04-14 PROCEDURE — 2500000003 HC RX 250 WO HCPCS: Performed by: INTERNAL MEDICINE

## 2023-04-14 PROCEDURE — C1751 CATH, INF, PER/CENT/MIDLINE: HCPCS | Performed by: INTERNAL MEDICINE

## 2023-04-14 PROCEDURE — C1769 GUIDE WIRE: HCPCS | Performed by: INTERNAL MEDICINE

## 2023-04-14 RX ORDER — HEPARIN SODIUM 200 [USP'U]/100ML
INJECTION, SOLUTION INTRAVENOUS CONTINUOUS PRN
Status: DISCONTINUED | OUTPATIENT
Start: 2023-04-14 | End: 2023-04-14 | Stop reason: HOSPADM

## 2023-04-14 RX ORDER — DIPHENHYDRAMINE HYDROCHLORIDE 50 MG/ML
50 INJECTION INTRAMUSCULAR; INTRAVENOUS ONCE
Status: COMPLETED | OUTPATIENT
Start: 2023-04-14 | End: 2023-04-14

## 2023-04-14 RX ORDER — SODIUM CHLORIDE 9 MG/ML
INJECTION, SOLUTION INTRAVENOUS CONTINUOUS
Status: DISCONTINUED | OUTPATIENT
Start: 2023-04-14 | End: 2023-04-14 | Stop reason: HOSPADM

## 2023-04-14 RX ORDER — MIDAZOLAM HYDROCHLORIDE 1 MG/ML
INJECTION INTRAMUSCULAR; INTRAVENOUS PRN
Status: DISCONTINUED | OUTPATIENT
Start: 2023-04-14 | End: 2023-04-14 | Stop reason: HOSPADM

## 2023-04-14 RX ORDER — LIDOCAINE HYDROCHLORIDE 10 MG/ML
INJECTION, SOLUTION INFILTRATION; PERINEURAL PRN
Status: DISCONTINUED | OUTPATIENT
Start: 2023-04-14 | End: 2023-04-14 | Stop reason: HOSPADM

## 2023-04-14 RX ORDER — ASPIRIN 81 MG/1
324 TABLET, CHEWABLE ORAL ONCE
Status: DISCONTINUED | OUTPATIENT
Start: 2023-04-14 | End: 2023-04-14 | Stop reason: HOSPADM

## 2023-04-14 RX ADMIN — SODIUM CHLORIDE: 900 INJECTION, SOLUTION INTRAVENOUS at 07:30

## 2023-04-14 RX ADMIN — DIPHENHYDRAMINE HYDROCHLORIDE 50 MG: 50 INJECTION, SOLUTION INTRAMUSCULAR; INTRAVENOUS at 07:30

## 2023-04-14 RX ADMIN — SODIUM CHLORIDE, PRESERVATIVE FREE 20 MG: 5 INJECTION INTRAVENOUS at 07:30

## 2023-04-14 RX ADMIN — HYDROCORTISONE SODIUM SUCCINATE 100 MG: 100 INJECTION, POWDER, FOR SOLUTION INTRAMUSCULAR; INTRAVENOUS at 07:30

## 2023-04-14 NOTE — PROGRESS NOTES
Discharge instructions given per orders, voiced good understanding of right brachial and right groin care, medications & follow up care.  Denies any questions

## 2023-04-14 NOTE — PROGRESS NOTES
TRANSFER - OUT REPORT:    Verbal report given to RN on Kathi Raymundo  being transferred to [facility] for routine progression of patient care       Report consisted of patient's Situation, Background, Assessment and   Recommendations(SBAR). Information from the following report(s) Nurse Handoff Report and MAR was reviewed with the receiving nurse.       [facility] and [facility] w/ Dr. Claudette Boozer  No interventions  R brachial venous access, sheath pulled post procedure  R femoral arterial access  R femoral access closed with Mynx closure device   No s/sxs of bleeding to R femoral     Heparin 2,000 units IC  Versed 0.5mg IV

## 2023-04-14 NOTE — PROGRESS NOTES
Patient received to [address] [place] [address] room # 12  Ambulatory from [facility]. Patient scheduled for [facility] today with Dr Frederick Van. Procedure reviewed & questions answered, voiced good understanding consent obtained & placed on chart. All medications and medical history reviewed. Will prep patient per orders. Patient & family updated on plan of care. The patient has a fraility score of 3-MANAGING WELL, based on ambulation. 324mg of Aspirin taken at 0500. Pt premedicated for IV contrast dye allergy.

## 2023-04-14 NOTE — DISCHARGE INSTRUCTIONS
HEART CATHETERIZATION/ANGIOGRAPHY DISCHARGE INSTRUCTIONS    Check puncture site frequently for swelling or bleeding. If there is any bleeding, apply pressure over the area with a clean towel or washcloth and call 911. Notify your doctor for any redness, swelling, drainage, or oozing from the puncture site. Notify your doctor for any fever or chills. If the extremity becomes cold, numb, or painful call [facility] [phone]. Activity should be limited for the next 48 hours. No heavy lifting, pushing, pulling  or strenuous activity for 48 hours. No heavy lifting (anything over 10 pounds) for 3 days. You may resume your usual diet. Drink more fluids than usual.  Have a responsible person drive you home and stay with you for at least 24 hours after your heart catheterization/angiography. You may remove bandage from your right groin and right upper arm in 24 hours. You may shower in 24 hours. No tub baths, hot tubs, or swimming for 1 week. Do not place any lotions, creams, powders, or ointments over puncture site for 1 week. You may place a clean band-aid over the puncture site each day for 5 days. Change daily. Sedation for a Medical Procedure: Care Instructions     You were given a sedative medication during your visit. While many of the effects will have worn   off before you leave; you may continue to feel some effects for several hours. Common side effects from sedation include:  Feeling sleepy. (Your doctors and nurses will make sure you are not too sleepy to go home.)  Nausea and vomiting. This usually does not last long. Feeling tired. How can you care for yourself at home? Activity    Don't do anything for 24 hours that requires attention to detail. It takes time for the medicine effects to completely wear off. Do not make important legal decisions for 24 hours. Do not sign any legal documents for 24 hours.      Do not drink alcohol today     For your safety, you

## 2023-04-14 NOTE — PROGRESS NOTES
Ambulated pt around [address]. Ambulated without pain or difficulty. Pt back in bed sitting up. No s/s of swelling or bleeding from right brachial and right femoral cath sites.

## 2023-04-17 ENCOUNTER — TELEPHONE (OUTPATIENT)
Dept: CARDIOLOGY CLINIC | Age: 78
End: 2023-04-17

## 2023-04-17 ENCOUNTER — NURSE ONLY (OUTPATIENT)
Dept: CARDIOLOGY CLINIC | Age: 78
End: 2023-04-17

## 2023-04-17 DIAGNOSIS — R10.31 GROIN PAIN, RIGHT: Primary | ICD-10-CM

## 2023-04-17 NOTE — TELEPHONE ENCOUNTER
Patient called and stated she had a heart cath and was black and blue from her groin to her knee.   Is this normal?

## 2023-04-17 NOTE — TELEPHONE ENCOUNTER
Pt had cath 4/14/23 with Dr. Reinier Rangel via right groin. C/o swelling of right leg. R thigh measures 3 in larger than left. Bruising down to her knee. New onset burning in left thigh today. Triage informed Dr. Kylie Ellis. Per Dr. Kylie Ellis, have pt come in for nurse visit and site check today; if needed Dr. Reinier Rangel may check her leg. Triage informed pt of Dr. Hailey Garsia response. She verbalizes understanding and is on her way.

## 2023-04-17 NOTE — PROGRESS NOTES
Here for nurse visit. C/o right groin pain. Soft bruising noted on right inner thigh. Right groin is firm and tender to touch. Dr. Matute Patient examined patient. Verbal order received for patient to have stat right groin u/s and hold Eliquis for 3 days. Order read back and verified. Patient instructed to hold eliquis unitil Thursday evening. She verbalized understanding.

## 2023-04-18 ENCOUNTER — TELEPHONE (OUTPATIENT)
Dept: CARDIOLOGY CLINIC | Age: 78
End: 2023-04-18

## 2023-04-18 ENCOUNTER — HOSPITAL ENCOUNTER (OUTPATIENT)
Dept: ULTRASOUND IMAGING | Age: 78
Discharge: HOME | End: 2023-04-21

## 2023-04-18 DIAGNOSIS — R10.31 GROIN PAIN, RIGHT: ICD-10-CM

## 2023-04-18 DIAGNOSIS — I72.4 FEMORAL ARTERY PSEUDOANEURYSM COMPLICATING CARDIAC CATHETERIZATION (HCC): Primary | ICD-10-CM

## 2023-04-18 DIAGNOSIS — T81.718A FEMORAL ARTERY PSEUDOANEURYSM COMPLICATING CARDIAC CATHETERIZATION (HCC): Primary | ICD-10-CM

## 2023-04-18 NOTE — TELEPHONE ENCOUNTER
Does the radiologist think it should be injected?   Otherwise its rest, elevation, heat/ice and tylenol with repeat in a week

## 2023-04-18 NOTE — TELEPHONE ENCOUNTER
MD Karina Quiñonez, RN  Caller: Unspecified (Today,  2:08 PM)  They should proceed with injection as they feel needed. She will follow-up with her early after this is done through the primary cardiologist and arrange cardiac catheterization. I attempted to call the number that was given and left a voicemail. Tell radiology they can order what they need to do to get this taken care of the     Reviewed Dr. Victor M William response with [facility]aels in Ultrasound. States she will try to contact Dr. Rafaela Bartlett to make aware Dr Telma Fuentes tried to contact him.

## 2023-04-18 NOTE — TELEPHONE ENCOUNTER
Sonia Collier from [address] calls back. States Dr. Ana Rosa Erickson needs Dr. Maricruz Dukes to call him. [phone]. 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

## 2023-04-18 NOTE — TELEPHONE ENCOUNTER
----- Message from Tree Crespo MD sent at 4/18/2023  4:20 PM EDT -----  I spoke to the interventional radiologist.  Please call the patient and tell her that she needs to stay off of her Eliquis at the present time and she needs to not restart until at least 3 days after her injection. Dee Shay He is plan to do the procedure on Thursday. Please put in a consult for interventional radiology as an order so they have that.

## 2023-04-20 ENCOUNTER — HOSPITAL ENCOUNTER (OUTPATIENT)
Dept: ULTRASOUND IMAGING | Age: 78
Discharge: HOME | End: 2023-04-20
Payer: MEDICARE

## 2023-04-20 VITALS
HEART RATE: 64 BPM | SYSTOLIC BLOOD PRESSURE: 132 MMHG | DIASTOLIC BLOOD PRESSURE: 59 MMHG | OXYGEN SATURATION: 97 % | TEMPERATURE: 98.1 F

## 2023-04-20 DIAGNOSIS — I72.9 PSEUDOANEURYSM (HCC): ICD-10-CM

## 2023-04-20 PROCEDURE — 93926 LOWER EXTREMITY STUDY: CPT

## 2023-04-20 NOTE — DISCHARGE INSTRUCTIONS
[facility]     Department of Interventional Radiology     [facility] Radiology     [phone] Office     [phone] Fax       GENERAL POST PROCEDURE DISCHARGE INSTRUCTIONS:           Post IV Contrast:        You have contrast (x-ray dye) today. Any reactions to dye would be expected to occur during your procedure in the department. You should drink plenty of fluids to help your kidneys to clear the contrast. If your kidneys have a decrease in function, you may have been asked to drink mucomyst, a medicine designed to protect the kidneys. This would have been done prior to your procedure. Home Care Instructions: You can resume your regular diet and medication regimen. The results from your exam will be available to your regular doctor in 3-5 business days. You should relax for the rest of the day, and you will need someone to drive you home. For the next 24 hours, you should not drink alcohol, drive or take any sedative medications. Your child may be irritable, restless, or crying upon waking. This will pass, but may take several hours. Do not feed your child until he/she is fully awake. Call If:        You should call your Physician and/or the Radiology Nurse if you have any allergic reaction, which includes swelling, itching, rash, welts, and/or shortness of breath. Follow-Up Instructions: Please see your ordering doctor as he/she has requested. To Reach Us: If you have any questions about your procedure, please call the Interventional Radiology department at [phone]. After business hours (5pm) and weekends, call the answering service at [phone] and ask for the Radiologist on call to be paged. Si tiene Preguntas acerca del procedimiento, por favor llame al departamento de Radiología Intervencional al [phone].       Después de horas de oficina (5 pm) y los fines de Dwight, llamar al

## 2023-04-20 NOTE — BRIEF OP NOTE
Department of Interventional Radiology  [phone]        Interventional Radiology Brief Procedure Note    Patient: Emely Barfield MRN: [id number]  SSN: [id number]    YOB: 1945  Age: 76 y.o. Sex: female      Date of Procedure: 4/20/2023    Pre-Procedure Diagnosis: R groin pseudoaneurysm    Post-Procedure Diagnosis: SAME--spontaneously thrombosed after stopping Eliquis 1-2 days ago. Procedure(s):  Diagnostic US    Brief Description of Procedure: as above    Performed By: Sasha Queen MD     Assistants: None    Anesthesia:None    Estimated Blood Loss: None    Specimens:  None    Implants:  None    Findings: Bi-lobed PSA is now completely thrombosed. Complications: None    Recommendations: NO strenuous activity x 48 hours.   Restart Eliquis 4/22/23     Follow Up: PRN    Signed By: Sasha Queen MD     April 20, 2023

## 2023-04-24 ENCOUNTER — CARE COORDINATION (OUTPATIENT)
Dept: CARE COORDINATION | Facility: CLINIC | Age: 78
End: 2023-04-24

## 2023-05-03 RX ORDER — SPIRONOLACTONE 25 MG/1
TABLET ORAL
Qty: 90 TABLET | OUTPATIENT
Start: 2023-05-03

## 2023-05-08 ENCOUNTER — OFFICE VISIT (OUTPATIENT)
Age: 78
End: 2023-05-08
Payer: MEDICARE

## 2023-05-08 VITALS
SYSTOLIC BLOOD PRESSURE: 120 MMHG | HEART RATE: 68 BPM | WEIGHT: 151 LBS | DIASTOLIC BLOOD PRESSURE: 58 MMHG | HEIGHT: 60 IN | BODY MASS INDEX: 29.64 KG/M2

## 2023-05-08 DIAGNOSIS — I34.0 MILD MITRAL REGURGITATION: Primary | ICD-10-CM

## 2023-05-08 DIAGNOSIS — I10 ESSENTIAL HYPERTENSION: ICD-10-CM

## 2023-05-08 DIAGNOSIS — E78.2 MIXED HYPERLIPIDEMIA: ICD-10-CM

## 2023-05-08 PROCEDURE — G8417 CALC BMI ABV UP PARAM F/U: HCPCS | Performed by: INTERNAL MEDICINE

## 2023-05-08 PROCEDURE — 3074F SYST BP LT 130 MM HG: CPT | Performed by: INTERNAL MEDICINE

## 2023-05-08 PROCEDURE — 99213 OFFICE O/P EST LOW 20 MIN: CPT | Performed by: INTERNAL MEDICINE

## 2023-05-08 PROCEDURE — G8427 DOCREV CUR MEDS BY ELIG CLIN: HCPCS | Performed by: INTERNAL MEDICINE

## 2023-05-08 PROCEDURE — 1123F ACP DISCUSS/DSCN MKR DOCD: CPT | Performed by: INTERNAL MEDICINE

## 2023-05-08 PROCEDURE — 3078F DIAST BP <80 MM HG: CPT | Performed by: INTERNAL MEDICINE

## 2023-05-08 PROCEDURE — G8399 PT W/DXA RESULTS DOCUMENT: HCPCS | Performed by: INTERNAL MEDICINE

## 2023-05-08 PROCEDURE — 1090F PRES/ABSN URINE INCON ASSESS: CPT | Performed by: INTERNAL MEDICINE

## 2023-05-08 PROCEDURE — 1036F TOBACCO NON-USER: CPT | Performed by: INTERNAL MEDICINE

## 2023-05-08 RX ORDER — ROSUVASTATIN CALCIUM 10 MG/1
10 TABLET, COATED ORAL DAILY
Qty: 90 TABLET | Refills: 3 | Status: SHIPPED | OUTPATIENT
Start: 2023-05-08

## 2023-05-08 RX ORDER — ASCORBIC ACID 1000 MG
TABLET ORAL
COMMUNITY

## 2023-05-08 ASSESSMENT — ENCOUNTER SYMPTOMS
WHEEZING: 0
NAUSEA: 0
VOMITING: 0
SHORTNESS OF BREATH: 0
COUGH: 0
ABDOMINAL PAIN: 0

## 2023-05-08 NOTE — PROGRESS NOTES
with any issues or other concerns related to their cardiac condition(s) and/or complaint(s). Return in about 6 months (around 11/8/2023).     Oliver Nolan DO  5/8/2023 12:42 PM

## 2023-05-09 DIAGNOSIS — I25.10 CORONARY ARTERY DISEASE INVOLVING NATIVE CORONARY ARTERY OF NATIVE HEART WITHOUT ANGINA PECTORIS: ICD-10-CM

## 2023-05-09 DIAGNOSIS — N18.30 STAGE 3 CHRONIC KIDNEY DISEASE, UNSPECIFIED WHETHER STAGE 3A OR 3B CKD (HCC): Chronic | ICD-10-CM

## 2023-05-09 DIAGNOSIS — I48.0 PAROXYSMAL ATRIAL FIBRILLATION (HCC): ICD-10-CM

## 2023-05-09 DIAGNOSIS — E78.2 MIXED HYPERLIPIDEMIA: Chronic | ICD-10-CM

## 2023-05-09 DIAGNOSIS — Z79.899 ON POTASSIUM WASTING DIURETIC THERAPY: Chronic | ICD-10-CM

## 2023-05-09 DIAGNOSIS — Z79.01 CURRENT USE OF LONG TERM ANTICOAGULATION: ICD-10-CM

## 2023-05-09 DIAGNOSIS — I10 ESSENTIAL HYPERTENSION: Chronic | ICD-10-CM

## 2023-05-09 LAB
BASOPHILS # BLD: 0.1 K/UL (ref 0–0.2)
BASOPHILS NFR BLD: 2 % (ref 0–2)
DIFFERENTIAL METHOD BLD: ABNORMAL
EOSINOPHIL # BLD: 0.4 K/UL (ref 0–0.8)
EOSINOPHIL NFR BLD: 6 % (ref 0.5–7.8)
ERYTHROCYTE [DISTWIDTH] IN BLOOD BY AUTOMATED COUNT: 14 % (ref 11.9–14.6)
HCT VFR BLD AUTO: 38.5 % (ref 35.8–46.3)
HGB BLD-MCNC: 11.8 G/DL (ref 11.7–15.4)
IMM GRANULOCYTES # BLD AUTO: 0 K/UL (ref 0–0.5)
IMM GRANULOCYTES NFR BLD AUTO: 0 % (ref 0–5)
LYMPHOCYTES # BLD: 2.1 K/UL (ref 0.5–4.6)
LYMPHOCYTES NFR BLD: 34 % (ref 13–44)
MCH RBC QN AUTO: 28.2 PG (ref 26.1–32.9)
MCHC RBC AUTO-ENTMCNC: 30.6 G/DL (ref 31.4–35)
MCV RBC AUTO: 92.1 FL (ref 82–102)
MONOCYTES # BLD: 0.6 K/UL (ref 0.1–1.3)
MONOCYTES NFR BLD: 10 % (ref 4–12)
NEUTS SEG # BLD: 3 K/UL (ref 1.7–8.2)
NEUTS SEG NFR BLD: 49 % (ref 43–78)
NRBC # BLD: 0 K/UL (ref 0–0.2)
PLATELET # BLD AUTO: 249 K/UL (ref 150–450)
PMV BLD AUTO: 10.7 FL (ref 9.4–12.3)
RBC # BLD AUTO: 4.18 M/UL (ref 4.05–5.2)
WBC # BLD AUTO: 6.1 K/UL (ref 4.3–11.1)

## 2023-05-10 LAB
ALBUMIN SERPL-MCNC: 3.7 G/DL (ref 3.2–4.6)
ALBUMIN/GLOB SERPL: 1 (ref 0.4–1.6)
ALP SERPL-CCNC: 55 U/L (ref 50–136)
ALT SERPL-CCNC: 18 U/L (ref 12–65)
ANION GAP SERPL CALC-SCNC: 5 MMOL/L (ref 2–11)
AST SERPL-CCNC: 19 U/L (ref 15–37)
BILIRUB SERPL-MCNC: 0.4 MG/DL (ref 0.2–1.1)
BUN SERPL-MCNC: 37 MG/DL (ref 8–23)
CALCIUM SERPL-MCNC: 9.3 MG/DL (ref 8.3–10.4)
CHLORIDE SERPL-SCNC: 106 MMOL/L (ref 101–110)
CHOLEST SERPL-MCNC: 106 MG/DL
CO2 SERPL-SCNC: 28 MMOL/L (ref 21–32)
CREAT SERPL-MCNC: 1.7 MG/DL (ref 0.6–1)
GLOBULIN SER CALC-MCNC: 3.7 G/DL (ref 2.8–4.5)
GLUCOSE SERPL-MCNC: 95 MG/DL (ref 65–100)
HDLC SERPL-MCNC: 56 MG/DL (ref 40–60)
HDLC SERPL: 1.9
LDLC SERPL CALC-MCNC: 35 MG/DL
MAGNESIUM SERPL-MCNC: 3 MG/DL (ref 1.8–2.4)
POTASSIUM SERPL-SCNC: 4.5 MMOL/L (ref 3.5–5.1)
PROT SERPL-MCNC: 7.4 G/DL (ref 6.3–8.2)
SODIUM SERPL-SCNC: 139 MMOL/L (ref 133–143)
TRIGL SERPL-MCNC: 75 MG/DL (ref 35–150)
TSH, 3RD GENERATION: 1.21 UIU/ML (ref 0.36–3.74)
URATE SERPL-MCNC: 7.1 MG/DL (ref 2.6–6)
VLDLC SERPL CALC-MCNC: 15 MG/DL (ref 6–23)

## 2023-05-12 ENCOUNTER — TELEPHONE (OUTPATIENT)
Dept: ORTHOPEDIC SURGERY | Age: 78
End: 2023-05-12

## 2023-05-13 SDOH — ECONOMIC STABILITY: HOUSING INSECURITY
IN THE LAST 12 MONTHS, WAS THERE A TIME WHEN YOU DID NOT HAVE A STEADY PLACE TO SLEEP OR SLEPT IN A SHELTER (INCLUDING NOW)?: NO

## 2023-05-13 SDOH — ECONOMIC STABILITY: FOOD INSECURITY: WITHIN THE PAST 12 MONTHS, THE FOOD YOU BOUGHT JUST DIDN'T LAST AND YOU DIDN'T HAVE MONEY TO GET MORE.: NEVER TRUE

## 2023-05-13 SDOH — ECONOMIC STABILITY: INCOME INSECURITY: HOW HARD IS IT FOR YOU TO PAY FOR THE VERY BASICS LIKE FOOD, HOUSING, MEDICAL CARE, AND HEATING?: PATIENT DECLINED

## 2023-05-13 SDOH — ECONOMIC STABILITY: TRANSPORTATION INSECURITY
IN THE PAST 12 MONTHS, HAS LACK OF TRANSPORTATION KEPT YOU FROM MEETINGS, WORK, OR FROM GETTING THINGS NEEDED FOR DAILY LIVING?: NO

## 2023-05-15 ENCOUNTER — TELEPHONE (OUTPATIENT)
Dept: ORTHOPEDIC SURGERY | Age: 78
End: 2023-05-15

## 2023-05-15 NOTE — TELEPHONE ENCOUNTER
She is wanting to see if she can get her injection sooner. She is hoping there is somewhere she can be worked in. She leaves to go out of town on Thursday. She called Friday but never heard back. Please call.

## 2023-05-16 ENCOUNTER — OFFICE VISIT (OUTPATIENT)
Dept: INTERNAL MEDICINE CLINIC | Facility: CLINIC | Age: 78
End: 2023-05-16
Payer: MEDICARE

## 2023-05-16 VITALS
DIASTOLIC BLOOD PRESSURE: 68 MMHG | SYSTOLIC BLOOD PRESSURE: 122 MMHG | HEIGHT: 61 IN | WEIGHT: 152 LBS | BODY MASS INDEX: 28.7 KG/M2 | OXYGEN SATURATION: 100 % | HEART RATE: 55 BPM

## 2023-05-16 DIAGNOSIS — G47.33 OSA TREATED WITH BIPAP: ICD-10-CM

## 2023-05-16 DIAGNOSIS — I48.0 PAROXYSMAL ATRIAL FIBRILLATION (HCC): Chronic | ICD-10-CM

## 2023-05-16 DIAGNOSIS — G47.34 NOCTURNAL HYPOXEMIA: ICD-10-CM

## 2023-05-16 DIAGNOSIS — N18.30 STAGE 3 CHRONIC KIDNEY DISEASE, UNSPECIFIED WHETHER STAGE 3A OR 3B CKD (HCC): Chronic | ICD-10-CM

## 2023-05-16 DIAGNOSIS — Z79.01 CHRONIC ANTICOAGULATION: ICD-10-CM

## 2023-05-16 DIAGNOSIS — E78.2 MIXED HYPERLIPIDEMIA: Chronic | ICD-10-CM

## 2023-05-16 DIAGNOSIS — K21.9 GASTROESOPHAGEAL REFLUX DISEASE, UNSPECIFIED WHETHER ESOPHAGITIS PRESENT: Chronic | ICD-10-CM

## 2023-05-16 DIAGNOSIS — Z79.899 ON POTASSIUM WASTING DIURETIC THERAPY: Chronic | ICD-10-CM

## 2023-05-16 DIAGNOSIS — E83.41 HYPERMAGNESEMIA: ICD-10-CM

## 2023-05-16 DIAGNOSIS — I10 ESSENTIAL HYPERTENSION: Primary | Chronic | ICD-10-CM

## 2023-05-16 PROCEDURE — G8399 PT W/DXA RESULTS DOCUMENT: HCPCS | Performed by: NURSE PRACTITIONER

## 2023-05-16 PROCEDURE — 3074F SYST BP LT 130 MM HG: CPT | Performed by: NURSE PRACTITIONER

## 2023-05-16 PROCEDURE — G8427 DOCREV CUR MEDS BY ELIG CLIN: HCPCS | Performed by: NURSE PRACTITIONER

## 2023-05-16 PROCEDURE — G8417 CALC BMI ABV UP PARAM F/U: HCPCS | Performed by: NURSE PRACTITIONER

## 2023-05-16 PROCEDURE — 1123F ACP DISCUSS/DSCN MKR DOCD: CPT | Performed by: NURSE PRACTITIONER

## 2023-05-16 PROCEDURE — 1036F TOBACCO NON-USER: CPT | Performed by: NURSE PRACTITIONER

## 2023-05-16 PROCEDURE — 1090F PRES/ABSN URINE INCON ASSESS: CPT | Performed by: NURSE PRACTITIONER

## 2023-05-16 PROCEDURE — 99214 OFFICE O/P EST MOD 30 MIN: CPT | Performed by: NURSE PRACTITIONER

## 2023-05-16 PROCEDURE — 3078F DIAST BP <80 MM HG: CPT | Performed by: NURSE PRACTITIONER

## 2023-05-16 RX ORDER — PANTOPRAZOLE SODIUM 40 MG/1
40 TABLET, DELAYED RELEASE ORAL
Qty: 90 TABLET | Refills: 3 | Status: SHIPPED | OUTPATIENT
Start: 2023-05-16

## 2023-05-16 SDOH — ECONOMIC STABILITY: FOOD INSECURITY: WITHIN THE PAST 12 MONTHS, YOU WORRIED THAT YOUR FOOD WOULD RUN OUT BEFORE YOU GOT MONEY TO BUY MORE.: NEVER TRUE

## 2023-05-16 SDOH — ECONOMIC STABILITY: INCOME INSECURITY: HOW HARD IS IT FOR YOU TO PAY FOR THE VERY BASICS LIKE FOOD, HOUSING, MEDICAL CARE, AND HEATING?: NOT HARD AT ALL

## 2023-05-16 SDOH — ECONOMIC STABILITY: FOOD INSECURITY: WITHIN THE PAST 12 MONTHS, THE FOOD YOU BOUGHT JUST DIDN'T LAST AND YOU DIDN'T HAVE MONEY TO GET MORE.: NEVER TRUE

## 2023-05-16 ASSESSMENT — PATIENT HEALTH QUESTIONNAIRE - PHQ9
10. IF YOU CHECKED OFF ANY PROBLEMS, HOW DIFFICULT HAVE THESE PROBLEMS MADE IT FOR YOU TO DO YOUR WORK, TAKE CARE OF THINGS AT HOME, OR GET ALONG WITH OTHER PEOPLE: 0
2. FEELING DOWN, DEPRESSED OR HOPELESS: 0
SUM OF ALL RESPONSES TO PHQ QUESTIONS 1-9: 6
8. MOVING OR SPEAKING SO SLOWLY THAT OTHER PEOPLE COULD HAVE NOTICED. OR THE OPPOSITE, BEING SO FIGETY OR RESTLESS THAT YOU HAVE BEEN MOVING AROUND A LOT MORE THAN USUAL: 0
6. FEELING BAD ABOUT YOURSELF - OR THAT YOU ARE A FAILURE OR HAVE LET YOURSELF OR YOUR FAMILY DOWN: 0
9. THOUGHTS THAT YOU WOULD BE BETTER OFF DEAD, OR OF HURTING YOURSELF: 0
3. TROUBLE FALLING OR STAYING ASLEEP: 1
SUM OF ALL RESPONSES TO PHQ QUESTIONS 1-9: 6
5. POOR APPETITE OR OVEREATING: 0
SUM OF ALL RESPONSES TO PHQ9 QUESTIONS 1 & 2: 1
SUM OF ALL RESPONSES TO PHQ QUESTIONS 1-9: 6
7. TROUBLE CONCENTRATING ON THINGS, SUCH AS READING THE NEWSPAPER OR WATCHING TELEVISION: 3
SUM OF ALL RESPONSES TO PHQ QUESTIONS 1-9: 6
4. FEELING TIRED OR HAVING LITTLE ENERGY: 1
1. LITTLE INTEREST OR PLEASURE IN DOING THINGS: 1

## 2023-05-17 ENCOUNTER — OFFICE VISIT (OUTPATIENT)
Dept: ORTHOPEDIC SURGERY | Age: 78
End: 2023-05-17
Payer: MEDICARE

## 2023-05-17 DIAGNOSIS — M25.512 LEFT SHOULDER PAIN, UNSPECIFIED CHRONICITY: Primary | ICD-10-CM

## 2023-05-17 DIAGNOSIS — M19.012 ARTHRITIS OF LEFT GLENOHUMERAL JOINT: ICD-10-CM

## 2023-05-17 PROCEDURE — 20610 DRAIN/INJ JOINT/BURSA W/O US: CPT | Performed by: SPECIALIST/TECHNOLOGIST

## 2023-05-17 RX ORDER — TRIAMCINOLONE ACETONIDE 40 MG/ML
40 INJECTION, SUSPENSION INTRA-ARTICULAR; INTRAMUSCULAR ONCE
Status: COMPLETED | OUTPATIENT
Start: 2023-05-17 | End: 2023-05-17

## 2023-05-17 RX ADMIN — TRIAMCINOLONE ACETONIDE 40 MG: 40 INJECTION, SUSPENSION INTRA-ARTICULAR; INTRAMUSCULAR at 11:52

## 2023-05-31 DIAGNOSIS — I50.32 CHRONIC HEART FAILURE WITH PRESERVED EJECTION FRACTION (HCC): Primary | ICD-10-CM

## 2023-05-31 RX ORDER — METOPROLOL SUCCINATE 25 MG/1
25 TABLET, EXTENDED RELEASE ORAL DAILY
Qty: 90 TABLET | Refills: 3 | Status: SHIPPED | OUTPATIENT
Start: 2023-05-31

## 2023-05-31 RX ORDER — METOPROLOL SUCCINATE 25 MG/1
TABLET, EXTENDED RELEASE ORAL
Qty: 90 TABLET | OUTPATIENT
Start: 2023-05-31

## 2023-07-12 NOTE — PROGRESS NOTES
She is a very pleasant 76year old female seen today for follow-up of mild - moderate asthma with recent exacerbation, history of tobacco use, 14 pack years, quit in 1981, bronchitis, pulmonary hypertension, QUINTIN, now on BiPAP. History is also notable for heart failure with preserved EF, PAF, on anticoagulant, CAD. She is followed by Dr. Dulce Anderson. She developed central apneas and worsening hypoxia on CPAP titration, had subsequent BiPAP titration study 6/26/2022 which demonstrated optimal pressure of 15/2 cm and supplemental oxygen was required at 2 L/min. DIAGNOSTICS:     Chest CT 10/2021-negative for PE. Moderate cardiomegaly. Enlarged pulmonary artery. Echo 10/2021-EF 60-65%, RVSP estimated at 44 mmHg, mild AR, mild MR. Myocardial perfusion study 1/2022-normal perfusion. Spirometry 8/5/2020-normal.  CPAP titration 5/2022-QUINTIN, CPAP titration revealed inadequate improvement of sleep disordered breathing, oxygen saturation worsened as CPAP levels increased. Bilevel titration 6/2022-improvement of sleep disordered breathing at 15/10 cm, oxygen corrected at 2 L/min. CXR 1/23/2023-lungs are clear. Heart is normal in size. LHC/RHC 4/14/2023: Mild nonobstructive CAD, mildly elevated PA pressures (43/14, CO 3 L/min, PCWP 22 mmHg) 2-3+ MR, LVEF greater than 60%. MING 4/10/23: LVEF >55%, RV normal, mild MR no systolic flow reversal in the pulmonary veins, no LA/GENET thrombus.

## 2023-07-13 ENCOUNTER — OFFICE VISIT (OUTPATIENT)
Dept: PULMONOLOGY | Age: 78
End: 2023-07-13
Payer: MEDICARE

## 2023-07-13 VITALS
DIASTOLIC BLOOD PRESSURE: 68 MMHG | HEIGHT: 61 IN | OXYGEN SATURATION: 99 % | SYSTOLIC BLOOD PRESSURE: 126 MMHG | TEMPERATURE: 98.2 F | WEIGHT: 153 LBS | BODY MASS INDEX: 28.89 KG/M2 | HEART RATE: 56 BPM

## 2023-07-13 DIAGNOSIS — R05.3 CHRONIC COUGH: ICD-10-CM

## 2023-07-13 DIAGNOSIS — J45.30 MILD PERSISTENT ASTHMA WITHOUT COMPLICATION: Primary | Chronic | ICD-10-CM

## 2023-07-13 DIAGNOSIS — R09.82 POST-NASAL DRIP: ICD-10-CM

## 2023-07-13 DIAGNOSIS — I27.20 MILD PULMONARY HYPERTENSION (HCC): ICD-10-CM

## 2023-07-13 DIAGNOSIS — G47.33 OSA TREATED WITH BIPAP: ICD-10-CM

## 2023-07-13 DIAGNOSIS — K21.9 GASTROESOPHAGEAL REFLUX DISEASE WITHOUT ESOPHAGITIS: ICD-10-CM

## 2023-07-13 DIAGNOSIS — J30.1 SEASONAL ALLERGIC RHINITIS DUE TO POLLEN: ICD-10-CM

## 2023-07-13 DIAGNOSIS — G47.34 NOCTURNAL HYPOXEMIA: ICD-10-CM

## 2023-07-13 PROCEDURE — G8427 DOCREV CUR MEDS BY ELIG CLIN: HCPCS | Performed by: NURSE PRACTITIONER

## 2023-07-13 PROCEDURE — 3078F DIAST BP <80 MM HG: CPT | Performed by: NURSE PRACTITIONER

## 2023-07-13 PROCEDURE — 1123F ACP DISCUSS/DSCN MKR DOCD: CPT | Performed by: NURSE PRACTITIONER

## 2023-07-13 PROCEDURE — G8417 CALC BMI ABV UP PARAM F/U: HCPCS | Performed by: NURSE PRACTITIONER

## 2023-07-13 PROCEDURE — 1090F PRES/ABSN URINE INCON ASSESS: CPT | Performed by: NURSE PRACTITIONER

## 2023-07-13 PROCEDURE — G8399 PT W/DXA RESULTS DOCUMENT: HCPCS | Performed by: NURSE PRACTITIONER

## 2023-07-13 PROCEDURE — 1036F TOBACCO NON-USER: CPT | Performed by: NURSE PRACTITIONER

## 2023-07-13 PROCEDURE — 99214 OFFICE O/P EST MOD 30 MIN: CPT | Performed by: NURSE PRACTITIONER

## 2023-07-13 PROCEDURE — 3074F SYST BP LT 130 MM HG: CPT | Performed by: NURSE PRACTITIONER

## 2023-07-13 RX ORDER — PANTOPRAZOLE SODIUM 40 MG/1
TABLET, DELAYED RELEASE ORAL
Qty: 180 TABLET | Refills: 1 | Status: SHIPPED | OUTPATIENT
Start: 2023-07-13

## 2023-07-13 RX ORDER — FLUTICASONE PROPIONATE AND SALMETEROL 232; 14 UG/1; UG/1
POWDER, METERED RESPIRATORY (INHALATION)
Qty: 1 EACH | Refills: 11 | Status: SHIPPED | OUTPATIENT
Start: 2023-07-13

## 2023-07-13 ASSESSMENT — ENCOUNTER SYMPTOMS
SHORTNESS OF BREATH: 0
WHEEZING: 0
COUGH: 1

## 2023-07-13 NOTE — PATIENT INSTRUCTIONS
Continue airduo, 1 inhalation twice daily, rinse mouth after use. Albuterol inhaler or nebulizer 4 times daily as needed. Advised to contact us if she is requiring nebulizer and fails to improve. Advised to refrain from use of scented body products. Continue BiPAP/oxygen as prescribed, follow-up in the sleep center. Advised to increase Protonix to twice daily-30 minutes prior to morning meal, 30 minutes prior to evening meal.  May take over-the-counter Pepcid Complete, generic, chewable 1 tablet up to twice daily for breakthrough reflux/heartburn. Advised to follow-up with GI if she has ongoing issues with reflux despite above changes. OTC Zyrtec or Claritin, 1 daily as needed for control of postnasal drainage.

## 2023-07-13 NOTE — PROGRESS NOTES
[facility]. [address] [facility], [address] [place] [address]  [phone]    Patient Name:  Marisol Paul    YOB: 1945    Office Visit 7/13/2023      CHIEF COMPLAINT:      Chief Complaint   Patient presents with    Asthma    Follow-up         ASSESSMENT:   (Medical Decision Making)                                                                                                                                          Encounter Diagnoses   Name Primary? Mild persistent asthma without complication Yes    Seasonal allergic rhinitis due to pollen     QUINTIN treated with BiPAP     Nocturnal hypoxemia     Mild pulmonary hypertension (HCC)     Chronic cough     Post-nasal drip     Gastroesophageal reflux disease without esophagitis      She is stable symptomatically from respiratory standpoint except for recent issues with cough. She is compliant with maintenance inhaler. She has not required significant use of albuterol. She is having some issues with postnasal drainage which may be a factor in her cough. She is on BiPAP and oxygen with sleep, she is working with her DME company regarding issues with her mask leaking. Hypoxia corrected on BiPAP 15/10 and 2 L/min. Right Heart    Right Heart Cath Access site for the RHC was obtained via the right femoral vein. Baird-Naila catheter was used. Mild pulmonary hypertension noted. PA pressure = 43/14 mmHg. PA mean = 31 mmHg. PA Sat = 71.4 %. Mid RA mean = 12 mmHg. RV pressure = 48/4 mmHg. RV mean = 10 mmHg. RV Sat = 71.5 %. Wedge pressure = 22 mmHg. AO Sat = 100 %. TDCO = 3.17 L/min. Dhruv CO = 3 L/min. PVR = 72.3. Try simple OTC antihistamine for control of postnasal drainage. We will also intensify Rx for GERD but if she fails to improve, may need to have GI follow-up. PLAN:     Continue airduo, 1 inhalation twice daily, rinse mouth after use.      Albuterol inhaler or nebulizer 4 times daily

## 2023-07-24 DIAGNOSIS — E78.2 MIXED HYPERLIPIDEMIA: ICD-10-CM

## 2023-07-24 RX ORDER — ROSUVASTATIN CALCIUM 10 MG/1
10 TABLET, COATED ORAL DAILY
Qty: 90 TABLET | Refills: 3 | OUTPATIENT
Start: 2023-07-24

## 2023-07-26 ENCOUNTER — TELEPHONE (OUTPATIENT)
Dept: SLEEP MEDICINE | Age: 78
End: 2023-07-26

## 2023-07-26 NOTE — TELEPHONE ENCOUNTER
Pt called refill line stating that she went to  inhaler at [address] [place] [address] and  [facility] stated [facility] not [facility]  [facility] for an inhaler. Pt would  like to know where was the inhaler sent to. Pt did not state the name of the inhaler nor did she state what [facility]  [facility]. She ask for a call.

## 2023-07-27 NOTE — TELEPHONE ENCOUNTER
Ms Christina Romano, please send script for Air-duo to [address] @ [place], thanks Jah Sandoval

## 2023-07-28 ENCOUNTER — OFFICE VISIT (OUTPATIENT)
Dept: ORTHOPEDIC SURGERY | Age: 78
End: 2023-07-28

## 2023-07-28 DIAGNOSIS — M79.602 PARESTHESIA AND PAIN OF BOTH UPPER EXTREMITIES: ICD-10-CM

## 2023-07-28 DIAGNOSIS — M19.012 ARTHRITIS OF LEFT GLENOHUMERAL JOINT: Primary | ICD-10-CM

## 2023-07-28 DIAGNOSIS — M25.512 LEFT SHOULDER PAIN, UNSPECIFIED CHRONICITY: ICD-10-CM

## 2023-07-28 DIAGNOSIS — M79.601 PARESTHESIA AND PAIN OF BOTH UPPER EXTREMITIES: ICD-10-CM

## 2023-07-28 DIAGNOSIS — R20.2 PARESTHESIA AND PAIN OF BOTH UPPER EXTREMITIES: ICD-10-CM

## 2023-07-28 DIAGNOSIS — I50.22 CHRONIC SYSTOLIC (CONGESTIVE) HEART FAILURE (HCC): ICD-10-CM

## 2023-07-28 RX ORDER — TRAMADOL HYDROCHLORIDE 50 MG/1
50-100 TABLET ORAL NIGHTLY PRN
Qty: 30 TABLET | Refills: 0 | Status: SHIPPED | OUTPATIENT
Start: 2023-07-28 | End: 2023-08-12

## 2023-07-28 NOTE — PATIENT INSTRUCTIONS
facility to help if you do not have home support. Don't participate in contact sports or do any repetitive heavy lifting after your shoulder replacement. Do avoid placing your arm in any extreme position, such as straight out to the side or behind your body for the first 6 weeks after surgery. Many thousands of patients have experienced an improved quality of life after shoulder joint replacement surgery. They experience less pain, improved motion and strength, and better function. Research  There are ongoing efforts to design and develop newer and better shoulder replacements that can be performed with less invasive surgical techniques. And researchers are collecting data to determine which patients are the best candidates for which type of shoulder replacement surgery. This information will allow your surgeon to offer you the best recommendation for the treatment of your arthritic shoulder. To help doctors in the management of glenohumeral joint arthritis -- the American Academy of Orthopaedic Surgeons has conducted research to provide some useful guidelines. These are recommendations only and may not apply to every case. For more information: Glenohumeral Joint Osteoarthritis - Clinical Practice Guideline (CPG)  American Academy of Orthopaedic Surgeons (aaos. org)

## 2023-07-28 NOTE — PROGRESS NOTES
Name: Bar Barrow  YOB: 1945  Gender: female  MRN: [id number]    CC:   Chief Complaint   Patient presents with    Shoulder Pain     Left shoulder    left shoulder(s) pain, stiffness    HPI:  This patient presents with a chronic history of Left shoulder pain and limited motion. Patient has previously been treated with nurse practitioner Silvino Brasher and Dr. Montrell Martinez who last gave the injection on 5-. Prior injections have given good relief. Today the patient states the previous injection did not give relief. She states popping and clicking. Notes interference with sleep. She is right-hand dominant. She feels she is ready to proceed surgically. of note she is on a blood thinner. Allergies   Allergen Reactions    Wasp Venom Protein Anaphylaxis    Atorvastatin Other (See Comments)     High doses cause leg cramps.  Able to tolerate low doses (<20mg/day)    Iodine Hives    Lisinopril Other (See Comments)    Tetracycline Other (See Comments)     ULCERS IN MOUTH    Penicillin G Rash     Past Medical History:   Diagnosis Date    Allergic rhinitis, cause unspecified 01/07/2015    Aortic valve disorders 01/07/2015    Arrhythmia     a-fib    Asthma     uses inhaler prn    CAD (coronary artery disease)     mild,  treated with med; NO MI, NO stents, NO CABG     Carotid stenosis 1/7/2015    Carpal tunnel syndrome 01/07/2015    bilat wrists-no issue now    Congestive heart failure ([address])     COVID-19 vaccine series completed 03/22/2021    Pfizer; 2/27/2021    Depression 06/30/2015    controlled     Esophageal reflux 01/07/2015    controlled with medication     Essential hypertension     controlled with medication    Fibromyalgia     Former smoker     1 ppd for 14 yrs; quit smoking 1981    GERD (gastroesophageal reflux disease)     controlled with medication     Heart failure ([address])     last EF 60-65% echo done 10/26/2021    History of EKG 11/03/2021    a fib; patient started on eliquis BID

## 2023-07-31 NOTE — TELEPHONE ENCOUNTER
Qian Borja, we received a Prior-Auth on this patient's Airduo. However, in previous experience that if the prescription says digihaler and is not totally the generic version then the insurance will not cover it. I pended the generic version of the Airduo to the patient's preferred pharmacy.  Please review and sign off if appropriate. // Umesh Shin

## 2023-08-02 ENCOUNTER — TELEPHONE (OUTPATIENT)
Dept: PULMONOLOGY | Age: 78
End: 2023-08-02

## 2023-08-02 NOTE — TELEPHONE ENCOUNTER
Joel White,    I received this message from [facility], so I cannot tell if the PA came before the generic for airduo is recommended. If generic is not covered, would use symbicort 160/4.5, 2 puffs twice daily and rinse mouth after use. Let me know if I need to send in generic that [facility] has pended or if I need to send in symbicort. Thanks for your help. Requested Prescriptions     Fluticasone-Salmeterol,sensor, 232-14 MCG/ACT AEPB          Possible duplicate: Hover to review recent actions on this medication    Sig: Inhale 1 puff into the lungs in the morning and at bedtime    Disp:  1 each    Refills:  11    Start: 7/31/2023    Class: Normal    Non-formulary            To be filled at: [address] [place] [address], [address] [place] [address] [phone]          Medication Problem  (Newest Message First)  Jimi Prakash MA routed conversation to You 2 days ago     Jah Gustafson 2 days ago     [facility], we received a Prior-Auth on this patient's Airduo. However, in previous experience that if the prescription says digihaler and is not totally the generic version then the insurance will not cover it. I pended the generic version of the Airduo to the patient's preferred pharmacy.  Please review and sign off if appropriate. // Leopold Finger

## 2023-08-02 NOTE — TELEPHONE ENCOUNTER
PA sent to [facility] via CoverMyMeds for AirDuo  Case ID: [id number]  Deadline to reply:  July 27, 2024 4:46 PM (In 12 months)  Payer:  [facility] - Medicare    [address] PA Form    The drug you asked for is not listed in your preferred drug list (formulary). The preferred drug(s), you may not have tried, are: Flovent HFA aerosol inhaler, and Symbicort HFA aerosol inhaler. Your provider needs to give us medical reasons why the preferred drug(s) would not work for you and/or would have bad side effects. Sometimes a preferred drug needs more review for approval. Additionally, some preferred drugs listed may be the same drugs with different strengths or forms. [employer] may only require one strength or form of that drug to be tried. This decision was from [facility] Non-Formulary Exceptions Coverage Policy. Please advise.

## 2023-08-03 DIAGNOSIS — M25.512 LEFT SHOULDER PAIN, UNSPECIFIED CHRONICITY: ICD-10-CM

## 2023-08-03 DIAGNOSIS — I50.22 CHRONIC SYSTOLIC (CONGESTIVE) HEART FAILURE (HCC): ICD-10-CM

## 2023-08-03 DIAGNOSIS — M79.601 PARESTHESIA AND PAIN OF BOTH UPPER EXTREMITIES: ICD-10-CM

## 2023-08-03 DIAGNOSIS — R20.2 PARESTHESIA AND PAIN OF BOTH UPPER EXTREMITIES: ICD-10-CM

## 2023-08-03 DIAGNOSIS — M79.602 PARESTHESIA AND PAIN OF BOTH UPPER EXTREMITIES: ICD-10-CM

## 2023-08-03 DIAGNOSIS — M19.012 ARTHRITIS OF LEFT GLENOHUMERAL JOINT: ICD-10-CM

## 2023-08-03 RX ORDER — BUDESONIDE AND FORMOTEROL FUMARATE DIHYDRATE 160; 4.5 UG/1; UG/1
AEROSOL RESPIRATORY (INHALATION)
Qty: 1 EACH | Refills: 11 | Status: SHIPPED | OUTPATIENT
Start: 2023-08-03

## 2023-08-03 NOTE — TELEPHONE ENCOUNTER
Called [facility] and spoke with Pharmacist, she ran the generic form of AirDuo and it was also rejected. They are requesting the patient try Symbicort, Flovent HFA, Arnuity Ellipta or Advair per pharmacy rejection. Yulissa Pelletier will be informed.

## 2023-08-10 ENCOUNTER — TELEPHONE (OUTPATIENT)
Dept: ORTHOPEDIC SURGERY | Age: 78
End: 2023-08-10

## 2023-08-10 DIAGNOSIS — M25.512 LEFT SHOULDER PAIN, UNSPECIFIED CHRONICITY: ICD-10-CM

## 2023-08-10 DIAGNOSIS — M19.012 ARTHRITIS OF LEFT GLENOHUMERAL JOINT: Primary | ICD-10-CM

## 2023-08-10 DIAGNOSIS — M79.602 PARESTHESIA AND PAIN OF BOTH UPPER EXTREMITIES: ICD-10-CM

## 2023-08-10 DIAGNOSIS — R20.2 PARESTHESIA AND PAIN OF BOTH UPPER EXTREMITIES: ICD-10-CM

## 2023-08-10 DIAGNOSIS — M79.601 PARESTHESIA AND PAIN OF BOTH UPPER EXTREMITIES: ICD-10-CM

## 2023-08-10 NOTE — TELEPHONE ENCOUNTER
Has 10/6 apt but is in a lot of pain, the Rx for pain meds not helping much plus it's  keeping her from sleeping.

## 2023-08-10 NOTE — TELEPHONE ENCOUNTER
Spoke with pt and let her know that I would try and send her somewhere else for her NCS and to look out for a call from them. She expressed understanding and thanked me for calling.

## 2023-08-18 ENCOUNTER — HOSPITAL ENCOUNTER (EMERGENCY)
Age: 78
Discharge: HOME | End: 2023-08-18
Attending: EMERGENCY MEDICINE
Payer: MEDICARE

## 2023-08-18 ENCOUNTER — APPOINTMENT (OUTPATIENT)
Dept: CT IMAGING | Age: 78
End: 2023-08-18
Payer: MEDICARE

## 2023-08-18 VITALS
OXYGEN SATURATION: 98 % | TEMPERATURE: 98.7 F | HEIGHT: 61 IN | SYSTOLIC BLOOD PRESSURE: 132 MMHG | BODY MASS INDEX: 28.32 KG/M2 | WEIGHT: 150 LBS | HEART RATE: 61 BPM | DIASTOLIC BLOOD PRESSURE: 70 MMHG | RESPIRATION RATE: 16 BRPM

## 2023-08-18 DIAGNOSIS — G62.9 NEUROPATHY: Primary | ICD-10-CM

## 2023-08-18 DIAGNOSIS — S00.83XA CONTUSION OF FACE, INITIAL ENCOUNTER: ICD-10-CM

## 2023-08-18 DIAGNOSIS — S51.012A SKIN TEAR OF LEFT ELBOW WITHOUT COMPLICATION, INITIAL ENCOUNTER: ICD-10-CM

## 2023-08-18 DIAGNOSIS — S09.90XA INJURY OF HEAD, INITIAL ENCOUNTER: Primary | ICD-10-CM

## 2023-08-18 PROCEDURE — 90714 TD VACC NO PRESV 7 YRS+ IM: CPT | Performed by: EMERGENCY MEDICINE

## 2023-08-18 PROCEDURE — 99284 EMERGENCY DEPT VISIT MOD MDM: CPT

## 2023-08-18 PROCEDURE — 90471 IMMUNIZATION ADMIN: CPT | Performed by: EMERGENCY MEDICINE

## 2023-08-18 PROCEDURE — 70450 CT HEAD/BRAIN W/O DYE: CPT

## 2023-08-18 PROCEDURE — 6360000002 HC RX W HCPCS: Performed by: EMERGENCY MEDICINE

## 2023-08-18 PROCEDURE — 72125 CT NECK SPINE W/O DYE: CPT

## 2023-08-18 RX ORDER — GABAPENTIN 300 MG/1
CAPSULE ORAL
Qty: 270 CAPSULE | OUTPATIENT
Start: 2023-08-18

## 2023-08-18 RX ADMIN — CLOSTRIDIUM TETANI TOXOID ANTIGEN (FORMALDEHYDE INACTIVATED) AND CORYNEBACTERIUM DIPHTHERIAE TOXOID ANTIGEN (FORMALDEHYDE INACTIVATED) 0.5 ML: 5; 2 INJECTION, SUSPENSION INTRAMUSCULAR at 17:08

## 2023-08-18 ASSESSMENT — LIFESTYLE VARIABLES
HOW OFTEN DO YOU HAVE A DRINK CONTAINING ALCOHOL: NEVER
HOW MANY STANDARD DRINKS CONTAINING ALCOHOL DO YOU HAVE ON A TYPICAL DAY: PATIENT DOES NOT DRINK

## 2023-08-18 NOTE — TELEPHONE ENCOUNTER
----- Message from Herman Nolan. Brandee Coombs sent at 8/18/2023  9:51 AM EDT -----  Regarding: Gabapentin  Contact: [phone]  I need a refill for gabapentin sent to [address] [place] [address].  I will be out before I come for next visit to you

## 2023-08-18 NOTE — ED PROVIDER NOTES
Emergency Department Provider Note       PCP: BERNA Cramer NP   Age: 76 y.o. Sex: female     DISPOSITION       No diagnosis found. Medical Decision Making     Complexity of Problems Addressed:  1 or more acute illnesses that pose a threat to life or bodily function. Data Reviewed and Analyzed:  I independently ordered and reviewed each unique test.  I reviewed external records: provider visit note from outside specialist.       I interpreted the CT Scan CT head reviewed by me shows no acute abnormality. Discussion of management or test interpretation. 78-year-old white female on Eliquis for paroxysmal A-fib presents after a mechanical fall. She states she stumbled on some uneven concrete. She did strike her face with the fall. No loss of consciousness. Denies neck pain and back pain. As superficial laceration to the left elbow. Tetanus status unknown. Has been able to ambulate since the fall. Physical exam  Well-nourished elderly white female awake alert no distress. HEENT exam normocephalic with minor contusion and abrasion to nose. Pupils equal and reactive. Extraocular movements intact. Oropharynx without trauma. Neck has tenderness to palpation therefore range of motion not assessed and a c-collar placed. Cardiovascular regular rate and rhythm no murmurs. Lungs are clear. Abdomen is soft nontender. Extremities good range of motion without tenderness. There is a skin tear laceration to left elbow. Total length 5 cm. Neuro exam alert and oriented. Moving all extremities    ED course  Tetanus updated. CT head and C-spine showed no acute abnormalities. Tetanus has been updated. Wound bandaged. Patient is comfortable and appears safe for discharge home          Risk of Complications and/or Morbidity of Patient Management:  Shared medical decision making was utilized in creating the patients health plan today. History      Nicolle Miguel is a 76 y.o.

## 2023-08-18 NOTE — TELEPHONE ENCOUNTER
----- Message from Emanuel Calvert sent at 8/18/2023  9:51 AM EDT -----  Regarding: Gabapentin  Contact: [phone]  I need a refill for gabapentin sent to [address] [place] [address].  I will be out before I come for next visit to you

## 2023-08-18 NOTE — ED TRIAGE NOTES
Pt arrives via [facility] from home for complaint of a trip and fall. Pt hit head and face and has a very small laceration on the bridge of her nose. Pt initially denied neck pain but endorses pain with light palpation so a c-collar will be placed. Pt has been taking elequis for \"about a year\". Pt has a skin tear to her left elbow.

## 2023-08-22 RX ORDER — GABAPENTIN 300 MG/1
300 CAPSULE ORAL 3 TIMES DAILY
Qty: 270 CAPSULE | Refills: 3 | Status: SHIPPED | OUTPATIENT
Start: 2023-08-22 | End: 2024-08-16

## 2023-08-25 ENCOUNTER — CARE COORDINATION (OUTPATIENT)
Dept: CARE COORDINATION | Facility: CLINIC | Age: 78
End: 2023-08-25

## 2023-08-25 NOTE — CARE COORDINATION
This patient was received as a referral from Daily assignment. [facility] CC outreached to patient today on behalf of Ambulatory Care Management RN to offer care management services. Introduction to self and role of care manager provided. Patient declined care management services at this time. Patient has this LPN CC's contact number for any questions or concerns.

## 2023-08-29 DIAGNOSIS — I50.32 CHRONIC HEART FAILURE WITH PRESERVED EJECTION FRACTION (HCC): Primary | ICD-10-CM

## 2023-08-29 RX ORDER — SPIRONOLACTONE 25 MG/1
25 TABLET ORAL DAILY
Qty: 90 TABLET | Refills: 1 | Status: SHIPPED | OUTPATIENT
Start: 2023-08-29

## 2023-08-29 NOTE — TELEPHONE ENCOUNTER
----- Message from Emanuel Calvert sent at 8/28/2023  7:18 PM EDT -----  Regarding: Spironolactone  Contact: [phone]  Clancy Homans did you get my request for a refill for Spironolactone . I will be out of it on Thursday of this week.

## 2023-08-29 NOTE — TELEPHONE ENCOUNTER
Patient is requesting a refill of the Spironolactone 25 MG tablet to be sent to [facility] in [address] [place] [address]. Last filled on 11/30/2021. Upcoming appt on 09/07/2023.

## 2023-08-29 NOTE — TELEPHONE ENCOUNTER
----- Message from Angel Zheng. Nav Paris sent at 8/29/2023  2:07 PM EDT -----  Regarding: Ear bleed  Contact: [phone]  I recently had a head injury and today I have had my right ear to bleed. The blood seems to be coming from right inside my ear. I don't have an injury to the ear. It's not bad it just starts tingling and I feel blood coming out. Not a great amount.

## 2023-08-29 NOTE — TELEPHONE ENCOUNTER
Called patient and she stated that she had a fall at her sister's house in the driveway on August 18, 2023 and hit her head falling face first. She said her face on the left side still has black/blue bruises. Patient said that she went to the ER because she is on a blood thinner and wanted to make sure that she didn't have a brain bleed. This morning, she said that her right ear started to bleed and stopped bleeding around 2 p.m. today. Informed patient to monitor the bleeding and to go to the ED or Urgent Care if symptoms become worse. Patient verbalized understanding.     Informed patient that the message would be relayed to Aureliano Brian NP.

## 2023-09-05 ENCOUNTER — TELEPHONE (OUTPATIENT)
Dept: INTERNAL MEDICINE CLINIC | Facility: CLINIC | Age: 78
End: 2023-09-05

## 2023-09-05 NOTE — TELEPHONE ENCOUNTER
----- Message from Chloé Valiente. Briseyda Jesus sent at 9/4/2023  7:31 PM EDT -----  Regarding: Refill  Contact: [phone]  Did you see my message for a refill for Spirolactone. I am completely out.

## 2023-09-05 NOTE — TELEPHONE ENCOUNTER
Patient notified that the prescription for the Spironolactone was sent to [facility] in [address] [place] [address] on 08/29/23.

## 2023-09-07 ENCOUNTER — OFFICE VISIT (OUTPATIENT)
Dept: INTERNAL MEDICINE CLINIC | Facility: CLINIC | Age: 78
End: 2023-09-07
Payer: MEDICARE

## 2023-09-07 ENCOUNTER — TELEPHONE (OUTPATIENT)
Dept: ORTHOPEDIC SURGERY | Age: 78
End: 2023-09-07

## 2023-09-07 VITALS
OXYGEN SATURATION: 100 % | SYSTOLIC BLOOD PRESSURE: 130 MMHG | DIASTOLIC BLOOD PRESSURE: 70 MMHG | HEIGHT: 61 IN | WEIGHT: 156.6 LBS | BODY MASS INDEX: 29.57 KG/M2 | HEART RATE: 58 BPM

## 2023-09-07 DIAGNOSIS — I48.0 PAROXYSMAL ATRIAL FIBRILLATION (HCC): Chronic | ICD-10-CM

## 2023-09-07 DIAGNOSIS — Z79.01 CHRONIC ANTICOAGULATION: ICD-10-CM

## 2023-09-07 DIAGNOSIS — E83.41 HYPERMAGNESEMIA: ICD-10-CM

## 2023-09-07 DIAGNOSIS — E55.9 VITAMIN D DEFICIENCY, UNSPECIFIED: ICD-10-CM

## 2023-09-07 DIAGNOSIS — E78.2 MIXED HYPERLIPIDEMIA: Chronic | ICD-10-CM

## 2023-09-07 DIAGNOSIS — K21.9 GASTROESOPHAGEAL REFLUX DISEASE, UNSPECIFIED WHETHER ESOPHAGITIS PRESENT: Chronic | ICD-10-CM

## 2023-09-07 DIAGNOSIS — Z00.00 MEDICARE ANNUAL WELLNESS VISIT, SUBSEQUENT: Primary | ICD-10-CM

## 2023-09-07 DIAGNOSIS — I10 ESSENTIAL HYPERTENSION: Chronic | ICD-10-CM

## 2023-09-07 DIAGNOSIS — Z79.899 ON POTASSIUM WASTING DIURETIC THERAPY: Chronic | ICD-10-CM

## 2023-09-07 LAB
25(OH)D3 SERPL-MCNC: 40 NG/ML (ref 30–100)
ALBUMIN SERPL-MCNC: 3.9 G/DL (ref 3.2–4.6)
ALBUMIN/GLOB SERPL: 1.2 (ref 0.4–1.6)
ALP SERPL-CCNC: 56 U/L (ref 50–136)
ALT SERPL-CCNC: 17 U/L (ref 12–65)
ANION GAP SERPL CALC-SCNC: 6 MMOL/L (ref 2–11)
AST SERPL-CCNC: 18 U/L (ref 15–37)
BASOPHILS # BLD: 0.1 K/UL (ref 0–0.2)
BASOPHILS NFR BLD: 1 % (ref 0–2)
BILIRUB SERPL-MCNC: 0.3 MG/DL (ref 0.2–1.1)
BUN SERPL-MCNC: 31 MG/DL (ref 8–23)
CALCIUM SERPL-MCNC: 9.6 MG/DL (ref 8.3–10.4)
CHLORIDE SERPL-SCNC: 109 MMOL/L (ref 101–110)
CHOLEST SERPL-MCNC: 131 MG/DL
CO2 SERPL-SCNC: 28 MMOL/L (ref 21–32)
CREAT SERPL-MCNC: 1.6 MG/DL (ref 0.6–1)
DIFFERENTIAL METHOD BLD: ABNORMAL
EOSINOPHIL # BLD: 0.2 K/UL (ref 0–0.8)
EOSINOPHIL NFR BLD: 3 % (ref 0.5–7.8)
ERYTHROCYTE [DISTWIDTH] IN BLOOD BY AUTOMATED COUNT: 15.4 % (ref 11.9–14.6)
GLOBULIN SER CALC-MCNC: 3.3 G/DL (ref 2.8–4.5)
GLUCOSE SERPL-MCNC: 97 MG/DL (ref 65–100)
HCT VFR BLD AUTO: 39.2 % (ref 35.8–46.3)
HDLC SERPL-MCNC: 76 MG/DL (ref 40–60)
HDLC SERPL: 1.7
HGB BLD-MCNC: 11.8 G/DL (ref 11.7–15.4)
IMM GRANULOCYTES # BLD AUTO: 0 K/UL (ref 0–0.5)
IMM GRANULOCYTES NFR BLD AUTO: 0 % (ref 0–5)
LDLC SERPL CALC-MCNC: 43 MG/DL
LYMPHOCYTES # BLD: 2.3 K/UL (ref 0.5–4.6)
LYMPHOCYTES NFR BLD: 29 % (ref 13–44)
MAGNESIUM SERPL-MCNC: 2.8 MG/DL (ref 1.8–2.4)
MCH RBC QN AUTO: 27.4 PG (ref 26.1–32.9)
MCHC RBC AUTO-ENTMCNC: 30.1 G/DL (ref 31.4–35)
MCV RBC AUTO: 91 FL (ref 82–102)
MONOCYTES # BLD: 0.6 K/UL (ref 0.1–1.3)
MONOCYTES NFR BLD: 8 % (ref 4–12)
NEUTS SEG # BLD: 4.8 K/UL (ref 1.7–8.2)
NEUTS SEG NFR BLD: 59 % (ref 43–78)
NRBC # BLD: 0 K/UL (ref 0–0.2)
PLATELET # BLD AUTO: 266 K/UL (ref 150–450)
PMV BLD AUTO: 10.5 FL (ref 9.4–12.3)
POTASSIUM SERPL-SCNC: 4.3 MMOL/L (ref 3.5–5.1)
PROT SERPL-MCNC: 7.2 G/DL (ref 6.3–8.2)
RBC # BLD AUTO: 4.31 M/UL (ref 4.05–5.2)
SODIUM SERPL-SCNC: 143 MMOL/L (ref 133–143)
TRIGL SERPL-MCNC: 60 MG/DL (ref 35–150)
URATE SERPL-MCNC: 5.6 MG/DL (ref 2.6–6)
VLDLC SERPL CALC-MCNC: 12 MG/DL (ref 6–23)
WBC # BLD AUTO: 8.1 K/UL (ref 4.3–11.1)

## 2023-09-07 PROCEDURE — G0439 PPPS, SUBSEQ VISIT: HCPCS | Performed by: NURSE PRACTITIONER

## 2023-09-07 PROCEDURE — 1123F ACP DISCUSS/DSCN MKR DOCD: CPT | Performed by: NURSE PRACTITIONER

## 2023-09-07 PROCEDURE — 3074F SYST BP LT 130 MM HG: CPT | Performed by: NURSE PRACTITIONER

## 2023-09-07 PROCEDURE — 3078F DIAST BP <80 MM HG: CPT | Performed by: NURSE PRACTITIONER

## 2023-09-07 RX ORDER — ERGOCALCIFEROL 1.25 MG/1
50000 CAPSULE ORAL
Qty: 6 CAPSULE | Refills: 3 | Status: SHIPPED | OUTPATIENT
Start: 2023-09-07

## 2023-09-07 ASSESSMENT — PATIENT HEALTH QUESTIONNAIRE - PHQ9
5. POOR APPETITE OR OVEREATING: 0
SUM OF ALL RESPONSES TO PHQ9 QUESTIONS 1 & 2: 2
6. FEELING BAD ABOUT YOURSELF - OR THAT YOU ARE A FAILURE OR HAVE LET YOURSELF OR YOUR FAMILY DOWN: 1
7. TROUBLE CONCENTRATING ON THINGS, SUCH AS READING THE NEWSPAPER OR WATCHING TELEVISION: 1
10. IF YOU CHECKED OFF ANY PROBLEMS, HOW DIFFICULT HAVE THESE PROBLEMS MADE IT FOR YOU TO DO YOUR WORK, TAKE CARE OF THINGS AT HOME, OR GET ALONG WITH OTHER PEOPLE: 0
SUM OF ALL RESPONSES TO PHQ QUESTIONS 1-9: 10
4. FEELING TIRED OR HAVING LITTLE ENERGY: 3
3. TROUBLE FALLING OR STAYING ASLEEP: 3
2. FEELING DOWN, DEPRESSED OR HOPELESS: 1
SUM OF ALL RESPONSES TO PHQ QUESTIONS 1-9: 10
1. LITTLE INTEREST OR PLEASURE IN DOING THINGS: 1
8. MOVING OR SPEAKING SO SLOWLY THAT OTHER PEOPLE COULD HAVE NOTICED. OR THE OPPOSITE, BEING SO FIGETY OR RESTLESS THAT YOU HAVE BEEN MOVING AROUND A LOT MORE THAN USUAL: 0
SUM OF ALL RESPONSES TO PHQ QUESTIONS 1-9: 10
9. THOUGHTS THAT YOU WOULD BE BETTER OFF DEAD, OR OF HURTING YOURSELF: 0
SUM OF ALL RESPONSES TO PHQ QUESTIONS 1-9: 10

## 2023-09-07 NOTE — TELEPHONE ENCOUNTER
Patient had a fall on 8/18 and hurt the arm that she was supposed to have a nerve study performed on. She states that her shoulder has been hurting ever since that fall. Can the patient be seen soon?

## 2023-09-07 NOTE — PROGRESS NOTES
Medicare Annual Wellness Visit    Rogers Nunez is here for Medicare AWV    Assessment & Plan   Medicare annual wellness visit, subsequent  Recommendations for Preventive Services Due: see orders and patient instructions/AVS.  Recommended screening schedule for the next 5-10 years is provided to the patient in written form: see Patient Instructions/AVS.     No follow-ups on file. Subjective   The following acute and/or chronic problems were also addressed today:  Shoulder pain and decreased ROM    Patient's complete Health Risk Assessment and screening values have been reviewed and are found in Flowsheets. The following problems were reviewed today and where indicated follow up appointments were made and/or referrals ordered. Positive Risk Factor Screenings with Interventions:    Fall Risk:  Do you feel unsteady or are you worried about falling? : (!) yes  2 or more falls in past year?: (!) yes  Fall with injury in past year?: (!) yes (Patient had a fall on August 28th)     Interventions:    Offered physical therapy but she declines at this time. Depression:  PHQ-2 Score: 2  PHQ-9 Total Score: 10    Interpretation:   1-4 = minimal  5-9 = mild  10-14 = moderate  15-19 = moderately severe  20-27 = severe  Interventions:  Discussed sharing thoughts with sons          General HRA Questions:  Select all that apply: (!) New or Increased Pain, Loneliness, New or Increased Fatigue    Pain Interventions:  Tylenol usage discussed. May also use Biofreeze, icepack. Fatigue Interventions:  Consider overnight oximetry    Loneliness Interventions:  Phone calls to friends, Jainism members. Safety:  Do you have any tripping hazards - loose or unsecured carpets or rugs?: (!) Yes  Do all of your stairways have a railing or banister?: (!) No  Interventions:  Home safety discussed.     ADL's:   Patient reports needing help with:  Select all that apply: (!) Dressing, Grooming  Select all that apply: (!)

## 2023-09-13 NOTE — RESULT ENCOUNTER NOTE
Patient notified that labs are stable and would be discussed further at her upcoming appointment. Patient verbalized understanding.

## 2023-09-21 ENCOUNTER — OFFICE VISIT (OUTPATIENT)
Dept: INTERNAL MEDICINE CLINIC | Facility: CLINIC | Age: 78
End: 2023-09-21
Payer: MEDICARE

## 2023-09-21 VITALS
DIASTOLIC BLOOD PRESSURE: 74 MMHG | BODY MASS INDEX: 29.48 KG/M2 | WEIGHT: 156 LBS | OXYGEN SATURATION: 97 % | SYSTOLIC BLOOD PRESSURE: 132 MMHG | HEART RATE: 58 BPM

## 2023-09-21 DIAGNOSIS — R06.09 DYSPNEA ON EXERTION: ICD-10-CM

## 2023-09-21 DIAGNOSIS — G47.33 OSA TREATED WITH BIPAP: ICD-10-CM

## 2023-09-21 DIAGNOSIS — K21.9 GASTROESOPHAGEAL REFLUX DISEASE WITHOUT ESOPHAGITIS: ICD-10-CM

## 2023-09-21 DIAGNOSIS — Z79.01 CURRENT USE OF LONG TERM ANTICOAGULATION: Chronic | ICD-10-CM

## 2023-09-21 DIAGNOSIS — I25.10 CORONARY ARTERY DISEASE INVOLVING NATIVE CORONARY ARTERY OF NATIVE HEART WITHOUT ANGINA PECTORIS: ICD-10-CM

## 2023-09-21 DIAGNOSIS — I10 ESSENTIAL HYPERTENSION: ICD-10-CM

## 2023-09-21 DIAGNOSIS — N18.30 STAGE 3 CHRONIC KIDNEY DISEASE, UNSPECIFIED WHETHER STAGE 3A OR 3B CKD (HCC): Chronic | ICD-10-CM

## 2023-09-21 DIAGNOSIS — Z79.899 ON POTASSIUM WASTING DIURETIC THERAPY: Chronic | ICD-10-CM

## 2023-09-21 DIAGNOSIS — E78.2 MIXED HYPERLIPIDEMIA: Primary | Chronic | ICD-10-CM

## 2023-09-21 DIAGNOSIS — E66.9 OBESITY (BMI 30.0-34.9): ICD-10-CM

## 2023-09-21 DIAGNOSIS — I48.0 PAROXYSMAL ATRIAL FIBRILLATION (HCC): Chronic | ICD-10-CM

## 2023-09-21 PROCEDURE — 3078F DIAST BP <80 MM HG: CPT | Performed by: NURSE PRACTITIONER

## 2023-09-21 PROCEDURE — G8417 CALC BMI ABV UP PARAM F/U: HCPCS | Performed by: NURSE PRACTITIONER

## 2023-09-21 PROCEDURE — 1036F TOBACCO NON-USER: CPT | Performed by: NURSE PRACTITIONER

## 2023-09-21 PROCEDURE — G8427 DOCREV CUR MEDS BY ELIG CLIN: HCPCS | Performed by: NURSE PRACTITIONER

## 2023-09-21 PROCEDURE — 99214 OFFICE O/P EST MOD 30 MIN: CPT | Performed by: NURSE PRACTITIONER

## 2023-09-21 PROCEDURE — 3074F SYST BP LT 130 MM HG: CPT | Performed by: NURSE PRACTITIONER

## 2023-09-21 PROCEDURE — 1090F PRES/ABSN URINE INCON ASSESS: CPT | Performed by: NURSE PRACTITIONER

## 2023-09-21 PROCEDURE — 1123F ACP DISCUSS/DSCN MKR DOCD: CPT | Performed by: NURSE PRACTITIONER

## 2023-09-21 PROCEDURE — G8399 PT W/DXA RESULTS DOCUMENT: HCPCS | Performed by: NURSE PRACTITIONER

## 2023-09-21 NOTE — PROGRESS NOTES
PROGRESS NOTE    SUBJECTIVE:   Deneen Tucker is a 78 y.o. female seen for a follow up visit for   Chief Complaint   Patient presents with    Hypertension    Hyperlipidemia    Follow-up Chronic Condition     HPI    CHRONIC MEDICAL PROBLEMS  Cholesterol Problem  Onset: approx since 2011. Asymptomatic treatment:  rosuvastatin -- tolerating well. Hypertension   Onset: approx since 2001. Asymptomatic. Risk factors include family history, postmenopause, obesity and hypertension. Treatment: Losartan 100 mg daily, metoprolol XL 25 mg daily, spironolactone 25 mg daily and furosemide 40 mg daily. GERD -- \"if I eat, lay down and then sit back up I have reflux and can taste acid but it goes back down. \"  Improvement with pantoprazole 40 mg twice daily. Fatigue all the time. \"I get up and I can go right back to bed. \"  Wakes up about 3:00 am and then goes back to bed. Then wakes again at 6:00 am and has coffee and stay up but still feels tired. Bedtime 9:00 pm.  Around 3 pm want to take a nap. Reports CPAP mask is not always sealing. DME=[employer]  \"the mask fits ok when sitting upright. But when I lay down it leaks. \"    Occasional chest pain non-exertional.  Can happen when sitting on bed or laying down. No diaphoresis, does not radiate. Left shoulder pain  Chronic. Planning steroid injection tomorrow at [facility]. Reviewed and updated this visit by provider:  Tobacco  Allergies  Meds  Problems  Med Hx  Surg Hx  Fam Hx         Review of Systems   Constitutional:  Negative for diaphoresis and fever. Respiratory:  Negative for cough, chest tightness, shortness of breath and wheezing. Cardiovascular:  Negative for chest pain, palpitations and leg swelling. Gastrointestinal:         GERD   Neurological:  Positive for numbness (left arm intermittent).         OBJECTIVE:    /74 (Site: Right Upper Arm, Position: Sitting, Cuff Size: Large Adult)   Pulse 58   Wt 156 lb (70.8 kg)   SpO2 97%

## 2023-09-28 ENCOUNTER — PROCEDURE VISIT (OUTPATIENT)
Dept: PHYSICAL MEDICINE AND REHAB | Age: 78
End: 2023-09-28

## 2023-09-28 VITALS
SYSTOLIC BLOOD PRESSURE: 124 MMHG | BODY MASS INDEX: 29.45 KG/M2 | HEART RATE: 61 BPM | HEIGHT: 61 IN | DIASTOLIC BLOOD PRESSURE: 59 MMHG | WEIGHT: 156 LBS

## 2023-09-28 DIAGNOSIS — M25.512 CHRONIC LEFT SHOULDER PAIN: Primary | ICD-10-CM

## 2023-09-28 DIAGNOSIS — G89.29 CHRONIC LEFT SHOULDER PAIN: Primary | ICD-10-CM

## 2023-10-03 ASSESSMENT — ENCOUNTER SYMPTOMS
CHEST TIGHTNESS: 0
COUGH: 0
WHEEZING: 0
SHORTNESS OF BREATH: 0

## 2023-10-04 ENCOUNTER — OFFICE VISIT (OUTPATIENT)
Dept: ORTHOPEDIC SURGERY | Age: 78
End: 2023-10-04

## 2023-10-04 DIAGNOSIS — I50.22 CHRONIC SYSTOLIC (CONGESTIVE) HEART FAILURE (HCC): ICD-10-CM

## 2023-10-04 DIAGNOSIS — M12.812 LEFT ROTATOR CUFF TEAR ARTHROPATHY: ICD-10-CM

## 2023-10-04 DIAGNOSIS — M75.102 LEFT ROTATOR CUFF TEAR ARTHROPATHY: ICD-10-CM

## 2023-10-04 DIAGNOSIS — M19.012 ARTHRITIS OF LEFT GLENOHUMERAL JOINT: Primary | ICD-10-CM

## 2023-10-04 DIAGNOSIS — M25.512 LEFT SHOULDER PAIN, UNSPECIFIED CHRONICITY: ICD-10-CM

## 2023-10-06 ENCOUNTER — TELEPHONE (OUTPATIENT)
Dept: PULMONOLOGY | Age: 78
End: 2023-10-06

## 2023-10-06 ENCOUNTER — TELEPHONE (OUTPATIENT)
Age: 78
End: 2023-10-06

## 2023-10-06 DIAGNOSIS — Z01.811 PRE-OP CHEST EXAM: Primary | ICD-10-CM

## 2023-10-06 NOTE — TELEPHONE ENCOUNTER
LOV 7/13/23 with May Montez NP    Pt will need 40 minute surgical clearance appt with May Montez NP; CXR prior with zane at visit. Please schedule.

## 2023-10-06 NOTE — TELEPHONE ENCOUNTER
Patient having Left Reverse total shoulder arthroplasty on TBD with Dr. Adams Westbrook under General and [address] [facility] [place] Requesting cardiac clearance and any medication hold.  Fax: [phone]

## 2023-10-06 NOTE — TELEPHONE ENCOUNTER
SURGICAL OR PROCEDURAL PULMONARY RISK ASSESSMENT:    Physician or Practice Requesting Clearance: [facility]  : [place]  Contact Phone: [phone]  Fax number: [phone]  Date of Surgery/Procedure: not scheduled  Type of Surgery or Procedure: Left reverse total shoulder arthoplasty  Type of Anesthesia: General with a regional block  Anticipated Duration of Surgery: 2 hours

## 2023-10-09 ENCOUNTER — TELEPHONE (OUTPATIENT)
Dept: ORTHOPEDIC SURGERY | Age: 78
End: 2023-10-09

## 2023-10-09 NOTE — TELEPHONE ENCOUNTER
I called Pulmonary and spoke w/ . I was told there weren't any sooner appointments but I did put the pt on a cancellation list. Pt was contacted and informed.

## 2023-10-09 NOTE — TELEPHONE ENCOUNTER
Call patient about needing surg says kelly fofana can't see her til January wants to know if you can get her sooner apt?

## 2023-10-11 NOTE — TELEPHONE ENCOUNTER
Please get her an appointment in the office for perioperative cardiac risk stratification appointment. Thank you.

## 2023-10-25 DIAGNOSIS — F32.0 CURRENT MILD EPISODE OF MAJOR DEPRESSIVE DISORDER WITHOUT PRIOR EPISODE (HCC): ICD-10-CM

## 2023-11-09 ENCOUNTER — OFFICE VISIT (OUTPATIENT)
Age: 78
End: 2023-11-09
Payer: MEDICARE

## 2023-11-09 VITALS
HEART RATE: 56 BPM | WEIGHT: 158.9 LBS | DIASTOLIC BLOOD PRESSURE: 62 MMHG | SYSTOLIC BLOOD PRESSURE: 126 MMHG | HEIGHT: 60 IN | BODY MASS INDEX: 31.2 KG/M2

## 2023-11-09 DIAGNOSIS — E78.2 MIXED HYPERLIPIDEMIA: ICD-10-CM

## 2023-11-09 DIAGNOSIS — I48.0 PAROXYSMAL ATRIAL FIBRILLATION (HCC): ICD-10-CM

## 2023-11-09 DIAGNOSIS — I50.32 CHRONIC HEART FAILURE WITH PRESERVED EJECTION FRACTION (HCC): ICD-10-CM

## 2023-11-09 DIAGNOSIS — I10 ESSENTIAL HYPERTENSION: Primary | ICD-10-CM

## 2023-11-09 DIAGNOSIS — I34.0 MILD MITRAL REGURGITATION: ICD-10-CM

## 2023-11-09 PROCEDURE — G8484 FLU IMMUNIZE NO ADMIN: HCPCS | Performed by: INTERNAL MEDICINE

## 2023-11-09 PROCEDURE — 3074F SYST BP LT 130 MM HG: CPT | Performed by: INTERNAL MEDICINE

## 2023-11-09 PROCEDURE — G8417 CALC BMI ABV UP PARAM F/U: HCPCS | Performed by: INTERNAL MEDICINE

## 2023-11-09 PROCEDURE — 93000 ELECTROCARDIOGRAM COMPLETE: CPT | Performed by: INTERNAL MEDICINE

## 2023-11-09 PROCEDURE — 1123F ACP DISCUSS/DSCN MKR DOCD: CPT | Performed by: INTERNAL MEDICINE

## 2023-11-09 PROCEDURE — 1090F PRES/ABSN URINE INCON ASSESS: CPT | Performed by: INTERNAL MEDICINE

## 2023-11-09 PROCEDURE — 3078F DIAST BP <80 MM HG: CPT | Performed by: INTERNAL MEDICINE

## 2023-11-09 PROCEDURE — 99214 OFFICE O/P EST MOD 30 MIN: CPT | Performed by: INTERNAL MEDICINE

## 2023-11-09 PROCEDURE — G8399 PT W/DXA RESULTS DOCUMENT: HCPCS | Performed by: INTERNAL MEDICINE

## 2023-11-09 PROCEDURE — G8428 CUR MEDS NOT DOCUMENT: HCPCS | Performed by: INTERNAL MEDICINE

## 2023-11-09 PROCEDURE — 1036F TOBACCO NON-USER: CPT | Performed by: INTERNAL MEDICINE

## 2023-11-09 ASSESSMENT — ENCOUNTER SYMPTOMS
HEMATOCHEZIA: 0
ORTHOPNEA: 0
COUGH: 0
SHORTNESS OF BREATH: 0

## 2023-11-09 NOTE — PROGRESS NOTES
diaphoretic. Cardiovascular:      Rate and Rhythm: Normal rate and regular rhythm. Heart sounds: Normal heart sounds. No murmur heard. No friction rub. No gallop. Pulmonary:      Effort: Pulmonary effort is normal. No respiratory distress. Breath sounds: Normal breath sounds. No stridor. No wheezing, rhonchi or rales. Abdominal:      General: Abdomen is flat. There is no distension. Musculoskeletal:      Right lower leg: No edema. Left lower leg: No edema. Skin:     General: Skin is warm and dry. Coloration: Skin is not jaundiced. Findings: No bruising. Neurological:      Mental Status: She is alert and oriented to person, place, and time. Mental status is at baseline. Gait: Gait normal.   Psychiatric:         Mood and Affect: Mood normal.         Behavior: Behavior normal.         Medical problems, medical history, and test results were reviewed with the patient today. No results found for this or any previous visit (from the past 168 hour(s)). Current Outpatient Medications:     ergocalciferol (ERGOCALCIFEROL) 1.25 MG (86148 UT) capsule, Take 1 capsule by mouth every 14 days, Disp: 6 capsule, Rfl: 3    spironolactone (ALDACTONE) 25 MG tablet, Take 1 tablet by mouth daily, Disp: 90 tablet, Rfl: 1    gabapentin (NEURONTIN) 300 MG capsule, Take 1 capsule by mouth 3 times daily for 360 days. , Disp: 270 capsule, Rfl: 3    budesonide-formoterol (SYMBICORT) 160-4.5 MCG/ACT AERO, 2 puffs twice daily, rinse mouth after use. May use preferred brand or generic (Patient taking differently: daily 2 puffs twice daily, rinse mouth after use.   May use preferred brand or generic), Disp: 1 each, Rfl: 11    pantoprazole (PROTONIX) 40 MG tablet, 1 tablet p.o. 30 minutes prior to morning and evening meal. (Patient taking differently: 1 tablet p.o. 30 minutes prior to morning and evening meal. Patient takes 1 tablet BID.), Disp: 180 tablet, Rfl: 1    metoprolol succinate (TOPROL

## 2023-11-10 ENCOUNTER — TELEPHONE (OUTPATIENT)
Age: 78
End: 2023-11-10

## 2023-11-10 NOTE — TELEPHONE ENCOUNTER
Pt states that she called the [address] [place] [address] and states that they need a call or fax sent for a verbal prescription for RX Eliquis. Verbal prescription line for direct refugio number is [phone]. Also gave pt a ID # [id number].

## 2024-01-01 NOTE — PROGRESS NOTES
Name: Macario Bamberger  YOB: 1945  Gender: female  MRN: [id number]    CC:   Chief Complaint   Patient presents with    Follow-up     CT and EMG/NCS results of left arm          HPI: Patient returns after a CT scan. Previously she has undergone conservative treatment with Dr. Mary Rose. She is ready to proceed with left shoulder replacement. Has had 1 fall or 2 from standing height she says from tripping and wants getting out of bed. Has seen her primary doctor about at least one of them. Has not followed up with her cardiologist just yet. Allergies   Allergen Reactions    Wasp Venom Protein Anaphylaxis    Atorvastatin Other (See Comments)     High doses cause leg cramps.  Able to tolerate low doses (<20mg/day)    Iodine Hives    Lisinopril Other (See Comments)    Tetracycline Other (See Comments)     ULCERS IN MOUTH    Penicillin G Rash     Past Medical History:   Diagnosis Date    Allergic rhinitis, cause unspecified 01/07/2015    Aortic valve disorders 01/07/2015    Arrhythmia     a-fib    Asthma     uses inhaler prn    CAD (coronary artery disease)     mild,  treated with med; NO MI, NO stents, NO CABG     Carotid stenosis 1/7/2015    Carpal tunnel syndrome 01/07/2015    bilat wrists-no issue now    Congestive heart failure ([address])     COVID-19 vaccine series completed 03/22/2021    [place]; 2/27/2021    Depression 06/30/2015    controlled     Esophageal reflux 01/07/2015    controlled with medication     Essential hypertension     controlled with medication    Fibromyalgia     Former smoker     1 ppd for 14 yrs; quit smoking 1981    GERD (gastroesophageal reflux disease)     controlled with medication     Heart failure ([address])     last EF 60-65% echo done 10/26/2021    History of EKG 11/03/2021    a fib; patient started on eliquis BID and ASA 81 mg per [facility]    History of peptic ulcer disease     resolved with protonix     Hx of echocardiogram 10/26/2021    EF 60-65%     Menopause
release javier on the sling. The thumb and sling strap were then placed correctly on the sling. The shoulder straps were adjusted to insure correct fit which can involve trimming excess material. Once the sling is correctly fitted, the pillow is then placed at the waist line of the affected shoulder with the Velcro facing away from body. The sling is attached to the outside of the pillow, along the hook and loop strips. I then buckled the waist strap to the pillow and adjusted the strap. During the fitting process I explained how to detach the shoulder strap javier and open front panel to allow for proper hygiene. Patient was informed waist belt should stay in place at all times unless told otherwise by the Doctor or Physical Therapist I explained and demonstrated all information to the patient about how to properly use the machine. It will be used to help reduce pain and swelling, in turn facilitate healing and speed up the rehabilitation process. The patient was instructed on how to properly fill the machine with ice and water as well as the importance to ALWAYS use a barrier between your skin and the cold pad to avoid additional injury. The patient was instructed to take the machine to the hospital with them the morning of their surgery. Patient was advised that if they had any questions about the Ice Man Machine or how to properly use it to please call me at [phone]. Patient read and signed documenting they understand and agree to [facility]'s current DME return policy.
2024 09:00

## 2024-01-02 DIAGNOSIS — E78.2 MIXED HYPERLIPIDEMIA: Chronic | ICD-10-CM

## 2024-01-02 DIAGNOSIS — Z79.899 ON POTASSIUM WASTING DIURETIC THERAPY: Chronic | ICD-10-CM

## 2024-01-02 DIAGNOSIS — N18.30 STAGE 3 CHRONIC KIDNEY DISEASE, UNSPECIFIED WHETHER STAGE 3A OR 3B CKD (HCC): Chronic | ICD-10-CM

## 2024-01-02 DIAGNOSIS — I10 ESSENTIAL HYPERTENSION: ICD-10-CM

## 2024-01-02 LAB
ALBUMIN SERPL-MCNC: 4 G/DL (ref 3.2–4.6)
ALBUMIN/GLOB SERPL: 1.2 (ref 0.4–1.6)
ALP SERPL-CCNC: 61 U/L (ref 50–136)
ALT SERPL-CCNC: 18 U/L (ref 12–65)
ANION GAP SERPL CALC-SCNC: 3 MMOL/L (ref 2–11)
AST SERPL-CCNC: 20 U/L (ref 15–37)
BASOPHILS # BLD: 0.1 K/UL (ref 0–0.2)
BASOPHILS NFR BLD: 1 % (ref 0–2)
BILIRUB SERPL-MCNC: 0.3 MG/DL (ref 0.2–1.1)
BUN SERPL-MCNC: 36 MG/DL (ref 8–23)
CALCIUM SERPL-MCNC: 9.2 MG/DL (ref 8.3–10.4)
CHLORIDE SERPL-SCNC: 106 MMOL/L (ref 103–113)
CHOLEST SERPL-MCNC: 141 MG/DL
CO2 SERPL-SCNC: 29 MMOL/L (ref 21–32)
CREAT SERPL-MCNC: 1.6 MG/DL (ref 0.6–1)
DIFFERENTIAL METHOD BLD: ABNORMAL
EOSINOPHIL # BLD: 0.3 K/UL (ref 0–0.8)
EOSINOPHIL NFR BLD: 5 % (ref 0.5–7.8)
ERYTHROCYTE [DISTWIDTH] IN BLOOD BY AUTOMATED COUNT: 13.9 % (ref 11.9–14.6)
GLOBULIN SER CALC-MCNC: 3.4 G/DL (ref 2.8–4.5)
GLUCOSE SERPL-MCNC: 96 MG/DL (ref 65–100)
HCT VFR BLD AUTO: 39.4 % (ref 35.8–46.3)
HDLC SERPL-MCNC: 65 MG/DL (ref 40–60)
HDLC SERPL: 2.2
HGB BLD-MCNC: 11.8 G/DL (ref 11.7–15.4)
IMM GRANULOCYTES # BLD AUTO: 0 K/UL (ref 0–0.5)
IMM GRANULOCYTES NFR BLD AUTO: 0 % (ref 0–5)
LDLC SERPL CALC-MCNC: 54.6 MG/DL
LYMPHOCYTES # BLD: 2.3 K/UL (ref 0.5–4.6)
LYMPHOCYTES NFR BLD: 37 % (ref 13–44)
MAGNESIUM SERPL-MCNC: 2.8 MG/DL (ref 1.8–2.4)
MCH RBC QN AUTO: 26.8 PG (ref 26.1–32.9)
MCHC RBC AUTO-ENTMCNC: 29.9 G/DL (ref 31.4–35)
MCV RBC AUTO: 89.5 FL (ref 82–102)
MONOCYTES # BLD: 0.5 K/UL (ref 0.1–1.3)
MONOCYTES NFR BLD: 8 % (ref 4–12)
NEUTS SEG # BLD: 3.1 K/UL (ref 1.7–8.2)
NEUTS SEG NFR BLD: 49 % (ref 43–78)
NRBC # BLD: 0 K/UL (ref 0–0.2)
PLATELET # BLD AUTO: 239 K/UL (ref 150–450)
POTASSIUM SERPL-SCNC: 4.4 MMOL/L (ref 3.5–5.1)
PROT SERPL-MCNC: 7.4 G/DL (ref 6.3–8.2)
RBC # BLD AUTO: 4.4 M/UL (ref 4.05–5.2)
SODIUM SERPL-SCNC: 138 MMOL/L (ref 136–146)
TRIGL SERPL-MCNC: 107 MG/DL (ref 35–150)
URATE SERPL-MCNC: 6.7 MG/DL (ref 2.6–6)
VLDLC SERPL CALC-MCNC: 21.4 MG/DL (ref 6–23)
WBC # BLD AUTO: 6.3 K/UL (ref 4.3–11.1)

## 2024-01-08 ENCOUNTER — OFFICE VISIT (OUTPATIENT)
Dept: INTERNAL MEDICINE CLINIC | Facility: CLINIC | Age: 79
End: 2024-01-08
Payer: MEDICARE

## 2024-01-08 VITALS
HEART RATE: 65 BPM | DIASTOLIC BLOOD PRESSURE: 50 MMHG | SYSTOLIC BLOOD PRESSURE: 116 MMHG | BODY MASS INDEX: 31.69 KG/M2 | HEIGHT: 60 IN | OXYGEN SATURATION: 97 % | WEIGHT: 161.4 LBS

## 2024-01-08 DIAGNOSIS — K21.9 GASTROESOPHAGEAL REFLUX DISEASE WITHOUT ESOPHAGITIS: ICD-10-CM

## 2024-01-08 DIAGNOSIS — Z79.899 ON POTASSIUM WASTING DIURETIC THERAPY: Chronic | ICD-10-CM

## 2024-01-08 DIAGNOSIS — J45.30 MILD PERSISTENT ASTHMA WITHOUT COMPLICATION: ICD-10-CM

## 2024-01-08 DIAGNOSIS — F32.0 CURRENT MILD EPISODE OF MAJOR DEPRESSIVE DISORDER WITHOUT PRIOR EPISODE (HCC): ICD-10-CM

## 2024-01-08 DIAGNOSIS — Z78.9 DIFFICULTY USING CONTINUOUS POSITIVE AIRWAY PRESSURE (CPAP) FULL FACE MASK: ICD-10-CM

## 2024-01-08 DIAGNOSIS — Z23 INFLUENZA VACCINE NEEDED: ICD-10-CM

## 2024-01-08 DIAGNOSIS — E78.2 MIXED HYPERLIPIDEMIA: Primary | Chronic | ICD-10-CM

## 2024-01-08 DIAGNOSIS — I50.32 CHRONIC HEART FAILURE WITH PRESERVED EJECTION FRACTION (HCC): ICD-10-CM

## 2024-01-08 DIAGNOSIS — I10 ESSENTIAL HYPERTENSION: ICD-10-CM

## 2024-01-08 DIAGNOSIS — Z79.01 CURRENT USE OF LONG TERM ANTICOAGULATION: Chronic | ICD-10-CM

## 2024-01-08 DIAGNOSIS — I48.0 PAROXYSMAL ATRIAL FIBRILLATION (HCC): Chronic | ICD-10-CM

## 2024-01-08 PROCEDURE — G0008 ADMIN INFLUENZA VIRUS VAC: HCPCS | Performed by: NURSE PRACTITIONER

## 2024-01-08 PROCEDURE — 1090F PRES/ABSN URINE INCON ASSESS: CPT | Performed by: NURSE PRACTITIONER

## 2024-01-08 PROCEDURE — 99214 OFFICE O/P EST MOD 30 MIN: CPT | Performed by: NURSE PRACTITIONER

## 2024-01-08 PROCEDURE — G8399 PT W/DXA RESULTS DOCUMENT: HCPCS | Performed by: NURSE PRACTITIONER

## 2024-01-08 PROCEDURE — 1036F TOBACCO NON-USER: CPT | Performed by: NURSE PRACTITIONER

## 2024-01-08 PROCEDURE — G8417 CALC BMI ABV UP PARAM F/U: HCPCS | Performed by: NURSE PRACTITIONER

## 2024-01-08 PROCEDURE — 3078F DIAST BP <80 MM HG: CPT | Performed by: NURSE PRACTITIONER

## 2024-01-08 PROCEDURE — G8427 DOCREV CUR MEDS BY ELIG CLIN: HCPCS | Performed by: NURSE PRACTITIONER

## 2024-01-08 PROCEDURE — G8484 FLU IMMUNIZE NO ADMIN: HCPCS | Performed by: NURSE PRACTITIONER

## 2024-01-08 PROCEDURE — 3074F SYST BP LT 130 MM HG: CPT | Performed by: NURSE PRACTITIONER

## 2024-01-08 PROCEDURE — 90694 VACC AIIV4 NO PRSRV 0.5ML IM: CPT | Performed by: NURSE PRACTITIONER

## 2024-01-08 PROCEDURE — 1123F ACP DISCUSS/DSCN MKR DOCD: CPT | Performed by: NURSE PRACTITIONER

## 2024-01-08 RX ORDER — FUROSEMIDE 40 MG/1
40 TABLET ORAL DAILY
Qty: 100 TABLET | Refills: 3 | Status: SHIPPED | OUTPATIENT
Start: 2024-01-08

## 2024-01-08 RX ORDER — LOSARTAN POTASSIUM 100 MG/1
100 TABLET ORAL DAILY
Qty: 100 TABLET | Refills: 3 | Status: SHIPPED | OUTPATIENT
Start: 2024-01-08

## 2024-01-08 RX ORDER — SPIRONOLACTONE 25 MG/1
25 TABLET ORAL DAILY
Qty: 100 TABLET | Refills: 3 | Status: SHIPPED | OUTPATIENT
Start: 2024-01-08

## 2024-01-08 RX ORDER — ACETAMINOPHEN 500 MG
500 TABLET ORAL EVERY 6 HOURS PRN
COMMUNITY

## 2024-01-08 RX ORDER — METOPROLOL SUCCINATE 25 MG/1
25 TABLET, EXTENDED RELEASE ORAL DAILY
Qty: 100 TABLET | Refills: 3 | Status: SHIPPED | OUTPATIENT
Start: 2024-01-08

## 2024-01-08 RX ORDER — PANTOPRAZOLE SODIUM 40 MG/1
TABLET, DELAYED RELEASE ORAL
Qty: 200 TABLET | Refills: 3 | Status: SHIPPED | OUTPATIENT
Start: 2024-01-08

## 2024-01-08 NOTE — PROGRESS NOTES
PROGRESS NOTE    SUBJECTIVE:   Zeynep Carrington is a 78 y.o. female seen for a follow up visit for   Chief Complaint   Patient presents with    Follow-up     HPI    Blood pressure has been running a little low over past 2 months.  Drinking a lot of fluid water.  Some dizziness.  No falls in last 3 months.    Shoulder pain that interferes with sleep.      CHRONIC MEDICAL PROBLEMS  Cholesterol Problem  Onset: approx since 2011.  Asymptomatic treatment:  rosuvastatin -- tolerating well.    Hypertension   Onset: approx since 2001.  Asymptomatic.  Risk factors include family history, postmenopause, obesity and hypertension. Treatment: Losartan 100 mg daily, metoprolol XL 25 mg daily, spironolactone 25 mg daily and furosemide 40 mg daily.    GERD -- \"if I eat, lay down and then sit back up I have reflux and can taste acid but it goes back down.\"  Improvement with pantoprazole 40 mg twice daily.    Fatigue all the time.  \"I get up and I can go right back to bed.\"  Wakes up about 3:00 am and then goes back to bed.  Then wakes again at 6:00 am and has coffee and stay up but still feels tired.  Bedtime 9:00 pm.  Around 3 pm want to take a nap.  Reports CPAP mask is not always sealing.  DME=[employer]  \"the mask fits ok when sitting upright.  But when I lay down it leaks.\"    Occasional chest pain non-exertional.  Can happen when sitting on bed or laying down.  No diaphoresis, does not radiate.    Left shoulder pain  Chronic.  Planning steroid injection tomorrow at [facility].          Past Medical History:   Diagnosis Date    Allergic rhinitis, cause unspecified 01/07/2015    Aortic valve disorders 01/07/2015    Arrhythmia     a-fib    Asthma     uses inhaler prn    CAD (coronary artery disease)     mild,  treated with med; NO MI, NO stents, NO CABG     Carotid stenosis 1/7/2015    Carpal tunnel syndrome 01/07/2015    bilat wrists-no issue now    Congestive heart failure (HCC)     COVID-19 vaccine series completed 03/22/2021

## 2024-01-09 ENCOUNTER — HOSPITAL ENCOUNTER (OUTPATIENT)
Dept: GENERAL RADIOLOGY | Age: 79
Discharge: HOME | End: 2024-01-12
Payer: MEDICARE

## 2024-01-09 ENCOUNTER — OFFICE VISIT (OUTPATIENT)
Dept: PULMONOLOGY | Age: 79
End: 2024-01-09
Payer: MEDICARE

## 2024-01-09 VITALS
TEMPERATURE: 97.4 F | RESPIRATION RATE: 19 BRPM | OXYGEN SATURATION: 95 % | HEIGHT: 60 IN | WEIGHT: 161 LBS | BODY MASS INDEX: 31.61 KG/M2 | HEART RATE: 69 BPM | SYSTOLIC BLOOD PRESSURE: 120 MMHG | DIASTOLIC BLOOD PRESSURE: 71 MMHG

## 2024-01-09 DIAGNOSIS — J45.30 MILD PERSISTENT ASTHMA WITHOUT COMPLICATION: ICD-10-CM

## 2024-01-09 DIAGNOSIS — J30.1 SEASONAL ALLERGIC RHINITIS DUE TO POLLEN: ICD-10-CM

## 2024-01-09 DIAGNOSIS — Z01.811 PRE-OP CHEST EXAM: ICD-10-CM

## 2024-01-09 DIAGNOSIS — I27.20 MILD PULMONARY HYPERTENSION (HCC): ICD-10-CM

## 2024-01-09 DIAGNOSIS — Z01.811 PRE-OP CHEST EXAM: Primary | ICD-10-CM

## 2024-01-09 DIAGNOSIS — G47.33 OSA TREATED WITH BIPAP: ICD-10-CM

## 2024-01-09 LAB
EXPIRATORY TIME: NORMAL
FEF 25-75% %PRED-PRE: NORMAL
FEF 25-75% PRED: NORMAL
FEF 25-75%-PRE: NORMAL
FEV1 %PRED-PRE: 74 %
FEV1 PRED: 1.57 L
FEV1/FVC %PRED-PRE: 107 %
FEV1/FVC PRED: 74 %
FEV1/FVC: 79 %
FEV1: 1.16 L
FVC %PRED-PRE: 68 %
FVC PRED: 2.14 L
FVC: 1.46 L
PEF %PRED-PRE: NORMAL
PEF PRED: NORMAL
PEF-PRE: NORMAL

## 2024-01-09 PROCEDURE — 3078F DIAST BP <80 MM HG: CPT | Performed by: NURSE PRACTITIONER

## 2024-01-09 PROCEDURE — 3074F SYST BP LT 130 MM HG: CPT | Performed by: NURSE PRACTITIONER

## 2024-01-09 PROCEDURE — 99214 OFFICE O/P EST MOD 30 MIN: CPT | Performed by: NURSE PRACTITIONER

## 2024-01-09 PROCEDURE — 71046 X-RAY EXAM CHEST 2 VIEWS: CPT

## 2024-01-09 PROCEDURE — 1036F TOBACCO NON-USER: CPT | Performed by: NURSE PRACTITIONER

## 2024-01-09 PROCEDURE — 1123F ACP DISCUSS/DSCN MKR DOCD: CPT | Performed by: NURSE PRACTITIONER

## 2024-01-09 PROCEDURE — G8484 FLU IMMUNIZE NO ADMIN: HCPCS | Performed by: NURSE PRACTITIONER

## 2024-01-09 PROCEDURE — G8427 DOCREV CUR MEDS BY ELIG CLIN: HCPCS | Performed by: NURSE PRACTITIONER

## 2024-01-09 PROCEDURE — G8399 PT W/DXA RESULTS DOCUMENT: HCPCS | Performed by: NURSE PRACTITIONER

## 2024-01-09 PROCEDURE — G8417 CALC BMI ABV UP PARAM F/U: HCPCS | Performed by: NURSE PRACTITIONER

## 2024-01-09 PROCEDURE — 1090F PRES/ABSN URINE INCON ASSESS: CPT | Performed by: NURSE PRACTITIONER

## 2024-01-09 PROCEDURE — 94010 BREATHING CAPACITY TEST: CPT | Performed by: INTERNAL MEDICINE

## 2024-01-09 RX ORDER — ALBUTEROL SULFATE 90 UG/1
AEROSOL, METERED RESPIRATORY (INHALATION)
Qty: 18 G | Refills: 11 | Status: SHIPPED | OUTPATIENT
Start: 2024-01-09

## 2024-01-09 RX ORDER — ALBUTEROL SULFATE 2.5 MG/3ML
2.5 SOLUTION RESPIRATORY (INHALATION) EVERY 6 HOURS PRN
Qty: 360 ML | Refills: 11 | Status: SHIPPED | OUTPATIENT
Start: 2024-01-09

## 2024-01-09 RX ORDER — BUDESONIDE AND FORMOTEROL FUMARATE DIHYDRATE 160; 4.5 UG/1; UG/1
AEROSOL RESPIRATORY (INHALATION)
Qty: 1 EACH | Refills: 11 | Status: SHIPPED | OUTPATIENT
Start: 2024-01-09

## 2024-01-09 ASSESSMENT — ENCOUNTER SYMPTOMS
WHEEZING: 0
SPUTUM PRODUCTION: 0
COUGH: 1
SHORTNESS OF BREATH: 1

## 2024-01-09 ASSESSMENT — PULMONARY FUNCTION TESTS
FVC_PREDICTED: 2.14
FEV1: 1.16
FEV1_PREDICTED: 1.57
FVC: 1.46
FVC_PERCENT_PREDICTED_PRE: 68
FEV1/FVC_PREDICTED: 74
FEV1/FVC_PERCENT_PREDICTED_PRE: 107
FEV1/FVC: 79
FEV1_PERCENT_PREDICTED_PRE: 74

## 2024-01-09 NOTE — PROGRESS NOTES
She is an 78-year-old female seen today for preoperative pulmonary risk assessment prior to left reverse total shoulder arthroplasty, [facility], general anesthesia with regional block, anticipated duration of surgery 2 hours.  She has history of mild-moderate asthma, history of tobacco use, having quit in 1981 after accumulating 14 pack years.  History is also notable for bronchitis, pulmonary hypertension, with mildly elevated PA pressure on right heart cath, 2-3+ MR, QUINTIN/nocturnal hypoxia, on BiPAP/O2.    DIAGNOSTICS:     Chest CT 10/2021-negative for PE.  Moderate cardiomegaly.  Enlarged pulmonary artery.  Echo 10/2021-EF 60-65%, RVSP estimated at 44 mmHg, mild AR, mild MR.  Myocardial perfusion study 1/2022-normal perfusion.  Spirometry 8/5/2020-normal.  CPAP titration 5/2022-QUINTIN, CPAP titration revealed inadequate improvement of sleep disordered breathing, oxygen saturation worsened as CPAP levels increased.  Bilevel titration 6/2022-improvement of sleep disordered breathing at 15/10 cm, oxygen corrected at 2 L/min.  CXR 1/23/2023-lungs are clear.  Heart is normal in size.  Echo 3/23-EF 60-65%, abnormal diastolic function, moderate to severe MR, RVSP moderately elevated, estimated at 45 mmHg.   Spirometry 4/2023-normal.  LHC/RHC 4/14/2023: Mild nonobstructive CAD, mildly elevated PA pressures (43/14, CO 3 L/min, PCWP 22 mmHg) 2-3+ MR, LVEF greater than 60%.  MING 4/10/23: LVEF >55%, RV normal, mild MR no systolic flow reversal in the pulmonary veins, no LA/GENET thrombus.   
    Spouse name: None    Number of children: None    Years of education: 14 years    Highest education level: None   Tobacco Use    Smoking status: Former     Current packs/day: 0.00     Average packs/day: 1 pack/day for 10.0 years (10.0 ttl pk-yrs)     Types: Cigarettes     Start date: 1971     Quit date: 1981     Years since quittin.0    Smokeless tobacco: Never    Tobacco comments:     Quit smoking: quit smoking    Vaping Use    Vaping Use: Never used   Substance and Sexual Activity    Alcohol use: No     Alcohol/week: 0.0 standard drinks of alcohol    Drug use: No    Sexual activity: Not Currently     Social Determinants of Health     Financial Resource Strain: Low Risk  (2023)    Overall Financial Resource Strain (CARDIA)     Difficulty of Paying Living Expenses: Not hard at all   Transportation Needs: Unknown (2023)    PRAPARE - Transportation     Lack of Transportation (Non-Medical): No   Physical Activity: Insufficiently Active (2023)    Exercise Vital Sign     Days of Exercise per Week: 7 days     Minutes of Exercise per Session: 20 min   Housing Stability: Unknown (2023)    Housing Stability Vital Sign     Unstable Housing in the Last Year: No       Family History   Problem Relation Age of Onset    Heart Disease Maternal Grandmother     Rheum Arthritis Paternal Grandmother     Heart Attack Sister          from heart issue age 55    Diabetes Mother     Diabetes Sister     Diabetes Sister     Breast Cancer Sister 70    Diabetes Sister     Heart Attack Father     Heart Attack Mother        Allergies   Allergen Reactions    Wasp Venom Protein Anaphylaxis    Atorvastatin Other (See Comments)     High doses cause leg cramps. Able to tolerate low doses (<20mg/day)    Iodine Hives    Lisinopril Other (See Comments)    Tetracycline Other (See Comments)     ULCERS IN MOUTH    Penicillin G Rash       Current Outpatient Medications   Medication Sig    acetaminophen (TYLENOL)

## 2024-01-09 NOTE — PROGRESS NOTES
Take 1 tablet by mouth    Calcium Carbonate-Vitamin D (CALCIUM-VITAMIN D) 600-125 MG-UNIT TABS Take by mouth    cyanocobalamin 500 MCG tablet Take 1 tablet by mouth daily    nitroGLYCERIN (NITROSTAT) 0.4 MG SL tablet Place 1 tablet under the tongue     No current facility-administered medications for this visit.           REVIEW OF SYSTEMS:   CONSTITUTIONAL:   There is no history of fever, chills, night sweats, weight loss,persistent fatigue, or lethargy/hypersomnolence.  Positive weight gain about 10 pounds over the past year due to decreased activity   CARDIAC:   No chest pain, pressure, discomfort, palpitations, orthopnea, murmurs, or edema.   GI:   No dysphagia, heartburn reflux, nausea/vomiting, diarrhea, abdominal pain, or bleeding.   NEURO:   There is no history of AMS, persistent headache, decreased level of consciousness, seizures, or motor or sensory deficits.  Positive for unsteady gait and needs a cane occasionally to ambulate      PHYSICAL EXAM:    Vitals:    01/10/24 1353   BP: 124/70   Pulse: 76   Resp: 13   SpO2: 98%   Weight: 74.1 kg (163 lb 6.4 oz)   Height: 1.524 m (5')        Body mass index is 31.91 kg/m².         GENERAL APPEARANCE:   The patient is normal weight and in no respiratory distress.   HEENT:   PERRL.  Conjunctivae unremarkable.   Nasal mucosa is without epistaxis, exudate, or polyps.  Gums and dentition are unremarkable.  Narrow airway-modified Weaver stage IV.  Nares noted decreased air entry in left nasal passage versus right-more narrow on the left   NECK/LYMPHATIC:   Symmetrical with no elevation of jugular venous pulsation.  Trachea midline. No thyroid enlargement.  No cervical adenopathy.   LUNGS:   Normal respiratory effort with symmetrical lung expansion.   Breath sounds clear to auscultation.   HEART:   There is a regular rate and rhythm.  No murmur, rub, or gallop.  There is no edema in the lower extremities.   ABDOMEN:   Soft and non-tender.  No hepatosplenomegaly.

## 2024-01-09 NOTE — PATIENT INSTRUCTIONS
She is to increase Symbicort to 2 puffs twice daily, use with spacer, rinse mouth after use.    Albuterol inhaler or nebulizer 4 times daily as needed.    Continue BiPAP/oxygen as prescribed, will request appointment in the sleep center, first available to assist with issues with BiPAP prior to surgery.  She will need to take BiPAP to the hospital with her to use if she has overnight stay.    Follow-up in 4 months with Dr. Dee, complete pulmonary function test will be obtained at that visit for further evaluation of restrictive lung defect.    I have completed insurance form, faxed at her request, in regards to her chronic medical conditions.  She states that this will hopefully assist her with cost of medications.    She is up-to-date on flu vaccine and pneumonia vaccines.  Recommend newest COVID booster and RSV vaccine.

## 2024-01-10 ENCOUNTER — OFFICE VISIT (OUTPATIENT)
Dept: SLEEP MEDICINE | Age: 79
End: 2024-01-10
Payer: MEDICARE

## 2024-01-10 VITALS
BODY MASS INDEX: 32.08 KG/M2 | HEART RATE: 76 BPM | HEIGHT: 60 IN | RESPIRATION RATE: 13 BRPM | SYSTOLIC BLOOD PRESSURE: 124 MMHG | OXYGEN SATURATION: 98 % | DIASTOLIC BLOOD PRESSURE: 70 MMHG | WEIGHT: 163.4 LBS

## 2024-01-10 DIAGNOSIS — G47.10 HYPERSOMNIA, UNSPECIFIED: ICD-10-CM

## 2024-01-10 DIAGNOSIS — Z78.9 DIFFICULTY USING CONTINUOUS POSITIVE AIRWAY PRESSURE (CPAP) FULL FACE MASK: ICD-10-CM

## 2024-01-10 DIAGNOSIS — G47.33 OSA (OBSTRUCTIVE SLEEP APNEA): Primary | ICD-10-CM

## 2024-01-10 DIAGNOSIS — I25.10 CORONARY ARTERY DISEASE INVOLVING NATIVE CORONARY ARTERY OF NATIVE HEART WITHOUT ANGINA PECTORIS: ICD-10-CM

## 2024-01-10 DIAGNOSIS — G47.34 NOCTURNAL HYPOXEMIA: ICD-10-CM

## 2024-01-10 PROCEDURE — G8417 CALC BMI ABV UP PARAM F/U: HCPCS | Performed by: INTERNAL MEDICINE

## 2024-01-10 PROCEDURE — G8399 PT W/DXA RESULTS DOCUMENT: HCPCS | Performed by: INTERNAL MEDICINE

## 2024-01-10 PROCEDURE — 1036F TOBACCO NON-USER: CPT | Performed by: INTERNAL MEDICINE

## 2024-01-10 PROCEDURE — G8484 FLU IMMUNIZE NO ADMIN: HCPCS | Performed by: INTERNAL MEDICINE

## 2024-01-10 PROCEDURE — 3078F DIAST BP <80 MM HG: CPT | Performed by: INTERNAL MEDICINE

## 2024-01-10 PROCEDURE — 99215 OFFICE O/P EST HI 40 MIN: CPT | Performed by: INTERNAL MEDICINE

## 2024-01-10 PROCEDURE — 1090F PRES/ABSN URINE INCON ASSESS: CPT | Performed by: INTERNAL MEDICINE

## 2024-01-10 PROCEDURE — 3074F SYST BP LT 130 MM HG: CPT | Performed by: INTERNAL MEDICINE

## 2024-01-10 PROCEDURE — G8427 DOCREV CUR MEDS BY ELIG CLIN: HCPCS | Performed by: INTERNAL MEDICINE

## 2024-01-10 PROCEDURE — 1123F ACP DISCUSS/DSCN MKR DOCD: CPT | Performed by: INTERNAL MEDICINE

## 2024-01-10 ASSESSMENT — SLEEP AND FATIGUE QUESTIONNAIRES
ESS TOTAL SCORE: 12
HOW LIKELY ARE YOU TO NOD OFF OR FALL ASLEEP WHILE WATCHING TV: 3
HOW LIKELY ARE YOU TO NOD OFF OR FALL ASLEEP WHILE SITTING AND TALKING TO SOMEONE: 0
HOW LIKELY ARE YOU TO NOD OFF OR FALL ASLEEP WHILE LYING DOWN TO REST IN THE AFTERNOON WHEN CIRCUMSTANCES PERMIT: 0
HOW LIKELY ARE YOU TO NOD OFF OR FALL ASLEEP WHEN YOU ARE A PASSENGER IN A CAR FOR AN HOUR WITHOUT A BREAK: 3
HOW LIKELY ARE YOU TO NOD OFF OR FALL ASLEEP IN A CAR, WHILE STOPPED FOR A FEW MINUTES IN TRAFFIC: 0
HOW LIKELY ARE YOU TO NOD OFF OR FALL ASLEEP WHILE SITTING INACTIVE IN A PUBLIC PLACE: 0
HOW LIKELY ARE YOU TO NOD OFF OR FALL ASLEEP WHILE SITTING QUIETLY AFTER LUNCH WITHOUT ALCOHOL: 3
HOW LIKELY ARE YOU TO NOD OFF OR FALL ASLEEP WHILE SITTING AND READING: 3

## 2024-01-16 ENCOUNTER — TELEPHONE (OUTPATIENT)
Dept: ORTHOPEDIC SURGERY | Age: 79
End: 2024-01-16

## 2024-01-16 DIAGNOSIS — M75.102 LEFT ROTATOR CUFF TEAR ARTHROPATHY: ICD-10-CM

## 2024-01-16 DIAGNOSIS — M25.512 LEFT SHOULDER PAIN, UNSPECIFIED CHRONICITY: ICD-10-CM

## 2024-01-16 DIAGNOSIS — M12.812 LEFT ROTATOR CUFF TEAR ARTHROPATHY: ICD-10-CM

## 2024-01-16 DIAGNOSIS — M19.012 ARTHRITIS OF LEFT GLENOHUMERAL JOINT: Primary | ICD-10-CM

## 2024-01-17 PROBLEM — M12.812 LEFT ROTATOR CUFF TEAR ARTHROPATHY: Status: ACTIVE | Noted: 2024-01-16

## 2024-01-17 PROBLEM — M25.512 LEFT SHOULDER PAIN: Status: ACTIVE | Noted: 2024-01-16

## 2024-01-17 PROBLEM — M75.102 LEFT ROTATOR CUFF TEAR ARTHROPATHY: Status: ACTIVE | Noted: 2024-01-16

## 2024-01-17 PROBLEM — M19.012 ARTHRITIS OF LEFT GLENOHUMERAL JOINT: Status: ACTIVE | Noted: 2024-01-16

## 2024-01-17 ASSESSMENT — ENCOUNTER SYMPTOMS
SHORTNESS OF BREATH: 0
COUGH: 0
CHEST TIGHTNESS: 0
WHEEZING: 0

## 2024-01-25 DIAGNOSIS — M19.012 ARTHRITIS OF LEFT GLENOHUMERAL JOINT: ICD-10-CM

## 2024-01-25 DIAGNOSIS — M12.812 LEFT ROTATOR CUFF TEAR ARTHROPATHY: ICD-10-CM

## 2024-01-25 DIAGNOSIS — M75.102 LEFT ROTATOR CUFF TEAR ARTHROPATHY: ICD-10-CM

## 2024-01-25 DIAGNOSIS — M25.512 LEFT SHOULDER PAIN, UNSPECIFIED CHRONICITY: ICD-10-CM

## 2024-02-02 DIAGNOSIS — M25.512 LEFT SHOULDER PAIN, UNSPECIFIED CHRONICITY: ICD-10-CM

## 2024-02-02 DIAGNOSIS — M19.012 ARTHRITIS OF LEFT GLENOHUMERAL JOINT: Primary | ICD-10-CM

## 2024-02-02 DIAGNOSIS — M12.812 LEFT ROTATOR CUFF TEAR ARTHROPATHY: ICD-10-CM

## 2024-02-02 DIAGNOSIS — M75.102 LEFT ROTATOR CUFF TEAR ARTHROPATHY: ICD-10-CM

## 2024-02-02 DIAGNOSIS — M19.012 ARTHRITIS OF LEFT ACROMIOCLAVICULAR JOINT: ICD-10-CM

## 2024-02-05 ENCOUNTER — TELEPHONE (OUTPATIENT)
Dept: PULMONOLOGY | Age: 79
End: 2024-02-05

## 2024-02-05 RX ORDER — PREDNISONE 20 MG/1
TABLET ORAL
Qty: 15 TABLET | Refills: 0 | Status: SHIPPED | OUTPATIENT
Start: 2024-02-05

## 2024-02-05 RX ORDER — AZITHROMYCIN 250 MG/1
TABLET, FILM COATED ORAL
Qty: 6 TABLET | Refills: 0 | Status: SHIPPED | OUTPATIENT
Start: 2024-02-05

## 2024-02-05 NOTE — TELEPHONE ENCOUNTER
TRIAGE CALL      Complaint: cough/wheezing  Cough: yes  Productive:  sometimes  Bloody Sputum:  no  Increased SOB/Wheezing:  yes both  Duration: 1 wk   Fever/Chills: no  OTC Meds tried: no      Says she has been trying to take care of it herself but it has just gotten worse

## 2024-02-05 NOTE — TELEPHONE ENCOUNTER
LOV 1/9/24 as pre-op surgical assessment with DORINA Ortiz--- asthma, allergic rhinitis, QUINTIN on BiPAP, mild PH    Allergies: wasp, atorvastatin, iodine, lisinopril, tetracycline, PCN    Pt calling to report that she has had about 1 week of wheezing and cough; states that she has gotten worse over the course of a week. Does have mucus production at times that is yellow/white. No fever or chills. Does have sore throat that comes and goes. Having headaches.Hasn't tried anything from the OTC. Has albuterol inhaler/nebs, and Symbicort. Reporting that she is using these with little benefit. Pt does have audible wheezing. Has not had any viral testing.

## 2024-02-06 NOTE — TELEPHONE ENCOUNTER
Reviewed NP recommendations, she has Rx and started this morning. Encouraged to call back if not better in a few days, will need appt.

## 2024-02-13 ENCOUNTER — TELEPHONE (OUTPATIENT)
Dept: PULMONOLOGY | Age: 79
End: 2024-02-13

## 2024-02-13 NOTE — TELEPHONE ENCOUNTER
Patient is still wheezing, not able to get breath. Finished antibiotic Saturday. Feels like she should be seen in office.

## 2024-02-13 NOTE — TELEPHONE ENCOUNTER
Able to reach patient regarding report of wheezing & sob; has completed course of antibiotics, continues w/ nebulizer tx & steroids; no edema; cough is intermittently productive; when she starts wheezing she can feel it all the way into her stomach. Worried that she is not getting better and wants to be worked in for office appt, next available 2/15.

## 2024-02-13 NOTE — TELEPHONE ENCOUNTER
LOV 1/9/2024 Mrs Ortiz-pre-op exam, asthma, allergic rhinitis, seasonal allergic rhinitis, QUINTIN with BIPAP, mild pulm hypertension    Symbicort  Was scheduled for L reverse today shoulder arthroplasty with Dr Zheng    Called 2/5/2024 recommended viral testing, Prednisone taper and ZPAK, shoulder surgery rescheduled for 3/4/2024    Left message for a return call.

## 2024-02-15 ENCOUNTER — OFFICE VISIT (OUTPATIENT)
Dept: PULMONOLOGY | Age: 79
End: 2024-02-15
Payer: MEDICARE

## 2024-02-15 ENCOUNTER — HOSPITAL ENCOUNTER (OUTPATIENT)
Dept: GENERAL RADIOLOGY | Age: 79
Discharge: HOME | End: 2024-02-15
Payer: MEDICARE

## 2024-02-15 VITALS
SYSTOLIC BLOOD PRESSURE: 119 MMHG | DIASTOLIC BLOOD PRESSURE: 63 MMHG | WEIGHT: 160 LBS | TEMPERATURE: 97.3 F | BODY MASS INDEX: 31.41 KG/M2 | RESPIRATION RATE: 18 BRPM | HEIGHT: 60 IN | OXYGEN SATURATION: 98 % | HEART RATE: 59 BPM

## 2024-02-15 DIAGNOSIS — R05.1 ACUTE COUGH: ICD-10-CM

## 2024-02-15 DIAGNOSIS — I50.32 CHRONIC HEART FAILURE WITH PRESERVED EJECTION FRACTION (HCC): Chronic | ICD-10-CM

## 2024-02-15 DIAGNOSIS — R05.1 ACUTE COUGH: Primary | ICD-10-CM

## 2024-02-15 DIAGNOSIS — J45.41 MODERATE PERSISTENT ASTHMA WITH ACUTE EXACERBATION: ICD-10-CM

## 2024-02-15 PROCEDURE — 1036F TOBACCO NON-USER: CPT | Performed by: INTERNAL MEDICINE

## 2024-02-15 PROCEDURE — G8484 FLU IMMUNIZE NO ADMIN: HCPCS | Performed by: INTERNAL MEDICINE

## 2024-02-15 PROCEDURE — G8417 CALC BMI ABV UP PARAM F/U: HCPCS | Performed by: INTERNAL MEDICINE

## 2024-02-15 PROCEDURE — G8427 DOCREV CUR MEDS BY ELIG CLIN: HCPCS | Performed by: INTERNAL MEDICINE

## 2024-02-15 PROCEDURE — 1123F ACP DISCUSS/DSCN MKR DOCD: CPT | Performed by: INTERNAL MEDICINE

## 2024-02-15 PROCEDURE — 3074F SYST BP LT 130 MM HG: CPT | Performed by: INTERNAL MEDICINE

## 2024-02-15 PROCEDURE — 71046 X-RAY EXAM CHEST 2 VIEWS: CPT

## 2024-02-15 PROCEDURE — G8399 PT W/DXA RESULTS DOCUMENT: HCPCS | Performed by: INTERNAL MEDICINE

## 2024-02-15 PROCEDURE — 99214 OFFICE O/P EST MOD 30 MIN: CPT | Performed by: INTERNAL MEDICINE

## 2024-02-15 PROCEDURE — 1090F PRES/ABSN URINE INCON ASSESS: CPT | Performed by: INTERNAL MEDICINE

## 2024-02-15 PROCEDURE — 3078F DIAST BP <80 MM HG: CPT | Performed by: INTERNAL MEDICINE

## 2024-02-15 RX ORDER — PREDNISONE 20 MG/1
TABLET ORAL
Qty: 15 TABLET | Refills: 0 | Status: SHIPPED | OUTPATIENT
Start: 2024-02-15

## 2024-02-15 RX ORDER — AZITHROMYCIN 250 MG/1
TABLET, FILM COATED ORAL
Qty: 6 TABLET | Refills: 0 | Status: SHIPPED | OUTPATIENT
Start: 2024-02-15

## 2024-02-15 NOTE — PROGRESS NOTES
Name:  Zeynep Carrington  YOB: 1945   MRN: [id number]      Office Visit: 2/15/2024     ASSESSMENT AND PLAN:  (Medical Decision Making)    Impression: 78 y/o female with HFrEF, mild persistent asthma on symbicort, albuterol as well as mild sleep apnea treated with BiPAP    1. Acute cough  Somewhat improved but ongoing dry cough, wheezing and congestion over the past 2 weeks.  Ultimately this is probably a viral infection, but will extend out steroids and repeat Z-Antony.  She has a tetracycline allergy reported.  Will have her get a chest x-ray on the way out to assure no severe pneumonia infiltrate though do not hear anything more than some mild rhonchi on exam.  Does not have any evidence for volume overload or heart failure exacerbation on exam.  - XR CHEST (2 VW); Future  - predniSONE (DELTASONE) 20 MG tablet; 2 po q am pc for 3 days, 1 and 1/2 for 3 days, 1 for 3 days, 1/2 for 3 days, then stop or as directed.  Dispense: 15 tablet; Refill: 0  - azithromycin (ZITHROMAX) 250 MG tablet; TAKE 2 TABS ON DAY 1, THEN 1 TAB A DAY FOR 4 DAYS  Dispense: 6 tablet; Refill: 0    2. Moderate persistent asthma with acute exacerbation  Has been stable on Symbicort 2 puffs twice daily, but worsened wheezing during this exacerbation.  Will extend steroid course.  - predniSONE (DELTASONE) 20 MG tablet; 2 po q am pc for 3 days, 1 and 1/2 for 3 days, 1 for 3 days, 1/2 for 3 days, then stop or as directed.  Dispense: 15 tablet; Refill: 0    3. Chronic heart failure with preserved ejection fraction (HCC)  No evidence for volume overload or heart failure exacerbation.    Orders Placed This Encounter   Medications    predniSONE (DELTASONE) 20 MG tablet     Si po q am pc for 3 days, 1 and 1/2 for 3 days, 1 for 3 days, 1/2 for 3 days, then stop or as directed.     Dispense:  15 tablet     Refill:  0    azithromycin (ZITHROMAX) 250 MG tablet     Sig: TAKE 2 TABS ON DAY 1, THEN 1 TAB A DAY FOR 4 DAYS     Dispense:  6

## 2024-02-21 ENCOUNTER — OFFICE VISIT (OUTPATIENT)
Dept: ORTHOPEDIC SURGERY | Age: 79
End: 2024-02-21
Payer: MEDICARE

## 2024-02-21 DIAGNOSIS — M75.102 LEFT ROTATOR CUFF TEAR ARTHROPATHY: ICD-10-CM

## 2024-02-21 DIAGNOSIS — M12.812 LEFT ROTATOR CUFF TEAR ARTHROPATHY: ICD-10-CM

## 2024-02-21 DIAGNOSIS — M19.012 ARTHRITIS OF LEFT GLENOHUMERAL JOINT: Primary | ICD-10-CM

## 2024-02-21 DIAGNOSIS — M25.512 LEFT SHOULDER PAIN, UNSPECIFIED CHRONICITY: ICD-10-CM

## 2024-02-21 PROCEDURE — 1036F TOBACCO NON-USER: CPT | Performed by: ORTHOPAEDIC SURGERY

## 2024-02-21 PROCEDURE — G8399 PT W/DXA RESULTS DOCUMENT: HCPCS | Performed by: ORTHOPAEDIC SURGERY

## 2024-02-21 PROCEDURE — 1123F ACP DISCUSS/DSCN MKR DOCD: CPT | Performed by: ORTHOPAEDIC SURGERY

## 2024-02-21 PROCEDURE — G8428 CUR MEDS NOT DOCUMENT: HCPCS | Performed by: ORTHOPAEDIC SURGERY

## 2024-02-21 PROCEDURE — G8417 CALC BMI ABV UP PARAM F/U: HCPCS | Performed by: ORTHOPAEDIC SURGERY

## 2024-02-21 PROCEDURE — 99214 OFFICE O/P EST MOD 30 MIN: CPT | Performed by: ORTHOPAEDIC SURGERY

## 2024-02-21 PROCEDURE — G8484 FLU IMMUNIZE NO ADMIN: HCPCS | Performed by: ORTHOPAEDIC SURGERY

## 2024-02-21 PROCEDURE — 1090F PRES/ABSN URINE INCON ASSESS: CPT | Performed by: ORTHOPAEDIC SURGERY

## 2024-02-21 NOTE — PROGRESS NOTES
Name: Zeynep Carrington  YOB: 1945  Gender: female  MRN: [id number]    CC:   Chief Complaint   Patient presents with    Shoulder Pain     Left shoulder CT results        HPI: Patient presents for CT follow-up of the left shoulder in preparation for left total shoulder arthroplasty.  She states she had been on antibiotics for some congestion as well as a steroid taper.  She is done with antibiotics and is about to be finished with taper in a day or 2.    Allergies   Allergen Reactions    Wasp Venom Protein Anaphylaxis    Atorvastatin Other (See Comments)     High doses cause leg cramps. Able to tolerate low doses (<20mg/day)    Iodine Hives    Lisinopril Other (See Comments)    Tetracycline Other (See Comments)     ULCERS IN MOUTH    Penicillin G Rash     Past Medical History:   Diagnosis Date    Allergic rhinitis, cause unspecified 01/07/2015    Aortic valve disorders 01/07/2015    Arrhythmia     a-fib    Asthma     uses inhaler prn    CAD (coronary artery disease)     mild,  treated with med; NO MI, NO stents, NO CABG     Carotid stenosis 1/7/2015    Carpal tunnel syndrome 01/07/2015    bilat wrists-no issue now    Congestive heart failure (HCC)     COVID-19 vaccine series completed 03/22/2021    [employer]; 2/27/2021    Depression 06/30/2015    controlled     Esophageal reflux 01/07/2015    controlled with medication     Essential hypertension     controlled with medication    Fibromyalgia     Former smoker     1 ppd for 14 yrs; quit smoking 1981    GERD (gastroesophageal reflux disease)     controlled with medication     Heart failure (HCC)     last EF 60-65% echo done 10/26/2021    History of EKG 11/03/2021    a fib; patient started on eliquis BID and ASA 81 mg per [facility]    History of peptic ulcer disease     resolved with protonix     Hx of echocardiogram 10/26/2021    EF 60-65%     Menopause     Mixed hyperlipidemia 4/21/2016    Neuropathy     BLE; gabapentin     Osteoarthritis

## 2024-02-26 ENCOUNTER — APPOINTMENT (OUTPATIENT)
Dept: GENERAL RADIOLOGY | Age: 79
End: 2024-02-26
Payer: MEDICARE

## 2024-02-26 ENCOUNTER — HOSPITAL ENCOUNTER (EMERGENCY)
Age: 79
Discharge: HOME | End: 2024-02-26
Attending: STUDENT IN AN ORGANIZED HEALTH CARE EDUCATION/TRAINING PROGRAM
Payer: MEDICARE

## 2024-02-26 ENCOUNTER — HOSPITAL ENCOUNTER (OUTPATIENT)
Dept: SURGERY | Age: 79
Discharge: HOME | End: 2024-02-29

## 2024-02-26 VITALS
RESPIRATION RATE: 20 BRPM | SYSTOLIC BLOOD PRESSURE: 99 MMHG | DIASTOLIC BLOOD PRESSURE: 70 MMHG | OXYGEN SATURATION: 98 % | HEART RATE: 62 BPM | TEMPERATURE: 98.2 F

## 2024-02-26 VITALS
OXYGEN SATURATION: 95 % | RESPIRATION RATE: 19 BRPM | HEIGHT: 60 IN | HEART RATE: 54 BPM | SYSTOLIC BLOOD PRESSURE: 113 MMHG | DIASTOLIC BLOOD PRESSURE: 44 MMHG | WEIGHT: 163 LBS | BODY MASS INDEX: 32 KG/M2 | TEMPERATURE: 97.8 F

## 2024-02-26 DIAGNOSIS — R07.9 CHEST PAIN, UNSPECIFIED TYPE: Primary | ICD-10-CM

## 2024-02-26 LAB
ALBUMIN SERPL-MCNC: 3.3 G/DL (ref 3.2–4.6)
ALBUMIN/GLOB SERPL: 1 (ref 0.4–1.6)
ALP SERPL-CCNC: 46 U/L (ref 50–136)
ALT SERPL-CCNC: 31 U/L (ref 12–65)
ANION GAP SERPL CALC-SCNC: 4 MMOL/L (ref 2–11)
AST SERPL-CCNC: 27 U/L (ref 15–37)
BASOPHILS # BLD: 0.1 K/UL (ref 0–0.2)
BASOPHILS NFR BLD: 1 % (ref 0–2)
BILIRUB DIRECT SERPL-MCNC: 0.1 MG/DL
BILIRUB SERPL-MCNC: 0.3 MG/DL (ref 0.2–1.1)
BUN SERPL-MCNC: 56 MG/DL (ref 8–23)
CALCIUM SERPL-MCNC: 8.4 MG/DL (ref 8.3–10.4)
CHLORIDE SERPL-SCNC: 108 MMOL/L (ref 103–113)
CO2 SERPL-SCNC: 29 MMOL/L (ref 21–32)
CREAT SERPL-MCNC: 2.22 MG/DL (ref 0.6–1)
DIFFERENTIAL METHOD BLD: ABNORMAL
EOSINOPHIL # BLD: 0.3 K/UL (ref 0–0.8)
EOSINOPHIL NFR BLD: 3 % (ref 0.5–7.8)
ERYTHROCYTE [DISTWIDTH] IN BLOOD BY AUTOMATED COUNT: 15.1 % (ref 11.9–14.6)
GLOBULIN SER CALC-MCNC: 3.4 G/DL (ref 2.8–4.5)
GLUCOSE SERPL-MCNC: 83 MG/DL (ref 65–100)
HCT VFR BLD AUTO: 39.1 % (ref 35.8–46.3)
HGB BLD-MCNC: 11.9 G/DL (ref 11.7–15.4)
IMM GRANULOCYTES # BLD AUTO: 0 K/UL (ref 0–0.5)
IMM GRANULOCYTES NFR BLD AUTO: 0 % (ref 0–5)
LYMPHOCYTES # BLD: 2.7 K/UL (ref 0.5–4.6)
LYMPHOCYTES NFR BLD: 27 % (ref 13–44)
MCH RBC QN AUTO: 27 PG (ref 26.1–32.9)
MCHC RBC AUTO-ENTMCNC: 30.4 G/DL (ref 31.4–35)
MCV RBC AUTO: 88.7 FL (ref 82–102)
MONOCYTES # BLD: 0.9 K/UL (ref 0.1–1.3)
MONOCYTES NFR BLD: 9 % (ref 4–12)
NEUTS SEG # BLD: 5.9 K/UL (ref 1.7–8.2)
NEUTS SEG NFR BLD: 60 % (ref 43–78)
NRBC # BLD: 0 K/UL (ref 0–0.2)
NT PRO BNP: ABNORMAL PG/ML
PLATELET # BLD AUTO: 236 K/UL (ref 150–450)
PMV BLD AUTO: 10.2 FL (ref 9.4–12.3)
POTASSIUM SERPL-SCNC: 4.5 MMOL/L (ref 3.5–5.1)
PROT SERPL-MCNC: 6.7 G/DL (ref 6.3–8.2)
RBC # BLD AUTO: 4.41 M/UL (ref 4.05–5.2)
SODIUM SERPL-SCNC: 141 MMOL/L (ref 136–146)
TROPONIN I SERPL HS-MCNC: 14.5 PG/ML (ref 0–14)
TROPONIN I SERPL HS-MCNC: 15.3 PG/ML (ref 0–14)
WBC # BLD AUTO: 9.9 K/UL (ref 4.3–11.1)

## 2024-02-26 PROCEDURE — 94762 N-INVAS EAR/PLS OXIMTRY CONT: CPT

## 2024-02-26 PROCEDURE — 2580000003 HC RX 258: Performed by: STUDENT IN AN ORGANIZED HEALTH CARE EDUCATION/TRAINING PROGRAM

## 2024-02-26 PROCEDURE — 85025 COMPLETE CBC W/AUTO DIFF WBC: CPT

## 2024-02-26 PROCEDURE — 99285 EMERGENCY DEPT VISIT HI MDM: CPT

## 2024-02-26 PROCEDURE — 84484 ASSAY OF TROPONIN QUANT: CPT

## 2024-02-26 PROCEDURE — 71045 X-RAY EXAM CHEST 1 VIEW: CPT

## 2024-02-26 PROCEDURE — 80076 HEPATIC FUNCTION PANEL: CPT

## 2024-02-26 PROCEDURE — 80048 BASIC METABOLIC PNL TOTAL CA: CPT

## 2024-02-26 PROCEDURE — 83880 ASSAY OF NATRIURETIC PEPTIDE: CPT

## 2024-02-26 PROCEDURE — 6370000000 HC RX 637 (ALT 250 FOR IP): Performed by: STUDENT IN AN ORGANIZED HEALTH CARE EDUCATION/TRAINING PROGRAM

## 2024-02-26 RX ORDER — 0.9 % SODIUM CHLORIDE 0.9 %
500 INTRAVENOUS SOLUTION INTRAVENOUS
Status: COMPLETED | OUTPATIENT
Start: 2024-02-26 | End: 2024-02-26

## 2024-02-26 RX ORDER — ACETAMINOPHEN 500 MG
1000 TABLET ORAL
Status: COMPLETED | OUTPATIENT
Start: 2024-02-26 | End: 2024-02-26

## 2024-02-26 RX ORDER — 0.9 % SODIUM CHLORIDE 0.9 %
500 INTRAVENOUS SOLUTION INTRAVENOUS
Status: DISCONTINUED | OUTPATIENT
Start: 2024-02-26 | End: 2024-02-26

## 2024-02-26 RX ADMIN — ACETAMINOPHEN 1000 MG: 500 TABLET, FILM COATED ORAL at 17:39

## 2024-02-26 RX ADMIN — SODIUM CHLORIDE 500 ML: 9 INJECTION, SOLUTION INTRAVENOUS at 17:34

## 2024-02-26 ASSESSMENT — PAIN DESCRIPTION - LOCATION
LOCATION: SHOULDER
LOCATION: HEAD
LOCATION: CHEST
LOCATION: HEAD

## 2024-02-26 ASSESSMENT — PAIN SCALES - GENERAL
PAINLEVEL_OUTOF10: 5
PAINLEVEL_OUTOF10: 9
PAINLEVEL_OUTOF10: 1
PAINLEVEL_OUTOF10: 3

## 2024-02-26 ASSESSMENT — PAIN DESCRIPTION - DESCRIPTORS: DESCRIPTORS: SHARP;SHOOTING

## 2024-02-26 ASSESSMENT — PAIN DESCRIPTION - ORIENTATION
ORIENTATION: LEFT
ORIENTATION: LEFT

## 2024-02-26 ASSESSMENT — PAIN - FUNCTIONAL ASSESSMENT: PAIN_FUNCTIONAL_ASSESSMENT: 0-10

## 2024-02-26 NOTE — PROGRESS NOTES
Pt here for PAT appointment, shoulder surgery is scheduled for 03/04/2024. Pt reports having on and off chest pains, SOB, dizziness, h/a and is feeling very weak for the past couple days. Per pt has not been able to get around like normal due to being very weak. Patient and son advised to go to the emergency room. Patient verbalized understanding. Patient left PAT in no signs of distress. Chart in box for anesthesia review and flagged for CN to f/u. Surgeon's office notified.

## 2024-02-26 NOTE — ED TRIAGE NOTES
Patient presents with c/o fatigue and dizziness which started yesterday and left sided chest pain which started this morning. Denies nausea/vomiting diaphoresis, endorses dyspnea and intermittent headache \"for days\".     Patient has a history of Angina     No medication PTA

## 2024-02-26 NOTE — ED NOTES
I have reviewed discharge instructions with the patient and caregiver.  The patient and caregiver verbalized understanding.    Patient left ED via Discharge Method: wheelchair to Home with family.    Opportunity for questions and clarification provided.       Patient given 0 scripts.         To continue your aftercare when you leave the hospital, you may receive an automated call from our care team to check in on how you are doing.  This is a free service and part of our promise to provide the best care and service to meet your aftercare needs.” If you have questions, or wish to unsubscribe from this service please call [phone].  Thank you for Choosing our [facility] Emergency Department.

## 2024-02-26 NOTE — ED NOTES
Orthostatic Vitals:      2/26/2024     5:25 PM   Orthostatic Vitals   Orthostatic B/P and Pulse? Yes   Blood Pressure Lying 112/69   Pulse Lying 57 PER MINUTE   Blood Pressure Sitting 112/97   Pulse Sitting 60 PER MINUTE   Blood Pressure Standing 106/67   Pulse Standing 51 PER MINUTE

## 2024-02-26 NOTE — ED PROVIDER NOTES
intermittent chest pain, shortness of breath and now lightheadedness.  Patient states chest pain and shortness of breath has been present intermittently for quite some time.  She is unsure of just how long however family at bedside states she has not been forthcoming with this information at home.  She also reports new onset dizziness described as a lightheaded type feeling when she stands and attempts to move.  This started last evening.  There is been no syncopal event reported.  She denies nausea, vomiting or sweating.  Patient endorses a chronic cough that has been ongoing for quite some time treated with multiple rounds of antibiotics and steroids.  Patient states cough is nonproductive currently.  History includes CHF and atrial fibrillation.  Patient currently taking Eliquis    The history is provided by the patient and a relative. No  was used.       ROS     Review of Systems     Physical Exam     Vitals signs and nursing note reviewed:  Vitals:    02/26/24 1718 02/26/24 1720 02/26/24 1725 02/26/24 1815   BP: 112/69 (!) 112/97 106/67 (!) 113/44   Pulse: 52 58 63 54   Resp: 10 16 15 19   Temp:       TempSrc:       SpO2: 97% 90% 96% 95%   Weight:       Height:          Physical Exam  Vitals and nursing note reviewed.   Constitutional:       General: She is not in acute distress.     Appearance: Normal appearance. She is normal weight. She is not ill-appearing or toxic-appearing.      Comments: Generally well-appearing, alert and oriented x4.  No acute distress, speaks in clear, fluid sentences.   HENT:      Head: Normocephalic and atraumatic.      Right Ear: External ear normal.      Left Ear: External ear normal.      Nose: Nose normal.      Mouth/Throat:      Mouth: Mucous membranes are moist.   Eyes:      General: No scleral icterus.        Right eye: No discharge.         Left eye: No discharge.      Extraocular Movements: Extraocular movements intact.   Cardiovascular:      Rate and  Rhythm: Normal rate and regular rhythm.      Pulses: Normal pulses.      Heart sounds: Normal heart sounds.   Pulmonary:      Effort: Pulmonary effort is normal. No tachypnea, bradypnea, accessory muscle usage, prolonged expiration or respiratory distress.      Breath sounds: Normal breath sounds and air entry. No stridor. No decreased breath sounds, wheezing, rhonchi or rales.   Abdominal:      General: Abdomen is flat. There is no distension.      Palpations: There is no mass.      Tenderness: There is no abdominal tenderness. There is no right CVA tenderness, left CVA tenderness, guarding or rebound. Negative signs include Ricci's sign and McBurney's sign.      Hernia: No hernia is present.   Musculoskeletal:         General: No swelling, tenderness or deformity. Normal range of motion.      Cervical back: Normal range of motion.   Skin:     General: Skin is warm.      Capillary Refill: Capillary refill takes less than 2 seconds.   Neurological:      General: No focal deficit present.      Mental Status: She is alert.   Psychiatric:         Mood and Affect: Mood normal.        Procedures     Procedures    Orders Placed This Encounter   Procedures    XR CHEST PORTABLE    Basic Metabolic Panel    CBC with Auto Differential    Troponin    Hepatic Function Panel    Brain Natriuretic Peptide    [facility] [place]    Cardiac Monitor - ED Only    EKG 12 Lead    Saline lock IV        Medications given during this emergency department visit:  Medications   acetaminophen (TYLENOL) tablet 1,000 mg (1,000 mg Oral Given 2/26/24 7482)   sodium chloride 0.9 % bolus 500 mL (0 mLs IntraVENous Stopped 2/26/24 1804)       Discharge Medication List as of 2/26/2024  6:14 PM           Past Medical History:   Diagnosis Date    Allergic rhinitis, cause unspecified 01/07/2015    Aortic valve disorders 01/07/2015    Arrhythmia     a-fib    Asthma     uses inhaler prn    CAD (coronary artery disease)     mild,  treated

## 2024-02-26 NOTE — DISCHARGE INSTRUCTIONS
Your evaluation today reveals no evidence of acute heart abnormality.  However, you require close outpatient follow-up with a cardiology specialist. This appointment has been arranged and you should receive a call from this specialist within the next 48 hours to schedule your appointment.  If you do not receive this call, please call the number listed.  Return immediately if your symptoms worsen or you have any concerns or questions.

## 2024-02-28 ENCOUNTER — TELEPHONE (OUTPATIENT)
Dept: ORTHOPEDIC SURGERY | Age: 79
End: 2024-02-28

## 2024-02-28 ENCOUNTER — TELEPHONE (OUTPATIENT)
Dept: SURGERY | Age: 79
End: 2024-02-28

## 2024-02-28 NOTE — TELEPHONE ENCOUNTER
Dr. Dick reviewed chart and states \"Dr. Zheng aware of cancellation. Follow up with pulmonary for clearance. Delay until cleared. No surgery prior to 03/11/24.\"

## 2024-02-28 NOTE — TELEPHONE ENCOUNTER
Patient left msg that she tried to call [facility] appt and they told her we needed to send them what they needed?

## 2024-02-28 NOTE — PROGRESS NOTES
Dr. Dick reviewed chart and states \"Dr. Zheng aware of cancellation. Follow up with pulmonary for clearance. Delay until cleared. No surgery prior to 03/11/24.\" Surgeon's office notified.

## 2024-02-29 ENCOUNTER — TELEPHONE (OUTPATIENT)
Dept: PULMONOLOGY | Age: 79
End: 2024-02-29

## 2024-02-29 NOTE — TELEPHONE ENCOUNTER
Patient ask if [facility] has sent the fax for the surgical clearance for her surgery . She ask for a call so she can touch bases with them again

## 2024-02-29 NOTE — TELEPHONE ENCOUNTER
Spoke with the patient and notified her that we have not received a Surgical Clearance form from [facility] [place].  I provided the patient with two fax numbers ( [phone] & [phone]) to provide to them to have the form faxed.  Patient verbalized understanding and stated that she will give [facility] [place] a call and will have them fax the form.  No further questions or concerns were asked at this time. // Tracy Harding [facility]

## 2024-03-01 ENCOUNTER — APPOINTMENT (OUTPATIENT)
Dept: GENERAL RADIOLOGY | Age: 79
DRG: 193 | End: 2024-03-01
Payer: MEDICARE

## 2024-03-01 ENCOUNTER — HOSPITAL ENCOUNTER (INPATIENT)
Age: 79
LOS: 6 days | Discharge: NURSING, REHAB OR LONG-TERM CARE | DRG: 193 | End: 2024-03-07
Attending: EMERGENCY MEDICINE | Admitting: FAMILY MEDICINE
Payer: MEDICARE

## 2024-03-01 DIAGNOSIS — R53.1 GENERAL WEAKNESS: ICD-10-CM

## 2024-03-01 DIAGNOSIS — J18.9 PNEUMONIA OF BOTH LOWER LOBES DUE TO INFECTIOUS ORGANISM: ICD-10-CM

## 2024-03-01 DIAGNOSIS — R79.89 ELEVATED TROPONIN: ICD-10-CM

## 2024-03-01 DIAGNOSIS — J44.1 COPD EXACERBATION (HCC): Primary | ICD-10-CM

## 2024-03-01 PROBLEM — N17.9 AKI (ACUTE KIDNEY INJURY) (HCC): Status: ACTIVE | Noted: 2024-03-01

## 2024-03-01 LAB
ALBUMIN SERPL-MCNC: 3.2 G/DL (ref 3.2–4.6)
ALBUMIN/GLOB SERPL: 0.9 (ref 0.4–1.6)
ALP SERPL-CCNC: 45 U/L (ref 50–136)
ALT SERPL-CCNC: 53 U/L (ref 12–65)
ANION GAP SERPL CALC-SCNC: 4 MMOL/L (ref 2–11)
APPEARANCE UR: ABNORMAL
AST SERPL-CCNC: 33 U/L (ref 15–37)
BACTERIA URNS QL MICRO: ABNORMAL /HPF
BASOPHILS # BLD: 0 K/UL (ref 0–0.2)
BASOPHILS NFR BLD: 0 % (ref 0–2)
BILIRUB SERPL-MCNC: 0.4 MG/DL (ref 0.2–1.1)
BILIRUB UR QL: NEGATIVE
BUN SERPL-MCNC: 46 MG/DL (ref 8–23)
CALCIUM SERPL-MCNC: 8.8 MG/DL (ref 8.3–10.4)
CASTS URNS QL MICRO: 0 /LPF
CHLORIDE SERPL-SCNC: 109 MMOL/L (ref 103–113)
CO2 SERPL-SCNC: 29 MMOL/L (ref 21–32)
COLOR UR: YELLOW
CREAT SERPL-MCNC: 2.1 MG/DL (ref 0.6–1)
CRYSTALS URNS QL MICRO: 0 /LPF
DIFFERENTIAL METHOD BLD: ABNORMAL
EOSINOPHIL # BLD: 0.4 K/UL (ref 0–0.8)
EOSINOPHIL NFR BLD: 3 % (ref 0.5–7.8)
EPI CELLS #/AREA URNS HPF: ABNORMAL /HPF
ERYTHROCYTE [DISTWIDTH] IN BLOOD BY AUTOMATED COUNT: 15.7 % (ref 11.9–14.6)
FLUAV RNA SPEC QL NAA+PROBE: NOT DETECTED
FLUBV RNA SPEC QL NAA+PROBE: NOT DETECTED
GLOBULIN SER CALC-MCNC: 3.6 G/DL (ref 2.8–4.5)
GLUCOSE SERPL-MCNC: 102 MG/DL (ref 65–100)
GLUCOSE UR STRIP.AUTO-MCNC: NEGATIVE MG/DL
HCT VFR BLD AUTO: 34 % (ref 35.8–46.3)
HGB BLD-MCNC: 10.5 G/DL (ref 11.7–15.4)
HGB UR QL STRIP: NEGATIVE
IMM GRANULOCYTES # BLD AUTO: 0.1 K/UL (ref 0–0.5)
IMM GRANULOCYTES NFR BLD AUTO: 1 % (ref 0–5)
KETONES UR QL STRIP.AUTO: NEGATIVE MG/DL
LACTATE SERPL-SCNC: 1.2 MMOL/L (ref 0.4–2)
LEUKOCYTE ESTERASE UR QL STRIP.AUTO: ABNORMAL
LYMPHOCYTES # BLD: 0.9 K/UL (ref 0.5–4.6)
LYMPHOCYTES NFR BLD: 8 % (ref 13–44)
MCH RBC QN AUTO: 27.1 PG (ref 26.1–32.9)
MCHC RBC AUTO-ENTMCNC: 30.9 G/DL (ref 31.4–35)
MCV RBC AUTO: 87.9 FL (ref 82–102)
MONOCYTES # BLD: 0.6 K/UL (ref 0.1–1.3)
MONOCYTES NFR BLD: 5 % (ref 4–12)
MUCOUS THREADS URNS QL MICRO: 0 /LPF
NEUTS SEG # BLD: 8.5 K/UL (ref 1.7–8.2)
NEUTS SEG NFR BLD: 83 % (ref 43–78)
NITRITE UR QL STRIP.AUTO: NEGATIVE
NRBC # BLD: 0 K/UL (ref 0–0.2)
OTHER OBSERVATIONS: ABNORMAL
PH UR STRIP: 7.5 (ref 5–9)
PLATELET # BLD AUTO: 152 K/UL (ref 150–450)
PMV BLD AUTO: 10.7 FL (ref 9.4–12.3)
POTASSIUM SERPL-SCNC: 4.2 MMOL/L (ref 3.5–5.1)
PROCALCITONIN SERPL-MCNC: <0.05 NG/ML (ref 0–0.49)
PROT SERPL-MCNC: 6.8 G/DL (ref 6.3–8.2)
PROT UR STRIP-MCNC: NEGATIVE MG/DL
RBC # BLD AUTO: 3.87 M/UL (ref 4.05–5.2)
RBC #/AREA URNS HPF: ABNORMAL /HPF
SARS-COV-2 RDRP RESP QL NAA+PROBE: NOT DETECTED
SODIUM SERPL-SCNC: 142 MMOL/L (ref 136–146)
SOURCE: NORMAL
SP GR UR REFRACTOMETRY: 1.01 (ref 1–1.02)
UROBILINOGEN UR QL STRIP.AUTO: 0.2 EU/DL (ref 0.2–1)
WBC # BLD AUTO: 10.3 K/UL (ref 4.3–11.1)
WBC URNS QL MICRO: ABNORMAL /HPF

## 2024-03-01 PROCEDURE — 6370000000 HC RX 637 (ALT 250 FOR IP): Performed by: EMERGENCY MEDICINE

## 2024-03-01 PROCEDURE — 83605 ASSAY OF LACTIC ACID: CPT

## 2024-03-01 PROCEDURE — 87502 INFLUENZA DNA AMP PROBE: CPT

## 2024-03-01 PROCEDURE — 84145 PROCALCITONIN (PCT): CPT

## 2024-03-01 PROCEDURE — 94640 AIRWAY INHALATION TREATMENT: CPT

## 2024-03-01 PROCEDURE — 81001 URINALYSIS AUTO W/SCOPE: CPT

## 2024-03-01 PROCEDURE — 6360000002 HC RX W HCPCS: Performed by: EMERGENCY MEDICINE

## 2024-03-01 PROCEDURE — 71045 X-RAY EXAM CHEST 1 VIEW: CPT

## 2024-03-01 PROCEDURE — 1100000000 HC RM PRIVATE

## 2024-03-01 PROCEDURE — 96365 THER/PROPH/DIAG IV INF INIT: CPT

## 2024-03-01 PROCEDURE — 87186 SC STD MICRODIL/AGAR DIL: CPT

## 2024-03-01 PROCEDURE — 80053 COMPREHEN METABOLIC PANEL: CPT

## 2024-03-01 PROCEDURE — 2580000003 HC RX 258: Performed by: EMERGENCY MEDICINE

## 2024-03-01 PROCEDURE — 87086 URINE CULTURE/COLONY COUNT: CPT

## 2024-03-01 PROCEDURE — 87040 BLOOD CULTURE FOR BACTERIA: CPT

## 2024-03-01 PROCEDURE — 85025 COMPLETE CBC W/AUTO DIFF WBC: CPT

## 2024-03-01 PROCEDURE — 87635 SARS-COV-2 COVID-19 AMP PRB: CPT

## 2024-03-01 PROCEDURE — 5A09457 ASSISTANCE WITH RESPIRATORY VENTILATION, 24-96 CONSECUTIVE HOURS, CONTINUOUS POSITIVE AIRWAY PRESSURE: ICD-10-PCS | Performed by: FAMILY MEDICINE

## 2024-03-01 PROCEDURE — 99285 EMERGENCY DEPT VISIT HI MDM: CPT

## 2024-03-01 PROCEDURE — 87088 URINE BACTERIA CULTURE: CPT

## 2024-03-01 RX ORDER — SODIUM CHLORIDE 9 MG/ML
INJECTION, SOLUTION INTRAVENOUS PRN
Status: DISCONTINUED | OUTPATIENT
Start: 2024-03-01 | End: 2024-03-07 | Stop reason: HOSPADM

## 2024-03-01 RX ORDER — ACETAMINOPHEN 650 MG/1
650 SUPPOSITORY RECTAL EVERY 6 HOURS PRN
Status: DISCONTINUED | OUTPATIENT
Start: 2024-03-01 | End: 2024-03-07 | Stop reason: HOSPADM

## 2024-03-01 RX ORDER — PANTOPRAZOLE SODIUM 40 MG/1
40 TABLET, DELAYED RELEASE ORAL
Status: DISCONTINUED | OUTPATIENT
Start: 2024-03-02 | End: 2024-03-07 | Stop reason: HOSPADM

## 2024-03-01 RX ORDER — MAGNESIUM HYDROXIDE/ALUMINUM HYDROXICE/SIMETHICONE 120; 1200; 1200 MG/30ML; MG/30ML; MG/30ML
30 SUSPENSION ORAL EVERY 6 HOURS PRN
Status: DISCONTINUED | OUTPATIENT
Start: 2024-03-01 | End: 2024-03-07 | Stop reason: HOSPADM

## 2024-03-01 RX ORDER — FAMOTIDINE 20 MG/1
10 TABLET, FILM COATED ORAL DAILY PRN
Status: DISCONTINUED | OUTPATIENT
Start: 2024-03-01 | End: 2024-03-07 | Stop reason: HOSPADM

## 2024-03-01 RX ORDER — ASPIRIN 81 MG/1
81 TABLET ORAL ONCE
Status: COMPLETED | OUTPATIENT
Start: 2024-03-02 | End: 2024-03-02

## 2024-03-01 RX ORDER — GABAPENTIN 100 MG/1
200 CAPSULE ORAL 3 TIMES DAILY
Status: DISCONTINUED | OUTPATIENT
Start: 2024-03-01 | End: 2024-03-04

## 2024-03-01 RX ORDER — PREDNISONE 20 MG/1
40 TABLET ORAL DAILY
Status: COMPLETED | OUTPATIENT
Start: 2024-03-02 | End: 2024-03-06

## 2024-03-01 RX ORDER — POLYETHYLENE GLYCOL 3350 17 G/17G
17 POWDER, FOR SOLUTION ORAL DAILY PRN
Status: DISCONTINUED | OUTPATIENT
Start: 2024-03-01 | End: 2024-03-07 | Stop reason: HOSPADM

## 2024-03-01 RX ORDER — ALBUTEROL SULFATE 2.5 MG/3ML
5 SOLUTION RESPIRATORY (INHALATION)
Status: COMPLETED | OUTPATIENT
Start: 2024-03-01 | End: 2024-03-01

## 2024-03-01 RX ORDER — AZITHROMYCIN 250 MG/1
250 TABLET, FILM COATED ORAL EVERY 24 HOURS
Status: COMPLETED | OUTPATIENT
Start: 2024-03-02 | End: 2024-03-03

## 2024-03-01 RX ORDER — ONDANSETRON 2 MG/ML
4 INJECTION INTRAMUSCULAR; INTRAVENOUS EVERY 6 HOURS PRN
Status: DISCONTINUED | OUTPATIENT
Start: 2024-03-01 | End: 2024-03-07 | Stop reason: HOSPADM

## 2024-03-01 RX ORDER — METOPROLOL SUCCINATE 25 MG/1
25 TABLET, EXTENDED RELEASE ORAL DAILY
Status: DISCONTINUED | OUTPATIENT
Start: 2024-03-02 | End: 2024-03-07 | Stop reason: HOSPADM

## 2024-03-01 RX ORDER — IPRATROPIUM BROMIDE AND ALBUTEROL SULFATE 2.5; .5 MG/3ML; MG/3ML
1 SOLUTION RESPIRATORY (INHALATION)
Status: COMPLETED | OUTPATIENT
Start: 2024-03-01 | End: 2024-03-01

## 2024-03-01 RX ORDER — ROSUVASTATIN CALCIUM 10 MG/1
10 TABLET, COATED ORAL DAILY
Status: DISCONTINUED | OUTPATIENT
Start: 2024-03-02 | End: 2024-03-04

## 2024-03-01 RX ORDER — HYDRALAZINE HYDROCHLORIDE 20 MG/ML
10 INJECTION INTRAMUSCULAR; INTRAVENOUS EVERY 6 HOURS PRN
Status: DISCONTINUED | OUTPATIENT
Start: 2024-03-01 | End: 2024-03-07 | Stop reason: HOSPADM

## 2024-03-01 RX ORDER — GUAIFENESIN 600 MG/1
1200 TABLET, EXTENDED RELEASE ORAL 2 TIMES DAILY
Status: DISCONTINUED | OUTPATIENT
Start: 2024-03-01 | End: 2024-03-04

## 2024-03-01 RX ORDER — ACETAMINOPHEN 325 MG/1
650 TABLET ORAL EVERY 6 HOURS PRN
Status: DISCONTINUED | OUTPATIENT
Start: 2024-03-01 | End: 2024-03-07 | Stop reason: HOSPADM

## 2024-03-01 RX ORDER — ONDANSETRON 4 MG/1
4 TABLET, ORALLY DISINTEGRATING ORAL EVERY 8 HOURS PRN
Status: DISCONTINUED | OUTPATIENT
Start: 2024-03-01 | End: 2024-03-07 | Stop reason: HOSPADM

## 2024-03-01 RX ORDER — SODIUM CHLORIDE 0.9 % (FLUSH) 0.9 %
5-40 SYRINGE (ML) INJECTION PRN
Status: DISCONTINUED | OUTPATIENT
Start: 2024-03-01 | End: 2024-03-07 | Stop reason: HOSPADM

## 2024-03-01 RX ORDER — BISACODYL 10 MG
10 SUPPOSITORY, RECTAL RECTAL DAILY PRN
Status: DISCONTINUED | OUTPATIENT
Start: 2024-03-01 | End: 2024-03-07 | Stop reason: HOSPADM

## 2024-03-01 RX ORDER — SODIUM CHLORIDE 0.9 % (FLUSH) 0.9 %
5-40 SYRINGE (ML) INJECTION EVERY 12 HOURS SCHEDULED
Status: DISCONTINUED | OUTPATIENT
Start: 2024-03-01 | End: 2024-03-07 | Stop reason: HOSPADM

## 2024-03-01 RX ADMIN — IPRATROPIUM BROMIDE AND ALBUTEROL SULFATE 1 DOSE: 2.5; .5 SOLUTION RESPIRATORY (INHALATION) at 20:25

## 2024-03-01 RX ADMIN — ALBUTEROL SULFATE 5 MG: 2.5 SOLUTION RESPIRATORY (INHALATION) at 22:05

## 2024-03-01 RX ADMIN — AZITHROMYCIN MONOHYDRATE 500 MG: 500 INJECTION, POWDER, LYOPHILIZED, FOR SOLUTION INTRAVENOUS at 22:11

## 2024-03-01 RX ADMIN — WATER 125 MG: 1 INJECTION INTRAMUSCULAR; INTRAVENOUS; SUBCUTANEOUS at 22:08

## 2024-03-01 RX ADMIN — CEFEPIME 2000 MG: 2 INJECTION, POWDER, FOR SOLUTION INTRAVENOUS at 20:14

## 2024-03-01 ASSESSMENT — LIFESTYLE VARIABLES
HOW MANY STANDARD DRINKS CONTAINING ALCOHOL DO YOU HAVE ON A TYPICAL DAY: PATIENT DOES NOT DRINK
HOW OFTEN DO YOU HAVE A DRINK CONTAINING ALCOHOL: NEVER

## 2024-03-01 ASSESSMENT — PAIN SCALES - GENERAL: PAINLEVEL_OUTOF10: 10

## 2024-03-01 ASSESSMENT — PAIN - FUNCTIONAL ASSESSMENT: PAIN_FUNCTIONAL_ASSESSMENT: 0-10

## 2024-03-01 NOTE — ED TRIAGE NOTES
Patient reports of chills and shaking started this afternoon. Patient denies fever, patient reports of shortness of breath, cold sweat, fatigue and weakness, sick for a month. Patient reports of congestion with dry cough, runny nose, post nasal drip, headaches,dizzy, lightheaded,  denies trouble swallowing. USED inhaler 3x today.   Patient last had her abt 2 weeks ago. [place] on Monday for the same reason. Unknown of covid and flu results.     HX: seasonal allergies, CHF, asthma,    Observed - confusion during triage with patient.

## 2024-03-02 LAB
ANION GAP SERPL CALC-SCNC: 6 MMOL/L (ref 2–11)
BASOPHILS # BLD: 0 K/UL (ref 0–0.2)
BASOPHILS NFR BLD: 0 % (ref 0–2)
BUN SERPL-MCNC: 44 MG/DL (ref 8–23)
CALCIUM SERPL-MCNC: 8.5 MG/DL (ref 8.3–10.4)
CHLORIDE SERPL-SCNC: 112 MMOL/L (ref 103–113)
CO2 SERPL-SCNC: 26 MMOL/L (ref 21–32)
CREAT SERPL-MCNC: 2 MG/DL (ref 0.6–1)
DIFFERENTIAL METHOD BLD: ABNORMAL
EKG ATRIAL RATE: 91 BPM
EKG DIAGNOSIS: NORMAL
EKG P AXIS: 77 DEGREES
EKG P-R INTERVAL: 274 MS
EKG Q-T INTERVAL: 330 MS
EKG QRS DURATION: 70 MS
EKG QTC CALCULATION (BAZETT): 405 MS
EKG R AXIS: -9 DEGREES
EKG T AXIS: 69 DEGREES
EKG VENTRICULAR RATE: 91 BPM
EOSINOPHIL # BLD: 0 K/UL (ref 0–0.8)
EOSINOPHIL NFR BLD: 0 % (ref 0.5–7.8)
ERYTHROCYTE [DISTWIDTH] IN BLOOD BY AUTOMATED COUNT: 15.6 % (ref 11.9–14.6)
GLUCOSE SERPL-MCNC: 170 MG/DL (ref 65–100)
HCT VFR BLD AUTO: 30.3 % (ref 35.8–46.3)
HGB BLD-MCNC: 9.4 G/DL (ref 11.7–15.4)
IMM GRANULOCYTES # BLD AUTO: 0.1 K/UL (ref 0–0.5)
IMM GRANULOCYTES NFR BLD AUTO: 1 % (ref 0–5)
LYMPHOCYTES # BLD: 0.6 K/UL (ref 0.5–4.6)
LYMPHOCYTES NFR BLD: 7 % (ref 13–44)
MCH RBC QN AUTO: 27 PG (ref 26.1–32.9)
MCHC RBC AUTO-ENTMCNC: 31 G/DL (ref 31.4–35)
MCV RBC AUTO: 87.1 FL (ref 82–102)
MONOCYTES # BLD: 0.2 K/UL (ref 0.1–1.3)
MONOCYTES NFR BLD: 2 % (ref 4–12)
NEUTS SEG # BLD: 8.6 K/UL (ref 1.7–8.2)
NEUTS SEG NFR BLD: 90 % (ref 43–78)
NRBC # BLD: 0 K/UL (ref 0–0.2)
PLATELET # BLD AUTO: 121 K/UL (ref 150–450)
PMV BLD AUTO: 10.9 FL (ref 9.4–12.3)
POTASSIUM SERPL-SCNC: 4 MMOL/L (ref 3.5–5.1)
RBC # BLD AUTO: 3.48 M/UL (ref 4.05–5.2)
SODIUM SERPL-SCNC: 144 MMOL/L (ref 136–146)
WBC # BLD AUTO: 9.5 K/UL (ref 4.3–11.1)

## 2024-03-02 PROCEDURE — 94640 AIRWAY INHALATION TREATMENT: CPT

## 2024-03-02 PROCEDURE — 80048 BASIC METABOLIC PNL TOTAL CA: CPT

## 2024-03-02 PROCEDURE — 85025 COMPLETE CBC W/AUTO DIFF WBC: CPT

## 2024-03-02 PROCEDURE — 2580000003 HC RX 258: Performed by: FAMILY MEDICINE

## 2024-03-02 PROCEDURE — 97535 SELF CARE MNGMENT TRAINING: CPT

## 2024-03-02 PROCEDURE — 6370000000 HC RX 637 (ALT 250 FOR IP): Performed by: FAMILY MEDICINE

## 2024-03-02 PROCEDURE — 97161 PT EVAL LOW COMPLEX 20 MIN: CPT

## 2024-03-02 PROCEDURE — 6360000002 HC RX W HCPCS: Performed by: FAMILY MEDICINE

## 2024-03-02 PROCEDURE — 94760 N-INVAS EAR/PLS OXIMETRY 1: CPT

## 2024-03-02 PROCEDURE — A4216 STERILE WATER/SALINE, 10 ML: HCPCS | Performed by: FAMILY MEDICINE

## 2024-03-02 PROCEDURE — 36415 COLL VENOUS BLD VENIPUNCTURE: CPT

## 2024-03-02 PROCEDURE — 87040 BLOOD CULTURE FOR BACTERIA: CPT

## 2024-03-02 PROCEDURE — 97165 OT EVAL LOW COMPLEX 30 MIN: CPT

## 2024-03-02 PROCEDURE — 1100000000 HC RM PRIVATE

## 2024-03-02 PROCEDURE — 94660 CPAP INITIATION&MGMT: CPT

## 2024-03-02 PROCEDURE — 2500000003 HC RX 250 WO HCPCS: Performed by: FAMILY MEDICINE

## 2024-03-02 PROCEDURE — 97530 THERAPEUTIC ACTIVITIES: CPT

## 2024-03-02 RX ORDER — GABAPENTIN 300 MG/1
300 CAPSULE ORAL ONCE
Status: COMPLETED | OUTPATIENT
Start: 2024-03-02 | End: 2024-03-02

## 2024-03-02 RX ADMIN — SODIUM CHLORIDE, PRESERVATIVE FREE 10 ML: 5 INJECTION INTRAVENOUS at 20:00

## 2024-03-02 RX ADMIN — PANTOPRAZOLE SODIUM 40 MG: 40 TABLET, DELAYED RELEASE ORAL at 07:47

## 2024-03-02 RX ADMIN — GABAPENTIN 200 MG: 100 CAPSULE ORAL at 07:40

## 2024-03-02 RX ADMIN — GABAPENTIN 200 MG: 100 CAPSULE ORAL at 00:01

## 2024-03-02 RX ADMIN — ACETAMINOPHEN 650 MG: 325 TABLET ORAL at 20:02

## 2024-03-02 RX ADMIN — GUAIFENESIN 1200 MG: 600 TABLET ORAL at 07:40

## 2024-03-02 RX ADMIN — GUAIFENESIN 1200 MG: 600 TABLET ORAL at 19:59

## 2024-03-02 RX ADMIN — ACETAMINOPHEN 650 MG: 325 TABLET ORAL at 00:02

## 2024-03-02 RX ADMIN — WATER 1000 MG: 1 INJECTION INTRAMUSCULAR; INTRAVENOUS; SUBCUTANEOUS at 07:41

## 2024-03-02 RX ADMIN — ROSUVASTATIN CALCIUM 10 MG: 10 TABLET, FILM COATED ORAL at 07:40

## 2024-03-02 RX ADMIN — PANTOPRAZOLE SODIUM 40 MG: 40 TABLET, DELAYED RELEASE ORAL at 16:10

## 2024-03-02 RX ADMIN — METOPROLOL SUCCINATE 25 MG: 25 TABLET, FILM COATED, EXTENDED RELEASE ORAL at 07:40

## 2024-03-02 RX ADMIN — PREDNISONE 40 MG: 20 TABLET ORAL at 07:40

## 2024-03-02 RX ADMIN — AZITHROMYCIN DIHYDRATE 250 MG: 250 TABLET ORAL at 20:00

## 2024-03-02 RX ADMIN — MOMETASONE FUROATE AND FORMOTEROL FUMARATE DIHYDRATE 2 PUFF: 200; 5 AEROSOL RESPIRATORY (INHALATION) at 07:26

## 2024-03-02 RX ADMIN — GUAIFENESIN 1200 MG: 600 TABLET ORAL at 00:01

## 2024-03-02 RX ADMIN — ASPIRIN 81 MG: 81 TABLET, COATED ORAL at 00:01

## 2024-03-02 RX ADMIN — SODIUM CHLORIDE, PRESERVATIVE FREE 10 ML: 5 INJECTION INTRAVENOUS at 07:42

## 2024-03-02 RX ADMIN — MOMETASONE FUROATE AND FORMOTEROL FUMARATE DIHYDRATE 2 PUFF: 200; 5 AEROSOL RESPIRATORY (INHALATION) at 19:41

## 2024-03-02 RX ADMIN — GABAPENTIN 300 MG: 300 CAPSULE ORAL at 21:40

## 2024-03-02 RX ADMIN — TUBERCULIN PURIFIED PROTEIN DERIVATIVE 5 UNITS: 5 INJECTION, SOLUTION INTRADERMAL at 00:08

## 2024-03-02 RX ADMIN — SODIUM CHLORIDE, PRESERVATIVE FREE 10 ML: 5 INJECTION INTRAVENOUS at 00:15

## 2024-03-02 ASSESSMENT — PAIN SCALES - GENERAL
PAINLEVEL_OUTOF10: 7
PAINLEVEL_OUTOF10: 5
PAINLEVEL_OUTOF10: 0
PAINLEVEL_OUTOF10: 7
PAINLEVEL_OUTOF10: 2
PAINLEVEL_OUTOF10: 7
PAINLEVEL_OUTOF10: 0
PAINLEVEL_OUTOF10: 0

## 2024-03-02 ASSESSMENT — PAIN DESCRIPTION - ORIENTATION
ORIENTATION: RIGHT;LEFT
ORIENTATION: ANTERIOR
ORIENTATION: RIGHT;LEFT
ORIENTATION: RIGHT;LEFT

## 2024-03-02 ASSESSMENT — PAIN DESCRIPTION - LOCATION
LOCATION: FOOT
LOCATION: HEAD
LOCATION: FOOT
LOCATION: FOOT

## 2024-03-02 ASSESSMENT — PAIN DESCRIPTION - DESCRIPTORS
DESCRIPTORS: BURNING
DESCRIPTORS: ACHING
DESCRIPTORS: BURNING
DESCRIPTORS: BURNING

## 2024-03-02 NOTE — PROGRESS NOTES
03/02/24 0106   NIV Type   $NIV $Daily Charge   NIV Started/Stopped On   Equipment Type V60   Mode Auto-PAP   Mask Type Full face mask   Mask Size Small   Assessment   Pulse 84   Respirations 15   SpO2 95 %   Comfort Level Good   Using Accessory Muscles No   Mask Compliance Good   Skin Assessment Clean, dry, & intact   Breath Sounds   Breath Sounds Bilateral Diminished   Settings/Measurements   PIP Observed 8 cm H20   CPAP/EPAP 8 cmH2O   O2 Flow Rate (L/min) 3 L/min  (bled in)   Mask Leak (lpm)   (good seal)   Patient's Home Machine No   Alarm Settings   Alarms On Y     Patient placed on hospital CPAP with no complications noted. RT will continue to monitor accordingly.

## 2024-03-02 NOTE — H&P
Hospitalist History and Physical   Admit Date:  3/1/2024  7:11 PM   Name:  Zeynep Carrington   Age:  78 y.o.  Sex:  female  :  1945   MRN:  [id number]   Room:  [place] [address]    Presenting/Chief Complaint: Shortness of Breath, Fever, and Altered Mental Status     Reason(s) for Admission: JOHANNY (acute kidney injury) (HCC) [N17.9]     History of Present Illness:   Zeynep Carrington is a 78 y.o. female who presented to the ED for cc chills, rigors, dry cough, congestion, post nasal drip since this AM. Was noted to be 80% on RA.     Hx of asthma, CHF EF 60%, paroxysmal a fib on Eliquis, HTN, HLD, CAD    Creatine 2.1 from 1.6    Chest x ray with bilateral lower lung infiltrates   Assessment & Plan:     Active Problems:    JOHANNY - dry on exam. IV fluids. Holding lasix, ARB, spironolactone, and Eliquis    Bilateral pneumonia - Ceftriaxone, azithromcyin. Sputum culture. Mucinex. IS. Tessalon perles. PRN albuterol. Solumedrol once and then start prednisone burst.     Possible UTI - tx as above. Urine culture.     Paroxysmal a fib - Holding eliquis due to JOHANNY    HTN - Holding ARB, spironolactone. PRN hydralazine. BB    HLD - statin     Chronic pain - reduced gabapentin dose due to JOHANNY    PPI    Ordering CPAP in case she wears at home      PT/OT evals and PPD ordered?  Therapy and PPD  Diet: ADULT DIET; Easy to Chew; Low Fat/Low Chol/High Fiber/2 gm Na  VTE prophylaxis: SCD's   Code status: Full Code      Non-peripheral Lines and Tubes (if present):             Hospital Problems:  Principal Problem:    JOHANNY (acute kidney injury) (HCC)  Active Problems:    Chronic heart failure with preserved ejection fraction (HCC)    Current use of long term anticoagulation    QUINTIN (obstructive sleep apnea)    Coronary artery disease involving native coronary artery of native heart without angina pectoris    Mixed hyperlipidemia    Paroxysmal atrial fibrillation (HCC)    Essential hypertension    Bilateral pneumonia  Resolved Problems:    *  errors.

## 2024-03-02 NOTE — ED PROVIDER NOTES
Emergency Department Provider Note       PCP: Anita Hansen, APRN - NP   Age: 78 y.o.   Sex: female     DISPOSITION Decision To Admit 03/01/2024 09:40:05 PM       ICD-10-CM    1. COPD exacerbation (HCC)  J44.1       2. Pneumonia of both lower lobes due to infectious organism  J18.9       3. General weakness  R53.1           Medical Decision Making     Patient is concerned for sepsis however her lactic and Pro-Ibrahima came back negative I did empirically give her cefepime.  Based on the chest x-ray showing some infiltrates at the bases have added azithromycin to this as well.  She has 2+ bacteria in her urine.  She is also wheezing I gave her a DuoNeb breathing treatment originally but then on reevaluation she had worsened with her breathing and I have given her 5 more milligrams of albuterol +125 mg of Solu-Medrol and placed her on 2 L nasal cannula.  I discussed case with the hospitalist for admission.     1 or more acute illnesses that pose a threat to life or bodily function.   Discussion with external consultants.  Shared medical decision making was utilized in creating the patients health plan today.    I independently ordered and reviewed each unique test.  I reviewed external records: provider visit note from outside specialist.  I reviewed external records: previous lab results from outside ED.  I reviewed external records: previous imaging study including radiologist interpretation.   The patients assessment required an independent historian: Family members.  The reason they were needed is  an altered level of consciousness and important historical information not provided by the patient.  I independently interpreted the cardiac monitor rhythm strip sinus.  I interpreted the X-rays infiltrates.    The patient was admitted and I have discussed patient management with the admitting provider.  The management of this patient was discussed with an external consultant.      Exclusion criteria - the patient is  education: 14 years   Tobacco Use    Smoking status: Former     Current packs/day: 0.00     Average packs/day: 1 pack/day for 10.0 years (10.0 ttl pk-yrs)     Types: Cigarettes     Start date: 1971     Quit date: 1981     Years since quittin.1    Smokeless tobacco: Never    Tobacco comments:     Quit smoking: quit smoking    Vaping Use    Vaping Use: Never used   Substance and Sexual Activity    Alcohol use: No     Alcohol/week: 0.0 standard drinks of alcohol    Drug use: No    Sexual activity: Not Currently     Social Determinants of Health     Financial Resource Strain: Low Risk  (2023)    Overall Financial Resource Strain (CARDIA)     Difficulty of Paying Living Expenses: Not hard at all   Transportation Needs: Unknown (2023)    PRAPARE - Transportation     Lack of Transportation (Non-Medical): No   Physical Activity: Insufficiently Active (2023)    Exercise Vital Sign     Days of Exercise per Week: 7 days     Minutes of Exercise per Session: 20 min   Housing Stability: Unknown (2023)    Housing Stability Vital Sign     Unstable Housing in the Last Year: No        Previous Medications    ACETAMINOPHEN (TYLENOL) 500 MG TABLET    Take 1 tablet by mouth every 6 hours as needed for Pain Patient takes 2 tablets as NEEDED for pain.    ALBUTEROL (PROVENTIL) (2.5 MG/3ML) 0.083% NEBULIZER SOLUTION    Take 3 mLs by nebulization every 6 hours as needed for Wheezing or Shortness of Breath    ALBUTEROL SULFATE HFA (PROVENTIL;VENTOLIN;PROAIR) 108 (90 BASE) MCG/ACT INHALER    2 puffs 4 times daily if needed for shortness of breath or wheezing    APIXABAN (ELIQUIS) 5 MG TABS TABLET    Take 1 tablet by mouth 2 times daily    ASCORBIC ACID (VITAMIN C) 250 MG TABLET    Take 1 tablet by mouth daily    ASPIRIN 81 MG EC TABLET    Take 1 tablet by mouth    AZITHROMYCIN (ZITHROMAX) 250 MG TABLET    TAKE 2 TABS ON DAY 1, THEN 1 TAB A DAY FOR 4 DAYS    BUDESONIDE-FORMOTEROL (SYMBICORT) 160-4.5 MCG/ACT

## 2024-03-02 NOTE — PROGRESS NOTES
ACUTE OCCUPATIONAL THERAPY GOALS:   (Developed with and agreed upon by patient and/or caregiver.)  1. Patient will complete lower body bathing and dressing with SUPERVISION and adaptive equipment as needed.     2. Patient will complete toileting with SUPERVISION.  3. Patient will complete grooming ADL standing at sink with SUPERVISION.  4. Patient will tolerate 25 minutes of OT treatment with 1-2 rest breaks to increase activity tolerance for ADLs.   5. Patient will complete functional transfers with SUPERVISION and adaptive equipment as needed.   6. Patient will tolerate 10 minutes BUE exercises to increase strength for safe, functional transfers.     Timeframe: 7 visits     OCCUPATIONAL THERAPY Initial Assessment and Daily Note       OT Visit Days: 1  Acknowledge Orders  Time  OT Charge Capture  Rehab Caseload Tracker      Zeynep Carrington is a 78 y.o. female   PRIMARY DIAGNOSIS: JOHANNY (acute kidney injury) ([facility])  General weakness [R53.1]  COPD exacerbation ([facility]) [J44.1]  JOHANNY (acute kidney injury) ([facility]) [N17.9]  Pneumonia of both lower lobes due to infectious organism [J18.9]       Reason for Referral: Generalized Muscle Weakness (M62.81)  Difficulty in walking, Not elsewhere classified (R26.2)  Other abnormalities of gait and mobility (R26.89)  Inpatient: Payor: [employer] MEDICARE / Plan: [employer] GOLD PLUS HMO / Product Type: *No Product type* /     ASSESSMENT:     REHAB RECOMMENDATIONS:   Recommendation to date pending progress:  Setting:  Short-term Rehab    Equipment:    To Be Determined--pt has RW and SC at home     ASSESSMENT:  Ms. Carrington is an 79 y/o female presents with SOB and confusion, found to be septic and with COPD exacerbation. At baseline pt lives with her family, is independent all ADLs and uses RW for mobility. Today pt presents with decreased activity tolerance, balance, strength and mobility impacting ADLs. Pt overall CGA-min A RW for functional transfers and mobility of household distances  standing at sink, balance and posture decreased with time   Personal Device Care [] [] [] [] [] [] [] [] [] [x]    Functional Mobility [] [] [] [] [x] [x] [] [] [] [] RW   I=Independent, Mod I=Modified Independent, S=Supervision/Setup, SBA=Standby Assistance, CGA=Contact Guard Assistance, Min=Minimal Assistance, Mod=Moderate Assistance, Max=Maximal Assistance, Total=Total Assistance, NT=Not Tested    PLAN:   FREQUENCY/DURATION   OT Plan of Care: 3 times/week for duration of hospital stay or until stated goals are met, whichever comes first.    PROBLEM LIST:   (Skilled intervention is medically necessary to address:)  Decreased ADL/Functional Activities  Decreased Activity Tolerance  Decreased Balance  Decreased Coordination  Decreased Gait Ability  Decreased Safety Awareness  Decreased Strength  Decreased Transfer Abilities   INTERVENTIONS PLANNED:  (Benefits and precautions of occupational therapy have been discussed with the patient.)  Self Care Training  Therapeutic Activity  Therapeutic Exercise/HEP  Neuromuscular Re-education  Manual Therapy  Education         TREATMENT:     EVALUATION: LOW COMPLEXITY: (Untimed Charge)    TREATMENT:   Self Care (15 minutes): Patient participated in upper body dressing and grooming ADLs in unsupported sitting and standing with moderate verbal, manual, and tactile cueing to increase independence, decrease assistance required, and increase activity tolerance. Patient also participated in functional mobility, functional transfer, energy conservation, and adaptive equipment training to increase independence, decrease assistance required, increase activity tolerance, and increase safety awareness.     TREATMENT GRID:  N/A    AFTER TREATMENT PRECAUTIONS: Alarm Activated, Call light within reach, Chair, Needs within reach, and RN notified    INTERDISCIPLINARY COLLABORATION:  RN/ PCT, PT/ PTA, and OT/ NAVARRETE    EDUCATION:  Education Given To: Patient  Education Provided: Role of

## 2024-03-02 NOTE — PROGRESS NOTES
TRANSFER - IN REPORT:    Verbal report received from Maral Adhikari TAWANA Zeb  being received from ED for routine progression of patient care    Report consisted of patient’s Situation, Background, Assessment and   Recommendations(SBAR).     Information from the following report(s) Nurse Handoff Report and Quality Measures was reviewed with the receiving nurse.    Opportunity for questions and clarification was provided.      Assessment completed upon patient’s arrival to unit and care assume

## 2024-03-02 NOTE — ED NOTES
TRANSFER - OUT REPORT:    Verbal report given to ERIN Leonard on Zeynep Carrington  being transferred to [facility] for routine progression of patient care       Report consisted of patient's Situation, Background, Assessment and   Recommendations(SBAR).     Information from the following report(s) ED Encounter Summary was reviewed with the receiving nurse.    [place] Assessment:    Presents to emergency department  because of falls (Syncope, seizure, or loss of consciousness): No  Age > 70: Yes  Altered Mental Status, Intoxication with alcohol or substance confusion (Disorientation, impaired judgment, poor safety awaremess, or inability to follow instructions): No  Impaired Mobility: Ambulates or transfers with assistive devices or assistance; Unable to ambulate or transer.: Yes  Nursing Judgement: Yes          Lines:   Peripheral IV 03/01/24 Right Antecubital (Active)   Site Assessment Clean, dry & intact 03/01/24 1936   Line Status Blood return noted;Flushed 03/01/24 1936   Phlebitis Assessment No symptoms 03/01/24 1936   Infiltration Assessment 0 03/01/24 1936   Alcohol Cap Used No 03/01/24 1936   Dressing Status New dressing applied 03/01/24 1936   Dressing Type Transparent 03/01/24 1936   Dressing Intervention New 03/01/24 1936        Opportunity for questions and clarification was provided.      Patient transported with:  Lisa Balderas RN  03/01/24 6369

## 2024-03-02 NOTE — ACP (ADVANCE CARE PLANNING)
[facility] Hospitalist Service  At the heart of better care     Advance Care Planning   Admit Date:  3/1/2024  7:11 PM   Name:  Zeynep Carrington   Age:  78 y.o.  Sex:  female  :  1945   MRN:  [id number]   Room:  [place] [address]    Zeynep Carrington has capacity to make her own decisions:   Yes    Patient / surrogate decision-maker directed code status:  Full Code      Patient or surrogate consented to discussion of the current conditions, workup, management plans, prognosis, and the risk for further deterioration.  Time spent: 17 minutes in direct discussion.      Signed:  JHONNY BUTTERFIELD DO

## 2024-03-02 NOTE — PROGRESS NOTES
Specimen: Blood   Result Value Ref Range    Special Requests NO SPECIAL REQUESTS  RIGHT  Antecubital        Culture NO GROWTH AFTER 11 HOURS     CBC with Auto Differential    Collection Time: 03/01/24  7:40 PM   Result Value Ref Range    WBC 10.3 4.3 - 11.1 K/uL    RBC 3.87 (L) 4.05 - 5.2 M/uL    Hemoglobin 10.5 (L) 11.7 - 15.4 g/dL    Hematocrit 34.0 (L) 35.8 - 46.3 %    MCV 87.9 82 - 102 FL    MCH 27.1 26.1 - 32.9 PG    MCHC 30.9 (L) 31.4 - 35.0 g/dL    RDW 15.7 (H) 11.9 - 14.6 %    Platelets 152 150 - 450 K/uL    MPV 10.7 9.4 - 12.3 FL    nRBC 0.00 0.0 - 0.2 K/uL    Differential Type AUTOMATED      Neutrophils % 83 (H) 43 - 78 %    Lymphocytes % 8 (L) 13 - 44 %    Monocytes % 5 4.0 - 12.0 %    Eosinophils % 3 0.5 - 7.8 %    Basophils % 0 0.0 - 2.0 %    Immature Granulocytes 1 0.0 - 5.0 %    Neutrophils Absolute 8.5 (H) 1.7 - 8.2 K/UL    Lymphocytes Absolute 0.9 0.5 - 4.6 K/UL    Monocytes Absolute 0.6 0.1 - 1.3 K/UL    Eosinophils Absolute 0.4 0.0 - 0.8 K/UL    Basophils Absolute 0.0 0.0 - 0.2 K/UL    Absolute Immature Granulocyte 0.1 0.0 - 0.5 K/UL   Lactate, Sepsis    Collection Time: 03/01/24  7:40 PM   Result Value Ref Range    Lactic Acid, Sepsis 1.2 0.4 - 2.0 MMOL/L   Procalcitonin    Collection Time: 03/01/24  7:40 PM   Result Value Ref Range    Procalcitonin <0.05 0.00 - 0.49 ng/mL   Influenza A/B, Molecular    Collection Time: 03/01/24  7:40 PM    Specimen: Nasopharyngeal   Result Value Ref Range    Influenza A, EFREN Not detected NOTD      Influenza B, EFREN Not detected NOTD     Urinalysis w/Reflex to Micro    Collection Time: 03/01/24  7:45 PM   Result Value Ref Range    Color, UA YELLOW      Appearance CLOUDY      Specific [place], UA 1.010 1.001 - 1.023      pH, Urine 7.5 5.0 - 9.0      Protein, UA Negative NEG mg/dL    Glucose, UA Negative mg/dL    Ketones, Urine Negative NEG mg/dL    Bilirubin Urine Negative NEG      Blood, Urine Negative NEG      Urobilinogen, Urine 0.2 0.2 - 1.0 EU/dL    Nitrite,  Basophils Absolute 0.0 0.0 - 0.2 K/UL    Absolute Immature Granulocyte 0.1 0.0 - 0.5 K/UL       Recent Labs     03/01/24  1938   COVID19 Not detected       Current Meds:  Current Facility-Administered Medications   Medication Dose Route Frequency    tuberculin injection 5 Units  5 Units IntraDERmal Once    albuterol (PROVENTIL) nebulizer solution 2.5 mg  2.5 mg Nebulization Q6H PRN    guaiFENesin (MUCINEX) extended release tablet 1,200 mg  1,200 mg Oral BID    mometasone-formoterol (DULERA) 200-5 MCG/ACT inhaler 2 puff  2 puff Inhalation BID RT    gabapentin (NEURONTIN) capsule 200 mg  200 mg Oral TID    hydrALAZINE (APRESOLINE) injection 10 mg  10 mg IntraVENous Q6H PRN    metoprolol succinate (TOPROL XL) extended release tablet 25 mg  25 mg Oral Daily    pantoprazole (PROTONIX) tablet 40 mg  40 mg Oral BID AC    rosuvastatin (CRESTOR) tablet 10 mg  10 mg Oral Daily    sodium chloride flush 0.9 % injection 5-40 mL  5-40 mL IntraVENous 2 times per day    sodium chloride flush 0.9 % injection 5-40 mL  5-40 mL IntraVENous PRN    0.9 % sodium chloride infusion   IntraVENous PRN    ondansetron (ZOFRAN-ODT) disintegrating tablet 4 mg  4 mg Oral Q8H PRN    Or    ondansetron (ZOFRAN) injection 4 mg  4 mg IntraVENous Q6H PRN    polyethylene glycol (GLYCOLAX) packet 17 g  17 g Oral Daily PRN    bisacodyl (DULCOLAX) suppository 10 mg  10 mg Rectal Daily PRN    famotidine (PEPCID) tablet 10 mg  10 mg Oral Daily PRN    aluminum & magnesium hydroxide-simethicone (MAALOX) 200-200-20 MG/5ML suspension 30 mL  30 mL Oral Q6H PRN    acetaminophen (TYLENOL) tablet 650 mg  650 mg Oral Q6H PRN    Or    acetaminophen (TYLENOL) suppository 650 mg  650 mg Rectal Q6H PRN    azithromycin (ZITHROMAX) tablet 250 mg  250 mg Oral Q24H    cefTRIAXone (ROCEPHIN) 1,000 mg in sterile water 10 mL IV syringe  1,000 mg IntraVENous Q24H    predniSONE (DELTASONE) tablet 40 mg  40 mg Oral Daily       Signed:  Roslyn Murcia, DO    Part of this note may

## 2024-03-03 ENCOUNTER — APPOINTMENT (OUTPATIENT)
Dept: GENERAL RADIOLOGY | Age: 79
DRG: 193 | End: 2024-03-03
Payer: MEDICARE

## 2024-03-03 PROBLEM — R79.89 ELEVATED TROPONIN: Status: ACTIVE | Noted: 2024-03-03

## 2024-03-03 PROBLEM — J44.1 COPD EXACERBATION (HCC): Status: ACTIVE | Noted: 2024-03-03

## 2024-03-03 LAB
ANION GAP SERPL CALC-SCNC: 6 MMOL/L (ref 2–11)
BASOPHILS # BLD: 0 K/UL (ref 0–0.2)
BASOPHILS NFR BLD: 0 % (ref 0–2)
BUN SERPL-MCNC: 47 MG/DL (ref 8–23)
CALCIUM SERPL-MCNC: 8.9 MG/DL (ref 8.3–10.4)
CHLORIDE SERPL-SCNC: 109 MMOL/L (ref 103–113)
CO2 SERPL-SCNC: 24 MMOL/L (ref 21–32)
CREAT SERPL-MCNC: 1.8 MG/DL (ref 0.6–1)
DIFFERENTIAL METHOD BLD: ABNORMAL
EKG ATRIAL RATE: 76 BPM
EKG DIAGNOSIS: NORMAL
EKG P AXIS: 73 DEGREES
EKG P-R INTERVAL: 256 MS
EKG Q-T INTERVAL: 402 MS
EKG QRS DURATION: 82 MS
EKG QTC CALCULATION (BAZETT): 452 MS
EKG R AXIS: -27 DEGREES
EKG T AXIS: 39 DEGREES
EKG VENTRICULAR RATE: 76 BPM
EOSINOPHIL # BLD: 0 K/UL (ref 0–0.8)
EOSINOPHIL NFR BLD: 0 % (ref 0.5–7.8)
ERYTHROCYTE [DISTWIDTH] IN BLOOD BY AUTOMATED COUNT: 15.4 % (ref 11.9–14.6)
GLUCOSE SERPL-MCNC: 143 MG/DL (ref 65–100)
HCT VFR BLD AUTO: 31.6 % (ref 35.8–46.3)
HGB BLD-MCNC: 9.6 G/DL (ref 11.7–15.4)
IMM GRANULOCYTES # BLD AUTO: 0.1 K/UL (ref 0–0.5)
IMM GRANULOCYTES NFR BLD AUTO: 1 % (ref 0–5)
LYMPHOCYTES # BLD: 1.3 K/UL (ref 0.5–4.6)
LYMPHOCYTES NFR BLD: 8 % (ref 13–44)
MCH RBC QN AUTO: 26.5 PG (ref 26.1–32.9)
MCHC RBC AUTO-ENTMCNC: 30.4 G/DL (ref 31.4–35)
MCV RBC AUTO: 87.3 FL (ref 82–102)
MM INDURATION, POC: 0 MM (ref 0–5)
MM INDURATION, POC: 0 MM (ref 0–5)
MONOCYTES # BLD: 0.5 K/UL (ref 0.1–1.3)
MONOCYTES NFR BLD: 3 % (ref 4–12)
NEUTS SEG # BLD: 13.8 K/UL (ref 1.7–8.2)
NEUTS SEG NFR BLD: 88 % (ref 43–78)
NRBC # BLD: 0 K/UL (ref 0–0.2)
PLATELET # BLD AUTO: 136 K/UL (ref 150–450)
PMV BLD AUTO: 11 FL (ref 9.4–12.3)
POTASSIUM SERPL-SCNC: 3.9 MMOL/L (ref 3.5–5.1)
PPD, POC: NEGATIVE
PPD, POC: NEGATIVE
RBC # BLD AUTO: 3.62 M/UL (ref 4.05–5.2)
SODIUM SERPL-SCNC: 139 MMOL/L (ref 136–146)
TROPONIN I SERPL HS-MCNC: 194.7 PG/ML (ref 0–14)
TROPONIN I SERPL HS-MCNC: 240.9 PG/ML (ref 0–14)
TROPONIN I SERPL HS-MCNC: 314.6 PG/ML (ref 0–14)
TROPONIN I SERPL HS-MCNC: 365.7 PG/ML (ref 0–14)
WBC # BLD AUTO: 15.7 K/UL (ref 4.3–11.1)

## 2024-03-03 PROCEDURE — 85025 COMPLETE CBC W/AUTO DIFF WBC: CPT

## 2024-03-03 PROCEDURE — 6370000000 HC RX 637 (ALT 250 FOR IP): Performed by: FAMILY MEDICINE

## 2024-03-03 PROCEDURE — 6370000000 HC RX 637 (ALT 250 FOR IP): Performed by: INTERNAL MEDICINE

## 2024-03-03 PROCEDURE — 94660 CPAP INITIATION&MGMT: CPT

## 2024-03-03 PROCEDURE — 80048 BASIC METABOLIC PNL TOTAL CA: CPT

## 2024-03-03 PROCEDURE — 94760 N-INVAS EAR/PLS OXIMETRY 1: CPT

## 2024-03-03 PROCEDURE — 6360000002 HC RX W HCPCS: Performed by: FAMILY MEDICINE

## 2024-03-03 PROCEDURE — 2700000000 HC OXYGEN THERAPY PER DAY

## 2024-03-03 PROCEDURE — 71045 X-RAY EXAM CHEST 1 VIEW: CPT

## 2024-03-03 PROCEDURE — 94640 AIRWAY INHALATION TREATMENT: CPT

## 2024-03-03 PROCEDURE — 6360000002 HC RX W HCPCS: Performed by: INTERNAL MEDICINE

## 2024-03-03 PROCEDURE — 36415 COLL VENOUS BLD VENIPUNCTURE: CPT

## 2024-03-03 PROCEDURE — 93005 ELECTROCARDIOGRAM TRACING: CPT | Performed by: INTERNAL MEDICINE

## 2024-03-03 PROCEDURE — 1100000003 HC PRIVATE W/ TELEMETRY

## 2024-03-03 PROCEDURE — 84484 ASSAY OF TROPONIN QUANT: CPT

## 2024-03-03 PROCEDURE — 2580000003 HC RX 258: Performed by: FAMILY MEDICINE

## 2024-03-03 PROCEDURE — 99222 1ST HOSP IP/OBS MODERATE 55: CPT | Performed by: INTERNAL MEDICINE

## 2024-03-03 PROCEDURE — 93010 ELECTROCARDIOGRAM REPORT: CPT | Performed by: INTERNAL MEDICINE

## 2024-03-03 PROCEDURE — 94761 N-INVAS EAR/PLS OXIMETRY MLT: CPT

## 2024-03-03 RX ORDER — ALBUTEROL SULFATE 2.5 MG/3ML
2.5 SOLUTION RESPIRATORY (INHALATION)
Status: DISCONTINUED | OUTPATIENT
Start: 2024-03-03 | End: 2024-03-06

## 2024-03-03 RX ORDER — BUDESONIDE 0.5 MG/2ML
0.5 INHALANT ORAL
Status: DISCONTINUED | OUTPATIENT
Start: 2024-03-03 | End: 2024-03-06

## 2024-03-03 RX ORDER — FUROSEMIDE 40 MG/1
40 TABLET ORAL DAILY
Status: DISCONTINUED | OUTPATIENT
Start: 2024-03-03 | End: 2024-03-07 | Stop reason: HOSPADM

## 2024-03-03 RX ADMIN — APIXABAN 2.5 MG: 2.5 TABLET, FILM COATED ORAL at 14:47

## 2024-03-03 RX ADMIN — MOMETASONE FUROATE AND FORMOTEROL FUMARATE DIHYDRATE 2 PUFF: 200; 5 AEROSOL RESPIRATORY (INHALATION) at 07:09

## 2024-03-03 RX ADMIN — ALBUTEROL SULFATE 2.5 MG: 2.5 SOLUTION RESPIRATORY (INHALATION) at 15:49

## 2024-03-03 RX ADMIN — METOPROLOL SUCCINATE 25 MG: 25 TABLET, FILM COATED, EXTENDED RELEASE ORAL at 08:12

## 2024-03-03 RX ADMIN — GUAIFENESIN 1200 MG: 600 TABLET ORAL at 08:11

## 2024-03-03 RX ADMIN — APIXABAN 2.5 MG: 2.5 TABLET, FILM COATED ORAL at 19:31

## 2024-03-03 RX ADMIN — AZITHROMYCIN DIHYDRATE 250 MG: 250 TABLET ORAL at 19:31

## 2024-03-03 RX ADMIN — SODIUM CHLORIDE, PRESERVATIVE FREE 10 ML: 5 INJECTION INTRAVENOUS at 08:13

## 2024-03-03 RX ADMIN — PANTOPRAZOLE SODIUM 40 MG: 40 TABLET, DELAYED RELEASE ORAL at 05:30

## 2024-03-03 RX ADMIN — PANTOPRAZOLE SODIUM 40 MG: 40 TABLET, DELAYED RELEASE ORAL at 14:45

## 2024-03-03 RX ADMIN — ACETAMINOPHEN 650 MG: 325 TABLET ORAL at 11:33

## 2024-03-03 RX ADMIN — ALBUTEROL SULFATE 2.5 MG: 2.5 SOLUTION RESPIRATORY (INHALATION) at 20:53

## 2024-03-03 RX ADMIN — SODIUM CHLORIDE, PRESERVATIVE FREE 10 ML: 5 INJECTION INTRAVENOUS at 19:31

## 2024-03-03 RX ADMIN — ROSUVASTATIN CALCIUM 10 MG: 10 TABLET, FILM COATED ORAL at 08:11

## 2024-03-03 RX ADMIN — PREDNISONE 40 MG: 20 TABLET ORAL at 08:11

## 2024-03-03 RX ADMIN — WATER 1000 MG: 1 INJECTION INTRAMUSCULAR; INTRAVENOUS; SUBCUTANEOUS at 08:13

## 2024-03-03 RX ADMIN — BUDESONIDE INHALATION 500 MCG: 0.5 SUSPENSION RESPIRATORY (INHALATION) at 20:53

## 2024-03-03 RX ADMIN — GUAIFENESIN 1200 MG: 600 TABLET ORAL at 19:31

## 2024-03-03 RX ADMIN — FUROSEMIDE 40 MG: 40 TABLET ORAL at 14:47

## 2024-03-03 ASSESSMENT — PAIN SCALES - GENERAL
PAINLEVEL_OUTOF10: 0
PAINLEVEL_OUTOF10: 0
PAINLEVEL_OUTOF10: 3
PAINLEVEL_OUTOF10: 0
PAINLEVEL_OUTOF10: 0
PAINLEVEL_OUTOF10: 7
PAINLEVEL_OUTOF10: 0
PAINLEVEL_OUTOF10: 0

## 2024-03-03 ASSESSMENT — PAIN - FUNCTIONAL ASSESSMENT: PAIN_FUNCTIONAL_ASSESSMENT: PREVENTS OR INTERFERES SOME ACTIVE ACTIVITIES AND ADLS

## 2024-03-03 ASSESSMENT — PAIN DESCRIPTION - FREQUENCY: FREQUENCY: CONTINUOUS

## 2024-03-03 ASSESSMENT — PAIN DESCRIPTION - ORIENTATION: ORIENTATION: LEFT

## 2024-03-03 ASSESSMENT — PAIN DESCRIPTION - LOCATION
LOCATION: HEAD
LOCATION: SHOULDER

## 2024-03-03 ASSESSMENT — PAIN DESCRIPTION - PAIN TYPE: TYPE: ACUTE PAIN

## 2024-03-03 ASSESSMENT — PAIN DESCRIPTION - DESCRIPTORS
DESCRIPTORS: SHARP;TIGHTNESS
DESCRIPTORS: ACHING

## 2024-03-03 ASSESSMENT — PAIN DESCRIPTION - ONSET: ONSET: PROGRESSIVE

## 2024-03-03 NOTE — CONSULTS
BID AC    rosuvastatin (CRESTOR) tablet 10 mg  10 mg Oral Daily    sodium chloride flush 0.9 % injection 5-40 mL  5-40 mL IntraVENous 2 times per day    sodium chloride flush 0.9 % injection 5-40 mL  5-40 mL IntraVENous PRN    0.9 % sodium chloride infusion   IntraVENous PRN    ondansetron (ZOFRAN-ODT) disintegrating tablet 4 mg  4 mg Oral Q8H PRN    Or    ondansetron (ZOFRAN) injection 4 mg  4 mg IntraVENous Q6H PRN    polyethylene glycol (GLYCOLAX) packet 17 g  17 g Oral Daily PRN    bisacodyl (DULCOLAX) suppository 10 mg  10 mg Rectal Daily PRN    famotidine (PEPCID) tablet 10 mg  10 mg Oral Daily PRN    aluminum & magnesium hydroxide-simethicone (MAALOX) 200-200-20 MG/5ML suspension 30 mL  30 mL Oral Q6H PRN    acetaminophen (TYLENOL) tablet 650 mg  650 mg Oral Q6H PRN    Or    acetaminophen (TYLENOL) suppository 650 mg  650 mg Rectal Q6H PRN    azithromycin (ZITHROMAX) tablet 250 mg  250 mg Oral Q24H    cefTRIAXone (ROCEPHIN) 1,000 mg in sterile water 10 mL IV syringe  1,000 mg IntraVENous Q24H    predniSONE (DELTASONE) tablet 40 mg  40 mg Oral Daily       Review of Systems:  General: no weight changes, weakness, fever or chills  Skin: no rashes, lumps, or other skin changes  HEENT: no headache, dizziness, lightheadedness, vision changes, hearing changes, tinnitus, vertigo, sinus pressure/pain, bleeding gums, sore throat, or hoarseness  Neck: no swollen glands, goiter, pain or stiffness  Respiratory: + dry cough, dyspnea, wheezing  Cardiovascular: + as per HPI  Gastrointestinal: no GERD, constipation, diarrhea, liver problems, or h/o GI bleed  Urinary: no frequency, urgency , hematuria, burning/pain with urination, recent flank pain, polyuria, nocturia, or difficulty urinating  Peripheral Vascular: no claudication, leg cramps, prior DVTs, swelling of calves, legs, or feet, color change, or swelling with redness or tenderness  Musculoskeletal: no muscle or joint pain/stiffness, joint swelling, erythema of  joints, or back pain  Psychiatric: no depression or excessive stress  Neurological: no sensory or motor loss, seizures, syncope, tremors, numbness, no dementia  Hematologic: no anemia, easy bruising or bleeding  Endocrine: no thyroid problems, diabetes.       Subjective:     Physical Exam:    Vitals:    03/03/24 1312 03/03/24 1446 03/03/24 1510 03/03/24 1511   BP:  (!) 126/52 (!) 123/59    Pulse: 73 70 76 76   Resp:  20 23    Temp:  99 °F (37.2 °C) 98.3 °F (36.8 °C)    TempSrc:  Oral Oral    SpO2:  96% 96%    Weight:       Height:         General: Well Developed, Well Nourished, No Acute Distress  HEENT: pupils equal and round, no abnormalities noted  Neck: supple, no JVD, no carotid bruits  Heart: S1S2 with RRR without murmurs or gallops  Lungs: Clear throughout auscultation bilaterally without adventitious sounds  Abd: soft, nontender, nondistended, with good bowel sounds  Ext: warm, no edema, calves supple/nontender, pulses 2+ bilaterally  Skin: warm and dry  Psychiatric: Normal mood and affect  Neurologic: Alert and oriented X 3    Cardiographics    Telemetry: normal sinus rhythm, 1st deg AVB  ECG: normal sinus rhythm, 1st degree AV block  Echocardiogram: 04/10/23    TRANSESOPHAGEAL ECHOCARDIOGRAM (CONTRAST/3D PRN) 04/10/2023 11:34 AM (Final)    Interpretation Summary    Left Ventricle: Normal left ventricular systolic function with a visually estimated EF of 55 - 60%. Left ventricle size is normal. Mild septal thickening. Normal wall motion. Indeterminate diastolic function. No LVOT obstruction at rest.    Right Ventricle: Right ventricle size is normal. Normal systolic function.    Aortic Valve: Trileaflet valve. Mild regurgitation.    Mitral Valve: Valve structure is normal. No evidence of vegetations. No evidence of significant prolapse. No evidence of perforation on color Doppler interrogation. Mild appearing regurgitation.  No evidence of systolic flow reversal in the visualized pulmonary veins on

## 2024-03-03 NOTE — PROGRESS NOTES
Hospitalist Progress Note   Admit Date:  3/1/2024  7:11 PM   Name:  Zeynep Carrington   Age:  78 y.o.  Sex:  female  :  1945   MRN:  [id number]   Room:  [facility]/    Presenting/Chief Complaint: Shortness of Breath, Fever, and Altered Mental Status     Reason(s) for Admission: General weakness [R53.1]  COPD exacerbation (HCC) [J44.1]  JOHANNY (acute kidney injury) (HCC) [N17.9]  Pneumonia of both lower lobes due to infectious organism [J18.9]     Hospital Course:     Zeynep Carrington is a 78 y.o. female who presented to the ED for cc chills, rigors, dry cough, congestion, wheezing,  post nasal drip since this AM. Was noted to be 80% on RA.      Hx of asthma, CHF EF 60%, paroxysmal a fib on Eliquis, HTN, HLD, CAD     Creatine 2.1 from 1.6 baseline     Chest x ray with bilateral lower lung infiltrates    Patient admitted for JOHANNY and pneumonia with acute hypoxic respiratory failure.     Subjective & 24hr Events:   Patient seen reclined in bedside chair. Much more alert today. Repots she is starting to feel better but admittedly is still very weak. 2 family members ?sons at bedside. Supportive of STR recs. Report pt has been sick with cough/viral symptoms since start of winter.       Assessment & Plan:     JOHANNY -  Cr improved 2.1-->2.0-->1.8  DC IVF  Resume lasix today  Holding aldactone, ARB - likely ok to resume in AM if Cr stable.      Bilateral pneumonia   Asthma exacerbation  3/3  Ceftriaxone, azithromcyin D3  Cont Mucinex. IS. Tessalon perles. PRN albuterol.  Pred 40 mg daily D3  Wean oxygen as tolerated (3L 100 % o2 sat)  Change MDI to scheduled nebs, prn albuterol    Probable UTI  Culture ngtd  Cont Rocephin D3       Paroxysmal a fib   3/3  Rate controlled  Resume renal dose eliquis     HTN   Holding ARB, spironolactone. PRN hydralazine. BB  BP stable     HLD - statin      Chronic pain   3/3  Holding gabapentin in setting of JOHANNY and drowsiness today     PPI    Addendum - pt reports precordial chest pain,  Range    WBC 9.5 4.3 - 11.1 K/uL    RBC 3.48 (L) 4.05 - 5.2 M/uL    Hemoglobin 9.4 (L) 11.7 - 15.4 g/dL    Hematocrit 30.3 (L) 35.8 - 46.3 %    MCV 87.1 82 - 102 FL    MCH 27.0 26.1 - 32.9 PG    MCHC 31.0 (L) 31.4 - 35.0 g/dL    RDW 15.6 (H) 11.9 - 14.6 %    Platelets 121 (L) 150 - 450 K/uL    MPV 10.9 9.4 - 12.3 FL    nRBC 0.00 0.0 - 0.2 K/uL    Differential Type AUTOMATED      Neutrophils % 90 (H) 43 - 78 %    Lymphocytes % 7 (L) 13 - 44 %    Monocytes % 2 (L) 4.0 - 12.0 %    Eosinophils % 0 (L) 0.5 - 7.8 %    Basophils % 0 0.0 - 2.0 %    Immature Granulocytes 1 0.0 - 5.0 %    Neutrophils Absolute 8.6 (H) 1.7 - 8.2 K/UL    Lymphocytes Absolute 0.6 0.5 - 4.6 K/UL    Monocytes Absolute 0.2 0.1 - 1.3 K/UL    Eosinophils Absolute 0.0 0.0 - 0.8 K/UL    Basophils Absolute 0.0 0.0 - 0.2 K/UL    Absolute Immature Granulocyte 0.1 0.0 - 0.5 K/UL   PLEASE READ & DOCUMENT PPD TEST IN 24 HRS    Collection Time: 03/03/24 12:08 AM   Result Value Ref Range    PPD, (POC) Negative     mm Induration 0 0 - 5 mm   Basic Metabolic Panel w/ Reflex to MG    Collection Time: 03/03/24  4:05 AM   Result Value Ref Range    Sodium 139 136 - 146 mmol/L    Potassium 3.9 3.5 - 5.1 mmol/L    Chloride 109 103 - 113 mmol/L    CO2 24 21 - 32 mmol/L    Anion Gap 6 2 - 11 mmol/L    Glucose 143 (H) 65 - 100 mg/dL    BUN 47 (H) 8 - 23 MG/DL    Creatinine 1.80 (H) 0.6 - 1.0 MG/DL    Est, Glom Filt Rate 28 (L) >60 ml/min/1.73m2    Calcium 8.9 8.3 - 10.4 MG/DL   CBC with Auto Differential    Collection Time: 03/03/24  4:05 AM   Result Value Ref Range    WBC 15.7 (H) 4.3 - 11.1 K/uL    RBC 3.62 (L) 4.05 - 5.2 M/uL    Hemoglobin 9.6 (L) 11.7 - 15.4 g/dL    Hematocrit 31.6 (L) 35.8 - 46.3 %    MCV 87.3 82 - 102 FL    MCH 26.5 26.1 - 32.9 PG    MCHC 30.4 (L) 31.4 - 35.0 g/dL    RDW 15.4 (H) 11.9 - 14.6 %    Platelets 136 (L) 150 - 450 K/uL    MPV 11.0 9.4 - 12.3 FL    nRBC 0.00 0.0 - 0.2 K/uL    Differential Type AUTOMATED      Neutrophils % 88 (H) 43 - 78 %

## 2024-03-03 NOTE — PROGRESS NOTES
1503- Critical results called by Jayda in the lab of Troponin 365.7; Dr. Roslyn Murcia aware via perfect serve.     1505- Patient c/o SOB, left shoulder pain, and chest pain. Dr. Murcia aware face to face.    1513- [facility]

## 2024-03-03 NOTE — CARE COORDINATION
CM met with patient and discussed the recommendation for STR.  Patient was provided a SNF and states that she was made aware that it was a rehab floor here and has requested a referral to be made to inpatient rehab.  Referral completed.  CM will continue to follow.           03/03/24 5124   Service Assessment   Patient Orientation Alert and Oriented;Person;Place;Situation;Self   Cognition Alert   History Provided By Patient;Medical Record   Primary Caregiver Self   Support Systems Family Members;Children   Patient's Healthcare Decision Maker is: Legal Next of Kin   PCP Verified by CM Yes   Last Visit to PCP Within last 3 months   Current Functional Level Assistance with the following:   Can patient return to prior living arrangement Yes   Ability to make needs known: Good   Family able to assist with home care needs: Yes   Would you like for me to discuss the discharge plan with any other family members/significant others, and if so, who? Yes   Financial Resources Medicare   Social/Functional History   Lives With Family   Type of Home House   Home Layout One level   Bathroom Shower/Tub Walk-in shower   Bathroom Equipment Shower chair   Bathroom Accessibility Accessible   Home Equipment Walker, rolling   ADL Assistance Independent   Homemaking Responsibilities Yes   Ambulation Assistance Independent   Transfer Assistance Independent   Active  Yes   Mode of Transportation Car   Occupation Retired   Discharge Planning   Type of Residence House   Living Arrangements Children   Potential Assistance Needed N/A   Potential Assistance Purchasing Medications No   Type of Home Care Services Nursing Services   Patient expects to be discharged to: House   One/Two Story Residence One story   History of falls? 0   Services At/After Discharge   Transition of Care Consult (CM Consult) Discharge Planning   [place] Resource Information Provided? No   Confirm Follow Up Transport Family

## 2024-03-04 ENCOUNTER — APPOINTMENT (OUTPATIENT)
Dept: NON INVASIVE DIAGNOSTICS | Age: 79
DRG: 193 | End: 2024-03-04
Attending: INTERNAL MEDICINE
Payer: MEDICARE

## 2024-03-04 PROBLEM — Z78.9 DECREASED ACTIVITIES OF DAILY LIVING (ADL): Status: ACTIVE | Noted: 2024-03-04

## 2024-03-04 PROBLEM — M19.011 LOCALIZED OSTEOARTHRITIS OF BOTH SHOULDER REGIONS: Status: ACTIVE | Noted: 2024-03-04

## 2024-03-04 PROBLEM — R26.9 GAIT ABNORMALITY: Status: ACTIVE | Noted: 2024-03-04

## 2024-03-04 PROBLEM — M19.012 LOCALIZED OSTEOARTHRITIS OF BOTH SHOULDER REGIONS: Status: ACTIVE | Noted: 2024-03-04

## 2024-03-04 LAB
ANION GAP SERPL CALC-SCNC: 5 MMOL/L (ref 2–11)
BASOPHILS # BLD: 0 K/UL (ref 0–0.2)
BASOPHILS NFR BLD: 0 % (ref 0–2)
BUN SERPL-MCNC: 38 MG/DL (ref 8–23)
CALCIUM SERPL-MCNC: 8.5 MG/DL (ref 8.3–10.4)
CHLORIDE SERPL-SCNC: 111 MMOL/L (ref 103–113)
CO2 SERPL-SCNC: 27 MMOL/L (ref 21–32)
CREAT SERPL-MCNC: 1.4 MG/DL (ref 0.6–1)
DIFFERENTIAL METHOD BLD: ABNORMAL
ECHO AO ASC DIAM: 3.3 CM
ECHO AO ASCENDING AORTA INDEX: 1.85 CM/M2
ECHO AO ROOT DIAM: 3.2 CM
ECHO AO ROOT INDEX: 1.8 CM/M2
ECHO AR MAX VEL PISA: 3.7 M/S
ECHO AV AREA PEAK VELOCITY: 2.2 CM2
ECHO AV AREA VTI: 2.2 CM2
ECHO AV AREA/BSA PEAK VELOCITY: 1.2 CM2/M2
ECHO AV AREA/BSA VTI: 1.2 CM2/M2
ECHO AV MEAN GRADIENT: 9 MMHG
ECHO AV MEAN VELOCITY: 1.4 M/S
ECHO AV PEAK GRADIENT: 16 MMHG
ECHO AV PEAK VELOCITY: 2 M/S
ECHO AV REGURGITANT PHT: 304 MS
ECHO AV VELOCITY RATIO: 0.75
ECHO AV VTI: 45.4 CM
ECHO BSA: 1.77 M2
ECHO EST RA PRESSURE: 3 MMHG
ECHO IVC PROX: 2.1 CM
ECHO LA AREA 2C: 26.4 CM2
ECHO LA AREA 4C: 26.9 CM2
ECHO LA DIAMETER INDEX: 1.97 CM/M2
ECHO LA DIAMETER: 3.5 CM
ECHO LA MAJOR AXIS: 7 CM
ECHO LA MINOR AXIS: 6.9 CM
ECHO LA TO AORTIC ROOT RATIO: 1.09
ECHO LA VOL BP: 84 ML (ref 22–52)
ECHO LA VOL MOD A2C: 83 ML (ref 22–52)
ECHO LA VOL MOD A4C: 84 ML (ref 22–52)
ECHO LA VOL/BSA BIPLANE: 47 ML/M2 (ref 16–34)
ECHO LA VOLUME INDEX MOD A2C: 47 ML/M2 (ref 16–34)
ECHO LA VOLUME INDEX MOD A4C: 47 ML/M2 (ref 16–34)
ECHO LV E' LATERAL VELOCITY: 10 CM/S
ECHO LV E' SEPTAL VELOCITY: 11 CM/S
ECHO LV EDV A2C: 81 ML
ECHO LV EDV A4C: 57 ML
ECHO LV EDV INDEX A4C: 32 ML/M2
ECHO LV EDV NDEX A2C: 46 ML/M2
ECHO LV EJECTION FRACTION A2C: 63 %
ECHO LV EJECTION FRACTION A4C: 65 %
ECHO LV EJECTION FRACTION BIPLANE: 64 % (ref 55–100)
ECHO LV ESV A2C: 30 ML
ECHO LV ESV A4C: 20 ML
ECHO LV ESV INDEX A2C: 17 ML/M2
ECHO LV ESV INDEX A4C: 11 ML/M2
ECHO LV FRACTIONAL SHORTENING: 38 % (ref 28–44)
ECHO LV INTERNAL DIMENSION DIASTOLE INDEX: 2.81 CM/M2
ECHO LV INTERNAL DIMENSION DIASTOLIC: 5 CM (ref 3.9–5.3)
ECHO LV INTERNAL DIMENSION SYSTOLIC INDEX: 1.74 CM/M2
ECHO LV INTERNAL DIMENSION SYSTOLIC: 3.1 CM
ECHO LV IVSD: 0.9 CM (ref 0.6–0.9)
ECHO LV MASS 2D: 158.2 G (ref 67–162)
ECHO LV MASS INDEX 2D: 88.9 G/M2 (ref 43–95)
ECHO LV POSTERIOR WALL DIASTOLIC: 0.9 CM (ref 0.6–0.9)
ECHO LV RELATIVE WALL THICKNESS RATIO: 0.36
ECHO LVOT AREA: 2.8 CM2
ECHO LVOT AV VTI INDEX: 0.78
ECHO LVOT DIAM: 1.9 CM
ECHO LVOT MEAN GRADIENT: 5 MMHG
ECHO LVOT PEAK GRADIENT: 9 MMHG
ECHO LVOT PEAK VELOCITY: 1.5 M/S
ECHO LVOT STROKE VOLUME INDEX: 56.2 ML/M2
ECHO LVOT SV: 100 ML
ECHO LVOT VTI: 35.3 CM
ECHO MV A VELOCITY: 1.05 M/S
ECHO MV E DECELERATION TIME (DT): 209 MS
ECHO MV E VELOCITY: 1.67 M/S
ECHO MV E/A RATIO: 1.59
ECHO MV E/E' LATERAL: 16.7
ECHO MV E/E' RATIO (AVERAGED): 15.94
ECHO MV REGURGITANT ALIASING (NYQUIST) VELOCITY: 23 CM/S
ECHO MV REGURGITANT PEAK GRADIENT: 100 MMHG
ECHO MV REGURGITANT PEAK VELOCITY: 5 M/S
ECHO MV REGURGITANT RADIUS PISA: 0.7 CM
ECHO MV REGURGITANT VTIA: 170 CM
ECHO PV ACCELERATION TIME (AT): 103 MS
ECHO PV MAX VELOCITY: 1 M/S
ECHO PV PEAK GRADIENT: 4 MMHG
ECHO RIGHT VENTRICULAR SYSTOLIC PRESSURE (RVSP): 56 MMHG
ECHO RV BASAL DIMENSION: 3 CM
ECHO RV FREE WALL PEAK S': 17 CM/S
ECHO RV INTERNAL DIMENSION: 1.8 CM
ECHO RV TAPSE: 2.2 CM (ref 1.7–?)
ECHO TV REGURGITANT MAX VELOCITY: 3.64 M/S
ECHO TV REGURGITANT PEAK GRADIENT: 53 MMHG
EOSINOPHIL # BLD: 0 K/UL (ref 0–0.8)
EOSINOPHIL NFR BLD: 0 % (ref 0.5–7.8)
ERYTHROCYTE [DISTWIDTH] IN BLOOD BY AUTOMATED COUNT: 15.6 % (ref 11.9–14.6)
GLUCOSE SERPL-MCNC: 123 MG/DL (ref 65–100)
HCT VFR BLD AUTO: 29.6 % (ref 35.8–46.3)
HGB BLD-MCNC: 8.9 G/DL (ref 11.7–15.4)
IMM GRANULOCYTES # BLD AUTO: 0.1 K/UL (ref 0–0.5)
IMM GRANULOCYTES NFR BLD AUTO: 0 % (ref 0–5)
LYMPHOCYTES # BLD: 1.3 K/UL (ref 0.5–4.6)
LYMPHOCYTES NFR BLD: 11 % (ref 13–44)
MCH RBC QN AUTO: 26.6 PG (ref 26.1–32.9)
MCHC RBC AUTO-ENTMCNC: 30.1 G/DL (ref 31.4–35)
MCV RBC AUTO: 88.4 FL (ref 82–102)
MONOCYTES # BLD: 0.4 K/UL (ref 0.1–1.3)
MONOCYTES NFR BLD: 4 % (ref 4–12)
NEUTS SEG # BLD: 10.2 K/UL (ref 1.7–8.2)
NEUTS SEG NFR BLD: 85 % (ref 43–78)
NRBC # BLD: 0 K/UL (ref 0–0.2)
PLATELET # BLD AUTO: 124 K/UL (ref 150–450)
PMV BLD AUTO: 10.6 FL (ref 9.4–12.3)
POTASSIUM SERPL-SCNC: 3.8 MMOL/L (ref 3.5–5.1)
RBC # BLD AUTO: 3.35 M/UL (ref 4.05–5.2)
SODIUM SERPL-SCNC: 143 MMOL/L (ref 136–146)
WBC # BLD AUTO: 12 K/UL (ref 4.3–11.1)

## 2024-03-04 PROCEDURE — 97530 THERAPEUTIC ACTIVITIES: CPT

## 2024-03-04 PROCEDURE — 93306 TTE W/DOPPLER COMPLETE: CPT

## 2024-03-04 PROCEDURE — 94760 N-INVAS EAR/PLS OXIMETRY 1: CPT

## 2024-03-04 PROCEDURE — 80048 BASIC METABOLIC PNL TOTAL CA: CPT

## 2024-03-04 PROCEDURE — 6370000000 HC RX 637 (ALT 250 FOR IP): Performed by: INTERNAL MEDICINE

## 2024-03-04 PROCEDURE — 2700000000 HC OXYGEN THERAPY PER DAY

## 2024-03-04 PROCEDURE — 6370000000 HC RX 637 (ALT 250 FOR IP): Performed by: FAMILY MEDICINE

## 2024-03-04 PROCEDURE — 6360000002 HC RX W HCPCS: Performed by: INTERNAL MEDICINE

## 2024-03-04 PROCEDURE — 94660 CPAP INITIATION&MGMT: CPT

## 2024-03-04 PROCEDURE — 1100000000 HC RM PRIVATE

## 2024-03-04 PROCEDURE — 94761 N-INVAS EAR/PLS OXIMETRY MLT: CPT

## 2024-03-04 PROCEDURE — 94640 AIRWAY INHALATION TREATMENT: CPT

## 2024-03-04 PROCEDURE — 6360000002 HC RX W HCPCS: Performed by: FAMILY MEDICINE

## 2024-03-04 PROCEDURE — 85025 COMPLETE CBC W/AUTO DIFF WBC: CPT

## 2024-03-04 PROCEDURE — 2580000003 HC RX 258: Performed by: FAMILY MEDICINE

## 2024-03-04 PROCEDURE — 99222 1ST HOSP IP/OBS MODERATE 55: CPT | Performed by: STUDENT IN AN ORGANIZED HEALTH CARE EDUCATION/TRAINING PROGRAM

## 2024-03-04 PROCEDURE — 99232 SBSQ HOSP IP/OBS MODERATE 35: CPT | Performed by: INTERNAL MEDICINE

## 2024-03-04 PROCEDURE — 36415 COLL VENOUS BLD VENIPUNCTURE: CPT

## 2024-03-04 PROCEDURE — 6370000000 HC RX 637 (ALT 250 FOR IP): Performed by: HOSPITALIST

## 2024-03-04 RX ORDER — CEFUROXIME AXETIL 250 MG/1
500 TABLET ORAL EVERY 12 HOURS SCHEDULED
Status: COMPLETED | OUTPATIENT
Start: 2024-03-04 | End: 2024-03-06

## 2024-03-04 RX ORDER — GUAIFENESIN 600 MG/1
1200 TABLET, EXTENDED RELEASE ORAL 2 TIMES DAILY PRN
Status: DISCONTINUED | OUTPATIENT
Start: 2024-03-04 | End: 2024-03-07 | Stop reason: HOSPADM

## 2024-03-04 RX ADMIN — ALBUTEROL SULFATE 2.5 MG: 2.5 SOLUTION RESPIRATORY (INHALATION) at 17:00

## 2024-03-04 RX ADMIN — BUDESONIDE INHALATION 500 MCG: 0.5 SUSPENSION RESPIRATORY (INHALATION) at 19:30

## 2024-03-04 RX ADMIN — FUROSEMIDE 40 MG: 40 TABLET ORAL at 09:40

## 2024-03-04 RX ADMIN — APIXABAN 2.5 MG: 2.5 TABLET, FILM COATED ORAL at 09:40

## 2024-03-04 RX ADMIN — APIXABAN 5 MG: 5 TABLET, FILM COATED ORAL at 20:47

## 2024-03-04 RX ADMIN — PANTOPRAZOLE SODIUM 40 MG: 40 TABLET, DELAYED RELEASE ORAL at 04:30

## 2024-03-04 RX ADMIN — ALBUTEROL SULFATE 2.5 MG: 2.5 SOLUTION RESPIRATORY (INHALATION) at 08:14

## 2024-03-04 RX ADMIN — CEFUROXIME AXETIL 500 MG: 250 TABLET ORAL at 20:47

## 2024-03-04 RX ADMIN — PREDNISONE 40 MG: 20 TABLET ORAL at 09:40

## 2024-03-04 RX ADMIN — SODIUM CHLORIDE, PRESERVATIVE FREE 10 ML: 5 INJECTION INTRAVENOUS at 09:42

## 2024-03-04 RX ADMIN — WATER 1000 MG: 1 INJECTION INTRAMUSCULAR; INTRAVENOUS; SUBCUTANEOUS at 09:42

## 2024-03-04 RX ADMIN — METOPROLOL SUCCINATE 25 MG: 25 TABLET, FILM COATED, EXTENDED RELEASE ORAL at 10:47

## 2024-03-04 RX ADMIN — ALBUTEROL SULFATE 2.5 MG: 2.5 SOLUTION RESPIRATORY (INHALATION) at 11:42

## 2024-03-04 RX ADMIN — PANTOPRAZOLE SODIUM 40 MG: 40 TABLET, DELAYED RELEASE ORAL at 16:28

## 2024-03-04 RX ADMIN — SODIUM CHLORIDE, PRESERVATIVE FREE 10 ML: 5 INJECTION INTRAVENOUS at 20:47

## 2024-03-04 RX ADMIN — ALBUTEROL SULFATE 2.5 MG: 2.5 SOLUTION RESPIRATORY (INHALATION) at 19:30

## 2024-03-04 RX ADMIN — BUDESONIDE INHALATION 500 MCG: 0.5 SUSPENSION RESPIRATORY (INHALATION) at 08:14

## 2024-03-04 ASSESSMENT — PAIN SCALES - GENERAL
PAINLEVEL_OUTOF10: 0

## 2024-03-04 NOTE — CONSULTS
disease)     controlled with medication     Heart failure (HCC)     last EF 60-65% echo done 10/26/2021    History of EKG 11/03/2021    a fib; patient started on eliquis BID and ASA 81 mg per [facility]    History of peptic ulcer disease     resolved with protonix     Hx of echocardiogram 10/26/2021    EF 60-65%     Menopause     Mixed hyperlipidemia 4/21/2016    Neuropathy     BLE; gabapentin     Osteoarthritis     Osteopenia 1/7/2015    Renal insufficiency 01/07/2015       Date of Evaluation: March 4, 2024    Subjective       HPI: Zeynep Carrington is a 78 y.o. female patient who presented to [facility] on 3/1/2024 secondary to chills and dry cough.  Patient had reported having viral symptoms on and off for roughly 1 to 2 months.  In the emergency department she was noted to be saturating at 80% on room air.  She was noted to have JOHANNY chest x-ray showed bilateral lower lung infiltrates.  She was admitted to the hospitalist team for JOHANNY pneumonia with acute hypoxic respiratory failure.  JOHANNY resolved after IV fluids.  She was started on ceftriaxone and azithromycin for bilateral pneumonia.  Due to history of COPD, prednisone was initiated as well.  Concern for UTI, urine culture no growth to date.  Antihypertensives held secondary to JOHANNY, blood pressure remains well-controlled.  On 3/2 she started having chest pain, she was noted to have elevated troponin thought secondary to sepsis and pneumonia.  Echocardiogram pending this morning. Due to ongoing functional deficits, PM&R consulted to evaluate patient's rehabilitation and ongoing medical needs to determine best suited placement for patient once discharged from acute care.     Patient seen at bedside this afternoon.  Discussed that she lives at home [place]-door to her son and daughter-in-law.  She reports cough improving, currently on supplemental oxygen.  She states that she utilizes supplemental oxygen at night only.  Limited mobility secondary to  170 (H) 65 - 100 mg/dL    BUN 44 (H) 8 - 23 MG/DL    Creatinine 2.00 (H) 0.6 - 1.0 MG/DL    Est, Glom Filt Rate 25 (L) >60 ml/min/1.73m2    Calcium 8.5 8.3 - 10.4 MG/DL   CBC with Auto Differential    Collection Time: 03/02/24  3:32 AM   Result Value Ref Range    WBC 9.5 4.3 - 11.1 K/uL    RBC 3.48 (L) 4.05 - 5.2 M/uL    Hemoglobin 9.4 (L) 11.7 - 15.4 g/dL    Hematocrit 30.3 (L) 35.8 - 46.3 %    MCV 87.1 82 - 102 FL    MCH 27.0 26.1 - 32.9 PG    MCHC 31.0 (L) 31.4 - 35.0 g/dL    RDW 15.6 (H) 11.9 - 14.6 %    Platelets 121 (L) 150 - 450 K/uL    MPV 10.9 9.4 - 12.3 FL    nRBC 0.00 0.0 - 0.2 K/uL    Differential Type AUTOMATED      Neutrophils % 90 (H) 43 - 78 %    Lymphocytes % 7 (L) 13 - 44 %    Monocytes % 2 (L) 4.0 - 12.0 %    Eosinophils % 0 (L) 0.5 - 7.8 %    Basophils % 0 0.0 - 2.0 %    Immature Granulocytes 1 0.0 - 5.0 %    Neutrophils Absolute 8.6 (H) 1.7 - 8.2 K/UL    Lymphocytes Absolute 0.6 0.5 - 4.6 K/UL    Monocytes Absolute 0.2 0.1 - 1.3 K/UL    Eosinophils Absolute 0.0 0.0 - 0.8 K/UL    Basophils Absolute 0.0 0.0 - 0.2 K/UL    Absolute Immature Granulocyte 0.1 0.0 - 0.5 K/UL   PLEASE READ & DOCUMENT PPD TEST IN 48 HRS    Collection Time: 03/03/24 12:00 AM   Result Value Ref Range    PPD, (POC) Negative     mm Induration 0 0 - 5 mm   PLEASE READ & DOCUMENT PPD TEST IN 24 HRS    Collection Time: 03/03/24 12:08 AM   Result Value Ref Range    PPD, (POC) Negative     mm Induration 0 0 - 5 mm   Basic Metabolic Panel w/ Reflex to MG    Collection Time: 03/03/24  4:05 AM   Result Value Ref Range    Sodium 139 136 - 146 mmol/L    Potassium 3.9 3.5 - 5.1 mmol/L    Chloride 109 103 - 113 mmol/L    CO2 24 21 - 32 mmol/L    Anion Gap 6 2 - 11 mmol/L    Glucose 143 (H) 65 - 100 mg/dL    BUN 47 (H) 8 - 23 MG/DL    Creatinine 1.80 (H) 0.6 - 1.0 MG/DL    Est, Glom Filt Rate 28 (L) >60 ml/min/1.73m2    Calcium 8.9 8.3 - 10.4 MG/DL   CBC with Auto Differential    Collection Time: 03/03/24  4:05 AM   Result Value Ref Range

## 2024-03-04 NOTE — CARE COORDINATION
Received Call from [employer] that patient was denied inpatient rehab admission.  Patients family may appeal decision if they wish by calling [phone] or fax [phone].  [facility] did state that they would approve subacute admission if the patient/family wish to pursue this option.

## 2024-03-04 NOTE — PLAN OF CARE
Patient has remained A&O x 4.   Vital signs have remained stable. Patient denies pain, N/V/D.     Patient rounded on hourly by myself or patient assistant and encouraged to call with any needs. No signs of distress noted. Bed low, locked, call bell within reach.   BSSR given to ERIN Tracey RN    Problem: Pain  Goal: Verbalizes/displays adequate comfort level or baseline comfort level  Outcome: Progressing     Problem: Skin/Tissue Integrity  Goal: Absence of new skin breakdown  Description: 1.  Monitor for areas of redness and/or skin breakdown  2.  Assess vascular access sites hourly  3.  Every 4-6 hours minimum:  Change oxygen saturation probe site  4.  Every 4-6 hours:  If on nasal continuous positive airway pressure, respiratory therapy assess nares and determine need for appliance change or resting period.  Outcome: Progressing     Problem: Safety - Adult  Goal: Free from fall injury  Outcome: Progressing     Problem: Chronic Conditions and Co-morbidities  Goal: Patient's chronic conditions and co-morbidity symptoms are monitored and maintained or improved  Outcome: Progressing

## 2024-03-04 NOTE — PROGRESS NOTES
ACUTE PHYSICAL THERAPY GOALS:   (Developed with and agreed upon by patient and/or caregiver.)    (1.) Zeynep Carrington  will move from supine to sit and sit to supine  with SUPERVISION within 7 treatment day(s).    (2.) Zeynep Carrington will transfer from bed to chair and chair to bed with SUPERVISION using the least restrictive device within 7 treatment day(s).    (3.) Zeynep Carrington will ambulate with SUPERVISION for 150 feet with the least restrictive device within 7 treatment day(s).   (4.) Zeynep Carrington will perform standing static and dynamic balance activities x 20 minutes with SUPERVISION to improve safety within 7 treatment day(s).  (5.) Zeynep Carrington will perform therapeutic exercises x 20 min for HEP with INDEPENDENCE to improve strength, endurance, and functional mobility within 7 treatment day(s).     PHYSICAL THERAPY: Daily Note AM   (Link to Caseload Tracking: PT Visit Days : 2  Time In/Out PT Charge Capture  Rehab Caseload Tracker  Orders    Zeynep Carrington is a 78 y.o. female   PRIMARY DIAGNOSIS: JOHANNY (acute kidney injury) ([facility])  General weakness [R53.1]  COPD exacerbation ([facility]) [J44.1]  JOHANNY (acute kidney injury) ([facility]) [N17.9]  Pneumonia of both lower lobes due to infectious organism [J18.9]       Inpatient: Payor: [employer] MEDICARE / Plan: [employer] GOLD PLUS HMO / Product Type: *No Product type* /     ASSESSMENT:     REHAB RECOMMENDATIONS:   Recommendation to date pending progress:  Setting:  Inpatient Rehab Facility    Equipment:    To Be Determined     ASSESSMENT:  Ms. Carrington was received sitting in chair, agreeable to therapy. She was able to progress to ambulating 30 ft x 2 with RW and CGA-Arcenio. She also participated in dynamic standing balance activities while performing ADL's with CGA. She continues to require frequent and extended rest break but improvements noted today in balance and activity tolerance. Steady progress, will continue to follow.      SUBJECTIVE:   Ms. Carrington  Assistance, CGA=Contact Guard Assistance,   Min=Minimal Assistance, Mod=Moderate Assistance, Max=Maximal Assistance, Total=Total Assistance, NT=Not Tested    PLAN:   FREQUENCY AND DURATION: 3 times/week for duration of hospital stay or until stated goals are met, whichever comes first.    TREATMENT:   TREATMENT:   Therapeutic Activity (27 Minutes): Therapeutic activity included Scooting, Transfer Training, Ambulation on level ground, Sitting balance , and Standing balance to improve functional Activity tolerance, Balance, Mobility, and Strength.    TREATMENT GRID:  N/A    AFTER TREATMENT PRECAUTIONS: Alarm Activated, Bed/Chair Locked, Call light within reach, Chair, Needs within reach, and RN notified    INTERDISCIPLINARY COLLABORATION:  RN/ PCT    EDUCATION:      TIME IN/OUT:  Time In: 0916  Time Out: 0943  Minutes: 27    ABRIL MULLER PT

## 2024-03-04 NOTE — PROGRESS NOTES
nebulizer suspension 500 mcg  0.5 mg Nebulization BID RT    furosemide (LASIX) tablet 40 mg  40 mg Oral Daily    apixaban (ELIQUIS) tablet 2.5 mg  2.5 mg Oral BID    albuterol (PROVENTIL) nebulizer solution 2.5 mg  2.5 mg Nebulization Q6H PRN    guaiFENesin (MUCINEX) extended release tablet 1,200 mg  1,200 mg Oral BID    [Held by provider] gabapentin (NEURONTIN) capsule 200 mg  200 mg Oral TID    hydrALAZINE (APRESOLINE) injection 10 mg  10 mg IntraVENous Q6H PRN    metoprolol succinate (TOPROL XL) extended release tablet 25 mg  25 mg Oral Daily    pantoprazole (PROTONIX) tablet 40 mg  40 mg Oral BID AC    rosuvastatin (CRESTOR) tablet 10 mg  10 mg Oral Daily    sodium chloride flush 0.9 % injection 5-40 mL  5-40 mL IntraVENous 2 times per day    sodium chloride flush 0.9 % injection 5-40 mL  5-40 mL IntraVENous PRN    0.9 % sodium chloride infusion   IntraVENous PRN    ondansetron (ZOFRAN-ODT) disintegrating tablet 4 mg  4 mg Oral Q8H PRN    Or    ondansetron (ZOFRAN) injection 4 mg  4 mg IntraVENous Q6H PRN    polyethylene glycol (GLYCOLAX) packet 17 g  17 g Oral Daily PRN    bisacodyl (DULCOLAX) suppository 10 mg  10 mg Rectal Daily PRN    famotidine (PEPCID) tablet 10 mg  10 mg Oral Daily PRN    aluminum & magnesium hydroxide-simethicone (MAALOX) 200-200-20 MG/5ML suspension 30 mL  30 mL Oral Q6H PRN    acetaminophen (TYLENOL) tablet 650 mg  650 mg Oral Q6H PRN    Or    acetaminophen (TYLENOL) suppository 650 mg  650 mg Rectal Q6H PRN    cefTRIAXone (ROCEPHIN) 1,000 mg in sterile water 10 mL IV syringe  1,000 mg IntraVENous Q24H    predniSONE (DELTASONE) tablet 40 mg  40 mg Oral Daily       Data Review: data included in this note has been independently reviewed by the author     TELEMETRY: NSR    Assessment/Plan:     Patient Active Problem List   Diagnosis    Weakening of rectovaginal tissue    On potassium wasting diuretic therapy    Moderate persistent asthma with acute exacerbation    Carpal tunnel syndrome  abnormalities.     Paroxysmal a-fib  -Continue telemetry monitoring.  -Continue Eliquis and metoprolol     HTN  BP stable at 143/68  -Continue to monitor.     HFpEF  -Will examine function during scheduled Echo.  -Continue metoprolol and Lasix    Acute hypoxic respiratory failure 2/2 pneumonia vs asthma exacerbation  -Followed by primary hospital team        RICARDA LAW  I have personally seen and evaluated the patient and reviewed the students note and agree with the following assessment and plan and findings. I was present for and observed the key components of this note.  Any appropriate additions or editing of the information have been done by me.    Karl Zhao MD, [facility]  Cardiology

## 2024-03-04 NOTE — CARE COORDINATION
Dr. Francisco met with CM to discuss pt's d/c plan.  Pt meets criteria for IRC.  If her echo is clear then IRC will start pre-cert.  Given that pt has [employer] Medicare this plan usually denies IRC and requires a peer to peer.  CM will continue to follow.  LOS = 3 days

## 2024-03-04 NOTE — PROGRESS NOTES
03/03/24  4:36 PM   Result Value Ref Range    Troponin, High Sensitivity 240.9 (HH) 0 - 14 pg/mL   Troponin    Collection Time: 03/03/24  6:31 PM   Result Value Ref Range    Troponin, High Sensitivity 194.7 (HH) 0 - 14 pg/mL   Basic Metabolic Panel w/ Reflex to MG    Collection Time: 03/04/24  3:35 AM   Result Value Ref Range    Sodium 143 136 - 146 mmol/L    Potassium 3.8 3.5 - 5.1 mmol/L    Chloride 111 103 - 113 mmol/L    CO2 27 21 - 32 mmol/L    Anion Gap 5 2 - 11 mmol/L    Glucose 123 (H) 65 - 100 mg/dL    BUN 38 (H) 8 - 23 MG/DL    Creatinine 1.40 (H) 0.6 - 1.0 MG/DL    Est, Glom Filt Rate 38 (L) >60 ml/min/1.73m2    Calcium 8.5 8.3 - 10.4 MG/DL   CBC with Auto Differential    Collection Time: 03/04/24  3:35 AM   Result Value Ref Range    WBC 12.0 (H) 4.3 - 11.1 K/uL    RBC 3.35 (L) 4.05 - 5.2 M/uL    Hemoglobin 8.9 (L) 11.7 - 15.4 g/dL    Hematocrit 29.6 (L) 35.8 - 46.3 %    MCV 88.4 82 - 102 FL    MCH 26.6 26.1 - 32.9 PG    MCHC 30.1 (L) 31.4 - 35.0 g/dL    RDW 15.6 (H) 11.9 - 14.6 %    Platelets 124 (L) 150 - 450 K/uL    MPV 10.6 9.4 - 12.3 FL    nRBC 0.00 0.0 - 0.2 K/uL    Differential Type AUTOMATED      Neutrophils % 85 (H) 43 - 78 %    Lymphocytes % 11 (L) 13 - 44 %    Monocytes % 4 4.0 - 12.0 %    Eosinophils % 0 (L) 0.5 - 7.8 %    Basophils % 0 0.0 - 2.0 %    Immature Granulocytes 0 0.0 - 5.0 %    Neutrophils Absolute 10.2 (H) 1.7 - 8.2 K/UL    Lymphocytes Absolute 1.3 0.5 - 4.6 K/UL    Monocytes Absolute 0.4 0.1 - 1.3 K/UL    Eosinophils Absolute 0.0 0.0 - 0.8 K/UL    Basophils Absolute 0.0 0.0 - 0.2 K/UL    Absolute Immature Granulocyte 0.1 0.0 - 0.5 K/UL       Recent Labs     03/01/24  1938   COVID19 Not detected         Current Meds:  Current Facility-Administered Medications   Medication Dose Route Frequency    guaiFENesin (MUCINEX) extended release tablet 1,200 mg  1,200 mg Oral BID PRN    apixaban (ELIQUIS) tablet 5 mg  5 mg Oral BID    cefUROXime (CEFTIN) tablet 500 mg  500 mg Oral 2 times per  day    albuterol (PROVENTIL) (2.5 MG/3ML) 0.083% nebulizer solution 2.5 mg  2.5 mg Nebulization 4x Daily RT    budesonide (PULMICORT) nebulizer suspension 500 mcg  0.5 mg Nebulization BID RT    furosemide (LASIX) tablet 40 mg  40 mg Oral Daily    albuterol (PROVENTIL) nebulizer solution 2.5 mg  2.5 mg Nebulization Q6H PRN    hydrALAZINE (APRESOLINE) injection 10 mg  10 mg IntraVENous Q6H PRN    metoprolol succinate (TOPROL XL) extended release tablet 25 mg  25 mg Oral Daily    pantoprazole (PROTONIX) tablet 40 mg  40 mg Oral BID AC    sodium chloride flush 0.9 % injection 5-40 mL  5-40 mL IntraVENous 2 times per day    sodium chloride flush 0.9 % injection 5-40 mL  5-40 mL IntraVENous PRN    0.9 % sodium chloride infusion   IntraVENous PRN    ondansetron (ZOFRAN-ODT) disintegrating tablet 4 mg  4 mg Oral Q8H PRN    Or    ondansetron (ZOFRAN) injection 4 mg  4 mg IntraVENous Q6H PRN    polyethylene glycol (GLYCOLAX) packet 17 g  17 g Oral Daily PRN    bisacodyl (DULCOLAX) suppository 10 mg  10 mg Rectal Daily PRN    famotidine (PEPCID) tablet 10 mg  10 mg Oral Daily PRN    aluminum & magnesium hydroxide-simethicone (MAALOX) 200-200-20 MG/5ML suspension 30 mL  30 mL Oral Q6H PRN    acetaminophen (TYLENOL) tablet 650 mg  650 mg Oral Q6H PRN    Or    acetaminophen (TYLENOL) suppository 650 mg  650 mg Rectal Q6H PRN    predniSONE (DELTASONE) tablet 40 mg  40 mg Oral Daily       Signed:  Jaylen Her MD    Part of this note may have been written by using a voice dictation software.  The note has been proof read but may still contain some grammatical/other typographical errors.

## 2024-03-05 PROBLEM — M19.012 LOCALIZED OSTEOARTHRITIS OF BOTH SHOULDER REGIONS: Chronic | Status: ACTIVE | Noted: 2024-03-04

## 2024-03-05 PROBLEM — R79.89 ELEVATED TROPONIN: Status: RESOLVED | Noted: 2024-03-03 | Resolved: 2024-03-05

## 2024-03-05 PROBLEM — M19.011 LOCALIZED OSTEOARTHRITIS OF BOTH SHOULDER REGIONS: Chronic | Status: ACTIVE | Noted: 2024-03-04

## 2024-03-05 PROBLEM — G47.33 OSA (OBSTRUCTIVE SLEEP APNEA): Chronic | Status: ACTIVE | Noted: 2022-03-02

## 2024-03-05 PROBLEM — Z78.9 DECREASED ACTIVITIES OF DAILY LIVING (ADL): Chronic | Status: ACTIVE | Noted: 2024-03-04

## 2024-03-05 PROBLEM — N17.9 AKI (ACUTE KIDNEY INJURY) (HCC): Status: RESOLVED | Noted: 2024-03-01 | Resolved: 2024-03-05

## 2024-03-05 LAB
ALBUMIN SERPL-MCNC: 2.5 G/DL (ref 3.2–4.6)
ANION GAP SERPL CALC-SCNC: 3 MMOL/L (ref 2–11)
BACTERIA SPEC CULT: ABNORMAL
BACTERIA SPEC CULT: ABNORMAL
BUN SERPL-MCNC: 33 MG/DL (ref 8–23)
CALCIUM SERPL-MCNC: 8.8 MG/DL (ref 8.3–10.4)
CHLORIDE SERPL-SCNC: 108 MMOL/L (ref 103–113)
CO2 SERPL-SCNC: 28 MMOL/L (ref 21–32)
CREAT SERPL-MCNC: 1.4 MG/DL (ref 0.6–1)
GLUCOSE SERPL-MCNC: 123 MG/DL (ref 65–100)
PHOSPHATE SERPL-MCNC: 2.8 MG/DL (ref 2.3–3.7)
POTASSIUM SERPL-SCNC: 3.9 MMOL/L (ref 3.5–5.1)
SERVICE CMNT-IMP: ABNORMAL
SODIUM SERPL-SCNC: 139 MMOL/L (ref 136–146)

## 2024-03-05 PROCEDURE — 94760 N-INVAS EAR/PLS OXIMETRY 1: CPT

## 2024-03-05 PROCEDURE — 6370000000 HC RX 637 (ALT 250 FOR IP): Performed by: INTERNAL MEDICINE

## 2024-03-05 PROCEDURE — 94761 N-INVAS EAR/PLS OXIMETRY MLT: CPT

## 2024-03-05 PROCEDURE — 94660 CPAP INITIATION&MGMT: CPT

## 2024-03-05 PROCEDURE — 1100000000 HC RM PRIVATE

## 2024-03-05 PROCEDURE — 6370000000 HC RX 637 (ALT 250 FOR IP): Performed by: HOSPITALIST

## 2024-03-05 PROCEDURE — 6360000002 HC RX W HCPCS: Performed by: INTERNAL MEDICINE

## 2024-03-05 PROCEDURE — 2580000003 HC RX 258: Performed by: FAMILY MEDICINE

## 2024-03-05 PROCEDURE — 97530 THERAPEUTIC ACTIVITIES: CPT

## 2024-03-05 PROCEDURE — 97110 THERAPEUTIC EXERCISES: CPT

## 2024-03-05 PROCEDURE — 94640 AIRWAY INHALATION TREATMENT: CPT

## 2024-03-05 PROCEDURE — 80069 RENAL FUNCTION PANEL: CPT

## 2024-03-05 PROCEDURE — 36415 COLL VENOUS BLD VENIPUNCTURE: CPT

## 2024-03-05 PROCEDURE — 6370000000 HC RX 637 (ALT 250 FOR IP): Performed by: FAMILY MEDICINE

## 2024-03-05 RX ORDER — LOSARTAN POTASSIUM 25 MG/1
25 TABLET ORAL DAILY
Status: DISCONTINUED | OUTPATIENT
Start: 2024-03-05 | End: 2024-03-06

## 2024-03-05 RX ORDER — SPIRONOLACTONE 25 MG/1
25 TABLET ORAL DAILY
Status: DISCONTINUED | OUTPATIENT
Start: 2024-03-05 | End: 2024-03-07 | Stop reason: HOSPADM

## 2024-03-05 RX ORDER — GABAPENTIN 300 MG/1
300 CAPSULE ORAL 3 TIMES DAILY
Status: DISCONTINUED | OUTPATIENT
Start: 2024-03-05 | End: 2024-03-07 | Stop reason: HOSPADM

## 2024-03-05 RX ADMIN — SODIUM CHLORIDE, PRESERVATIVE FREE 10 ML: 5 INJECTION INTRAVENOUS at 08:19

## 2024-03-05 RX ADMIN — CEFUROXIME AXETIL 500 MG: 250 TABLET ORAL at 08:18

## 2024-03-05 RX ADMIN — SPIRONOLACTONE 25 MG: 25 TABLET ORAL at 08:54

## 2024-03-05 RX ADMIN — PANTOPRAZOLE SODIUM 40 MG: 40 TABLET, DELAYED RELEASE ORAL at 14:30

## 2024-03-05 RX ADMIN — GABAPENTIN 300 MG: 300 CAPSULE ORAL at 08:18

## 2024-03-05 RX ADMIN — FUROSEMIDE 40 MG: 40 TABLET ORAL at 08:18

## 2024-03-05 RX ADMIN — CEFUROXIME AXETIL 500 MG: 250 TABLET ORAL at 20:50

## 2024-03-05 RX ADMIN — ALBUTEROL SULFATE 2.5 MG: 2.5 SOLUTION RESPIRATORY (INHALATION) at 21:32

## 2024-03-05 RX ADMIN — BUDESONIDE INHALATION 500 MCG: 0.5 SUSPENSION RESPIRATORY (INHALATION) at 21:32

## 2024-03-05 RX ADMIN — LOSARTAN POTASSIUM 25 MG: 25 TABLET, FILM COATED ORAL at 08:54

## 2024-03-05 RX ADMIN — METOPROLOL SUCCINATE 25 MG: 25 TABLET, FILM COATED, EXTENDED RELEASE ORAL at 08:18

## 2024-03-05 RX ADMIN — GABAPENTIN 300 MG: 300 CAPSULE ORAL at 04:48

## 2024-03-05 RX ADMIN — PREDNISONE 40 MG: 20 TABLET ORAL at 08:18

## 2024-03-05 RX ADMIN — PANTOPRAZOLE SODIUM 40 MG: 40 TABLET, DELAYED RELEASE ORAL at 04:37

## 2024-03-05 RX ADMIN — GABAPENTIN 300 MG: 300 CAPSULE ORAL at 20:50

## 2024-03-05 RX ADMIN — APIXABAN 5 MG: 5 TABLET, FILM COATED ORAL at 08:18

## 2024-03-05 RX ADMIN — ALBUTEROL SULFATE 2.5 MG: 2.5 SOLUTION RESPIRATORY (INHALATION) at 07:48

## 2024-03-05 RX ADMIN — BUDESONIDE INHALATION 500 MCG: 0.5 SUSPENSION RESPIRATORY (INHALATION) at 07:48

## 2024-03-05 RX ADMIN — ALBUTEROL SULFATE 2.5 MG: 2.5 SOLUTION RESPIRATORY (INHALATION) at 12:43

## 2024-03-05 RX ADMIN — ALBUTEROL SULFATE 2.5 MG: 2.5 SOLUTION RESPIRATORY (INHALATION) at 15:52

## 2024-03-05 RX ADMIN — APIXABAN 5 MG: 5 TABLET, FILM COATED ORAL at 20:50

## 2024-03-05 RX ADMIN — GABAPENTIN 300 MG: 300 CAPSULE ORAL at 14:30

## 2024-03-05 RX ADMIN — SODIUM CHLORIDE, PRESERVATIVE FREE 10 ML: 5 INJECTION INTRAVENOUS at 20:50

## 2024-03-05 ASSESSMENT — PAIN SCALES - GENERAL
PAINLEVEL_OUTOF10: 0
PAINLEVEL_OUTOF10: 0

## 2024-03-05 NOTE — PROGRESS NOTES
Hospitalist Progress Note   Admit Date:  3/1/2024  7:11 PM   Name:  Zeynep Carrington   Age:  78 y.o.  Sex:  female  :  1945   MRN:  [id number]   Room:  [facility]/    Presenting/Chief Complaint: Shortness of Breath, Fever, and Altered Mental Status     Reason(s) for Admission: General weakness [R53.1]  COPD exacerbation (HCC) [J44.1]  JOHANNY (acute kidney injury) (HCC) [N17.9]  Pneumonia of both lower lobes due to infectious organism [J18.9]     Hospital Course:     Zeynep Carrington is a 78 y.o. female who presented to the ED for cc chills, rigors, dry cough, congestion, wheezing,  post nasal drip since this AM. Was noted to be 80% on RA.   Hx of asthma, CHF EF 60%, paroxysmal a fib on Eliquis, HTN, HLD, CAD  Creatine 2.1 from 1.6 baseline  Chest x ray with bilateral lower lung infiltrates  Patient admitted for JOHANNY and pneumonia with acute hypoxic respiratory failure.     Today  Pt ambulating unassisted though slightly unsteady.  On room air.  No complaints at present.    Dispo: awaiting STR placement.  I do not think pt meets criteria for IRC    Assessment & Plan:     JOHANNY on CKD 3b  24 - Cr at baseline today.  recheck BMP in AM  - resolved    COPD exacerbation due to bacterial PNA  -cephalosporin and azithromcyin  -Cont Mucinex. IS. Tessalon perles. PRN albuterol.  -Pred 40 mg  -scheduled nebs, prn albuterol    Acute hypoxic resp failure, resolved  24 - Echo unremarkable, no CHF    Paroxysmal a fib   -renal dose eliquis     HTN   24 -uncontrolled.  Resume losartan at 25mg    HLD   - statin      Chronic pain   -controlled.    dCHF  -cont home lasix/aldactone       Dispo: awaiting STR placement.  I do not think pt meets criteria for IRC      Objective:   Patient Vitals for the past 24 hrs:   Temp Pulse Resp BP SpO2   24 0805 98.2 °F (36.8 °C) 68 20 (!) 141/57 94 %   24 0749 -- -- -- -- 97 %   24 0258 -- 53 17 (!) 167/66 97 %   24 0158 -- -- -- -- 96 %   24 3441  E/A 1.59     E/E' Ratio (Averaged) 15.94     E/E' Lateral 16.70     LA Volume Index BP 47 (A) 16 - 34 ml/m2    LVOT Stroke Volume Index 56.2 mL/m2    LA Volume Index MOD A2C 47 (A) 16 - 34 ml/m2    LA Volume Index MOD A4C 47 (A) 16 - 34 ml/m2    LA Size Index 1.97 cm/m2    LA/AO Root Ratio 1.09     Ao Root Index 1.80 cm/m2    Ascending Aorta Index 1.85 cm/m2    AV Velocity Ratio 0.75     LVOT:AV VTI Index 0.78     AVELINA/BSA VTI 1.2 cm2/m2    AVELINA/BSA Peak Velocity 1.2 cm2/m2    RVSP 56 mmHg   Renal Function Panel    Collection Time: 03/05/24  3:56 AM   Result Value Ref Range    Sodium 139 136 - 146 mmol/L    Potassium 3.9 3.5 - 5.1 mmol/L    Chloride 108 103 - 113 mmol/L    CO2 28 21 - 32 mmol/L    Anion Gap 3 2 - 11 mmol/L    Glucose 123 (H) 65 - 100 mg/dL    BUN 33 (H) 8 - 23 MG/DL    Creatinine 1.40 (H) 0.6 - 1.0 MG/DL    Est, Glom Filt Rate 38 (L) >60 ml/min/1.73m2    Calcium 8.8 8.3 - 10.4 MG/DL    Phosphorus 2.8 2.3 - 3.7 MG/DL    Albumin 2.5 (L) 3.2 - 4.6 g/dL       No results for input(s): \"COVID19\" in the last 72 hours.      Current Meds:  Current Facility-Administered Medications   Medication Dose Route Frequency    gabapentin (NEURONTIN) capsule 300 mg  300 mg Oral TID    spironolactone (ALDACTONE) tablet 25 mg  25 mg Oral Daily    losartan (COZAAR) tablet 25 mg  25 mg Oral Daily    guaiFENesin (MUCINEX) extended release tablet 1,200 mg  1,200 mg Oral BID PRN    apixaban (ELIQUIS) tablet 5 mg  5 mg Oral BID    cefUROXime (CEFTIN) tablet 500 mg  500 mg Oral 2 times per day    albuterol (PROVENTIL) (2.5 MG/3ML) 0.083% nebulizer solution 2.5 mg  2.5 mg Nebulization 4x Daily RT    budesonide (PULMICORT) nebulizer suspension 500 mcg  0.5 mg Nebulization BID RT    furosemide (LASIX) tablet 40 mg  40 mg Oral Daily    albuterol (PROVENTIL) nebulizer solution 2.5 mg  2.5 mg Nebulization Q6H PRN    hydrALAZINE (APRESOLINE) injection 10 mg  10 mg IntraVENous Q6H PRN    metoprolol succinate (TOPROL XL) extended release

## 2024-03-05 NOTE — CARE COORDINATION
CM received a voicemail from mobli that pt has been denied for IRC.  She will be approved for STR.  CM met with pt at the bedside to discuss d/c planning.  She requested a referral be made to [facility].  Referral has been submitted.  CM will continue to follow.  LOS = 4 days    Update: Pt has been accepted to [facility].  CM is awaiting confirmation of when bed will be available.  Pre-cert will be submitted.

## 2024-03-05 NOTE — PROGRESS NOTES
ACUTE PHYSICAL THERAPY GOALS:   (Developed with and agreed upon by patient and/or caregiver.)    (1.) Zeynep Carrington  will move from supine to sit and sit to supine  with SUPERVISION within 7 treatment day(s).    (2.) Zeynep Carrington will transfer from bed to chair and chair to bed with SUPERVISION using the least restrictive device within 7 treatment day(s).    (3.) Zeynep Carrington will ambulate with SUPERVISION for 150 feet with the least restrictive device within 7 treatment day(s).   (4.) Zeynep Carrington will perform standing static and dynamic balance activities x 20 minutes with SUPERVISION to improve safety within 7 treatment day(s).  (5.) Zeynep Carrington will perform therapeutic exercises x 20 min for HEP with INDEPENDENCE to improve strength, endurance, and functional mobility within 7 treatment day(s).     PHYSICAL THERAPY: Daily Note AM   (Link to Caseload Tracking: PT Visit Days : 3  Time In/Out PT Charge Capture  Rehab Caseload Tracker  Orders    Zeynep Carrington is a 78 y.o. female   PRIMARY DIAGNOSIS: JOHANNY (acute kidney injury) ([facility])  General weakness [R53.1]  COPD exacerbation ([facility]) [J44.1]  JOHANNY (acute kidney injury) ([facility]) [N17.9]  Pneumonia of both lower lobes due to infectious organism [J18.9]       Inpatient: Payor: [employer] MEDICARE / Plan: [employer] GOLD PLUS HMO / Product Type: *No Product type* /     ASSESSMENT:     REHAB RECOMMENDATIONS:   Recommendation to date pending progress:  Setting:  Inpatient Rehab Facility    Equipment:    To Be Determined     ASSESSMENT:  Ms. Carrington is up in the chair on arrival.  Her daughter is present.  Ms. Carrington apparently did not get in to [facility] and there was a discharge order in.  There is some confusion as Ms. Carrington continues to require some form of inpatient rehab at this time.  Her daughter works during the day and Ms. Carrington is not strong enough to be home by herself.  As a reminder she is IND at baseline and drives to [employer] with no assistive  [] [] [] []              Standing Static [] [x] [x] [] [] []    Standing Dynamic [] [] [x] [] [] []      GAIT: I Mod I S SBA CGA Min Mod Max Total  NT x2 Comments:   Level of Assistance [] [] [] [] [x] [x] [] [] [] [] []    Distance 60, 25     DME Rolling Walker and then with nothing    Gait Quality Decreased dolores , Decreased step length, and Trunk sway increased    Weightbearing Status      Stairs      I=Independent, Mod I=Modified Independent, S=Supervision, SBA=Standby Assistance, CGA=Contact Guard Assistance,   Min=Minimal Assistance, Mod=Moderate Assistance, Max=Maximal Assistance, Total=Total Assistance, NT=Not Tested    PLAN:   FREQUENCY AND DURATION: 3 times/week for duration of hospital stay or until stated goals are met, whichever comes first.    TREATMENT:   TREATMENT:   Therapeutic Activity (15 Minutes): Therapeutic activity included Scooting, Transfer Training, Ambulation on level ground, Sitting balance , and Standing balance to improve functional Activity tolerance, Balance, Mobility, and Strength.  Therapeutic Exercise (8 Minutes): Therapeutic exercises noted below to improve functional activity tolerance, AROM, and strength.     TREATMENT GRID:  N/A    AFTER TREATMENT PRECAUTIONS: Alarm Activated, Bed/Chair Locked, Call light within reach, Chair, Needs within reach, and RN notified    INTERDISCIPLINARY COLLABORATION:  RN/ PCT    EDUCATION:      TIME IN/OUT:  Time In: 1040  Time Out: 1105  Minutes: 25    LOBITO CHRISTIANSON, PT

## 2024-03-05 NOTE — PROGRESS NOTES
Patient placed on CPAP with 3L O2 bled in. VS stable and WNL. No distress noted at this time. Will continue to monitor.       03/04/24 2129   NIV Type   $NIV $Daily Charge   NIV Started/Stopped On   Equipment Type resmed   Mode Auto-PAP   Mask Type Full face mask   Mask Size Small   Assessment   SpO2 95 %   Comfort Level Good   Using Accessory Muscles No   Mask Compliance Good   Skin Assessment Clean, dry, & intact   Skin Protection for O2 Device N/A   Settings/Measurements   CPAP/EPAP 8 cmH2O   EPAP Min 8 cmH2O   O2 Flow Rate (L/min) 3 L/min   Patient's Home Machine No   Alarm Settings   Alarms On Y

## 2024-03-06 LAB
BACTERIA SPEC CULT: NORMAL
SERVICE CMNT-IMP: NORMAL

## 2024-03-06 PROCEDURE — 6370000000 HC RX 637 (ALT 250 FOR IP): Performed by: FAMILY MEDICINE

## 2024-03-06 PROCEDURE — 97530 THERAPEUTIC ACTIVITIES: CPT

## 2024-03-06 PROCEDURE — 6370000000 HC RX 637 (ALT 250 FOR IP): Performed by: INTERNAL MEDICINE

## 2024-03-06 PROCEDURE — 94640 AIRWAY INHALATION TREATMENT: CPT

## 2024-03-06 PROCEDURE — 6370000000 HC RX 637 (ALT 250 FOR IP): Performed by: HOSPITALIST

## 2024-03-06 PROCEDURE — 94660 CPAP INITIATION&MGMT: CPT

## 2024-03-06 PROCEDURE — 2580000003 HC RX 258: Performed by: FAMILY MEDICINE

## 2024-03-06 PROCEDURE — 6360000002 HC RX W HCPCS: Performed by: INTERNAL MEDICINE

## 2024-03-06 PROCEDURE — 1100000000 HC RM PRIVATE

## 2024-03-06 RX ORDER — ALBUTEROL SULFATE 2.5 MG/3ML
2.5 SOLUTION RESPIRATORY (INHALATION) EVERY 6 HOURS PRN
Status: DISCONTINUED | OUTPATIENT
Start: 2024-03-06 | End: 2024-03-07 | Stop reason: HOSPADM

## 2024-03-06 RX ORDER — LOSARTAN POTASSIUM 50 MG/1
50 TABLET ORAL DAILY
Status: DISCONTINUED | OUTPATIENT
Start: 2024-03-07 | End: 2024-03-07

## 2024-03-06 RX ADMIN — PANTOPRAZOLE SODIUM 40 MG: 40 TABLET, DELAYED RELEASE ORAL at 04:25

## 2024-03-06 RX ADMIN — SERTRALINE 75 MG: 50 TABLET, FILM COATED ORAL at 08:54

## 2024-03-06 RX ADMIN — PREDNISONE 40 MG: 20 TABLET ORAL at 07:24

## 2024-03-06 RX ADMIN — SODIUM CHLORIDE, PRESERVATIVE FREE 10 ML: 5 INJECTION INTRAVENOUS at 07:30

## 2024-03-06 RX ADMIN — ACETAMINOPHEN 650 MG: 325 TABLET ORAL at 09:54

## 2024-03-06 RX ADMIN — GABAPENTIN 300 MG: 300 CAPSULE ORAL at 14:04

## 2024-03-06 RX ADMIN — SODIUM CHLORIDE, PRESERVATIVE FREE 10 ML: 5 INJECTION INTRAVENOUS at 20:36

## 2024-03-06 RX ADMIN — CEFUROXIME AXETIL 500 MG: 250 TABLET ORAL at 07:28

## 2024-03-06 RX ADMIN — SPIRONOLACTONE 25 MG: 25 TABLET ORAL at 07:25

## 2024-03-06 RX ADMIN — APIXABAN 5 MG: 5 TABLET, FILM COATED ORAL at 07:26

## 2024-03-06 RX ADMIN — LOSARTAN POTASSIUM 25 MG: 25 TABLET, FILM COATED ORAL at 07:26

## 2024-03-06 RX ADMIN — FUROSEMIDE 40 MG: 40 TABLET ORAL at 07:25

## 2024-03-06 RX ADMIN — ALBUTEROL SULFATE 2.5 MG: 2.5 SOLUTION RESPIRATORY (INHALATION) at 08:41

## 2024-03-06 RX ADMIN — METOPROLOL SUCCINATE 25 MG: 25 TABLET, FILM COATED, EXTENDED RELEASE ORAL at 07:25

## 2024-03-06 RX ADMIN — APIXABAN 5 MG: 5 TABLET, FILM COATED ORAL at 20:34

## 2024-03-06 RX ADMIN — PANTOPRAZOLE SODIUM 40 MG: 40 TABLET, DELAYED RELEASE ORAL at 14:04

## 2024-03-06 RX ADMIN — GABAPENTIN 300 MG: 300 CAPSULE ORAL at 20:34

## 2024-03-06 RX ADMIN — GABAPENTIN 300 MG: 300 CAPSULE ORAL at 07:26

## 2024-03-06 ASSESSMENT — PAIN DESCRIPTION - DESCRIPTORS: DESCRIPTORS: ACHING

## 2024-03-06 ASSESSMENT — PAIN SCALES - GENERAL
PAINLEVEL_OUTOF10: 3
PAINLEVEL_OUTOF10: 0

## 2024-03-06 ASSESSMENT — PAIN DESCRIPTION - LOCATION: LOCATION: HEAD

## 2024-03-06 NOTE — CARE COORDINATION
LENY was notified by the [facility] liaison that they may not have a bed available for pt for several days.  CM sent a referral to the family's second choice: [facility] who has accepted pt.  CM met with pt at the bedside and explained the situation.  [facility] will have a private room available on 3/7.  CM will continue to follow.  LENY submitted pre-cert and received auth:  Valid March 7-11. 2024  Plan Auth ID: [id number]   Auth ID: [id number]   LOS = 5 days

## 2024-03-06 NOTE — PROGRESS NOTES
Hospitalist Progress Note   Admit Date:  3/1/2024  7:11 PM   Name:  Zeynep Carrington   Age:  78 y.o.  Sex:  female  :  1945   MRN:  [id number]   Room:  [facility]    Presenting/Chief Complaint: Shortness of Breath, Fever, and Altered Mental Status     Reason(s) for Admission: General weakness [R53.1]  COPD exacerbation (HCC) [J44.1]  JOHANNY (acute kidney injury) (HCC) [N17.9]  Pneumonia of both lower lobes due to infectious organism [J18.9]     Hospital Course:     Zeynep Carrington is a 78 y.o. female who presented to the ED for cc chills, rigors, dry cough, congestion, wheezing,  post nasal drip since this AM. Was noted to be 80% on RA.   Hx of asthma, CHF EF 60%, paroxysmal a fib on Eliquis, HTN, HLD, CAD  Creatine 2.1 from 1.6 baseline  Chest x ray with bilateral lower lung infiltrates  Patient admitted for JOHANNY and pneumonia/asthma exacerbation with acute hypoxic respiratory failure.     Today  Pt feels well.   Ready to go to rehab.  No SOB, CP, cough, or other complaints.    Dispo: awaiting STR placement.     Assessment & Plan:     JOHANNY on CKD 3b  - resolved    Asthma  exacerbation due to bacterial PNA  -resolved.   -s/p cephalosporin and azithromcyin  -IS. Tessalon perles. PRN albuterol.  -s/p Pred 40 mg x5d    Acute hypoxic resp failure, resolved   - Echo unremarkable, no CHF    Paroxysmal a fib   -renal dose eliquis     HTN   24 -uncontrolled.  increase losartan to 50mg    HLD   - statin      Chronic pain   -controlled.    dCHF  -cont home lasix/aldactone       Dispo: awaiting STR placement.  I do not think pt meets criteria for IRC      Objective:   Patient Vitals for the past 24 hrs:   Temp Pulse Resp BP SpO2   24 0710 97.7 °F (36.5 °C) 57 18 (!) 141/63 95 %   24 0443 -- 56 -- -- 97 %   24 0324 97.5 °F (36.4 °C) 54 16 (!) 158/53 98 %   24 0014 97.5 °F (36.4 °C) 57 18 (!) 156/64 98 %   24 2227 -- 55 16 -- 96 %   24 2132 -- 55 18 -- 96 %   24 97.7  MG/5ML suspension 30 mL  30 mL Oral Q6H PRN    acetaminophen (TYLENOL) tablet 650 mg  650 mg Oral Q6H PRN    Or    acetaminophen (TYLENOL) suppository 650 mg  650 mg Rectal Q6H PRN       Signed:  Lamont Malave MD    Part of this note may have been written by using a voice dictation software.  The note has been proof read but may still contain some grammatical/other typographical errors.

## 2024-03-07 VITALS
WEIGHT: 167 LBS | BODY MASS INDEX: 32.79 KG/M2 | OXYGEN SATURATION: 95 % | RESPIRATION RATE: 16 BRPM | HEIGHT: 60 IN | TEMPERATURE: 97.9 F | HEART RATE: 57 BPM | DIASTOLIC BLOOD PRESSURE: 67 MMHG | SYSTOLIC BLOOD PRESSURE: 142 MMHG

## 2024-03-07 LAB
BACTERIA SPEC CULT: NORMAL
SARS-COV-2 RDRP RESP QL NAA+PROBE: NOT DETECTED
SERVICE CMNT-IMP: NORMAL
SOURCE: NORMAL

## 2024-03-07 PROCEDURE — 6370000000 HC RX 637 (ALT 250 FOR IP): Performed by: HOSPITALIST

## 2024-03-07 PROCEDURE — 87635 SARS-COV-2 COVID-19 AMP PRB: CPT

## 2024-03-07 PROCEDURE — 6370000000 HC RX 637 (ALT 250 FOR IP): Performed by: INTERNAL MEDICINE

## 2024-03-07 PROCEDURE — 94660 CPAP INITIATION&MGMT: CPT

## 2024-03-07 PROCEDURE — 2580000003 HC RX 258: Performed by: FAMILY MEDICINE

## 2024-03-07 PROCEDURE — 6370000000 HC RX 637 (ALT 250 FOR IP): Performed by: FAMILY MEDICINE

## 2024-03-07 RX ORDER — PREDNISONE 20 MG/1
40 TABLET ORAL DAILY
Status: DISCONTINUED | OUTPATIENT
Start: 2024-03-07 | End: 2024-03-07 | Stop reason: HOSPADM

## 2024-03-07 RX ORDER — LOSARTAN POTASSIUM 50 MG/1
100 TABLET ORAL DAILY
Status: DISCONTINUED | OUTPATIENT
Start: 2024-03-07 | End: 2024-03-07 | Stop reason: HOSPADM

## 2024-03-07 RX ORDER — PREDNISONE 10 MG/1
TABLET ORAL
Qty: 14 TABLET | Refills: 0
Start: 2024-03-07

## 2024-03-07 RX ADMIN — PREDNISONE 40 MG: 20 TABLET ORAL at 08:55

## 2024-03-07 RX ADMIN — FUROSEMIDE 40 MG: 40 TABLET ORAL at 08:55

## 2024-03-07 RX ADMIN — SPIRONOLACTONE 25 MG: 25 TABLET ORAL at 08:55

## 2024-03-07 RX ADMIN — APIXABAN 5 MG: 5 TABLET, FILM COATED ORAL at 08:55

## 2024-03-07 RX ADMIN — GABAPENTIN 300 MG: 300 CAPSULE ORAL at 08:55

## 2024-03-07 RX ADMIN — SODIUM CHLORIDE, PRESERVATIVE FREE 10 ML: 5 INJECTION INTRAVENOUS at 09:21

## 2024-03-07 RX ADMIN — SERTRALINE 75 MG: 50 TABLET, FILM COATED ORAL at 08:55

## 2024-03-07 RX ADMIN — LOSARTAN POTASSIUM 100 MG: 50 TABLET, FILM COATED ORAL at 08:55

## 2024-03-07 RX ADMIN — PANTOPRAZOLE SODIUM 40 MG: 40 TABLET, DELAYED RELEASE ORAL at 05:44

## 2024-03-07 NOTE — CARE COORDINATION
Pt to d/c today to [facility] for STR.  Room: 417.  Report: [phone].  Transport is scheduled via [facility] for noon.  Full Code.  On RA.  Pt and son Ross updated on d/c information.  No other supportive care needs identified.  Pt and family agree with d/c plan.  Milestones met.  LOS = 6 days       03/03/24 5178   Service Assessment   Patient Orientation Alert and Oriented;Person;Place;Self   Cognition Alert   History Provided By Patient;Medical Record   Primary Caregiver Self   Accompanied By/Relationship N/A   Support Systems Family Members;Children   Patient's Healthcare Decision Maker is: Legal Next of Kin   PCP Verified by CM Yes  (Anita Hansen NP)   Last Visit to PCP Within last 3 months   Prior Functional Level Assistance with the following:;Mobility   Current Functional Level Assistance with the following:;Mobility   Can patient return to prior living arrangement Yes   Ability to make needs known: Good   Family able to assist with home care needs: Yes   Would you like for me to discuss the discharge plan with any other family members/significant others, and if so, who? Yes  (Son: Ross)   Financial Resources Medicare  ([employer])   Community Resources None   CM/SW Referral Other (see comment)  (N/A)   Social/Functional History   Lives With Family   Type of Home House   Home Layout One level   Bathroom Shower/Tub Walk-in shower   Bathroom Equipment Shower chair   Bathroom Accessibility Accessible   Home Equipment Walker, rolling   Receives Help From Family   ADL Assistance Independent   Homemaking Responsibilities Yes   Ambulation Assistance Independent   Transfer Assistance Independent   Active  No   Patient's  Info Family   Mode of Transportation Family   Occupation Retired   Discharge Planning   Type of Residence Skilled Nursing Facility  ([facility])   Living Arrangements Children;Family Members   Current Services Prior To Admission

## 2024-03-07 NOTE — PROGRESS NOTES
Report called to Aut[facility] at [facility]. Pt will be transported to room 417 at 1200 via [facility] ambulance services.

## 2024-03-07 NOTE — DISCHARGE SUMMARY
Hospitalist Discharge Summary   Admit Date:  3/1/2024  7:11 PM   DC Note date: 3/7/2024  Name:  Zeynep Carrington   Age:  78 y.o.  Sex:  female  :  1945   MRN:  [id number]   Room:  [facility]  PCP:  Anita Hansen APRN - NP    Presenting Complaint: Shortness of Breath, Fever, and Altered Mental Status     Initial Admission Diagnosis: General weakness [R53.1]  COPD exacerbation (HCC) [J44.1]  JOHANNY (acute kidney injury) (HCC) [N17.9]  Pneumonia of both lower lobes due to infectious organism [J18.9]     Problem List for this Hospitalization (present on admission):    Principal Problem (Resolved):    JOHANNY (acute kidney injury) (HCC)  Active Problems:    COPD exacerbation (HCC)    Chronic heart failure with preserved ejection fraction (HCC)    Current use of long term anticoagulation    QUINTIN (obstructive sleep apnea)    Coronary artery disease involving native coronary artery of native heart without angina pectoris    Mixed hyperlipidemia    Paroxysmal atrial fibrillation (HCC)    Essential hypertension    Bilateral pneumonia    Gait abnormality    Localized osteoarthritis of both shoulder regions    Decreased activities of daily living (ADL)  Resolved Problems:    Elevated troponin      Hospital Course:  Zeynep Carrington is a 78 y.o. female who presented to the ED for cc chills, rigors, dry cough, congestion, wheezing,  post nasal drip since this AM. Was noted to be 80% on RA.   Hx of asthma, CHF EF 60%, paroxysmal a fib on Eliquis, HTN, HLD, CAD  Creatine 2.1 from 1.6 baseline  Chest x ray with bilateral lower lung infiltrates  Patient admitted for JOHANNY and pneumonia/asthma exacerbation with acute hypoxic respiratory failure.    Improved on rocephin/azithromycin.  Completed abx course here.  Completed 5d prednisone.  Stable enough to go to rehab today.  BP slightly uncontrolled but she was on a lower dose of losartan than home.    Disposition: SNF Rehab  Diet: ADULT DIET; Easy to Chew  Code Status: Full  Exam:    General:    Well nourished.  No overt distress  Head:  Normocephalic, atraumatic  Eyes:  Sclerae appear normal.  Pupils equally round.    HENT:  Nares appear normal, no drainage.  Moist mucous membranes  Neck:  No restricted ROM.  Trachea midline  CV:   RRR. No JVD  Lungs:   Exp wheezing bilaterally.   Respirations even, unlabored  Abdomen:   nondistended.    Extremities: Warm and dry.   No edema.    Skin:     No rashes.  Normal coloration  Neuro:  CN II-XII grossly intact.  Psych:  Normal mood and affect.    Signed:  Lamont Malave MD    Part of this note may have been written by using a voice dictation software.  The note has been proof read but may still contain some grammatical/other typographical errors.

## 2024-03-07 NOTE — PROGRESS NOTES
PT was in chair. CH introduced self. PT shared about health and hospitalization including concerns and hopes. PT engaged in life review. CH offered empathetic spiritual presence, active listening, and therapeutic communication. PT expressed affection  for Family. PT expressed sadness over loss of Spouse and two Sons. PT expressed comfort in [place] and Prayer. PT is [employer]. CH offered prayer. CH thanked PT for visit and offered support.     Rev. Komal Camarena M.Div.

## 2024-03-07 NOTE — PROGRESS NOTES
Patient is on cpap. No respiratory distress is noted.      03/06/24 2210   NIV Type   NIV Started/Stopped On   Equipment Type Resmed   Mode Auto-PAP   Mask Type Full face mask   Mask Size Small   Assessment   Pulse 60   Respirations 17   SpO2 96 %   Comfort Level Good   Using Accessory Muscles No   Mask Compliance Good   Skin Assessment Clean, dry, & intact   Skin Protection for O2 Device N/A   Settings/Measurements   PIP Observed 8 cm H20   CPAP/EPAP 8 cmH2O   O2 Flow Rate (L/min) 1 L/min  (bled in)   Mask Leak (lpm)   (mask fits well)   Patient's Home Machine No

## 2024-03-08 ENCOUNTER — CARE COORDINATION (OUTPATIENT)
Dept: CARE COORDINATION | Facility: CLINIC | Age: 79
End: 2024-03-08

## 2024-03-08 NOTE — CARE COORDINATION
Transition of care outreach postponed for 7 days due to patient's discharge to [facility] Acute facility.

## 2024-03-14 ENCOUNTER — CARE COORDINATION (OUTPATIENT)
Dept: CARE COORDINATION | Facility: CLINIC | Age: 79
End: 2024-03-14

## 2024-03-14 NOTE — CARE COORDINATION
Care Transitions Post-Acute Facility Update Call    3/14/2024    Patient: Zeynep Carrington Patient : 1945   MRN: [id number]  Reason for Admission: JOHANNY  Discharge Date: 3/7/24 RARS: Readmission Risk Score: 18.3         Care Transitions Post Acute Facility Update    Per [facility] patient remains at [facility]. CTN contacted facility regarding patient discharge plan. At this time a tentative d/c date is set for 3/17/2024. Unsure at this time if patient will discharge home with HH. She is resting today and SW hasn't been able to discuss with patient or family.

## 2024-03-19 ENCOUNTER — CARE COORDINATION (OUTPATIENT)
Dept: CARE COORDINATION | Facility: CLINIC | Age: 79
End: 2024-03-19

## 2024-03-19 NOTE — CARE COORDINATION
Care Transitions Post-Acute Facility Update Call    3/19/2024    Patient: Zeynep Carrington Patient : 1945   MRN: [id number]  Reason for Admission: JOHANNY  Discharge Date: 3/7/24 RARS: Readmission Risk Score: 18.3         Care Transitions Post Acute Facility Update    Care Transitions Interventions      Post Acute Facility Update     Spoke with patient who reports d/c to home from [facility].  Patient agreeable to NICK outreach.  Will notify CTN.

## 2024-03-19 NOTE — CARE COORDINATION
Care Transitions Post-Acute Facility Discharge Call    3/19/2024    Patient: Zeynep Carrington Patient : 1945   MRN: [id number]  Reason for Admission: JOHANNY  Discharge Date: 3/7/24 RARS: Readmission Risk Score: 18.3         Discharge Facility:  [facility] discharged home with [facility].  [facility] initial start of care scheduled-3/19/2024  PCP follow up appointment 3/21/2024 at 3:00 pm  Patient lives with her son and daughter in law. Daughter in law to transport to PCP appointment,  Care Transitions Post Acute Facility Transition      Do you have a copy of your discharge instructions?: Pos   Do you have all of your prescriptions and are they filled?: Yes   Do you have any questions related to your medications?: No   Have you been contacted by a [facility] Pharmacist?: Neg   Have you scheduled your follow up appointment?: Yes   How are you going to get to your appointment?: Car - family or friend to transport         Do you feel like you have everything you need to keep you well at home?: Yes      Care Transitions Interventions    Physical Therapy: Completed     Occupational Therapy: Completed    Schedule Follow Up Appointment with Physician: Completed         Future Appointments   Date Time Provider Department Center   3/21/2024  3:00 PM Anita Hansen APRN - NP [facility] AMB   2024 11:00 AM Anita Hansen APRN - NP [facility] AMB   2024 11:45 AM Jones Arthur DO [facility] AMB   2024  9:00 AM PP PFT LAB [facility] AMB   2024 10:00 AM Litzy Dee MD [facility] AMB   2024  2:00 PM Jacky Laureano MD [facility] AMB       Amelia Davis, RN

## 2024-03-20 DIAGNOSIS — I50.32 CHRONIC HEART FAILURE WITH PRESERVED EJECTION FRACTION (HCC): ICD-10-CM

## 2024-03-20 RX ORDER — METOPROLOL SUCCINATE 25 MG/1
25 TABLET, EXTENDED RELEASE ORAL DAILY
Qty: 90 TABLET | Refills: 3 | OUTPATIENT
Start: 2024-03-20

## 2024-03-21 ENCOUNTER — HOSPITAL ENCOUNTER (OUTPATIENT)
Dept: GENERAL RADIOLOGY | Age: 79
Discharge: HOME | End: 2024-03-24

## 2024-03-21 ENCOUNTER — OFFICE VISIT (OUTPATIENT)
Dept: INTERNAL MEDICINE CLINIC | Facility: CLINIC | Age: 79
End: 2024-03-21
Payer: MEDICARE

## 2024-03-21 VITALS
BODY MASS INDEX: 33.06 KG/M2 | HEIGHT: 60 IN | WEIGHT: 168.4 LBS | DIASTOLIC BLOOD PRESSURE: 74 MMHG | OXYGEN SATURATION: 96 % | HEART RATE: 70 BPM | SYSTOLIC BLOOD PRESSURE: 120 MMHG

## 2024-03-21 DIAGNOSIS — N17.9 AKI (ACUTE KIDNEY INJURY) (HCC): ICD-10-CM

## 2024-03-21 DIAGNOSIS — I25.10 CORONARY ARTERY DISEASE INVOLVING NATIVE CORONARY ARTERY OF NATIVE HEART WITHOUT ANGINA PECTORIS: ICD-10-CM

## 2024-03-21 DIAGNOSIS — I50.32 CHRONIC HEART FAILURE WITH PRESERVED EJECTION FRACTION (HCC): Chronic | ICD-10-CM

## 2024-03-21 DIAGNOSIS — R06.02 SHORTNESS OF BREATH: ICD-10-CM

## 2024-03-21 DIAGNOSIS — J44.1 COPD EXACERBATION (HCC): ICD-10-CM

## 2024-03-21 DIAGNOSIS — Z09 HOSPITAL DISCHARGE FOLLOW-UP: Primary | ICD-10-CM

## 2024-03-21 PROCEDURE — 1090F PRES/ABSN URINE INCON ASSESS: CPT | Performed by: NURSE PRACTITIONER

## 2024-03-21 PROCEDURE — G8399 PT W/DXA RESULTS DOCUMENT: HCPCS | Performed by: NURSE PRACTITIONER

## 2024-03-21 PROCEDURE — 1036F TOBACCO NON-USER: CPT | Performed by: NURSE PRACTITIONER

## 2024-03-21 PROCEDURE — 3074F SYST BP LT 130 MM HG: CPT | Performed by: NURSE PRACTITIONER

## 2024-03-21 PROCEDURE — G8427 DOCREV CUR MEDS BY ELIG CLIN: HCPCS | Performed by: NURSE PRACTITIONER

## 2024-03-21 PROCEDURE — G8417 CALC BMI ABV UP PARAM F/U: HCPCS | Performed by: NURSE PRACTITIONER

## 2024-03-21 PROCEDURE — 1111F DSCHRG MED/CURRENT MED MERGE: CPT | Performed by: NURSE PRACTITIONER

## 2024-03-21 PROCEDURE — G8484 FLU IMMUNIZE NO ADMIN: HCPCS | Performed by: NURSE PRACTITIONER

## 2024-03-21 PROCEDURE — 3023F SPIROM DOC REV: CPT | Performed by: NURSE PRACTITIONER

## 2024-03-21 PROCEDURE — 99215 OFFICE O/P EST HI 40 MIN: CPT | Performed by: NURSE PRACTITIONER

## 2024-03-21 PROCEDURE — 3078F DIAST BP <80 MM HG: CPT | Performed by: NURSE PRACTITIONER

## 2024-03-21 PROCEDURE — 1123F ACP DISCUSS/DSCN MKR DOCD: CPT | Performed by: NURSE PRACTITIONER

## 2024-03-21 ASSESSMENT — PATIENT HEALTH QUESTIONNAIRE - PHQ9
9. THOUGHTS THAT YOU WOULD BE BETTER OFF DEAD, OR OF HURTING YOURSELF: NOT AT ALL
SUM OF ALL RESPONSES TO PHQ QUESTIONS 1-9: 2
3. TROUBLE FALLING OR STAYING ASLEEP: SEVERAL DAYS
4. FEELING TIRED OR HAVING LITTLE ENERGY: SEVERAL DAYS
6. FEELING BAD ABOUT YOURSELF - OR THAT YOU ARE A FAILURE OR HAVE LET YOURSELF OR YOUR FAMILY DOWN: NOT AT ALL
SUM OF ALL RESPONSES TO PHQ QUESTIONS 1-9: 2
SUM OF ALL RESPONSES TO PHQ QUESTIONS 1-9: 2
7. TROUBLE CONCENTRATING ON THINGS, SUCH AS READING THE NEWSPAPER OR WATCHING TELEVISION: NOT AT ALL
5. POOR APPETITE OR OVEREATING: NOT AT ALL
SUM OF ALL RESPONSES TO PHQ QUESTIONS 1-9: 2
SUM OF ALL RESPONSES TO PHQ9 QUESTIONS 1 & 2: 0
10. IF YOU CHECKED OFF ANY PROBLEMS, HOW DIFFICULT HAVE THESE PROBLEMS MADE IT FOR YOU TO DO YOUR WORK, TAKE CARE OF THINGS AT HOME, OR GET ALONG WITH OTHER PEOPLE: NOT DIFFICULT AT ALL
1. LITTLE INTEREST OR PLEASURE IN DOING THINGS: NOT AT ALL
2. FEELING DOWN, DEPRESSED OR HOPELESS: NOT AT ALL
8. MOVING OR SPEAKING SO SLOWLY THAT OTHER PEOPLE COULD HAVE NOTICED. OR THE OPPOSITE, BEING SO FIGETY OR RESTLESS THAT YOU HAVE BEEN MOVING AROUND A LOT MORE THAN USUAL: NOT AT ALL

## 2024-03-22 LAB
ANION GAP SERPL CALC-SCNC: 5 MMOL/L (ref 2–11)
BUN SERPL-MCNC: 28 MG/DL (ref 8–23)
CALCIUM SERPL-MCNC: 9.3 MG/DL (ref 8.3–10.4)
CHLORIDE SERPL-SCNC: 106 MMOL/L (ref 103–113)
CO2 SERPL-SCNC: 30 MMOL/L (ref 21–32)
CREAT SERPL-MCNC: 1.8 MG/DL (ref 0.6–1)
GLUCOSE SERPL-MCNC: 84 MG/DL (ref 65–100)
NT PRO BNP: 1018 PG/ML
POTASSIUM SERPL-SCNC: 4.3 MMOL/L (ref 3.5–5.1)
SODIUM SERPL-SCNC: 141 MMOL/L (ref 136–146)

## 2024-03-26 ENCOUNTER — CARE COORDINATION (OUTPATIENT)
Dept: CARE COORDINATION | Facility: CLINIC | Age: 79
End: 2024-03-26

## 2024-03-26 NOTE — CARE COORDINATION
Care Transitions Outreach Attempt    Call within 2 business days of discharge: Yes   Attempted to reach patient for transitions of care follow up. Unable to reach patient.    Patient: Zeynep Carrington Patient : 1945 MRN: [id number]    Last Discharge Facility       Date Complaint Diagnosis Description Type Department Provider    3/1/24 Shortness of Breath; Fever; Altered Mental Status COPD exacerbation (HCC) ... ED to Hosp-Admission (Discharged) (ADMITTED) [id number] Lamont Malave MD; Lisa, ...              Was this an external facility discharge? No Discharge Facility Name: [facility]    Noted following upcoming appointments from discharge chart review:   [facility] follow up appointment(s):   Future Appointments   Date Time Provider Department Center   2024 11:00 AM Anita Hansen, APRN - NP [facility] AMB   2024 11:45 AM Jones Arthur DO [facility] AMB   2024  9:00 AM PP PFT LAB [facility] AMB   2024 10:00 AM Litzy Dee MD [facility] AMB   2024  2:00 PM Jacky Laureano MD [facility] AMB

## 2024-04-01 ENCOUNTER — CARE COORDINATION (OUTPATIENT)
Dept: CARE COORDINATION | Facility: CLINIC | Age: 79
End: 2024-04-01

## 2024-04-01 NOTE — CARE COORDINATION
Care Transitions Follow Up Call    Patient Current Location:  Home: [address]  [place] [address]    LPN Care Coordinator contacted the patient by telephone to follow up after admission on 3/1/24.  Verified name and  with patient as identifiers.    Patient: Zeynep Carrington  Patient : 1945   MRN: [id number]  Reason for Admission: JOHANNY  Discharge Date: 3/7/24 RARS: Readmission Risk Score: 18.3      Needs to be reviewed by the provider   Additional needs identified to be addressed with provider: No  none             Method of communication with provider: none.    Patient reports feeling better and continues with [facility] OT and PT.  Patient states she will contact them today regarding their schedule.  Instructed patient to reach out to this writer if she is unable to get in touch with [facility].  No questions or concerns voiced at this time.    Follow Up  Future Appointments   Date Time Provider Department Center   2024 11:00 AM Anita Hansen, BERNA - NP [facility] AMB   2024 11:45 AM Jones Arthur DO [facility] AMB   2024  9:00 AM PP PFT LAB [facility] AMB   2024 10:00 AM Litzy Dee MD [facility] AMB   2024  2:00 PM Jacky Laureano MD [facility] AMB     External follow up appointment(s): n/a    LPN Care Coordinator reviewed discharge instructions, medical action plan, and red flags with patient and discussed any barriers to care and/or understanding of plan of care after discharge. Discussed appropriate site of care based on symptoms and resources available to patient including: PCP  Specialist  Urgent care clinics  [facility]  When to call 911  [id number]. The patient agrees to contact the PCP office for questions related to their healthcare.     Advance Care Planning:   decision maker updated.     Patients top risk factors for readmission:  JOHANNY  Interventions to address risk factors: Obtained and reviewed discharge summary and/or continuity of

## 2024-04-02 RX ORDER — NITROGLYCERIN 0.4 MG/1
TABLET SUBLINGUAL
Qty: 25 TABLET | Refills: 11 | Status: SHIPPED | OUTPATIENT
Start: 2024-04-02

## 2024-04-02 ASSESSMENT — ENCOUNTER SYMPTOMS
COUGH: 1
WHEEZING: 1
SHORTNESS OF BREATH: 0

## 2024-04-03 NOTE — PROGRESS NOTES
Post-Discharge Transitional Care Follow Up      Zeynep Carrington   YOB: 1945    Date of Office Visit:  3/21/2024  Date of Hospital Admission: 3/1/24  Date of Hospital Discharge: 3/7/24  Readmission Risk Score (high >=14%. Medium >=10%):Readmission Risk Score: 18.3      Care management risk score Rising risk (score 2-5) and Complex Care (Scores >=6): No Risk Score On File     Non face to face  following discharge, date last encounter closed (first attempt may have been earlier): 03/26/2024     Call initiated 2 business days of discharge: Yes     Hospital discharge follow-up  -     CO DISCHARGE MEDS RECONCILED W/ CURRENT OUTPATIENT MED LIST  JOHANNY (acute kidney injury) (HCC)  Shortness of breath  -     Basic Metabolic Panel; Future  -     Brain Natriuretic Peptide; Future  -     XR CHEST PA LAT (2 VIEWS); Future  COPD exacerbation (HCC)  Chronic heart failure with preserved ejection fraction (HCC)  Coronary artery disease involving native coronary artery of native heart without angina pectoris  -     nitroGLYCERIN (NITROSTAT) 0.4 MG SL tablet; Place 1 tablet under tongue for angina/chest pain.  May repeat every 5 minutes for a total of 3.  If no relief call 911., Disp-25 tablet, R-11Normal      Medical Decision Making: moderate complexity  Return for Follow-Up, regularly scheduled appointment or sooner prn.      She still has some shortness of breath.  Occasionally she is coughing.  Her shortness of breath is worse with activity.  She is noticing her feet are increasing swelling.  Recommend chest x-ray, BMP and BNP today.  Also recommend follow-up with both cardiology and pulmonology.  She is hesitant in proceeding with surgery at this point due to recent events of hospitalization and intermittent shortness of breath.  Results of chest x-ray and lab will be called as soon as available.      On this date 3/21/2024 I have spent 42 minutes reviewing previous notes, test results and face to face with the

## 2024-04-08 ENCOUNTER — CARE COORDINATION (OUTPATIENT)
Dept: CARE COORDINATION | Facility: CLINIC | Age: 79
End: 2024-04-08

## 2024-04-08 NOTE — CARE COORDINATION
Care Transitions Outreach Attempt    Call within 2 business days of discharge: Yes   Attempted to reach patient for transitions of care follow up. Unable to reach patient.  Program closed.    Patient: Zeynep Carrington Patient : 1945 MRN: [id number]    Last Discharge Facility       Date Complaint Diagnosis Description Type Department Provider    3/1/24 Shortness of Breath; Fever; Altered Mental Status COPD exacerbation (HCC) ... ED to Hosp-Admission (Discharged) (ADMITTED) [id number] Lamont Malave MD; Lisa, ...              Was this an external facility discharge? No Discharge Facility Name: [facility]    Noted following upcoming appointments from discharge chart review:   [facility] follow up appointment(s):   Future Appointments   Date Time Provider Department Center   2024 11:00 AM Anita Hansen APRN - NP [facility] AMB   2024 11:45 AM Jones Arthur DO [facility] AMB   2024  9:00 AM PP PFT LAB [facility] AMB   2024 10:00 AM Litzy Dee MD [facility] AMB   2024  2:00 PM Jacky Laureano MD [facility] AMB     [facility]  follow up appointment(s): n/a

## 2024-04-11 DIAGNOSIS — E78.2 MIXED HYPERLIPIDEMIA: Chronic | ICD-10-CM

## 2024-04-11 DIAGNOSIS — I10 ESSENTIAL HYPERTENSION: ICD-10-CM

## 2024-04-11 DIAGNOSIS — Z79.899 ON POTASSIUM WASTING DIURETIC THERAPY: Chronic | ICD-10-CM

## 2024-04-11 DIAGNOSIS — I48.0 PAROXYSMAL ATRIAL FIBRILLATION (HCC): Chronic | ICD-10-CM

## 2024-04-11 DIAGNOSIS — I50.32 CHRONIC HEART FAILURE WITH PRESERVED EJECTION FRACTION (HCC): ICD-10-CM

## 2024-04-11 DIAGNOSIS — Z79.01 CURRENT USE OF LONG TERM ANTICOAGULATION: Chronic | ICD-10-CM

## 2024-04-11 LAB
ALBUMIN SERPL-MCNC: 3.6 G/DL (ref 3.2–4.6)
ALBUMIN/GLOB SERPL: 1.1 (ref 0.4–1.6)
ALP SERPL-CCNC: 56 U/L (ref 50–136)
ALT SERPL-CCNC: 17 U/L (ref 12–65)
ANION GAP SERPL CALC-SCNC: 1 MMOL/L (ref 2–11)
AST SERPL-CCNC: 17 U/L (ref 15–37)
BASOPHILS # BLD: 0.1 K/UL (ref 0–0.2)
BASOPHILS NFR BLD: 1 % (ref 0–2)
BILIRUB SERPL-MCNC: 0.3 MG/DL (ref 0.2–1.1)
BUN SERPL-MCNC: 24 MG/DL (ref 8–23)
CALCIUM SERPL-MCNC: 8.9 MG/DL (ref 8.3–10.4)
CHLORIDE SERPL-SCNC: 105 MMOL/L (ref 103–113)
CHOLEST SERPL-MCNC: 126 MG/DL
CO2 SERPL-SCNC: 32 MMOL/L (ref 21–32)
CREAT SERPL-MCNC: 1.5 MG/DL (ref 0.6–1)
DIFFERENTIAL METHOD BLD: ABNORMAL
EOSINOPHIL # BLD: 0.6 K/UL (ref 0–0.8)
EOSINOPHIL NFR BLD: 7 % (ref 0.5–7.8)
ERYTHROCYTE [DISTWIDTH] IN BLOOD BY AUTOMATED COUNT: 14.6 % (ref 11.9–14.6)
GLOBULIN SER CALC-MCNC: 3.3 G/DL (ref 2.8–4.5)
GLUCOSE SERPL-MCNC: 91 MG/DL (ref 65–100)
HCT VFR BLD AUTO: 35.5 % (ref 35.8–46.3)
HDLC SERPL-MCNC: 61 MG/DL (ref 40–60)
HDLC SERPL: 2.1
HGB BLD-MCNC: 10.6 G/DL (ref 11.7–15.4)
IMM GRANULOCYTES # BLD AUTO: 0 K/UL (ref 0–0.5)
IMM GRANULOCYTES NFR BLD AUTO: 0 % (ref 0–5)
LDLC SERPL CALC-MCNC: 47.6 MG/DL
LYMPHOCYTES # BLD: 2.5 K/UL (ref 0.5–4.6)
LYMPHOCYTES NFR BLD: 30 % (ref 13–44)
MCH RBC QN AUTO: 26.2 PG (ref 26.1–32.9)
MCHC RBC AUTO-ENTMCNC: 29.9 G/DL (ref 31.4–35)
MCV RBC AUTO: 87.9 FL (ref 82–102)
MONOCYTES # BLD: 0.8 K/UL (ref 0.1–1.3)
MONOCYTES NFR BLD: 9 % (ref 4–12)
NEUTS SEG # BLD: 4.5 K/UL (ref 1.7–8.2)
NEUTS SEG NFR BLD: 53 % (ref 43–78)
NRBC # BLD: 0 K/UL (ref 0–0.2)
PLATELET # BLD AUTO: 287 K/UL (ref 150–450)
PMV BLD AUTO: 10.4 FL (ref 9.4–12.3)
POTASSIUM SERPL-SCNC: 4.6 MMOL/L (ref 3.5–5.1)
PROT SERPL-MCNC: 6.9 G/DL (ref 6.3–8.2)
RBC # BLD AUTO: 4.04 M/UL (ref 4.05–5.2)
SODIUM SERPL-SCNC: 138 MMOL/L (ref 136–146)
TRIGL SERPL-MCNC: 87 MG/DL (ref 35–150)
TSH, 3RD GENERATION: 2.05 UIU/ML (ref 0.36–3.74)
URATE SERPL-MCNC: 6.2 MG/DL (ref 2.6–6)
VLDLC SERPL CALC-MCNC: 17.4 MG/DL (ref 6–23)
WBC # BLD AUTO: 8.4 K/UL (ref 4.3–11.1)

## 2024-04-18 ENCOUNTER — OFFICE VISIT (OUTPATIENT)
Dept: INTERNAL MEDICINE CLINIC | Facility: CLINIC | Age: 79
End: 2024-04-18
Payer: MEDICARE

## 2024-04-18 VITALS
WEIGHT: 166 LBS | OXYGEN SATURATION: 93 % | DIASTOLIC BLOOD PRESSURE: 64 MMHG | HEART RATE: 73 BPM | SYSTOLIC BLOOD PRESSURE: 122 MMHG | HEIGHT: 60 IN | BODY MASS INDEX: 32.59 KG/M2

## 2024-04-18 DIAGNOSIS — N18.30 STAGE 3 CHRONIC KIDNEY DISEASE, UNSPECIFIED WHETHER STAGE 3A OR 3B CKD (HCC): Chronic | ICD-10-CM

## 2024-04-18 DIAGNOSIS — K21.9 GASTROESOPHAGEAL REFLUX DISEASE WITHOUT ESOPHAGITIS: ICD-10-CM

## 2024-04-18 DIAGNOSIS — I10 ESSENTIAL HYPERTENSION: ICD-10-CM

## 2024-04-18 DIAGNOSIS — E78.2 MIXED HYPERLIPIDEMIA: Primary | Chronic | ICD-10-CM

## 2024-04-18 DIAGNOSIS — I50.32 CHRONIC HEART FAILURE WITH PRESERVED EJECTION FRACTION (HCC): ICD-10-CM

## 2024-04-18 PROCEDURE — G8427 DOCREV CUR MEDS BY ELIG CLIN: HCPCS | Performed by: NURSE PRACTITIONER

## 2024-04-18 PROCEDURE — 1036F TOBACCO NON-USER: CPT | Performed by: NURSE PRACTITIONER

## 2024-04-18 PROCEDURE — G8417 CALC BMI ABV UP PARAM F/U: HCPCS | Performed by: NURSE PRACTITIONER

## 2024-04-18 PROCEDURE — 3078F DIAST BP <80 MM HG: CPT | Performed by: NURSE PRACTITIONER

## 2024-04-18 PROCEDURE — 3074F SYST BP LT 130 MM HG: CPT | Performed by: NURSE PRACTITIONER

## 2024-04-18 PROCEDURE — 99214 OFFICE O/P EST MOD 30 MIN: CPT | Performed by: NURSE PRACTITIONER

## 2024-04-18 PROCEDURE — G8399 PT W/DXA RESULTS DOCUMENT: HCPCS | Performed by: NURSE PRACTITIONER

## 2024-04-18 PROCEDURE — 1090F PRES/ABSN URINE INCON ASSESS: CPT | Performed by: NURSE PRACTITIONER

## 2024-04-18 PROCEDURE — 1123F ACP DISCUSS/DSCN MKR DOCD: CPT | Performed by: NURSE PRACTITIONER

## 2024-04-18 RX ORDER — PANTOPRAZOLE SODIUM 40 MG/1
TABLET, DELAYED RELEASE ORAL
Qty: 200 TABLET | Refills: 3 | Status: SHIPPED | OUTPATIENT
Start: 2024-04-18

## 2024-04-18 RX ORDER — FUROSEMIDE 40 MG/1
40 TABLET ORAL DAILY
Qty: 100 TABLET | Refills: 3 | Status: SHIPPED | OUTPATIENT
Start: 2024-04-18

## 2024-04-18 RX ORDER — LOSARTAN POTASSIUM 100 MG/1
100 TABLET ORAL DAILY
Qty: 100 TABLET | Refills: 3 | Status: SHIPPED | OUTPATIENT
Start: 2024-04-18

## 2024-04-18 RX ORDER — METOPROLOL SUCCINATE 25 MG/1
25 TABLET, EXTENDED RELEASE ORAL DAILY
Qty: 100 TABLET | Refills: 3 | Status: SHIPPED | OUTPATIENT
Start: 2024-04-18

## 2024-04-18 ASSESSMENT — PATIENT HEALTH QUESTIONNAIRE - PHQ9
SUM OF ALL RESPONSES TO PHQ9 QUESTIONS 1 & 2: 0
SUM OF ALL RESPONSES TO PHQ QUESTIONS 1-9: 2
7. TROUBLE CONCENTRATING ON THINGS, SUCH AS READING THE NEWSPAPER OR WATCHING TELEVISION: NOT AT ALL
SUM OF ALL RESPONSES TO PHQ QUESTIONS 1-9: 2
2. FEELING DOWN, DEPRESSED OR HOPELESS: NOT AT ALL
3. TROUBLE FALLING OR STAYING ASLEEP: SEVERAL DAYS
6. FEELING BAD ABOUT YOURSELF - OR THAT YOU ARE A FAILURE OR HAVE LET YOURSELF OR YOUR FAMILY DOWN: NOT AT ALL
8. MOVING OR SPEAKING SO SLOWLY THAT OTHER PEOPLE COULD HAVE NOTICED. OR THE OPPOSITE, BEING SO FIGETY OR RESTLESS THAT YOU HAVE BEEN MOVING AROUND A LOT MORE THAN USUAL: SEVERAL DAYS
4. FEELING TIRED OR HAVING LITTLE ENERGY: NOT AT ALL
10. IF YOU CHECKED OFF ANY PROBLEMS, HOW DIFFICULT HAVE THESE PROBLEMS MADE IT FOR YOU TO DO YOUR WORK, TAKE CARE OF THINGS AT HOME, OR GET ALONG WITH OTHER PEOPLE: SOMEWHAT DIFFICULT
1. LITTLE INTEREST OR PLEASURE IN DOING THINGS: NOT AT ALL
9. THOUGHTS THAT YOU WOULD BE BETTER OFF DEAD, OR OF HURTING YOURSELF: NOT AT ALL
5. POOR APPETITE OR OVEREATING: NOT AT ALL

## 2024-04-18 NOTE — PROGRESS NOTES
Gastrointestinal:         GERD   Endocrine: Positive for heat intolerance.   Neurological:  Positive for numbness (left arm intermittent).        OBJECTIVE:    /64 (Site: Right Upper Arm, Position: Sitting)   Pulse 73   Ht 1.524 m (5')   Wt 75.3 kg (166 lb)   LMP  (LMP Unknown)   SpO2 93%   BMI 32.42 kg/m²      Physical Exam  Vitals and nursing note reviewed.   Constitutional:       Appearance: Normal appearance.   HENT:      Head: Normocephalic.      Mouth/Throat:      Lips: Pink.      Mouth: Mucous membranes are moist.   Eyes:      General: Lids are normal.   Neck:      Vascular: No carotid bruit.   Cardiovascular:      Rate and Rhythm: Normal rate.      Heart sounds: Normal heart sounds.   Pulmonary:      Effort: Pulmonary effort is normal.      Breath sounds: Wheezes: faint expiratory wheeze.   Musculoskeletal:      Right shoulder: Decreased range of motion.      Left shoulder: Decreased range of motion.      Cervical back: Neck supple.      Right lower leg: No edema.      Left lower leg: No edema.   Skin:     General: Skin is warm and dry.   Neurological:      Mental Status: She is alert and oriented to person, place, and time. Mental status is at baseline.      Gait: Gait normal.   Psychiatric:         Mood and Affect: Mood normal.         Behavior: Behavior normal.          ASSESSMENT and PLAN    1. Mixed hyperlipidemia  -     Lipid Panel; Future  -     Comprehensive Metabolic Panel; Future  2. Essential hypertension  -     furosemide (LASIX) 40 MG tablet; Take 1 tablet by mouth daily, Disp-100 tablet, R-3Normal  -     losartan (COZAAR) 100 MG tablet; Take 1 tablet by mouth daily, Disp-100 tablet, R-3Normal  -     Comprehensive Metabolic Panel; Future  -     CBC with Auto Differential; Future  -     Uric Acid; Future  3. Chronic heart failure with preserved ejection fraction (HCC)  -     metoprolol succinate (TOPROL XL) 25 MG extended release tablet; Take 1 tablet by mouth daily, Disp-100

## 2024-04-19 ENCOUNTER — TELEPHONE (OUTPATIENT)
Dept: PULMONOLOGY | Age: 79
End: 2024-04-19

## 2024-04-19 NOTE — TELEPHONE ENCOUNTER
TRIAGE CALL      Complaint: cough/ wheezing   Cough: yes   Productive:  a little   Bloody Sputum:  no  Increased SOB/Wheezing:  wheezing  Duration: yesterday   Fever/Chills: no  OTC Meds tried: tylenol

## 2024-04-22 RX ORDER — PREDNISONE 20 MG/1
20 TABLET ORAL DAILY
Qty: 15 TABLET | Refills: 0 | Status: SHIPPED | OUTPATIENT
Start: 2024-04-22

## 2024-04-22 NOTE — TELEPHONE ENCOUNTER
Can try another course of prednisone.  Continue nebs.  If persists, needs follow up malka to discuss inhaled therapies.  Looks like she was supposed to have a visit in May 2024

## 2024-04-22 NOTE — TELEPHONE ENCOUNTER
Last seen: 2/15/24  Hx: HFrEF, asthma, QUINTIN w/ BiPAP    Patient call reporting increased coughing & wheezing, somewhat productive.  Contacted patient to review s/s, using nebulizer, 3 times yesterday, but only once so far today; does perceive some benefit but not \"stopping it\"; reporting sputum is little creamy colored but mostly white; congestion is in throat & chest, doesn't feel like drainage, no pressure around eyes; notes she couldn't use her BiPAP last night due to frequency of coughing; slight edema but not bad. Feels like the congestion is what is making her cough.   Checked O2 this morning was @ 93%.  Advised to report to urgent care or ER for even slightest bit of worsening.   Confirmed pharmacy on file. Please advise for next step, no open appts in next 48 hours in our office.

## 2024-04-22 NOTE — TELEPHONE ENCOUNTER
Contacted patient & advised steroid course until can get in for appt, continue nebs, able to work in tomorrow afternoon.

## 2024-04-23 ENCOUNTER — OFFICE VISIT (OUTPATIENT)
Dept: PULMONOLOGY | Age: 79
End: 2024-04-23
Payer: MEDICARE

## 2024-04-23 VITALS
RESPIRATION RATE: 18 BRPM | HEIGHT: 60 IN | OXYGEN SATURATION: 96 % | DIASTOLIC BLOOD PRESSURE: 64 MMHG | TEMPERATURE: 97.7 F | WEIGHT: 164 LBS | BODY MASS INDEX: 32.2 KG/M2 | HEART RATE: 79 BPM | SYSTOLIC BLOOD PRESSURE: 124 MMHG

## 2024-04-23 DIAGNOSIS — J45.51 SEVERE PERSISTENT ASTHMA WITH EXACERBATION: Primary | ICD-10-CM

## 2024-04-23 PROCEDURE — 99214 OFFICE O/P EST MOD 30 MIN: CPT | Performed by: INTERNAL MEDICINE

## 2024-04-23 PROCEDURE — 96372 THER/PROPH/DIAG INJ SC/IM: CPT | Performed by: INTERNAL MEDICINE

## 2024-04-23 PROCEDURE — 1123F ACP DISCUSS/DSCN MKR DOCD: CPT | Performed by: INTERNAL MEDICINE

## 2024-04-23 PROCEDURE — G8399 PT W/DXA RESULTS DOCUMENT: HCPCS | Performed by: INTERNAL MEDICINE

## 2024-04-23 PROCEDURE — 1036F TOBACCO NON-USER: CPT | Performed by: INTERNAL MEDICINE

## 2024-04-23 PROCEDURE — 3078F DIAST BP <80 MM HG: CPT | Performed by: INTERNAL MEDICINE

## 2024-04-23 PROCEDURE — G8417 CALC BMI ABV UP PARAM F/U: HCPCS | Performed by: INTERNAL MEDICINE

## 2024-04-23 PROCEDURE — 3074F SYST BP LT 130 MM HG: CPT | Performed by: INTERNAL MEDICINE

## 2024-04-23 PROCEDURE — 1090F PRES/ABSN URINE INCON ASSESS: CPT | Performed by: INTERNAL MEDICINE

## 2024-04-23 PROCEDURE — G8427 DOCREV CUR MEDS BY ELIG CLIN: HCPCS | Performed by: INTERNAL MEDICINE

## 2024-04-23 RX ORDER — DEXAMETHASONE SODIUM PHOSPHATE 10 MG/ML
8 INJECTION INTRAMUSCULAR; INTRAVENOUS ONCE
Status: COMPLETED | OUTPATIENT
Start: 2024-04-23 | End: 2024-04-23

## 2024-04-23 RX ORDER — AZITHROMYCIN 250 MG/1
TABLET, FILM COATED ORAL
Qty: 6 TABLET | Refills: 0 | Status: SHIPPED | OUTPATIENT
Start: 2024-04-23 | End: 2024-05-03

## 2024-04-23 RX ADMIN — DEXAMETHASONE SODIUM PHOSPHATE 8 MG: 10 INJECTION INTRAMUSCULAR; INTRAVENOUS at 17:14

## 2024-04-23 NOTE — PROGRESS NOTES
low doses (<20mg/day)    High doses cause leg cramps. Able to tolerate low doses    Fluvastatin Hives    Iodinated Contrast Media     Iodine Hives    Lisinopril Other (See Comments) and Cough     cough    Penicillins     Tetracycline Other (See Comments) and Itching     ULCERS IN MOUTH    Tetracyclines & Related     Wasp Venom     Penicillin G Rash     Current Outpatient Medications   Medication Instructions    acetaminophen (TYLENOL) 500 mg, Oral, EVERY 6 HOURS PRN, Patient takes 2 tablets as NEEDED for pain.    albuterol (PROVENTIL) 2.5 mg, Nebulization, EVERY 6 HOURS PRN    albuterol sulfate HFA (PROVENTIL;VENTOLIN;PROAIR) 108 (90 Base) MCG/ACT inhaler 2 puffs 4 times daily if needed for shortness of breath or wheezing    apixaban (ELIQUIS) 5 mg, Oral, 2 TIMES DAILY    ascorbic acid (VITAMIN C) 250 mg, Oral, DAILY    aspirin 81 mg, Oral    budesonide-formoterol (SYMBICORT) 160-4.5 MCG/ACT AERO 2 puffs twice daily, rinse mouth after use.  May use preferred brand or generic    Calcium Carbonate-Vitamin D (CALCIUM-VITAMIN D) 600-125 MG-UNIT TABS Oral    Coenzyme Q10 (CO Q 10) 10 MG CAPS Oral    cyanocobalamin 500 mcg, Oral, DAILY    diclofenac sodium (VOLTAREN) 2 g, Topical, 4 TIMES DAILY, Patient takes AS NEEDED    ergocalciferol (ERGOCALCIFEROL) 50,000 Units, Oral, EVERY 14 DAYS    furosemide (LASIX) 40 mg, Oral, DAILY    gabapentin (NEURONTIN) 300 mg, Oral, 3 TIMES DAILY    losartan (COZAAR) 100 mg, Oral, DAILY    metoprolol succinate (TOPROL XL) 25 mg, Oral, DAILY    nitroGLYCERIN (NITROSTAT) 0.4 MG SL tablet Place 1 tablet under tongue for angina/chest pain.  May repeat every 5 minutes for a total of 3.  If no relief call 911.    pantoprazole (PROTONIX) 40 MG tablet 1 tablet p.o. 30 minutes prior to morning and evening meal.    predniSONE (DELTASONE) 20 mg, Oral, DAILY, 40mg x 3 days; 30mg x 3 days; 20mg x 3 days; 10mg x 3 days    rosuvastatin (CRESTOR) 10 mg, Oral, DAILY    sertraline (ZOLOFT) 75 mg, Oral,

## 2024-04-23 NOTE — PATIENT INSTRUCTIONS
Please complete your steroid taper  Get the antibiotic rx filled and begin tonight  Use breztri 2 puffs twice daily x 1 week and then resume symbicort.    Prior to your next visit here, please get blood work drawn.  It is preferred that these blood tests are done when you have been off of prednisone for at least 1-2 weeks.

## 2024-04-25 ASSESSMENT — ENCOUNTER SYMPTOMS
WHEEZING: 0
CHEST TIGHTNESS: 0
COUGH: 0
SHORTNESS OF BREATH: 0

## 2024-04-26 ENCOUNTER — TELEPHONE (OUTPATIENT)
Dept: INTERNAL MEDICINE CLINIC | Facility: CLINIC | Age: 79
End: 2024-04-26

## 2024-04-26 NOTE — TELEPHONE ENCOUNTER
I spoke to pt about an hour ago and she said that this morning she had a normal BM, but there was blood streaks in the stool and blood on the toilet paper. She denies N/V, fever or chills, but does have slight and pain since having the BM this morning.   Per DR Rivas pt was scheduled for Monday of next week and advised if sx get worse before the appointment she should go to Urgent Care or ER. Pt voiced understanding and agrees.

## 2024-04-26 NOTE — TELEPHONE ENCOUNTER
Pt says she is bleeding out her bowel and would like to speak with a nurse about what she should do

## 2024-04-29 ENCOUNTER — OFFICE VISIT (OUTPATIENT)
Dept: INTERNAL MEDICINE CLINIC | Facility: CLINIC | Age: 79
End: 2024-04-29
Payer: MEDICARE

## 2024-04-29 VITALS
SYSTOLIC BLOOD PRESSURE: 130 MMHG | BODY MASS INDEX: 32.22 KG/M2 | DIASTOLIC BLOOD PRESSURE: 70 MMHG | WEIGHT: 165 LBS | OXYGEN SATURATION: 98 % | HEART RATE: 60 BPM

## 2024-04-29 DIAGNOSIS — D12.6 TUBULOVILLOUS ADENOMA OF COLON: ICD-10-CM

## 2024-04-29 DIAGNOSIS — K92.1 BLOOD IN STOOL: ICD-10-CM

## 2024-04-29 DIAGNOSIS — K92.1 BLOOD IN STOOL: Primary | ICD-10-CM

## 2024-04-29 DIAGNOSIS — Z79.01 ON ANTICOAGULANT THERAPY: ICD-10-CM

## 2024-04-29 LAB
BASOPHILS # BLD: 0 K/UL (ref 0–0.2)
BASOPHILS NFR BLD: 0 % (ref 0–2)
DIFFERENTIAL METHOD BLD: ABNORMAL
EOSINOPHIL # BLD: 0 K/UL (ref 0–0.8)
EOSINOPHIL NFR BLD: 0 % (ref 0.5–7.8)
ERYTHROCYTE [DISTWIDTH] IN BLOOD BY AUTOMATED COUNT: 14.5 % (ref 11.9–14.6)
FERRITIN SERPL-MCNC: 34 NG/ML (ref 8–388)
HCT VFR BLD AUTO: 36.8 % (ref 35.8–46.3)
HGB BLD-MCNC: 11 G/DL (ref 11.7–15.4)
IMM GRANULOCYTES # BLD AUTO: 0.1 K/UL (ref 0–0.5)
IMM GRANULOCYTES NFR BLD AUTO: 1 % (ref 0–5)
LYMPHOCYTES # BLD: 1.7 K/UL (ref 0.5–4.6)
LYMPHOCYTES NFR BLD: 16 % (ref 13–44)
MCH RBC QN AUTO: 25.5 PG (ref 26.1–32.9)
MCHC RBC AUTO-ENTMCNC: 29.9 G/DL (ref 31.4–35)
MCV RBC AUTO: 85.2 FL (ref 82–102)
MONOCYTES # BLD: 0.4 K/UL (ref 0.1–1.3)
MONOCYTES NFR BLD: 4 % (ref 4–12)
NEUTS SEG # BLD: 8.9 K/UL (ref 1.7–8.2)
NEUTS SEG NFR BLD: 79 % (ref 43–78)
NRBC # BLD: 0 K/UL (ref 0–0.2)
PLATELET # BLD AUTO: 313 K/UL (ref 150–450)
PMV BLD AUTO: 10.6 FL (ref 9.4–12.3)
RBC # BLD AUTO: 4.32 M/UL (ref 4.05–5.2)
WBC # BLD AUTO: 11.2 K/UL (ref 4.3–11.1)

## 2024-04-29 PROCEDURE — 1123F ACP DISCUSS/DSCN MKR DOCD: CPT | Performed by: NURSE PRACTITIONER

## 2024-04-29 PROCEDURE — 1090F PRES/ABSN URINE INCON ASSESS: CPT | Performed by: NURSE PRACTITIONER

## 2024-04-29 PROCEDURE — G8399 PT W/DXA RESULTS DOCUMENT: HCPCS | Performed by: NURSE PRACTITIONER

## 2024-04-29 PROCEDURE — 1036F TOBACCO NON-USER: CPT | Performed by: NURSE PRACTITIONER

## 2024-04-29 PROCEDURE — G8427 DOCREV CUR MEDS BY ELIG CLIN: HCPCS | Performed by: NURSE PRACTITIONER

## 2024-04-29 PROCEDURE — 99214 OFFICE O/P EST MOD 30 MIN: CPT | Performed by: NURSE PRACTITIONER

## 2024-04-29 PROCEDURE — 3075F SYST BP GE 130 - 139MM HG: CPT | Performed by: NURSE PRACTITIONER

## 2024-04-29 PROCEDURE — 3078F DIAST BP <80 MM HG: CPT | Performed by: NURSE PRACTITIONER

## 2024-04-29 PROCEDURE — G8417 CALC BMI ABV UP PARAM F/U: HCPCS | Performed by: NURSE PRACTITIONER

## 2024-04-29 RX ORDER — BUDESONIDE AND FORMOTEROL FUMARATE DIHYDRATE 160; 4.5 UG/1; UG/1
AEROSOL RESPIRATORY (INHALATION)
COMMUNITY
Start: 2020-03-01 | End: 2024-04-29 | Stop reason: ALTCHOICE

## 2024-04-29 ASSESSMENT — ENCOUNTER SYMPTOMS
ABDOMINAL PAIN: 0
BLOOD IN STOOL: 1
CONSTIPATION: 0
NAUSEA: 0
DIARRHEA: 0

## 2024-04-29 NOTE — PROGRESS NOTES
PROGRESS NOTE      Chief Complaint   Patient presents with    Other     Reports blood in stool 2 days ago red in color denies diarrhea or pain       HPI    Blood in stool:Noticed 3 days ago. Stool was soft and brown with red blood mixed in. No dark clots. No mucous. No abdominal pain or rectal pain. No dark or tarry stools. Daily bowel movement with normal stools since. 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     Current Outpatient Medications   Medication Sig Dispense Refill    azithromycin (ZITHROMAX) 250 MG tablet 500mg on day 1 followed by 250mg on days 2 - 5 6 tablet 0    predniSONE (DELTASONE) 20 MG tablet Take 1 tablet by mouth daily 40mg x 3 days; 30mg x 3 days; 20mg x 3 days; 10mg x 3 days 15 tablet 0    furosemide (LASIX) 40 MG tablet Take 1 tablet by mouth daily 100 tablet 3    losartan (COZAAR) 100 MG tablet Take 1 tablet by mouth daily 100 tablet 3    metoprolol succinate (TOPROL XL) 25 MG extended release tablet Take 1 tablet by mouth daily 100 tablet 3    pantoprazole (PROTONIX) 40 MG tablet 1 tablet p.o. 30 minutes prior to morning and evening meal. 200 tablet 3    nitroGLYCERIN (NITROSTAT) 0.4 MG SL tablet Place 1 tablet under tongue for angina/chest pain.  May repeat every 5 minutes for a total of 3.  If no relief call 911. 25 tablet 11    albuterol (PROVENTIL) (2.5 MG/3ML) 0.083% nebulizer solution Take 3 mLs by nebulization every 6 hours as needed for Wheezing or Shortness of Breath 360 mL 11    albuterol sulfate HFA (PROVENTIL;VENTOLIN;PROAIR) 108 (90 Base) MCG/ACT inhaler 2 puffs 4 times daily if needed for shortness of breath or wheezing 18 g 11    budesonide-formoterol (SYMBICORT) 160-4.5 MCG/ACT AERO 2 puffs twice daily, rinse mouth after use.  May use preferred brand or generic 1 each 11    acetaminophen (TYLENOL) 500

## 2024-05-06 ENCOUNTER — OFFICE VISIT (OUTPATIENT)
Age: 79
End: 2024-05-06
Payer: MEDICARE

## 2024-05-06 VITALS
HEART RATE: 64 BPM | DIASTOLIC BLOOD PRESSURE: 64 MMHG | HEIGHT: 60 IN | WEIGHT: 167 LBS | BODY MASS INDEX: 32.79 KG/M2 | SYSTOLIC BLOOD PRESSURE: 138 MMHG | OXYGEN SATURATION: 99 %

## 2024-05-06 DIAGNOSIS — E78.2 MIXED HYPERLIPIDEMIA: ICD-10-CM

## 2024-05-06 DIAGNOSIS — I50.32 CHRONIC HEART FAILURE WITH PRESERVED EJECTION FRACTION (HCC): ICD-10-CM

## 2024-05-06 DIAGNOSIS — I10 ESSENTIAL HYPERTENSION: Primary | ICD-10-CM

## 2024-05-06 DIAGNOSIS — I34.0 MILD MITRAL REGURGITATION: ICD-10-CM

## 2024-05-06 DIAGNOSIS — I48.0 PAROXYSMAL ATRIAL FIBRILLATION (HCC): ICD-10-CM

## 2024-05-06 PROCEDURE — 1090F PRES/ABSN URINE INCON ASSESS: CPT | Performed by: INTERNAL MEDICINE

## 2024-05-06 PROCEDURE — G8399 PT W/DXA RESULTS DOCUMENT: HCPCS | Performed by: INTERNAL MEDICINE

## 2024-05-06 PROCEDURE — G8427 DOCREV CUR MEDS BY ELIG CLIN: HCPCS | Performed by: INTERNAL MEDICINE

## 2024-05-06 PROCEDURE — 1123F ACP DISCUSS/DSCN MKR DOCD: CPT | Performed by: INTERNAL MEDICINE

## 2024-05-06 PROCEDURE — 99214 OFFICE O/P EST MOD 30 MIN: CPT | Performed by: INTERNAL MEDICINE

## 2024-05-06 PROCEDURE — 1036F TOBACCO NON-USER: CPT | Performed by: INTERNAL MEDICINE

## 2024-05-06 PROCEDURE — 3078F DIAST BP <80 MM HG: CPT | Performed by: INTERNAL MEDICINE

## 2024-05-06 PROCEDURE — G8417 CALC BMI ABV UP PARAM F/U: HCPCS | Performed by: INTERNAL MEDICINE

## 2024-05-06 PROCEDURE — 3075F SYST BP GE 130 - 139MM HG: CPT | Performed by: INTERNAL MEDICINE

## 2024-05-06 ASSESSMENT — ENCOUNTER SYMPTOMS
HEMATOCHEZIA: 1
COUGH: 1
WHEEZING: 1

## 2024-05-06 NOTE — PROGRESS NOTES
[address], [address]     [place] [address]      Patient:  Zeynep Carrington  1945         SUBJECTIVE:  Zeynep Carrington is a  78 y.o. female seen for a follow up visit regarding the following:     Chief Complaint   Patient presents with    6 Month Follow-Up    Hypertension      CC: follow up HTN, A Fib      HPI:   78  y.o. female  with a history of paroxysmal atrial fibrillation, CAD, HTN, HLD is here for follow-up.    Patient was last seen in office on 3/1/24 with Dr Zhao , since then reports that she has been doing ok, still having some dyspnea. Seen by pulmonology, on steroids.  She reports one episode of palpitations with some lightheadedness, no syncope. Has been taking her Eliquis without missing doses. She had one episode of blood per rectum, resolved. Has GI evaluation in near future. Had some leg swelling when steroids were started, since resolved. No other complaints at this time.     Cardiovascular Testing:  - Echo 3/4/24: LVEF 60-65%, RV normal, trace AI, mild MR, RVSP 56 mmHg.   -LHC/RHC 4/14/2023: Mild nonobstructive CAD, mildly elevated PA pressures (43/14, CO 3 L/min, PCWP 22 mmHg) 2-3+ MR, LVEF greater than 60%.  - MING 4/10/23: LVEF >55%, RV normal, mild MR no systolic flow reversal in the pulmonary veins, no LA/GENET thrombus.   - SPECT 1/13/2022: Normal perfusion, not gated, low risk scan  - Echo 10/26/21: LVEF 60-65 %, RV normal, Valves: mild AI, mild MR, mild TR, RVSP 44 mmHg.  - Cath 2011: Nonobstructive CAD    Past medical history, past surgical history, family history, social history, and medications were all reviewed with the patient today and updated as necessary.         Patient Active Problem List    Diagnosis Date Noted    COPD exacerbation (HCC) 03/03/2024     Priority: High    Dyspnea on exertion 04/14/2023     Priority: High    Current use of long term anticoagulation 01/31/2023     Priority: Medium    Chronic renal disease, stage III

## 2024-05-12 DIAGNOSIS — I10 ESSENTIAL HYPERTENSION: ICD-10-CM

## 2024-05-13 RX ORDER — LOSARTAN POTASSIUM 100 MG/1
100 TABLET ORAL DAILY
Qty: 90 TABLET | Refills: 3 | OUTPATIENT
Start: 2024-05-13

## 2024-05-16 ENCOUNTER — NURSE ONLY (OUTPATIENT)
Dept: PULMONOLOGY | Age: 79
End: 2024-05-16

## 2024-05-16 ENCOUNTER — CLINICAL DOCUMENTATION (OUTPATIENT)
Dept: PULMONOLOGY | Age: 79
End: 2024-05-16

## 2024-05-16 ENCOUNTER — OFFICE VISIT (OUTPATIENT)
Dept: PULMONOLOGY | Age: 79
End: 2024-05-16
Payer: MEDICARE

## 2024-05-16 VITALS
HEART RATE: 73 BPM | RESPIRATION RATE: 18 BRPM | TEMPERATURE: 97.9 F | HEIGHT: 60 IN | DIASTOLIC BLOOD PRESSURE: 61 MMHG | BODY MASS INDEX: 32.98 KG/M2 | SYSTOLIC BLOOD PRESSURE: 103 MMHG | OXYGEN SATURATION: 94 % | WEIGHT: 168 LBS

## 2024-05-16 DIAGNOSIS — J45.51 SEVERE PERSISTENT ASTHMA WITH EXACERBATION: Primary | ICD-10-CM

## 2024-05-16 DIAGNOSIS — I50.32 CHRONIC HEART FAILURE WITH PRESERVED EJECTION FRACTION (HCC): ICD-10-CM

## 2024-05-16 DIAGNOSIS — J45.50 SEVERE PERSISTENT ASTHMA WITHOUT COMPLICATION: Primary | ICD-10-CM

## 2024-05-16 DIAGNOSIS — G47.33 OSA TREATED WITH BIPAP: ICD-10-CM

## 2024-05-16 LAB
FEV 1 , POC: 1.27 L
FEV1 % PRED, POC: 80 %
FEV1/FVC, POC: NORMAL
FVC % PRED, POC: 82 %
FVC, POC: NORMAL

## 2024-05-16 PROCEDURE — 1036F TOBACCO NON-USER: CPT | Performed by: INTERNAL MEDICINE

## 2024-05-16 PROCEDURE — 3074F SYST BP LT 130 MM HG: CPT | Performed by: INTERNAL MEDICINE

## 2024-05-16 PROCEDURE — 99214 OFFICE O/P EST MOD 30 MIN: CPT | Performed by: INTERNAL MEDICINE

## 2024-05-16 PROCEDURE — G8427 DOCREV CUR MEDS BY ELIG CLIN: HCPCS | Performed by: INTERNAL MEDICINE

## 2024-05-16 PROCEDURE — 1123F ACP DISCUSS/DSCN MKR DOCD: CPT | Performed by: INTERNAL MEDICINE

## 2024-05-16 PROCEDURE — 1090F PRES/ABSN URINE INCON ASSESS: CPT | Performed by: INTERNAL MEDICINE

## 2024-05-16 PROCEDURE — G8417 CALC BMI ABV UP PARAM F/U: HCPCS | Performed by: INTERNAL MEDICINE

## 2024-05-16 PROCEDURE — 3078F DIAST BP <80 MM HG: CPT | Performed by: INTERNAL MEDICINE

## 2024-05-16 PROCEDURE — G8399 PT W/DXA RESULTS DOCUMENT: HCPCS | Performed by: INTERNAL MEDICINE

## 2024-05-16 RX ORDER — BENRALIZUMAB 30 MG/ML
30 INJECTION, SOLUTION SUBCUTANEOUS
Qty: 1 ADJUSTABLE DOSE PRE-FILLED PEN SYRINGE | Refills: 6 | Status: SHIPPED | OUTPATIENT
Start: 2024-05-16

## 2024-05-16 ASSESSMENT — PULMONARY FUNCTION TESTS
FEV1_PERCENT_PREDICTED_POC: 80
FVC_PERCENT_PREDICTED_POC: 82

## 2024-05-16 NOTE — TELEPHONE ENCOUNTER
New Enrollment for Fasenra    Patient got 1st injection in office.    New script for Fasenra has been pended for Dr. Dee's signature.

## 2024-05-16 NOTE — PROGRESS NOTES
cyanocobalamin 500 mcg, Oral, DAILY    diclofenac sodium (VOLTAREN) 2 g, Topical, 4 TIMES DAILY, Patient takes AS NEEDED    ergocalciferol (ERGOCALCIFEROL) 50,000 Units, Oral, EVERY 14 DAYS    furosemide (LASIX) 40 mg, Oral, DAILY    gabapentin (NEURONTIN) 300 mg, Oral, 3 TIMES DAILY    losartan (COZAAR) 100 mg, Oral, DAILY    metoprolol succinate (TOPROL XL) 25 mg, Oral, DAILY    nitroGLYCERIN (NITROSTAT) 0.4 MG SL tablet Place 1 tablet under tongue for angina/chest pain.  May repeat every 5 minutes for a total of 3.  If no relief call 911.    pantoprazole (PROTONIX) 40 MG tablet 1 tablet p.o. 30 minutes prior to morning and evening meal.    predniSONE (DELTASONE) 20 mg, Oral, DAILY, 40mg x 3 days; 30mg x 3 days; 20mg x 3 days; 10mg x 3 days    rosuvastatin (CRESTOR) 10 mg, Oral, DAILY    sertraline (ZOLOFT) 75 mg, Oral, DAILY    spironolactone (ALDACTONE) 25 mg, Oral, DAILY

## 2024-05-16 NOTE — PATIENT INSTRUCTIONS
You received your first Fasenra injection in the office today. Expect calls from the speciality pharmacy and your insurance company regarding getting these shipped to your home for future injections. You will need to refugio your calendar for injections every month for the first 3, then every 8 weeks after that. The injection today was on your lower abdominal area on the left. Feel free to call anytime if you have any questions or concerns.

## 2024-05-28 ENCOUNTER — OFFICE VISIT (OUTPATIENT)
Dept: ORTHOPEDIC SURGERY | Age: 79
End: 2024-05-28
Payer: MEDICARE

## 2024-05-28 DIAGNOSIS — M19.011 ARTHRITIS OF RIGHT SHOULDER REGION: ICD-10-CM

## 2024-05-28 DIAGNOSIS — M25.511 RIGHT SHOULDER PAIN, UNSPECIFIED CHRONICITY: Primary | ICD-10-CM

## 2024-05-28 PROCEDURE — G8399 PT W/DXA RESULTS DOCUMENT: HCPCS | Performed by: ORTHOPAEDIC SURGERY

## 2024-05-28 PROCEDURE — 99214 OFFICE O/P EST MOD 30 MIN: CPT | Performed by: ORTHOPAEDIC SURGERY

## 2024-05-28 PROCEDURE — G8417 CALC BMI ABV UP PARAM F/U: HCPCS | Performed by: ORTHOPAEDIC SURGERY

## 2024-05-28 PROCEDURE — 1090F PRES/ABSN URINE INCON ASSESS: CPT | Performed by: ORTHOPAEDIC SURGERY

## 2024-05-28 PROCEDURE — 1123F ACP DISCUSS/DSCN MKR DOCD: CPT | Performed by: ORTHOPAEDIC SURGERY

## 2024-05-28 PROCEDURE — 20610 DRAIN/INJ JOINT/BURSA W/O US: CPT | Performed by: ORTHOPAEDIC SURGERY

## 2024-05-28 PROCEDURE — G8428 CUR MEDS NOT DOCUMENT: HCPCS | Performed by: ORTHOPAEDIC SURGERY

## 2024-05-28 PROCEDURE — 1036F TOBACCO NON-USER: CPT | Performed by: ORTHOPAEDIC SURGERY

## 2024-05-28 RX ORDER — METHYLPREDNISOLONE ACETATE 40 MG/ML
80 INJECTION, SUSPENSION INTRA-ARTICULAR; INTRALESIONAL; INTRAMUSCULAR; SOFT TISSUE ONCE
Status: COMPLETED | OUTPATIENT
Start: 2024-05-28 | End: 2024-05-28

## 2024-05-28 RX ADMIN — METHYLPREDNISOLONE ACETATE 80 MG: 40 INJECTION, SUSPENSION INTRA-ARTICULAR; INTRALESIONAL; INTRAMUSCULAR; SOFT TISSUE at 14:01

## 2024-05-28 NOTE — PROGRESS NOTES
Lidocaine, and 2 cc of 0.5% Marcaine into the JOINT SPACE.  The patient tolerated the injection well.     A handout from the [facility] on total shoulder replacement was provided.      Return as currently scheduled.    Thank you for the opportunity to see and take care of your patients. If you ever have any questions please give me a call.   Sincerely,  Yaakov Zheng MD  05/28/24

## 2024-05-28 NOTE — PROGRESS NOTES
PRAPARE - Transportation     Lack of Transportation (Medical): No     Lack of Transportation (Non-Medical): No   Physical Activity: Insufficiently Active (2023)    Exercise Vital Sign     Days of Exercise per Week: 7 days     Minutes of Exercise per Session: 20 min   Stress: Not on file   Social Connections: Not on file   Intimate Partner Violence: Not on file   Housing Stability: Low Risk  (3/1/2024)    Housing Stability Vital Sign     Unable to Pay for Housing in the Last Year: No     Number of Places Lived in the Last Year: 1     Unstable Housing in the Last Year: No         Family History   Problem Relation Age of Onset    Heart Disease Maternal Grandmother     Rheum Arthritis Paternal Grandmother     Heart Attack Sister          from heart issue age 55    Diabetes Mother     Diabetes Sister     Diabetes Sister     Breast Cancer Sister 70    Diabetes Sister     Heart Attack Father     Heart Attack Mother          Allergies   Allergen Reactions    Wasp Venom Protein Anaphylaxis    Atorvastatin Other (See Comments)     High doses cause leg cramps. Able to tolerate low doses (<20mg/day)    High doses cause leg cramps. Able to tolerate low doses    Fluvastatin Hives    Iodinated Contrast Media     Iodine Hives    Lisinopril Other (See Comments) and Cough     cough    Penicillins     Tetracycline Other (See Comments) and Itching     ULCERS IN MOUTH    Tetracyclines & Related     Wasp Venom     Penicillin G Rash         Current Outpatient Medications   Medication Sig    Benralizumab (FASENRA PEN) 30 MG/ML SOAJ Inject 1 mL into the skin every 28 days For 3 doses then every 56 days.    apixaban (ELIQUIS) 2.5 MG TABS tablet Take 1 tablet by mouth 2 times daily    furosemide (LASIX) 40 MG tablet Take 1 tablet by mouth daily    losartan (COZAAR) 100 MG tablet Take 1 tablet by mouth daily    metoprolol succinate (TOPROL XL) 25 MG extended release tablet Take 1 tablet by mouth daily    pantoprazole (PROTONIX)

## 2024-05-29 ENCOUNTER — TELEPHONE (OUTPATIENT)
Dept: PULMONOLOGY | Age: 79
End: 2024-05-29

## 2024-05-29 ENCOUNTER — CLINICAL DOCUMENTATION (OUTPATIENT)
Dept: SLEEP MEDICINE | Age: 79
End: 2024-05-29

## 2024-05-29 ENCOUNTER — OFFICE VISIT (OUTPATIENT)
Dept: SLEEP MEDICINE | Age: 79
End: 2024-05-29
Payer: MEDICARE

## 2024-05-29 VITALS
RESPIRATION RATE: 14 BRPM | BODY MASS INDEX: 33.18 KG/M2 | WEIGHT: 169 LBS | SYSTOLIC BLOOD PRESSURE: 110 MMHG | DIASTOLIC BLOOD PRESSURE: 64 MMHG | HEART RATE: 50 BPM | OXYGEN SATURATION: 98 % | HEIGHT: 60 IN

## 2024-05-29 DIAGNOSIS — E66.9 OBESITY (BMI 30-39.9): ICD-10-CM

## 2024-05-29 DIAGNOSIS — G47.10 HYPERSOMNIA, UNSPECIFIED: ICD-10-CM

## 2024-05-29 DIAGNOSIS — J45.51 SEVERE PERSISTENT ASTHMA WITH EXACERBATION: ICD-10-CM

## 2024-05-29 DIAGNOSIS — Z78.9 DIFFICULTY USING CONTINUOUS POSITIVE AIRWAY PRESSURE (CPAP) FULL FACE MASK: ICD-10-CM

## 2024-05-29 DIAGNOSIS — G47.34 NOCTURNAL HYPOXEMIA: ICD-10-CM

## 2024-05-29 DIAGNOSIS — G47.33 OSA (OBSTRUCTIVE SLEEP APNEA): Primary | ICD-10-CM

## 2024-05-29 PROCEDURE — 1090F PRES/ABSN URINE INCON ASSESS: CPT | Performed by: INTERNAL MEDICINE

## 2024-05-29 PROCEDURE — 1036F TOBACCO NON-USER: CPT | Performed by: INTERNAL MEDICINE

## 2024-05-29 PROCEDURE — 1123F ACP DISCUSS/DSCN MKR DOCD: CPT | Performed by: INTERNAL MEDICINE

## 2024-05-29 PROCEDURE — G8399 PT W/DXA RESULTS DOCUMENT: HCPCS | Performed by: INTERNAL MEDICINE

## 2024-05-29 PROCEDURE — G8427 DOCREV CUR MEDS BY ELIG CLIN: HCPCS | Performed by: INTERNAL MEDICINE

## 2024-05-29 PROCEDURE — 3078F DIAST BP <80 MM HG: CPT | Performed by: INTERNAL MEDICINE

## 2024-05-29 PROCEDURE — G8417 CALC BMI ABV UP PARAM F/U: HCPCS | Performed by: INTERNAL MEDICINE

## 2024-05-29 PROCEDURE — 99214 OFFICE O/P EST MOD 30 MIN: CPT | Performed by: INTERNAL MEDICINE

## 2024-05-29 PROCEDURE — 3074F SYST BP LT 130 MM HG: CPT | Performed by: INTERNAL MEDICINE

## 2024-05-29 ASSESSMENT — SLEEP AND FATIGUE QUESTIONNAIRES
ESS TOTAL SCORE: 9
HOW LIKELY ARE YOU TO NOD OFF OR FALL ASLEEP WHILE LYING DOWN TO REST IN THE AFTERNOON WHEN CIRCUMSTANCES PERMIT: WOULD NEVER DOZE
HOW LIKELY ARE YOU TO NOD OFF OR FALL ASLEEP WHILE SITTING AND TALKING TO SOMEONE: WOULD NEVER DOZE
HOW LIKELY ARE YOU TO NOD OFF OR FALL ASLEEP WHILE SITTING AND READING: HIGH CHANCE OF DOZING
HOW LIKELY ARE YOU TO NOD OFF OR FALL ASLEEP WHILE SITTING QUIETLY AFTER LUNCH WITHOUT ALCOHOL: HIGH CHANCE OF DOZING
HOW LIKELY ARE YOU TO NOD OFF OR FALL ASLEEP WHILE SITTING INACTIVE IN A PUBLIC PLACE: WOULD NEVER DOZE
HOW LIKELY ARE YOU TO NOD OFF OR FALL ASLEEP WHEN YOU ARE A PASSENGER IN A CAR FOR AN HOUR WITHOUT A BREAK: WOULD NEVER DOZE
HOW LIKELY ARE YOU TO NOD OFF OR FALL ASLEEP IN A CAR, WHILE STOPPED FOR A FEW MINUTES IN TRAFFIC: WOULD NEVER DOZE
HOW LIKELY ARE YOU TO NOD OFF OR FALL ASLEEP WHILE WATCHING TV: HIGH CHANCE OF DOZING

## 2024-05-29 NOTE — TELEPHONE ENCOUNTER
Patient dropped off Patient Assistance Forms for Fasenra. Forms have been completed and placed in Dr. Dee's folder for signature.

## 2024-05-30 LAB
BASOPHILS # BLD: 0 K/UL (ref 0–0.2)
BASOPHILS NFR BLD: 0 % (ref 0–2)
DIFFERENTIAL METHOD BLD: ABNORMAL
EOSINOPHIL # BLD: 0 K/UL (ref 0–0.8)
EOSINOPHIL NFR BLD: 0 % (ref 0.5–7.8)
ERYTHROCYTE [DISTWIDTH] IN BLOOD BY AUTOMATED COUNT: 14.9 % (ref 11.9–14.6)
HCT VFR BLD AUTO: 34.3 % (ref 35.8–46.3)
HGB BLD-MCNC: 10.1 G/DL (ref 11.7–15.4)
IMM GRANULOCYTES # BLD AUTO: 0.1 K/UL (ref 0–0.5)
IMM GRANULOCYTES NFR BLD AUTO: 1 % (ref 0–5)
LYMPHOCYTES # BLD: 1.2 K/UL (ref 0.5–4.6)
LYMPHOCYTES NFR BLD: 8 % (ref 13–44)
MCH RBC QN AUTO: 25.3 PG (ref 26.1–32.9)
MCHC RBC AUTO-ENTMCNC: 29.4 G/DL (ref 31.4–35)
MCV RBC AUTO: 85.8 FL (ref 82–102)
MONOCYTES # BLD: 0.6 K/UL (ref 0.1–1.3)
MONOCYTES NFR BLD: 4 % (ref 4–12)
NEUTS SEG # BLD: 12.6 K/UL (ref 1.7–8.2)
NEUTS SEG NFR BLD: 87 % (ref 43–78)
NRBC # BLD: 0 K/UL (ref 0–0.2)
PLATELET # BLD AUTO: 338 K/UL (ref 150–450)
PMV BLD AUTO: 10.5 FL (ref 9.4–12.3)
RBC # BLD AUTO: 4 M/UL (ref 4.05–5.2)
WBC # BLD AUTO: 14.5 K/UL (ref 4.3–11.1)

## 2024-05-31 NOTE — TELEPHONE ENCOUNTER
Forms have been completed and signed. Patient has been informed that the forms are at the  ready to be picked up.

## 2024-06-01 LAB — IGE SERPL-ACNC: 77 IU/ML (ref 6–495)

## 2024-06-13 ENCOUNTER — TELEPHONE (OUTPATIENT)
Dept: PULMONOLOGY | Age: 79
End: 2024-06-13

## 2024-06-13 NOTE — TELEPHONE ENCOUNTER
Returned patients call, she states that it is time for her to get her 2nd Fasenra Shot and she hasn't heard anything from the patient assistance. She states she mailed in her paperwork on 5/31/24. I informed the patient that I would call and see if they have approved her and what the status was. In the meantime I would place her name on a sample of the Fasenra PEN and she could stop by the office and pick it up. Patient states she will come by tomorrow 6/14/24. Sample is in the refrigerator with patients name on it.    I called Prescription Assistance and spoke with Isabelle, she states that did receive her forms on 6/5/24 and that they are still processing. She states that it could take anywhere from 7-10 business days and then it will be sent to [employer] for processing and could take another 7-10 business days. Patient states understanding.

## 2024-06-20 ENCOUNTER — TELEPHONE (OUTPATIENT)
Dept: PULMONOLOGY | Age: 79
End: 2024-06-20

## 2024-06-20 NOTE — TELEPHONE ENCOUNTER
Called her w/ results of CPFT's, these were reviewed w/ her at recent appt w/ Dr Dee.    CPFT's revealed normal spirometry, lung volumes and flow volume loop.  No significant bronchodilator response.  Diffusion capacity moderately reduced.  DLCO/VA is normal.    FVC and FEV 1 have improved compared to her prior spirometry 1/2024.    history of mild-moderate asthma, history of tobacco use, having quit in 1981 after accumulating 14 pack years.  History is also notable for bronchitis, pulmonary hypertension, with mildly elevated PA pressure on right heart cath, 2-3+ MR, QUINTIN/nocturnal hypoxia, on BiPAP/O2, afib, CHF.    ___________________________________________________________________________      During today's phone call, she indicates that she needs \"clearance from us,\" for shoulder surgery by Dr. Zheng; This was provided at her visit with me 1/2024, but unfortunately she has been treated for exacerbations since that time, as well as pneumonia 3/2024 with hospitalization.  She had follow-up x-ray with primary provider that showed clear lungs.    She has improved since beginning Fasenra.    Please arrange preop pulmonary risk appt w/ me or Dr Dee, prior to her scheduled appt in August.    She had recent CPFT's and had CXR 3/2024 showing clearing of infiltrates.  Therefore, she just needs visit, does not need x-ray or spirometry.

## 2024-06-21 ENCOUNTER — TELEPHONE (OUTPATIENT)
Dept: ORTHOPEDIC SURGERY | Age: 79
End: 2024-06-21

## 2024-06-21 NOTE — TELEPHONE ENCOUNTER
Spoke with off of Dr Zheng to determine timing.  I spoke with Alanis KHAN.  She will have surgery scheduler contact office for anticipated date of surgery.     SURGICAL OR PROCEDURAL PULMONARY RISK ASSESSMENT:    Physician or Practice Requesting Clearance: Dr Dustin Zheng  : Alanis  Contact Phone: [phone]  Fax number: [phone]  Date of Surgery/Procedure: TBD  Type of Surgery or Procedure: Total shoulder  Type of Anesthesia:   Anticipated Duration of Surgery:

## 2024-06-21 NOTE — TELEPHONE ENCOUNTER
Patient called about surgery clearance and they need to know the date of the surgery, even an estimate. Please give her a call with this info. She also needs to know what type of anesthesia and how long the surgery will last.

## 2024-06-24 ENCOUNTER — TELEPHONE (OUTPATIENT)
Age: 79
End: 2024-06-24

## 2024-06-24 NOTE — TELEPHONE ENCOUNTER
Here is the other info for the Surgical clearance       Fax#   [phone]    General and Regional block    Duration   2 hrs

## 2024-06-24 NOTE — TELEPHONE ENCOUNTER
LVM informing pt of surgical clearance appt on 6/27 at 9:40. Pt instructed to call back to confirm.

## 2024-06-24 NOTE — TELEPHONE ENCOUNTER
I think that there may be overlapping messages for this patient regarding surgical clearance appt.    You can use my 10:00 chart review slot on the 27th along w/ the 10:20 appt slot,  needs to be here by 9:40.  Otherwise, will need to look for other appt options as per my prior message.    Here is the other info for the Surgical clearance (copied from telephone call).     Fax#   [phone]     General and Regional block     Duration   2 hrs     Physician or Practice Requesting Clearance: Dr Dustin Zheng  : Alanis  Contact Phone: [phone]  Fax number: [phone]  Date of Surgery/Procedure: TBD  Type of Surgery or Procedure: Total shoulder  Type of Anesthesia:   Anticipated Duration of Surgery:

## 2024-06-24 NOTE — TELEPHONE ENCOUNTER
Patient having Left reverse total shoulder arthroplasty on TBD with Dr. Zheng under general/ regional .Requesting risk assessment and Eliquis hold . Fax: [phone]

## 2024-06-24 NOTE — TELEPHONE ENCOUNTER
Please let orthopedics know it is generally considered low risk to hold Eliquis for 48 hours prior to surgery if required by surgeon; and the patient would like to undergo procedure/operation.  Closely monitor hemodynamics perioperatively.    Resume Eliquis as soon as possible once okay with surgeon as the patient is at increased risk for CVA while off anticoagulation.    From a cardiovascular perspective patient is at intermediate risk for orthopedic surgery.  Recommend evaluation with pulmonology prior to the OR given underlying lung disease.

## 2024-06-24 NOTE — TELEPHONE ENCOUNTER
Jesica will reach out to pulmonary about clearance. We will make sure cleared before ordering new CT scan.

## 2024-06-26 NOTE — PROGRESS NOTES
[facility]  [address]  [place] [address]  [phone]    Patient Name:  Zeynep Carrington    YOB: 1945    Office Visit 6/27/2024      CHIEF COMPLAINT:      Chief Complaint   Patient presents with    Follow-up         ASSESSMENT:   (Medical Decision Making)                                                                                                                                          Encounter Diagnoses   Name Primary?    Preop pulmonary/respiratory exam Yes    Severe persistent asthma without complication     QUINTIN treated with BiPAP     Chronic heart failure with preserved ejection fraction (HCC)     Seasonal allergic rhinitis due to pollen      Marked improvement symptomatically since beginning Fasenra.  She is awaiting shipment.  She has received 2 doses so far, last was ~ 6/16/24.  Lungs are clear on exam.  She is compliant with maintenance inhaler.  She has used albuterol on limited basis, has been able to decrease frequency significantly since beginning Fasenra.    CPFT's 5/16/2024 revealed normal spirometry, lung volumes and flow volume loop. No significant bronchodilator response. Diffusion capacity moderately reduced. DLCO/VA is normal.     She is tolerating BiPAP much better and has improved compliance, followed in the sleep center.  She is aware that she will need to take device with her to the hospital.    CHF appears compensated at this time.     Estimated postoperative risk for probability of respiratory failure is 1.02%.    Left shoulder total arthroplasty reverse-regional block-23-hour observation is planned but not yet scheduled.                          PLAN:     Continue Fasenra.  I have provided additional sample of Fasenra pen, 30 mg/milliliter, 1 single dose autoinjector.  Lot number [id number], expiration date 8/2026.  She is to contact us if there are issues with next shipment.    Continue Symbicort 2 puffs twice daily, rinse

## 2024-06-27 ENCOUNTER — OFFICE VISIT (OUTPATIENT)
Dept: PULMONOLOGY | Age: 79
End: 2024-06-27
Payer: MEDICARE

## 2024-06-27 VITALS
SYSTOLIC BLOOD PRESSURE: 118 MMHG | OXYGEN SATURATION: 98 % | HEART RATE: 68 BPM | RESPIRATION RATE: 18 BRPM | DIASTOLIC BLOOD PRESSURE: 60 MMHG | HEIGHT: 60 IN | WEIGHT: 166 LBS | TEMPERATURE: 97.3 F | BODY MASS INDEX: 32.59 KG/M2

## 2024-06-27 DIAGNOSIS — G47.33 OSA TREATED WITH BIPAP: ICD-10-CM

## 2024-06-27 DIAGNOSIS — J30.1 SEASONAL ALLERGIC RHINITIS DUE TO POLLEN: ICD-10-CM

## 2024-06-27 DIAGNOSIS — I27.20 MILD PULMONARY HYPERTENSION (HCC): ICD-10-CM

## 2024-06-27 DIAGNOSIS — I50.32 CHRONIC HEART FAILURE WITH PRESERVED EJECTION FRACTION (HCC): ICD-10-CM

## 2024-06-27 DIAGNOSIS — Z01.811 PREOP PULMONARY/RESPIRATORY EXAM: Primary | ICD-10-CM

## 2024-06-27 DIAGNOSIS — J45.50 SEVERE PERSISTENT ASTHMA WITHOUT COMPLICATION: ICD-10-CM

## 2024-06-27 PROCEDURE — 1123F ACP DISCUSS/DSCN MKR DOCD: CPT | Performed by: NURSE PRACTITIONER

## 2024-06-27 PROCEDURE — 1036F TOBACCO NON-USER: CPT | Performed by: NURSE PRACTITIONER

## 2024-06-27 PROCEDURE — G8399 PT W/DXA RESULTS DOCUMENT: HCPCS | Performed by: NURSE PRACTITIONER

## 2024-06-27 PROCEDURE — G8427 DOCREV CUR MEDS BY ELIG CLIN: HCPCS | Performed by: NURSE PRACTITIONER

## 2024-06-27 PROCEDURE — G8417 CALC BMI ABV UP PARAM F/U: HCPCS | Performed by: NURSE PRACTITIONER

## 2024-06-27 PROCEDURE — 1090F PRES/ABSN URINE INCON ASSESS: CPT | Performed by: NURSE PRACTITIONER

## 2024-06-27 PROCEDURE — 3078F DIAST BP <80 MM HG: CPT | Performed by: NURSE PRACTITIONER

## 2024-06-27 PROCEDURE — 3074F SYST BP LT 130 MM HG: CPT | Performed by: NURSE PRACTITIONER

## 2024-06-27 PROCEDURE — 99214 OFFICE O/P EST MOD 30 MIN: CPT | Performed by: NURSE PRACTITIONER

## 2024-06-27 ASSESSMENT — ENCOUNTER SYMPTOMS
SPUTUM PRODUCTION: 0
HEMOPTYSIS: 0

## 2024-06-27 NOTE — PATIENT INSTRUCTIONS
Continue Fasenra.  I have provided additional sample of Fasenra pen, 30 mg/milliliter, 1 single dose autoinjector.  Lot number [id number], expiration date 8/2026.  She is to contact us if there are issues with next shipment.    Continue Symbicort 2 puffs twice daily, rinse mouth after use.    Albuterol inhaler or nebulizer 4 times daily as needed.    Continue BiPAP as prescribed by sleep center.

## 2024-07-08 ENCOUNTER — APPOINTMENT (OUTPATIENT)
Dept: GENERAL RADIOLOGY | Age: 79
DRG: 291 | End: 2024-07-08
Payer: MEDICARE

## 2024-07-08 ENCOUNTER — TELEPHONE (OUTPATIENT)
Age: 79
End: 2024-07-08

## 2024-07-08 ENCOUNTER — HOSPITAL ENCOUNTER (INPATIENT)
Age: 79
LOS: 1 days | Discharge: HOME | DRG: 291 | End: 2024-07-12
Attending: EMERGENCY MEDICINE | Admitting: INTERNAL MEDICINE
Payer: MEDICARE

## 2024-07-08 DIAGNOSIS — N18.9 ACUTE ON CHRONIC RENAL INSUFFICIENCY: ICD-10-CM

## 2024-07-08 DIAGNOSIS — N28.9 ACUTE ON CHRONIC RENAL INSUFFICIENCY: ICD-10-CM

## 2024-07-08 DIAGNOSIS — I50.33 ACUTE ON CHRONIC DIASTOLIC (CONGESTIVE) HEART FAILURE (HCC): ICD-10-CM

## 2024-07-08 DIAGNOSIS — I48.91 ATRIAL FIBRILLATION, UNSPECIFIED TYPE (HCC): ICD-10-CM

## 2024-07-08 DIAGNOSIS — R00.1 BRADYCARDIA: Primary | ICD-10-CM

## 2024-07-08 DIAGNOSIS — I50.9 ACUTE ON CHRONIC CONGESTIVE HEART FAILURE, UNSPECIFIED HEART FAILURE TYPE (HCC): ICD-10-CM

## 2024-07-08 LAB
ALBUMIN SERPL-MCNC: 3.8 G/DL (ref 3.2–4.6)
ALBUMIN/GLOB SERPL: 1.3 (ref 1–1.9)
ALP SERPL-CCNC: 46 U/L (ref 35–104)
ALT SERPL-CCNC: 46 U/L (ref 12–65)
ANION GAP SERPL CALC-SCNC: 10 MMOL/L (ref 9–18)
APTT PPP: 28.1 SEC (ref 23.3–37.4)
AST SERPL-CCNC: 34 U/L (ref 15–37)
BASOPHILS # BLD: 0 K/UL (ref 0–0.2)
BASOPHILS NFR BLD: 0 % (ref 0–2)
BILIRUB SERPL-MCNC: 0.5 MG/DL (ref 0–1.2)
BUN SERPL-MCNC: 45 MG/DL (ref 8–23)
CALCIUM SERPL-MCNC: 9.6 MG/DL (ref 8.8–10.2)
CHLORIDE SERPL-SCNC: 105 MMOL/L (ref 98–107)
CO2 SERPL-SCNC: 26 MMOL/L (ref 20–28)
CREAT SERPL-MCNC: 2.13 MG/DL (ref 0.6–1.1)
DIFFERENTIAL METHOD BLD: ABNORMAL
EKG ATRIAL RATE: 0 BPM
EKG DIAGNOSIS: NORMAL
EKG Q-T INTERVAL: 468 MS
EKG QRS DURATION: 88 MS
EKG QTC CALCULATION (BAZETT): 423 MS
EKG R AXIS: -8 DEGREES
EKG T AXIS: 49 DEGREES
EKG VENTRICULAR RATE: 49 BPM
EOSINOPHIL # BLD: 0 K/UL (ref 0–0.8)
EOSINOPHIL NFR BLD: 0 % (ref 0.5–7.8)
ERYTHROCYTE [DISTWIDTH] IN BLOOD BY AUTOMATED COUNT: 16 % (ref 11.9–14.6)
GLOBULIN SER CALC-MCNC: 3 G/DL (ref 2.3–3.5)
GLUCOSE SERPL-MCNC: 93 MG/DL (ref 70–99)
HCT VFR BLD AUTO: 30.7 % (ref 35.8–46.3)
HGB BLD-MCNC: 9 G/DL (ref 11.7–15.4)
IMM GRANULOCYTES # BLD AUTO: 0 K/UL (ref 0–0.5)
IMM GRANULOCYTES NFR BLD AUTO: 0 % (ref 0–5)
INR PPP: 1.2
LYMPHOCYTES # BLD: 1.6 K/UL (ref 0.5–4.6)
LYMPHOCYTES NFR BLD: 29 % (ref 13–44)
MAGNESIUM SERPL-MCNC: 2.7 MG/DL (ref 1.8–2.4)
MCH RBC QN AUTO: 25.1 PG (ref 26.1–32.9)
MCHC RBC AUTO-ENTMCNC: 29.3 G/DL (ref 31.4–35)
MCV RBC AUTO: 85.5 FL (ref 82–102)
MONOCYTES # BLD: 0.7 K/UL (ref 0.1–1.3)
MONOCYTES NFR BLD: 12 % (ref 4–12)
NEUTS SEG # BLD: 3.3 K/UL (ref 1.7–8.2)
NEUTS SEG NFR BLD: 59 % (ref 43–78)
NRBC # BLD: 0 K/UL (ref 0–0.2)
NT PRO BNP: 8843 PG/ML (ref 0–450)
PLATELET # BLD AUTO: 260 K/UL (ref 150–450)
PMV BLD AUTO: 10.8 FL (ref 9.4–12.3)
POTASSIUM SERPL-SCNC: 5 MMOL/L (ref 3.5–5.1)
PROT SERPL-MCNC: 6.8 G/DL (ref 6.3–8.2)
PROTHROMBIN TIME: 16.2 SEC (ref 11.3–14.9)
RBC # BLD AUTO: 3.59 M/UL (ref 4.05–5.2)
SODIUM SERPL-SCNC: 140 MMOL/L (ref 136–145)
TROPONIN T SERPL HS-MCNC: 25 NG/L (ref 0–14)
UFH PPP CHRO-ACNC: >1.1 IU/ML (ref 0.3–0.7)
WBC # BLD AUTO: 5.6 K/UL (ref 4.3–11.1)

## 2024-07-08 PROCEDURE — 85610 PROTHROMBIN TIME: CPT

## 2024-07-08 PROCEDURE — 2580000003 HC RX 258: Performed by: NURSE PRACTITIONER

## 2024-07-08 PROCEDURE — 85025 COMPLETE CBC W/AUTO DIFF WBC: CPT

## 2024-07-08 PROCEDURE — 99285 EMERGENCY DEPT VISIT HI MDM: CPT

## 2024-07-08 PROCEDURE — 36415 COLL VENOUS BLD VENIPUNCTURE: CPT

## 2024-07-08 PROCEDURE — 84484 ASSAY OF TROPONIN QUANT: CPT

## 2024-07-08 PROCEDURE — 6360000002 HC RX W HCPCS: Performed by: EMERGENCY MEDICINE

## 2024-07-08 PROCEDURE — 80053 COMPREHEN METABOLIC PANEL: CPT

## 2024-07-08 PROCEDURE — 83735 ASSAY OF MAGNESIUM: CPT

## 2024-07-08 PROCEDURE — 96375 TX/PRO/DX INJ NEW DRUG ADDON: CPT

## 2024-07-08 PROCEDURE — 6360000002 HC RX W HCPCS: Performed by: NURSE PRACTITIONER

## 2024-07-08 PROCEDURE — 96374 THER/PROPH/DIAG INJ IV PUSH: CPT

## 2024-07-08 PROCEDURE — 99223 1ST HOSP IP/OBS HIGH 75: CPT | Performed by: INTERNAL MEDICINE

## 2024-07-08 PROCEDURE — 85730 THROMBOPLASTIN TIME PARTIAL: CPT

## 2024-07-08 PROCEDURE — G0378 HOSPITAL OBSERVATION PER HR: HCPCS

## 2024-07-08 PROCEDURE — 71045 X-RAY EXAM CHEST 1 VIEW: CPT

## 2024-07-08 PROCEDURE — 93010 ELECTROCARDIOGRAM REPORT: CPT | Performed by: INTERNAL MEDICINE

## 2024-07-08 PROCEDURE — 93005 ELECTROCARDIOGRAM TRACING: CPT | Performed by: EMERGENCY MEDICINE

## 2024-07-08 PROCEDURE — 6370000000 HC RX 637 (ALT 250 FOR IP): Performed by: NURSE PRACTITIONER

## 2024-07-08 PROCEDURE — 85520 HEPARIN ASSAY: CPT

## 2024-07-08 PROCEDURE — 83880 ASSAY OF NATRIURETIC PEPTIDE: CPT

## 2024-07-08 RX ORDER — ACETAMINOPHEN 325 MG/1
650 TABLET ORAL EVERY 6 HOURS PRN
Status: DISCONTINUED | OUTPATIENT
Start: 2024-07-08 | End: 2024-07-12 | Stop reason: HOSPADM

## 2024-07-08 RX ORDER — HEPARIN SODIUM 1000 [USP'U]/ML
4000 INJECTION, SOLUTION INTRAVENOUS; SUBCUTANEOUS ONCE
Status: COMPLETED | OUTPATIENT
Start: 2024-07-08 | End: 2024-07-08

## 2024-07-08 RX ORDER — SPIRONOLACTONE 25 MG/1
25 TABLET ORAL DAILY
Status: DISCONTINUED | OUTPATIENT
Start: 2024-07-09 | End: 2024-07-12 | Stop reason: HOSPADM

## 2024-07-08 RX ORDER — ACETAMINOPHEN 500 MG
500 TABLET ORAL EVERY 6 HOURS PRN
Status: DISCONTINUED | OUTPATIENT
Start: 2024-07-08 | End: 2024-07-08 | Stop reason: SDUPTHER

## 2024-07-08 RX ORDER — PANTOPRAZOLE SODIUM 40 MG/1
40 TABLET, DELAYED RELEASE ORAL
Status: DISCONTINUED | OUTPATIENT
Start: 2024-07-09 | End: 2024-07-12 | Stop reason: HOSPADM

## 2024-07-08 RX ORDER — ROSUVASTATIN CALCIUM 10 MG/1
10 TABLET, COATED ORAL DAILY
Status: DISCONTINUED | OUTPATIENT
Start: 2024-07-09 | End: 2024-07-12 | Stop reason: HOSPADM

## 2024-07-08 RX ORDER — ONDANSETRON 4 MG/1
4 TABLET, ORALLY DISINTEGRATING ORAL EVERY 8 HOURS PRN
Status: DISCONTINUED | OUTPATIENT
Start: 2024-07-08 | End: 2024-07-12 | Stop reason: HOSPADM

## 2024-07-08 RX ORDER — LANOLIN ALCOHOL/MO/W.PET/CERES
500 CREAM (GRAM) TOPICAL DAILY
Status: DISCONTINUED | OUTPATIENT
Start: 2024-07-08 | End: 2024-07-12 | Stop reason: HOSPADM

## 2024-07-08 RX ORDER — ASPIRIN 81 MG/1
81 TABLET ORAL DAILY
Status: DISCONTINUED | OUTPATIENT
Start: 2024-07-08 | End: 2024-07-12 | Stop reason: HOSPADM

## 2024-07-08 RX ORDER — HEPARIN SODIUM 1000 [USP'U]/ML
4000 INJECTION, SOLUTION INTRAVENOUS; SUBCUTANEOUS PRN
Status: DISCONTINUED | OUTPATIENT
Start: 2024-07-08 | End: 2024-07-10 | Stop reason: ALTCHOICE

## 2024-07-08 RX ORDER — MAGNESIUM SULFATE IN WATER 40 MG/ML
2000 INJECTION, SOLUTION INTRAVENOUS PRN
Status: DISCONTINUED | OUTPATIENT
Start: 2024-07-08 | End: 2024-07-08

## 2024-07-08 RX ORDER — SODIUM CHLORIDE 0.9 % (FLUSH) 0.9 %
5-40 SYRINGE (ML) INJECTION PRN
Status: DISCONTINUED | OUTPATIENT
Start: 2024-07-08 | End: 2024-07-12 | Stop reason: HOSPADM

## 2024-07-08 RX ORDER — ARFORMOTEROL TARTRATE 15 UG/2ML
15 SOLUTION RESPIRATORY (INHALATION)
Status: DISCONTINUED | OUTPATIENT
Start: 2024-07-08 | End: 2024-07-12 | Stop reason: HOSPADM

## 2024-07-08 RX ORDER — SODIUM CHLORIDE 9 MG/ML
INJECTION, SOLUTION INTRAVENOUS PRN
Status: DISCONTINUED | OUTPATIENT
Start: 2024-07-08 | End: 2024-07-12 | Stop reason: HOSPADM

## 2024-07-08 RX ORDER — POTASSIUM CHLORIDE 20 MEQ/1
40 TABLET, EXTENDED RELEASE ORAL PRN
Status: DISCONTINUED | OUTPATIENT
Start: 2024-07-08 | End: 2024-07-08

## 2024-07-08 RX ORDER — HEPARIN SODIUM 10000 [USP'U]/100ML
5-30 INJECTION, SOLUTION INTRAVENOUS CONTINUOUS
Status: DISCONTINUED | OUTPATIENT
Start: 2024-07-08 | End: 2024-07-10

## 2024-07-08 RX ORDER — NITROGLYCERIN 0.4 MG/1
0.4 TABLET SUBLINGUAL EVERY 5 MIN PRN
Status: DISCONTINUED | OUTPATIENT
Start: 2024-07-08 | End: 2024-07-12 | Stop reason: HOSPADM

## 2024-07-08 RX ORDER — PANTOPRAZOLE SODIUM 40 MG/1
40 TABLET, DELAYED RELEASE ORAL
Status: DISCONTINUED | OUTPATIENT
Start: 2024-07-09 | End: 2024-07-08

## 2024-07-08 RX ORDER — POLYETHYLENE GLYCOL 3350 17 G/17G
17 POWDER, FOR SOLUTION ORAL DAILY PRN
Status: DISCONTINUED | OUTPATIENT
Start: 2024-07-08 | End: 2024-07-12 | Stop reason: HOSPADM

## 2024-07-08 RX ORDER — LOSARTAN POTASSIUM 50 MG/1
100 TABLET ORAL DAILY
Status: DISCONTINUED | OUTPATIENT
Start: 2024-07-08 | End: 2024-07-12 | Stop reason: HOSPADM

## 2024-07-08 RX ORDER — ALBUTEROL SULFATE 2.5 MG/3ML
2.5 SOLUTION RESPIRATORY (INHALATION) EVERY 6 HOURS PRN
Status: DISCONTINUED | OUTPATIENT
Start: 2024-07-08 | End: 2024-07-12 | Stop reason: HOSPADM

## 2024-07-08 RX ORDER — BUDESONIDE 0.5 MG/2ML
0.5 INHALANT ORAL
Status: DISCONTINUED | OUTPATIENT
Start: 2024-07-08 | End: 2024-07-12 | Stop reason: HOSPADM

## 2024-07-08 RX ORDER — ONDANSETRON 2 MG/ML
4 INJECTION INTRAMUSCULAR; INTRAVENOUS EVERY 6 HOURS PRN
Status: DISCONTINUED | OUTPATIENT
Start: 2024-07-08 | End: 2024-07-12 | Stop reason: HOSPADM

## 2024-07-08 RX ORDER — MAGNESIUM HYDROXIDE/ALUMINUM HYDROXICE/SIMETHICONE 120; 1200; 1200 MG/30ML; MG/30ML; MG/30ML
30 SUSPENSION ORAL EVERY 6 HOURS PRN
Status: DISCONTINUED | OUTPATIENT
Start: 2024-07-08 | End: 2024-07-12 | Stop reason: HOSPADM

## 2024-07-08 RX ORDER — FUROSEMIDE 10 MG/ML
60 INJECTION INTRAMUSCULAR; INTRAVENOUS
Status: COMPLETED | OUTPATIENT
Start: 2024-07-08 | End: 2024-07-08

## 2024-07-08 RX ORDER — POTASSIUM CHLORIDE 7.45 MG/ML
10 INJECTION INTRAVENOUS PRN
Status: DISCONTINUED | OUTPATIENT
Start: 2024-07-08 | End: 2024-07-08

## 2024-07-08 RX ORDER — BUDESONIDE AND FORMOTEROL FUMARATE DIHYDRATE 160; 4.5 UG/1; UG/1
2 AEROSOL RESPIRATORY (INHALATION)
Status: DISCONTINUED | OUTPATIENT
Start: 2024-07-09 | End: 2024-07-08 | Stop reason: RX

## 2024-07-08 RX ORDER — ERGOCALCIFEROL 1.25 MG/1
50000 CAPSULE ORAL
Status: DISCONTINUED | OUTPATIENT
Start: 2024-07-08 | End: 2024-07-12 | Stop reason: HOSPADM

## 2024-07-08 RX ORDER — SODIUM CHLORIDE 0.9 % (FLUSH) 0.9 %
5-40 SYRINGE (ML) INJECTION EVERY 12 HOURS SCHEDULED
Status: DISCONTINUED | OUTPATIENT
Start: 2024-07-08 | End: 2024-07-12 | Stop reason: HOSPADM

## 2024-07-08 RX ORDER — HEPARIN SODIUM 1000 [USP'U]/ML
2000 INJECTION, SOLUTION INTRAVENOUS; SUBCUTANEOUS PRN
Status: DISCONTINUED | OUTPATIENT
Start: 2024-07-08 | End: 2024-07-10 | Stop reason: ALTCHOICE

## 2024-07-08 RX ORDER — GABAPENTIN 300 MG/1
300 CAPSULE ORAL 3 TIMES DAILY
Status: DISCONTINUED | OUTPATIENT
Start: 2024-07-08 | End: 2024-07-12 | Stop reason: HOSPADM

## 2024-07-08 RX ADMIN — ALBUTEROL SULFATE 2.5 MG: 2.5 SOLUTION RESPIRATORY (INHALATION) at 23:43

## 2024-07-08 RX ADMIN — FUROSEMIDE 60 MG: 10 INJECTION, SOLUTION INTRAMUSCULAR; INTRAVENOUS at 15:24

## 2024-07-08 RX ADMIN — HEPARIN SODIUM 4000 UNITS: 1000 INJECTION INTRAVENOUS; SUBCUTANEOUS at 21:51

## 2024-07-08 RX ADMIN — HEPARIN SODIUM 12 UNITS/KG/HR: 10000 INJECTION, SOLUTION INTRAVENOUS at 21:51

## 2024-07-08 RX ADMIN — GABAPENTIN 300 MG: 300 CAPSULE ORAL at 21:51

## 2024-07-08 RX ADMIN — SODIUM CHLORIDE, PRESERVATIVE FREE 5 ML: 5 INJECTION INTRAVENOUS at 21:51

## 2024-07-08 ASSESSMENT — ENCOUNTER SYMPTOMS
ABDOMINAL PAIN: 1
DIARRHEA: 0
NAUSEA: 0
COLOR CHANGE: 0
COUGH: 0
BLOATING: 1
BACK PAIN: 0
ABDOMINAL PAIN: 0
VOMITING: 0
SHORTNESS OF BREATH: 1
HEARTBURN: 0
ORTHOPNEA: 1
EYES NEGATIVE: 1
RHINORRHEA: 0

## 2024-07-08 NOTE — ACP (ADVANCE CARE PLANNING)
Advance Care Planning   Healthcare Decision Maker:    Primary Decision Maker: Ross Carrington - Child - [phone]    Primary Decision Maker: Sg Carrington - Child - [phone]    Click here to complete Healthcare Decision Makers including selection of the Healthcare Decision Maker Relationship (ie \"Primary\").

## 2024-07-08 NOTE — TELEPHONE ENCOUNTER
Please let the patient know to take Lasix 40 mg PO BID for he next two days to see if symptoms improve.    If symptoms worsen should go to the ER.

## 2024-07-08 NOTE — ED PROVIDER NOTES
Emergency Department Provider Note       PCP: Anita Hansen, APRN - NP   Age: 78 y.o.   Sex: female     DISPOSITION Decision To Admit 07/08/2024 03:18:10 PM       ICD-10-CM    1. Bradycardia  R00.1       2. Atrial fibrillation, unspecified type (HCC)  I48.91       3. Acute on chronic congestive heart failure, unspecified heart failure type (HCC)  I50.9       4. Acute on chronic renal insufficiency  N28.9     N18.9           Medical Decision Making     Patient has atrial fibrillation/flutter with bradycardia.    3:22 PM  No further need for any more atropine other than that given prehospital so far.  Pacer pads did not have to be applied as she stayed in the 40s and 50s.  She has some mild worsening of chronic renal insufficiency and clinically radiographically on laboratory W[place] have some mild CHF.  Cardiology consulted for admission.  May need pacemaker.  Stable in ED with bradycardia mainly in the 40s.  Blood pressure maintained     1 or more acute illnesses that pose a threat to life or bodily function.   1 or more chronic illnesses with a severe exacerbation or progression.  1 chronic illness with exacerbation.  Shared medical decision making was utilized in creating the patients health plan today.    I independently ordered and reviewed each unique test.  I reviewed external records: provider visit note from outside specialist.  I reviewed external records: previous EKG including cardiologist interpretation.    I reviewed external records: Previous echo    The patients assessment required an independent historian: EMS.  The reason they were needed is important historical information not provided by the patient.  I interpreted the X-rays cardiomegaly and vascular congestion versus early pulmonary edema.  EKG interpretation in absence of cardiology  EKG shows atrial fibrillation with bradycardic rate of 49, nonspecific ST-T changes, low voltage precordial leads, no STEMI  The patient was admitted and I  Take by mouth    COENZYME Q10 (CO Q 10) 10 MG CAPS    Take by mouth    CYANOCOBALAMIN 500 MCG TABLET    Take 1 tablet by mouth daily    DICLOFENAC SODIUM (VOLTAREN) 1 % GEL    Apply 2 g topically 4 times daily Patient takes AS NEEDED    ERGOCALCIFEROL (ERGOCALCIFEROL) 1.25 MG (11118 UT) CAPSULE    Take 1 capsule by mouth every 14 days    FUROSEMIDE (LASIX) 40 MG TABLET    Take 1 tablet by mouth daily    GABAPENTIN (NEURONTIN) 300 MG CAPSULE    Take 1 capsule by mouth 3 times daily for 360 days.    LOSARTAN (COZAAR) 100 MG TABLET    Take 1 tablet by mouth daily    METOPROLOL SUCCINATE (TOPROL XL) 25 MG EXTENDED RELEASE TABLET    Take 1 tablet by mouth daily    NITROGLYCERIN (NITROSTAT) 0.4 MG SL TABLET    Place 1 tablet under tongue for angina/chest pain.  May repeat every 5 minutes for a total of 3.  If no relief call 911.    PANTOPRAZOLE (PROTONIX) 40 MG TABLET    1 tablet p.o. 30 minutes prior to morning and evening meal.    ROSUVASTATIN (CRESTOR) 10 MG TABLET    Take 1 tablet by mouth daily    SERTRALINE (ZOLOFT) 50 MG TABLET    Take 1.5 tablets by mouth daily    SPIRONOLACTONE (ALDACTONE) 25 MG TABLET    Take 1 tablet by mouth daily        Results for orders placed or performed during the hospital encounter of 07/08/24   XR CHEST PORTABLE    Narrative    Chest X-ray    INDICATION: Shortness of breath    COMPARISON: Two-view chest, 3/21/2024    TECHNIQUE: AP/PA view of the chest was obtained.    FINDINGS: The lungs are clear. There are no infiltrates or effusions. There is  cardiomegaly. There is calcific vascular disease of the thoracic aorta..  The  bony thorax is intact.        Impression    No acute findings in the chest. Cardiomegaly.      Electronically signed by Gaurav Villegas   CBC with Auto Differential   Result Value Ref Range    WBC 5.6 4.3 - 11.1 K/uL    RBC 3.59 (L) 4.05 - 5.2 M/uL    Hemoglobin 9.0 (L) 11.7 - 15.4 g/dL    Hematocrit 30.7 (L) 35.8 - 46.3 %    MCV 85.5 82 - 102 FL    MCH 25.1 (L) 26.1

## 2024-07-08 NOTE — TELEPHONE ENCOUNTER
Spoke with pt and review  note. Pt v/u.     Pt stated she was having CP/SOB.     While speaking with pt she was having increase in SOB, and difficulty speaking.     Sharp pain on left shoulder, which radiated cross the chest.     CP: Heavy and pressure.   Rated: at 0-10, 5. ( While standing or walking pain increased to 8)    Pt was afraid to called 911, from past hx with  illness. Nurse offered to call EMS for her, pt agree.     Nurse stayed on the phone with pt until fire arrived.

## 2024-07-08 NOTE — CARE COORDINATION
Patient confirms POA, PCP and insurance.  CM to follow clinical course for discharge planning needs which may include  services   07/08/24 3173   Service Assessment   Patient Orientation Alert and Oriented   Cognition Alert   History Provided By Patient   Primary Caregiver Self   Accompanied By/Relationship sister   Support Systems Children;Family Members   Patient's Healthcare Decision Maker is: Legal Next of Kin   PCP Verified by CM Yes   Last Visit to PCP Within last 3 months   Prior Functional Level Assistance with the following:;Housework;Cooking   Current Functional Level Cooking;Housework;Assistance with the following:   Can patient return to prior living arrangement Yes   Ability to make needs known: Good   Family able to assist with home care needs: Yes   Would you like for me to discuss the discharge plan with any other family members/significant others, and if so, who? Yes   Financial Resources Medicare   Community Resources None   CM/SW Referral Other (see comment)   Social/Functional History   Lives With Son  (lives with son and daughter in law)   Type of Home Trailer  (describes home as a modular home)   Home Layout One level   Home Access Stairs to enter with rails   Entrance Stairs - Number of Steps 5   Bathroom Shower/Tub Walk-in shower;Shower chair without back   Bathroom Toilet Standard   Bathroom Equipment Shower chair;None   Bathroom Accessibility Accessible   Home Equipment Cane;Walker - Standard;Oxygen  (2L oxygen and CPAP at night supplied by [facility])   Receives Help From Family   ADL Assistance Independent   Homemaking Assistance Needs assistance   Active  Yes   Mode of Transportation Car   Occupation Retired   Discharge Planning   Type of Residence Trailer/Mobile Home   Living Arrangements Children   Current Services Prior To Admission C-pap;Durable Medical Equipment   Current DME Prior to Arrival Bipap;Cane;Walker   Potential Assistance Needed N/A   DME Ordered? No   Potential

## 2024-07-08 NOTE — TELEPHONE ENCOUNTER
Duration 3 days.     Sx:  SOB  Bilateral edema, ankle down (mostly left).   3lbs in 3 days.   Headache     BP:143/52    Pt stated her son and daughter cooked for her, she tries for a low salt diet. Pt stated she has had a low appetite for the past few days.     Meds:  Lasix 40mg   Eliquis 2.5mg BID  Cozaar 100mg   Metoprolol 25mg   Spironolactone 25mg   Crestor 10mg

## 2024-07-08 NOTE — ED NOTES
Patient states she has been gaining a pound a day for the past 3 weeks. States Hx of CHF. Takes Eliquis. Hx of A-fib.      Mark Harris, RN  07/08/24 2440

## 2024-07-08 NOTE — H&P
[facility] CARDIOLOGY History &Physical                 Primary Cardiologist: Dr Arthur    Primary Care Physician: Anita Hansen, BERNA - NP    Admitting Physician: Dr Lynn    Subjective:     Patient is a 78 y.o. female with a hx of asthma, HTN, GERD, carotid stenosis, mild CAD, PAF, elevated RVSP, moderate pulmonary HTN, chronic respiratory failure with use of BiPAP at night, and moderate MR.  The patient has been SOB with ADAME and orthopnea with use of her BiPAP for several weeks.  She has now has what is first described as chest pain but when she points its to her abd described as fullness that will not go away and increases when bending over or forward.  This can be recreated with palpation as well.  She states she has been complaint with her home medication regiment, fluid intake, and diet as well.  She has no other complaints at this time and is complaint with her home BiPAP as well.  The patient is noted to be in A Fib SVR compared to her normal NSR with HR between 56-90 by EKG and office notes.  Her Cr is elevated to 2.13 from an avg of 1.2-1.5, Mag 2.7, BUN 45, NT Pro BNP 8843, HS Trop 25  , normal albumin, Hgb 9.0, with abnormal WBC.      Recent Cardiac Synopsis w/ Labs  LHC/NST:  4/14/2023   Mild nonobstructive coronary disease    Mild pulmonary hypertension    Normal LV systolic function    2-3+ mitral regurgitation    Mynx vascular closure for right femoral arteriotomy site     Echo: 3/4/2024   Left Ventricle: Normal left ventricular systolic function with a visually estimated EF of 60 - 65%. Left ventricle size is normal. Normal wall thickness. Normal wall motion. Normal diastolic function.    Mitral Valve: Mild regurgitation with an eccentrically directed jet and may underestimate severity.    Tricuspid Valve: Moderately elevated RVSP, consistent with moderate pulmonary hypertension. The estimated RVSP is 56 mmHg.    Left Atrium: Left atrium is moderately dilated.    Image quality is good.  EKG: A   Priority: Low     PHYSICAL EXAM:    Vitals:    07/08/24 1458 07/08/24 1513 07/08/24 1524 07/08/24 1528   BP: 131/74 (!) 134/95 (!) 134/95    Pulse: (!) 45 (!) 39  50   Resp: 15 21  19   Temp:       TempSrc:       SpO2: 95% 96%  91%   Weight:       Height:           General: Well Developed, Well Nourished, No Acute Distress  HEENT: pupils equal and round, no abnormalities noted  Neck: supple, 10-12cm JVD at 60 deg, no carotid bruits  Heart: slow irreg irreg without murmurs or gallops  Lungs: Clear throughout auscultation bilaterally apically, decreased/faint bibasilar crackles  Abd: soft, mildly tender diffusely in upper quadrants, nondistended  Ext: No edema distally  Skin: warm and dry  Psychiatric: Normal mood and affect  Neurologic: Alert and oriented X 3, cranial nerves II thru XII grossly intact and symmetric      ASSESSMENT AND PLAN:  Active Hospital Problems    Bradycardia-mildly symptomatic.  Uncertain duration of A-fib.  Hold Eliquis, start heparin in case pacemaker is needed.  Hold all rate slowing meds and reassess tomorrow.  Follow telemetry, consider MING directed cardioversion +/- pacemaker implant in the next day or 2 pending results of cessation of rate slowing meds.      *Acute on chronic diastolic (congestive) heart failure (HCC)-clinically appears volume overloaded, BNP suggest volume overload as well.  Give Lasix 40 mg IV twice daily and reassess renal function and vital signs tomorrow.  Follow telemetry.  Replete electrolytes as needed.      Chronic renal disease, stage III (HCC) [180933]-careful with diuresis as noted above      QUINTIN treated with BiPAP-Per RT, consult RT for BiPAP overnight      Nocturnal hypoxemia-as above      Moderate mitral regurgitation--no significant regurgitation murmur on exam while in A-fib but check limited echo tomorrow for EF, MR severity and PA pressure/right-sided function.  Most recent echo showed pulmonary hypertension but normal size and function of the right

## 2024-07-08 NOTE — ED TRIAGE NOTES
Pt arrived via EMS from home. Pt cardiologist instructed pt to go to ER for SOB/CP that started yesterday. 1 Mg atropine given en route by EMS. HR 30s, Hx afib, chf, htn, lt sided facial droop for several months

## 2024-07-08 NOTE — ED NOTES
TRANSFER - OUT REPORT:    Verbal report given to Davina KHAN on Zeynep Carrington  being transferred to [facility] for routine progression of patient care       Report consisted of patient's Situation, Background, Assessment and   Recommendations(SBAR).     Information from the following report(s) ED Encounter Summary, ED SBAR, MAR, Recent Results, and Cardiac Rhythm Atrial Fibrilation  was reviewed with the receiving nurse.    [place] Fall Assessment:    Presents to emergency department  because of falls (Syncope, seizure, or loss of consciousness): No  Age > 70: Yes  Altered Mental Status, Intoxication with alcohol or substance confusion (Disorientation, impaired judgment, poor safety awaremess, or inability to follow instructions): No  Impaired Mobility: Ambulates or transfers with assistive devices or assistance; Unable to ambulate or transer.: No             Lines:   Peripheral IV 07/08/24 Right Antecubital (Active)   Site Assessment Clean, dry & intact 07/08/24 1335   Line Status Blood return noted 07/08/24 1335   Phlebitis Assessment No symptoms 07/08/24 1335   Infiltration Assessment 0 07/08/24 1335       Peripheral IV 07/08/24 Right Forearm (Active)   Site Assessment Clean, dry & intact 07/08/24 1335   Line Status No blood return;Flushed 07/08/24 1335   Line Care Cap changed 07/08/24 1335   Phlebitis Assessment No symptoms 07/08/24 1335        Opportunity for questions and clarification was provided.      Patient transported with:  Monitor, O2 @ 2lpm, and Registered Nurse          Mark Harris RN  07/08/24 7268

## 2024-07-08 NOTE — PROGRESS NOTES
TRANSFER - IN REPORT:    Verbal report received from Suresh RN on Zeynep Carrington  being received from ED [facility] for routine progression of patient care      Report consisted of patient's Situation, Background, Assessment and   Recommendations(SBAR).     Information from the following report(s) ED SBAR, Adult Overview, MAR, and Cardiac Rhythm Afib/bradycardia  was reviewed with the receiving nurse.    Opportunity for questions and clarification was provided.

## 2024-07-09 ENCOUNTER — APPOINTMENT (OUTPATIENT)
Dept: NON INVASIVE DIAGNOSTICS | Age: 79
DRG: 291 | End: 2024-07-09
Payer: MEDICARE

## 2024-07-09 ENCOUNTER — APPOINTMENT (OUTPATIENT)
Dept: CARDIAC CATH/INVASIVE PROCEDURES | Age: 79
DRG: 291 | End: 2024-07-09
Attending: INTERNAL MEDICINE
Payer: MEDICARE

## 2024-07-09 PROBLEM — I48.91 ATRIAL FIBRILLATION (HCC): Status: ACTIVE | Noted: 2021-10-25

## 2024-07-09 LAB
ANION GAP SERPL CALC-SCNC: 12 MMOL/L (ref 9–18)
APTT PPP: 44.2 SEC (ref 23.3–37.4)
APTT PPP: 83.1 SEC (ref 23.3–37.4)
BUN SERPL-MCNC: 46 MG/DL (ref 8–23)
CALCIUM SERPL-MCNC: 9 MG/DL (ref 8.8–10.2)
CHLORIDE SERPL-SCNC: 103 MMOL/L (ref 98–107)
CO2 SERPL-SCNC: 25 MMOL/L (ref 20–28)
CREAT SERPL-MCNC: 1.96 MG/DL (ref 0.6–1.1)
ECHO AR MAX VEL PISA: 3.1 M/S
ECHO AV MEAN GRADIENT: 5 MMHG
ECHO AV MEAN VELOCITY: 1.1 M/S
ECHO AV PEAK GRADIENT: 10 MMHG
ECHO AV PEAK VELOCITY: 1.6 M/S
ECHO AV REGURGITANT PHT: 929 MS
ECHO AV VELOCITY RATIO: 0.69
ECHO AV VTI: 39.8 CM
ECHO BSA: 1.81 M2
ECHO BSA: 1.81 M2
ECHO EST RA PRESSURE: 8 MMHG
ECHO IVC PROX: 2.3 CM
ECHO LA AREA 4C: 24.2 CM2
ECHO LA DIAMETER INDEX: 2.18 CM/M2
ECHO LA DIAMETER: 3.8 CM
ECHO LA MAJOR AXIS: 6.5 CM
ECHO LA VOL MOD A4C: 74 ML (ref 22–52)
ECHO LA VOLUME INDEX MOD A4C: 43 ML/M2 (ref 16–34)
ECHO LV EDV A2C: 67 ML
ECHO LV EDV A4C: 72 ML
ECHO LV EDV INDEX A4C: 41 ML/M2
ECHO LV EDV NDEX A2C: 39 ML/M2
ECHO LV EJECTION FRACTION A2C: 63 %
ECHO LV EJECTION FRACTION A4C: 63 %
ECHO LV EJECTION FRACTION BIPLANE: 63 % (ref 55–100)
ECHO LV ESV A2C: 25 ML
ECHO LV ESV A4C: 27 ML
ECHO LV ESV INDEX A2C: 14 ML/M2
ECHO LV ESV INDEX A4C: 16 ML/M2
ECHO LV FRACTIONAL SHORTENING: 38 % (ref 28–44)
ECHO LV INTERNAL DIMENSION DIASTOLE INDEX: 2.76 CM/M2
ECHO LV INTERNAL DIMENSION DIASTOLIC: 4.8 CM (ref 3.9–5.3)
ECHO LV INTERNAL DIMENSION SYSTOLIC INDEX: 1.72 CM/M2
ECHO LV INTERNAL DIMENSION SYSTOLIC: 3 CM
ECHO LV IVSD: 0.9 CM (ref 0.6–0.9)
ECHO LV MASS 2D: 137.1 G (ref 67–162)
ECHO LV MASS INDEX 2D: 78.8 G/M2 (ref 43–95)
ECHO LV POSTERIOR WALL DIASTOLIC: 0.8 CM (ref 0.6–0.9)
ECHO LV RELATIVE WALL THICKNESS RATIO: 0.33
ECHO LVOT AV VTI INDEX: 0.66
ECHO LVOT MEAN GRADIENT: 2 MMHG
ECHO LVOT PEAK GRADIENT: 5 MMHG
ECHO LVOT PEAK VELOCITY: 1.1 M/S
ECHO LVOT VTI: 26.2 CM
ECHO MV LVOT VTI INDEX: 2
ECHO MV MAX VELOCITY: 2.1 M/S
ECHO MV MEAN GRADIENT: 5 MMHG
ECHO MV MEAN VELOCITY: 1 M/S
ECHO MV PEAK GRADIENT: 17 MMHG
ECHO MV REGURGITANT ALIASING (NYQUIST) VELOCITY: 31 CM/S
ECHO MV REGURGITANT PEAK GRADIENT: 100 MMHG
ECHO MV REGURGITANT PEAK VELOCITY: 5 M/S
ECHO MV REGURGITANT RADIUS PISA: 0.8 CM
ECHO MV REGURGITANT VTIA: 191 CM
ECHO MV VTI: 52.5 CM
ECHO PV ACCELERATION TIME (AT): 70 MS
ECHO PV MAX VELOCITY: 1 M/S
ECHO PV PEAK GRADIENT: 4 MMHG
ECHO RIGHT VENTRICULAR SYSTOLIC PRESSURE (RVSP): 58 MMHG
ECHO RV INTERNAL DIMENSION: 2.5 CM
ECHO TV REGURGITANT MAX VELOCITY: 3.55 M/S
ECHO TV REGURGITANT PEAK GRADIENT: 50 MMHG
EKG ATRIAL RATE: 54 BPM
EKG DIAGNOSIS: NORMAL
EKG P AXIS: 67 DEGREES
EKG P-R INTERVAL: 298 MS
EKG Q-T INTERVAL: 480 MS
EKG QRS DURATION: 86 MS
EKG QTC CALCULATION (BAZETT): 455 MS
EKG R AXIS: -41 DEGREES
EKG T AXIS: 38 DEGREES
EKG VENTRICULAR RATE: 54 BPM
ERYTHROCYTE [DISTWIDTH] IN BLOOD BY AUTOMATED COUNT: 16.1 % (ref 11.9–14.6)
FERRITIN SERPL-MCNC: 41 NG/ML (ref 8–388)
FOLATE SERPL-MCNC: 13.5 NG/ML (ref 3.1–17.5)
GLUCOSE SERPL-MCNC: 134 MG/DL (ref 70–99)
HCT VFR BLD AUTO: 28.1 % (ref 35.8–46.3)
HGB BLD-MCNC: 8.2 G/DL (ref 11.7–15.4)
IRON SATN MFR SERPL: 11 % (ref 20–50)
IRON SERPL-MCNC: 43 UG/DL (ref 35–100)
MAGNESIUM SERPL-MCNC: 2.4 MG/DL (ref 1.8–2.4)
MCH RBC QN AUTO: 24.8 PG (ref 26.1–32.9)
MCHC RBC AUTO-ENTMCNC: 29.2 G/DL (ref 31.4–35)
MCV RBC AUTO: 84.9 FL (ref 82–102)
NRBC # BLD: 0 K/UL (ref 0–0.2)
PLATELET # BLD AUTO: 246 K/UL (ref 150–450)
PMV BLD AUTO: 10.6 FL (ref 9.4–12.3)
POTASSIUM SERPL-SCNC: 4.4 MMOL/L (ref 3.5–5.1)
RBC # BLD AUTO: 3.31 M/UL (ref 4.05–5.2)
SODIUM SERPL-SCNC: 140 MMOL/L (ref 136–145)
TIBC SERPL-MCNC: 408 UG/DL (ref 240–450)
UFH PPP CHRO-ACNC: >1.1 IU/ML (ref 0.3–0.7)
UIBC SERPL-MCNC: 365 UG/DL (ref 112–347)
VIT B12 SERPL-MCNC: >4000 PG/ML (ref 193–986)
WBC # BLD AUTO: 7.7 K/UL (ref 4.3–11.1)

## 2024-07-09 PROCEDURE — 82607 VITAMIN B-12: CPT

## 2024-07-09 PROCEDURE — 94760 N-INVAS EAR/PLS OXIMETRY 1: CPT

## 2024-07-09 PROCEDURE — 2700000000 HC OXYGEN THERAPY PER DAY

## 2024-07-09 PROCEDURE — 6360000002 HC RX W HCPCS: Performed by: NURSE PRACTITIONER

## 2024-07-09 PROCEDURE — 93010 ELECTROCARDIOGRAM REPORT: CPT | Performed by: INTERNAL MEDICINE

## 2024-07-09 PROCEDURE — 93319 3D ECHO IMG CGEN CAR ANOMAL: CPT | Performed by: INTERNAL MEDICINE

## 2024-07-09 PROCEDURE — 6370000000 HC RX 637 (ALT 250 FOR IP): Performed by: INTERNAL MEDICINE

## 2024-07-09 PROCEDURE — 82728 ASSAY OF FERRITIN: CPT

## 2024-07-09 PROCEDURE — 6360000002 HC RX W HCPCS: Performed by: INTERNAL MEDICINE

## 2024-07-09 PROCEDURE — 82746 ASSAY OF FOLIC ACID SERUM: CPT

## 2024-07-09 PROCEDURE — 6370000000 HC RX 637 (ALT 250 FOR IP): Performed by: NURSE PRACTITIONER

## 2024-07-09 PROCEDURE — 85027 COMPLETE CBC AUTOMATED: CPT

## 2024-07-09 PROCEDURE — 99152 MOD SED SAME PHYS/QHP 5/>YRS: CPT | Performed by: INTERNAL MEDICINE

## 2024-07-09 PROCEDURE — 94640 AIRWAY INHALATION TREATMENT: CPT

## 2024-07-09 PROCEDURE — 36415 COLL VENOUS BLD VENIPUNCTURE: CPT

## 2024-07-09 PROCEDURE — 93308 TTE F-UP OR LMTD: CPT | Performed by: INTERNAL MEDICINE

## 2024-07-09 PROCEDURE — 83735 ASSAY OF MAGNESIUM: CPT

## 2024-07-09 PROCEDURE — 83540 ASSAY OF IRON: CPT

## 2024-07-09 PROCEDURE — 93321 DOPPLER ECHO F-UP/LMTD STD: CPT

## 2024-07-09 PROCEDURE — 85520 HEPARIN ASSAY: CPT

## 2024-07-09 PROCEDURE — 93312 ECHO TRANSESOPHAGEAL: CPT | Performed by: INTERNAL MEDICINE

## 2024-07-09 PROCEDURE — 96376 TX/PRO/DX INJ SAME DRUG ADON: CPT

## 2024-07-09 PROCEDURE — 93321 DOPPLER ECHO F-UP/LMTD STD: CPT | Performed by: INTERNAL MEDICINE

## 2024-07-09 PROCEDURE — 92960 CARDIOVERSION ELECTRIC EXT: CPT | Performed by: INTERNAL MEDICINE

## 2024-07-09 PROCEDURE — 2580000003 HC RX 258: Performed by: NURSE PRACTITIONER

## 2024-07-09 PROCEDURE — G0378 HOSPITAL OBSERVATION PER HR: HCPCS

## 2024-07-09 PROCEDURE — 93325 DOPPLER ECHO COLOR FLOW MAPG: CPT

## 2024-07-09 PROCEDURE — 85730 THROMBOPLASTIN TIME PARTIAL: CPT

## 2024-07-09 PROCEDURE — 94660 CPAP INITIATION&MGMT: CPT

## 2024-07-09 PROCEDURE — 93005 ELECTROCARDIOGRAM TRACING: CPT | Performed by: INTERNAL MEDICINE

## 2024-07-09 PROCEDURE — 93325 DOPPLER ECHO COLOR FLOW MAPG: CPT | Performed by: INTERNAL MEDICINE

## 2024-07-09 PROCEDURE — 99152 MOD SED SAME PHYS/QHP 5/>YRS: CPT

## 2024-07-09 PROCEDURE — 93320 DOPPLER ECHO COMPLETE: CPT

## 2024-07-09 PROCEDURE — 99232 SBSQ HOSP IP/OBS MODERATE 35: CPT | Performed by: INTERNAL MEDICINE

## 2024-07-09 PROCEDURE — 93320 DOPPLER ECHO COMPLETE: CPT | Performed by: INTERNAL MEDICINE

## 2024-07-09 PROCEDURE — 80048 BASIC METABOLIC PNL TOTAL CA: CPT

## 2024-07-09 PROCEDURE — 83550 IRON BINDING TEST: CPT

## 2024-07-09 RX ORDER — FUROSEMIDE 10 MG/ML
40 INJECTION INTRAMUSCULAR; INTRAVENOUS
Status: COMPLETED | OUTPATIENT
Start: 2024-07-09 | End: 2024-07-09

## 2024-07-09 RX ORDER — MIDAZOLAM HYDROCHLORIDE 1 MG/ML
INJECTION INTRAMUSCULAR; INTRAVENOUS PRN
Status: COMPLETED | OUTPATIENT
Start: 2024-07-09 | End: 2024-07-09

## 2024-07-09 RX ORDER — FENTANYL CITRATE 50 UG/ML
INJECTION, SOLUTION INTRAMUSCULAR; INTRAVENOUS PRN
Status: COMPLETED | OUTPATIENT
Start: 2024-07-09 | End: 2024-07-09

## 2024-07-09 RX ORDER — LIDOCAINE HYDROCHLORIDE 20 MG/ML
SOLUTION OROPHARYNGEAL PRN
Status: COMPLETED | OUTPATIENT
Start: 2024-07-09 | End: 2024-07-09

## 2024-07-09 RX ADMIN — ARFORMOTEROL TARTRATE 15 MCG: 15 SOLUTION RESPIRATORY (INHALATION) at 19:27

## 2024-07-09 RX ADMIN — ROSUVASTATIN CALCIUM 10 MG: 10 TABLET, FILM COATED ORAL at 09:49

## 2024-07-09 RX ADMIN — GABAPENTIN 300 MG: 300 CAPSULE ORAL at 09:48

## 2024-07-09 RX ADMIN — CYANOCOBALAMIN TAB 1000 MCG 500 MCG: 1000 TAB at 09:48

## 2024-07-09 RX ADMIN — SODIUM CHLORIDE, PRESERVATIVE FREE 10 ML: 5 INJECTION INTRAVENOUS at 09:55

## 2024-07-09 RX ADMIN — GABAPENTIN 300 MG: 300 CAPSULE ORAL at 20:33

## 2024-07-09 RX ADMIN — LOSARTAN POTASSIUM 100 MG: 50 TABLET, FILM COATED ORAL at 09:48

## 2024-07-09 RX ADMIN — ASPIRIN 81 MG: 81 TABLET, COATED ORAL at 09:48

## 2024-07-09 RX ADMIN — ARFORMOTEROL TARTRATE 15 MCG: 15 SOLUTION RESPIRATORY (INHALATION) at 07:45

## 2024-07-09 RX ADMIN — ACETAMINOPHEN 650 MG: 325 TABLET ORAL at 09:48

## 2024-07-09 RX ADMIN — PANTOPRAZOLE SODIUM 40 MG: 40 TABLET, DELAYED RELEASE ORAL at 16:38

## 2024-07-09 RX ADMIN — FENTANYL CITRATE 50 MCG: 50 INJECTION, SOLUTION INTRAMUSCULAR; INTRAVENOUS at 14:38

## 2024-07-09 RX ADMIN — LIDOCAINE HYDROCHLORIDE 15 ML: 20 SOLUTION ORAL at 14:11

## 2024-07-09 RX ADMIN — BUDESONIDE 500 MCG: 0.5 INHALANT RESPIRATORY (INHALATION) at 19:27

## 2024-07-09 RX ADMIN — MIDAZOLAM 2 MG: 1 INJECTION INTRAMUSCULAR; INTRAVENOUS at 14:38

## 2024-07-09 RX ADMIN — FUROSEMIDE 40 MG: 10 INJECTION, SOLUTION INTRAMUSCULAR; INTRAVENOUS at 11:27

## 2024-07-09 RX ADMIN — PANTOPRAZOLE SODIUM 40 MG: 40 TABLET, DELAYED RELEASE ORAL at 06:14

## 2024-07-09 RX ADMIN — SERTRALINE 75 MG: 50 TABLET, FILM COATED ORAL at 09:48

## 2024-07-09 RX ADMIN — SPIRONOLACTONE 25 MG: 25 TABLET ORAL at 09:48

## 2024-07-09 RX ADMIN — DICLOFENAC SODIUM 2 G: 10 GEL TOPICAL at 16:41

## 2024-07-09 RX ADMIN — BUDESONIDE 500 MCG: 0.5 INHALANT RESPIRATORY (INHALATION) at 07:45

## 2024-07-09 RX ADMIN — GABAPENTIN 300 MG: 300 CAPSULE ORAL at 16:38

## 2024-07-09 RX ADMIN — DICLOFENAC SODIUM 2 G: 10 GEL TOPICAL at 09:57

## 2024-07-09 RX ADMIN — SODIUM CHLORIDE, PRESERVATIVE FREE 10 ML: 5 INJECTION INTRAVENOUS at 20:33

## 2024-07-09 RX ADMIN — HEPARIN SODIUM 4000 UNITS: 1000 INJECTION INTRAVENOUS; SUBCUTANEOUS at 16:30

## 2024-07-09 ASSESSMENT — PAIN DESCRIPTION - ORIENTATION: ORIENTATION: ANTERIOR

## 2024-07-09 ASSESSMENT — PAIN SCALES - GENERAL
PAINLEVEL_OUTOF10: 0
PAINLEVEL_OUTOF10: 3

## 2024-07-09 ASSESSMENT — PAIN DESCRIPTION - DESCRIPTORS: DESCRIPTORS: PRESSURE

## 2024-07-09 ASSESSMENT — PAIN - FUNCTIONAL ASSESSMENT: PAIN_FUNCTIONAL_ASSESSMENT: PREVENTS OR INTERFERES SOME ACTIVE ACTIVITIES AND ADLS

## 2024-07-09 ASSESSMENT — PAIN DESCRIPTION - LOCATION: LOCATION: HEAD

## 2024-07-09 NOTE — PROGRESS NOTES
Pt briefly dropped to HR 36. Currently sustaining 40s and 50s in shakir Afib. NP Carmen Rivero aware.

## 2024-07-09 NOTE — PROGRESS NOTES
4 Eyes Skin Assessment     NAME:  Zeynep Carrington  YOB: 1945  MEDICAL RECORD NUMBER:  [id number]    The patient is being assessed for  Admission    I agree that at least one RN has performed a thorough Head to Toe Skin Assessment on the patient. ALL assessment sites listed below have been assessed.      Areas assessed by both nurses:    Head, Face, Ears, Shoulders, Back, Chest, Arms, Elbows, Hands, Sacrum. Buttock, Coccyx, Ischium, and Legs. Feet and Heels    Sacrum and heels are clean, dry, intact, and free of redness. BLE vascular discoloration, scattered scars and bruises. No skin breakdown noted.        Does the Patient have a Wound? No noted wound(s)       James Prevention initiated by RN: No  Wound Care Orders initiated by RN: No    Pressure Injury (Stage 3,4, Unstageable, DTI, NWPT, and Complex wounds) if present, place Wound referral order by RN under : No    New Ostomies, if present place, Ostomy referral order under : No     Nurse 1 eSignature: Electronically signed by YA MELENDEZ RN on 7/8/24 at 9:30 PM EDT    **SHARE this note so that the co-signing nurse can place an eSignature**    Nurse 2 eSignature: Electronically signed by Lisa Shaikh RN on 7/8/24 at 10:33 PM EDT

## 2024-07-09 NOTE — PROGRESS NOTES
[facility] CARDIOLOGY PROGRESS NOTE           7/9/2024 10:44 AM    Admit Date: 7/8/2024      Subjective:   Patient remains weak and fatigued.  Remains on O2 but satting 97%.  She is in slow atrial flutter.    ROS:  Cardiovascular:  As noted above    Objective:      Vitals:    07/09/24 0456 07/09/24 0742 07/09/24 0747 07/09/24 0948   BP: (!) 116/51 (!) 109/43  (!) 118/51   Pulse: (!) 42 50     Resp: 18 16     Temp: 98.1 °F (36.7 °C) 98.2 °F (36.8 °C)     TempSrc:  Oral     SpO2: 93% 97% 97%    Weight:       Height:           Physical Exam:  General-No Acute Distress  Neck- supple, no JVD  CV-irregular bradycardic  Lung-diminished bilaterally  Abd- soft, nontender, nondistended  Ext- no edema bilaterally.  Skin- warm and dry    Data Review:   Recent Labs     07/08/24  1331 07/08/24  2141 07/09/24  0354     --  140   K 5.0  --  4.4   MG 2.7*  --  2.4   BUN 45*  --  46*   WBC 5.6  --  7.7   HGB 9.0*  --  8.2*   HCT 30.7*  --  28.1*     --  246   INR  --  1.2  --        Assessment/Plan:     Principal Problem:    Acute on chronic diastolic (congestive) heart failure (HCC)  Plan: Patient remains on oxygen.  Will repeat IV Lasix x 1 today.  Echocardiogram is pending.  Active Problems:    Bradycardia  Plan: Patient appears to be in slow atypical atrial flutter.  May require pacemaker implantation.    Chronic renal disease, stage III ([facility]) [933695]  Plan: Renal function slightly improved    QUINTIN treated with BiPAP  Plan: Chronic    Mixed hyperlipidemia  Plan: Chronic    Nocturnal hypoxemia  Plan: Chronic    Moderate mitral regurgitation  Plan: Plan MING today    Paroxysmal atrial fibrillation (HCC)  Plan: Plan MING guided cardioversion today.  Hopefully patient be less bradycardic in sinus rhythm.  Currently anticoagulated with heparin.  Pending findings of cardiac catheterization may convert to oral anticoagulation.    Essential hypertension  Plan: Mildly hypotensive and monitor for

## 2024-07-09 NOTE — PROGRESS NOTES
Received a call from Bela in pharmacy asking to verify pt's home dosage of eliquis since pt is on heparin. Notified pharm that pt takes 2.5mg PO twice daily and pt is running at 9 units/kg/hr.     Due to home eliquis dosage, pharm switched to aPTTs vs Xa's. Retimed draw to aPTT @6647.

## 2024-07-09 NOTE — CONSULTS
[facility] Hospitalist Consult   Admit Date:  2024  1:24 PM   Name:  Zeynep Carrington   Age:  78 y.o.  Sex:  female  :  1945   MRN:  [id number]   [place]  [facility] [place] [facility] [place]    Presenting/Chief Complaint: Shortness of Breath    Reason(s) for Admission: Bradycardia [R00.1]  Acute on chronic renal insufficiency [N28.9, N18.9]  Acute on chronic diastolic (congestive) heart failure (HCC) [I50.33]  Atrial fibrillation, unspecified type (HCC) [I48.91]  Acute on chronic congestive heart failure, unspecified heart failure type (HCC) [I50.9]     Hospitalists consulted by Dwaine Lynn, * for: anemia    History of Presenting Illness:   Zeynep Carrington is a 78 y.o. female with history of hypertension, carotid stenosis, CAD, paroxysmal A-fib, pulmonary hypertension, chronic respiratory failure with use of BiPAP at night, asthma, GERD admitted by cardiology for acute on chronic diastolic heart failure and A-fib.  Started on IV diuresis.  Cardiology planning for MING guided cardioversion.  Hospitalist consulted for anemia.    Patient seen and examined at the bedside.  Reports overall feeling better.  Denies any known history of anemia.  On chart review patient noted with anemia since 2024 with hemoglobin ~9, which patient is unaware of.  She is on Eliquis.  Patient reports she had rectal bleeding few months ago, described as red blood mixed in the stool, denies any melena.  Her PCP at that time referred her to GI and patient is scheduled for colonoscopy in August. Last colonoscopy in , history of colon polyp tubulovillous adenoma.  Denies any further rectal bleeding.  Denies melena, hematemesis or hematuria.  Denies chronic use of NSAIDs.  Reports reflux symptoms and taking PPI.  Reports balanced diet including meat and vegetables.    Assessment & Plan:     Normocytic hypochromic anemia:  Baseline hemoglobin since 2024 ~9-10.  Patient had an episode of rectal bleeding in April and was referred to GI and  2.4 mg/dL   Echo (TTE) limited (PRN contrast/bubble/strain/3D)    Collection Time: 07/09/24 10:30 AM   Result Value Ref Range    LV EDV A2C 67 mL    LV EDV A4C 72 mL    LV ESV A2C 25 mL    LV ESV A4C 27 mL    IVSd 0.9 0.6 - 0.9 cm    LVIDd 4.8 3.9 - 5.3 cm    LVIDs 3.0 cm    LVOT Mean Gradient 2 mmHg    LVOT VTI 26.2 cm    LVOT Peak Velocity 1.1 m/s    LVOT Peak Gradient 5 mmHg    LVPWd 0.8 0.6 - 0.9 cm    LV Ejection Fraction A2C 63 %    LV Ejection Fraction A4C 63 %    EF BP 63 55 - 100 %    LA Major [place] 6.5 cm    LA Area 4C 24.2 cm2    LA Volume MOD A4C 74 (A) 22 - 52 mL    LA Diameter 3.8 cm    AR .0 ms    AR Max Velocity PISA 3.1 m/s    AV Peak Velocity 1.6 m/s    AV Peak Gradient 10 mmHg    AV Mean Velocity 1.1 m/s    AV Mean Gradient 5 mmHg    AV VTI 39.8 cm    IVC Proxmal 2.3 cm    MV Nyquist Velocity 31 cm/s    MR Radius PISA 0.80 cm    MR .0 cm    MV Mean Gradient 5 mmHg    MV VTI 52.5 cm    MV Mean Velocity 1.0 m/s    MR Peak Velocity 5.0 m/s    MR Peak Gradient 100 mmHg    MV Max Velocity 2.1 m/s    MV Peak Gradient 17 mmHg    PV AT 70.0 ms    PV Max Velocity 1.0 m/s    PV Peak Gradient 4 mmHg    Est. RA Pressure 8 mmHg    RVIDd 2.5 cm    TR Max Velocity 3.55 m/s    TR Peak Gradient 50 mmHg    Body Surface Area 1.81 m2    Fractional Shortening 2D 38 28 - 44 %    LV ESV Index A4C 16 mL/m2    LV EDV Index A4C 41 mL/m2    LV ESV Index A2C 14 mL/m2    LV EDV Index A2C 39 mL/m2    LVIDd Index 2.76 cm/m2    LVIDs Index 1.72 cm/m2    LV RWT Ratio 0.33     LV Mass 2D 137.1 67 - 162 g    LV Mass 2D Index 78.8 43 - 95 g/m2    LA Volume Index MOD A4C 43 (A) 16 - 34 ml/m2    LA Size Index 2.18 cm/m2    AV Velocity Ratio 0.69     LVOT:AV VTI Index 0.66     MV:LVOT VTI Index 2.00     RVSP 58 mmHg       No results for input(s): \"COVID19\" in the last 72 hours.    I have personally reviewed imaging studies showing:  Echo (TTE) limited (PRN contrast/bubble/strain/3D)    Result Date: 7/9/2024    Limited

## 2024-07-09 NOTE — PROGRESS NOTES
TRANSFER - OUT REPORT:  MING/CVN  Viscous lidocaine @ 1411  Versed 2mg  Fentanyl 50mcg  Shocked x1 @ 360J to sinus rhythm    Verbal report given to RN(name) on Zeynep Carrington being transferred to tele(unit) for routine progression of patient care       Report consisted of patient's Situation, Background, Assessment and   Recommendations(SBAR).     Information from the following report(s) Nurse Handoff Report was reviewed with the receiving nurse.    Opportunity for questions and clarification was provided.      Patient transported with:   Tech

## 2024-07-09 NOTE — PLAN OF CARE
Problem: Discharge Planning  Goal: Discharge to home or other facility with appropriate resources  Outcome: Progressing  Flowsheets (Taken 7/8/2024 2039)  Discharge to home or other facility with appropriate resources:   Identify barriers to discharge with patient and caregiver   Arrange for needed discharge resources and transportation as appropriate   Identify discharge learning needs (meds, wound care, etc)     Problem: Safety - Adult  Goal: Free from fall injury  Outcome: Progressing  Flowsheets (Taken 7/8/2024 2039)  Free From Fall Injury: Instruct family/caregiver on patient safety     Problem: ABCDS Injury Assessment  Goal: Absence of physical injury  Outcome: Progressing  Flowsheets (Taken 7/8/2024 2039)  Absence of Physical Injury: Implement safety measures based on patient assessment

## 2024-07-10 LAB
ANION GAP SERPL CALC-SCNC: 12 MMOL/L (ref 9–18)
APTT PPP: 116.8 SEC (ref 23.3–37.4)
APTT PPP: 32.5 SEC (ref 23.3–37.4)
APTT PPP: 71.4 SEC (ref 23.3–37.4)
BUN SERPL-MCNC: 36 MG/DL (ref 8–23)
CALCIUM SERPL-MCNC: 8.7 MG/DL (ref 8.8–10.2)
CHLORIDE SERPL-SCNC: 103 MMOL/L (ref 98–107)
CO2 SERPL-SCNC: 26 MMOL/L (ref 20–28)
CREAT SERPL-MCNC: 1.76 MG/DL (ref 0.6–1.1)
ERYTHROCYTE [DISTWIDTH] IN BLOOD BY AUTOMATED COUNT: 16.1 % (ref 11.9–14.6)
GLUCOSE SERPL-MCNC: 105 MG/DL (ref 70–99)
HCT VFR BLD AUTO: 28.6 % (ref 35.8–46.3)
HGB BLD-MCNC: 8.5 G/DL (ref 11.7–15.4)
MAGNESIUM SERPL-MCNC: 2.3 MG/DL (ref 1.8–2.4)
MCH RBC QN AUTO: 25.6 PG (ref 26.1–32.9)
MCHC RBC AUTO-ENTMCNC: 29.7 G/DL (ref 31.4–35)
MCV RBC AUTO: 86.1 FL (ref 82–102)
NRBC # BLD: 0 K/UL (ref 0–0.2)
PLATELET # BLD AUTO: 234 K/UL (ref 150–450)
PMV BLD AUTO: 11.9 FL (ref 9.4–12.3)
POTASSIUM SERPL-SCNC: 4.5 MMOL/L (ref 3.5–5.1)
RBC # BLD AUTO: 3.32 M/UL (ref 4.05–5.2)
SODIUM SERPL-SCNC: 140 MMOL/L (ref 136–145)
WBC # BLD AUTO: 6.5 K/UL (ref 4.3–11.1)

## 2024-07-10 PROCEDURE — 85027 COMPLETE CBC AUTOMATED: CPT

## 2024-07-10 PROCEDURE — 80048 BASIC METABOLIC PNL TOTAL CA: CPT

## 2024-07-10 PROCEDURE — 6360000002 HC RX W HCPCS: Performed by: INTERNAL MEDICINE

## 2024-07-10 PROCEDURE — 85730 THROMBOPLASTIN TIME PARTIAL: CPT

## 2024-07-10 PROCEDURE — 83735 ASSAY OF MAGNESIUM: CPT

## 2024-07-10 PROCEDURE — 99232 SBSQ HOSP IP/OBS MODERATE 35: CPT | Performed by: INTERNAL MEDICINE

## 2024-07-10 PROCEDURE — 94660 CPAP INITIATION&MGMT: CPT

## 2024-07-10 PROCEDURE — 6360000002 HC RX W HCPCS: Performed by: NURSE PRACTITIONER

## 2024-07-10 PROCEDURE — 93005 ELECTROCARDIOGRAM TRACING: CPT | Performed by: NURSE PRACTITIONER

## 2024-07-10 PROCEDURE — 94760 N-INVAS EAR/PLS OXIMETRY 1: CPT

## 2024-07-10 PROCEDURE — 96367 TX/PROPH/DG ADDL SEQ IV INF: CPT

## 2024-07-10 PROCEDURE — 2700000000 HC OXYGEN THERAPY PER DAY

## 2024-07-10 PROCEDURE — 6370000000 HC RX 637 (ALT 250 FOR IP): Performed by: INTERNAL MEDICINE

## 2024-07-10 PROCEDURE — 96366 THER/PROPH/DIAG IV INF ADDON: CPT

## 2024-07-10 PROCEDURE — 2580000003 HC RX 258: Performed by: NURSE PRACTITIONER

## 2024-07-10 PROCEDURE — 6370000000 HC RX 637 (ALT 250 FOR IP): Performed by: NURSE PRACTITIONER

## 2024-07-10 PROCEDURE — G0378 HOSPITAL OBSERVATION PER HR: HCPCS

## 2024-07-10 PROCEDURE — 2580000003 HC RX 258: Performed by: INTERNAL MEDICINE

## 2024-07-10 PROCEDURE — 94640 AIRWAY INHALATION TREATMENT: CPT

## 2024-07-10 PROCEDURE — 96365 THER/PROPH/DIAG IV INF INIT: CPT

## 2024-07-10 PROCEDURE — 36415 COLL VENOUS BLD VENIPUNCTURE: CPT

## 2024-07-10 RX ADMIN — ARFORMOTEROL TARTRATE 15 MCG: 15 SOLUTION RESPIRATORY (INHALATION) at 21:14

## 2024-07-10 RX ADMIN — ACETAMINOPHEN 650 MG: 325 TABLET ORAL at 05:22

## 2024-07-10 RX ADMIN — ASPIRIN 81 MG: 81 TABLET, COATED ORAL at 08:03

## 2024-07-10 RX ADMIN — SODIUM CHLORIDE 125 MG: 9 INJECTION, SOLUTION INTRAVENOUS at 20:13

## 2024-07-10 RX ADMIN — GABAPENTIN 300 MG: 300 CAPSULE ORAL at 20:09

## 2024-07-10 RX ADMIN — APIXABAN 2.5 MG: 2.5 TABLET, FILM COATED ORAL at 20:09

## 2024-07-10 RX ADMIN — LOSARTAN POTASSIUM 100 MG: 50 TABLET, FILM COATED ORAL at 08:03

## 2024-07-10 RX ADMIN — SERTRALINE 75 MG: 50 TABLET, FILM COATED ORAL at 08:03

## 2024-07-10 RX ADMIN — BUDESONIDE 500 MCG: 0.5 INHALANT RESPIRATORY (INHALATION) at 21:14

## 2024-07-10 RX ADMIN — SPIRONOLACTONE 25 MG: 25 TABLET ORAL at 08:03

## 2024-07-10 RX ADMIN — GABAPENTIN 300 MG: 300 CAPSULE ORAL at 14:23

## 2024-07-10 RX ADMIN — ROSUVASTATIN CALCIUM 10 MG: 10 TABLET, FILM COATED ORAL at 08:03

## 2024-07-10 RX ADMIN — CYANOCOBALAMIN TAB 1000 MCG 500 MCG: 1000 TAB at 08:03

## 2024-07-10 RX ADMIN — BUDESONIDE 500 MCG: 0.5 INHALANT RESPIRATORY (INHALATION) at 07:25

## 2024-07-10 RX ADMIN — SODIUM CHLORIDE, PRESERVATIVE FREE 10 ML: 5 INJECTION INTRAVENOUS at 08:04

## 2024-07-10 RX ADMIN — SODIUM CHLORIDE, PRESERVATIVE FREE 10 ML: 5 INJECTION INTRAVENOUS at 20:13

## 2024-07-10 RX ADMIN — GABAPENTIN 300 MG: 300 CAPSULE ORAL at 08:03

## 2024-07-10 RX ADMIN — HEPARIN SODIUM 15 UNITS/KG/HR: 10000 INJECTION, SOLUTION INTRAVENOUS at 07:08

## 2024-07-10 RX ADMIN — ARFORMOTEROL TARTRATE 15 MCG: 15 SOLUTION RESPIRATORY (INHALATION) at 07:25

## 2024-07-10 RX ADMIN — PANTOPRAZOLE SODIUM 40 MG: 40 TABLET, DELAYED RELEASE ORAL at 05:19

## 2024-07-10 RX ADMIN — HEPARIN SODIUM 2000 UNITS: 1000 INJECTION INTRAVENOUS; SUBCUTANEOUS at 07:08

## 2024-07-10 RX ADMIN — PANTOPRAZOLE SODIUM 40 MG: 40 TABLET, DELAYED RELEASE ORAL at 16:28

## 2024-07-10 ASSESSMENT — PAIN DESCRIPTION - LOCATION: LOCATION: HEAD

## 2024-07-10 ASSESSMENT — PAIN SCALES - GENERAL: PAINLEVEL_OUTOF10: 3

## 2024-07-10 NOTE — PROGRESS NOTES
Hospitalist Progress Note   Admit Date:  2024  1:24 PM   Name:  Zeynep Carrington   Age:  78 y.o.  Sex:  female  :  1945   MRN:  [id number]   Room:  [facility]    Presenting/Chief Complaint: Shortness of Breath     Reason(s) for Admission: Bradycardia [R00.1]  Acute on chronic renal insufficiency [N28.9, N18.9]  Acute on chronic diastolic (congestive) heart failure (HCC) [I50.33]  Atrial fibrillation, unspecified type (HCC) [I48.91]  Acute on chronic congestive heart failure, unspecified heart failure type (HCC) [I50.9]     Hospital Course:     Zeynep Carrington is a 78 y.o. female with history of hypertension, carotid stenosis, CAD, paroxysmal A-fib, pulmonary hypertension, chronic respiratory failure with use of BiPAP at night, asthma, GERD admitted by cardiology for acute on chronic diastolic heart failure and A-fib.  Started on IV diuresis.  Cardiology planning for MING guided cardioversion.  Hospitalist consulted for anemia.     Patient seen and examined at the bedside.  Reports overall feeling better.  Denies any known history of anemia.  On chart review patient noted with anemia since 2024 with hemoglobin ~9, which patient is unaware of.  She is on Eliquis.  Patient reports she had rectal bleeding few months ago, described as red blood mixed in the stool, denies any melena.  Her PCP at that time referred her to GI and patient is scheduled for colonoscopy in August. Last colonoscopy in , history of colon polyp tubulovillous adenoma.  Denies any further rectal bleeding.  Denies melena, hematemesis or hematuria.  Denies chronic use of NSAIDs.  Reports reflux symptoms and taking PPI.  Reports balanced diet including meat and vegetables.    Subjective & 24hr Events:     First meeting with patient.  She is sleeping soundly when I entered the room but awakens easily to voice and light touch.  She does not have any complaints.  Discussed plan for inpatient care as well as her plan to follow-up  LVOT:AV VTI Index 0.66     MV:LVOT VTI Index 2.00     RVSP 58 mmHg   APTT    Collection Time: 07/09/24 12:49 PM   Result Value Ref Range    APTT 44.2 (H) 23.3 - 37.4 SEC   MING with possible cardioversion (PRN contrast/bubble/3D)    Collection Time: 07/09/24  2:50 PM   Result Value Ref Range    Body Surface Area 1.81 m2   EKG 12 Lead    Collection Time: 07/09/24  3:00 PM   Result Value Ref Range    Ventricular Rate 54 BPM    Atrial Rate 54 BPM    P-R Interval 298 ms    QRS Duration 86 ms    Q-T Interval 480 ms    QTc Calculation (Bazett) 455 ms    P Axis 67 degrees    R Axis -41 degrees    T Axis 38 degrees    Diagnosis       Sinus bradycardia with 1st degree A-V block  Left axis deviation  Abnormal ECG  When compared with ECG of 08-JUL-2024 13:29,  sinus rhythm has replaces atrial fibrillation  Confirmed by MD JOSH, BANDAR ([id number]) on 7/9/2024 4:39:01 PM     APTT    Collection Time: 07/09/24 10:21 PM   Result Value Ref Range    APTT 83.1 (H) 23.3 - 37.4 SEC   Basic Metabolic Panel w/ Reflex to MG    Collection Time: 07/10/24  5:01 AM   Result Value Ref Range    Sodium 140 136 - 145 mmol/L    Potassium 4.5 3.5 - 5.1 mmol/L    Chloride 103 98 - 107 mmol/L    CO2 26 20 - 28 mmol/L    Anion Gap 12 9 - 18 mmol/L    Glucose 105 (H) 70 - 99 mg/dL    BUN 36 (H) 8 - 23 MG/DL    Creatinine 1.76 (H) 0.60 - 1.10 MG/DL    Est, Glom Filt Rate 29 (L) >60 ml/min/1.73m2    Calcium 8.7 (L) 8.8 - 10.2 MG/DL   CBC    Collection Time: 07/10/24  5:01 AM   Result Value Ref Range    WBC 6.5 4.3 - 11.1 K/uL    RBC 3.32 (L) 4.05 - 5.2 M/uL    Hemoglobin 8.5 (L) 11.7 - 15.4 g/dL    Hematocrit 28.6 (L) 35.8 - 46.3 %    MCV 86.1 82 - 102 FL    MCH 25.6 (L) 26.1 - 32.9 PG    MCHC 29.7 (L) 31.4 - 35.0 g/dL    RDW 16.1 (H) 11.9 - 14.6 %    Platelets 234 150 - 450 K/uL    MPV 11.9 9.4 - 12.3 FL    nRBC 0.00 0.0 - 0.2 K/uL   Magnesium    Collection Time: 07/10/24  5:01 AM   Result Value Ref Range    Magnesium 2.3 1.8 - 2.4 mg/dL   APTT

## 2024-07-10 NOTE — PROGRESS NOTES
[facility] CARDIOLOGY PROGRESS NOTE           7/10/2024 3:17 PM    Admit Date: 7/8/2024      Subjective:   Patient remains weak and fatigued.  Remains in sinus rhythm after cardioversion yesterday.    ROS:  Cardiovascular:  As noted above    Objective:      Vitals:    07/10/24 0503 07/10/24 0725 07/10/24 0734 07/10/24 1100   BP: (!) 132/52  132/82 110/80   Pulse: 66  68 68   Resp: 20 16 16 17   Temp: 99.8 °F (37.7 °C)  97.5 °F (36.4 °C) 97.9 °F (36.6 °C)   TempSrc:    Oral   SpO2: 99% 97% 96% 94%   Weight: 78.2 kg (172 lb 4.8 oz)      Height:           Physical Exam:  General-No Acute Distress  Neck- supple, no JVD  CV-regular  Lung-diminished bilaterally  Abd- soft, nontender, nondistended  Ext- no edema bilaterally.  Skin- warm and dry    Data Review:   Recent Labs     07/08/24  2141 07/09/24  0354 07/10/24  0501   NA  --  140 140   K  --  4.4 4.5   MG  --  2.4 2.3   BUN  --  46* 36*   WBC  --  7.7 6.5   HGB  --  8.2* 8.5*   HCT  --  28.1* 28.6*   PLT  --  246 234   INR 1.2  --   --          Assessment/Plan:     Principal Problem:    Acute on chronic diastolic (congestive) heart failure (HCC)  Plan: Volume status is markedly improved.  Hold additional diuresis at this time.  Continue losartan 100 mg daily and Aldactone 25 mg daily.  No beta-blocker due to resting bradycardia.  Active Problems:    Bradycardia  Plan: Patient presented with slow atypical atrial flutter.  She has now converted to sinus bradycardia.  Monitor for now.  Consider 30-day telemetry monitor at discharge to assess for possible pacemaker needed in the future.    Chronic renal disease, stage III (HCC) [753707]  Plan: Renal function slightly improved    QUINTIN treated with BiPAP  Plan: Chronic    Mixed hyperlipidemia  Plan: Chronic    Nocturnal hypoxemia  Plan: Chronic    Moderate mitral regurgitation  Plan: Moderate by MING yesterday.    Paroxysmal atrial fibrillation (HCC)  Plan: Remains in sinus rhythm post MING guided  cardioversion.  DC heparin and start Eliquis 2.5 mg twice daily.    Essential hypertension  Plan: Mildly hypotensive and monitor for now.    Patient with severe anemia.  Appears to be a recurring problem.  Receiving IV iron after consultation.  Will need outpatient close follow-up and may require watchman in the future.    Anticipate discharged tomorrow.        DEBBY HUTTON MD  7/10/2024 3:17 PM

## 2024-07-10 NOTE — NURSE NAVIGATOR
CHF teaching completed.CHF background completed. Teaching emphasis on Call Cardiology STAT ,if any of the following are noted:  Short of Breath; with activity or without/ while reclined, Generalize weakness, edema are noted individually or grouped.  Cardiac Diet  and salt/fluid restrictions covered.  Daily WTs emphasized.  Planner with summarization sheet provided with cardiology service and phone number and bathroom scale offered.  Total time @ BS is 1 hour and pt verbalize understanding/past post test.  Pt instructed to call myself, if any questions occur.  She states she is compliant with these teachings at home.

## 2024-07-11 LAB
ANION GAP SERPL CALC-SCNC: 10 MMOL/L (ref 9–18)
BUN SERPL-MCNC: 25 MG/DL (ref 8–23)
CALCIUM SERPL-MCNC: 9.1 MG/DL (ref 8.8–10.2)
CHLORIDE SERPL-SCNC: 105 MMOL/L (ref 98–107)
CO2 SERPL-SCNC: 26 MMOL/L (ref 20–28)
CREAT SERPL-MCNC: 1.35 MG/DL (ref 0.6–1.1)
EKG ATRIAL RATE: 83 BPM
EKG DIAGNOSIS: NORMAL
EKG P AXIS: 80 DEGREES
EKG P-R INTERVAL: 360 MS
EKG Q-T INTERVAL: 378 MS
EKG QRS DURATION: 74 MS
EKG QTC CALCULATION (BAZETT): 444 MS
EKG R AXIS: -54 DEGREES
EKG T AXIS: 39 DEGREES
EKG VENTRICULAR RATE: 83 BPM
ERYTHROCYTE [DISTWIDTH] IN BLOOD BY AUTOMATED COUNT: 15.9 % (ref 11.9–14.6)
GLUCOSE SERPL-MCNC: 118 MG/DL (ref 70–99)
HCT VFR BLD AUTO: 28.6 % (ref 35.8–46.3)
HGB BLD-MCNC: 8.3 G/DL (ref 11.7–15.4)
MAGNESIUM SERPL-MCNC: 2.3 MG/DL (ref 1.8–2.4)
MCH RBC QN AUTO: 24.8 PG (ref 26.1–32.9)
MCHC RBC AUTO-ENTMCNC: 29 G/DL (ref 31.4–35)
MCV RBC AUTO: 85.4 FL (ref 82–102)
NRBC # BLD: 0 K/UL (ref 0–0.2)
PLATELET # BLD AUTO: 237 K/UL (ref 150–450)
PMV BLD AUTO: 10.2 FL (ref 9.4–12.3)
POTASSIUM SERPL-SCNC: 4.3 MMOL/L (ref 3.5–5.1)
RBC # BLD AUTO: 3.35 M/UL (ref 4.05–5.2)
SODIUM SERPL-SCNC: 141 MMOL/L (ref 136–145)
WBC # BLD AUTO: 6.2 K/UL (ref 4.3–11.1)

## 2024-07-11 PROCEDURE — 85027 COMPLETE CBC AUTOMATED: CPT

## 2024-07-11 PROCEDURE — 2580000003 HC RX 258: Performed by: NURSE PRACTITIONER

## 2024-07-11 PROCEDURE — 36415 COLL VENOUS BLD VENIPUNCTURE: CPT

## 2024-07-11 PROCEDURE — G0378 HOSPITAL OBSERVATION PER HR: HCPCS

## 2024-07-11 PROCEDURE — 99232 SBSQ HOSP IP/OBS MODERATE 35: CPT | Performed by: INTERNAL MEDICINE

## 2024-07-11 PROCEDURE — 6360000002 HC RX W HCPCS: Performed by: NURSE PRACTITIONER

## 2024-07-11 PROCEDURE — 6370000000 HC RX 637 (ALT 250 FOR IP): Performed by: INTERNAL MEDICINE

## 2024-07-11 PROCEDURE — 94640 AIRWAY INHALATION TREATMENT: CPT

## 2024-07-11 PROCEDURE — 93010 ELECTROCARDIOGRAM REPORT: CPT | Performed by: INTERNAL MEDICINE

## 2024-07-11 PROCEDURE — 6370000000 HC RX 637 (ALT 250 FOR IP): Performed by: NURSE PRACTITIONER

## 2024-07-11 PROCEDURE — 80048 BASIC METABOLIC PNL TOTAL CA: CPT

## 2024-07-11 PROCEDURE — 83735 ASSAY OF MAGNESIUM: CPT

## 2024-07-11 RX ORDER — DIPHENHYDRAMINE HCL 25 MG
25 CAPSULE ORAL EVERY 6 HOURS PRN
Status: DISCONTINUED | OUTPATIENT
Start: 2024-07-11 | End: 2024-07-12 | Stop reason: HOSPADM

## 2024-07-11 RX ADMIN — ACETAMINOPHEN 650 MG: 325 TABLET ORAL at 20:14

## 2024-07-11 RX ADMIN — LOSARTAN POTASSIUM 100 MG: 50 TABLET, FILM COATED ORAL at 07:58

## 2024-07-11 RX ADMIN — ARFORMOTEROL TARTRATE 15 MCG: 15 SOLUTION RESPIRATORY (INHALATION) at 20:41

## 2024-07-11 RX ADMIN — GABAPENTIN 300 MG: 300 CAPSULE ORAL at 13:38

## 2024-07-11 RX ADMIN — BUDESONIDE 500 MCG: 0.5 INHALANT RESPIRATORY (INHALATION) at 20:42

## 2024-07-11 RX ADMIN — DIPHENHYDRAMINE HYDROCHLORIDE 25 MG: 25 CAPSULE ORAL at 13:38

## 2024-07-11 RX ADMIN — ROSUVASTATIN CALCIUM 10 MG: 10 TABLET, FILM COATED ORAL at 07:58

## 2024-07-11 RX ADMIN — SERTRALINE 75 MG: 50 TABLET, FILM COATED ORAL at 07:58

## 2024-07-11 RX ADMIN — BUDESONIDE 500 MCG: 0.5 INHALANT RESPIRATORY (INHALATION) at 07:25

## 2024-07-11 RX ADMIN — SPIRONOLACTONE 25 MG: 25 TABLET ORAL at 07:58

## 2024-07-11 RX ADMIN — PANTOPRAZOLE SODIUM 40 MG: 40 TABLET, DELAYED RELEASE ORAL at 05:19

## 2024-07-11 RX ADMIN — GABAPENTIN 300 MG: 300 CAPSULE ORAL at 07:58

## 2024-07-11 RX ADMIN — ARFORMOTEROL TARTRATE 15 MCG: 15 SOLUTION RESPIRATORY (INHALATION) at 07:25

## 2024-07-11 RX ADMIN — CYANOCOBALAMIN TAB 1000 MCG 500 MCG: 1000 TAB at 07:59

## 2024-07-11 RX ADMIN — POLYETHYLENE GLYCOL 3350 17 G: 17 POWDER, FOR SOLUTION ORAL at 20:11

## 2024-07-11 RX ADMIN — PANTOPRAZOLE SODIUM 40 MG: 40 TABLET, DELAYED RELEASE ORAL at 16:34

## 2024-07-11 RX ADMIN — GABAPENTIN 300 MG: 300 CAPSULE ORAL at 20:11

## 2024-07-11 RX ADMIN — DICLOFENAC SODIUM 2 G: 10 GEL TOPICAL at 07:59

## 2024-07-11 RX ADMIN — SODIUM CHLORIDE, PRESERVATIVE FREE 10 ML: 5 INJECTION INTRAVENOUS at 20:12

## 2024-07-11 RX ADMIN — ASPIRIN 81 MG: 81 TABLET, COATED ORAL at 07:58

## 2024-07-11 RX ADMIN — DICLOFENAC SODIUM 2 G: 10 GEL TOPICAL at 20:12

## 2024-07-11 RX ADMIN — APIXABAN 2.5 MG: 2.5 TABLET, FILM COATED ORAL at 07:58

## 2024-07-11 RX ADMIN — SODIUM CHLORIDE, PRESERVATIVE FREE 10 ML: 5 INJECTION INTRAVENOUS at 07:59

## 2024-07-11 RX ADMIN — APIXABAN 5 MG: 5 TABLET, FILM COATED ORAL at 20:11

## 2024-07-11 ASSESSMENT — PAIN DESCRIPTION - LOCATION: LOCATION: HEAD

## 2024-07-11 ASSESSMENT — PAIN SCALES - WONG BAKER
WONGBAKER_NUMERICALRESPONSE: NO HURT
WONGBAKER_NUMERICALRESPONSE: NO HURT

## 2024-07-11 ASSESSMENT — PAIN SCALES - GENERAL
PAINLEVEL_OUTOF10: 0
PAINLEVEL_OUTOF10: 0
PAINLEVEL_OUTOF10: 3

## 2024-07-11 NOTE — PROGRESS NOTES
[facility] CARDIOLOGY PROGRESS NOTE           7/11/2024 10:09 AM    Admit Date: 7/8/2024      Subjective:   Patient reports she is feeling better, lower extremity swelling is resolved, breathing improved.  Reports some nosebleeds since arrival to the hospital, did spit out stringy like blood clot yesterday.  No other hemoptysis or hematemesis, GI or  bleeding, chest pain or chest pressure, abdominal pain, nausea, vomiting.  Reports generalized fatigue, no dizziness lightheadedness syncope or near syncope.    ROS:  Cardiovascular:  As noted above    Objective:      Vitals:    07/10/24 2120 07/11/24 0027 07/11/24 0413 07/11/24 0739   BP:  134/66 110/76 134/83   Pulse: 87 77 67 71   Resp: 16 16 17 17   Temp:  98.1 °F (36.7 °C) 99.3 °F (37.4 °C) 97.9 °F (36.6 °C)   TempSrc:    Oral   SpO2: 95% 94% 94% 95%   Weight:   77 kg (169 lb 11.2 oz)    Height:           Physical Exam:  General-No Acute Distress, weight, alert, interactive, supine  Neck- supple, no JVD  CV- regular rate and rhythm faint systolic murmur left lower sternal border/apical  Lung- clear bilaterally  Abd- soft, nontender, nondistended  Ext- no leg edema bilaterally.  Skin- warm and dry    Data Review:   Recent Labs     07/08/24  2141 07/09/24  0354 07/10/24  0501 07/11/24  0338   NA  --    < > 140 141   K  --    < > 4.5 4.3   MG  --    < > 2.3 2.3   BUN  --    < > 36* 25*   WBC  --    < > 6.5 6.2   HGB  --    < > 8.5* 8.3*   HCT  --    < > 28.6* 28.6*   PLT  --    < > 234 237   INR 1.2  --   --   --     < > = values in this interval not displayed.       Assessment/Plan:       Paroxysmal atrial fibrillation (HCC)    Paroxysmal atrial flutter  -Continue Eliquis, currently on low-dose 2.5 mg twice daily due to abnormal renal function, if creatinine remains less than 1.5 would warrant changing to 5 mg p.o. twice daily.      Bradycardia  -Rate controlled medicines have been held for this reason, currently in sinus rhythm with brief

## 2024-07-11 NOTE — PROGRESS NOTES
Patients family is concerned about her ability to get around on her own. Daughter in law states that she is home a lot by herself as she and her  both work. Patient does have access to a walker.

## 2024-07-12 VITALS
TEMPERATURE: 98.6 F | OXYGEN SATURATION: 96 % | RESPIRATION RATE: 15 BRPM | HEART RATE: 74 BPM | WEIGHT: 171 LBS | DIASTOLIC BLOOD PRESSURE: 55 MMHG | HEIGHT: 60 IN | SYSTOLIC BLOOD PRESSURE: 126 MMHG | BODY MASS INDEX: 33.57 KG/M2

## 2024-07-12 DIAGNOSIS — R42 DIZZINESS: Primary | ICD-10-CM

## 2024-07-12 DIAGNOSIS — R55 SYNCOPE: ICD-10-CM

## 2024-07-12 DIAGNOSIS — I50.32 CHRONIC HEART FAILURE WITH PRESERVED EJECTION FRACTION (HCC): Primary | Chronic | ICD-10-CM

## 2024-07-12 PROBLEM — I50.33 ACUTE ON CHRONIC DIASTOLIC HEART FAILURE (HCC): Status: ACTIVE | Noted: 2024-07-12

## 2024-07-12 PROBLEM — I50.33 ACUTE ON CHRONIC DIASTOLIC (CONGESTIVE) HEART FAILURE (HCC): Status: RESOLVED | Noted: 2024-07-08 | Resolved: 2024-07-12

## 2024-07-12 PROBLEM — I50.33 ACUTE ON CHRONIC DIASTOLIC HEART FAILURE (HCC): Status: RESOLVED | Noted: 2024-07-12 | Resolved: 2024-07-12

## 2024-07-12 PROBLEM — R00.1 BRADYCARDIA: Status: RESOLVED | Noted: 2024-07-08 | Resolved: 2024-07-12

## 2024-07-12 LAB
ERYTHROCYTE [DISTWIDTH] IN BLOOD BY AUTOMATED COUNT: 16.3 % (ref 11.9–14.6)
HCT VFR BLD AUTO: 29 % (ref 35.8–46.3)
HGB BLD-MCNC: 8.3 G/DL (ref 11.7–15.4)
MCH RBC QN AUTO: 25 PG (ref 26.1–32.9)
MCHC RBC AUTO-ENTMCNC: 28.6 G/DL (ref 31.4–35)
MCV RBC AUTO: 87.3 FL (ref 82–102)
NRBC # BLD: 0 K/UL (ref 0–0.2)
PLATELET # BLD AUTO: 209 K/UL (ref 150–450)
PMV BLD AUTO: 10.2 FL (ref 9.4–12.3)
RBC # BLD AUTO: 3.32 M/UL (ref 4.05–5.2)
WBC # BLD AUTO: 5.2 K/UL (ref 4.3–11.1)

## 2024-07-12 PROCEDURE — 6370000000 HC RX 637 (ALT 250 FOR IP): Performed by: INTERNAL MEDICINE

## 2024-07-12 PROCEDURE — 85027 COMPLETE CBC AUTOMATED: CPT

## 2024-07-12 PROCEDURE — 97161 PT EVAL LOW COMPLEX 20 MIN: CPT

## 2024-07-12 PROCEDURE — G0378 HOSPITAL OBSERVATION PER HR: HCPCS

## 2024-07-12 PROCEDURE — 2140000000 HC CCU INTERMEDIATE R&B

## 2024-07-12 PROCEDURE — 94660 CPAP INITIATION&MGMT: CPT

## 2024-07-12 PROCEDURE — 97530 THERAPEUTIC ACTIVITIES: CPT

## 2024-07-12 PROCEDURE — 2580000003 HC RX 258: Performed by: NURSE PRACTITIONER

## 2024-07-12 PROCEDURE — 36415 COLL VENOUS BLD VENIPUNCTURE: CPT

## 2024-07-12 PROCEDURE — 6370000000 HC RX 637 (ALT 250 FOR IP): Performed by: NURSE PRACTITIONER

## 2024-07-12 PROCEDURE — 97112 NEUROMUSCULAR REEDUCATION: CPT

## 2024-07-12 PROCEDURE — 6360000002 HC RX W HCPCS: Performed by: NURSE PRACTITIONER

## 2024-07-12 PROCEDURE — 94640 AIRWAY INHALATION TREATMENT: CPT

## 2024-07-12 PROCEDURE — 94760 N-INVAS EAR/PLS OXIMETRY 1: CPT

## 2024-07-12 PROCEDURE — 97165 OT EVAL LOW COMPLEX 30 MIN: CPT

## 2024-07-12 PROCEDURE — 94664 DEMO&/EVAL PT USE INHALER: CPT

## 2024-07-12 PROCEDURE — 97535 SELF CARE MNGMENT TRAINING: CPT

## 2024-07-12 RX ORDER — FUROSEMIDE 40 MG/1
40 TABLET ORAL DAILY
Status: DISCONTINUED | OUTPATIENT
Start: 2024-07-12 | End: 2024-07-12 | Stop reason: HOSPADM

## 2024-07-12 RX ADMIN — ASPIRIN 81 MG: 81 TABLET, COATED ORAL at 08:17

## 2024-07-12 RX ADMIN — SODIUM CHLORIDE, PRESERVATIVE FREE 10 ML: 5 INJECTION INTRAVENOUS at 08:20

## 2024-07-12 RX ADMIN — ARFORMOTEROL TARTRATE 15 MCG: 15 SOLUTION RESPIRATORY (INHALATION) at 08:41

## 2024-07-12 RX ADMIN — CYANOCOBALAMIN TAB 1000 MCG 500 MCG: 1000 TAB at 08:17

## 2024-07-12 RX ADMIN — SERTRALINE 75 MG: 50 TABLET, FILM COATED ORAL at 08:16

## 2024-07-12 RX ADMIN — GABAPENTIN 300 MG: 300 CAPSULE ORAL at 08:17

## 2024-07-12 RX ADMIN — DICLOFENAC SODIUM 2 G: 10 GEL TOPICAL at 08:21

## 2024-07-12 RX ADMIN — BUDESONIDE 500 MCG: 0.5 INHALANT RESPIRATORY (INHALATION) at 08:41

## 2024-07-12 RX ADMIN — LOSARTAN POTASSIUM 100 MG: 50 TABLET, FILM COATED ORAL at 08:17

## 2024-07-12 RX ADMIN — SPIRONOLACTONE 25 MG: 25 TABLET ORAL at 08:17

## 2024-07-12 RX ADMIN — ROSUVASTATIN CALCIUM 10 MG: 10 TABLET, FILM COATED ORAL at 08:17

## 2024-07-12 RX ADMIN — PANTOPRAZOLE SODIUM 40 MG: 40 TABLET, DELAYED RELEASE ORAL at 06:58

## 2024-07-12 RX ADMIN — FUROSEMIDE 40 MG: 40 TABLET ORAL at 09:40

## 2024-07-12 RX ADMIN — APIXABAN 5 MG: 5 TABLET, FILM COATED ORAL at 08:17

## 2024-07-12 ASSESSMENT — PAIN SCALES - WONG BAKER: WONGBAKER_NUMERICALRESPONSE: NO HURT

## 2024-07-12 ASSESSMENT — PAIN SCALES - GENERAL
PAINLEVEL_OUTOF10: 0

## 2024-07-12 NOTE — PLAN OF CARE
Problem: Discharge Planning  Goal: Discharge to home or other facility with appropriate resources  Outcome: Progressing     Problem: Safety - Adult  Goal: Free from fall injury  Outcome: Progressing     Problem: ABCDS Injury Assessment  Goal: Absence of physical injury  Outcome: Progressing     Problem: Pain  Goal: Verbalizes/displays adequate comfort level or baseline comfort level  Outcome: Progressing  Flowsheets (Taken 7/11/2024 2000)  Verbalizes/displays adequate comfort level or baseline comfort level:   Encourage patient to monitor pain and request assistance   Assess pain using appropriate pain scale   Administer analgesics based on type and severity of pain and evaluate response   Implement non-pharmacological measures as appropriate and evaluate response   Consider cultural and social influences on pain and pain management   Notify Licensed Independent Practitioner if interventions unsuccessful or patient reports new pain

## 2024-07-12 NOTE — DISCHARGE SUMMARY
[facility] Cardiology Discharge Summary     Patient ID:  Zeynep Carrington  [id number]  78 y.o.  1945    Admit date: 7/8/2024    Discharge date:  07/12/24    Admitting Physician: Dwaine Lynn MD     Discharge Physician: Kevin Muñoz, BERNA - [facility]/Dr. Moore    Admission Diagnoses: Bradycardia [R00.1]  Acute on chronic renal insufficiency [N28.9, N18.9]  Acute on chronic diastolic (congestive) heart failure (HCC) [I50.33]  Atrial fibrillation, unspecified type (HCC) [I48.91]  Acute on chronic congestive heart failure, unspecified heart failure type (HCC) [I50.9]  Acute on chronic diastolic heart failure (HCC) [I50.33]    Discharge Diagnoses:     Principal Problem (Resolved):    Acute on chronic diastolic (congestive) heart failure (HCC)  Active Problems:    Chronic renal disease, stage III ([facility]) [542697]    QUINTIN treated with BiPAP    Mixed hyperlipidemia    Nocturnal hypoxemia    Moderate mitral regurgitation    Atrial fibrillation (HCC)    Essential hypertension  Resolved Problems:    Bradycardia    Acute on chronic diastolic heart failure (HCC)      Cardiology Procedures this admission:  EchoCardiogram  MING/Cardioversion  Consults: Hospital Medicine for Anemia    Hospital Course:     Patient came to emergency department with complaints of ADAME, orthopnea, abdominal distention over the past several weeks.  Patient was noted to be in atrial fibrillation with SVR with heart rates in the 40s.  Patient beta-blocker was held and she was noted to had elevated NT proBNP of 8800 and low-level high sensitive troponins.  Patient was admitted for monitoring and diuresis given 40 mg IV Lasix twice daily for total of 2.5 L diuresis hospital admission.  An echocardiogram was completed which showed the following        Limited study.    Left Ventricle: Normal left ventricular systolic function with a visually estimated EF of 60 - 65%. Left ventricle size is normal. Normal wall thickness. Normal wall  hyperlipidemia      diclofenac sodium (VOLTAREN) 1 % GEL Apply 2 g topically 4 times daily Patient takes AS NEEDED      ascorbic acid (VITAMIN C) 250 MG tablet Take 1 tablet by mouth daily      aspirin 81 MG EC tablet Take 1 tablet by mouth      Calcium Carbonate-Vitamin D (CALCIUM-VITAMIN D) 600-125 MG-UNIT TABS Take by mouth      cyanocobalamin 500 MCG tablet Take 1 tablet by mouth daily           STOP taking these medications       metoprolol succinate (TOPROL XL) 25 MG extended release tablet Comments:   Reason for Stopping:         ergocalciferol (ERGOCALCIFEROL) 1.25 MG (78757 UT) capsule Comments:   Reason for Stopping:             Note some or all of the above medications that were set to stop by the system but the pt was not taking in the outpatient setting.  Limitations in the system make it appear that we have stopped the medications when we have not.        Signed:  BERNA Hampton CNP  7/12/2024  10:35 AM

## 2024-07-12 NOTE — PROGRESS NOTES
[facility] CARDIOLOGY PROGRESS NOTE           7/12/2024 8:32 AM    Admit Date: 7/8/2024      Subjective:   Weight charted at 77.6 kg - 171 pounds  -Intake versus output-net -900 cc  She feels that her breathing is back to baseline.  No chest pain or any worsening dyspnea today.  Telemetry overnight shows that she is maintaining sinus rhythm.      ROS:  Cardiovascular:  As noted above    Objective:      Vitals:    07/12/24 0018 07/12/24 0432 07/12/24 0446 07/12/24 0745   BP: (!) 103/58 130/61  (!) 149/62   Pulse: 70 67 65 70   Resp: 20 18 17 17   Temp: 98.2 °F (36.8 °C) 97.5 °F (36.4 °C)  97.9 °F (36.6 °C)   TempSrc: Axillary Axillary  Oral   SpO2: 94% 95% 96% 97%   Weight:  77.6 kg (171 lb)     Height:           Physical Exam:  General-No Acute Distress  Neck- supple, no JVD  CV- regular rate and rhythm no MRG  Lung- clear bilaterally  Abd- soft, nontender, nondistended  Ext- no edema bilaterally.  Skin- warm and dry    Data Review:     Lab Results   Component Value Date/Time     07/11/2024 03:38 AM    K 4.3 07/11/2024 03:38 AM     07/11/2024 03:38 AM    CO2 26 07/11/2024 03:38 AM    BUN 25 07/11/2024 03:38 AM    CREATININE 1.35 07/11/2024 03:38 AM    GLUCOSE 118 07/11/2024 03:38 AM    CALCIUM 9.1 07/11/2024 03:38 AM         Lab Results   Component Value Date    WBC 5.2 07/12/2024    HGB 8.3 (L) 07/12/2024    HCT 29.0 (L) 07/12/2024    MCV 87.3 07/12/2024     07/12/2024 07/08/24    MING W/ POSSIBLE CARDIOVERSION (PRN CONTRAST/BUBBLE/3D) 07/09/2024  7:29 PM (Final)    Interpretation Summary    Left Ventricle: Normal left ventricular systolic function with a visually estimated EF of 55 - 60%. Left ventricle size is normal.    Aortic Valve: Mild regurgitation.    Mitral Valve: Mild to moderate regurgitation (multiple smaller central jets).    Left Atrium: No left atrial appendage thrombus noted.    Successful cardioversion with one attempt at 360J of biphasic energy.     hypoxemia  -Positive pressure ventilation overnight       Mild to moderate mitral regurgitation  -Visualized on MING, continue afterload reduction and diuretic for volume control.       Mixed hyperlipidemia  -Continue rosuvastatin       Essential hypertension  -Controlled at this time    Plan today: Counseled on importance of following a low-sodium diet.  We restarted her furosemide 40 mg daily and along with her spironolactone 25 mg daily but she really needs to keep an eye on her salt intake and if she does managed to do so, we should be in a position to cut back furosemide dosing to help with renal function.  -30-day mobile cardiac telemetry monitor will be ordered following discharge and follow-up with EP for tachybradycardia syndrome/paroxysmal atrial fibrillation.  Follow-up with primary cardiologist-Dr. Arthur in 2 to 3 weeks    Jae Moore MD  7/12/2024 8:32 AM

## 2024-07-12 NOTE — PROGRESS NOTES
ACUTE PHYSICAL THERAPY GOALS:   (Developed with and agreed upon by patient and/or caregiver.)  Pt will perform all bed mobility and transfers Mod (I) c use of LRAD/external supports as needed and no LOB or miss-steps in 7 therapy sessions.  Pt will ambulate 500 ft Mod (I) with use of LRAD, no LOB or miss-steps and breaks as needed in 7 therapy sessions.  Pt will perform standing dynamic balance activities with minimal postural sway in 7 therapy sessions.  Pt will tolerate multiple sets and reps of BLE exercises in 7 therapy sessions.      PHYSICAL THERAPY Initial Assessment, Daily Note, and AM  (Link to Caseload Tracking: PT Visit Days : 1  Acknowledge Orders  Time In/Out  PT Charge Capture  Rehab Caseload Tracker    Zeynep Carrington is a 78 y.o. female   PRIMARY DIAGNOSIS: Acute on chronic diastolic (congestive) heart failure (HCC)  Bradycardia [R00.1]  Acute on chronic renal insufficiency [N28.9, N18.9]  Acute on chronic diastolic (congestive) heart failure (HCC) [I50.33]  Atrial fibrillation, unspecified type (HCC) [I48.91]  Acute on chronic congestive heart failure, unspecified heart failure type (HCC) [I50.9]  Acute on chronic diastolic heart failure (HCC) [I50.33]       Reason for Referral: Generalized Muscle Weakness (M62.81)  Difficulty in walking, Not elsewhere classified (R26.2)  Other abnormalities of gait and mobility (R26.89)  Inpatient: Payor: [employer] MEDICARE / Plan: [employer] GOLD PLUS HMO / Product Type: *No Product type* /     ASSESSMENT:     REHAB RECOMMENDATIONS:   Recommendation to date pending progress:  Setting:  Home Health Therapy vs No Needs    Equipment:    None  To Be Determined     ASSESSMENT:  /Ms. Carrington Is a 78 y.o. female presenting to PT after being admitted on 4/8/24 for acute on chronic diastolic HF associated with worsening SOB and declining mobility status. At time of initial evaluation, pt presents below baseline LOF with deficits in strength, transfers, balance, gait and  Assistance, Total=Total Assistance, NT=Not Tested    GAIT: I Mod I S SBA CGA Min Mod Max Total  NT x2 Comments:   Level of Assistance [] [] [] [] [x] [] [] [] [] [] []    Distance  250'x1    DME Gait Belt and Rolling Walker    Gait Quality Decreased dolores , Decreased step clearance, Trunk sway increased, and Wide base of support    Weightbearing Status      Stairs      I=Independent, Mod I=Modified Independent, S=Supervision, SBA=Standby Assistance, CGA=Contact Guard Assistance,   Min=Minimal Assistance, Mod=Moderate Assistance, Max=Maximal Assistance, Total=Total Assistance, NT=Not Tested    PLAN:   FREQUENCY AND DURATION: 3 times/week for duration of hospital stay or until stated goals are met, whichever comes first.    THERAPY PROGNOSIS: Fair    PROBLEM LIST:   (Skilled intervention is medically necessary to address:)  Decreased Activity Tolerance  Decreased Balance  Decreased Gait Ability  Decreased Strength  Decreased Transfer Abilities INTERVENTIONS PLANNED:   (Benefits and precautions of physical therapy have been discussed with the patient.)  Therapeutic Activity  Therapeutic Exercise/HEP  Gait Training  Education       TREATMENT:   EVALUATION: LOW COMPLEXITY: (Untimed Charge)  The initial evaluation charge encompasses clinical chart review, objective assessment, interpretation of assessment, and skilled monitoring of the patient's response to treatment in order to develop a plan of care.     TREATMENT:   Therapeutic Activity (26 Minutes): Therapeutic activity included Scooting, Transfer Training, Ambulation on level ground, Sitting balance , and Standing balance to improve functional Activity tolerance, Balance, Mobility, and Strength.    TREATMENT GRID:  N/A    AFTER TREATMENT PRECAUTIONS: Alarm Activated, Bed/Chair Locked, Call light within reach, Chair, Needs within reach, and RN notified    INTERDISCIPLINARY COLLABORATION:  RN/ PCT and PT/ PTA    EDUCATION: Education Given To: Patient  Education

## 2024-07-12 NOTE — PROGRESS NOTES
Discharge instructions were reviewed with patient. An opportunity was given for questions. All medications were reviewed, and information was given on the new medications. Patient verbalized understanding, and has no questions at this time. IV and heart monitor removed.

## 2024-07-12 NOTE — PROGRESS NOTES
training, midline training, standing tolerance activity , and sitting balance activity   with minimal assistance and verbal cues to improve sitting balance, standing balance, posture, static balance, and dynamic balance in order to prepare for functional task, prepare for seated ADLs, prepare for standing ADLs, prepare for functional transfer, prepare for discharge home , and prepare for self care..   Self Care (15 minutes): Patient participated in toileting, lower body dressing, and grooming ADLs in unsupported sitting and standing with minimal verbal cueing to increase independence, decrease assistance required, and increase activity tolerance. Patient also participated in bed mobility, functional mobility, and functional transfer training to increase independence, decrease assistance required, and increase activity tolerance.     TREATMENT GRID:  N/A    AFTER TREATMENT PRECAUTIONS: Alarm Activated, Bed/Chair Locked, Call light within reach, Chair, Heels floated, Needs within reach, and RN notified    INTERDISCIPLINARY COLLABORATION:  RN/ PCT, PT/ PTA, and OT/ NAVARRETE    EDUCATION:  Education Given To: Patient  Education Provided: Role of Therapy;Plan of Care  Barriers to Learning: None  Education Outcome: Verbalized understanding    TOTAL TREATMENT DURATION AND TIME:  Time In: 0943  Time Out: 1012  Minutes: 29    [facility]

## 2024-07-12 NOTE — CARE COORDINATION
CM met with patient to finalize transition of care.   Patient qualifies for RPM for CHF. CM informed patient of the program. Patient verbalized agreement. Patient received [id number].   Patient agreeable to home health and prefers [facility]. [facility] accepted referral. CM updated address of son, Sg Carrington ([phone])    [address] [place], [address].  Patient reports Sg's home has a level entrance compared to her home with [address]. Patient reports Sg and his spouse work during the day but her grandsons are in and out to offer support.  Cardiology ordered 30 day monitor which patient will have applied today after discharge.  Grandson to transport home by car.    07/12/24 0357   Services At/After Discharge   Transition of Care Consult (CM Consult) Home Health;Other;Discharge Planning  (RPM for CHF.)   Internal Home Health No   Reason Outside Agency Chosen Patient already serviced by other home care/hospice agency  ([facility] home [facility])   Services At/After Discharge Home Health;Nursing services;OT;PT   [place] Resource Information Provided? No   Mode of Transport at Discharge Other (see comment)  (Grandcatherine to transport home by car.)   Confirm Follow Up Transport Family   Condition of Participation: Discharge Planning   The Plan for Transition of Care is related to the following treatment goals: Home with home health and RPM for CHF   The Patient and/or Patient Representative was provided with a Choice of Provider? Patient   The Patient and/Or Patient Representative agree with the Discharge Plan? Yes   Freedom of Choice list was provided with basic dialogue that supports the patient's individualized plan of care/goals, treatment preferences, and shares the quality data associated with the providers?  Yes

## 2024-07-15 ENCOUNTER — CARE COORDINATION (OUTPATIENT)
Dept: CARE COORDINATION | Facility: CLINIC | Age: 79
End: 2024-07-15

## 2024-07-15 ENCOUNTER — TELEPHONE (OUTPATIENT)
Age: 79
End: 2024-07-15

## 2024-07-15 DIAGNOSIS — I50.23 ACUTE ON CHRONIC SYSTOLIC CONGESTIVE HEART FAILURE (HCC): Primary | Chronic | ICD-10-CM

## 2024-07-15 PROBLEM — D64.9 ANEMIA: Status: ACTIVE | Noted: 2024-07-15

## 2024-07-15 PROBLEM — N19 RENAL FAILURE: Status: ACTIVE | Noted: 2024-07-15

## 2024-07-15 PROCEDURE — 1111F DSCHRG MED/CURRENT MED MERGE: CPT | Performed by: NURSE PRACTITIONER

## 2024-07-15 NOTE — TELEPHONE ENCOUNTER
Care Transitions Initial Follow Up Call    Call within 2 business days of discharge: Yes     Patient: Zeynep Carrington Patient : 1945 MRN: [id number]    [unfilled]    RARS: Readmission Risk Score: 17.4       Spoke with: patient    Discharge department/facility: [facility]    Non-face-to-face services provided:  he patient is being discharged home in stable condition on a low saturated fat, low cholesterol and low salt diet. The patient is instructed to advance activities as tolerated to the limit of fatigue or shortness of breath. The patient is instructed to call the office or return to the ER for immediate evaluation for any severe shortness of breath, chest pain, prolonged palpitations, near syncope or syncope.     Patient voiced understanding of discharge instructions. Sister will bring to appointment tomorrow.    Follow Up  Future Appointments   Date Time Provider Department Center   2024  1:00 PM Jones Arthur, DO [facility] AM[facility]   2024 11:00 AM Nadia Thomas MD [facility] AMB   2024  9:45 AM SHARON Zheng MD [facility] AMB   2024  2:00 PM [facility], APRN - [facility]   2024 10:30 AM Jorge Woodward MD [facility] AMB   10/28/2024 11:00 AM Alondra Ortiz, APRN - CNP [facility] AMB   2024  2:30 PM Jones Arthur DO [facility] AMB   12/10/2024 11:00 AM Jacky Laureano MD [facility] AMB       Yvrose Love LPN

## 2024-07-15 NOTE — CARE COORDINATION
scale: regular (<330lbs)  [x]  Hours of ACM monitoring - Monday-Friday [phone]  [x]  It is important to take your vitals every day, even on the weekends, to keep your care team aware of how you are doing every day of the week.    All questions about RPM program answered at this time.    Assessments:  Care Transitions 24 Hour Call    Schedule Follow Up Appointment with PCP: Completed  Do you have a copy of your discharge instructions?: Yes  Do you have all of your prescriptions and are they filled?: Yes  Have you been contacted by a [facility] Pharmacist?: No  Have you scheduled your follow up appointment?: Yes  How are you going to get to your appointment?: Car - family or friend to transport  Post Acute Services: Home Health  Do you have support at home?: Child  Do you feel like you have everything you need to keep you well at home?: Yes  Are you an active caregiver in your home?: No  Care Transitions Interventions        Follow Up Appointment:   Discussed follow up appointments. Patient has hospital follow up appointment scheduled within 7 days of discharge.   Future Appointments         Provider Specialty Dept Phone    7/16/2024 1:00 PM Jones Arthur DO Cardiology [phone]    7/17/2024 11:00 AM Nadia Thomas MD Internal Medicine [phone]    8/14/2024 9:45 AM SHARON Zheng MD Orthopedic Surgery [phone]    8/20/2024 2:00 PM Anita Hansen, APRN - NP Internal Medicine [phone]    8/28/2024 10:30 AM Jorge Woodward MD Cardiology [phone]    10/28/2024 11:00 AM Alondra Ortiz, APRN - CNP Pulmonology [phone]    11/13/2024 2:30 PM Jones Arthur DO Cardiology [phone]    12/10/2024 11:00 AM Jacky Laureano MD Sleep Medicine [phone]            Care Transition Nurse provided contact information.  Plan for follow-up call in 6-10 days based on severity of symptoms and risk factors.  Plan for next call: Symptom management - assess for continued improvements and/or any

## 2024-07-16 ENCOUNTER — CARE COORDINATION (OUTPATIENT)
Dept: CARE COORDINATION | Facility: CLINIC | Age: 79
End: 2024-07-16

## 2024-07-16 ENCOUNTER — OFFICE VISIT (OUTPATIENT)
Dept: INTERNAL MEDICINE CLINIC | Facility: CLINIC | Age: 79
End: 2024-07-16
Payer: MEDICARE

## 2024-07-16 ENCOUNTER — OFFICE VISIT (OUTPATIENT)
Age: 79
End: 2024-07-16

## 2024-07-16 VITALS
DIASTOLIC BLOOD PRESSURE: 56 MMHG | WEIGHT: 166.2 LBS | HEART RATE: 72 BPM | BODY MASS INDEX: 32.63 KG/M2 | SYSTOLIC BLOOD PRESSURE: 128 MMHG | HEIGHT: 60 IN

## 2024-07-16 VITALS
HEART RATE: 78 BPM | WEIGHT: 167 LBS | SYSTOLIC BLOOD PRESSURE: 110 MMHG | OXYGEN SATURATION: 97 % | DIASTOLIC BLOOD PRESSURE: 68 MMHG | BODY MASS INDEX: 32.61 KG/M2

## 2024-07-16 DIAGNOSIS — I10 ESSENTIAL HYPERTENSION: ICD-10-CM

## 2024-07-16 DIAGNOSIS — N19 RENAL FAILURE, UNSPECIFIED CHRONICITY: ICD-10-CM

## 2024-07-16 DIAGNOSIS — Z79.01 ON ANTICOAGULANT THERAPY: ICD-10-CM

## 2024-07-16 DIAGNOSIS — E78.2 MIXED HYPERLIPIDEMIA: ICD-10-CM

## 2024-07-16 DIAGNOSIS — I25.10 CORONARY ARTERY DISEASE INVOLVING NATIVE CORONARY ARTERY OF NATIVE HEART WITHOUT ANGINA PECTORIS: ICD-10-CM

## 2024-07-16 DIAGNOSIS — I48.0 PAROXYSMAL ATRIAL FIBRILLATION (HCC): Primary | ICD-10-CM

## 2024-07-16 DIAGNOSIS — I34.0 MODERATE MITRAL REGURGITATION BY PRIOR ECHOCARDIOGRAM: ICD-10-CM

## 2024-07-16 DIAGNOSIS — Z87.19 HX OF GASTRITIS: ICD-10-CM

## 2024-07-16 DIAGNOSIS — D64.9 ANEMIA, UNSPECIFIED TYPE: Primary | ICD-10-CM

## 2024-07-16 DIAGNOSIS — I50.32 CHRONIC HEART FAILURE WITH PRESERVED EJECTION FRACTION (HCC): ICD-10-CM

## 2024-07-16 PROCEDURE — G8399 PT W/DXA RESULTS DOCUMENT: HCPCS | Performed by: INTERNAL MEDICINE

## 2024-07-16 PROCEDURE — G8427 DOCREV CUR MEDS BY ELIG CLIN: HCPCS | Performed by: INTERNAL MEDICINE

## 2024-07-16 PROCEDURE — 1123F ACP DISCUSS/DSCN MKR DOCD: CPT | Performed by: INTERNAL MEDICINE

## 2024-07-16 PROCEDURE — 99214 OFFICE O/P EST MOD 30 MIN: CPT | Performed by: INTERNAL MEDICINE

## 2024-07-16 PROCEDURE — 1090F PRES/ABSN URINE INCON ASSESS: CPT | Performed by: INTERNAL MEDICINE

## 2024-07-16 PROCEDURE — 3074F SYST BP LT 130 MM HG: CPT | Performed by: INTERNAL MEDICINE

## 2024-07-16 PROCEDURE — 3078F DIAST BP <80 MM HG: CPT | Performed by: INTERNAL MEDICINE

## 2024-07-16 PROCEDURE — 1111F DSCHRG MED/CURRENT MED MERGE: CPT | Performed by: INTERNAL MEDICINE

## 2024-07-16 PROCEDURE — G8417 CALC BMI ABV UP PARAM F/U: HCPCS | Performed by: INTERNAL MEDICINE

## 2024-07-16 PROCEDURE — 1036F TOBACCO NON-USER: CPT | Performed by: INTERNAL MEDICINE

## 2024-07-16 RX ORDER — FERROUS SULFATE 325(65) MG
325 TABLET ORAL 2 TIMES DAILY
Qty: 180 TABLET | Refills: 1 | Status: SHIPPED | OUTPATIENT
Start: 2024-07-16

## 2024-07-16 SDOH — ECONOMIC STABILITY: FOOD INSECURITY: WITHIN THE PAST 12 MONTHS, THE FOOD YOU BOUGHT JUST DIDN'T LAST AND YOU DIDN'T HAVE MONEY TO GET MORE.: NEVER TRUE

## 2024-07-16 SDOH — ECONOMIC STABILITY: INCOME INSECURITY: HOW HARD IS IT FOR YOU TO PAY FOR THE VERY BASICS LIKE FOOD, HOUSING, MEDICAL CARE, AND HEATING?: SOMEWHAT HARD

## 2024-07-16 SDOH — ECONOMIC STABILITY: FOOD INSECURITY: WITHIN THE PAST 12 MONTHS, YOU WORRIED THAT YOUR FOOD WOULD RUN OUT BEFORE YOU GOT MONEY TO BUY MORE.: SOMETIMES TRUE

## 2024-07-16 ASSESSMENT — PATIENT HEALTH QUESTIONNAIRE - PHQ9
10. IF YOU CHECKED OFF ANY PROBLEMS, HOW DIFFICULT HAVE THESE PROBLEMS MADE IT FOR YOU TO DO YOUR WORK, TAKE CARE OF THINGS AT HOME, OR GET ALONG WITH OTHER PEOPLE: SOMEWHAT DIFFICULT
SUM OF ALL RESPONSES TO PHQ QUESTIONS 1-9: 6
5. POOR APPETITE OR OVEREATING: SEVERAL DAYS
3. TROUBLE FALLING OR STAYING ASLEEP: SEVERAL DAYS
7. TROUBLE CONCENTRATING ON THINGS, SUCH AS READING THE NEWSPAPER OR WATCHING TELEVISION: SEVERAL DAYS
SUM OF ALL RESPONSES TO PHQ QUESTIONS 1-9: 6
9. THOUGHTS THAT YOU WOULD BE BETTER OFF DEAD, OR OF HURTING YOURSELF: NOT AT ALL
4. FEELING TIRED OR HAVING LITTLE ENERGY: NEARLY EVERY DAY
SUM OF ALL RESPONSES TO PHQ QUESTIONS 1-9: 9
1. LITTLE INTEREST OR PLEASURE IN DOING THINGS: SEVERAL DAYS
2. FEELING DOWN, DEPRESSED OR HOPELESS: SEVERAL DAYS
SUM OF ALL RESPONSES TO PHQ QUESTIONS 1-9: 9
SUM OF ALL RESPONSES TO PHQ9 QUESTIONS 1 & 2: 2
8. MOVING OR SPEAKING SO SLOWLY THAT OTHER PEOPLE COULD HAVE NOTICED. OR THE OPPOSITE, BEING SO FIGETY OR RESTLESS THAT YOU HAVE BEEN MOVING AROUND A LOT MORE THAN USUAL: NOT AT ALL
3. TROUBLE FALLING OR STAYING ASLEEP: SEVERAL DAYS
6. FEELING BAD ABOUT YOURSELF - OR THAT YOU ARE A FAILURE OR HAVE LET YOURSELF OR YOUR FAMILY DOWN: MORE THAN HALF THE DAYS
7. TROUBLE CONCENTRATING ON THINGS, SUCH AS READING THE NEWSPAPER OR WATCHING TELEVISION: NOT AT ALL
SUM OF ALL RESPONSES TO PHQ QUESTIONS 1-9: 9
1. LITTLE INTEREST OR PLEASURE IN DOING THINGS: SEVERAL DAYS
4. FEELING TIRED OR HAVING LITTLE ENERGY: NOT AT ALL
9. THOUGHTS THAT YOU WOULD BE BETTER OFF DEAD, OR OF HURTING YOURSELF: NOT AT ALL
2. FEELING DOWN, DEPRESSED OR HOPELESS: NOT AT ALL
6. FEELING BAD ABOUT YOURSELF - OR THAT YOU ARE A FAILURE OR HAVE LET YOURSELF OR YOUR FAMILY DOWN: NEARLY EVERY DAY
SUM OF ALL RESPONSES TO PHQ QUESTIONS 1-9: 6
SUM OF ALL RESPONSES TO PHQ QUESTIONS 1-9: 6
SUM OF ALL RESPONSES TO PHQ QUESTIONS 1-9: 9
5. POOR APPETITE OR OVEREATING: NOT AT ALL
SUM OF ALL RESPONSES TO PHQ9 QUESTIONS 1 & 2: 1
10. IF YOU CHECKED OFF ANY PROBLEMS, HOW DIFFICULT HAVE THESE PROBLEMS MADE IT FOR YOU TO DO YOUR WORK, TAKE CARE OF THINGS AT HOME, OR GET ALONG WITH OTHER PEOPLE: SOMEWHAT DIFFICULT

## 2024-07-16 ASSESSMENT — ENCOUNTER SYMPTOMS
SHORTNESS OF BREATH: 0
COUGH: 0
RESPIRATORY NEGATIVE: 1

## 2024-07-16 NOTE — CARE COORDINATION
Care Transitions Note    Follow Up Call     Patient Current Location:  Home: [address] [place] [address]    Care Transition Nurse contacted the patient by telephone. Verified name and  as identifiers.    Additional needs identified to be addressed with provider   No needs identified                 Method of communication with provider: none.    Remote Patient Monitoring:  Offered patient enrollment in the Remote Patient Monitoring (RPM) program for in-home monitoring: Yes, patient enrolled; current status is activated and monitoring.    Remote Patient Monitoring Welcome Note  Date/Time:  2024 10:17 AM  Patient Current Location: Home: [address] [place] [address]  Verified patients name and  as identifiers.  Completed and confirmed the following:   Emergency Contact: Ross Zeb [phone]  [x] Patient received all RPM equipment (tablet, scale, blood pressure device and cuff, and pulse oximeter)  Cuff Size: regular (9.05\"-15.74\")    Weight Scale:  regular (<330lbs)                    [x] Instructed patient keep box for use when returning equipment                                                          [x] Reviewed Patient Welcome Letter with patient    [x]  Reviewed Consent Form  Copy of consent form in chart.                 [x] Reviewed expectations for patient and care team  Monitoring hours M-F 9-4pm  It is important to take your vitals every day, even on the weekends,to keep your care team aware of how you are doing every day of the week.  Completing monitoring by 12pm on  so that alerts can be responded to in the same day  Patient weighs self at same time every day (or after urinating and waking up)  Take blood pressure 1-2 hrs after medications   RPM team may have different phone area code (including [place], [place], [place] or [place])                              [x] Instructed patient to keep scale on flat surface                                                   
Ultrasound guidance was used during placement./The location was identified, and the area was draped and prepped./All lumen(s) aspirated and flushed without difficulty./The catheter was placed using sterile technique./The guidewire was recovered.

## 2024-07-16 NOTE — PATIENT INSTRUCTIONS
[place] Transportation Resources*  (Call 211/[employer] if you need more resources.)    Medicaid Van: [employer]   They offer: Non-emergency medical transportation for Medicaid members and some Medicare Advantage plans  Contact: [phone]   Helpful Info: Must call for a ride at least 3 days before your appointment.  Call Monday- Friday 8:00am to 5:00pm. Transportation is available for MD appts, dialysis, x-rays, lab work, drug store, or other medical appointments.     [facility] [employer]  What they offer: Provides assistance and medical transport to adults 60 years and older.  Contact: [phone]    [employer]   What they offer: Charge a fee for transport (not free).  Contact: [phone]    [employer]   They Offer: Charge a fee for transport (not free).  Contact: [phone]    [employer]trip  They Offer: non-emergency medical transportation ([employer]) that supports all levels of transport: rideshare (Lyft/Uber), Taxis, wheelchair vans when and where they are needed.  Contact: [email or web address]

## 2024-07-16 NOTE — PROGRESS NOTES
Behavior normal.         Medical problems, medical history, and test results were reviewed with the patient today.     Recent Results (from the past 168 hour(s))   MING with possible cardioversion (PRN contrast/bubble/3D)    Collection Time: 07/09/24  2:50 PM   Result Value Ref Range    Body Surface Area 1.81 m2   EKG 12 Lead    Collection Time: 07/09/24  3:00 PM   Result Value Ref Range    Ventricular Rate 54 BPM    Atrial Rate 54 BPM    P-R Interval 298 ms    QRS Duration 86 ms    Q-T Interval 480 ms    QTc Calculation (Bazett) 455 ms    P Axis 67 degrees    R Axis -41 degrees    T Axis 38 degrees    Diagnosis       Sinus bradycardia with 1st degree A-V block  Left axis deviation  Abnormal ECG  When compared with ECG of 08-JUL-2024 13:29,  sinus rhythm has replaces atrial fibrillation  Confirmed by MD MORENO DANIEL ([id number]) on 7/9/2024 4:39:01 PM     APTT    Collection Time: 07/09/24 10:21 PM   Result Value Ref Range    APTT 83.1 (H) 23.3 - 37.4 SEC   Basic Metabolic Panel w/ Reflex to MG    Collection Time: 07/10/24  5:01 AM   Result Value Ref Range    Sodium 140 136 - 145 mmol/L    Potassium 4.5 3.5 - 5.1 mmol/L    Chloride 103 98 - 107 mmol/L    CO2 26 20 - 28 mmol/L    Anion Gap 12 9 - 18 mmol/L    Glucose 105 (H) 70 - 99 mg/dL    BUN 36 (H) 8 - 23 MG/DL    Creatinine 1.76 (H) 0.60 - 1.10 MG/DL    Est, Glom Filt Rate 29 (L) >60 ml/min/1.73m2    Calcium 8.7 (L) 8.8 - 10.2 MG/DL   CBC    Collection Time: 07/10/24  5:01 AM   Result Value Ref Range    WBC 6.5 4.3 - 11.1 K/uL    RBC 3.32 (L) 4.05 - 5.2 M/uL    Hemoglobin 8.5 (L) 11.7 - 15.4 g/dL    Hematocrit 28.6 (L) 35.8 - 46.3 %    MCV 86.1 82 - 102 FL    MCH 25.6 (L) 26.1 - 32.9 PG    MCHC 29.7 (L) 31.4 - 35.0 g/dL    RDW 16.1 (H) 11.9 - 14.6 %    Platelets 234 150 - 450 K/uL    MPV 11.9 9.4 - 12.3 FL    nRBC 0.00 0.0 - 0.2 K/uL   Magnesium    Collection Time: 07/10/24  5:01 AM   Result Value Ref Range    Magnesium 2.3 1.8 - 2.4 mg/dL   APTT    Collection Time:

## 2024-07-17 PROBLEM — Z87.19 HX OF GASTRITIS: Status: ACTIVE | Noted: 2024-07-17

## 2024-07-17 ASSESSMENT — ENCOUNTER SYMPTOMS
BLOOD IN STOOL: 0
ABDOMINAL PAIN: 0

## 2024-07-23 ENCOUNTER — CARE COORDINATION (OUTPATIENT)
Dept: CARE COORDINATION | Facility: CLINIC | Age: 79
End: 2024-07-23

## 2024-07-23 DIAGNOSIS — N18.30 STAGE 3 CHRONIC KIDNEY DISEASE, UNSPECIFIED WHETHER STAGE 3A OR 3B CKD (HCC): Chronic | ICD-10-CM

## 2024-07-23 DIAGNOSIS — D64.9 ANEMIA, UNSPECIFIED TYPE: ICD-10-CM

## 2024-07-23 DIAGNOSIS — I50.32 CHRONIC HEART FAILURE WITH PRESERVED EJECTION FRACTION (HCC): ICD-10-CM

## 2024-07-23 DIAGNOSIS — I10 ESSENTIAL HYPERTENSION: ICD-10-CM

## 2024-07-23 DIAGNOSIS — E78.2 MIXED HYPERLIPIDEMIA: Chronic | ICD-10-CM

## 2024-07-23 LAB
ALBUMIN SERPL-MCNC: 4 G/DL (ref 3.2–4.6)
ALBUMIN/GLOB SERPL: 1.3 (ref 1–1.9)
ALP SERPL-CCNC: 47 U/L (ref 35–104)
ALT SERPL-CCNC: 16 U/L (ref 12–65)
ANION GAP SERPL CALC-SCNC: 13 MMOL/L (ref 9–18)
AST SERPL-CCNC: 24 U/L (ref 15–37)
BASOPHILS # BLD: 0 K/UL (ref 0–0.2)
BASOPHILS NFR BLD: 0 % (ref 0–2)
BILIRUB SERPL-MCNC: 0.2 MG/DL (ref 0–1.2)
BUN SERPL-MCNC: 39 MG/DL (ref 8–23)
CALCIUM SERPL-MCNC: 9.5 MG/DL (ref 8.8–10.2)
CHLORIDE SERPL-SCNC: 103 MMOL/L (ref 98–107)
CHOLEST SERPL-MCNC: 116 MG/DL (ref 0–200)
CO2 SERPL-SCNC: 25 MMOL/L (ref 20–28)
CREAT SERPL-MCNC: 1.71 MG/DL (ref 0.6–1.1)
DIFFERENTIAL METHOD BLD: ABNORMAL
EOSINOPHIL # BLD: 0 K/UL (ref 0–0.8)
EOSINOPHIL NFR BLD: 0 % (ref 0.5–7.8)
ERYTHROCYTE [DISTWIDTH] IN BLOOD BY AUTOMATED COUNT: 18.2 % (ref 11.9–14.6)
GLOBULIN SER CALC-MCNC: 3 G/DL (ref 2.3–3.5)
GLUCOSE SERPL-MCNC: 92 MG/DL (ref 70–99)
HCT VFR BLD AUTO: 35.4 % (ref 35.8–46.3)
HDLC SERPL-MCNC: 57 MG/DL (ref 40–60)
HDLC SERPL: 2 (ref 0–5)
HGB BLD-MCNC: 10.1 G/DL (ref 11.7–15.4)
HGB BLD-MCNC: 10.3 G/DL (ref 11.7–15.4)
IMM GRANULOCYTES # BLD AUTO: 0 K/UL (ref 0–0.5)
IMM GRANULOCYTES NFR BLD AUTO: 0 % (ref 0–5)
LDLC SERPL CALC-MCNC: 40 MG/DL (ref 0–100)
LYMPHOCYTES # BLD: 1.6 K/UL (ref 0.5–4.6)
LYMPHOCYTES NFR BLD: 26 % (ref 13–44)
MCH RBC QN AUTO: 25 PG (ref 26.1–32.9)
MCHC RBC AUTO-ENTMCNC: 28.5 G/DL (ref 31.4–35)
MCV RBC AUTO: 87.6 FL (ref 82–102)
MONOCYTES # BLD: 0.6 K/UL (ref 0.1–1.3)
MONOCYTES NFR BLD: 10 % (ref 4–12)
NEUTS SEG # BLD: 3.9 K/UL (ref 1.7–8.2)
NEUTS SEG NFR BLD: 64 % (ref 43–78)
NRBC # BLD: 0 K/UL (ref 0–0.2)
PLATELET # BLD AUTO: 250 K/UL (ref 150–450)
PMV BLD AUTO: 11.1 FL (ref 9.4–12.3)
POTASSIUM SERPL-SCNC: 4.5 MMOL/L (ref 3.5–5.1)
PROT SERPL-MCNC: 7 G/DL (ref 6.3–8.2)
RBC # BLD AUTO: 4.04 M/UL (ref 4.05–5.2)
SODIUM SERPL-SCNC: 140 MMOL/L (ref 136–145)
TRIGL SERPL-MCNC: 98 MG/DL (ref 0–150)
URATE SERPL-MCNC: 7 MG/DL (ref 2.5–7.1)
VLDLC SERPL CALC-MCNC: 20 MG/DL (ref 6–23)
WBC # BLD AUTO: 6.1 K/UL (ref 4.3–11.1)

## 2024-07-23 NOTE — CARE COORDINATION
Care Transitions Note    Follow Up Call     Patient Current Location:  [place]    Care Transition Nurse contacted the patient by telephone. Verified name and  as identifiers.    Additional needs identified to be addressed with provider   No needs identified                 Method of communication with provider: none.  Patients top risk factors for readmission: medical condition-Acute on chronic CHF, HLD, Mitral valve regurgitation, HTN, CRD, QUINTIN, hypoxemia, Atrial fibrillation     Plan of care updates since last contact:  Patient completed follow up with PCP and Iron supplement was started. Patient completed follow up with Cardiologist as scheduled.   Patient reports she had a \"dizzy spell\" this morning. Patient reports she felt dizzy for a few minutes but got back to normal. Patient reports she is still wearing heart monitor and it shows \"not connected\" on her phone. Patient is going to the office for staff to assist with troubleshoot. Patient reports she continues to monitor herself at home. Patient has RPM monitoring and BP was 127/78, HR 52, oxygen saturation at 97% on RA, and weight was 163 this morning. Patient endorses compliance with her medications. Patient reports she continues to use oxygen at hs. Patient states [facility] home health PT will call her for visit this week. Patient is aware of upcoming appointments and plans to attend.   Care Transition Nurse reviewed red flags and symptoms of concern with patient. The patient was given an opportunity to ask questions; all questions answered at this time.The patient verbalized understanding. Patient has contacts for providers if needs arise.    Medication Review:  Full medication reconciliation completed during previous call. and Medications changed since last call, reviewed today.     Remote Patient Monitoring:  Offered patient enrollment in the Remote Patient Monitoring (RPM) program for in-home monitoring: Yes, patient enrolled; current status is

## 2024-07-24 DIAGNOSIS — D50.0 IRON DEFICIENCY ANEMIA DUE TO CHRONIC BLOOD LOSS: Primary | ICD-10-CM

## 2024-07-26 VITALS
DIASTOLIC BLOOD PRESSURE: 81 MMHG | WEIGHT: 164.2 LBS | BODY MASS INDEX: 32.07 KG/M2 | HEART RATE: 70 BPM | OXYGEN SATURATION: 96 % | SYSTOLIC BLOOD PRESSURE: 130 MMHG

## 2024-07-29 DIAGNOSIS — E78.2 MIXED HYPERLIPIDEMIA: ICD-10-CM

## 2024-07-29 RX ORDER — ROSUVASTATIN CALCIUM 10 MG/1
10 TABLET, COATED ORAL DAILY
Qty: 90 TABLET | Refills: 3 | Status: SHIPPED | OUTPATIENT
Start: 2024-07-29

## 2024-07-29 NOTE — TELEPHONE ENCOUNTER
Requested Prescriptions     Pending Prescriptions Disp Refills    rosuvastatin (CRESTOR) 10 MG tablet 90 tablet 3     Sig: Take 1 tablet by mouth daily     Rx verified last ov 7/16/24

## 2024-07-29 NOTE — TELEPHONE ENCOUNTER
MEDICATION REFILL REQUEST      Name of Medication:  Rosuvastatin   Dose:  10 mg  Frequency:  daily   Quantity:    Days' supply:  90      Pharmacy Name/Location:  [facility]

## 2024-07-30 ENCOUNTER — CARE COORDINATION (OUTPATIENT)
Dept: CARE COORDINATION | Facility: CLINIC | Age: 79
End: 2024-07-30

## 2024-07-30 NOTE — CARE COORDINATION
Care Transitions Note    Follow Up Call     Patient Current Location:  Home: [address]  [place] [address]    Care Transition Nurse contacted the patient by telephone. Verified name and  as identifiers.    Additional needs identified to be addressed with provider   No needs identified                 Method of communication with provider: none.  Patients top risk factors for readmission: medical condition-Acute on chronic CHF, HLD, Mitral valve regurgitation, HTN, CRD, QUINTIN, hypoxemia, Atrial fibrillation       Plan of care updates since last contact:  Patient reports she is doing well. Patient reports compliance with all her medications. Patient states some constipation since Iron started and will try OTC stool softener. Patient encouraged to incorporate fruits and fiber in diet. Patient reports no new or worsening symptoms or dizzy spells this week. Patient reports she had her heart monitor checked out and everything is working well. Patient continues to monitor her self at home. Patient continues to be compliance with RPM. Patient has RPM monitoring and BP was 127/83, HR 63, oxygen saturation at 96% on RA, and weight was 164 this morning. Patient endorses compliance with her medications. Patient reports she continues to use oxygen at hs. Patient encourage to monitor for signs and symptoms of volume overload and knows when to seek care. Patient states she is current with [facility].   Care Transition Nurse reviewed red flags and symptoms of concern with patient. The patient was given an opportunity to ask questions; all questions answered at this time.The patient verbalized understanding. Patient has contacts for providers if needs arise.  Medication Review:  Full medication reconciliation completed during previous call. and No changes since last call.     Remote Patient Monitoring:  Offered patient enrollment in the Remote Patient Monitoring (RPM) program for in-home monitoring: Yes,

## 2024-08-05 ENCOUNTER — TELEPHONE (OUTPATIENT)
Dept: ORTHOPEDIC SURGERY | Age: 79
End: 2024-08-05

## 2024-08-05 NOTE — TELEPHONE ENCOUNTER
Spoke with pt regarding upcoming appointment and pt states appointment is to discuss her Left shoulder and not her right shoulder. Will keep current appointment. Pt expressed understanding.

## 2024-08-07 ENCOUNTER — CARE COORDINATION (OUTPATIENT)
Dept: CARE COORDINATION | Facility: CLINIC | Age: 79
End: 2024-08-07

## 2024-08-07 NOTE — CARE COORDINATION
Care Transitions Note    Follow Up Call     Patient Current Location:  Home: [address]  [place] [address]    Care Transition Nurse contacted the patient by telephone. Verified name and  as identifiers.    Additional needs identified to be addressed with provider   No needs identified                 Method of communication with provider: none.  Patients top risk factors for readmission: medical condition-Acute on chronic CHF, HLD, Mitral valve regurgitation, HTN, CRD, QUINTIN, hypoxemia, Atrial fibrillation     Plan of care updates since last contact:  Patient continues to monitor her self at home. Patient continues to be compliance with RPM. Patient has RPM monitoring and BP was 133/71, HR 67, oxygen saturation at 96% on RA, and weight was 164 this morning. Patient endorses compliance with her medications. Patient reports she continues to use oxygen at hs. Patient encourage to monitor for signs and symptoms of volume overload and knows when to seek care. Patient states she is current with [facility]. Care Transition Nurse reviewed red flags and symptoms of concern with patient. The patient was given an opportunity to ask questions; all questions answered at this time.The patient verbalized understanding. Patient has contacts for providers if needs arise.     Medication Review:  Full medication reconciliation completed during previous call. and No changes since last call.     Remote Patient Monitoring:  Offered patient enrollment in the Remote Patient Monitoring (RPM) program for in-home monitoring: Yes, patient enrolled; current status is activated and monitoring.    Assessments:  Care Transitions Subsequent and Final Call    Schedule Follow Up Appointment with PCP: Completed  Subsequent and Final Calls  Do you have any ongoing symptoms?: No  Have your medications changed?: No  Do you have any questions related to your medications?: No  Do you currently have any active services?: Yes  Are

## 2024-08-14 ENCOUNTER — OFFICE VISIT (OUTPATIENT)
Dept: ORTHOPEDIC SURGERY | Age: 79
End: 2024-08-14
Payer: MEDICARE

## 2024-08-14 DIAGNOSIS — M19.012 ARTHRITIS OF LEFT GLENOHUMERAL JOINT: Primary | ICD-10-CM

## 2024-08-14 DIAGNOSIS — M25.512 LEFT SHOULDER PAIN, UNSPECIFIED CHRONICITY: ICD-10-CM

## 2024-08-14 DIAGNOSIS — M75.102 LEFT ROTATOR CUFF TEAR ARTHROPATHY: ICD-10-CM

## 2024-08-14 DIAGNOSIS — M12.812 LEFT ROTATOR CUFF TEAR ARTHROPATHY: ICD-10-CM

## 2024-08-14 DIAGNOSIS — M75.102 LEFT ROTATOR CUFF TEAR ARTHROPATHY: Primary | ICD-10-CM

## 2024-08-14 DIAGNOSIS — M12.812 LEFT ROTATOR CUFF TEAR ARTHROPATHY: Primary | ICD-10-CM

## 2024-08-14 DIAGNOSIS — M19.012 ARTHRITIS OF LEFT GLENOHUMERAL JOINT: ICD-10-CM

## 2024-08-14 PROCEDURE — 1090F PRES/ABSN URINE INCON ASSESS: CPT | Performed by: ORTHOPAEDIC SURGERY

## 2024-08-14 PROCEDURE — 1036F TOBACCO NON-USER: CPT | Performed by: ORTHOPAEDIC SURGERY

## 2024-08-14 PROCEDURE — G8399 PT W/DXA RESULTS DOCUMENT: HCPCS | Performed by: ORTHOPAEDIC SURGERY

## 2024-08-14 PROCEDURE — G8417 CALC BMI ABV UP PARAM F/U: HCPCS | Performed by: ORTHOPAEDIC SURGERY

## 2024-08-14 PROCEDURE — 1123F ACP DISCUSS/DSCN MKR DOCD: CPT | Performed by: ORTHOPAEDIC SURGERY

## 2024-08-14 PROCEDURE — G8428 CUR MEDS NOT DOCUMENT: HCPCS | Performed by: ORTHOPAEDIC SURGERY

## 2024-08-14 PROCEDURE — 99214 OFFICE O/P EST MOD 30 MIN: CPT | Performed by: ORTHOPAEDIC SURGERY

## 2024-08-14 RX ORDER — TRAMADOL HYDROCHLORIDE 50 MG/1
50-100 TABLET ORAL NIGHTLY PRN
Qty: 30 TABLET | Refills: 0 | Status: SHIPPED | OUTPATIENT
Start: 2024-08-14 | End: 2024-08-29

## 2024-08-14 NOTE — PROGRESS NOTES
Name: Zeynep Carrington  YOB: 1945  Gender: female  MRN: [id number]    CC:   Chief Complaint   Patient presents with    Follow-up     Recheck L Shoulder         HPI: Patient presents for recheck of left shoulder pain.  We have also previously seen her for the contralateral shoulder.  We have previously discussed proceeding with shoulder replacement of the left shoulder. Patient had prior CT in January.  She still has some issues with A-fib and has an appointment with a cardiologist still upcoming.  Previous injections for both left and right shoulder have not really helped.  Of note: Patient did have right glenohumeral injection on 5-.    Allergies   Allergen Reactions    Wasp Venom Protein Anaphylaxis    Atorvastatin Other (See Comments)     High doses cause leg cramps. Able to tolerate low doses (<20mg/day)    High doses cause leg cramps. Able to tolerate low doses    Fluvastatin Hives    Iodinated Contrast Media     Iodine Hives    Lisinopril Other (See Comments) and Cough     cough    Penicillins     Tetracycline Other (See Comments) and Itching     ULCERS IN MOUTH    Tetracyclines & Related     Wasp Venom     Penicillin G Rash     Past Medical History:   Diagnosis Date    Allergic rhinitis, cause unspecified 01/07/2015    Aortic valve disorders 01/07/2015    Arrhythmia     a-fib    Asthma     uses inhaler prn    CAD (coronary artery disease)     mild,  treated with med; NO MI, NO stents, NO CABG     Carotid stenosis 1/7/2015    Carpal tunnel syndrome 01/07/2015    bilat wrists-no issue now    Congestive heart failure (HCC)     COVID-19 vaccine series completed 03/22/2021    Pfizer; 2/27/2021    Depression 06/30/2015    controlled     Esophageal reflux 01/07/2015    controlled with medication     Essential hypertension     controlled with medication    Fibromyalgia     Former smoker     1 ppd for 14 yrs; quit smoking 1981    GERD (gastroesophageal reflux disease)     controlled with

## 2024-08-15 ENCOUNTER — CARE COORDINATION (OUTPATIENT)
Dept: CARE COORDINATION | Facility: CLINIC | Age: 79
End: 2024-08-15

## 2024-08-15 NOTE — CARE COORDINATION
Care Transitions Note    Final Call     Patient Current Location:  Home: [address]  [place] [address]    Care Transition Nurse contacted the patient by telephone. Verified name and  as identifiers.    Patient graduated from the Care Transitions program on 8/15/2024.  Patient/family verbalizes confidence in the ability to self-manage at this time. has the ability to self manage at this time..      Handoff:   Patient/family agreeable to ACM outreach.    Patient continues to be compliant with RPM.  Patient was referred to Pain Management for follow up   Patient is current with [facility]  Assessments:  Care Transitions Subsequent and Final Call    Schedule Follow Up Appointment with PCP: Completed  Subsequent and Final Calls  Have your medications changed?: Yes  Do you currently have any active services?: Yes  Are you currently active with any services?: Home Health  Do you have any needs or concerns that I can assist you with?: No  Identified Barriers: None  Care Transitions Interventions  Other Interventions:           Upcoming Appointments:    Future Appointments         Provider Specialty Dept Phone    2024 2:00 PM Anita Hansen APRN - NP Internal Medicine [phone]    2024 10:30 AM Jorge Woodward MD Cardiology [phone]    10/18/2024 1:15 PM Jones Arthur DO Cardiology [phone]    10/28/2024 11:00 AM Alondra Ortiz APRN - CNP Pulmonology [phone]    2024 2:30 PM Jones Arthur DO Cardiology [phone]    12/10/2024 11:00 AM Jacky Laureano MD Sleep Medicine [phone]        Patient has agreed to contact primary care provider and/or specialist for any further questions, concerns, or needs.    Laura Crowell RN

## 2024-08-16 ENCOUNTER — CARE COORDINATION (OUTPATIENT)
Dept: CARE COORDINATION | Facility: CLINIC | Age: 79
End: 2024-08-16

## 2024-08-16 NOTE — CARE COORDINATION
-Remote Alert Monitoring Note      Date/Time:  2024 1:37 PM  Patient Current Location: [place]  Verified patients name and  as identifiers.    Rpm alert to be reviewed by the provider   red alert  blood pressure heart rate (46)  Vitals Recheck blood pressure heart rate (51)  Additional needs to be addressed by provider: No             LPN 2nd attempt to contact patient by telephone regarding red alert received. Left another HIPAA compliant message requesting a return call. LPN contacted patients emergency contact, Ross Carrington, Verified patients name and  as identifiers. Ross Carrington states he is currently at the beach but will attempt to contact pt and request pt return this LPN 's call. Incoming / return call from pt received. Verified patients name and  as identifiers.  Background: Pt enrolled in RPM r/t CHF.   Refer to 911 immediately if:  Patient unresponsive or unable to provide history  Change in cognition or sudden confusion  Patient unable to respond in complete sentences  Intense chest pain/tightness  Any concern for any clinical emergency  Red Alert: Provider response time of 1 hr required for any red alert requiring intervention  Yellow Alert: Provider response time of 3hr required for any escalated yellow alert  Patient Chief Complaint:  HR Triage  Are you having any Chest Pain? no   Are you having any Shortness of Breath? no   Are you having any dizziness? no   Are you feeling more fatigued or tired than normal? no   Are you having any other health concerns or issues? no     Clinical Interventions: Reviewed and followed up on alerts and treatments-Pt denies CP, SOB, dizziness, and fatigue more than normal at this time. Pulse ox HR of 62 noted in HRS and is within RPM parameters. Pt confirms medication compliance and is agreeable to recheck BP HR. Updated reading of 51 noted in HRS and is within RPM parameters. Pt verbalizes understanding of when to notify provider(s) of changes /

## 2024-08-16 NOTE — CARE COORDINATION
Remote Patient Monitoring Note      Date/Time:  8/16/2024 10:02 AM  Patient Current Location: [place]  LPN attempted to contact patient by telephone regarding red alert received for blood pressure heart rate (46) and weight increase (> 3 lbs in last day and > 5 lbs in last 7 days). HRS weights reviewed. Inaccurate weight of 91.8# removed from HRS. Most recent recorded weight of 165.8# is within RPM parameters. Unable to reach pt. Left HIPAA compliant message.   Background: Pt enrolled in RPM r/t CHF.   Plan/Follow Up: Will continue to review, monitor and address alerts with follow up based on severity of symptoms and risk factors.

## 2024-08-20 ENCOUNTER — OFFICE VISIT (OUTPATIENT)
Dept: INTERNAL MEDICINE CLINIC | Facility: CLINIC | Age: 79
End: 2024-08-20
Payer: MEDICARE

## 2024-08-20 VITALS
WEIGHT: 161 LBS | HEART RATE: 64 BPM | OXYGEN SATURATION: 97 % | SYSTOLIC BLOOD PRESSURE: 114 MMHG | DIASTOLIC BLOOD PRESSURE: 62 MMHG | BODY MASS INDEX: 31.44 KG/M2

## 2024-08-20 DIAGNOSIS — G62.9 NEUROPATHY: ICD-10-CM

## 2024-08-20 DIAGNOSIS — I10 ESSENTIAL HYPERTENSION: Primary | ICD-10-CM

## 2024-08-20 DIAGNOSIS — I50.32 CHRONIC HEART FAILURE WITH PRESERVED EJECTION FRACTION (HCC): ICD-10-CM

## 2024-08-20 DIAGNOSIS — D50.9 IRON DEFICIENCY ANEMIA, UNSPECIFIED IRON DEFICIENCY ANEMIA TYPE: ICD-10-CM

## 2024-08-20 DIAGNOSIS — F32.0 CURRENT MILD EPISODE OF MAJOR DEPRESSIVE DISORDER WITHOUT PRIOR EPISODE (HCC): ICD-10-CM

## 2024-08-20 DIAGNOSIS — E78.2 MIXED HYPERLIPIDEMIA: Chronic | ICD-10-CM

## 2024-08-20 PROCEDURE — 3078F DIAST BP <80 MM HG: CPT | Performed by: NURSE PRACTITIONER

## 2024-08-20 PROCEDURE — G8427 DOCREV CUR MEDS BY ELIG CLIN: HCPCS | Performed by: NURSE PRACTITIONER

## 2024-08-20 PROCEDURE — 99214 OFFICE O/P EST MOD 30 MIN: CPT | Performed by: NURSE PRACTITIONER

## 2024-08-20 PROCEDURE — 1036F TOBACCO NON-USER: CPT | Performed by: NURSE PRACTITIONER

## 2024-08-20 PROCEDURE — 1123F ACP DISCUSS/DSCN MKR DOCD: CPT | Performed by: NURSE PRACTITIONER

## 2024-08-20 PROCEDURE — G8399 PT W/DXA RESULTS DOCUMENT: HCPCS | Performed by: NURSE PRACTITIONER

## 2024-08-20 PROCEDURE — 3074F SYST BP LT 130 MM HG: CPT | Performed by: NURSE PRACTITIONER

## 2024-08-20 PROCEDURE — G8417 CALC BMI ABV UP PARAM F/U: HCPCS | Performed by: NURSE PRACTITIONER

## 2024-08-20 PROCEDURE — 1090F PRES/ABSN URINE INCON ASSESS: CPT | Performed by: NURSE PRACTITIONER

## 2024-08-20 RX ORDER — LOSARTAN POTASSIUM 100 MG/1
100 TABLET ORAL DAILY
Qty: 100 TABLET | Refills: 3 | Status: CANCELLED | OUTPATIENT
Start: 2024-08-20

## 2024-08-20 RX ORDER — FUROSEMIDE 40 MG/1
40 TABLET ORAL DAILY
Qty: 100 TABLET | Refills: 3 | Status: CANCELLED | OUTPATIENT
Start: 2024-08-20

## 2024-08-20 RX ORDER — GABAPENTIN 300 MG/1
300 CAPSULE ORAL 3 TIMES DAILY
Qty: 270 CAPSULE | Refills: 3 | Status: CANCELLED | OUTPATIENT
Start: 2024-08-20 | End: 2025-08-15

## 2024-08-20 RX ORDER — SPIRONOLACTONE 25 MG/1
25 TABLET ORAL DAILY
Qty: 100 TABLET | Refills: 3 | Status: CANCELLED | OUTPATIENT
Start: 2024-08-20

## 2024-08-20 ASSESSMENT — PATIENT HEALTH QUESTIONNAIRE - PHQ9
SUM OF ALL RESPONSES TO PHQ QUESTIONS 1-9: 0
2. FEELING DOWN, DEPRESSED OR HOPELESS: NOT AT ALL
1. LITTLE INTEREST OR PLEASURE IN DOING THINGS: NOT AT ALL
SUM OF ALL RESPONSES TO PHQ QUESTIONS 1-9: 0
SUM OF ALL RESPONSES TO PHQ9 QUESTIONS 1 & 2: 0

## 2024-08-20 ASSESSMENT — ENCOUNTER SYMPTOMS
COUGH: 0
WHEEZING: 0
CHEST TIGHTNESS: 0
SHORTNESS OF BREATH: 0

## 2024-08-20 NOTE — PROGRESS NOTES
PROGRESS NOTE    SUBJECTIVE:   Zeynep Carrington is a 78 y.o. female seen for a follow up visit for   Chief Complaint   Patient presents with    Hyperlipidemia     HPI    Iron supplement causing constipation.    CHRONIC MEDICAL PROBLEMS  Cholesterol Problem  Onset: approx since 2011.  Asymptomatic treatment:  rosuvastatin -- tolerating well.    Hypertension   Onset: approx since 2001.  Asymptomatic.  Risk factors include family history, postmenopause, obesity and hypertension. Treatment: Losartan 100 mg daily, metoprolol XL 25 mg daily, spironolactone 25 mg daily and furosemide 40 mg daily.    GERD -- \"if I eat, lay down and then sit back up I have reflux and can taste acid but it goes back down.\"  Improvement with pantoprazole 40 mg twice daily.    Fatigue all the time.  \"I get up and I can go right back to bed.\"  Wakes up about 3:00 am and then goes back to bed.  Then wakes again at 6:00 am and has coffee and stay up but still feels tired.  Bedtime 9:00 pm.  Around 3 pm want to take a nap.  Reports CPAP mask is not always sealing.  DME=[employer]  \"the mask fits ok when sitting upright.  But when I lay down it leaks.\"    Occasional chest pain non-exertional.  Can happen when sitting on bed or laying down.  No diaphoresis, does not radiate.    Left shoulder pain  Chronic.  Multiple injections.  Has considered shoulder replacement however, not at this time due to comorbidities.    Past Medical History:   Diagnosis Date    Allergic rhinitis, cause unspecified 01/07/2015    Aortic valve disorders 01/07/2015    Arrhythmia     a-fib    Asthma     uses inhaler prn    CAD (coronary artery disease)     mild,  treated with med; NO MI, NO stents, NO CABG     Carotid stenosis 1/7/2015    Carpal tunnel syndrome 01/07/2015    bilat wrists-no issue now    Congestive heart failure (HCC)     COVID-19 vaccine series completed 03/22/2021    Pfizer; 2/27/2021    Depression 06/30/2015    controlled     Esophageal reflux 01/07/2015

## 2024-08-21 ENCOUNTER — TELEPHONE (OUTPATIENT)
Dept: PULMONOLOGY | Age: 79
End: 2024-08-21

## 2024-08-21 NOTE — TELEPHONE ENCOUNTER
Patient called the office stating that she still hasn't heard anything from [employer] about her shipment for the Fasenra PEN. She filled out the application for the Patient Assistance Program in June. I have reviewed her chart and she went through [employer] for assistance. I have called [employer] to check on the status of the application, I spoke with Roldan, he states that they do not have an application for this patient. He also said that they do not affiliate with [employer] (They are a 3rd party that charges the patient for something that should be a free service). He states that we would need to send in a new application to enroll her in the PAP for Fasenra. I have called the patient and she states that she will cancel the [employer] and contact her bank to stop payments. She will come by the office to get a sample for her upcoming shot in Sept. She will sign the application at that time.

## 2024-08-26 ENCOUNTER — CARE COORDINATION (OUTPATIENT)
Dept: CARE COORDINATION | Facility: CLINIC | Age: 79
End: 2024-08-26

## 2024-08-26 NOTE — CARE COORDINATION
-Remote Alert Monitoring Note      Date/Time:  2024 10:10 AM  Patient Current Location: [place]  Verified patients name and  as identifiers.    Rpm alert to be reviewed by the provider   red alert  blood pressure heart rate (40)  Vitals Recheck blood pressure heart rate (57)  Additional needs to be addressed by provider: No             LPN contacted patient by telephone regarding red alert received   Background: Pt enrolled in RPM r/t CHF.   Refer to 911 immediately if:  Patient unresponsive or unable to provide history  Change in cognition or sudden confusion  Patient unable to respond in complete sentences  Intense chest pain/tightness  Any concern for any clinical emergency  Red Alert: Provider response time of 1 hr required for any red alert requiring intervention  Yellow Alert: Provider response time of 3hr required for any escalated yellow alert  Patient Chief Complaint:  HR Triage  Are you having any Chest Pain? no   Are you having any Shortness of Breath? no   Are you having any dizziness? no   Are you feeling more fatigued or tired than normal? no   Are you having any other health concerns or issues? no     Clinical Interventions: Reviewed and followed up on alerts and treatments- Pt denies CP, SOB, dizziness, and fatigue more than normal at this time, and any other health concerns or issues that have not been or are not being addressed by a provider. Pulse ox HR of 61 noted in HRS and is within RPM parameters. Pt confirms compliance with Eliquis, Aspirin, losartan, and Aldactone. Pt agreeable to recheck BP HR at this time. Updated reading of 57 noted in HRS and is within RPM parameters. Pt recently wore a heart monitor and is scheduled for f/u with EP on 24. See Cardiology pt message from 24 for additional detail. Pt verbalizes understanding of when to notify provider(s) of changes / concerns and when to seek emergent medical care.   Plan/Follow Up: Will continue to review,

## 2024-08-28 ENCOUNTER — INITIAL CONSULT (OUTPATIENT)
Age: 79
End: 2024-08-28
Payer: MEDICARE

## 2024-08-28 VITALS
BODY MASS INDEX: 33.34 KG/M2 | SYSTOLIC BLOOD PRESSURE: 128 MMHG | WEIGHT: 169.8 LBS | HEIGHT: 60 IN | DIASTOLIC BLOOD PRESSURE: 70 MMHG | HEART RATE: 48 BPM

## 2024-08-28 DIAGNOSIS — D50.9 IRON DEFICIENCY ANEMIA, UNSPECIFIED IRON DEFICIENCY ANEMIA TYPE: ICD-10-CM

## 2024-08-28 DIAGNOSIS — I48.19 PERSISTENT ATRIAL FIBRILLATION (HCC): Primary | ICD-10-CM

## 2024-08-28 DIAGNOSIS — I10 ESSENTIAL HYPERTENSION: Chronic | ICD-10-CM

## 2024-08-28 DIAGNOSIS — R00.1 SYMPTOMATIC BRADYCARDIA: ICD-10-CM

## 2024-08-28 LAB
BASOPHILS # BLD: 0 K/UL (ref 0–0.2)
BASOPHILS NFR BLD: 0 % (ref 0–2)
DIFFERENTIAL METHOD BLD: ABNORMAL
EOSINOPHIL # BLD: 0 K/UL (ref 0–0.8)
EOSINOPHIL NFR BLD: 0 % (ref 0.5–7.8)
ERYTHROCYTE [DISTWIDTH] IN BLOOD BY AUTOMATED COUNT: 17.3 % (ref 11.9–14.6)
HCT VFR BLD AUTO: 34.5 % (ref 35.8–46.3)
HGB BLD-MCNC: 10.5 G/DL (ref 11.7–15.4)
IMM GRANULOCYTES # BLD AUTO: 0 K/UL (ref 0–0.5)
IMM GRANULOCYTES NFR BLD AUTO: 0 % (ref 0–5)
IRON SATN MFR SERPL: 35 % (ref 20–50)
IRON SERPL-MCNC: 131 UG/DL (ref 35–100)
LYMPHOCYTES # BLD: 1.5 K/UL (ref 0.5–4.6)
LYMPHOCYTES NFR BLD: 24 % (ref 13–44)
MCH RBC QN AUTO: 27.3 PG (ref 26.1–32.9)
MCHC RBC AUTO-ENTMCNC: 30.4 G/DL (ref 31.4–35)
MCV RBC AUTO: 89.8 FL (ref 82–102)
MONOCYTES # BLD: 0.6 K/UL (ref 0.1–1.3)
MONOCYTES NFR BLD: 10 % (ref 4–12)
NEUTS SEG # BLD: 4.1 K/UL (ref 1.7–8.2)
NEUTS SEG NFR BLD: 66 % (ref 43–78)
NRBC # BLD: 0 K/UL (ref 0–0.2)
PLATELET # BLD AUTO: 213 K/UL (ref 150–450)
PMV BLD AUTO: 10.5 FL (ref 9.4–12.3)
RBC # BLD AUTO: 3.84 M/UL (ref 4.05–5.2)
TIBC SERPL-MCNC: 375 UG/DL (ref 240–450)
UIBC SERPL-MCNC: 244 UG/DL (ref 112–347)
WBC # BLD AUTO: 6.3 K/UL (ref 4.3–11.1)

## 2024-08-28 PROCEDURE — 3078F DIAST BP <80 MM HG: CPT | Performed by: INTERNAL MEDICINE

## 2024-08-28 PROCEDURE — 99204 OFFICE O/P NEW MOD 45 MIN: CPT | Performed by: INTERNAL MEDICINE

## 2024-08-28 PROCEDURE — 3074F SYST BP LT 130 MM HG: CPT | Performed by: INTERNAL MEDICINE

## 2024-08-28 PROCEDURE — 1036F TOBACCO NON-USER: CPT | Performed by: INTERNAL MEDICINE

## 2024-08-28 PROCEDURE — G8399 PT W/DXA RESULTS DOCUMENT: HCPCS | Performed by: INTERNAL MEDICINE

## 2024-08-28 PROCEDURE — G8427 DOCREV CUR MEDS BY ELIG CLIN: HCPCS | Performed by: INTERNAL MEDICINE

## 2024-08-28 PROCEDURE — 93000 ELECTROCARDIOGRAM COMPLETE: CPT | Performed by: INTERNAL MEDICINE

## 2024-08-28 PROCEDURE — G8417 CALC BMI ABV UP PARAM F/U: HCPCS | Performed by: INTERNAL MEDICINE

## 2024-08-28 PROCEDURE — 1123F ACP DISCUSS/DSCN MKR DOCD: CPT | Performed by: INTERNAL MEDICINE

## 2024-08-28 PROCEDURE — 1090F PRES/ABSN URINE INCON ASSESS: CPT | Performed by: INTERNAL MEDICINE

## 2024-08-28 RX ORDER — HYDROCODONE BITARTRATE AND ACETAMINOPHEN 5; 325 MG/1; MG/1
1 TABLET ORAL NIGHTLY PRN
COMMUNITY
Start: 2024-08-22

## 2024-08-28 NOTE — PROGRESS NOTES
[facility] CARDIOLOGY, [address], [address]  [place] [address]  PHONE: [phone]  Zeynep Carrington  1945    Chief Complant:    Chief Complaint   Patient presents with    Consultation    Atrial Fibrillation      Consultation is requested by [unfilled] for evaluation of Consultation and Atrial Fibrillation    Reason for Consultation: afib    History:  Zeynep Carrington is a very pleasant 79 y.o. female with a past medical and cardiac history significant for persistent atrial fibrillation s/p recent MING guided DCCV, CAD, HTN, HLD and presents for EP consultation for afib. Pt main complaint is low energy, fatigue, not able to do what she wants. She denies palpitations, no rapid HR, no syncope. She has had ongoing bradycardia, av HR 61 from monitor.    Cardiac PMH: (Old records have been reviewed and summarized below)  TTE (7/9/24): EF 60-65%, mod MR, mod LAE  Cardiac telemetry: 96% afib, no significant pauses, av HR 61  EKG (7/10/24) personally viewed and interpreted: NSR, FAV block with WV ~360 msec     Reviewed office note Dr. Arthur 7/16/24    Past Medical History, Past Surgical History, Family history, Social History, and Medications were all reviewed with the patient today and updated as necessary.     Current Outpatient Medications   Medication Sig Dispense Refill    HYDROcodone-acetaminophen (NORCO) 5-325 MG per tablet Take 1 tablet by mouth nightly as needed.      rosuvastatin (CRESTOR) 10 MG tablet Take 1 tablet by mouth daily 90 tablet 3    ferrous sulfate (IRON 325) 325 (65 Fe) MG tablet Take 1 tablet by mouth 2 times daily 180 tablet 1    apixaban (ELIQUIS) 5 MG TABS tablet Take 1 tablet by mouth 2 times daily 60 tablet 5    Benralizumab (FASENRA PEN) 30 MG/ML SOAJ Inject 1 mL into the skin every 28 days For 3 doses then every 56 days. 1 Adjustable Dose Pre-filled Pen Syringe 6    furosemide (LASIX) 40 MG tablet Take 1 tablet by mouth daily 100 tablet 3    losartan (COZAAR) 100 MG  Q12H    3. HTN: controlled, cont losartan 100mg, aldactone 25mg    Patient has been instructed and agrees to call our office with any issues or other concerns related to their cardiac condition(s) and/or complaint(s).    No follow-up provider specified.      Jorge Woodward MD, MS  Cardiology/Electrophysiology  08/28/24  10:32 AM

## 2024-09-09 ENCOUNTER — CARE COORDINATION (OUTPATIENT)
Dept: CARE COORDINATION | Facility: CLINIC | Age: 79
End: 2024-09-09

## 2024-09-26 ENCOUNTER — TELEMEDICINE (OUTPATIENT)
Dept: INTERNAL MEDICINE CLINIC | Facility: CLINIC | Age: 79
End: 2024-09-26
Payer: MEDICARE

## 2024-09-26 DIAGNOSIS — Z00.00 MEDICARE ANNUAL WELLNESS VISIT, SUBSEQUENT: Primary | ICD-10-CM

## 2024-09-26 PROCEDURE — G0439 PPPS, SUBSEQ VISIT: HCPCS | Performed by: NURSE PRACTITIONER

## 2024-09-26 PROCEDURE — 1123F ACP DISCUSS/DSCN MKR DOCD: CPT | Performed by: NURSE PRACTITIONER

## 2024-09-26 ASSESSMENT — PATIENT HEALTH QUESTIONNAIRE - PHQ9
SUM OF ALL RESPONSES TO PHQ QUESTIONS 1-9: 1
9. THOUGHTS THAT YOU WOULD BE BETTER OFF DEAD, OR OF HURTING YOURSELF: NOT AT ALL
SUM OF ALL RESPONSES TO PHQ QUESTIONS 1-9: 1
7. TROUBLE CONCENTRATING ON THINGS, SUCH AS READING THE NEWSPAPER OR WATCHING TELEVISION: SEVERAL DAYS
10. IF YOU CHECKED OFF ANY PROBLEMS, HOW DIFFICULT HAVE THESE PROBLEMS MADE IT FOR YOU TO DO YOUR WORK, TAKE CARE OF THINGS AT HOME, OR GET ALONG WITH OTHER PEOPLE: NOT DIFFICULT AT ALL
5. POOR APPETITE OR OVEREATING: NOT AT ALL
1. LITTLE INTEREST OR PLEASURE IN DOING THINGS: NOT AT ALL
4. FEELING TIRED OR HAVING LITTLE ENERGY: NOT AT ALL
SUM OF ALL RESPONSES TO PHQ9 QUESTIONS 1 & 2: 0
SUM OF ALL RESPONSES TO PHQ QUESTIONS 1-9: 1
8. MOVING OR SPEAKING SO SLOWLY THAT OTHER PEOPLE COULD HAVE NOTICED. OR THE OPPOSITE, BEING SO FIGETY OR RESTLESS THAT YOU HAVE BEEN MOVING AROUND A LOT MORE THAN USUAL: NOT AT ALL
SUM OF ALL RESPONSES TO PHQ QUESTIONS 1-9: 1
6. FEELING BAD ABOUT YOURSELF - OR THAT YOU ARE A FAILURE OR HAVE LET YOURSELF OR YOUR FAMILY DOWN: NOT AT ALL
2. FEELING DOWN, DEPRESSED OR HOPELESS: NOT AT ALL
3. TROUBLE FALLING OR STAYING ASLEEP: NOT AT ALL

## 2024-09-26 NOTE — PROGRESS NOTES
Medicare Annual Wellness Visit    Zeynep Carrington is here for Medicare AWV    Assessment & Plan   Medicare annual wellness visit, subsequent  Recommendations for Preventive Services Due: see orders and patient instructions/AVS.  Recommended screening schedule for the next 5-10 years is provided to the patient in written form: see Patient Instructions/AVS.     Return for Follow-Up, regularly scheduled appointment or sooner prn.     Subjective       Patient's complete Health Risk Assessment and screening values have been reviewed and are found in Flowsheets. The following problems were reviewed today and where indicated follow up appointments were made and/or referrals ordered.    Positive Risk Factor Screenings with Interventions:            Controlled Medication Review:    Today's Pain Level: No data recorded   Opioid Risk: (Low risk score <55) Opioid risk score: 22    Patient is low risk for opioid use disorder or overdose.    Last PDMP Bright as Reviewed:  Review User Review Instant Review Result   ROSA MORENO 9/1/2022  5:46 PM     Reviewed PDMP [1]         General HRA Questions:  Select all that apply: (!) New or Increased Pain  Interventions - Pain:  Patient stated increased pain is due to her foot cramps. Patient informed to schedule an office visit if she would like to discuss this.        Abnormal BMI (obese):  There is no height or weight on file to calculate BMI. (!) Abnormal  Interventions:  See AVS for additional education material       Hearing Screen:  Do you or your family notice any trouble with your hearing that hasn't been managed with hearing aids?: (!) Yes    Interventions:  Patient is going to call and schedule an visit to get her hearing aids looked at and checked.    Vision Screen:  Do you have difficulty driving, watching TV, or doing any of your daily activities because of your eyesight?: (!) Yes  Have you had an eye exam within the past year?: (!) No  Interventions:   Patient encouraged

## 2024-09-26 NOTE — PATIENT INSTRUCTIONS
Preventive Plan for Zeynep Carrington - 9/26/2024  Medicare offers a range of preventive health benefits. Some of the tests and screenings are paid in full while other may be subject to a deductible, co-insurance, and/or copay.    Some of these benefits include a comprehensive review of your medical history including lifestyle, illnesses that may run in your family, and various assessments and screenings as appropriate.    After reviewing your medical record and screening and assessments performed today your provider may have ordered immunizations, labs, imaging, and/or referrals for you.  A list of these orders (if applicable) as well as your Preventive Care list are included within your After Visit Summary for your review.    Other Preventive Recommendations:    A preventive eye exam performed by an eye specialist is recommended every 1-2 years to screen for glaucoma; cataracts, macular degeneration, and other eye disorders.  A preventive dental visit is recommended every 6 months.  Try to get at least 150 minutes of exercise per week or 10,000 steps per day on a pedometer .  Order or download the FREE \"Exercise & Physical Activity: Your Everyday Guide\" from [employer]. Call 1-727.128.1627 or search [employer] online.  You need 0401-2580 mg of calcium and 1782-5906 IU of vitamin D per day. It is possible to meet your calcium requirement with diet alone, but a vitamin D supplement is usually necessary to meet this goal.  When exposed to the sun, use a sunscreen that protects against both UVA and UVB radiation with an SPF of 30 or greater. Reapply every 2 to 3 hours or after sweating, drying off with a towel, or swimming.  Always wear a seat belt when traveling in a car. Always wear a helmet when riding a bicycle or motorcycle.

## 2024-10-01 ENCOUNTER — CARE COORDINATION (OUTPATIENT)
Dept: CARE COORDINATION | Facility: CLINIC | Age: 79
End: 2024-10-01

## 2024-10-01 NOTE — CARE COORDINATION
Remote Patient Monitoring Note      Date/Time:  10/1/2024 10:45 AM  RPM red alert received for weight increase (of 3 lbs in last day and 5 lbs in last 7 days). HRS weights reviewed. 09/30/24 2nd recorded weight of 137.2# removed from HRS. Inaccurate / not patients weight. Current recorded weight of 159.8# is within RPM parameters. No pt outreach by this LPN indicated at this time.   Background: Pt enrolled in RPM r/t CHF.  Plan/Follow Up: Will continue to review, monitor and address alerts with follow up based on severity of symptoms and risk factors.

## 2024-10-02 NOTE — TELEPHONE ENCOUNTER
Received a fax from [employer] requesting a refill for Fasenra. New script has been placed and sent to Dr. Dee for signature.

## 2024-10-03 RX ORDER — BENRALIZUMAB 30 MG/ML
30 INJECTION, SOLUTION SUBCUTANEOUS
Qty: 1 ADJUSTABLE DOSE PRE-FILLED PEN SYRINGE | Refills: 8 | Status: ACTIVE | OUTPATIENT
Start: 2024-10-03

## 2024-10-08 ENCOUNTER — CARE COORDINATION (OUTPATIENT)
Dept: CARE COORDINATION | Facility: CLINIC | Age: 79
End: 2024-10-08

## 2024-10-08 ENCOUNTER — TELEPHONE (OUTPATIENT)
Age: 79
End: 2024-10-08

## 2024-10-08 NOTE — TELEPHONE ENCOUNTER
Dr.Hurlery,  Just to clarify.. her wt.gain >5 lbs in 7 days is okay and no diuretic adjustments needed?

## 2024-10-08 NOTE — TELEPHONE ENCOUNTER
Red alert  weight (increase > 5 lbs in last 7 days)  Pt enrolled in RPM r/t CHF. Pt c/o some clothing feeling \"tighter than usual\" and states \"my heart felt like it was beating real fast\" earlier this AM. BP HR: 58. Pulse ox HR: 54. Denies CP and SOB at this time. Current recorded weight is 166.0#. 6.2# increase per 7 day look back noted. Compliant with Lasix 40 mg qd, Aldactone 25 mg qd, losartan 100 mg qd, and Eliquis 5 mg BID. Confirms weighing self upon rising, after voiding, before eating or drinking, and while wearing the same amount of clothing she typically wears when weighing self. Takes fiber daily due to constipation. Last BM was today, 10/08/24. Per [facility], pt is currently scheduled for insert PPM biv multi and Cardiology office visit on 10/18/24. Please advise pt. Thank you.

## 2024-10-08 NOTE — TELEPHONE ENCOUNTER
Please have her take Lasix 40 mg PO BID for the next 2 days to see if it helps with weight. Continue to closely monitor vitals and weight. Thank you.

## 2024-10-08 NOTE — CARE COORDINATION
Remote Patient Monitoring Note      Date/Time:  10/8/2024 11:20 AM  Patient Current Location: [place]  LPN contacted patient by telephone regarding red alert received for weight increase (> 5 lbs in last 7 days). Verified patients name and  as identifiers.    Rpm alert to be reviewed by the provider   This patient is active with [facility] Cardiology and active with Remote Patient Monitoring.   The patient received a reading outside of the parameters that need to be addressed by a provider.   red alert  weight (increase > 5 lbs in last 7 days)  Additional needs to be addressed by provider: Pt enrolled in [facility] r/t CHF. Pt c/o some clothing feeling \"tighter than usual\" and states \"my heart felt like it was beating real fast\" earlier this AM. BP HR: 58. Pulse ox HR: 54. Denies CP and SOB at this time. Current recorded weight is 166.0#. 6.2# increase per 7 day look back noted. Compliant with Lasix 40 mg qd, Aldactone 25 mg qd, losartan 100 mg qd, and Eliquis 5 mg BID. Confirms weighing self upon rising, after voiding, before eating or drinking, and while wearing the same amount of clothing she typically wears when weighing self. Takes fiber daily due to constipation. Last BM was today, 10/08/24. Per [facility], pt is currently scheduled for insert PPM biv multi and Cardiology office visit on 10/18/24. Please advise pt. Thank you.                   Background: Pt enrolled in [facility] r/t CHF  Chief Complaint: clothing feeling \"tighter than usual\" and states \"my heart felt like it was beating real fast\"   Clinical Interventions: Reviewed and followed up on alerts and treatments-Pt c/o some clothing feeling \"tighter than usual\" and states \"my heart felt like it was beating real fast\" earlier this AM. Pt denies CP and SOB at this time. Updated BP, BP HR, pulse ox, and pulse ox HR are all within RPM parameters. Current recorded weight is 166.0#. 6.2# increase per 7 day look back noted in HRS. Pt reports compliance with Lasix 40

## 2024-10-08 NOTE — TELEPHONE ENCOUNTER
Please let the patient know to continue current medications. BP and HR numbers look good. Keep scheduled appointments. If severe symptoms should call 911.

## 2024-10-09 ENCOUNTER — TELEPHONE (OUTPATIENT)
Age: 79
End: 2024-10-09

## 2024-10-09 NOTE — TELEPHONE ENCOUNTER
MEDICATION REFILL REQUEST      Name of Medication:  Apixaban  Dose:  5 mg  Frequency:  BID  Quantity:  180  Days' supply:  90      Pharmacy Name/Location:  [facility]-Please fax a rx

## 2024-10-10 ENCOUNTER — CARE COORDINATION (OUTPATIENT)
Dept: CASE MANAGEMENT | Age: 79
End: 2024-10-10

## 2024-10-10 NOTE — CARE COORDINATION
Remote Patient Monitoring Note      Date/Time:  10/10/2024 9:03 AM  Patient Current Location: Home: [address]  [place] [address]  LPN contacted patient by telephone regarding red alert received for weight increase (OVER 5 POUNDS IN 7 DAYS AND OVER 3 POUNDS IN 1 DAY). Verified patients name and  as identifiers.    Rpm alert to be reviewed by the provider   This patient is active with [facility] Cardiology and active with Remote Patient Monitoring.   The patient received a reading outside of the parameters that need to be addressed by a provider.       red alert  weight (Weight increase. Pt weight on 163# 10/7  169.4# today)  Additional needs to be addressed by provider:  [facility] Cardiology  CHF                     Background: ENROLLED IN RPM for CHF   Chief Complaint: WEIGHT INCREASE  Clinical Interventions: Reviewed and followed up on alerts and treatments-Spoke to Pat regarding RPM red alert for weight increase.pt has CHF.  Denies chest pain or dyspnea. Denies swelling in abdomen or extremities   Pt reports she recently went to her son's home to stay for one week.  She brought the scale with her there. Her scale was on carpet and then moved to hard floor. Educated on moving scale alters the calibration and accuracy of scale. Verbalized understanding. Pt states she weights first thing in the morning before dressing or eating.  Pt states she is going back home today and  moving her scale again back to her home. Pt reports her lasix 40mg was increased  to BID for 2 days and she is taking all of her medications as directed.        Plan/Follow Up: Patient informed that RPM nurse will be sending a message to Dr. Jones Arthur's nurse line with the information we discussed.    [facility] informed of  patient concerns and follow up will be made by [facility] on any needed intervention or recommendations. Patient instructed  for any new or worsening symptoms please call [facility]

## 2024-10-14 ENCOUNTER — CARE COORDINATION (OUTPATIENT)
Dept: CARE COORDINATION | Facility: CLINIC | Age: 79
End: 2024-10-14

## 2024-10-14 ENCOUNTER — CARE COORDINATION (OUTPATIENT)
Facility: CLINIC | Age: 79
End: 2024-10-14

## 2024-10-14 DIAGNOSIS — I50.32 CHRONIC HEART FAILURE WITH PRESERVED EJECTION FRACTION (HCC): Primary | Chronic | ICD-10-CM

## 2024-10-14 NOTE — CARE COORDINATION
Ambulatory Care Coordination Note     10/14/2024 3:03 PM     Care Summary Note:   Patient has returned call from earlier.   - discussed RPM.  Feels she is stable.  Has pulse oximeter, BP cuff and scale of her own.  Has a clear understanding of when to contact help with re: to rapid weight gain (fluids), shortness of breath, blood pressure.    Patient Current Location:   Home: [address] [place] [address]  [place] [address]  Patient has graduated from the Remote Patient Monitoring program on 10/14/2024.   RPM goals have been met at this time.      Patient has been provided instruction on process to return RPM equipment and RPM has been deactivated.      ACM: Nidia Palacio RN     Method of communication with provider: chart routing.    Has the patient been seen in the ED since your last call? no    PCP/Specialist follow up:   Future Appointments         Provider Specialty Dept Phone    10/18/2024 1:15 PM Jones Arthur, DO Cardiology [phone]    10/28/2024 11:00 AM Alondra Ortiz, APRN - CNP Pulmonology [phone]    11/13/2024 2:30 PM Jones Arthur DO Cardiology [phone]    12/10/2024 11:00 AM Jacky Laureano MD Sleep Medicine [phone]    12/20/2024 12:30 PM Anita Hansen APRN - NP Internal Medicine [phone]

## 2024-10-14 NOTE — PROGRESS NOTES
Remote Patient Order Discontinued    Received request from Nidia Palacio RN   to discontinue order for remote patient monitoring of CHF and order completed.

## 2024-10-14 NOTE — CARE COORDINATION
Ambulatory Care Coordination Note     10/14/2024 9:36 AM    Care Summary Note:   Outreach for High Risk Case Management - Unable to reach  Left a message requesting return call  Patient compliant with RPM - 92 days at this point  VS staying stable  Patient to see cardiology in follow up on Friday 10/18     ACM: Nidia Palacio, RN     PCP/Specialist follow up:   Future Appointments         Provider Specialty Dept Phone    10/18/2024 1:15 PM Jones Arthur DO Cardiology [phone]    10/28/2024 11:00 AM Alondra Ortiz, APRN - CNP Pulmonology [phone]    11/13/2024 2:30 PM Jones Arthur DO Cardiology [phone]    12/10/2024 11:00 AM Jacky Laureano MD Sleep Medicine [phone]    12/20/2024 12:30 PM Anita Hansen, APRN - NP Internal Medicine [phone]

## 2024-10-15 NOTE — TELEPHONE ENCOUNTER
MEDICATION REFILL REQUEST      Name of Medication:  Apaixaban  Dose:  5 mg  Frequency:  BID  Quantity:  180  Days' supply:  90 with 3 refills      Pharmacy Name/Location:  [facility]-p-[phone]  Please call in a rx for Apixaban

## 2024-10-16 NOTE — TELEPHONE ENCOUNTER
Requested Prescriptions     Pending Prescriptions Disp Refills    apixaban (ELIQUIS) 5 MG TABS tablet 60 tablet 5     Sig: Take 1 tablet by mouth 2 times daily     Rx verified last ov 8/28/24 Dr Woodward

## 2024-10-16 NOTE — TELEPHONE ENCOUNTER
Pt is wanting a call back ,she said we called her medication into [facility]  and she asked for it to be called into [facility] in [place].Please call pt at [phone] and let her know we will send one to [facility]

## 2024-10-18 ENCOUNTER — OFFICE VISIT (OUTPATIENT)
Age: 79
End: 2024-10-18

## 2024-10-18 VITALS
HEART RATE: 56 BPM | HEIGHT: 60 IN | DIASTOLIC BLOOD PRESSURE: 60 MMHG | SYSTOLIC BLOOD PRESSURE: 110 MMHG | WEIGHT: 166 LBS | BODY MASS INDEX: 32.59 KG/M2

## 2024-10-18 DIAGNOSIS — R00.1 SYMPTOMATIC BRADYCARDIA: Primary | ICD-10-CM

## 2024-10-18 DIAGNOSIS — I48.0 PAROXYSMAL ATRIAL FIBRILLATION (HCC): ICD-10-CM

## 2024-10-18 DIAGNOSIS — I50.32 CHRONIC HEART FAILURE WITH PRESERVED EJECTION FRACTION (HCC): ICD-10-CM

## 2024-10-18 DIAGNOSIS — E78.2 MIXED HYPERLIPIDEMIA: ICD-10-CM

## 2024-10-18 DIAGNOSIS — I10 ESSENTIAL HYPERTENSION: ICD-10-CM

## 2024-10-18 DIAGNOSIS — I34.0 MODERATE MITRAL REGURGITATION BY PRIOR ECHOCARDIOGRAM: ICD-10-CM

## 2024-10-18 DIAGNOSIS — I48.19 PERSISTENT ATRIAL FIBRILLATION (HCC): ICD-10-CM

## 2024-10-18 ASSESSMENT — ENCOUNTER SYMPTOMS
RESPIRATORY NEGATIVE: 1
SHORTNESS OF BREATH: 0
HEMATOCHEZIA: 0
COUGH: 0
HEMATEMESIS: 0

## 2024-10-18 NOTE — PROGRESS NOTES
in future.  Scheduled for 10/31/2024.  - as she is not symptomatic she prefers to hold off on further cardioversion at this time. If HR at home becomes elevated near or above 100 or less than 50 BPM, call 911 to be taken to the hospital.      2. Essential hypertension  -Controlled at this time     3. Heart failure with preserved ejection fraction, unspecified HF chronicity (HCC)  - Appears euvolemic  - On Spironolactone, Lasix, Losartan.   - Toprol XL 25 mg held due to significant bradycardia, can look to resume post pacer.      4. Mixed Hyperlipidemia  -Continue rosuvastatin 10      5.  Mild mitral regurgitation on prior echo  - appears well compensated from volume perspective. No edema, lungs clear.   - Heart rate and blood pressure controlled on losartan, spironolactone for volume control.  - mild to mod appearing MR on MING 7/9/24.     Problem List Items Addressed This Visit          Circulatory    Chronic heart failure with preserved ejection fraction (HCC) (Chronic)    Essential hypertension (Chronic)    Atrial fibrillation (HCC)    Symptomatic bradycardia - Primary       Other    Mixed hyperlipidemia (Chronic)     Other Visit Diagnoses       Moderate mitral regurgitation by prior echocardiogram                Instructed patient go to ER or call 911/EMS should symptoms recur or worsen.     Patient has been instructed and agrees to call our office with any issues or other concerns related to their cardiac condition(s) and/or complaint(s).    Return in about 4 months (around 2/18/2025).    Jones Arthur,   10/18/2024 1:35 PM

## 2024-10-21 ENCOUNTER — CARE COORDINATION (OUTPATIENT)
Dept: CARE COORDINATION | Facility: CLINIC | Age: 79
End: 2024-10-21

## 2024-10-25 NOTE — PROGRESS NOTES
Sig    apixaban (ELIQUIS) 5 MG TABS tablet Take 1 tablet by mouth 2 times daily    gabapentin (NEURONTIN) 300 MG capsule Take 1 capsule by mouth 3 times daily for 360 days.    Benralizumab (FASENRA PEN) 30 MG/ML SOAJ Inject 1 mL into the skin every 2 months Every 4 weeks for 3 doses, then every 8 weeks.    rosuvastatin (CRESTOR) 10 MG tablet Take 1 tablet by mouth daily    ferrous sulfate (IRON 325) 325 (65 Fe) MG tablet Take 1 tablet by mouth 2 times daily    Benralizumab (FASENRA PEN) 30 MG/ML SOAJ Inject 1 mL into the skin every 28 days For 3 doses then every 56 days.    furosemide (LASIX) 40 MG tablet Take 1 tablet by mouth daily    losartan (COZAAR) 100 MG tablet Take 1 tablet by mouth daily    pantoprazole (PROTONIX) 40 MG tablet 1 tablet p.o. 30 minutes prior to morning and evening meal.    nitroGLYCERIN (NITROSTAT) 0.4 MG SL tablet Place 1 tablet under tongue for angina/chest pain.  May repeat every 5 minutes for a total of 3.  If no relief call 911.    albuterol (PROVENTIL) (2.5 MG/3ML) 0.083% nebulizer solution Take 3 mLs by nebulization every 6 hours as needed for Wheezing or Shortness of Breath    albuterol sulfate HFA (PROVENTIL;VENTOLIN;PROAIR) 108 (90 Base) MCG/ACT inhaler 2 puffs 4 times daily if needed for shortness of breath or wheezing    budesonide-formoterol (SYMBICORT) 160-4.5 MCG/ACT AERO 2 puffs twice daily, rinse mouth after use.  May use preferred brand or generic    acetaminophen (TYLENOL) 500 MG tablet Take 1 tablet by mouth every 6 hours as needed for Pain Patient takes 2 tablets as NEEDED for pain.    sertraline (ZOLOFT) 50 MG tablet Take 1.5 tablets by mouth daily    spironolactone (ALDACTONE) 25 MG tablet Take 1 tablet by mouth daily    Coenzyme Q10 (CO Q 10) 10 MG CAPS Take by mouth    diclofenac sodium (VOLTAREN) 1 % GEL Apply 2 g topically 4 times daily Patient takes AS NEEDED    ascorbic acid (VITAMIN C) 250 MG tablet Take 1 tablet by mouth daily    aspirin 81 MG EC tablet Take 1

## 2024-10-28 ENCOUNTER — OFFICE VISIT (OUTPATIENT)
Dept: PULMONOLOGY | Age: 79
End: 2024-10-28
Payer: MEDICARE

## 2024-10-28 VITALS
OXYGEN SATURATION: 97 % | DIASTOLIC BLOOD PRESSURE: 60 MMHG | WEIGHT: 166.9 LBS | SYSTOLIC BLOOD PRESSURE: 146 MMHG | RESPIRATION RATE: 14 BRPM | HEIGHT: 60 IN | BODY MASS INDEX: 32.76 KG/M2 | HEART RATE: 72 BPM | TEMPERATURE: 97.5 F

## 2024-10-28 DIAGNOSIS — I50.32 CHRONIC HEART FAILURE WITH PRESERVED EJECTION FRACTION (HCC): ICD-10-CM

## 2024-10-28 DIAGNOSIS — J45.50 SEVERE PERSISTENT ASTHMA WITHOUT COMPLICATION: Primary | ICD-10-CM

## 2024-10-28 DIAGNOSIS — G47.33 OSA TREATED WITH BIPAP: ICD-10-CM

## 2024-10-28 DIAGNOSIS — I48.0 PAF (PAROXYSMAL ATRIAL FIBRILLATION) (HCC): ICD-10-CM

## 2024-10-28 DIAGNOSIS — I27.20 PULMONARY HYPERTENSION (HCC): ICD-10-CM

## 2024-10-28 DIAGNOSIS — J30.1 SEASONAL ALLERGIC RHINITIS DUE TO POLLEN: ICD-10-CM

## 2024-10-28 DIAGNOSIS — R00.1 SYMPTOMATIC BRADYCARDIA: ICD-10-CM

## 2024-10-28 PROCEDURE — 1090F PRES/ABSN URINE INCON ASSESS: CPT | Performed by: NURSE PRACTITIONER

## 2024-10-28 PROCEDURE — G8484 FLU IMMUNIZE NO ADMIN: HCPCS | Performed by: NURSE PRACTITIONER

## 2024-10-28 PROCEDURE — 99214 OFFICE O/P EST MOD 30 MIN: CPT | Performed by: NURSE PRACTITIONER

## 2024-10-28 PROCEDURE — 1123F ACP DISCUSS/DSCN MKR DOCD: CPT | Performed by: NURSE PRACTITIONER

## 2024-10-28 PROCEDURE — 1125F AMNT PAIN NOTED PAIN PRSNT: CPT | Performed by: NURSE PRACTITIONER

## 2024-10-28 PROCEDURE — G8427 DOCREV CUR MEDS BY ELIG CLIN: HCPCS | Performed by: NURSE PRACTITIONER

## 2024-10-28 PROCEDURE — G8399 PT W/DXA RESULTS DOCUMENT: HCPCS | Performed by: NURSE PRACTITIONER

## 2024-10-28 PROCEDURE — G8417 CALC BMI ABV UP PARAM F/U: HCPCS | Performed by: NURSE PRACTITIONER

## 2024-10-28 PROCEDURE — 3077F SYST BP >= 140 MM HG: CPT | Performed by: NURSE PRACTITIONER

## 2024-10-28 PROCEDURE — 1159F MED LIST DOCD IN RCRD: CPT | Performed by: NURSE PRACTITIONER

## 2024-10-28 PROCEDURE — 1036F TOBACCO NON-USER: CPT | Performed by: NURSE PRACTITIONER

## 2024-10-28 PROCEDURE — 3078F DIAST BP <80 MM HG: CPT | Performed by: NURSE PRACTITIONER

## 2024-10-28 PROCEDURE — 1160F RVW MEDS BY RX/DR IN RCRD: CPT | Performed by: NURSE PRACTITIONER

## 2024-10-28 RX ORDER — ALBUTEROL SULFATE 0.83 MG/ML
SOLUTION RESPIRATORY (INHALATION)
Qty: 360 ML | Refills: 11 | Status: SHIPPED | OUTPATIENT
Start: 2024-10-28

## 2024-10-28 RX ORDER — ALBUTEROL SULFATE 90 UG/1
INHALANT RESPIRATORY (INHALATION)
Qty: 18 G | Refills: 11 | Status: SHIPPED | OUTPATIENT
Start: 2024-10-28

## 2024-10-28 RX ORDER — BUDESONIDE AND FORMOTEROL FUMARATE DIHYDRATE 160; 4.5 UG/1; UG/1
AEROSOL RESPIRATORY (INHALATION)
Qty: 1 EACH | Refills: 11 | Status: SHIPPED | OUTPATIENT
Start: 2024-10-28

## 2024-10-28 ASSESSMENT — ENCOUNTER SYMPTOMS
SPUTUM PRODUCTION: 0
WHEEZING: 0
SHORTNESS OF BREATH: 0
COUGH: 0
HEMOPTYSIS: 0

## 2024-10-28 NOTE — PATIENT INSTRUCTIONS
Continue Fasenra.  Forms for patient assistance are completed, Sravanthi Sarabia MA, assisting.     Continue Symbicort 2 puffs twice daily, rinse mouth after use.     Albuterol inhaler or nebulizer 4 times daily as needed.     Continue BiPAP/oxygen with sleep as prescribed by sleep center.  Advised to take BIPAP with her to the hospital for upcoming procedure/overnight stay.    Recommend RSV vaccine, newest COVID booster.  Prevnar 20 pneumonia vaccine, if insurance will reimburse for it, if not, she has had PPSV 23 since age 63 and has had prevnar 13.  She has had seasonal flu vaccine.

## 2024-10-30 ENCOUNTER — ANESTHESIA EVENT (OUTPATIENT)
Dept: CARDIAC CATH/INVASIVE PROCEDURES | Age: 79
End: 2024-10-30
Payer: MEDICARE

## 2024-10-30 NOTE — PROGRESS NOTES
Patient pre-assessment complete for BIV PPM Insertion scheduled for 10/31/24, arrival time 1030. Patient verified using . Patient instructed to bring a list of all home medications on the day of procedure. NPO status reinforced.  Patient instructed to HOLD Spironolactone, Losartan, Lasix, and Eliquis. Instructed they can take all other medications excluding vitamins & supplements. Patient verbalizes understanding of all instructions & denies any questions at this time.

## 2024-10-31 ENCOUNTER — ANESTHESIA (OUTPATIENT)
Dept: CARDIAC CATH/INVASIVE PROCEDURES | Age: 79
End: 2024-10-31
Payer: MEDICARE

## 2024-10-31 ENCOUNTER — HOSPITAL ENCOUNTER (OUTPATIENT)
Age: 79
Setting detail: OBSERVATION
Discharge: HOME | End: 2024-11-01
Attending: INTERNAL MEDICINE | Admitting: INTERNAL MEDICINE
Payer: MEDICARE

## 2024-10-31 DIAGNOSIS — G89.18 POST-OP PAIN: Primary | ICD-10-CM

## 2024-10-31 DIAGNOSIS — R00.1 SYMPTOMATIC BRADYCARDIA: ICD-10-CM

## 2024-10-31 PROBLEM — Z95.0 PACEMAKER: Status: ACTIVE | Noted: 2024-10-31

## 2024-10-31 LAB
ANION GAP SERPL CALC-SCNC: 12 MMOL/L (ref 7–16)
BASOPHILS # BLD: 0 K/UL (ref 0–0.2)
BASOPHILS NFR BLD: 0 % (ref 0–2)
BUN SERPL-MCNC: 45 MG/DL (ref 8–23)
CALCIUM SERPL-MCNC: 9.3 MG/DL (ref 8.8–10.2)
CHLORIDE SERPL-SCNC: 100 MMOL/L (ref 98–107)
CO2 SERPL-SCNC: 24 MMOL/L (ref 20–29)
CREAT SERPL-MCNC: 1.62 MG/DL (ref 0.6–1.1)
DIFFERENTIAL METHOD BLD: ABNORMAL
ECHO BSA: 1.79 M2
EKG DIAGNOSIS: NORMAL
EKG Q-T INTERVAL: 428 MS
EKG QRS DURATION: 78 MS
EKG QTC CALCULATION (BAZETT): 413 MS
EKG R AXIS: -22 DEGREES
EKG T AXIS: 55 DEGREES
EKG VENTRICULAR RATE: 56 BPM
EOSINOPHIL # BLD: 0 K/UL (ref 0–0.8)
EOSINOPHIL NFR BLD: 0 % (ref 0.5–7.8)
ERYTHROCYTE [DISTWIDTH] IN BLOOD BY AUTOMATED COUNT: 14.9 % (ref 11.9–14.6)
GLUCOSE SERPL-MCNC: 106 MG/DL (ref 70–99)
HCT VFR BLD AUTO: 36.9 % (ref 35.8–46.3)
HGB BLD-MCNC: 11.5 G/DL (ref 11.7–15.4)
IMM GRANULOCYTES # BLD AUTO: 0 K/UL (ref 0–0.5)
IMM GRANULOCYTES NFR BLD AUTO: 0 % (ref 0–5)
LYMPHOCYTES # BLD: 1.6 K/UL (ref 0.5–4.6)
LYMPHOCYTES NFR BLD: 23 % (ref 13–44)
MAGNESIUM SERPL-MCNC: 2.7 MG/DL (ref 1.8–2.4)
MCH RBC QN AUTO: 27.7 PG (ref 26.1–32.9)
MCHC RBC AUTO-ENTMCNC: 31.2 G/DL (ref 31.4–35)
MCV RBC AUTO: 88.9 FL (ref 82–102)
MONOCYTES # BLD: 0.7 K/UL (ref 0.1–1.3)
MONOCYTES NFR BLD: 9 % (ref 4–12)
NEUTS SEG # BLD: 4.7 K/UL (ref 1.7–8.2)
NEUTS SEG NFR BLD: 68 % (ref 43–78)
NRBC # BLD: 0 K/UL (ref 0–0.2)
PLATELET # BLD AUTO: 179 K/UL (ref 150–450)
PMV BLD AUTO: 9.9 FL (ref 9.4–12.3)
POTASSIUM SERPL-SCNC: 4.7 MMOL/L (ref 3.5–5.1)
RBC # BLD AUTO: 4.15 M/UL (ref 4.05–5.2)
SODIUM SERPL-SCNC: 135 MMOL/L (ref 136–145)
WBC # BLD AUTO: 7 K/UL (ref 4.3–11.1)

## 2024-10-31 PROCEDURE — 2720000010 HC SURG SUPPLY STERILE: Performed by: INTERNAL MEDICINE

## 2024-10-31 PROCEDURE — 85025 COMPLETE CBC W/AUTO DIFF WBC: CPT

## 2024-10-31 PROCEDURE — G0378 HOSPITAL OBSERVATION PER HR: HCPCS

## 2024-10-31 PROCEDURE — 83735 ASSAY OF MAGNESIUM: CPT

## 2024-10-31 PROCEDURE — 6360000004 HC RX CONTRAST MEDICATION: Performed by: INTERNAL MEDICINE

## 2024-10-31 PROCEDURE — 33207 INSERT HEART PM VENTRICULAR: CPT | Performed by: INTERNAL MEDICINE

## 2024-10-31 PROCEDURE — C1894 INTRO/SHEATH, NON-LASER: HCPCS | Performed by: INTERNAL MEDICINE

## 2024-10-31 PROCEDURE — C2621 PMKR, OTHER THAN SING/DUAL: HCPCS | Performed by: INTERNAL MEDICINE

## 2024-10-31 PROCEDURE — 2580000003 HC RX 258: Performed by: NURSE ANESTHETIST, CERTIFIED REGISTERED

## 2024-10-31 PROCEDURE — 2580000003 HC RX 258: Performed by: INTERNAL MEDICINE

## 2024-10-31 PROCEDURE — 3700000000 HC ANESTHESIA ATTENDED CARE: Performed by: INTERNAL MEDICINE

## 2024-10-31 PROCEDURE — 93010 ELECTROCARDIOGRAM REPORT: CPT | Performed by: INTERNAL MEDICINE

## 2024-10-31 PROCEDURE — 2500000003 HC RX 250 WO HCPCS: Performed by: INTERNAL MEDICINE

## 2024-10-31 PROCEDURE — 6360000002 HC RX W HCPCS: Performed by: INTERNAL MEDICINE

## 2024-10-31 PROCEDURE — 2700000000 HC OXYGEN THERAPY PER DAY

## 2024-10-31 PROCEDURE — 33208 INSRT HEART PM ATRIAL & VENT: CPT | Performed by: INTERNAL MEDICINE

## 2024-10-31 PROCEDURE — C1900 LEAD, CORONARY VENOUS: HCPCS | Performed by: INTERNAL MEDICINE

## 2024-10-31 PROCEDURE — 33225 L VENTRIC PACING LEAD ADD-ON: CPT | Performed by: INTERNAL MEDICINE

## 2024-10-31 PROCEDURE — 6370000000 HC RX 637 (ALT 250 FOR IP): Performed by: ANESTHESIOLOGY

## 2024-10-31 PROCEDURE — C1892 INTRO/SHEATH,FIXED,PEEL-AWAY: HCPCS | Performed by: INTERNAL MEDICINE

## 2024-10-31 PROCEDURE — A4217 STERILE WATER/SALINE, 500 ML: HCPCS | Performed by: INTERNAL MEDICINE

## 2024-10-31 PROCEDURE — 6370000000 HC RX 637 (ALT 250 FOR IP)

## 2024-10-31 PROCEDURE — 2709999900 HC NON-CHARGEABLE SUPPLY: Performed by: INTERNAL MEDICINE

## 2024-10-31 PROCEDURE — 93005 ELECTROCARDIOGRAM TRACING: CPT | Performed by: INTERNAL MEDICINE

## 2024-10-31 PROCEDURE — 2500000003 HC RX 250 WO HCPCS: Performed by: REGISTERED NURSE

## 2024-10-31 PROCEDURE — C1898 LEAD, PMKR, OTHER THAN TRANS: HCPCS | Performed by: INTERNAL MEDICINE

## 2024-10-31 PROCEDURE — 6360000002 HC RX W HCPCS: Performed by: ANESTHESIOLOGY

## 2024-10-31 PROCEDURE — 6360000002 HC RX W HCPCS: Performed by: REGISTERED NURSE

## 2024-10-31 PROCEDURE — C1769 GUIDE WIRE: HCPCS | Performed by: INTERNAL MEDICINE

## 2024-10-31 PROCEDURE — 94640 AIRWAY INHALATION TREATMENT: CPT

## 2024-10-31 PROCEDURE — 80048 BASIC METABOLIC PNL TOTAL CA: CPT

## 2024-10-31 PROCEDURE — 3700000001 HC ADD 15 MINUTES (ANESTHESIA): Performed by: INTERNAL MEDICINE

## 2024-10-31 DEVICE — CARDIAC RESYNCHRONIZATION THERAPY PACEMAKER
Type: IMPLANTABLE DEVICE | Site: CHEST  WALL | Status: FUNCTIONAL
Brand: VISIONIST™ X4 CRT-P

## 2024-10-31 DEVICE — PACE/SENSE LEAD
Type: IMPLANTABLE DEVICE | Site: HEART | Status: FUNCTIONAL
Brand: ACUITY™ X4 STRAIGHT

## 2024-10-31 DEVICE — PACE/SENSE LEAD
Type: IMPLANTABLE DEVICE | Site: HEART | Status: FUNCTIONAL
Brand: INGEVITY™+

## 2024-10-31 RX ORDER — OXYCODONE HYDROCHLORIDE 5 MG/1
5 TABLET ORAL
Status: DISCONTINUED | OUTPATIENT
Start: 2024-10-31 | End: 2024-10-31 | Stop reason: HOSPADM

## 2024-10-31 RX ORDER — ACETAMINOPHEN 325 MG/1
650 TABLET ORAL EVERY 6 HOURS PRN
Status: DISCONTINUED | OUTPATIENT
Start: 2024-10-31 | End: 2024-11-01 | Stop reason: HOSPADM

## 2024-10-31 RX ORDER — DEXMEDETOMIDINE HYDROCHLORIDE 100 UG/ML
INJECTION, SOLUTION INTRAVENOUS
Status: DISCONTINUED | OUTPATIENT
Start: 2024-10-31 | End: 2024-10-31 | Stop reason: SDUPTHER

## 2024-10-31 RX ORDER — FENTANYL CITRATE 50 UG/ML
50 INJECTION, SOLUTION INTRAMUSCULAR; INTRAVENOUS EVERY 5 MIN PRN
Status: DISCONTINUED | OUTPATIENT
Start: 2024-10-31 | End: 2024-10-31 | Stop reason: HOSPADM

## 2024-10-31 RX ORDER — TRAMADOL HYDROCHLORIDE 50 MG/1
50 TABLET ORAL EVERY 6 HOURS PRN
Status: DISCONTINUED | OUTPATIENT
Start: 2024-10-31 | End: 2024-11-01 | Stop reason: HOSPADM

## 2024-10-31 RX ORDER — SODIUM CHLORIDE, SODIUM LACTATE, POTASSIUM CHLORIDE, CALCIUM CHLORIDE 600; 310; 30; 20 MG/100ML; MG/100ML; MG/100ML; MG/100ML
INJECTION, SOLUTION INTRAVENOUS CONTINUOUS
Status: DISCONTINUED | OUTPATIENT
Start: 2024-10-31 | End: 2024-10-31 | Stop reason: HOSPADM

## 2024-10-31 RX ORDER — DIPHENHYDRAMINE HYDROCHLORIDE 50 MG/ML
50 INJECTION INTRAMUSCULAR; INTRAVENOUS ONCE
Status: DISCONTINUED | OUTPATIENT
Start: 2024-10-31 | End: 2024-10-31

## 2024-10-31 RX ORDER — SODIUM CHLORIDE 0.9 % (FLUSH) 0.9 %
5-40 SYRINGE (ML) INJECTION EVERY 12 HOURS SCHEDULED
Status: DISCONTINUED | OUTPATIENT
Start: 2024-10-31 | End: 2024-10-31 | Stop reason: HOSPADM

## 2024-10-31 RX ORDER — LIDOCAINE HYDROCHLORIDE AND EPINEPHRINE 10; 10 MG/ML; UG/ML
INJECTION, SOLUTION INFILTRATION; PERINEURAL PRN
Status: DISCONTINUED | OUTPATIENT
Start: 2024-10-31 | End: 2024-10-31 | Stop reason: HOSPADM

## 2024-10-31 RX ORDER — IPRATROPIUM BROMIDE AND ALBUTEROL SULFATE 2.5; .5 MG/3ML; MG/3ML
1 SOLUTION RESPIRATORY (INHALATION)
Status: DISCONTINUED | OUTPATIENT
Start: 2024-10-31 | End: 2024-10-31 | Stop reason: HOSPADM

## 2024-10-31 RX ORDER — ONDANSETRON 2 MG/ML
4 INJECTION INTRAMUSCULAR; INTRAVENOUS EVERY 6 HOURS PRN
Status: DISCONTINUED | OUTPATIENT
Start: 2024-10-31 | End: 2024-11-01 | Stop reason: HOSPADM

## 2024-10-31 RX ORDER — SODIUM CHLORIDE 0.9 % (FLUSH) 0.9 %
SYRINGE (ML) INJECTION
Status: DISCONTINUED | OUTPATIENT
Start: 2024-10-31 | End: 2024-10-31 | Stop reason: SDUPTHER

## 2024-10-31 RX ORDER — LIDOCAINE HYDROCHLORIDE 10 MG/ML
1 INJECTION, SOLUTION INFILTRATION; PERINEURAL
Status: DISCONTINUED | OUTPATIENT
Start: 2024-10-31 | End: 2024-10-31 | Stop reason: HOSPADM

## 2024-10-31 RX ORDER — ACETAMINOPHEN 500 MG
1000 TABLET ORAL ONCE
Status: COMPLETED | OUTPATIENT
Start: 2024-10-31 | End: 2024-10-31

## 2024-10-31 RX ORDER — DIPHENHYDRAMINE HYDROCHLORIDE 50 MG/ML
12.5 INJECTION INTRAMUSCULAR; INTRAVENOUS ONCE
Status: COMPLETED | OUTPATIENT
Start: 2024-10-31 | End: 2024-10-31

## 2024-10-31 RX ORDER — HALOPERIDOL 5 MG/ML
1 INJECTION INTRAMUSCULAR
Status: DISCONTINUED | OUTPATIENT
Start: 2024-10-31 | End: 2024-10-31 | Stop reason: HOSPADM

## 2024-10-31 RX ORDER — ONDANSETRON 2 MG/ML
4 INJECTION INTRAMUSCULAR; INTRAVENOUS
Status: DISCONTINUED | OUTPATIENT
Start: 2024-10-31 | End: 2024-10-31 | Stop reason: HOSPADM

## 2024-10-31 RX ORDER — NALOXONE HYDROCHLORIDE 0.4 MG/ML
INJECTION, SOLUTION INTRAMUSCULAR; INTRAVENOUS; SUBCUTANEOUS PRN
Status: DISCONTINUED | OUTPATIENT
Start: 2024-10-31 | End: 2024-10-31 | Stop reason: HOSPADM

## 2024-10-31 RX ORDER — MIDAZOLAM HYDROCHLORIDE 2 MG/2ML
2 INJECTION, SOLUTION INTRAMUSCULAR; INTRAVENOUS
Status: DISCONTINUED | OUTPATIENT
Start: 2024-10-31 | End: 2024-10-31 | Stop reason: HOSPADM

## 2024-10-31 RX ORDER — HYDROMORPHONE HYDROCHLORIDE 2 MG/ML
0.5 INJECTION, SOLUTION INTRAMUSCULAR; INTRAVENOUS; SUBCUTANEOUS EVERY 10 MIN PRN
Status: DISCONTINUED | OUTPATIENT
Start: 2024-10-31 | End: 2024-10-31 | Stop reason: HOSPADM

## 2024-10-31 RX ORDER — KETAMINE HCL IN NACL, ISO-OSM 20 MG/2 ML
SYRINGE (ML) INJECTION
Status: DISCONTINUED | OUTPATIENT
Start: 2024-10-31 | End: 2024-10-31 | Stop reason: SDUPTHER

## 2024-10-31 RX ORDER — SODIUM CHLORIDE 0.9 % (FLUSH) 0.9 %
5-40 SYRINGE (ML) INJECTION PRN
Status: DISCONTINUED | OUTPATIENT
Start: 2024-10-31 | End: 2024-10-31 | Stop reason: HOSPADM

## 2024-10-31 RX ORDER — PROPOFOL 10 MG/ML
INJECTION, EMULSION INTRAVENOUS
Status: DISCONTINUED | OUTPATIENT
Start: 2024-10-31 | End: 2024-10-31 | Stop reason: SDUPTHER

## 2024-10-31 RX ORDER — IOPAMIDOL 755 MG/ML
INJECTION, SOLUTION INTRAVASCULAR PRN
Status: DISCONTINUED | OUTPATIENT
Start: 2024-10-31 | End: 2024-10-31 | Stop reason: HOSPADM

## 2024-10-31 RX ORDER — LEVALBUTEROL INHALATION SOLUTION 0.63 MG/3ML
0.63 SOLUTION RESPIRATORY (INHALATION) ONCE
Status: COMPLETED | OUTPATIENT
Start: 2024-10-31 | End: 2024-10-31

## 2024-10-31 RX ADMIN — DEXMEDETOMIDINE 8 MCG: 100 INJECTION, SOLUTION, CONCENTRATE INTRAVENOUS at 14:48

## 2024-10-31 RX ADMIN — PROPOFOL 20 MG: 10 INJECTION, EMULSION INTRAVENOUS at 14:49

## 2024-10-31 RX ADMIN — PROPOFOL 25 MCG/KG/MIN: 10 INJECTION, EMULSION INTRAVENOUS at 13:32

## 2024-10-31 RX ADMIN — TRAMADOL HYDROCHLORIDE 50 MG: 50 TABLET ORAL at 23:33

## 2024-10-31 RX ADMIN — SODIUM CHLORIDE, PRESERVATIVE FREE 30 ML: 5 INJECTION INTRAVENOUS at 15:11

## 2024-10-31 RX ADMIN — DEXMEDETOMIDINE 8 MCG: 100 INJECTION, SOLUTION, CONCENTRATE INTRAVENOUS at 14:35

## 2024-10-31 RX ADMIN — SODIUM CHLORIDE, PRESERVATIVE FREE 20 MG: 5 INJECTION INTRAVENOUS at 11:55

## 2024-10-31 RX ADMIN — Medication 20 MG: at 13:32

## 2024-10-31 RX ADMIN — HYDROCORTISONE SODIUM SUCCINATE 100 MG: 100 INJECTION, POWDER, FOR SOLUTION INTRAMUSCULAR; INTRAVENOUS at 11:57

## 2024-10-31 RX ADMIN — DEXMEDETOMIDINE 4 MCG: 100 INJECTION, SOLUTION, CONCENTRATE INTRAVENOUS at 14:05

## 2024-10-31 RX ADMIN — DIPHENHYDRAMINE HYDROCHLORIDE 12.5 MG: 50 INJECTION INTRAMUSCULAR; INTRAVENOUS at 11:59

## 2024-10-31 RX ADMIN — CEFAZOLIN 2 ML: 10 INJECTION, POWDER, FOR SOLUTION INTRAVENOUS at 13:35

## 2024-10-31 RX ADMIN — LEVALBUTEROL HYDROCHLORIDE 0.63 MG: 0.63 SOLUTION RESPIRATORY (INHALATION) at 12:23

## 2024-10-31 RX ADMIN — DEXMEDETOMIDINE 4 MCG: 100 INJECTION, SOLUTION, CONCENTRATE INTRAVENOUS at 13:37

## 2024-10-31 RX ADMIN — ACETAMINOPHEN 1000 MG: 500 TABLET, FILM COATED ORAL at 15:49

## 2024-10-31 ASSESSMENT — PAIN SCALES - GENERAL
PAINLEVEL_OUTOF10: 6
PAINLEVEL_OUTOF10: 7
PAINLEVEL_OUTOF10: 4

## 2024-10-31 ASSESSMENT — PAIN DESCRIPTION - LOCATION
LOCATION: INCISION
LOCATION: SHOULDER
LOCATION: SHOULDER

## 2024-10-31 ASSESSMENT — PAIN DESCRIPTION - DESCRIPTORS
DESCRIPTORS: ACHING
DESCRIPTORS: ACHING;DISCOMFORT
DESCRIPTORS: ACHING;DISCOMFORT

## 2024-10-31 ASSESSMENT — COPD QUESTIONNAIRES: CAT_SEVERITY: MODERATE

## 2024-10-31 ASSESSMENT — PAIN DESCRIPTION - ORIENTATION
ORIENTATION: LEFT
ORIENTATION: LEFT

## 2024-10-31 NOTE — PROGRESS NOTES
Patient arrived and ambulated to room with no visual problems for planned BI v pacer with Dr. Woodward. Consent signed and procedure discussed with patient. Time allow for patient to verbalize concerns, and concerns addressed with voiced understanding. Medications and history reviewed with patient. Patient prepped for procedure as ordered.    The patient has a fraility score of 5-MILDLY FRAIL, based on Patient can complete ADL's with minimal difficulty or assistance.

## 2024-10-31 NOTE — H&P
[facility] Cardiology/Electrophyiology Consult                Date of  Admission: No admission date for patient encounter.     Zeynep Carrington is a 79 y.o. female admitted for Symptomatic bradycardia [R00.1].      79 y.o. female with a past medical and cardiac history significant for persistent atrial fibrillation s/p recent MING guided DCCV, CAD, HTN, HLD and presents for scheduled BiV PPM. Pt main complaint is low energy, fatigue, not able to do what she wants. She denies palpitations, no rapid HR, no syncope. She has had ongoing bradycardia, av HR 61 from monitor.     Cardiac PMH: (Old records have been reviewed and summarized below)    Patient Active Problem List   Diagnosis    Weakening of rectovaginal tissue    On anticoagulant therapy    Carpal tunnel syndrome    Calf pain    QUINTIN treated with BiPAP    Acute on chronic renal insufficiency    Spinal stenosis of lumbar region at multiple levels    Depression    BRANDON (stress urinary incontinence, female)    S/P laminectomy with spinal fusion    Arthropathies    Osteopenia of multiple sites    Asymptomatic carotid artery stenosis    Coronary artery disease involving native coronary artery of native heart without angina pectoris    Rectocele    Gastroesophageal reflux disease without esophagitis    Mixed hyperlipidemia    Congestive heart failure (HCC)    RLS (restless legs syndrome)    Nocturnal hypoxemia    Moderate mitral regurgitation    Atrial fibrillation (HCC)    Aortic valve disorder    Cor pulmonale (chronic) ([facility])    Lumbar stenosis with neurogenic claudication    Fluid overload    Obesity (BMI 30.0-34.9)    Elevated brain natriuretic peptide (BNP) level    Allergic rhinitis    Essential hypertension    Chronic renal disease, stage III ([facility]) [727056]    Chronic heart failure with preserved ejection fraction (HCC)    Current use of long term anticoagulation    Dyspnea on exertion    Mild pulmonary hypertension (HCC)    Chronic cough    Post-nasal drip     TABS tablet Take 1 tablet by mouth 2 times daily    gabapentin (NEURONTIN) 300 MG capsule Take 1 capsule by mouth 3 times daily for 360 days.    Benralizumab (FASENRA PEN) 30 MG/ML SOAJ Inject 1 mL into the skin every 2 months Every 4 weeks for 3 doses, then every 8 weeks.    rosuvastatin (CRESTOR) 10 MG tablet Take 1 tablet by mouth daily    ferrous sulfate (IRON 325) 325 (65 Fe) MG tablet Take 1 tablet by mouth 2 times daily    Benralizumab (FASENRA PEN) 30 MG/ML SOAJ Inject 1 mL into the skin every 28 days For 3 doses then every 56 days.    furosemide (LASIX) 40 MG tablet Take 1 tablet by mouth daily    losartan (COZAAR) 100 MG tablet Take 1 tablet by mouth daily    pantoprazole (PROTONIX) 40 MG tablet 1 tablet p.o. 30 minutes prior to morning and evening meal.    nitroGLYCERIN (NITROSTAT) 0.4 MG SL tablet Place 1 tablet under tongue for angina/chest pain.  May repeat every 5 minutes for a total of 3.  If no relief call 911.    acetaminophen (TYLENOL) 500 MG tablet Take 1 tablet by mouth every 6 hours as needed for Pain Patient takes 2 tablets as NEEDED for pain.    sertraline (ZOLOFT) 50 MG tablet Take 1.5 tablets by mouth daily    spironolactone (ALDACTONE) 25 MG tablet Take 1 tablet by mouth daily    Coenzyme Q10 (CO Q 10) 10 MG CAPS Take by mouth    diclofenac sodium (VOLTAREN) 1 % GEL Apply 2 g topically 4 times daily Patient takes AS NEEDED    ascorbic acid (VITAMIN C) 250 MG tablet Take 1 tablet by mouth daily    aspirin 81 MG EC tablet Take 1 tablet by mouth     Physical Exam  There were no vitals filed for this visit.    Physical Exam:  Gen: well appearing, well developed, NAD  Eyes: Pupils equal, EOMI  CV: irreg irreg no M/R/G, normal JVD, normal distal pulses, no MINDA  Pulm: CTAB, no accessory muscle uses, no wheezes, crackles  GI: soft, NT, ND  Neuro: Alert and oriented      Cardiographics    Telemetry:   ECG (Indpendently visualized and interpreted):  Echocardiogram:     Labs: No results for input(s):

## 2024-10-31 NOTE — PROGRESS NOTES
TRANSFER - OUT REPORT:    Verbal report given to ERIN Camilo on Zeynep Carrington being transferred to [facility] for routine progression of patient care       Report consisted of patient’s Situation, Background, Assessment and Recommendations (SBAR).     Information from the following report(s) SBAR, Kardex, Procedure Summary, and Cardiac Rhythm Vpaced  was reviewed with the receiving nurse.    Opportunity for questions and clarification was provided.

## 2024-10-31 NOTE — ANESTHESIA PRE PROCEDURE
Department of Anesthesiology  Preprocedure Note       Name:  Zeynep Carrington   Age:  79 y.o.  :  1945                                          MRN:  [id number]         Date:  10/31/2024      Surgeon: Surgeon(s):  Jorge Woodward MD    Procedure: Procedure(s):  Insert PPM biv multi    Medications prior to admission:   Prior to Admission medications    Medication Sig Start Date End Date Taking? Authorizing Provider   budesonide-formoterol (SYMBICORT) 160-4.5 MCG/ACT AERO 2 puffs twice daily, rinse mouth after use.  May use preferred brand or generic 10/28/24   Alondra Ortiz APRN - CNP   albuterol sulfate HFA (PROVENTIL;VENTOLIN;PROAIR) 108 (90 Base) MCG/ACT inhaler 2 puffs 4 times daily if needed for shortness of breath or wheezing 10/28/24   Alondra Ortiz APRN - CNP   albuterol (PROVENTIL) (2.5 MG/3ML) 0.083% nebulizer solution 3 ml via nebulizer qid prn shortness of breath or wheezing 10/28/24   Alondra Ortiz APRN - CNP   apixaban (ELIQUIS) 5 MG TABS tablet Take 1 tablet by mouth 2 times daily 10/16/24   Jones Arthur DO   gabapentin (NEURONTIN) 300 MG capsule Take 1 capsule by mouth 3 times daily for 360 days. 10/9/24 10/4/25  Anita Hansen APRN - NP   Benralizumab (FASENRA PEN) 30 MG/ML SOAJ Inject 1 mL into the skin every 2 months Every 4 weeks for 3 doses, then every 8 weeks. 10/3/24   Litzy Dee MD   rosuvastatin (CRESTOR) 10 MG tablet Take 1 tablet by mouth daily 24   Jones Arthur DO   ferrous sulfate (IRON 325) 325 (65 Fe) MG tablet Take 1 tablet by mouth 2 times daily 24   Nadia Thomas MD   Benralizumab (FASENRA PEN) 30 MG/ML SOAJ Inject 1 mL into the skin every 28 days For 3 doses then every 56 days. 24   Litzy Dee MD   furosemide (LASIX) 40 MG tablet Take 1 tablet by mouth daily 24   Anita Hansen, APRN - NP   losartan (COZAAR) 100 MG tablet Take 1 tablet by mouth daily 24   Anita Hansen, APRN - NP   pantoprazole

## 2024-10-31 NOTE — PROGRESS NOTES
Report received from SHRAVAN Nance RN. Procedural finding communicated. Intra procedural medication administration reviewed. Progression of care discussed.    Patient received into CPRU room 3, Post PPM w/ Dr Woodward    Access site without bleeding or swelling. None noted    Patient instructed to limit movement of L upper extremity.    Routine post procedural vital signs & site assessment initiated.

## 2024-11-01 ENCOUNTER — APPOINTMENT (OUTPATIENT)
Dept: GENERAL RADIOLOGY | Age: 79
End: 2024-11-01
Attending: INTERNAL MEDICINE
Payer: MEDICARE

## 2024-11-01 VITALS
TEMPERATURE: 97.5 F | RESPIRATION RATE: 17 BRPM | HEART RATE: 70 BPM | OXYGEN SATURATION: 97 % | DIASTOLIC BLOOD PRESSURE: 69 MMHG | HEIGHT: 60 IN | BODY MASS INDEX: 34.95 KG/M2 | SYSTOLIC BLOOD PRESSURE: 152 MMHG | WEIGHT: 178 LBS

## 2024-11-01 PROCEDURE — G0378 HOSPITAL OBSERVATION PER HR: HCPCS

## 2024-11-01 PROCEDURE — 71045 X-RAY EXAM CHEST 1 VIEW: CPT

## 2024-11-01 PROCEDURE — 6370000000 HC RX 637 (ALT 250 FOR IP): Performed by: NURSE PRACTITIONER

## 2024-11-01 PROCEDURE — 6370000000 HC RX 637 (ALT 250 FOR IP)

## 2024-11-01 RX ORDER — GABAPENTIN 300 MG/1
300 CAPSULE ORAL 3 TIMES DAILY
Status: DISCONTINUED | OUTPATIENT
Start: 2024-11-01 | End: 2024-11-01 | Stop reason: HOSPADM

## 2024-11-01 RX ORDER — ROSUVASTATIN CALCIUM 10 MG/1
10 TABLET, COATED ORAL DAILY
Status: DISCONTINUED | OUTPATIENT
Start: 2024-11-01 | End: 2024-11-01 | Stop reason: HOSPADM

## 2024-11-01 RX ORDER — HYDROCODONE BITARTRATE AND ACETAMINOPHEN 5; 325 MG/1; MG/1
1 TABLET ORAL EVERY 6 HOURS PRN
Status: DISCONTINUED | OUTPATIENT
Start: 2024-11-01 | End: 2024-11-01

## 2024-11-01 RX ORDER — LOSARTAN POTASSIUM 50 MG/1
100 TABLET ORAL DAILY
Status: DISCONTINUED | OUTPATIENT
Start: 2024-11-01 | End: 2024-11-01 | Stop reason: HOSPADM

## 2024-11-01 RX ORDER — SPIRONOLACTONE 25 MG/1
25 TABLET ORAL DAILY
Status: DISCONTINUED | OUTPATIENT
Start: 2024-11-01 | End: 2024-11-01 | Stop reason: HOSPADM

## 2024-11-01 RX ORDER — HYDROCODONE BITARTRATE AND ACETAMINOPHEN 7.5; 325 MG/1; MG/1
1 TABLET ORAL EVERY 6 HOURS PRN
Qty: 5 TABLET | Refills: 0 | Status: SHIPPED | OUTPATIENT
Start: 2024-11-01 | End: 2024-11-04

## 2024-11-01 RX ORDER — HYDROCODONE BITARTRATE AND ACETAMINOPHEN 7.5; 325 MG/1; MG/1
1 TABLET ORAL EVERY 6 HOURS PRN
Status: DISCONTINUED | OUTPATIENT
Start: 2024-11-01 | End: 2024-11-01 | Stop reason: HOSPADM

## 2024-11-01 RX ORDER — NITROGLYCERIN 0.4 MG/1
0.4 TABLET SUBLINGUAL EVERY 5 MIN PRN
Status: DISCONTINUED | OUTPATIENT
Start: 2024-11-01 | End: 2024-11-01 | Stop reason: HOSPADM

## 2024-11-01 RX ORDER — ASPIRIN 81 MG/1
81 TABLET ORAL DAILY
Status: DISCONTINUED | OUTPATIENT
Start: 2024-11-01 | End: 2024-11-01 | Stop reason: HOSPADM

## 2024-11-01 RX ADMIN — SERTRALINE 75 MG: 50 TABLET, FILM COATED ORAL at 09:21

## 2024-11-01 RX ADMIN — ASPIRIN 81 MG: 81 TABLET, COATED ORAL at 09:21

## 2024-11-01 RX ADMIN — GABAPENTIN 300 MG: 300 CAPSULE ORAL at 09:21

## 2024-11-01 RX ADMIN — SPIRONOLACTONE 25 MG: 25 TABLET ORAL at 09:21

## 2024-11-01 RX ADMIN — LOSARTAN POTASSIUM 100 MG: 50 TABLET, FILM COATED ORAL at 09:21

## 2024-11-01 RX ADMIN — ROSUVASTATIN CALCIUM 10 MG: 10 TABLET, FILM COATED ORAL at 09:21

## 2024-11-01 RX ADMIN — HYDROCODONE BITARTRATE AND ACETAMINOPHEN 1 TABLET: 7.5; 325 TABLET ORAL at 01:53

## 2024-11-01 ASSESSMENT — PAIN DESCRIPTION - DESCRIPTORS: DESCRIPTORS: ACHING;DISCOMFORT

## 2024-11-01 ASSESSMENT — PAIN DESCRIPTION - LOCATION: LOCATION: SHOULDER

## 2024-11-01 ASSESSMENT — PAIN SCALES - GENERAL: PAINLEVEL_OUTOF10: 6

## 2024-11-01 ASSESSMENT — PAIN DESCRIPTION - ORIENTATION: ORIENTATION: LEFT

## 2024-11-01 NOTE — ANESTHESIA POSTPROCEDURE EVALUATION
Department of Anesthesiology  Postprocedure Note    Patient: Zeynep Carrington  MRN: [id number]  YOB: 1945  Date of evaluation: 11/1/2024    Procedure Summary       Date: 10/31/24 Room / Location: [facility] 1 ALL EVENTS / [facility] CARDIAC CATH LAB    Anesthesia Start: 1328 Anesthesia Stop: 1526    Procedure: Insert PPM biv multi Diagnosis:       Symptomatic bradycardia      (Symptomatic bradycardia [R00.1])    Providers: Jorge Woodward MD Responsible Provider: Philipp Krishnamurthy MD    Anesthesia Type: TIVA ASA Status: 3            Anesthesia Type: No value filed.    Rachelle Phase I: Rachelle Score: 9    Rachelle Phase II:      Anesthesia Post Evaluation    Patient location during evaluation: PACU  Patient participation: complete - patient participated  Level of consciousness: awake and alert  Airway patency: patent  Nausea & Vomiting: no nausea and no vomiting  Cardiovascular status: hemodynamically stable  Respiratory status: acceptable, nonlabored ventilation and spontaneous ventilation  Hydration status: euvolemic  Comments: BP (!) 152/69   Pulse 70   Temp 97.5 °F (36.4 °C) (Oral)   Resp 17   Ht 1.524 m (5')   Wt 80.7 kg (178 lb)   LMP  (LMP Unknown)   SpO2 97%   BMI 34.76 kg/m²     Multimodal analgesia pain management approach  Pain management: adequate and satisfactory to patient        There were no known notable events for this encounter.

## 2024-11-01 NOTE — PROGRESS NOTES
Discharge instructions were reviewed with patient. An opportunity was given for questions. All medications were reviewed, and information was given on the new medications. Patient verbalized understanding, and has no questions at this time.   Iv and monitor removed

## 2024-11-01 NOTE — DISCHARGE SUMMARY
[facility] Cardiology Discharge Summary     Patient ID:  Zeynep Carrington  [id number]  79 y.o.  1945    Admit date: 10/31/2024    Discharge date:  11/1/2024    Admitting Physician: Jorge Woodward MD     Discharge Physician: Dr. Garcia    Admission Diagnoses: Symptomatic bradycardia [R00.1]  Pacemaker [Z95.0]    Discharge Diagnoses:   Patient Active Problem List    Diagnosis    Pacemaker    COPD exacerbation (HCC)    Dyspnea on exertion    Current use of long term anticoagulation    Chronic renal disease, stage III ([facility]) [125566]    Chronic heart failure with preserved ejection fraction (HCC)    PAF (paroxysmal atrial fibrillation) ([facility])    Symptomatic bradycardia    Chronic systolic (congestive) heart failure    Mild pulmonary hypertension (HCC)    Chronic cough    Post-nasal drip    Fluid overload    Calf pain    Moderate mitral regurgitation    Elevated brain natriuretic peptide (BNP) level    Atrial fibrillation (HCC)    Spinal stenosis of lumbar region at multiple levels    S/P laminectomy with spinal fusion    Lumbar stenosis with neurogenic claudication    On anticoagulant therapy    Asymptomatic carotid artery stenosis    Coronary artery disease involving native coronary artery of native heart without angina pectoris    Obesity (BMI 30.0-34.9)    Mixed hyperlipidemia    Carpal tunnel syndrome    Acute on chronic renal insufficiency    Arthropathies    Osteopenia of multiple sites    Congestive heart failure (HCC)    Aortic valve disorder    Allergic rhinitis    Essential hypertension    Gastroesophageal reflux disease without esophagitis       Cardiology Procedures this admission:  PPM implantation  Consults: none    Hospital Course: Patient was seen at the office of [facility] Cardiology by Dr. Jorge Woodward for management of persistent atrial fibrillation and symptomatic bradycardia and was subsequently scheduled for an AM Admission BiV PPM implantation at [facility] on 10/31/24. Patient was

## 2024-11-01 NOTE — DISCHARGE INSTRUCTIONS
DEVICE IMPLANT FOLLOWUP INSTRUCTIONS  You will be discharged with an occlusive dressing over the incision site. This dressing will be removed at the 10 -14-day postop site check with the Device Clinic. Your new device will be checked at that visit and all your device teaching will be given.   Keep the incision site dry until your follow up. Use a hand-held shower or bath.   Do not submerge or soak in pools or tubs for 6 weeks. If you are discharged with a prescription for antibiotics, please take the full prescription until it is gone.  After your site check, you may shower and get the incision site wet. You may let soap and water run on the incision and pat dry. Please do not apply creams, lotions or powders on or near the incision.   Mild bruising and swelling can be normal after implant and will resolve in a few weeks.     Call the office at [phone] if you have any of the following:  -Signs of infection, such as fever over 100 F, drainage from the incision, redness, significant swelling, or warmth at the incision site.  -Significant pain around the site that gets worse. Mild discomfort can be normal.   -Bleeding from the incision site  -Swelling in the arm on the side of the incision site  -Chest pain or shortness of breath     Your device has the capability of transmitting device information from home to our office using a home monitoring system or phone malka. The information will transmit through a cellular connection outside of your home. Your device data is reviewed during working hours to ensure proper device function. You will be given your equipment and instruction upon your hospital discharge.                                                                       -You will be given a sling to wear upon discharge.  If you can remembered not to lift your arm above shoulder level you do not need

## 2024-11-01 NOTE — PROGRESS NOTES
Patient said she had no worry or upset at this time. She lives with her son and enjoys his support. She uses devotionals and prayer to stay close to God and accepted a prayer for healing.

## 2024-11-01 NOTE — MANAGEMENT PLAN
EP Note    79 year old female with a history of Sbx and SSS s/p CRT-P.     -Stable wound, device interrogation and CXR.  -Anticipate d/c today with planned OP follow up.    Tuan Garcia MD, MS  Clinical Cardiac Electrophysiology  [facility] Cardiology

## 2024-11-01 NOTE — CARE COORDINATION
expects to be discharged to: House   Services At/After Discharge   Transition of Care Consult (CM Consult) N/A   Services At/After Discharge None    Resource Information Provided? No   Mode of Transport at Discharge Other (see comment)  (Car)   Confirm Follow Up Transport Family   Condition of Participation: Discharge Planning   The Plan for Transition of Care is related to the following treatment goals: Home with family assistance   The Patient and/Or Patient Representative agree with the Discharge Plan? Yes

## 2024-11-06 ENCOUNTER — CARE COORDINATION (OUTPATIENT)
Dept: CARE COORDINATION | Facility: CLINIC | Age: 79
End: 2024-11-06

## 2024-11-06 NOTE — CARE COORDINATION
Attempted to reach patient for transitions of care follow up. Unable to reach patient.    Outreach Attempts:   HIPAA compliant voicemail left for patient.     Patient: Zeynep Carrington    Patient : 1945   MRN: [id number]    Reason for Admission: Symptomatic bradycardia   Discharge Date: 24  RURS: Readmission Risk Score: 17.4    Last Discharge Facility       Date Complaint Diagnosis Description Type Department Provider    10/31/24  Post-op pain ... Admission (Discharged) [id number] Jorge Woodward MD            Was this an external facility discharge? No    Follow Up Appointment:   Patient has hospital follow up appointment scheduled within 7 days of discharge.    Future Appointments         Provider Specialty Dept Phone    2024 3:00 PM [facility] DEVICE 39 Cardiology [phone]    12/10/2024 11:00 AM Jacky Laureano MD Sleep Medicine [phone]    3/11/2025 1:00 PM Jayce Flowers MD Cardiology [phone]    2025 11:00 AM (Arrive by 10:45 AM) Alondra Ortiz, APRN - [facility] Pulmonology [phone]            Plan for follow-up on next business day.      Marlys Merino, ERIN

## 2024-11-07 ENCOUNTER — CARE COORDINATION (OUTPATIENT)
Dept: CARE COORDINATION | Facility: CLINIC | Age: 79
End: 2024-11-07

## 2024-11-07 NOTE — CARE COORDINATION
Patient Current Location:  Home: [address]  [place] [address]    Care Transition Nurse contacted the patient by telephone to perform post hospital discharge assessment, verified name and  as identifiers. Provided introduction to self, and explanation of the Care Transition Nurse role.     Patient: Zeynep Carrington    Patient : 1945   MRN: [id number]    Reason for Admission: Symptomatic bradycardia    Discharge Date: 24  RURS: Readmission Risk Score: 17.4      Last Discharge Facility       Date Complaint Diagnosis Description Type Department Provider    10/31/24  Post-op pain ... Admission (Discharged) [id number] Jorge Woodward MD            Was this an external facility discharge? No    Additional needs identified to be addressed with provider   No needs identified             Method of communication with provider: none.    Patients top risk factors for readmission: medical condition-see history and problems.    Interventions to address risk factors:   Review of patient management of conditions/medications: verbalized understanding.  Reviewed PPM implant discharge instructions.  DISPOSITION: Patient has been instructed to keep affected arm below shoulder level for the next 4 weeks or until cleared by doctor. The arm sling should be worn while sleeping. The dressing will be removed at follow-up. The incision site must be kept clean and dry. The patient may shower in a few days. Lotions, powders, or creams should be avoided as these can increase the risk of infection. The affected arm should not be used for any pushing, pulling, or lifting until cleared by doctor. Driving is prohibited until cleared by doctor in a follow up appointment. Any signs of infection including increased redness, suspicious drainage, or unexplained fever should be reported to the doctor immediately by calling [phone].     Care Summary Note:     History and problems:       Pacemaker    COPD exacerbation

## 2024-11-14 ENCOUNTER — CARE COORDINATION (OUTPATIENT)
Dept: CARE COORDINATION | Facility: CLINIC | Age: 79
End: 2024-11-14

## 2024-11-14 ENCOUNTER — NURSE ONLY (OUTPATIENT)
Age: 79
End: 2024-11-14

## 2024-11-14 DIAGNOSIS — I50.9 CONGESTIVE HEART FAILURE (HCC): Primary | Chronic | ICD-10-CM

## 2024-11-27 ENCOUNTER — CARE COORDINATION (OUTPATIENT)
Dept: CARE COORDINATION | Facility: CLINIC | Age: 79
End: 2024-11-27

## 2024-11-27 NOTE — CARE COORDINATION
Care Transitions Note    Follow Up Call     Patient Current Location:  Home: [address]  [place] [address]    LPN Care Coordinator contacted the patient by telephone. Verified name and  as identifiers.    Additional needs identified to be addressed with provider   No needs identified                 Method of communication with provider: none.    Care Summary Note: States she is doing very well.  Has attended scheduled follow up appointments.  Has appointment with new PCP scheduled on 2025.  Reports good appetite and adequate hydration.  Denies complaints of chest pain, shortness of breath, or noted edema. No new needs identified, has contact information if needed, Kelly Koo LPN [phone].    Plan of care updates since last contact:  Education: Kelly Koo LPN [phone].  All scheduled appointments.       Advance Care Planning:   Does patient have an Advance Directive:  no documents, agreed for ACP forms to be mailed and mailing address verified .    Medication Review:  No changes since last call.     Remote Patient Monitoring:  Offered patient enrollment in the Remote Patient Monitoring (RPM) program for in-home monitoring: Yes, but did not enroll at this time: na .    Assessments:  No changes since last call    Follow Up Appointment:   Reviewed upcoming appointment(s).  Future Appointments         Provider Specialty Dept Phone    12/10/2024 11:00 AM Jacky Laureano MD Sleep Medicine [phone]    2025 9:45 AM Mark Anthony Mac DO Internal Medicine [phone]    2025 2:00 PM [facility] DEVICE 39 Cardiology [phone]    2025 2:00 PM Julianne Ma PA Cardiology [phone]    3/11/2025 1:00 PM Jayce Flowers MD Cardiology [phone]    2025 11:00 AM (Arrive by 10:45 AM) Alondra Ortiz, APRN - CNP Pulmonology [phone]            [facility] Care Coordinator provided contact information.  Plan for follow-up call in 6-10 days based

## 2024-12-06 ENCOUNTER — CARE COORDINATION (OUTPATIENT)
Dept: CARE COORDINATION | Facility: CLINIC | Age: 79
End: 2024-12-06

## 2024-12-06 NOTE — CARE COORDINATION
Care Transitions Note    Final Call     Patient Current Location:  Home: [address]  [place] [address]    LPN Care Coordinator contacted the patient by telephone. Verified name and  as identifiers.    Patient graduated from the Care Transitions program on 2024.  Patient/family verbalizes confidence in the ability to self-manage at this time. has the ability to self manage at this time..      Advance Care Planning:   Does patient have an Advance Directive: reviewed during previous call, see note. .    Handoff:   Patient was not referred to the ACM team due to no additional needs identified.       Care Summary Note: States she is doing very well.  Denies complaints of chest pain, shortness of breath, palpitations.  Reports good appetite and adequate hydration.  No new needs identified at this time, has contact information if needed, Kelly Koo LPN  [phone].    Assessments:  No changes since last call    Upcoming Appointments:    Future Appointments         Provider Specialty Dept Phone    12/10/2024 11:00 AM Jacky Laureano MD Sleep Medicine [phone]    2025 9:45 AM Mark Anthony Mac DO Internal Medicine [phone]    2025 2:00 PM [facility] DEVICE 39 Cardiology [phone]    2025 2:00 PM Julianne Ma PA Cardiology [phone]    3/11/2025 1:00 PM Jayce Flowers MD Cardiology [phone]    2025 11:00 AM (Arrive by 10:45 AM) Alondra Ortiz, APRN - [facility] Pulmonology [phone]            Patient has agreed to contact primary care provider and/or specialist for any further questions, concerns, or needs.    Kelly Koo LPN

## 2024-12-06 NOTE — PROGRESS NOTES
Currently on oxygen 2 L/min with PAP therapy    -Will need overnight in the future, hold off for now      3. Difficulty using continuous positive airway pressure (CPAP) full face mask  Z78.9 -Likes the new mask but also does move around quite a bit.  Will see how she does with the sleep aid      4. Hypersomnia, unspecified  G47.10 -Clinton score is improving           5..  Obesity with BMI of 30-39.9  E66.9  --Will needs to work on this BMI still in the 30s    6.  Insomnia G47.00  - Told to try melatonin 1 mg or up to 3 mg 30 minutes to 60 minutes prior to sleep.  We also talked about sleep restriction.  As well as sleep routine today.    PLAN:    - Continue BiPAP but will go up to the pressure at 11/7 and continue oxygen at 2 L/min and see if that helps with her AHI   -Patient is compliant and benefiting  - Renew supplies     -Told the patient to bring in her mask and machine next visit so we can see exactly if she is doing it correctly devoid air leaks     -Trial of melatonin 1 mg or up to 3 mg 30 minutes 60 minutes prior to sleep to see if that helps consolidate sleep     -Continue proper sleep hygiene     -Weight loss by diet and exercise     - Did review Clinton score and compliance data         Follow-up in 2 to 3 months with me or any advanced care provider to see how her AHI is.  And how she is doing with her mask.    No orders of the defined types were placed in this encounter.    No orders of the defined types were placed in this encounter.          Today's office visit was spent  reviewing test results, prognosis, importance of compliance, education about disease process, benefits of medications, instructions for management of acute flare-ups, and follow up plans.  Total time spent with patient was 48 minutes.        Jacky Laureano MD  Electronically signed

## 2024-12-10 ENCOUNTER — OFFICE VISIT (OUTPATIENT)
Dept: SLEEP MEDICINE | Age: 79
End: 2024-12-10
Payer: MEDICARE

## 2024-12-10 VITALS
TEMPERATURE: 97.8 F | DIASTOLIC BLOOD PRESSURE: 79 MMHG | HEIGHT: 60 IN | HEART RATE: 60 BPM | SYSTOLIC BLOOD PRESSURE: 135 MMHG | BODY MASS INDEX: 32.98 KG/M2 | WEIGHT: 168 LBS | OXYGEN SATURATION: 97 % | RESPIRATION RATE: 19 BRPM

## 2024-12-10 DIAGNOSIS — Z78.9 DIFFICULTY USING CONTINUOUS POSITIVE AIRWAY PRESSURE (CPAP) FULL FACE MASK: ICD-10-CM

## 2024-12-10 DIAGNOSIS — G47.10 HYPERSOMNIA, UNSPECIFIED: ICD-10-CM

## 2024-12-10 DIAGNOSIS — G47.33 OSA (OBSTRUCTIVE SLEEP APNEA): Primary | ICD-10-CM

## 2024-12-10 DIAGNOSIS — G47.34 NOCTURNAL HYPOXEMIA: ICD-10-CM

## 2024-12-10 DIAGNOSIS — E66.9 OBESITY (BMI 30-39.9): ICD-10-CM

## 2024-12-10 DIAGNOSIS — G47.00 INSOMNIA, UNSPECIFIED TYPE: ICD-10-CM

## 2024-12-10 PROCEDURE — 1123F ACP DISCUSS/DSCN MKR DOCD: CPT | Performed by: INTERNAL MEDICINE

## 2024-12-10 PROCEDURE — G8484 FLU IMMUNIZE NO ADMIN: HCPCS | Performed by: INTERNAL MEDICINE

## 2024-12-10 PROCEDURE — G8427 DOCREV CUR MEDS BY ELIG CLIN: HCPCS | Performed by: INTERNAL MEDICINE

## 2024-12-10 PROCEDURE — 1036F TOBACCO NON-USER: CPT | Performed by: INTERNAL MEDICINE

## 2024-12-10 PROCEDURE — 1090F PRES/ABSN URINE INCON ASSESS: CPT | Performed by: INTERNAL MEDICINE

## 2024-12-10 PROCEDURE — G2211 COMPLEX E/M VISIT ADD ON: HCPCS | Performed by: INTERNAL MEDICINE

## 2024-12-10 PROCEDURE — 3078F DIAST BP <80 MM HG: CPT | Performed by: INTERNAL MEDICINE

## 2024-12-10 PROCEDURE — 1160F RVW MEDS BY RX/DR IN RCRD: CPT | Performed by: INTERNAL MEDICINE

## 2024-12-10 PROCEDURE — G8399 PT W/DXA RESULTS DOCUMENT: HCPCS | Performed by: INTERNAL MEDICINE

## 2024-12-10 PROCEDURE — G8417 CALC BMI ABV UP PARAM F/U: HCPCS | Performed by: INTERNAL MEDICINE

## 2024-12-10 PROCEDURE — 3075F SYST BP GE 130 - 139MM HG: CPT | Performed by: INTERNAL MEDICINE

## 2024-12-10 PROCEDURE — 1159F MED LIST DOCD IN RCRD: CPT | Performed by: INTERNAL MEDICINE

## 2024-12-10 PROCEDURE — 99214 OFFICE O/P EST MOD 30 MIN: CPT | Performed by: INTERNAL MEDICINE

## 2024-12-10 ASSESSMENT — SLEEP AND FATIGUE QUESTIONNAIRES
HOW LIKELY ARE YOU TO NOD OFF OR FALL ASLEEP IN A CAR, WHILE STOPPED FOR A FEW MINUTES IN TRAFFIC: WOULD NEVER DOZE
HOW LIKELY ARE YOU TO NOD OFF OR FALL ASLEEP WHILE SITTING QUIETLY AFTER LUNCH WITHOUT ALCOHOL: WOULD NEVER DOZE
HOW LIKELY ARE YOU TO NOD OFF OR FALL ASLEEP WHILE WATCHING TV: HIGH CHANCE OF DOZING
HOW LIKELY ARE YOU TO NOD OFF OR FALL ASLEEP WHILE SITTING AND TALKING TO SOMEONE: WOULD NEVER DOZE
HOW LIKELY ARE YOU TO NOD OFF OR FALL ASLEEP WHILE SITTING AND READING: HIGH CHANCE OF DOZING
ESS TOTAL SCORE: 6
HOW LIKELY ARE YOU TO NOD OFF OR FALL ASLEEP WHEN YOU ARE A PASSENGER IN A CAR FOR AN HOUR WITHOUT A BREAK: WOULD NEVER DOZE
HOW LIKELY ARE YOU TO NOD OFF OR FALL ASLEEP WHILE LYING DOWN TO REST IN THE AFTERNOON WHEN CIRCUMSTANCES PERMIT: WOULD NEVER DOZE
HOW LIKELY ARE YOU TO NOD OFF OR FALL ASLEEP WHILE SITTING INACTIVE IN A PUBLIC PLACE: WOULD NEVER DOZE

## 2024-12-17 PROBLEM — R50.9 FEVER: Status: ACTIVE | Noted: 2024-12-17

## 2024-12-17 PROBLEM — R19.7 DIARRHEA: Status: ACTIVE | Noted: 2024-12-17

## 2024-12-17 PROBLEM — R05.9 COUGH: Status: ACTIVE | Noted: 2023-07-13

## 2025-01-03 ENCOUNTER — TELEPHONE (OUTPATIENT)
Dept: PULMONOLOGY | Age: 80
End: 2025-01-03

## 2025-01-03 NOTE — TELEPHONE ENCOUNTER
PA for Fasenra sent to [employer] via [employer]. Key: [id number]    Eligibility could not be verified for this patient - patient not found. Please review patient information and re-submit.

## 2025-01-16 PROBLEM — R05.9 COUGH: Status: RESOLVED | Noted: 2023-07-13 | Resolved: 2025-01-16

## 2025-01-17 SDOH — ECONOMIC STABILITY: FOOD INSECURITY: WITHIN THE PAST 12 MONTHS, THE FOOD YOU BOUGHT JUST DIDN'T LAST AND YOU DIDN'T HAVE MONEY TO GET MORE.: NEVER TRUE

## 2025-01-17 SDOH — ECONOMIC STABILITY: TRANSPORTATION INSECURITY
IN THE PAST 12 MONTHS, HAS THE LACK OF TRANSPORTATION KEPT YOU FROM MEDICAL APPOINTMENTS OR FROM GETTING MEDICATIONS?: NO

## 2025-01-17 SDOH — ECONOMIC STABILITY: FOOD INSECURITY: WITHIN THE PAST 12 MONTHS, YOU WORRIED THAT YOUR FOOD WOULD RUN OUT BEFORE YOU GOT MONEY TO BUY MORE.: NEVER TRUE

## 2025-01-17 SDOH — ECONOMIC STABILITY: INCOME INSECURITY: IN THE LAST 12 MONTHS, WAS THERE A TIME WHEN YOU WERE NOT ABLE TO PAY THE MORTGAGE OR RENT ON TIME?: NO

## 2025-01-23 ENCOUNTER — OFFICE VISIT (OUTPATIENT)
Dept: INTERNAL MEDICINE CLINIC | Facility: CLINIC | Age: 80
End: 2025-01-23

## 2025-01-23 VITALS
DIASTOLIC BLOOD PRESSURE: 64 MMHG | WEIGHT: 165.2 LBS | HEIGHT: 60 IN | HEART RATE: 64 BPM | OXYGEN SATURATION: 91 % | SYSTOLIC BLOOD PRESSURE: 124 MMHG | BODY MASS INDEX: 32.43 KG/M2 | TEMPERATURE: 97.3 F

## 2025-01-23 DIAGNOSIS — I50.32 CHRONIC HEART FAILURE WITH PRESERVED EJECTION FRACTION (HCC): ICD-10-CM

## 2025-01-23 DIAGNOSIS — F32.0 CURRENT MILD EPISODE OF MAJOR DEPRESSIVE DISORDER WITHOUT PRIOR EPISODE (HCC): ICD-10-CM

## 2025-01-23 DIAGNOSIS — I10 ESSENTIAL HYPERTENSION: ICD-10-CM

## 2025-01-23 DIAGNOSIS — D64.9 ANEMIA, UNSPECIFIED TYPE: ICD-10-CM

## 2025-01-23 DIAGNOSIS — R73.01 IFG (IMPAIRED FASTING GLUCOSE): Primary | ICD-10-CM

## 2025-01-23 DIAGNOSIS — N18.32 STAGE 3B CHRONIC KIDNEY DISEASE (HCC): ICD-10-CM

## 2025-01-23 DIAGNOSIS — J45.50 SEVERE PERSISTENT ASTHMA WITHOUT COMPLICATION: ICD-10-CM

## 2025-01-23 DIAGNOSIS — R73.01 IFG (IMPAIRED FASTING GLUCOSE): ICD-10-CM

## 2025-01-23 DIAGNOSIS — G62.9 NEUROPATHY: ICD-10-CM

## 2025-01-23 LAB
ALBUMIN SERPL-MCNC: 3.7 G/DL (ref 3.2–4.6)
ALBUMIN/GLOB SERPL: 1.1 (ref 1–1.9)
ALP SERPL-CCNC: 57 U/L (ref 35–104)
ALT SERPL-CCNC: 16 U/L (ref 8–45)
ANION GAP SERPL CALC-SCNC: 12 MMOL/L (ref 7–16)
AST SERPL-CCNC: 21 U/L (ref 15–37)
BILIRUB SERPL-MCNC: 0.3 MG/DL (ref 0–1.2)
BUN SERPL-MCNC: 36 MG/DL (ref 8–23)
CALCIUM SERPL-MCNC: 9.4 MG/DL (ref 8.8–10.2)
CHLORIDE SERPL-SCNC: 104 MMOL/L (ref 98–107)
CHOLEST SERPL-MCNC: 118 MG/DL (ref 0–200)
CO2 SERPL-SCNC: 26 MMOL/L (ref 20–29)
CREAT SERPL-MCNC: 1.57 MG/DL (ref 0.6–1.1)
CREAT UR-MCNC: 58.8 MG/DL (ref 28–217)
ERYTHROCYTE [DISTWIDTH] IN BLOOD BY AUTOMATED COUNT: 13.2 % (ref 11.9–14.6)
EST. AVERAGE GLUCOSE BLD GHB EST-MCNC: 129 MG/DL
FERRITIN SERPL-MCNC: 114 NG/ML (ref 8–388)
GLOBULIN SER CALC-MCNC: 3.5 G/DL (ref 2.3–3.5)
GLUCOSE SERPL-MCNC: 112 MG/DL (ref 70–99)
HBA1C MFR BLD: 6.1 % (ref 0–5.6)
HCT VFR BLD AUTO: 43.2 % (ref 35.8–46.3)
HDLC SERPL-MCNC: 58 MG/DL (ref 40–60)
HDLC SERPL: 2 (ref 0–5)
HGB BLD-MCNC: 13.1 G/DL (ref 11.7–15.4)
IRON SATN MFR SERPL: 36 % (ref 20–50)
IRON SERPL-MCNC: 124 UG/DL (ref 35–100)
LDLC SERPL CALC-MCNC: 38 MG/DL (ref 0–100)
MCH RBC QN AUTO: 28.9 PG (ref 26.1–32.9)
MCHC RBC AUTO-ENTMCNC: 30.3 G/DL (ref 31.4–35)
MCV RBC AUTO: 95.4 FL (ref 82–102)
MICROALBUMIN UR-MCNC: <1.2 MG/DL (ref 0–20)
MICROALBUMIN/CREAT UR-RTO: NORMAL MG/G (ref 0–30)
NRBC # BLD: 0 K/UL (ref 0–0.2)
PLATELET # BLD AUTO: 243 K/UL (ref 150–450)
PMV BLD AUTO: 10.1 FL (ref 9.4–12.3)
POTASSIUM SERPL-SCNC: 4.2 MMOL/L (ref 3.5–5.1)
PROT SERPL-MCNC: 7.2 G/DL (ref 6.3–8.2)
RBC # BLD AUTO: 4.53 M/UL (ref 4.05–5.2)
SODIUM SERPL-SCNC: 142 MMOL/L (ref 136–145)
TIBC SERPL-MCNC: 349 UG/DL (ref 240–450)
TRIGL SERPL-MCNC: 108 MG/DL (ref 0–150)
TSH W FREE THYROID IF ABNORMAL: 2.22 UIU/ML (ref 0.27–4.2)
UIBC SERPL-MCNC: 225 UG/DL (ref 112–347)
VLDLC SERPL CALC-MCNC: 22 MG/DL (ref 6–23)
WBC # BLD AUTO: 10.5 K/UL (ref 4.3–11.1)

## 2025-01-23 RX ORDER — GABAPENTIN 300 MG/1
300 CAPSULE ORAL 3 TIMES DAILY
Qty: 270 CAPSULE | Refills: 3 | Status: SHIPPED | OUTPATIENT
Start: 2025-01-23 | End: 2026-01-18

## 2025-01-23 RX ORDER — FERROUS SULFATE 325(65) MG
325 TABLET ORAL DAILY
Qty: 90 TABLET | Refills: 3 | Status: SHIPPED | OUTPATIENT
Start: 2025-01-23

## 2025-01-23 RX ORDER — FUROSEMIDE 40 MG/1
40 TABLET ORAL DAILY
Qty: 90 TABLET | Refills: 3 | Status: SHIPPED | OUTPATIENT
Start: 2025-01-23

## 2025-01-23 RX ORDER — SPIRONOLACTONE 25 MG/1
25 TABLET ORAL DAILY
Qty: 90 TABLET | Refills: 3 | Status: SHIPPED | OUTPATIENT
Start: 2025-01-23

## 2025-01-23 ASSESSMENT — PATIENT HEALTH QUESTIONNAIRE - PHQ9
4. FEELING TIRED OR HAVING LITTLE ENERGY: NEARLY EVERY DAY
6. FEELING BAD ABOUT YOURSELF - OR THAT YOU ARE A FAILURE OR HAVE LET YOURSELF OR YOUR FAMILY DOWN: NOT AT ALL
10. IF YOU CHECKED OFF ANY PROBLEMS, HOW DIFFICULT HAVE THESE PROBLEMS MADE IT FOR YOU TO DO YOUR WORK, TAKE CARE OF THINGS AT HOME, OR GET ALONG WITH OTHER PEOPLE: NOT DIFFICULT AT ALL
5. POOR APPETITE OR OVEREATING: NOT AT ALL
3. TROUBLE FALLING OR STAYING ASLEEP: NOT AT ALL
SUM OF ALL RESPONSES TO PHQ QUESTIONS 1-9: 4
7. TROUBLE CONCENTRATING ON THINGS, SUCH AS READING THE NEWSPAPER OR WATCHING TELEVISION: NOT AT ALL
8. MOVING OR SPEAKING SO SLOWLY THAT OTHER PEOPLE COULD HAVE NOTICED. OR THE OPPOSITE, BEING SO FIGETY OR RESTLESS THAT YOU HAVE BEEN MOVING AROUND A LOT MORE THAN USUAL: SEVERAL DAYS
SUM OF ALL RESPONSES TO PHQ QUESTIONS 1-9: 4
2. FEELING DOWN, DEPRESSED OR HOPELESS: NOT AT ALL
SUM OF ALL RESPONSES TO PHQ9 QUESTIONS 1 & 2: 0
9. THOUGHTS THAT YOU WOULD BE BETTER OFF DEAD, OR OF HURTING YOURSELF: NOT AT ALL
1. LITTLE INTEREST OR PLEASURE IN DOING THINGS: NOT AT ALL
SUM OF ALL RESPONSES TO PHQ QUESTIONS 1-9: 4
SUM OF ALL RESPONSES TO PHQ QUESTIONS 1-9: 4

## 2025-01-23 NOTE — PROGRESS NOTES
Zeynep Carrington (: 1945) is a 79 y.o. female, here for evaluation of the following chief complaint(s):  Established New Doctor (Changing PCP) and Medication Refill     Assessment & Plan  1. Asthma.  She has a history of asthma, currently managed with two inhalers, a nebulizer, and Fasenra injections. She reports good control of her symptoms with this regimen. She is advised to continue her current medications and follow up with her pulmonologist as scheduled in February.    2. Anemia.  Her hemoglobin level was previously recorded at 11.5. She is currently taking iron supplements twice a day. If her hemoglobin levels normalize, the frequency of iron supplementation may be reduced to two or three times per week. Blood work will be conducted today to monitor her hemoglobin levels.    3. Hypertension.  She is advised to continue her current antihypertensive medications. Blood pressure will be monitored during today's visit.    4. Mild Depression.  She is currently taking sertraline. A refill for sertraline will be sent to [facility] in [facility].    5. Heart Failure with Preserved Ejection Fraction.  She is on a low sodium diet and reports no recent swelling in her ankles. She is advised to continue her current medications, including Lasix and spironolactone. Refills for Lasix and spironolactone will be sent to [facility] in [facility].    6. Chronic Kidney Disease.  She is advised to monitor her potassium levels closely. Blood work will be conducted today to check her potassium, thyroid, sugar, kidney function, cholesterol, and hemoglobin levels. A urine test will also be performed to check for protein.    7. Eosinophilic Asthma.  She has a history of eosinophilic asthma, which is being managed with Fasenra injections. She is advised to continue with her current treatment plan and follow up with her pulmonologist as scheduled in February.    8. Medication Management.  Refills for Lasix, sertraline, and

## 2025-02-10 NOTE — PROGRESS NOTES
[facility]  [address]  [place] [address]  [phone]    Patient Name:  Zeynep Carrington  YOB: 1945      Office Visit 2/13/2025    CHIEF COMPLAINT:    Chief Complaint   Patient presents with    Follow-up    Sleep Apnea         HISTORY OF PRESENT ILLNESS:     This pleasant lady came in today for a follow-up visit regarding her sleep apnea.      To recap:  Patient has QUINTIN with an  AHI of 11.6 and increased to 41 during REM sleep.  Saturation low at 69%.  Saturation less than 89% for 24.1 minutes.  She is currently on BiPAP 10/6 with oxygen at 2 L/min and an Estuardo fullface mask.  She is compliant with her PAP machine and doing well.    She is feeling more refreshed with PAP therapy.    At this time:  Compliance data currently shows she has used it 62 out of 62 days.  She is on BiPAP at 11/7 without C-Flex.  59 days more than 4 hours Irysleeve 7 hours and 14 minutes.  AHI is down to 4.3.  However air leaks are 61.6 L/min.    She currently reports the mask does not leak too badly, but she is reporting the machine is beeping when it is on her back and she is afraid is not going to work.  She only gets started on all of therapy since April 2022.    She is still using her oxygen with therapy feels it does make a difference.  Since starting therapy she feels more awake and alert.    Coats score today is 6/24     As a side note she reports she is still using her Symbicort for her asthma but reports that she does use the Proventil about once a week.  She indicates she uses it after she gets short of breath when she exerts herself.  She still on Fasenra as well.             2/13/2025     8:55 AM 12/10/2024    10:48 AM 5/29/2024     2:06 PM 1/10/2024     1:58 PM 7/19/2022     1:45 PM 3/2/2022    12:58 PM   Sleep Medicine   Sitting and reading 3 3 3 3 3 2   Watching TV 3 3 3 3 3 2   Sitting, inactive in a public place (e.g. a theatre or a meeting) 0 0 0 0 0 0   As a

## 2025-02-13 ENCOUNTER — OFFICE VISIT (OUTPATIENT)
Dept: SLEEP MEDICINE | Age: 80
End: 2025-02-13
Payer: MEDICARE

## 2025-02-13 VITALS
WEIGHT: 168 LBS | DIASTOLIC BLOOD PRESSURE: 79 MMHG | HEART RATE: 64 BPM | OXYGEN SATURATION: 97 % | HEIGHT: 60 IN | TEMPERATURE: 97.1 F | SYSTOLIC BLOOD PRESSURE: 136 MMHG | BODY MASS INDEX: 32.98 KG/M2 | RESPIRATION RATE: 19 BRPM

## 2025-02-13 DIAGNOSIS — G47.34 NOCTURNAL HYPOXEMIA: ICD-10-CM

## 2025-02-13 DIAGNOSIS — G47.10 HYPERSOMNIA, UNSPECIFIED: ICD-10-CM

## 2025-02-13 DIAGNOSIS — G47.33 OSA (OBSTRUCTIVE SLEEP APNEA): Primary | ICD-10-CM

## 2025-02-13 DIAGNOSIS — E66.9 OBESITY (BMI 30-39.9): ICD-10-CM

## 2025-02-13 DIAGNOSIS — J45.50 SEVERE PERSISTENT ASTHMA WITHOUT COMPLICATION: ICD-10-CM

## 2025-02-13 PROCEDURE — 1160F RVW MEDS BY RX/DR IN RCRD: CPT | Performed by: INTERNAL MEDICINE

## 2025-02-13 PROCEDURE — 1123F ACP DISCUSS/DSCN MKR DOCD: CPT | Performed by: INTERNAL MEDICINE

## 2025-02-13 PROCEDURE — G2211 COMPLEX E/M VISIT ADD ON: HCPCS | Performed by: INTERNAL MEDICINE

## 2025-02-13 PROCEDURE — 99214 OFFICE O/P EST MOD 30 MIN: CPT | Performed by: INTERNAL MEDICINE

## 2025-02-13 PROCEDURE — 3075F SYST BP GE 130 - 139MM HG: CPT | Performed by: INTERNAL MEDICINE

## 2025-02-13 PROCEDURE — 1159F MED LIST DOCD IN RCRD: CPT | Performed by: INTERNAL MEDICINE

## 2025-02-13 PROCEDURE — 3078F DIAST BP <80 MM HG: CPT | Performed by: INTERNAL MEDICINE

## 2025-02-13 ASSESSMENT — SLEEP AND FATIGUE QUESTIONNAIRES
HOW LIKELY ARE YOU TO NOD OFF OR FALL ASLEEP WHILE SITTING INACTIVE IN A PUBLIC PLACE: WOULD NEVER DOZE
HOW LIKELY ARE YOU TO NOD OFF OR FALL ASLEEP WHILE SITTING AND READING: HIGH CHANCE OF DOZING
HOW LIKELY ARE YOU TO NOD OFF OR FALL ASLEEP WHILE LYING DOWN TO REST IN THE AFTERNOON WHEN CIRCUMSTANCES PERMIT: WOULD NEVER DOZE
HOW LIKELY ARE YOU TO NOD OFF OR FALL ASLEEP WHILE SITTING AND TALKING TO SOMEONE: WOULD NEVER DOZE
HOW LIKELY ARE YOU TO NOD OFF OR FALL ASLEEP IN A CAR, WHILE STOPPED FOR A FEW MINUTES IN TRAFFIC: WOULD NEVER DOZE
ESS TOTAL SCORE: 6
HOW LIKELY ARE YOU TO NOD OFF OR FALL ASLEEP WHILE WATCHING TV: HIGH CHANCE OF DOZING
HOW LIKELY ARE YOU TO NOD OFF OR FALL ASLEEP WHEN YOU ARE A PASSENGER IN A CAR FOR AN HOUR WITHOUT A BREAK: WOULD NEVER DOZE
HOW LIKELY ARE YOU TO NOD OFF OR FALL ASLEEP WHILE SITTING QUIETLY AFTER LUNCH WITHOUT ALCOHOL: WOULD NEVER DOZE

## 2025-02-19 NOTE — TELEPHONE ENCOUNTER
1 - recommend viral testing due to HA, sore throat, cough.    2 - Prednisone 20 mg - 2 po q am pc for 3 days, 1 and 1/2 for 3 days, 1 for 3 days, 1/2 for 3 days, then stop or as directed.    3 - zpak  4 - needs to use nebulizer qid until back to baseline. Continue symbicort    5 - OTC mucolytic, (mucinex or generic equivalent of guaifenesin), 2400mg in 24 hours in divided doses as needed to thin mucous.    6 - OTC coricidin RX for headache, sore throat (no NSAIDs while on prednisone)    I sent in prednisone and zpak to her pharmacy.    If no better after completing above, needs to be seen here, urgent care.  If in distress, ER eval.   Sent patient information letter for missed appointment on 2/19/2025 with Dr Macias.

## 2025-02-20 ENCOUNTER — OFFICE VISIT (OUTPATIENT)
Age: 80
End: 2025-02-20
Payer: MEDICARE

## 2025-02-20 ENCOUNTER — NURSE ONLY (OUTPATIENT)
Age: 80
End: 2025-02-20

## 2025-02-20 VITALS
DIASTOLIC BLOOD PRESSURE: 72 MMHG | HEART RATE: 60 BPM | HEIGHT: 60 IN | BODY MASS INDEX: 32.39 KG/M2 | WEIGHT: 165 LBS | SYSTOLIC BLOOD PRESSURE: 138 MMHG

## 2025-02-20 DIAGNOSIS — R00.1 SYMPTOMATIC BRADYCARDIA: ICD-10-CM

## 2025-02-20 DIAGNOSIS — I48.19 PERSISTENT ATRIAL FIBRILLATION (HCC): Primary | ICD-10-CM

## 2025-02-20 DIAGNOSIS — I10 ESSENTIAL HYPERTENSION: ICD-10-CM

## 2025-02-20 DIAGNOSIS — I50.9 CONGESTIVE HEART FAILURE (HCC): Primary | Chronic | ICD-10-CM

## 2025-02-20 PROCEDURE — 3078F DIAST BP <80 MM HG: CPT | Performed by: PHYSICIAN ASSISTANT

## 2025-02-20 PROCEDURE — 1123F ACP DISCUSS/DSCN MKR DOCD: CPT | Performed by: PHYSICIAN ASSISTANT

## 2025-02-20 PROCEDURE — 1160F RVW MEDS BY RX/DR IN RCRD: CPT | Performed by: PHYSICIAN ASSISTANT

## 2025-02-20 PROCEDURE — 1159F MED LIST DOCD IN RCRD: CPT | Performed by: PHYSICIAN ASSISTANT

## 2025-02-20 PROCEDURE — 99214 OFFICE O/P EST MOD 30 MIN: CPT | Performed by: PHYSICIAN ASSISTANT

## 2025-02-20 PROCEDURE — 3075F SYST BP GE 130 - 139MM HG: CPT | Performed by: PHYSICIAN ASSISTANT

## 2025-02-20 PROCEDURE — 1126F AMNT PAIN NOTED NONE PRSNT: CPT | Performed by: PHYSICIAN ASSISTANT

## 2025-02-20 NOTE — PROGRESS NOTES
[address], [address]  [place] [address]  PHONE: [phone]  Zeynep Carrington  1945    Chief Complant:    Chief Complaint   Patient presents with    POST BIV Pacer      History:  Zeynep Carrington is a very pleasant 79 y.o. female with a past medical and cardiac history significant for persistent atrial fibrillation s/p recent MING guided DCCV, CAD, HTN, HLD and was seen in EP consultation for afib. Pt main complaint is low energy, fatigue, not able to do what she wants. She denies palpitations, no rapid HR, no syncope. She has had ongoing bradycardia, av HR 61 from monitor.She is now s/p BSC BIV PPM implantation 10/31/24. She presents today for follow up visit. Patient doing well. Admits to shortness of breath but states its chronic and related to her asthma. Occasional feels brief chest discomfort. Denies exertional symptoms.      Cardiac PMH: (Old records have been reviewed and summarized below)  Monitor (3/31/23):  Sinus rhythm. No sustained arrhythmias PACs 3.7%. No long pauses. PVCs <1%.     Cath (4/14/23):     Mild nonobstructive coronary disease    Mild pulmonary hypertension    Normal LV systolic function    2-3+ mitral regurgitation    TTE (7/9/24): EF 60-65%, mod MR, mod LAE    EKG (7/10/24) personally viewed and interpreted: NSR, FAV block with MN ~360 msec     Monitor (7/12/24): Cardiac telemetry: 96% afib, no significant pauses, av HR 61    Device (10/31/24): Successful implantation of [employer] biventricular permanent pacemaker     Reviewed office note Dr Mac 1/23/25    Past Medical History, Past Surgical History, Family history, Social History, and Medications were all reviewed with the patient today and updated as necessary.     Current Outpatient Medications   Medication Sig Dispense Refill    Cyanocobalamin (VITAMIN B 12 PO) Take by mouth 3,000 mcg  takes 2 gummies daily      NONFORMULARY CALCIUM 500 MG+ VITAMIN D 1000I U TAKES ONE GUMMIE

## 2025-03-11 ENCOUNTER — OFFICE VISIT (OUTPATIENT)
Age: 80
End: 2025-03-11
Payer: MEDICARE

## 2025-03-11 ENCOUNTER — TELEPHONE (OUTPATIENT)
Age: 80
End: 2025-03-11

## 2025-03-11 VITALS
WEIGHT: 165.5 LBS | HEART RATE: 60 BPM | DIASTOLIC BLOOD PRESSURE: 58 MMHG | HEIGHT: 60 IN | SYSTOLIC BLOOD PRESSURE: 134 MMHG | BODY MASS INDEX: 32.49 KG/M2

## 2025-03-11 DIAGNOSIS — I48.19 PERSISTENT ATRIAL FIBRILLATION (HCC): Primary | ICD-10-CM

## 2025-03-11 DIAGNOSIS — I10 ESSENTIAL HYPERTENSION: ICD-10-CM

## 2025-03-11 DIAGNOSIS — I25.10 CORONARY ARTERY DISEASE INVOLVING NATIVE CORONARY ARTERY OF NATIVE HEART WITHOUT ANGINA PECTORIS: ICD-10-CM

## 2025-03-11 DIAGNOSIS — I50.32 CHRONIC HEART FAILURE WITH PRESERVED EJECTION FRACTION (HCC): ICD-10-CM

## 2025-03-11 PROCEDURE — 3075F SYST BP GE 130 - 139MM HG: CPT | Performed by: INTERNAL MEDICINE

## 2025-03-11 PROCEDURE — 1126F AMNT PAIN NOTED NONE PRSNT: CPT | Performed by: INTERNAL MEDICINE

## 2025-03-11 PROCEDURE — 99214 OFFICE O/P EST MOD 30 MIN: CPT | Performed by: INTERNAL MEDICINE

## 2025-03-11 PROCEDURE — 1123F ACP DISCUSS/DSCN MKR DOCD: CPT | Performed by: INTERNAL MEDICINE

## 2025-03-11 PROCEDURE — 1160F RVW MEDS BY RX/DR IN RCRD: CPT | Performed by: INTERNAL MEDICINE

## 2025-03-11 PROCEDURE — 1159F MED LIST DOCD IN RCRD: CPT | Performed by: INTERNAL MEDICINE

## 2025-03-11 PROCEDURE — 3078F DIAST BP <80 MM HG: CPT | Performed by: INTERNAL MEDICINE

## 2025-03-11 RX ORDER — FUROSEMIDE 40 MG/1
60 TABLET ORAL DAILY
Qty: 90 TABLET | Refills: 3 | Status: SHIPPED | OUTPATIENT
Start: 2025-03-11

## 2025-03-11 NOTE — ASSESSMENT & PLAN NOTE
Mrs. Carrington is doing well post pace/ablate.  For her atrial fibrillation, we will reduce her Eliquis dose to 2.5 mg twice daily in view of age (greater than 80 years) and creatinine greater than 1.5.  I will also stop baby aspirin, as the Eliquis will cover her nonobstructive coronary artery disease.  This may help with her mild morning epistaxis and easy bruising.  Orders:    Basic Metabolic Panel; Future    apixaban (ELIQUIS) 2.5 MG TABS tablet; Take 1 tablet by mouth 2 times daily

## 2025-03-11 NOTE — PATIENT INSTRUCTIONS
- Stop aspirin, continue Eliquis at reduced dose of 2.5 mg twice daily  - Start Jardiance 10 mg daily  - Increase Lasix to 60 mg daily, blood work in 1-2 weeks  - Return to clinic in 4 weeks or sooner if needed

## 2025-03-11 NOTE — ASSESSMENT & PLAN NOTE
Chronic, well-controlled.  Continuing losartan 100 mg daily and spironolactone 25 mg daily.

## 2025-03-11 NOTE — PROGRESS NOTES
CARDIOLOGY CLINIC FOLLOW-UP    Date: 3/11/2025  Patient's Primary Care Physician:  Mark Anthony Mac DO  Referring Physician:  No ref. provider found     Subjective     Reason for Visit: Follow-up of atrial fibrillation    History of Present Illness   Ms. Carrington is a 79 y.o. female with past medical history of atrial fibrillation, coronary artery disease, hypertension, hyperlipidemia, and asthma who returns for routine follow-up.  Since she was last seen by Dr. Arthur, she underwent BiV pacemaker placement.  And AV node ablation and treatment of tachybradycardia syndrome.  She reports shortness of breath that is chronic and responds to inhaled medications. She reports occasional epistaxis and easy bruising.     Review of Systems   Cardiovascular review of systems negative accept as above.    Home Medications  Prior to Admission medications    Medication Sig Start Date End Date Taking? Authorizing Provider   furosemide (LASIX) 40 MG tablet Take 1.5 tablets by mouth daily 3/11/25  Yes Jayce Flowers MD   empagliflozin (JARDIANCE) 10 MG tablet Take 1 tablet by mouth daily 3/11/25  Yes Jayce Flowers MD   apixaban (ELIQUIS) 2.5 MG TABS tablet Take 1 tablet by mouth 2 times daily 3/11/25  Yes Jayce Flowers MD   Cyanocobalamin (VITAMIN B 12 PO) Take by mouth 3,000 mcg  takes 2 gummies daily   Yes Yaquelin Mullins MD   NONFORMULARY CALCIUM 500 MG+ VITAMIN D 1000I U TAKES ONE GUMMIE DAILY   Yes Yaquelin Mullins MD   sertraline (ZOLOFT) 50 MG tablet Take 1.5 tablets by mouth daily 1/23/25  Yes Mark Anthony Mac DO   spironolactone (ALDACTONE) 25 MG tablet Take 1 tablet by mouth daily 1/23/25  Yes Mark Anthony Mac DO   ferrous sulfate (IRON 325) 325 (65 Fe) MG tablet Take 1 tablet by mouth daily 1/23/25  Yes Mark Anthony Mac DO   gabapentin (NEURONTIN) 300 MG capsule Take 1 capsule by mouth 3 times daily for 360 days. 1/23/25 1/18/26 Yes Mark Anthony Mac DO   budesonide-formoterol (SYMBICORT)

## 2025-03-11 NOTE — ASSESSMENT & PLAN NOTE
Mildly volume overloaded on exam, with significantly elevated RVSP on most recent echocardiogram.  We will try nudging up her Lasix dose to 60 mg daily with surveillance blood work 1 to 2 weeks later.  I will also add Jardiance to her regimen.  Orders:    furosemide (LASIX) 40 MG tablet; Take 1.5 tablets by mouth daily    empagliflozin (JARDIANCE) 10 MG tablet; Take 1 tablet by mouth daily    Basic Metabolic Panel; Future

## 2025-03-19 ENCOUNTER — RESULTS FOLLOW-UP (OUTPATIENT)
Age: 80
End: 2025-03-19

## 2025-03-19 DIAGNOSIS — I50.32 CHRONIC HEART FAILURE WITH PRESERVED EJECTION FRACTION (HCC): ICD-10-CM

## 2025-03-19 DIAGNOSIS — I48.19 PERSISTENT ATRIAL FIBRILLATION (HCC): ICD-10-CM

## 2025-03-19 DIAGNOSIS — N18.9 ACUTE KIDNEY INJURY SUPERIMPOSED ON CKD: Primary | ICD-10-CM

## 2025-03-19 DIAGNOSIS — N17.9 ACUTE KIDNEY INJURY SUPERIMPOSED ON CKD: Primary | ICD-10-CM

## 2025-03-19 LAB
ANION GAP SERPL CALC-SCNC: 12 MMOL/L (ref 7–16)
BUN SERPL-MCNC: 57 MG/DL (ref 8–23)
CALCIUM SERPL-MCNC: 9.3 MG/DL (ref 8.8–10.2)
CHLORIDE SERPL-SCNC: 102 MMOL/L (ref 98–107)
CO2 SERPL-SCNC: 25 MMOL/L (ref 20–29)
CREAT SERPL-MCNC: 2.08 MG/DL (ref 0.6–1.1)
GLUCOSE SERPL-MCNC: 108 MG/DL (ref 70–99)
POTASSIUM SERPL-SCNC: 4.1 MMOL/L (ref 3.5–5.1)
SODIUM SERPL-SCNC: 139 MMOL/L (ref 136–145)

## 2025-03-26 ENCOUNTER — OFFICE VISIT (OUTPATIENT)
Dept: INTERNAL MEDICINE CLINIC | Facility: CLINIC | Age: 80
End: 2025-03-26
Payer: MEDICARE

## 2025-03-26 VITALS
OXYGEN SATURATION: 97 % | WEIGHT: 164 LBS | HEIGHT: 60 IN | SYSTOLIC BLOOD PRESSURE: 110 MMHG | BODY MASS INDEX: 32.2 KG/M2 | DIASTOLIC BLOOD PRESSURE: 60 MMHG | TEMPERATURE: 98.1 F | HEART RATE: 60 BPM

## 2025-03-26 DIAGNOSIS — I50.32 CHRONIC HEART FAILURE WITH PRESERVED EJECTION FRACTION (HCC): Chronic | ICD-10-CM

## 2025-03-26 DIAGNOSIS — R19.7 DIARRHEA, UNSPECIFIED TYPE: Primary | ICD-10-CM

## 2025-03-26 PROCEDURE — 1159F MED LIST DOCD IN RCRD: CPT | Performed by: INTERNAL MEDICINE

## 2025-03-26 PROCEDURE — 3074F SYST BP LT 130 MM HG: CPT | Performed by: INTERNAL MEDICINE

## 2025-03-26 PROCEDURE — 99214 OFFICE O/P EST MOD 30 MIN: CPT | Performed by: INTERNAL MEDICINE

## 2025-03-26 PROCEDURE — 1123F ACP DISCUSS/DSCN MKR DOCD: CPT | Performed by: INTERNAL MEDICINE

## 2025-03-26 PROCEDURE — 3078F DIAST BP <80 MM HG: CPT | Performed by: INTERNAL MEDICINE

## 2025-03-26 NOTE — PROGRESS NOTES
Zeynep Carrington (: 1945) is a 79 y.o. female, here for evaluation of the following chief complaint(s):  Diarrhea (X 4 weeks) and Abdominal Pain     Wt Readings from Last 3 Encounters:   25 74.4 kg (164 lb)   25 75.1 kg (165 lb 8 oz)   25 74.8 kg (165 lb)       Assessment & Plan  1. Diarrhea.  - Experiencing diarrhea for about 4 weeks, with watery stools occurring shortly after eating.  - No associated weight loss, fever, chills, or vomiting; no recent antibiotic use or travel outside the country.  - Discussed dietary habits, including consumption of 1 cup of coffee a day and sugar-free candy; symptoms started before taking Jardiance.  - Stool test will be conducted to check for any infections.  1. Diarrhea, unspecified type  -     C. difficile toxin Molecular; Future  -     Ova and Parasite Examination; Future  -     Fecal lactoferrin; Future  -     Culture, Stool; Future  -     Fecal leukocytes; Future  -     Culture, Stool  2. Chronic heart failure with preserved ejection fraction (HCC)    History of Present Illness  The patient is an 79-year-old female who presents for evaluation of diarrhea.    She has been experiencing diarrhea for approximately 4 weeks, with episodes occurring immediately after eating or within a 30-minute to 1-hour window post-meal. The consistency of her stool is watery, and she reports a sense of urgency when needing to defecate. She had 5 bowel movements this morning before eating anything. Additionally, she reports abdominal discomfort and nausea but has not experienced any vomiting. A gurgling sensation on the side is noted following meals. Despite these symptoms, no weight loss has been observed. A healthy diet is maintained, including fruits such as bananas and strawberries, and she consumes one cup of coffee daily. She does not chew gum. There has been no significant stress or anxiety recently. She has not been on any antibiotics in the past 6 months and

## 2025-03-27 DIAGNOSIS — R19.7 DIARRHEA, UNSPECIFIED TYPE: ICD-10-CM

## 2025-03-27 LAB — C. DIFFICILE TOXIN MOLECULAR: NEGATIVE

## 2025-03-30 LAB
BACTERIA SPEC CULT: NORMAL
SERVICE CMNT-IMP: NORMAL

## 2025-04-02 ENCOUNTER — OFFICE VISIT (OUTPATIENT)
Age: 80
End: 2025-04-02
Payer: MEDICARE

## 2025-04-02 VITALS
BODY MASS INDEX: 32.24 KG/M2 | SYSTOLIC BLOOD PRESSURE: 132 MMHG | DIASTOLIC BLOOD PRESSURE: 64 MMHG | WEIGHT: 164.2 LBS | HEART RATE: 62 BPM | HEIGHT: 60 IN

## 2025-04-02 DIAGNOSIS — Z95.0 PACEMAKER: ICD-10-CM

## 2025-04-02 DIAGNOSIS — I10 ESSENTIAL HYPERTENSION: Chronic | ICD-10-CM

## 2025-04-02 DIAGNOSIS — I48.21 PERMANENT ATRIAL FIBRILLATION (HCC): Primary | ICD-10-CM

## 2025-04-02 LAB
LACTOFERRIN, FECAL: 1.69 UG/ML(G) (ref 0–7.24)
O+P SPEC MICRO: NORMAL
O+P STL CONC: NORMAL
SPECIMEN SOURCE: NORMAL
SPECIMEN SOURCE: NORMAL
WHITE BLOOD CELLS (WBC), STOOL: NORMAL

## 2025-04-02 PROCEDURE — 1090F PRES/ABSN URINE INCON ASSESS: CPT | Performed by: INTERNAL MEDICINE

## 2025-04-02 PROCEDURE — G8399 PT W/DXA RESULTS DOCUMENT: HCPCS | Performed by: INTERNAL MEDICINE

## 2025-04-02 PROCEDURE — 99214 OFFICE O/P EST MOD 30 MIN: CPT | Performed by: INTERNAL MEDICINE

## 2025-04-02 PROCEDURE — 1123F ACP DISCUSS/DSCN MKR DOCD: CPT | Performed by: INTERNAL MEDICINE

## 2025-04-02 PROCEDURE — 3078F DIAST BP <80 MM HG: CPT | Performed by: INTERNAL MEDICINE

## 2025-04-02 PROCEDURE — 1036F TOBACCO NON-USER: CPT | Performed by: INTERNAL MEDICINE

## 2025-04-02 PROCEDURE — 93000 ELECTROCARDIOGRAM COMPLETE: CPT | Performed by: INTERNAL MEDICINE

## 2025-04-02 PROCEDURE — G8417 CALC BMI ABV UP PARAM F/U: HCPCS | Performed by: INTERNAL MEDICINE

## 2025-04-02 PROCEDURE — 3075F SYST BP GE 130 - 139MM HG: CPT | Performed by: INTERNAL MEDICINE

## 2025-04-02 PROCEDURE — G8428 CUR MEDS NOT DOCUMENT: HCPCS | Performed by: INTERNAL MEDICINE

## 2025-04-02 NOTE — PROGRESS NOTES
[facility] CARDIOLOGY, [address], [address] [place] [address]  PHONE: [phone]  Zeynep Carrington  1945    Chief Complant:    No chief complaint on file.     Consultation is requested by [unfilled] for evaluation of No chief complaint on file.    Reason for Consultation: afib    History:  Zeynep Carrington is a very pleasant 79 y.o. female with a past medical and cardiac history significant for persistent atrial fibrillation s/p recent MING guided DCCV, CAD, HTN, HLD s/p BiV PPM and presents for Watchman implantation. She is having problems easy bruising, bleeding, nosebleeds, and fall risk.     Cardiac PMH: (Old records have been reviewed and summarized below)  TTE (7/9/24): EF 60-65%, mod MR, mod LAE  Cardiac telemetry: 96% afib, no significant pauses, av HR 61  EKG (7/10/24) personally viewed and interpreted: NSR, FAV block with KS ~360 msec     Reviewed office note Dr. Flowers 3/11/25    Past Medical History, Past Surgical History, Family history, Social History, and Medications were all reviewed with the patient today and updated as necessary.     Current Outpatient Medications   Medication Sig Dispense Refill    furosemide (LASIX) 40 MG tablet Take 1.5 tablets by mouth daily 90 tablet 3    empagliflozin (JARDIANCE) 10 MG tablet Take 1 tablet by mouth daily 90 tablet 1    apixaban (ELIQUIS) 2.5 MG TABS tablet Take 1 tablet by mouth 2 times daily 180 tablet 1    Cyanocobalamin (VITAMIN B 12 PO) Take by mouth 3,000 mcg  takes 2 gummies daily      NONFORMULARY CALCIUM 500 MG+ VITAMIN D 1000I U TAKES ONE GUMMIE DAILY      sertraline (ZOLOFT) 50 MG tablet Take 1.5 tablets by mouth daily 135 tablet 3    spironolactone (ALDACTONE) 25 MG tablet Take 1 tablet by mouth daily 90 tablet 3    ferrous sulfate (IRON 325) 325 (65 Fe) MG tablet Take 1 tablet by mouth daily 90 tablet 3    gabapentin (NEURONTIN) 300 MG capsule Take 1 capsule by mouth 3 times daily for 360 days. 270 capsule 3

## 2025-04-03 ENCOUNTER — TELEPHONE (OUTPATIENT)
Dept: INTERNAL MEDICINE CLINIC | Facility: CLINIC | Age: 80
End: 2025-04-03

## 2025-04-03 NOTE — TELEPHONE ENCOUNTER
Patient called and states that she is still having diarrhea and would like to know what she needs to do. Patient states that she has not heard from her test results either and would like to know if someone would give her a call back in regards to her results. Please Advise.

## 2025-04-03 NOTE — TELEPHONE ENCOUNTER
Called the patient all stool studies negative. Advised she try BRAT diet and call her GI Dr Paris and let them see her. She states if she uses Imodium it stops. I told her she can't use that long term and get in with GI.

## 2025-04-07 NOTE — PROGRESS NOTES
Zeynep Carrington has been referred to the [facility] for evaluation for Left Atrial Appendage Closure with a FDA-approved Watchman device for management of stroke risk resulting from non-valvular atrial fibrillation.    Based on this patient's history, it has been determined that this patient is a poor candidate for long-term oral anticoagulation, however may be tolerant of short term treatment as needed for watchman implantation.     Specifically regarding anticoagulation, the patient has demonstrated: Anti-coagulation History: History of Bleeding (e.g. Intra cerebral, subdural, GI, retro-peritoneal) Recurrent nosebleeds, easy bruising, and bleeding.       The patient and I have discussed their unique stroke and bleeding risk both on and off oral-anticoagulation, and the rationale for this referral. Their individual [id number] stroke risk score, based on past history is indicated below**.  Select all that apply based on patient history:    Atrial Fibrillation CHADSVASC2 Score Stroke Risk:_  79 y.o. > 75 - 2    female Female - 1   CHF HX: No - 0   HTN HX: Yes - 1   Stroke/TIA/Thromboembolism No - 0   Vascular Disease HX: Yes - 1   Diabetes Mellitus No - 0   CHADSVASC 2 Score 5      Annual Stroke Risk 7.2% - moderate-high                Vascular disease: CAD    HAS-BLED Score     HTN Hx [x] 1 Point   Renal Disease  [] 1 Point   Liver Disease [] 1 Point   Stroke Hx [] 1 Point   Prior Major Bleeding or Predisposition [x] 1 Point   Labile INR [] 1 Point   Age > 65 Years Current: 79 y.o.   [x] 1 Point   Rx Usage Predisposing to Bleeding [] 1 Point   Alcohol Use (> or = 8 Drinks/wk) [] 1 Point   Total: UCHASBLED: Score 3 High Risk 5.8% Risk of major bleeding 3.72  Bleeds Per 100 pts years Alternatives to Anticoagulation can be considered       Dr. Jayce Flowers and Dr. Jorge Woodward have both had a face-to-face conversation with this patient explaining atrial fibrillation,

## 2025-04-08 ENCOUNTER — OFFICE VISIT (OUTPATIENT)
Dept: INTERNAL MEDICINE CLINIC | Facility: CLINIC | Age: 80
End: 2025-04-08
Payer: MEDICARE

## 2025-04-08 VITALS
BODY MASS INDEX: 32.59 KG/M2 | WEIGHT: 166 LBS | TEMPERATURE: 98.7 F | DIASTOLIC BLOOD PRESSURE: 68 MMHG | OXYGEN SATURATION: 98 % | HEIGHT: 60 IN | SYSTOLIC BLOOD PRESSURE: 120 MMHG | HEART RATE: 61 BPM

## 2025-04-08 DIAGNOSIS — J40 BRONCHITIS: Primary | ICD-10-CM

## 2025-04-08 PROCEDURE — 99213 OFFICE O/P EST LOW 20 MIN: CPT | Performed by: INTERNAL MEDICINE

## 2025-04-08 PROCEDURE — 1159F MED LIST DOCD IN RCRD: CPT | Performed by: INTERNAL MEDICINE

## 2025-04-08 PROCEDURE — G8417 CALC BMI ABV UP PARAM F/U: HCPCS | Performed by: INTERNAL MEDICINE

## 2025-04-08 PROCEDURE — 1036F TOBACCO NON-USER: CPT | Performed by: INTERNAL MEDICINE

## 2025-04-08 PROCEDURE — 3078F DIAST BP <80 MM HG: CPT | Performed by: INTERNAL MEDICINE

## 2025-04-08 PROCEDURE — G8427 DOCREV CUR MEDS BY ELIG CLIN: HCPCS | Performed by: INTERNAL MEDICINE

## 2025-04-08 PROCEDURE — 3074F SYST BP LT 130 MM HG: CPT | Performed by: INTERNAL MEDICINE

## 2025-04-08 PROCEDURE — 1123F ACP DISCUSS/DSCN MKR DOCD: CPT | Performed by: INTERNAL MEDICINE

## 2025-04-08 PROCEDURE — G8399 PT W/DXA RESULTS DOCUMENT: HCPCS | Performed by: INTERNAL MEDICINE

## 2025-04-08 PROCEDURE — 1090F PRES/ABSN URINE INCON ASSESS: CPT | Performed by: INTERNAL MEDICINE

## 2025-04-08 RX ORDER — AZITHROMYCIN 250 MG/1
TABLET, FILM COATED ORAL
Qty: 6 TABLET | Refills: 0 | Status: SHIPPED | OUTPATIENT
Start: 2025-04-08 | End: 2025-04-18

## 2025-04-08 RX ORDER — BENZONATATE 100 MG/1
100-200 CAPSULE ORAL 3 TIMES DAILY PRN
Qty: 60 CAPSULE | Refills: 0 | Status: SHIPPED | OUTPATIENT
Start: 2025-04-08 | End: 2025-04-15

## 2025-04-08 RX ORDER — METHYLPREDNISOLONE 4 MG/1
TABLET ORAL
Qty: 1 KIT | Refills: 0 | Status: SHIPPED | OUTPATIENT
Start: 2025-04-08 | End: 2025-04-14

## 2025-04-08 NOTE — PROGRESS NOTES
Zeynep Carrington (: 1945) is a 79 y.o. female, here for evaluation of the following chief complaint(s):  Pharyngitis (Symptoms started 25), Cough, and Wheezing     Assessment & Plan  1. Pharyngitis.  She reports a sore throat for the past 4 days, accompanied by cough and wheezing. There is no associated fever, chills, nausea, vomiting, or shortness of breath. She has not lost weight but has no appetite and has not eaten since last night. She is advised to stay hydrated and rest. If symptoms persist or worsen, she should seek further medical attention.    2. Diarrhea.  She reports ongoing diarrhea and has an upcoming appointment with a GI doctor. There is no associated weight loss.  1. Bronchitis    History of Present Illness  The patient is an 79-year-old female who presents for evaluation of a sore throat.    She has been experiencing a sore throat for the past 4 days, accompanied by a cough and wheezing. Her sleep was disrupted last night due to these symptoms. She reports no associated fever or chills. She also reports persistent diarrhea, for which she has scheduled an appointment with a gastroenterologist. Despite these symptoms, she has not experienced any weight loss. However, she reports a lack of appetite and has not consumed any food since the previous night.    Social History     Tobacco Use    Smoking status: Former     Current packs/day: 0.00     Average packs/day: 1 pack/day for 10.0 years (10.0 ttl pk-yrs)     Types: Cigarettes     Start date: 1971     Quit date: 1981     Years since quittin.3    Smokeless tobacco: Never    Tobacco comments:     Quit smoking: quit smoking    Vaping Use    Vaping status: Never Used   Substance Use Topics    Alcohol use: Never    Drug use: No     Vitals:    25 1044   BP: 120/68   Pulse: 61   Temp: 98.7 °F (37.1 °C)   TempSrc: Temporal   SpO2: 98%   Weight: 75.3 kg (166 lb)   Height: 1.524 m (5')     Body mass index is 32.42

## 2025-04-10 LAB
ANION GAP SERPL CALC-SCNC: 14 MMOL/L (ref 7–16)
BUN SERPL-MCNC: 31 MG/DL (ref 8–23)
CALCIUM SERPL-MCNC: 9.4 MG/DL (ref 8.8–10.2)
CHLORIDE SERPL-SCNC: 107 MMOL/L (ref 98–107)
CO2 SERPL-SCNC: 20 MMOL/L (ref 20–29)
CREAT SERPL-MCNC: 1.79 MG/DL (ref 0.6–1.1)
GLUCOSE SERPL-MCNC: 171 MG/DL (ref 70–99)
POTASSIUM SERPL-SCNC: 3.6 MMOL/L (ref 3.5–5.1)
SODIUM SERPL-SCNC: 141 MMOL/L (ref 136–145)

## 2025-04-18 ENCOUNTER — OFFICE VISIT (OUTPATIENT)
Age: 80
End: 2025-04-18
Payer: MEDICARE

## 2025-04-18 VITALS
DIASTOLIC BLOOD PRESSURE: 60 MMHG | HEART RATE: 60 BPM | HEIGHT: 60 IN | BODY MASS INDEX: 32.39 KG/M2 | WEIGHT: 165 LBS | SYSTOLIC BLOOD PRESSURE: 124 MMHG

## 2025-04-18 DIAGNOSIS — I25.10 CORONARY ARTERY DISEASE INVOLVING NATIVE CORONARY ARTERY OF NATIVE HEART WITHOUT ANGINA PECTORIS: ICD-10-CM

## 2025-04-18 DIAGNOSIS — Z95.0 PACEMAKER: ICD-10-CM

## 2025-04-18 DIAGNOSIS — I50.32 CHRONIC HEART FAILURE WITH PRESERVED EJECTION FRACTION (HCC): ICD-10-CM

## 2025-04-18 DIAGNOSIS — I48.21 PERMANENT ATRIAL FIBRILLATION (HCC): Primary | ICD-10-CM

## 2025-04-18 DIAGNOSIS — I10 ESSENTIAL HYPERTENSION: ICD-10-CM

## 2025-04-18 PROCEDURE — 1125F AMNT PAIN NOTED PAIN PRSNT: CPT | Performed by: INTERNAL MEDICINE

## 2025-04-18 PROCEDURE — 99213 OFFICE O/P EST LOW 20 MIN: CPT | Performed by: INTERNAL MEDICINE

## 2025-04-18 PROCEDURE — 1036F TOBACCO NON-USER: CPT | Performed by: INTERNAL MEDICINE

## 2025-04-18 PROCEDURE — G8399 PT W/DXA RESULTS DOCUMENT: HCPCS | Performed by: INTERNAL MEDICINE

## 2025-04-18 PROCEDURE — G8417 CALC BMI ABV UP PARAM F/U: HCPCS | Performed by: INTERNAL MEDICINE

## 2025-04-18 PROCEDURE — 1090F PRES/ABSN URINE INCON ASSESS: CPT | Performed by: INTERNAL MEDICINE

## 2025-04-18 PROCEDURE — G8428 CUR MEDS NOT DOCUMENT: HCPCS | Performed by: INTERNAL MEDICINE

## 2025-04-18 PROCEDURE — 3078F DIAST BP <80 MM HG: CPT | Performed by: INTERNAL MEDICINE

## 2025-04-18 PROCEDURE — 1123F ACP DISCUSS/DSCN MKR DOCD: CPT | Performed by: INTERNAL MEDICINE

## 2025-04-18 PROCEDURE — 3074F SYST BP LT 130 MM HG: CPT | Performed by: INTERNAL MEDICINE

## 2025-04-18 NOTE — PROGRESS NOTES
CARDIOLOGY CLINIC FOLLOW-UP    Date: 4/18/2025  Patient's Primary Care Physician:  Mark Anthony Mac DO  Referring Physician:  No ref. provider found     Subjective     Reason for Visit: Follow-up of atrial fibrillation    History of Present Illness  Per [facility], 3/11/2025: \"Ms. Carrington is a 79 y.o. female with past medical history of atrial fibrillation, coronary artery disease, hypertension, hyperlipidemia, and asthma who returns for routine follow-up.  Since she was last seen by Dr. Arthur, she underwent BiV pacemaker placement and AV node ablation in treatment of tachybradycardia syndrome.  She reports shortness of breath that is chronic and responds to inhaled medications. She reports occasional epistaxis and easy bruising.\"    In the interim, the patient was seen by electrophysiology and is planned for Watchman procedure.  At our last visit in early March the patient was mildly volume overloaded, and her diuretic was increased. She sustained borderline JOHANNY, and the dose was reduced. She denies tachypalpitations, near syncope, or syncope.  She reports chronic, stable dyspnea that responds to her inhalers.    Review of Systems   Cardiovascular review of systems negative accept as above.    Home Medications  Prior to Admission medications    Medication Sig Start Date End Date Taking? Authorizing Provider   furosemide (LASIX) 40 MG tablet Take 1.5 tablets by mouth daily 3/11/25  Yes Jayce Flowers MD   empagliflozin (JARDIANCE) 10 MG tablet Take 1 tablet by mouth daily 3/11/25  Yes Jayce Flowers MD   apixaban (ELIQUIS) 2.5 MG TABS tablet Take 1 tablet by mouth 2 times daily 3/11/25  Yes Jayce Flowers MD   Cyanocobalamin (VITAMIN B 12 PO) Take by mouth 3,000 mcg  takes 2 gummies daily   Yes ProviderYaquelin MD   NONFORMULARY CALCIUM 500 MG+ VITAMIN D 1000I U TAKES ONE GUMMIE DAILY   Yes Yaquelin Mullins MD   sertraline (ZOLOFT) 50 MG tablet Take 1.5 tablets by mouth daily 1/23/25  Yes

## 2025-04-22 ENCOUNTER — OFFICE VISIT (OUTPATIENT)
Dept: INTERNAL MEDICINE CLINIC | Facility: CLINIC | Age: 80
End: 2025-04-22
Payer: MEDICARE

## 2025-04-22 VITALS
WEIGHT: 165 LBS | HEIGHT: 60 IN | OXYGEN SATURATION: 98 % | BODY MASS INDEX: 32.39 KG/M2 | TEMPERATURE: 98.4 F | HEART RATE: 68 BPM | DIASTOLIC BLOOD PRESSURE: 66 MMHG | SYSTOLIC BLOOD PRESSURE: 114 MMHG

## 2025-04-22 DIAGNOSIS — J98.09 BRONCHOSPASTIC AIRWAY DISEASE: ICD-10-CM

## 2025-04-22 DIAGNOSIS — J40 BRONCHITIS: Primary | ICD-10-CM

## 2025-04-22 PROCEDURE — G8427 DOCREV CUR MEDS BY ELIG CLIN: HCPCS | Performed by: INTERNAL MEDICINE

## 2025-04-22 PROCEDURE — 3078F DIAST BP <80 MM HG: CPT | Performed by: INTERNAL MEDICINE

## 2025-04-22 PROCEDURE — G8399 PT W/DXA RESULTS DOCUMENT: HCPCS | Performed by: INTERNAL MEDICINE

## 2025-04-22 PROCEDURE — 1036F TOBACCO NON-USER: CPT | Performed by: INTERNAL MEDICINE

## 2025-04-22 PROCEDURE — 99213 OFFICE O/P EST LOW 20 MIN: CPT | Performed by: INTERNAL MEDICINE

## 2025-04-22 PROCEDURE — 1090F PRES/ABSN URINE INCON ASSESS: CPT | Performed by: INTERNAL MEDICINE

## 2025-04-22 PROCEDURE — G8417 CALC BMI ABV UP PARAM F/U: HCPCS | Performed by: INTERNAL MEDICINE

## 2025-04-22 PROCEDURE — 1159F MED LIST DOCD IN RCRD: CPT | Performed by: INTERNAL MEDICINE

## 2025-04-22 PROCEDURE — 1123F ACP DISCUSS/DSCN MKR DOCD: CPT | Performed by: INTERNAL MEDICINE

## 2025-04-22 PROCEDURE — 3074F SYST BP LT 130 MM HG: CPT | Performed by: INTERNAL MEDICINE

## 2025-04-22 RX ORDER — PREDNISONE 20 MG/1
TABLET ORAL
Qty: 19 TABLET | Refills: 0 | Status: SHIPPED | OUTPATIENT
Start: 2025-04-22

## 2025-04-22 RX ORDER — AZITHROMYCIN 500 MG/1
500 TABLET, FILM COATED ORAL DAILY
Qty: 3 TABLET | Refills: 0 | Status: SHIPPED | OUTPATIENT
Start: 2025-04-22 | End: 2025-04-25

## 2025-04-22 NOTE — PROGRESS NOTES
guardian, if applicable) and other individuals in attendance with the patient were advised that Artificial Intelligence will be utilized during this visit to record, process the conversation to generate a clinical note, and support improvement of the AI technology. The patient (or guardian, if applicable) and other individuals in attendance at the appointment consented to the use of AI, including the recording.        An electronic signature was used to authenticate this note.  Mark Anthony Mac, DO

## 2025-04-25 NOTE — PROGRESS NOTES
[facility]  [address]  [place] [address]  [phone]    Patient Name:  Zeynep Carrington    YOB: 1945    Office Visit 4/28/2025      Addendum:    CXR is personally reviewed, there is no acute cardiopulmonary process.  She will be informed.        CHIEF COMPLAINT:      Chief Complaint   Patient presents with    Follow-up    Asthma         ASSESSMENT:   (Medical Decision Making)                                                                                                                                          Encounter Diagnoses   Name Primary?    Severe persistent asthma without complication (HCC) Yes    QUINTIN treated with BiPAP     Chronic heart failure with preserved ejection fraction (HCC)     Seasonal allergic rhinitis due to pollen     Weakness     Subacute cough     History of pneumonia      She is completing course of prednisone prescribed by primary provider for exacerbation/bronchitis.  She has already completed azithromycin.  She expresses concern about cough, weakness as similar to when she had pneumonia.  She has minimal scattered rhonchi on exam.    She is compliant with BiPAP.    CHF appears compensated.    She is scheduled for watchman next week.                      PLAN:     Chest x-ray on her way out.  I will contact her with results.    Continue Symbicort 2 puffs twice daily, gargle with water after use.    Advised to use albuterol 4 times daily until she has completed prednisone and has improved to baseline.  May use albuterol inhaler when away from home/nebulizer.    Continue Fasenra as prescribed.    Discussed that if she has recurrent exacerbations, will consider changing Fasenra to Dupixent.    Advised to take remaining doses of prednisone each morning following breakfast.    Try Zyrtec or Claritin, generic, 1 daily for control of allergy symptoms.  She believes that she has had Singulair in the past without definite

## 2025-04-28 ENCOUNTER — RESULTS FOLLOW-UP (OUTPATIENT)
Dept: PULMONOLOGY | Age: 80
End: 2025-04-28

## 2025-04-28 ENCOUNTER — OFFICE VISIT (OUTPATIENT)
Dept: PULMONOLOGY | Age: 80
End: 2025-04-28
Payer: MEDICARE

## 2025-04-28 ENCOUNTER — HOSPITAL ENCOUNTER (OUTPATIENT)
Dept: GENERAL RADIOLOGY | Age: 80
Discharge: HOME | End: 2025-04-30
Payer: MEDICARE

## 2025-04-28 VITALS
BODY MASS INDEX: 35.88 KG/M2 | HEART RATE: 61 BPM | SYSTOLIC BLOOD PRESSURE: 137 MMHG | TEMPERATURE: 98.3 F | HEIGHT: 57 IN | RESPIRATION RATE: 20 BRPM | DIASTOLIC BLOOD PRESSURE: 84 MMHG | WEIGHT: 166.3 LBS | OXYGEN SATURATION: 96 %

## 2025-04-28 DIAGNOSIS — I50.32 CHRONIC HEART FAILURE WITH PRESERVED EJECTION FRACTION (HCC): ICD-10-CM

## 2025-04-28 DIAGNOSIS — G47.33 OSA TREATED WITH BIPAP: ICD-10-CM

## 2025-04-28 DIAGNOSIS — J45.50 SEVERE PERSISTENT ASTHMA WITHOUT COMPLICATION (HCC): Primary | ICD-10-CM

## 2025-04-28 DIAGNOSIS — Z87.01 HISTORY OF PNEUMONIA: ICD-10-CM

## 2025-04-28 DIAGNOSIS — R53.1 WEAKNESS: ICD-10-CM

## 2025-04-28 DIAGNOSIS — R05.2 SUBACUTE COUGH: ICD-10-CM

## 2025-04-28 DIAGNOSIS — J30.1 SEASONAL ALLERGIC RHINITIS DUE TO POLLEN: ICD-10-CM

## 2025-04-28 PROCEDURE — G8417 CALC BMI ABV UP PARAM F/U: HCPCS | Performed by: NURSE PRACTITIONER

## 2025-04-28 PROCEDURE — G8399 PT W/DXA RESULTS DOCUMENT: HCPCS | Performed by: NURSE PRACTITIONER

## 2025-04-28 PROCEDURE — 1126F AMNT PAIN NOTED NONE PRSNT: CPT | Performed by: NURSE PRACTITIONER

## 2025-04-28 PROCEDURE — 71046 X-RAY EXAM CHEST 2 VIEWS: CPT

## 2025-04-28 PROCEDURE — 3079F DIAST BP 80-89 MM HG: CPT | Performed by: NURSE PRACTITIONER

## 2025-04-28 PROCEDURE — 1090F PRES/ABSN URINE INCON ASSESS: CPT | Performed by: NURSE PRACTITIONER

## 2025-04-28 PROCEDURE — 99214 OFFICE O/P EST MOD 30 MIN: CPT | Performed by: NURSE PRACTITIONER

## 2025-04-28 PROCEDURE — 3075F SYST BP GE 130 - 139MM HG: CPT | Performed by: NURSE PRACTITIONER

## 2025-04-28 PROCEDURE — 1123F ACP DISCUSS/DSCN MKR DOCD: CPT | Performed by: NURSE PRACTITIONER

## 2025-04-28 PROCEDURE — 1036F TOBACCO NON-USER: CPT | Performed by: NURSE PRACTITIONER

## 2025-04-28 PROCEDURE — 1159F MED LIST DOCD IN RCRD: CPT | Performed by: NURSE PRACTITIONER

## 2025-04-28 PROCEDURE — 1160F RVW MEDS BY RX/DR IN RCRD: CPT | Performed by: NURSE PRACTITIONER

## 2025-04-28 PROCEDURE — G8427 DOCREV CUR MEDS BY ELIG CLIN: HCPCS | Performed by: NURSE PRACTITIONER

## 2025-04-28 ASSESSMENT — ENCOUNTER SYMPTOMS
WHEEZING: 1
SHORTNESS OF BREATH: 1
HEMOPTYSIS: 0
COUGH: 1
SPUTUM PRODUCTION: 0
SLEEP DISTURBANCES DUE TO BREATHING: 1

## 2025-04-28 NOTE — PATIENT INSTRUCTIONS
Chest x-ray on her way out.  I will contact her with results.    Continue Symbicort 2 puffs twice daily, gargle with water after use.    Advised to use albuterol 4 times daily until she has completed prednisone and has improved to baseline.  May use albuterol inhaler when away from home/nebulizer.    Continue Fasenra as prescribed.    Discussed that if she has recurrent exacerbations, will consider changing Fasenra to Dupixent.    Advised to take remaining doses of prednisone each morning following breakfast.    Try Zyrtec or Claritin, generic, 1 daily for control of allergy symptoms.  She believes that she has had Singulair in the past without definite benefit.    Recommend that she refrain from using perfume or scented products in general.

## 2025-05-01 ENCOUNTER — TELEPHONE (OUTPATIENT)
Dept: CARDIAC CATH/INVASIVE PROCEDURES | Age: 80
End: 2025-05-01

## 2025-05-01 NOTE — TELEPHONE ENCOUNTER
Watchman procedure rescheduled from 5/5  to 6/9/2025.  Patient is on cortico-steriods & nebulizer treatments 4x daily.  Patient has Watchman coordinator contact (Cece Berger [phone]) information for questions or concerns.

## 2025-05-09 ENCOUNTER — TELEPHONE (OUTPATIENT)
Dept: PULMONOLOGY | Age: 80
End: 2025-05-09

## 2025-05-09 NOTE — TELEPHONE ENCOUNTER
The patient wants to know if she needs a new refill for her fasenra. She would like to speak with you about it.

## 2025-05-25 NOTE — TELEPHONE ENCOUNTER
ED  Recommendation:  ED- Rec. (05/25/25 1201 : Nadia Walsh RN)   Spoke to mAber in ultrasound. States she has sent US to radiologist to read. States she will call radiologist to ask if injection indicated. Reviewed Dr. Lele Babcock response. Verb understanding.

## 2025-06-02 ENCOUNTER — OFFICE VISIT (OUTPATIENT)
Dept: INTERNAL MEDICINE CLINIC | Facility: CLINIC | Age: 80
End: 2025-06-02
Payer: MEDICARE

## 2025-06-02 VITALS
OXYGEN SATURATION: 97 % | SYSTOLIC BLOOD PRESSURE: 120 MMHG | HEIGHT: 57 IN | TEMPERATURE: 98 F | WEIGHT: 163 LBS | BODY MASS INDEX: 35.17 KG/M2 | DIASTOLIC BLOOD PRESSURE: 66 MMHG | HEART RATE: 62 BPM

## 2025-06-02 DIAGNOSIS — L03.113 CELLULITIS OF RIGHT UPPER EXTREMITY: Primary | ICD-10-CM

## 2025-06-02 PROCEDURE — G8399 PT W/DXA RESULTS DOCUMENT: HCPCS | Performed by: INTERNAL MEDICINE

## 2025-06-02 PROCEDURE — 1036F TOBACCO NON-USER: CPT | Performed by: INTERNAL MEDICINE

## 2025-06-02 PROCEDURE — G8417 CALC BMI ABV UP PARAM F/U: HCPCS | Performed by: INTERNAL MEDICINE

## 2025-06-02 PROCEDURE — 3078F DIAST BP <80 MM HG: CPT | Performed by: INTERNAL MEDICINE

## 2025-06-02 PROCEDURE — G8427 DOCREV CUR MEDS BY ELIG CLIN: HCPCS | Performed by: INTERNAL MEDICINE

## 2025-06-02 PROCEDURE — 1123F ACP DISCUSS/DSCN MKR DOCD: CPT | Performed by: INTERNAL MEDICINE

## 2025-06-02 PROCEDURE — 99213 OFFICE O/P EST LOW 20 MIN: CPT | Performed by: INTERNAL MEDICINE

## 2025-06-02 PROCEDURE — 1090F PRES/ABSN URINE INCON ASSESS: CPT | Performed by: INTERNAL MEDICINE

## 2025-06-02 PROCEDURE — 3074F SYST BP LT 130 MM HG: CPT | Performed by: INTERNAL MEDICINE

## 2025-06-02 NOTE — PROGRESS NOTES
Cardiovascular:  Negative for chest pain.   Skin:  Positive for color change and rash.       The patient (or guardian, if applicable) and other individuals in attendance with the patient were advised that Artificial Intelligence will be utilized during this visit to record, process the conversation to generate a clinical note, and support improvement of the AI technology. The patient (or guardian, if applicable) and other individuals in attendance at the appointment consented to the use of AI, including the recording.        An electronic signature was used to authenticate this note.  Mark Anthony Mac DO

## 2025-06-04 ENCOUNTER — TELEPHONE (OUTPATIENT)
Dept: CARDIAC CATH/INVASIVE PROCEDURES | Age: 80
End: 2025-06-04

## 2025-06-04 RX ORDER — SODIUM CHLORIDE 9 MG/ML
INJECTION, SOLUTION INTRAVENOUS CONTINUOUS
Status: CANCELLED | OUTPATIENT
Start: 2025-06-09 | End: 2025-06-09

## 2025-06-04 RX ORDER — WHEAT DEXTRIN 3 G/3.8 G
4 POWDER (GRAM) ORAL DAILY
COMMUNITY

## 2025-06-04 NOTE — PROGRESS NOTES
Patient pre-assessment complete for LAAO scheduled for 2025, arrival time 0830. Patient verified using . Patient instructed to bring all home medications in labeled bottles on the day of procedure. NPO status reinforced. Patient's last dose of Jardiance will be on 2025.  Patient's last dose of Eliquis will be on 2025.     Patient will take 1st dose of prednisone 50 mg 1 tablet PO (10:30 pm) 13 hours prior to procedure, 2nd dose of prednisone 50 mg 1 tablet PO  (04:30 am) 7 hours prior to procedure, then bring 3rd dose of prednisone 50 mg with her to be taken 1 hour prior to procedure. She will also bring benadryl 50 mg to be taken 1 hour prior to procedure for iodine allergy.     She will take Symbicort, gabapentin, pantoprazole, and sertralin  with a small sip of water, the morning of her procedure. Patient instructed to HOLD all other medications and supplements. Patient verbalizes understanding of all instructions & denies any questions at this time. Patient has watchman contact (Cece Berger [phone]) for questions.

## 2025-06-04 NOTE — TELEPHONE ENCOUNTER
Premedication for Iodine Allergy prior to contrast media per Dr. Jorge Woodward/Radiology protocol.  Procedure is scheduled for 6/9/2025 at 11:30 am.     Prednisone 50 mg, Take:   - 1 tablet by mouth 13 hours prior to procedure (10:30 pm)   - 1 tablet by mouth 7 hours prior to procedure (04:30 am)   - 1 tablet by mouth 1 hour prior to procedure (10:30 am)  Dispense 3 tablets  No refill     Patient advised to purchase benadryl 25 mg tablets over the counter.    Benadryl 25 mg, Take:  - 2  tablets by mouth 1 hour prior to procedure (10:30 am)     Prescription phoned to: Frieda at [id number] - [address] - P [phone] - F [phone] [[id number]]

## 2025-06-05 ENCOUNTER — HOSPITAL ENCOUNTER (OUTPATIENT)
Dept: LAB | Age: 80
Discharge: HOME | End: 2025-06-08
Payer: MEDICARE

## 2025-06-05 ENCOUNTER — PREP FOR PROCEDURE (OUTPATIENT)
Age: 80
End: 2025-06-05

## 2025-06-05 DIAGNOSIS — I48.19 PERSISTENT ATRIAL FIBRILLATION (HCC): Primary | ICD-10-CM

## 2025-06-05 DIAGNOSIS — I48.19 PERSISTENT ATRIAL FIBRILLATION (HCC): ICD-10-CM

## 2025-06-05 LAB
ALBUMIN SERPL-MCNC: 3.6 G/DL (ref 3.2–4.6)
ANION GAP SERPL CALC-SCNC: 13 MMOL/L (ref 7–16)
BASOPHILS # BLD: 0.01 K/UL (ref 0–0.2)
BASOPHILS NFR BLD: 0.1 % (ref 0–2)
BUN SERPL-MCNC: 29 MG/DL (ref 8–23)
CALCIUM SERPL-MCNC: 9.2 MG/DL (ref 8.8–10.2)
CHLORIDE SERPL-SCNC: 105 MMOL/L (ref 98–107)
CO2 SERPL-SCNC: 23 MMOL/L (ref 20–29)
CREAT SERPL-MCNC: 1.65 MG/DL (ref 0.6–1.1)
DIFFERENTIAL METHOD BLD: ABNORMAL
EOSINOPHIL # BLD: 0 K/UL (ref 0–0.8)
EOSINOPHIL NFR BLD: 0 % (ref 0.5–7.8)
ERYTHROCYTE [DISTWIDTH] IN BLOOD BY AUTOMATED COUNT: 13.9 % (ref 11.9–14.6)
GLUCOSE SERPL-MCNC: 112 MG/DL (ref 70–99)
HCT VFR BLD AUTO: 41.7 % (ref 35.8–46.3)
HGB BLD-MCNC: 12.8 G/DL (ref 11.7–15.4)
IMM GRANULOCYTES # BLD AUTO: 0.02 K/UL (ref 0–0.5)
IMM GRANULOCYTES NFR BLD AUTO: 0.2 % (ref 0–5)
LYMPHOCYTES # BLD: 1.51 K/UL (ref 0.5–4.6)
LYMPHOCYTES NFR BLD: 17.8 % (ref 13–44)
MAGNESIUM SERPL-MCNC: 2.5 MG/DL (ref 1.8–2.4)
MCH RBC QN AUTO: 28.3 PG (ref 26.1–32.9)
MCHC RBC AUTO-ENTMCNC: 30.7 G/DL (ref 31.4–35)
MCV RBC AUTO: 92.1 FL (ref 82–102)
MONOCYTES # BLD: 0.69 K/UL (ref 0.1–1.3)
MONOCYTES NFR BLD: 8.1 % (ref 4–12)
NEUTS SEG # BLD: 6.26 K/UL (ref 1.7–8.2)
NEUTS SEG NFR BLD: 73.8 % (ref 43–78)
NRBC # BLD: 0 K/UL (ref 0–0.2)
PLATELET # BLD AUTO: 219 K/UL (ref 150–450)
PMV BLD AUTO: 10 FL (ref 9.4–12.3)
POTASSIUM SERPL-SCNC: 4 MMOL/L (ref 3.5–5.1)
RBC # BLD AUTO: 4.53 M/UL (ref 4.05–5.2)
SODIUM SERPL-SCNC: 141 MMOL/L (ref 136–145)
WBC # BLD AUTO: 8.5 K/UL (ref 4.3–11.1)

## 2025-06-05 PROCEDURE — 83735 ASSAY OF MAGNESIUM: CPT

## 2025-06-05 PROCEDURE — 80048 BASIC METABOLIC PNL TOTAL CA: CPT

## 2025-06-05 PROCEDURE — 36415 COLL VENOUS BLD VENIPUNCTURE: CPT

## 2025-06-05 PROCEDURE — 85025 COMPLETE CBC W/AUTO DIFF WBC: CPT

## 2025-06-05 PROCEDURE — 82040 ASSAY OF SERUM ALBUMIN: CPT

## 2025-06-06 DIAGNOSIS — K21.9 GASTROESOPHAGEAL REFLUX DISEASE WITHOUT ESOPHAGITIS: ICD-10-CM

## 2025-06-06 NOTE — TELEPHONE ENCOUNTER
Patient called requesting a refill on her   pantoprazole (PROTONIX) 40 MG tablet [[id number]]    Order Details  Dose, Route, Frequency: As Directed   Dispense Quantity: 200 tablet Refills: 3          Si tablet p.o. 30 minutes prior to morning and evening meal.   Pharmacy    [id number] - [place], [place] - [address] - P [phone] - F [phone]  [address] [facility], [place] [address]  Phone: [phone]  Fax: [phone]   Please Advise.

## 2025-06-08 RX ORDER — SODIUM CHLORIDE 9 MG/ML
INJECTION, SOLUTION INTRAVENOUS PRN
Status: CANCELLED | OUTPATIENT
Start: 2025-06-08

## 2025-06-08 RX ORDER — SODIUM CHLORIDE 0.9 % (FLUSH) 0.9 %
5-40 SYRINGE (ML) INJECTION PRN
Status: CANCELLED | OUTPATIENT
Start: 2025-06-08

## 2025-06-08 RX ORDER — SODIUM CHLORIDE 0.9 % (FLUSH) 0.9 %
5-40 SYRINGE (ML) INJECTION EVERY 12 HOURS SCHEDULED
Status: CANCELLED | OUTPATIENT
Start: 2025-06-08

## 2025-06-08 RX ORDER — LIDOCAINE HYDROCHLORIDE 10 MG/ML
1 INJECTION, SOLUTION INFILTRATION; PERINEURAL
Status: CANCELLED | OUTPATIENT
Start: 2025-06-08

## 2025-06-08 RX ORDER — SODIUM CHLORIDE, SODIUM LACTATE, POTASSIUM CHLORIDE, CALCIUM CHLORIDE 600; 310; 30; 20 MG/100ML; MG/100ML; MG/100ML; MG/100ML
INJECTION, SOLUTION INTRAVENOUS CONTINUOUS
Status: CANCELLED | OUTPATIENT
Start: 2025-06-08

## 2025-06-09 ENCOUNTER — HOSPITAL ENCOUNTER (INPATIENT)
Age: 80
LOS: 1 days | Discharge: HOME | DRG: 274 | End: 2025-06-10
Attending: INTERNAL MEDICINE | Admitting: INTERNAL MEDICINE
Payer: MEDICARE

## 2025-06-09 ENCOUNTER — APPOINTMENT (OUTPATIENT)
Dept: CARDIAC CATH/INVASIVE PROCEDURES | Age: 80
DRG: 274 | End: 2025-06-09
Attending: INTERNAL MEDICINE
Payer: MEDICARE

## 2025-06-09 ENCOUNTER — ANESTHESIA EVENT (OUTPATIENT)
Dept: CARDIAC CATH/INVASIVE PROCEDURES | Age: 80
DRG: 274 | End: 2025-06-09
Payer: MEDICARE

## 2025-06-09 ENCOUNTER — ANESTHESIA (OUTPATIENT)
Dept: CARDIAC CATH/INVASIVE PROCEDURES | Age: 80
DRG: 274 | End: 2025-06-09
Payer: MEDICARE

## 2025-06-09 DIAGNOSIS — I48.19 PERSISTENT ATRIAL FIBRILLATION (HCC): ICD-10-CM

## 2025-06-09 DIAGNOSIS — Z09 HOSPITAL DISCHARGE FOLLOW-UP: Primary | ICD-10-CM

## 2025-06-09 LAB
ABO + RH BLD: NORMAL
ACT BLD: 348 SECS (ref 70–128)
ANION GAP SERPL CALC-SCNC: 14 MMOL/L (ref 7–16)
BLOOD GROUP ANTIBODIES SERPL: NORMAL
BUN SERPL-MCNC: 34 MG/DL (ref 8–23)
CALCIUM SERPL-MCNC: 9.6 MG/DL (ref 8.8–10.2)
CHLORIDE SERPL-SCNC: 104 MMOL/L (ref 98–107)
CO2 SERPL-SCNC: 21 MMOL/L (ref 20–29)
CREAT SERPL-MCNC: 1.49 MG/DL (ref 0.6–1.1)
ECHO BSA: 1.71 M2
ECHO BSA: 1.71 M2
EKG ATRIAL RATE: 340 BPM
EKG DIAGNOSIS: NORMAL
EKG P AXIS: 157 DEGREES
EKG Q-T INTERVAL: 416 MS
EKG QRS DURATION: 78 MS
EKG QTC CALCULATION (BAZETT): 455 MS
EKG R AXIS: -22 DEGREES
EKG T AXIS: -36 DEGREES
EKG VENTRICULAR RATE: 72 BPM
GLUCOSE SERPL-MCNC: 174 MG/DL (ref 70–99)
INR PPP: 1.1
POTASSIUM SERPL-SCNC: 4 MMOL/L (ref 3.5–5.1)
PROTHROMBIN TIME: 14.1 SEC (ref 11.3–14.9)
SODIUM SERPL-SCNC: 139 MMOL/L (ref 136–145)
SPECIMEN EXP DATE BLD: NORMAL

## 2025-06-09 PROCEDURE — 86850 RBC ANTIBODY SCREEN: CPT

## 2025-06-09 PROCEDURE — 85610 PROTHROMBIN TIME: CPT

## 2025-06-09 PROCEDURE — 2580000003 HC RX 258: Performed by: INTERNAL MEDICINE

## 2025-06-09 PROCEDURE — 93010 ELECTROCARDIOGRAM REPORT: CPT | Performed by: INTERNAL MEDICINE

## 2025-06-09 PROCEDURE — 6360000004 HC RX CONTRAST MEDICATION: Performed by: INTERNAL MEDICINE

## 2025-06-09 PROCEDURE — 2500000003 HC RX 250 WO HCPCS

## 2025-06-09 PROCEDURE — 80048 BASIC METABOLIC PNL TOTAL CA: CPT

## 2025-06-09 PROCEDURE — 93319 3D ECHO IMG CGEN CAR ANOMAL: CPT | Performed by: INTERNAL MEDICINE

## 2025-06-09 PROCEDURE — 93662 INTRACARDIAC ECG (ICE): CPT | Performed by: INTERNAL MEDICINE

## 2025-06-09 PROCEDURE — C1760 CLOSURE DEV, VASC: HCPCS | Performed by: INTERNAL MEDICINE

## 2025-06-09 PROCEDURE — 93005 ELECTROCARDIOGRAM TRACING: CPT | Performed by: INTERNAL MEDICINE

## 2025-06-09 PROCEDURE — 3700000001 HC ADD 15 MINUTES (ANESTHESIA): Performed by: INTERNAL MEDICINE

## 2025-06-09 PROCEDURE — 85347 COAGULATION TIME ACTIVATED: CPT

## 2025-06-09 PROCEDURE — 6360000002 HC RX W HCPCS

## 2025-06-09 PROCEDURE — G0378 HOSPITAL OBSERVATION PER HR: HCPCS

## 2025-06-09 PROCEDURE — C1759 CATH, INTRA ECHOCARDIOGRAPHY: HCPCS | Performed by: INTERNAL MEDICINE

## 2025-06-09 PROCEDURE — 33340 PERQ CLSR TCAT L ATR APNDGE: CPT | Performed by: INTERNAL MEDICINE

## 2025-06-09 PROCEDURE — 3700000000 HC ANESTHESIA ATTENDED CARE: Performed by: INTERNAL MEDICINE

## 2025-06-09 PROCEDURE — 2709999900 HC NON-CHARGEABLE SUPPLY: Performed by: INTERNAL MEDICINE

## 2025-06-09 PROCEDURE — C1889 IMPLANT/INSERT DEVICE, NOC: HCPCS | Performed by: INTERNAL MEDICINE

## 2025-06-09 PROCEDURE — 02L73DK OCCLUSION OF LEFT ATRIAL APPENDAGE WITH INTRALUMINAL DEVICE, PERCUTANEOUS APPROACH: ICD-10-PCS | Performed by: INTERNAL MEDICINE

## 2025-06-09 PROCEDURE — 93312 ECHO TRANSESOPHAGEAL: CPT | Performed by: INTERNAL MEDICINE

## 2025-06-09 PROCEDURE — 86901 BLOOD TYPING SEROLOGIC RH(D): CPT

## 2025-06-09 PROCEDURE — B24BZZ4 ULTRASONOGRAPHY OF HEART WITH AORTA, TRANSESOPHAGEAL: ICD-10-PCS | Performed by: INTERNAL MEDICINE

## 2025-06-09 PROCEDURE — 2500000003 HC RX 250 WO HCPCS: Performed by: PHYSICIAN ASSISTANT

## 2025-06-09 PROCEDURE — 2720000010 HC SURG SUPPLY STERILE: Performed by: INTERNAL MEDICINE

## 2025-06-09 PROCEDURE — 93325 DOPPLER ECHO COLOR FLOW MAPG: CPT

## 2025-06-09 PROCEDURE — C1894 INTRO/SHEATH, NON-LASER: HCPCS | Performed by: INTERNAL MEDICINE

## 2025-06-09 PROCEDURE — 6360000002 HC RX W HCPCS: Performed by: INTERNAL MEDICINE

## 2025-06-09 PROCEDURE — 86900 BLOOD TYPING SEROLOGIC ABO: CPT

## 2025-06-09 PROCEDURE — 2060000000 HC ICU INTERMEDIATE R&B

## 2025-06-09 PROCEDURE — 6370000000 HC RX 637 (ALT 250 FOR IP): Performed by: PHYSICIAN ASSISTANT

## 2025-06-09 PROCEDURE — 7100000001 HC PACU RECOVERY - ADDTL 15 MIN: Performed by: INTERNAL MEDICINE

## 2025-06-09 PROCEDURE — 7100000000 HC PACU RECOVERY - FIRST 15 MIN: Performed by: INTERNAL MEDICINE

## 2025-06-09 DEVICE — LEFT ATRIAL APPENDAGE CLOSURE DEVICE WITH DELIVERY SYSTEM
Type: IMPLANTABLE DEVICE | Site: HEART | Status: FUNCTIONAL
Brand: WATCHMAN FLX™ PRO

## 2025-06-09 RX ORDER — SPIRONOLACTONE 25 MG/1
25 TABLET ORAL DAILY
Status: DISCONTINUED | OUTPATIENT
Start: 2025-06-09 | End: 2025-06-10 | Stop reason: HOSPADM

## 2025-06-09 RX ORDER — ACETAMINOPHEN 325 MG/1
650 TABLET ORAL EVERY 6 HOURS PRN
Status: DISCONTINUED | OUTPATIENT
Start: 2025-06-09 | End: 2025-06-10 | Stop reason: HOSPADM

## 2025-06-09 RX ORDER — GABAPENTIN 300 MG/1
300 CAPSULE ORAL 2 TIMES DAILY
Status: DISCONTINUED | OUTPATIENT
Start: 2025-06-09 | End: 2025-06-10 | Stop reason: HOSPADM

## 2025-06-09 RX ORDER — ACETAMINOPHEN 500 MG
500 TABLET ORAL EVERY 6 HOURS PRN
Status: DISCONTINUED | OUTPATIENT
Start: 2025-06-09 | End: 2025-06-09 | Stop reason: SDUPTHER

## 2025-06-09 RX ORDER — FENTANYL CITRATE 50 UG/ML
INJECTION, SOLUTION INTRAMUSCULAR; INTRAVENOUS
Status: DISCONTINUED | OUTPATIENT
Start: 2025-06-09 | End: 2025-06-09 | Stop reason: SDUPTHER

## 2025-06-09 RX ORDER — PROPOFOL 10 MG/ML
INJECTION, EMULSION INTRAVENOUS
Status: DISCONTINUED | OUTPATIENT
Start: 2025-06-09 | End: 2025-06-09 | Stop reason: SDUPTHER

## 2025-06-09 RX ORDER — PROTAMINE SULFATE 10 MG/ML
INJECTION, SOLUTION INTRAVENOUS
Status: DISCONTINUED | OUTPATIENT
Start: 2025-06-09 | End: 2025-06-09 | Stop reason: SDUPTHER

## 2025-06-09 RX ORDER — SODIUM CHLORIDE 0.9 % (FLUSH) 0.9 %
5-40 SYRINGE (ML) INJECTION PRN
Status: DISCONTINUED | OUTPATIENT
Start: 2025-06-09 | End: 2025-06-10 | Stop reason: HOSPADM

## 2025-06-09 RX ORDER — ONDANSETRON 2 MG/ML
4 INJECTION INTRAMUSCULAR; INTRAVENOUS
Status: DISCONTINUED | OUTPATIENT
Start: 2025-06-09 | End: 2025-06-09 | Stop reason: HOSPADM

## 2025-06-09 RX ORDER — PANTOPRAZOLE SODIUM 40 MG/1
TABLET, DELAYED RELEASE ORAL
Qty: 180 TABLET | Refills: 3 | Status: SHIPPED | OUTPATIENT
Start: 2025-06-09

## 2025-06-09 RX ORDER — SODIUM CHLORIDE, SODIUM LACTATE, POTASSIUM CHLORIDE, CALCIUM CHLORIDE 600; 310; 30; 20 MG/100ML; MG/100ML; MG/100ML; MG/100ML
INJECTION, SOLUTION INTRAVENOUS CONTINUOUS
Status: DISCONTINUED | OUTPATIENT
Start: 2025-06-09 | End: 2025-06-10 | Stop reason: HOSPADM

## 2025-06-09 RX ORDER — SODIUM CHLORIDE 9 MG/ML
INJECTION, SOLUTION INTRAVENOUS PRN
Status: DISCONTINUED | OUTPATIENT
Start: 2025-06-09 | End: 2025-06-09 | Stop reason: HOSPADM

## 2025-06-09 RX ORDER — ONDANSETRON 2 MG/ML
INJECTION INTRAMUSCULAR; INTRAVENOUS
Status: DISCONTINUED | OUTPATIENT
Start: 2025-06-09 | End: 2025-06-09 | Stop reason: SDUPTHER

## 2025-06-09 RX ORDER — SODIUM CHLORIDE, SODIUM LACTATE, POTASSIUM CHLORIDE, CALCIUM CHLORIDE 600; 310; 30; 20 MG/100ML; MG/100ML; MG/100ML; MG/100ML
INJECTION, SOLUTION INTRAVENOUS CONTINUOUS
Status: DISCONTINUED | OUTPATIENT
Start: 2025-06-09 | End: 2025-06-09 | Stop reason: HOSPADM

## 2025-06-09 RX ORDER — SODIUM CHLORIDE 0.9 % (FLUSH) 0.9 %
5-40 SYRINGE (ML) INJECTION EVERY 12 HOURS SCHEDULED
Status: DISCONTINUED | OUTPATIENT
Start: 2025-06-09 | End: 2025-06-10 | Stop reason: HOSPADM

## 2025-06-09 RX ORDER — FERROUS SULFATE 325(65) MG
325 TABLET ORAL DAILY
Status: DISCONTINUED | OUTPATIENT
Start: 2025-06-09 | End: 2025-06-10 | Stop reason: HOSPADM

## 2025-06-09 RX ORDER — IOPAMIDOL 755 MG/ML
INJECTION, SOLUTION INTRAVASCULAR PRN
Status: DISCONTINUED | OUTPATIENT
Start: 2025-06-09 | End: 2025-06-09 | Stop reason: HOSPADM

## 2025-06-09 RX ORDER — LIDOCAINE HYDROCHLORIDE 20 MG/ML
INJECTION, SOLUTION EPIDURAL; INFILTRATION; INTRACAUDAL; PERINEURAL
Status: DISCONTINUED | OUTPATIENT
Start: 2025-06-09 | End: 2025-06-09 | Stop reason: SDUPTHER

## 2025-06-09 RX ORDER — SODIUM CHLORIDE 0.9 % (FLUSH) 0.9 %
5-40 SYRINGE (ML) INJECTION PRN
Status: DISCONTINUED | OUTPATIENT
Start: 2025-06-09 | End: 2025-06-09 | Stop reason: HOSPADM

## 2025-06-09 RX ORDER — CLINDAMYCIN PHOSPHATE 900 MG/50ML
900 INJECTION, SOLUTION INTRAVENOUS ONCE
Status: COMPLETED | OUTPATIENT
Start: 2025-06-09 | End: 2025-06-09

## 2025-06-09 RX ORDER — NITROGLYCERIN 0.4 MG/1
0.4 TABLET SUBLINGUAL EVERY 5 MIN PRN
Status: DISCONTINUED | OUTPATIENT
Start: 2025-06-09 | End: 2025-06-10 | Stop reason: HOSPADM

## 2025-06-09 RX ORDER — SODIUM CHLORIDE 0.9 % (FLUSH) 0.9 %
5-40 SYRINGE (ML) INJECTION EVERY 12 HOURS SCHEDULED
Status: DISCONTINUED | OUTPATIENT
Start: 2025-06-09 | End: 2025-06-09 | Stop reason: HOSPADM

## 2025-06-09 RX ORDER — ALBUTEROL SULFATE 0.83 MG/ML
2.5 SOLUTION RESPIRATORY (INHALATION) EVERY 6 HOURS PRN
Status: DISCONTINUED | OUTPATIENT
Start: 2025-06-09 | End: 2025-06-10 | Stop reason: HOSPADM

## 2025-06-09 RX ORDER — WHEAT DEXTRIN 3 G/3.8 G
4 POWDER (GRAM) ORAL DAILY
Status: DISCONTINUED | OUTPATIENT
Start: 2025-06-09 | End: 2025-06-09

## 2025-06-09 RX ORDER — ACETAMINOPHEN 650 MG/1
650 SUPPOSITORY RECTAL EVERY 6 HOURS PRN
Status: DISCONTINUED | OUTPATIENT
Start: 2025-06-09 | End: 2025-06-10 | Stop reason: HOSPADM

## 2025-06-09 RX ORDER — SODIUM CHLORIDE 9 MG/ML
INJECTION, SOLUTION INTRAVENOUS PRN
Status: DISCONTINUED | OUTPATIENT
Start: 2025-06-09 | End: 2025-06-10 | Stop reason: HOSPADM

## 2025-06-09 RX ORDER — OXYCODONE HYDROCHLORIDE 5 MG/1
10 TABLET ORAL PRN
Status: DISCONTINUED | OUTPATIENT
Start: 2025-06-09 | End: 2025-06-09 | Stop reason: HOSPADM

## 2025-06-09 RX ORDER — LOSARTAN POTASSIUM 50 MG/1
100 TABLET ORAL DAILY
Status: DISCONTINUED | OUTPATIENT
Start: 2025-06-09 | End: 2025-06-10 | Stop reason: HOSPADM

## 2025-06-09 RX ORDER — DEXAMETHASONE SODIUM PHOSPHATE 4 MG/ML
INJECTION, SOLUTION INTRA-ARTICULAR; INTRALESIONAL; INTRAMUSCULAR; INTRAVENOUS; SOFT TISSUE
Status: DISCONTINUED | OUTPATIENT
Start: 2025-06-09 | End: 2025-06-09 | Stop reason: SDUPTHER

## 2025-06-09 RX ORDER — ROSUVASTATIN CALCIUM 10 MG/1
10 TABLET, COATED ORAL DAILY
Status: DISCONTINUED | OUTPATIENT
Start: 2025-06-09 | End: 2025-06-10 | Stop reason: HOSPADM

## 2025-06-09 RX ORDER — POLYETHYLENE GLYCOL 3350 17 G/17G
17 POWDER, FOR SOLUTION ORAL DAILY PRN
Status: DISCONTINUED | OUTPATIENT
Start: 2025-06-09 | End: 2025-06-10 | Stop reason: HOSPADM

## 2025-06-09 RX ORDER — PANTOPRAZOLE SODIUM 40 MG/1
40 TABLET, DELAYED RELEASE ORAL
Status: DISCONTINUED | OUTPATIENT
Start: 2025-06-10 | End: 2025-06-10 | Stop reason: HOSPADM

## 2025-06-09 RX ORDER — DIPHENHYDRAMINE HYDROCHLORIDE 50 MG/ML
12.5 INJECTION, SOLUTION INTRAMUSCULAR; INTRAVENOUS
Status: DISCONTINUED | OUTPATIENT
Start: 2025-06-09 | End: 2025-06-09 | Stop reason: HOSPADM

## 2025-06-09 RX ORDER — FUROSEMIDE 40 MG/1
40 TABLET ORAL DAILY
Status: DISCONTINUED | OUTPATIENT
Start: 2025-06-10 | End: 2025-06-10 | Stop reason: HOSPADM

## 2025-06-09 RX ORDER — OXYCODONE HYDROCHLORIDE 5 MG/1
5 TABLET ORAL PRN
Status: DISCONTINUED | OUTPATIENT
Start: 2025-06-09 | End: 2025-06-09 | Stop reason: HOSPADM

## 2025-06-09 RX ORDER — ONDANSETRON 2 MG/ML
4 INJECTION INTRAMUSCULAR; INTRAVENOUS EVERY 6 HOURS PRN
Status: DISCONTINUED | OUTPATIENT
Start: 2025-06-09 | End: 2025-06-10 | Stop reason: HOSPADM

## 2025-06-09 RX ORDER — HEPARIN SODIUM 1000 [USP'U]/ML
INJECTION, SOLUTION INTRAVENOUS; SUBCUTANEOUS
Status: DISCONTINUED | OUTPATIENT
Start: 2025-06-09 | End: 2025-06-09 | Stop reason: SDUPTHER

## 2025-06-09 RX ORDER — ROCURONIUM BROMIDE 10 MG/ML
INJECTION, SOLUTION INTRAVENOUS
Status: DISCONTINUED | OUTPATIENT
Start: 2025-06-09 | End: 2025-06-09 | Stop reason: SDUPTHER

## 2025-06-09 RX ORDER — SODIUM CHLORIDE 9 MG/ML
INJECTION, SOLUTION INTRAVENOUS CONTINUOUS
Status: DISCONTINUED | OUTPATIENT
Start: 2025-06-09 | End: 2025-06-10 | Stop reason: HOSPADM

## 2025-06-09 RX ORDER — ONDANSETRON 4 MG/1
4 TABLET, ORALLY DISINTEGRATING ORAL EVERY 8 HOURS PRN
Status: DISCONTINUED | OUTPATIENT
Start: 2025-06-09 | End: 2025-06-10 | Stop reason: HOSPADM

## 2025-06-09 RX ORDER — NALOXONE HYDROCHLORIDE 0.4 MG/ML
INJECTION, SOLUTION INTRAMUSCULAR; INTRAVENOUS; SUBCUTANEOUS PRN
Status: DISCONTINUED | OUTPATIENT
Start: 2025-06-09 | End: 2025-06-09 | Stop reason: HOSPADM

## 2025-06-09 RX ORDER — PROCHLORPERAZINE EDISYLATE 5 MG/ML
5 INJECTION INTRAMUSCULAR; INTRAVENOUS
Status: DISCONTINUED | OUTPATIENT
Start: 2025-06-09 | End: 2025-06-09 | Stop reason: HOSPADM

## 2025-06-09 RX ADMIN — ROCURONIUM BROMIDE 40 MG: 10 INJECTION, SOLUTION INTRAVENOUS at 12:14

## 2025-06-09 RX ADMIN — PROPOFOL 150 MG: 10 INJECTION, EMULSION INTRAVENOUS at 12:14

## 2025-06-09 RX ADMIN — SODIUM CHLORIDE, PRESERVATIVE FREE 10 ML: 5 INJECTION INTRAVENOUS at 21:21

## 2025-06-09 RX ADMIN — SUGAMMADEX 200 MG: 100 INJECTION, SOLUTION INTRAVENOUS at 13:08

## 2025-06-09 RX ADMIN — PROTAMINE SULFATE 10 MG: 10 INJECTION, SOLUTION INTRAVENOUS at 13:12

## 2025-06-09 RX ADMIN — HEPARIN SODIUM 13000 UNITS: 1000 INJECTION INTRAVENOUS; SUBCUTANEOUS at 12:44

## 2025-06-09 RX ADMIN — DEXAMETHASONE SODIUM PHOSPHATE 4 MG: 4 INJECTION INTRA-ARTICULAR; INTRALESIONAL; INTRAMUSCULAR; INTRAVENOUS; SOFT TISSUE at 12:22

## 2025-06-09 RX ADMIN — PROTAMINE SULFATE 10 MG: 10 INJECTION, SOLUTION INTRAVENOUS at 13:14

## 2025-06-09 RX ADMIN — FENTANYL CITRATE 100 MCG: 50 INJECTION, SOLUTION INTRAMUSCULAR; INTRAVENOUS at 12:14

## 2025-06-09 RX ADMIN — SODIUM CHLORIDE, SODIUM LACTATE, POTASSIUM CHLORIDE, AND CALCIUM CHLORIDE: 600; 310; 30; 20 INJECTION, SOLUTION INTRAVENOUS at 12:06

## 2025-06-09 RX ADMIN — GABAPENTIN 300 MG: 300 CAPSULE ORAL at 21:20

## 2025-06-09 RX ADMIN — LIDOCAINE HYDROCHLORIDE 80 MG: 20 INJECTION, SOLUTION EPIDURAL; INFILTRATION; INTRACAUDAL; PERINEURAL at 12:14

## 2025-06-09 RX ADMIN — ACETAMINOPHEN 650 MG: 325 TABLET ORAL at 21:20

## 2025-06-09 RX ADMIN — SODIUM CHLORIDE: 9 INJECTION, SOLUTION INTRAVENOUS at 09:08

## 2025-06-09 RX ADMIN — PROTAMINE SULFATE 10 MG: 10 INJECTION, SOLUTION INTRAVENOUS at 13:13

## 2025-06-09 RX ADMIN — CLINDAMYCIN PHOSPHATE 900 MG: 900 INJECTION, SOLUTION INTRAVENOUS at 12:30

## 2025-06-09 RX ADMIN — ONDANSETRON 4 MG: 2 INJECTION INTRAMUSCULAR; INTRAVENOUS at 12:22

## 2025-06-09 ASSESSMENT — ENCOUNTER SYMPTOMS
CHEST TIGHTNESS: 0
COLOR CHANGE: 1

## 2025-06-09 ASSESSMENT — PAIN DESCRIPTION - FREQUENCY: FREQUENCY: CONTINUOUS

## 2025-06-09 ASSESSMENT — PAIN DESCRIPTION - DESCRIPTORS: DESCRIPTORS: ACHING

## 2025-06-09 ASSESSMENT — PAIN SCALES - GENERAL
PAINLEVEL_OUTOF10: 0
PAINLEVEL_OUTOF10: 5
PAINLEVEL_OUTOF10: 0

## 2025-06-09 ASSESSMENT — PAIN DESCRIPTION - LOCATION: LOCATION: HEAD

## 2025-06-09 ASSESSMENT — PAIN DESCRIPTION - ORIENTATION: ORIENTATION: RIGHT;LEFT;MID

## 2025-06-09 ASSESSMENT — PAIN DESCRIPTION - PAIN TYPE: TYPE: ACUTE PAIN

## 2025-06-09 ASSESSMENT — PAIN DESCRIPTION - ONSET: ONSET: GRADUAL

## 2025-06-09 ASSESSMENT — COPD QUESTIONNAIRES: CAT_SEVERITY: MODERATE

## 2025-06-09 ASSESSMENT — PAIN - FUNCTIONAL ASSESSMENT: PAIN_FUNCTIONAL_ASSESSMENT: ACTIVITIES ARE NOT PREVENTED

## 2025-06-09 NOTE — H&P
not limited:              - Pericardial tamponade (1.28%) which can be treated percutaneously in 63% of cases but can require sugical therapy in  31% of cases (3 out of 1000 patients).                - Procedural related CVA in 2 out of 1000 patients.                - Device embolization in less than 3 out of 1000 patients              - Death related to procedure in approximately 6 out of 10,000 patients.       Based on our discussion, it is felt benefits of left atrial appendage significantly outweigh risk. All questions answered to the best of my ability.  Patient would like to proceed with Watchman.    Jorge Woodward MD, MS  Cardiology/Electrophysiology

## 2025-06-09 NOTE — DISCHARGE INSTRUCTIONS
Watchman Discharge Instructions      Activity:    Follow your doctor’s recommendations.  Return to normal activities gradually, pacing yourself as you feel better, resting when tired.  Activity should be limited for the next 48 hours. Climb stairs as little as possible and avoid any stooping, bending, squatting, straining, or strenuous activity for 5 days. No heavy lifting (anything over 5 pounds) for seven days.  Do not drive for 48 hours. Have a responsible person drive you home and stay with you for at least 24 hours after your heart catheterization/angiography.  Check the puncture site frequently for swelling or bleeding. If you see any bleeding, lie down and apply pressure over the area with a clean town or washcloth. Notify your doctor for any redness, swelling, drainage or oozing from the puncture site. Notify your doctor for any fever or chills.  You may resume your usual diet. Drink more fluids than usual.        Incision / Wound Care      Cleanse wounds with mild soap and water. Keep wound dry.  A small amount of bloody or clear drainage is normal.  Watch for redness, swelling, incision site hot to touch, foul or colored drainage from the incision site, these are all signs of infection and should be reported immediately to the physicians.  If there is site concern please notify your implanting physician.  You may remove the bandage from your Right Groin in 24 hours. You may shower in 24 hours. No tub baths, hot tubs or swimming for one week. Do not place any lotions, creams, powders, ointments over the puncture site for one week. You may place a clean band-aid over the puncture sites changing it daily for the next 5 days.    Continued        Medications:  DO NOT STOP TAKING YOUR ELIQUIS  WITHOUT SPEAKING WITH YOUR CARDIOLOGIST FIRST!  Take your medications as ordered at the time of discharge.  Do NOT stop taking any medications without first discussing it with your doctor.  Notify all your doctors of

## 2025-06-09 NOTE — ANESTHESIA PRE PROCEDURE
Department of Anesthesiology  Preprocedure Note       Name:  Zeynep Carrington   Age:  79 y.o.  :  1945                                          MRN:  [id number]         Date:  2025      Surgeon: Surgeon(s):  Jorge Woodward MD    Procedure: Procedure(s):  Watchman ger closure device    Medications prior to admission:   Prior to Admission medications    Medication Sig Start Date End Date Taking? Authorizing Provider   pantoprazole (PROTONIX) 40 MG tablet 1 tablet p.o. 30 minutes prior to morning and evening meal. 25  Yes Mark Anthony Mac DO   Probiotic Product (DAILY PROBIOTIC PO) Take 1 capsule by mouth daily   Yes ProviderYaquelin MD   Wheat Dextrin (BENEFIBER) POWD Take 4 g by mouth daily   Yes ProviderYaquelin MD   amoxicillin-clavulanate (AUGMENTIN) 875-125 MG per tablet Take 1 tablet by mouth 2 times daily for 10 days 25 Yes Mark Anthony Mac DO   furosemide (LASIX) 40 MG tablet Take 1.5 tablets by mouth daily  Patient taking differently: Take 1 tablet by mouth daily 3/11/25  Yes Jayce Flowers MD   empagliflozin (JARDIANCE) 10 MG tablet Take 1 tablet by mouth daily 3/11/25  Yes Jayce Flowers MD   apixaban (ELIQUIS) 2.5 MG TABS tablet Take 1 tablet by mouth 2 times daily 3/11/25  Yes Jayce Flowers MD   Cyanocobalamin (VITAMIN B 12 PO) Take by mouth 3,000 mcg  takes 2 gummies daily   Yes Yaquelin Mullins MD   NONFORMULARY CALCIUM 500 MG+ VITAMIN D 1000I U TAKES ONE GUMMIE DAILY   Yes ProviderYaquelin MD   sertraline (ZOLOFT) 50 MG tablet Take 1.5 tablets by mouth daily 25  Yes Mark Anthony Mac DO   spironolactone (ALDACTONE) 25 MG tablet Take 1 tablet by mouth daily 25  Yes Mark Anthony Mac DO   ferrous sulfate (IRON 325) 325 (65 Fe) MG tablet Take 1 tablet by mouth daily 25  Yes Mark Anthony Mac DO   gabapentin (NEURONTIN) 300 MG capsule Take 1 capsule by mouth 3 times daily for 360 days.  Patient taking differently: Take 1

## 2025-06-09 NOTE — ANESTHESIA POSTPROCEDURE EVALUATION
Department of Anesthesiology  Postprocedure Note    Patient: Zeynep Carrington  MRN: [id number]  YOB: 1945  Date of evaluation: 6/9/2025    Procedure Summary       Date: 06/09/25 Room / Location: [facility] 1 ALL EVENTS / [facility] CARDIAC CATH LAB    Anesthesia Start: 1206 Anesthesia Stop: 1331    Procedure: Watchman ger closure device Diagnosis:       Persistent atrial fibrillation (HCC)      (Persistent atrial fibrillation (HCC) [I48.19])    Providers: Jorge Woodward MD Responsible Provider: Shaheed Freeman MD    Anesthesia Type: General ASA Status: 3            Anesthesia Type: General    Rachelle Phase I: Rachelle Score: 8    Rachelle Phase II:      Anesthesia Post Evaluation    Patient location during evaluation: PACU  Patient participation: complete - patient participated  Level of consciousness: awake and alert  Airway patency: patent  Nausea & Vomiting: no nausea and no vomiting  Cardiovascular status: hemodynamically stable  Respiratory status: acceptable, nonlabored ventilation and spontaneous ventilation  Hydration status: euvolemic  Comments: /71   Pulse 62   Temp 97 °F (36.1 °C) (Infrared)   Resp 18   Ht 1.448 m (4' 9\")   Wt 73 kg (161 lb)   LMP  (LMP Unknown)   SpO2 97%   BMI 34.84 kg/m²     Multimodal analgesia pain management approach  Pain management: adequate and satisfactory to patient    There were no known notable events for this encounter.

## 2025-06-09 NOTE — PROGRESS NOTES
4 Eyes Skin Assessment     NAME:  Zeynep Carrington  YOB: 1945  MEDICAL RECORD NUMBER:  [id number]    The patient is being assessed for  Cath Lab Post-Op    I agree that at least one RN has performed a thorough Head to Toe Skin Assessment on the patient. ALL assessment sites listed below have been assessed.      Areas assessed by both nurses:    Sacrum. Buttock, Coccyx, Ischium Patient has a R groin puncture site s/p watchman        Does the Patient have a Wound? No noted wound(s)       James Prevention initiated by RN: Yes  Wound Care Orders initiated by RN: No    Pressure Injury (Stage 3,4, Unstageable, DTI, NWPT, and Complex wounds) if present, place Wound referral order by RN under : No    New Ostomies, if present place, Ostomy referral order under : No     Nurse 1 eSignature: Electronically signed by Cristina Gamez RN on 6/9/25 at 4:08 PM EDT    **SHARE this note so that the co-signing nurse can place an eSignature**    Nurse 2 eSignature: Electronically signed by BOY SOMERS RN on 6/9/25 at 4:11 PM EDT

## 2025-06-09 NOTE — PROGRESS NOTES
Patient took 1st dose of prednisone 50 mg 1 tablet PO (10:30 pm) 13 hours prior to procedure, 2nd dose of prednisone 50 mg 1 tablet PO (04:30 am) 7 hours prior to procedure, then  3rd dose of prednisone 50 mg 1 hour prior to procedure. She also took benadryl 50 mg 1 hour prior to procedure for iodine allergy.

## 2025-06-09 NOTE — PROGRESS NOTES
Patient received to CPRU room # 17  Ambulatory from [facility]. Patient scheduled for LAAO today with Dr Woodward. Procedure reviewed & questions answered, voiced good understanding consent obtained & placed on chart. All medications and medical history reviewed. Will prep patient per orders. Patient & family updated on plan of care.      The patient has a fraility score of 4-VULNERABLE, based on patient A&Ox3, patient requires cane to ambulate to prep room.

## 2025-06-09 NOTE — PERIOP NOTE
TRANSFER - OUT REPORT:    Verbal report given to Cristina KHAN on Zeynep Carrington  being transferred to [facility] for routine post-op       Report consisted of patient's Situation, Background, Assessment and   Recommendations(SBAR).     Information from the following report(s) Nurse Handoff Report was reviewed with the receiving nurse.           Lines:   Peripheral IV 06/09/25 Left;Posterior Hand (Active)   Site Assessment Clean, dry & intact 06/09/25 1330   Line Status Flushed;Normal saline locked 06/09/25 1330   Phlebitis Assessment No symptoms 06/09/25 1330   Infiltration Assessment 0 06/09/25 1330   Dressing Status Clean, dry & intact 06/09/25 1330   Dressing Type Transparent 06/09/25 1330       Peripheral IV 06/09/25 Posterior;Right Hand (Active)   Site Assessment Clean, dry & intact 06/09/25 1330   Line Status Flushed;Normal saline locked 06/09/25 1330   Phlebitis Assessment No symptoms 06/09/25 1330   Infiltration Assessment 0 06/09/25 1330   Dressing Status Clean, dry & intact 06/09/25 1330   Dressing Type Transparent 06/09/25 1330        Opportunity for questions and clarification was provided.      Patient transported with:  Monitor, O2 @ 2lpm, and Registered Nurse

## 2025-06-10 VITALS
HEART RATE: 60 BPM | WEIGHT: 165.34 LBS | RESPIRATION RATE: 16 BRPM | OXYGEN SATURATION: 98 % | SYSTOLIC BLOOD PRESSURE: 131 MMHG | BODY MASS INDEX: 35.67 KG/M2 | TEMPERATURE: 98.1 F | HEIGHT: 57 IN | DIASTOLIC BLOOD PRESSURE: 58 MMHG

## 2025-06-10 LAB
ANION GAP SERPL CALC-SCNC: 12 MMOL/L (ref 7–16)
BASOPHILS # BLD: 0.01 K/UL (ref 0–0.2)
BASOPHILS NFR BLD: 0.1 % (ref 0–2)
BUN SERPL-MCNC: 35 MG/DL (ref 8–23)
CALCIUM SERPL-MCNC: 9.1 MG/DL (ref 8.8–10.2)
CHLORIDE SERPL-SCNC: 107 MMOL/L (ref 98–107)
CO2 SERPL-SCNC: 19 MMOL/L (ref 20–29)
CREAT SERPL-MCNC: 1.48 MG/DL (ref 0.6–1.1)
DIFFERENTIAL METHOD BLD: ABNORMAL
EOSINOPHIL # BLD: 0.01 K/UL (ref 0–0.8)
EOSINOPHIL NFR BLD: 0.1 % (ref 0.5–7.8)
ERYTHROCYTE [DISTWIDTH] IN BLOOD BY AUTOMATED COUNT: 13.8 % (ref 11.9–14.6)
GLUCOSE SERPL-MCNC: 165 MG/DL (ref 70–99)
HCT VFR BLD AUTO: 38.4 % (ref 35.8–46.3)
HGB BLD-MCNC: 12.7 G/DL (ref 11.7–15.4)
IMM GRANULOCYTES # BLD AUTO: 0.07 K/UL (ref 0–0.5)
IMM GRANULOCYTES NFR BLD AUTO: 0.6 % (ref 0–5)
LYMPHOCYTES # BLD: 1.04 K/UL (ref 0.5–4.6)
LYMPHOCYTES NFR BLD: 8.3 % (ref 13–44)
MCH RBC QN AUTO: 29.1 PG (ref 26.1–32.9)
MCHC RBC AUTO-ENTMCNC: 33.1 G/DL (ref 31.4–35)
MCV RBC AUTO: 87.9 FL (ref 82–102)
MONOCYTES # BLD: 0.68 K/UL (ref 0.1–1.3)
MONOCYTES NFR BLD: 5.5 % (ref 4–12)
NEUTS SEG # BLD: 10.66 K/UL (ref 1.7–8.2)
NEUTS SEG NFR BLD: 85.4 % (ref 43–78)
NRBC # BLD: 0 K/UL (ref 0–0.2)
PLATELET # BLD AUTO: 87 K/UL (ref 150–450)
PMV BLD AUTO: 11.2 FL (ref 9.4–12.3)
POTASSIUM SERPL-SCNC: 4.7 MMOL/L (ref 3.5–5.1)
RBC # BLD AUTO: 4.37 M/UL (ref 4.05–5.2)
SODIUM SERPL-SCNC: 138 MMOL/L (ref 136–145)
WBC # BLD AUTO: 12.5 K/UL (ref 4.3–11.1)

## 2025-06-10 PROCEDURE — 6370000000 HC RX 637 (ALT 250 FOR IP): Performed by: PHYSICIAN ASSISTANT

## 2025-06-10 PROCEDURE — 2500000003 HC RX 250 WO HCPCS: Performed by: PHYSICIAN ASSISTANT

## 2025-06-10 PROCEDURE — 85025 COMPLETE CBC W/AUTO DIFF WBC: CPT

## 2025-06-10 PROCEDURE — 80048 BASIC METABOLIC PNL TOTAL CA: CPT

## 2025-06-10 PROCEDURE — G0378 HOSPITAL OBSERVATION PER HR: HCPCS

## 2025-06-10 PROCEDURE — 36415 COLL VENOUS BLD VENIPUNCTURE: CPT

## 2025-06-10 RX ADMIN — APIXABAN 2.5 MG: 5 TABLET, FILM COATED ORAL at 08:49

## 2025-06-10 RX ADMIN — LOSARTAN POTASSIUM 100 MG: 50 TABLET, FILM COATED ORAL at 08:50

## 2025-06-10 RX ADMIN — FERROUS SULFATE TAB 325 MG (65 MG ELEMENTAL FE) 325 MG: 325 (65 FE) TAB at 08:49

## 2025-06-10 RX ADMIN — ROSUVASTATIN CALCIUM 10 MG: 10 TABLET, FILM COATED ORAL at 08:50

## 2025-06-10 RX ADMIN — SERTRALINE HYDROCHLORIDE 75 MG: 25 TABLET ORAL at 08:50

## 2025-06-10 RX ADMIN — EMPAGLIFLOZIN 10 MG: 10 TABLET, FILM COATED ORAL at 08:50

## 2025-06-10 RX ADMIN — GABAPENTIN 300 MG: 300 CAPSULE ORAL at 08:50

## 2025-06-10 RX ADMIN — PANTOPRAZOLE SODIUM 40 MG: 40 TABLET, DELAYED RELEASE ORAL at 06:07

## 2025-06-10 RX ADMIN — SPIRONOLACTONE 25 MG: 25 TABLET ORAL at 08:51

## 2025-06-10 RX ADMIN — FUROSEMIDE 40 MG: 40 TABLET ORAL at 08:50

## 2025-06-10 RX ADMIN — SODIUM CHLORIDE, PRESERVATIVE FREE 5 ML: 5 INJECTION INTRAVENOUS at 08:52

## 2025-06-10 ASSESSMENT — PAIN SCALES - GENERAL
PAINLEVEL_OUTOF10: 0
PAINLEVEL_OUTOF10: 0

## 2025-06-10 NOTE — PROGRESS NOTES
Pt ambulated post bedrest after watchman with primary nurse. No bleeding, oozing, or swelling was noted at the incision site. Pt tolerated ambulated well with assistance. Primary nurse educated pt on aftercare for watchman procedure and to notify nurse of any changes at the site. Bed was left in lowest position, call light left within pt reach. No additional needs.

## 2025-06-10 NOTE — PROGRESS NOTES
Discharge instructions were reviewed with patient. An opportunity was given for questions. All medications were reviewed, and information was given on the new medications. Patient verbalized understanding, and has no questions at this time.   Ivs and heart monitor removed.     NIH:0.

## 2025-06-10 NOTE — CARE COORDINATION
Discharge   Transition of Care Consult (CM Consult) N/A   Services At/After Discharge None   [place] Resource Information Provided? No   Mode of Transport at Discharge Other (see comment)  (Son to transport home by car)   Confirm Follow Up Transport Family   Condition of Participation: Discharge Planning   The Plan for Transition of Care is related to the following treatment goals: Home with family assistance   The Patient and/Or Patient Representative agree with the Discharge Plan? Yes

## 2025-06-10 NOTE — PLAN OF CARE
Problem: Safety - Adult  Goal: Free from fall injury  6/10/2025 0921 by Estelle Salomon RN  Outcome: Progressing  Flowsheets (Taken 6/10/2025 0848)  Free From Fall Injury: Instruct family/caregiver on patient safety  6/9/2025 2217 by Noris Corado RN  Outcome: Progressing     Problem: Chronic Conditions and Co-morbidities  Goal: Patient's chronic conditions and co-morbidity symptoms are monitored and maintained or improved  6/10/2025 0921 by Estelle Salomon RN  Outcome: Progressing  Flowsheets (Taken 6/10/2025 0848)  Care Plan - Patient's Chronic Conditions and Co-Morbidity Symptoms are Monitored and Maintained or Improved: Monitor and assess patient's chronic conditions and comorbid symptoms for stability, deterioration, or improvement  6/9/2025 2217 by Noris Corado RN  Outcome: Progressing     Problem: Discharge Planning  Goal: Discharge to home or other facility with appropriate resources  6/10/2025 0921 by Estelle Salomon RN  Outcome: Progressing  Flowsheets (Taken 6/10/2025 0848)  Discharge to home or other facility with appropriate resources: Identify barriers to discharge with patient and caregiver  6/9/2025 2217 by Noris Corado RN  Outcome: Progressing     Problem: Pain  Goal: Verbalizes/displays adequate comfort level or baseline comfort level  6/10/2025 0921 by Estelle Salomon RN  Outcome: Progressing  Flowsheets (Taken 6/10/2025 0848)  Verbalizes/displays adequate comfort level or baseline comfort level:   Encourage patient to monitor pain and request assistance   Assess pain using appropriate pain scale   Administer analgesics based on type and severity of pain and evaluate response   Implement non-pharmacological measures as appropriate and evaluate response   Consider cultural and social influences on pain and pain management   Notify Licensed Independent Practitioner if interventions unsuccessful or patient reports new pain  6/9/2025 2217 by Noris Corado RN  Outcome: Progressing   
  Problem: Safety - Adult  Goal: Free from fall injury  Outcome: Progressing     Problem: Chronic Conditions and Co-morbidities  Goal: Patient's chronic conditions and co-morbidity symptoms are monitored and maintained or improved  Outcome: Progressing     Problem: Discharge Planning  Goal: Discharge to home or other facility with appropriate resources  Outcome: Progressing     Problem: Pain  Goal: Verbalizes/displays adequate comfort level or baseline comfort level  Outcome: Progressing     
response   Implement non-pharmacological measures as appropriate and evaluate response   Consider cultural and social influences on pain and pain management   Notify Licensed Independent Practitioner if interventions unsuccessful or patient reports new pain  6/9/2025 2217 by Noris Corado, RN  Outcome: Progressing     Problem: Skin/Tissue Integrity  Goal: Skin integrity remains intact  Description: 1.  Monitor for areas of redness and/or skin breakdown2.  Assess vascular access sites hourly3.  Every 4-6 hours minimum:  Change oxygen saturation probe site4.  Every 4-6 hours:  If on nasal continuous positive airway pressure, respiratory therapy assess nares and determine need for appliance change or resting period  6/10/2025 0928 by Noris Mahoney, RN  Outcome: Adequate for Discharge  6/10/2025 0921 by Estelle Salomon, RN  Outcome: Progressing

## 2025-06-10 NOTE — PROGRESS NOTES
[facility] CARDIOLOGY PROGRESS NOTE           6/10/2025 6:42 AM    Admit Date: 6/9/2025    Admit Diagnosis: Persistent atrial fibrillation (HCC) [I48.19]  Atrial fibrillation (HCC) [I48.91]      Subjective:   No complaints this AM, no chest pain or shortness of breath    Interval History: (History of pertinent interval events obtained from nursing staff)  S/p Watchman implantation yesterday without immediate complications    ROS:  GEN:  No fever or chills  Cardiovascular:  As noted above  Pulmonary:  As noted above  Neuro:  No new focal motor or sensory loss      Objective:     Vitals:    06/09/25 2014 06/09/25 2120 06/09/25 2358 06/10/25 0426   BP: 132/60  (!) 120/54 (!) 124/55   Pulse: 60  60 59   Resp: 16  16 16   Temp: 97.3 °F (36.3 °C)  97.5 °F (36.4 °C) 97.9 °F (36.6 °C)   TempSrc: Oral  Oral Oral   SpO2: 96%  96% 98%   Weight:  75 kg (165 lb 4.8 oz)  75 kg (165 lb 5.5 oz)   Height:           Physical Exam:  General-Well Developed, Well Nourished, No Acute Distress, Alert & Oriented x 3, appropriate mood.  Neck- supple, no JVD  CV- regular rate and rhythm no MRG  Lung- clear bilaterally  Abd- soft, nontender, nondistended  Ext- no edema bilaterally, R femoral access site without hematoma or bruit  Skin- warm and dry    Current Facility-Administered Medications   Medication Dose Route Frequency    0.9 % sodium chloride infusion   IntraVENous Continuous    lactated ringers infusion   IntraVENous Continuous    albuterol (PROVENTIL) (2.5 MG/3ML) 0.083% nebulizer solution 2.5 mg  2.5 mg Nebulization Q6H PRN    apixaban (ELIQUIS) tablet 2.5 mg  2.5 mg Oral BID    empagliflozin (JARDIANCE) tablet 10 mg  10 mg Oral Daily    ferrous sulfate (IRON 325) tablet 325 mg  325 mg Oral Daily    furosemide (LASIX) tablet 40 mg  40 mg Oral Daily    gabapentin (NEURONTIN) capsule 300 mg  300 mg Oral BID    losartan (COZAAR) tablet 100 mg  100 mg Oral Daily    nitroGLYCERIN (NITROSTAT) SL tablet 0.4 mg  0.4 mg

## 2025-06-10 NOTE — DISCHARGE SUMMARY
[facility] Cardiology Discharge Summary     Patient ID:  Zeynep Carrington  [id number]  79 y.o.  1945    Admit date: 6/9/2025    Discharge date:  06/10/2025    Admitting Physician: Jogre Woodward MD     Discharge Physician: Tona Frost, APRNIELS - [facility]/Dr. Woodward    Admission Diagnoses: Persistent atrial fibrillation (HCC) [I48.19]  Atrial fibrillation (HCC) [I48.91]    Discharge Diagnoses:   Patient Active Problem List    Diagnosis    Persistent atrial fibrillation (HCC)    Pacemaker    COPD exacerbation (HCC)    Dyspnea on exertion    Current use of long term anticoagulation    Chronic renal disease, stage III (HCC) [980585]    Chronic heart failure with preserved ejection fraction (HCC)    PAF (paroxysmal atrial fibrillation) (HCC)    Symptomatic bradycardia    Severe persistent asthma without complication (HCC)    Gait abnormality    Localized osteoarthritis of both shoulder regions    Decreased activities of daily living (ADL)    Bilateral pneumonia    Arthritis of left acromioclavicular joint    Arthritis of left glenohumeral joint    Left shoulder pain    Left rotator cuff tear arthropathy    Difficulty using continuous positive airway pressure (CPAP) full face mask    Chronic systolic (congestive) heart failure    Mild pulmonary hypertension (HCC)    Fluid overload    Atrial fibrillation (HCC)    On anticoagulant therapy    Asymptomatic carotid artery stenosis    Coronary artery disease involving native coronary artery of native heart without angina pectoris    Obesity (BMI 30.0-34.9)    Mixed hyperlipidemia    Congestive heart failure (HCC)    Aortic valve disorder    Allergic rhinitis    Essential hypertension    Gastroesophageal reflux disease without esophagitis       Cardiology Procedures this admission:  MING, Implantation of Watchman device, Echocardiogram  Consults: none    Hospital Course: Patient was seen at the office of [facility] Cardiology by Dr. Woodward in follow up. The

## 2025-06-18 ENCOUNTER — OFFICE VISIT (OUTPATIENT)
Dept: INTERNAL MEDICINE CLINIC | Facility: CLINIC | Age: 80
End: 2025-06-18
Payer: MEDICARE

## 2025-06-18 VITALS
DIASTOLIC BLOOD PRESSURE: 64 MMHG | WEIGHT: 163 LBS | TEMPERATURE: 98.5 F | HEART RATE: 65 BPM | OXYGEN SATURATION: 97 % | SYSTOLIC BLOOD PRESSURE: 120 MMHG | HEIGHT: 57 IN | BODY MASS INDEX: 35.17 KG/M2

## 2025-06-18 DIAGNOSIS — N18.32 STAGE 3B CHRONIC KIDNEY DISEASE (HCC): ICD-10-CM

## 2025-06-18 DIAGNOSIS — Z95.818 PRESENCE OF WATCHMAN LEFT ATRIAL APPENDAGE CLOSURE DEVICE: ICD-10-CM

## 2025-06-18 DIAGNOSIS — R73.01 IFG (IMPAIRED FASTING GLUCOSE): ICD-10-CM

## 2025-06-18 DIAGNOSIS — D64.9 ANEMIA, UNSPECIFIED TYPE: ICD-10-CM

## 2025-06-18 DIAGNOSIS — I48.19 PERSISTENT ATRIAL FIBRILLATION (HCC): Primary | ICD-10-CM

## 2025-06-18 DIAGNOSIS — I10 ESSENTIAL HYPERTENSION: ICD-10-CM

## 2025-06-18 PROCEDURE — G8417 CALC BMI ABV UP PARAM F/U: HCPCS | Performed by: INTERNAL MEDICINE

## 2025-06-18 PROCEDURE — 1111F DSCHRG MED/CURRENT MED MERGE: CPT | Performed by: INTERNAL MEDICINE

## 2025-06-18 PROCEDURE — 99214 OFFICE O/P EST MOD 30 MIN: CPT | Performed by: INTERNAL MEDICINE

## 2025-06-18 PROCEDURE — 1123F ACP DISCUSS/DSCN MKR DOCD: CPT | Performed by: INTERNAL MEDICINE

## 2025-06-18 PROCEDURE — 3078F DIAST BP <80 MM HG: CPT | Performed by: INTERNAL MEDICINE

## 2025-06-18 PROCEDURE — 1159F MED LIST DOCD IN RCRD: CPT | Performed by: INTERNAL MEDICINE

## 2025-06-18 PROCEDURE — G8399 PT W/DXA RESULTS DOCUMENT: HCPCS | Performed by: INTERNAL MEDICINE

## 2025-06-18 PROCEDURE — 1036F TOBACCO NON-USER: CPT | Performed by: INTERNAL MEDICINE

## 2025-06-18 PROCEDURE — 3074F SYST BP LT 130 MM HG: CPT | Performed by: INTERNAL MEDICINE

## 2025-06-18 PROCEDURE — G2211 COMPLEX E/M VISIT ADD ON: HCPCS | Performed by: INTERNAL MEDICINE

## 2025-06-18 PROCEDURE — 1090F PRES/ABSN URINE INCON ASSESS: CPT | Performed by: INTERNAL MEDICINE

## 2025-06-18 PROCEDURE — G8427 DOCREV CUR MEDS BY ELIG CLIN: HCPCS | Performed by: INTERNAL MEDICINE

## 2025-06-18 NOTE — PROGRESS NOTES
35.27 kg/m².  Physical Exam  Vitals reviewed.   HENT:      Head: Normocephalic and atraumatic.   Cardiovascular:      Rate and Rhythm: Normal rate.      Pulses: Normal pulses.      Heart sounds: Murmur heard.   Pulmonary:      Effort: Pulmonary effort is normal.      Breath sounds: Normal breath sounds.   Neurological:      Mental Status: She is alert.     Zeynep \"Pat\" was seen today for follow-up from hospital.    Diagnoses and all orders for this visit:    Persistent atrial fibrillation (HCC)  -     Lipid Panel; Future  -     CBC with Auto Differential; Future  -     Comprehensive Metabolic Panel; Future  -     TSH reflex to FT4; Future  -     Hemoglobin A1C; Future    Anemia, unspecified type  -     Lipid Panel; Future  -     CBC with Auto Differential; Future  -     Comprehensive Metabolic Panel; Future  -     TSH reflex to FT4; Future  -     Hemoglobin A1C; Future    Essential hypertension  -     Lipid Panel; Future  -     CBC with Auto Differential; Future  -     Comprehensive Metabolic Panel; Future  -     TSH reflex to FT4; Future  -     Hemoglobin A1C; Future    Presence of Watchman left atrial appendage closure device    IFG (impaired fasting glucose)  -     Lipid Panel; Future  -     CBC with Auto Differential; Future  -     Comprehensive Metabolic Panel; Future  -     TSH reflex to FT4; Future  -     Hemoglobin A1C; Future    Stage 3b chronic kidney disease (HCC)  -     Lipid Panel; Future  -     CBC with Auto Differential; Future  -     Comprehensive Metabolic Panel; Future  -     TSH reflex to FT4; Future  -     Hemoglobin A1C; Future          The patient (or guardian, if applicable) and other individuals in attendance with the patient were advised that Artificial Intelligence will be utilized during this visit to record, process the conversation to generate a clinical note, and support improvement of the AI technology. The patient (or guardian, if applicable) and other individuals in attendance at

## 2025-07-09 DIAGNOSIS — I50.32 CHRONIC HEART FAILURE WITH PRESERVED EJECTION FRACTION (HCC): ICD-10-CM

## 2025-07-09 NOTE — TELEPHONE ENCOUNTER
MEDICATION REFILL REQUEST      Name of Medication:  Jardiance  Dose:  10 mg  Frequency:  QD  Quantity:  90  Days' supply:  90 with 3 refills      Pharmacy Name/Location:  [phone]

## 2025-07-09 NOTE — TELEPHONE ENCOUNTER
Requested Prescriptions     Pending Prescriptions Disp Refills    empagliflozin (JARDIANCE) 10 MG tablet 90 tablet 1     Sig: Take 1 tablet by mouth daily    Verified rx in last OV date 4/18/25. Pharmacy confirmed. Erx as requested

## 2025-07-17 ENCOUNTER — OFFICE VISIT (OUTPATIENT)
Age: 80
End: 2025-07-17
Payer: MEDICARE

## 2025-07-17 VITALS
SYSTOLIC BLOOD PRESSURE: 104 MMHG | BODY MASS INDEX: 35.6 KG/M2 | DIASTOLIC BLOOD PRESSURE: 56 MMHG | HEIGHT: 57 IN | WEIGHT: 165 LBS | HEART RATE: 64 BPM

## 2025-07-17 DIAGNOSIS — I48.19 PERSISTENT ATRIAL FIBRILLATION (HCC): Primary | ICD-10-CM

## 2025-07-17 PROCEDURE — 1160F RVW MEDS BY RX/DR IN RCRD: CPT | Performed by: PHYSICIAN ASSISTANT

## 2025-07-17 PROCEDURE — G8399 PT W/DXA RESULTS DOCUMENT: HCPCS | Performed by: PHYSICIAN ASSISTANT

## 2025-07-17 PROCEDURE — G8427 DOCREV CUR MEDS BY ELIG CLIN: HCPCS | Performed by: PHYSICIAN ASSISTANT

## 2025-07-17 PROCEDURE — G8417 CALC BMI ABV UP PARAM F/U: HCPCS | Performed by: PHYSICIAN ASSISTANT

## 2025-07-17 PROCEDURE — 3078F DIAST BP <80 MM HG: CPT | Performed by: PHYSICIAN ASSISTANT

## 2025-07-17 PROCEDURE — 1036F TOBACCO NON-USER: CPT | Performed by: PHYSICIAN ASSISTANT

## 2025-07-17 PROCEDURE — 1090F PRES/ABSN URINE INCON ASSESS: CPT | Performed by: PHYSICIAN ASSISTANT

## 2025-07-17 PROCEDURE — 1159F MED LIST DOCD IN RCRD: CPT | Performed by: PHYSICIAN ASSISTANT

## 2025-07-17 PROCEDURE — 1123F ACP DISCUSS/DSCN MKR DOCD: CPT | Performed by: PHYSICIAN ASSISTANT

## 2025-07-17 PROCEDURE — 3074F SYST BP LT 130 MM HG: CPT | Performed by: PHYSICIAN ASSISTANT

## 2025-07-17 PROCEDURE — 99214 OFFICE O/P EST MOD 30 MIN: CPT | Performed by: PHYSICIAN ASSISTANT

## 2025-07-17 NOTE — PROGRESS NOTES
[address], [address]  [place] [address]  PHONE: [phone]  Zeynep Carrington  1945    Chief Complant:    Chief Complaint   Patient presents with    Atrial Fibrillation     WATCHMAN FOLLOW UP      History:  Zeynep Carrington is a very pleasant 79 y.o. female with a past medical and cardiac history significant for persistent atrial fibrillation s/p DCCV, CAD, HTN, HLD and symptomatic bradycardia s/p BSC BIV PPM implantation 10/31/24. She was having occasional epistaxis, easy bruising/bleeding and fall risk now s/p Watchman implant 6/9/25. She is doing well.       Cardiac PMH: (Old records have been reviewed and summarized below)  Monitor (3/31/23):  Sinus rhythm. No sustained arrhythmias PACs 3.7%. No long pauses. PVCs <1%.     Cath (4/14/23):     Mild nonobstructive coronary disease    Mild pulmonary hypertension    Normal LV systolic function    2-3+ mitral regurgitation    TTE (7/9/24): EF 60-65%, mod MR, mod LAE    EKG (7/10/24) personally viewed and interpreted: NSR, FAV block with OH ~360 msec     Monitor (7/12/24): Cardiac telemetry: 96% afib, no significant pauses, av HR 61    Device (10/31/24): Successful implantation of [employer] biventricular permanent pacemaker     Watchman (6/9/25): Successful 27 mm Watchman device implantation     Reviewed office note Dr Flowers 4/18/25     Past Medical History, Past Surgical History, Family history, Social History, and Medications were all reviewed with the patient today and updated as necessary.     Current Outpatient Medications   Medication Sig Dispense Refill    amitriptyline (ELAVIL) 25 MG tablet Take 1 tablet by mouth nightly      empagliflozin (JARDIANCE) 10 MG tablet Take 1 tablet by mouth daily 90 tablet 1    pantoprazole (PROTONIX) 40 MG tablet 1 tablet p.o. 30 minutes prior to morning and evening meal. 180 tablet 3    Probiotic Product (DAILY PROBIOTIC PO) Take 1 capsule by mouth daily      Wheat Dextrin

## 2025-07-28 NOTE — PROGRESS NOTES
Pre-assessment complete. Procedure education completed with patient. Time allowed for questions/ concerns. Patient verbalizes understanding of procedure. Patient instructed to take Eliquis, losartan and protonix with a sip of water, and also instructed to have nothing to eat or drink after midnight. Patient instructed to arrive at 0730. Medical history and medications reviewed. Patient also instructed to use her inhalers if needed.

## 2025-07-29 ENCOUNTER — HOSPITAL ENCOUNTER (OUTPATIENT)
Dept: CARDIAC CATH/INVASIVE PROCEDURES | Age: 80
Discharge: HOME | End: 2025-07-29
Attending: INTERNAL MEDICINE | Admitting: INTERNAL MEDICINE
Payer: MEDICARE

## 2025-07-29 VITALS
SYSTOLIC BLOOD PRESSURE: 122 MMHG | DIASTOLIC BLOOD PRESSURE: 53 MMHG | HEIGHT: 59 IN | RESPIRATION RATE: 14 BRPM | OXYGEN SATURATION: 96 % | HEART RATE: 60 BPM | BODY MASS INDEX: 33.26 KG/M2 | WEIGHT: 165 LBS

## 2025-07-29 DIAGNOSIS — I48.19 PERSISTENT ATRIAL FIBRILLATION (HCC): Primary | ICD-10-CM

## 2025-07-29 LAB
ECHO BSA: 1.76 M2
EKG ATRIAL RATE: 315 BPM
EKG DIAGNOSIS: NORMAL
EKG Q-T INTERVAL: 476 MS
EKG QRS DURATION: 152 MS
EKG QTC CALCULATION (BAZETT): 476 MS
EKG R AXIS: -43 DEGREES
EKG T AXIS: 81 DEGREES
EKG VENTRICULAR RATE: 60 BPM

## 2025-07-29 PROCEDURE — 93319 3D ECHO IMG CGEN CAR ANOMAL: CPT | Performed by: INTERNAL MEDICINE

## 2025-07-29 PROCEDURE — 93312 ECHO TRANSESOPHAGEAL: CPT | Performed by: INTERNAL MEDICINE

## 2025-07-29 PROCEDURE — 6360000002 HC RX W HCPCS: Performed by: INTERNAL MEDICINE

## 2025-07-29 PROCEDURE — 6370000000 HC RX 637 (ALT 250 FOR IP): Performed by: INTERNAL MEDICINE

## 2025-07-29 PROCEDURE — 93005 ELECTROCARDIOGRAM TRACING: CPT | Performed by: INTERNAL MEDICINE

## 2025-07-29 PROCEDURE — 93010 ELECTROCARDIOGRAM REPORT: CPT | Performed by: INTERNAL MEDICINE

## 2025-07-29 PROCEDURE — 99152 MOD SED SAME PHYS/QHP 5/>YRS: CPT | Performed by: INTERNAL MEDICINE

## 2025-07-29 PROCEDURE — 93312 ECHO TRANSESOPHAGEAL: CPT

## 2025-07-29 RX ORDER — ASPIRIN 81 MG/1
81 TABLET ORAL DAILY
Qty: 90 TABLET | Refills: 1 | Status: SHIPPED | OUTPATIENT
Start: 2025-07-29

## 2025-07-29 RX ORDER — LIDOCAINE HYDROCHLORIDE 20 MG/ML
SOLUTION OROPHARYNGEAL PRN
Status: COMPLETED | OUTPATIENT
Start: 2025-07-29 | End: 2025-07-29

## 2025-07-29 RX ORDER — MIDAZOLAM HYDROCHLORIDE 2 MG/2ML
INJECTION, SOLUTION INTRAMUSCULAR; INTRAVENOUS PRN
Status: COMPLETED | OUTPATIENT
Start: 2025-07-29 | End: 2025-07-29

## 2025-07-29 RX ORDER — CLOPIDOGREL BISULFATE 75 MG/1
75 TABLET ORAL DAILY
Qty: 90 TABLET | Refills: 1 | Status: SHIPPED | OUTPATIENT
Start: 2025-07-29

## 2025-07-29 RX ADMIN — LIDOCAINE HYDROCHLORIDE 15 ML: 20 SOLUTION ORAL at 09:08

## 2025-07-29 RX ADMIN — MIDAZOLAM HYDROCHLORIDE 2 MG: 1 INJECTION, SOLUTION INTRAMUSCULAR; INTRAVENOUS at 09:36

## 2025-07-29 RX ADMIN — FENTANYL CITRATE 50 MCG: 50 INJECTION INTRAMUSCULAR; INTRAVENOUS at 09:36

## 2025-07-29 NOTE — NURSE NAVIGATOR
Successful post Watchman MING this am. No thrombus or significant periprosthetic device leak noted. Patient meets criteria to stop OAC. Eliquis discontinued. Aspirin 81 mg and plavix 75 mg daily ordered per Watchman protocol/Dr. Smart. Plavix stop date will be 11/9/2025. Patient has Nurse Navigator contact ([phone]) information for questions or concerns.

## 2025-07-29 NOTE — PROGRESS NOTES
MING with Dr Smart  Sedation start: 0936  Sedation stop: 0949  Versed 2mg  Fentanyl 50mcg  Throat numbed 0908

## 2025-07-29 NOTE — PROGRESS NOTES
Patient received to CPRU room # 16  Ambulatory from [facility]. Patient scheduled for MING today with Dr Smart. Procedure reviewed & questions answered, voiced good understanding consent obtained & placed on chart. All medications and medical history reviewed. Will prep patient per orders. Patient & family updated on plan of care.      The patient has a fraility score of 3-MANAGING WELL, based on patient A&Ox3, patient able to ambulate to room without difficulty.

## 2025-07-29 NOTE — PROGRESS NOTES
Discharge instructions given per orders, voiced good understanding of post procedure care, medications & follow up care. Denies any questions.

## 2025-07-30 ENCOUNTER — TELEPHONE (OUTPATIENT)
Dept: INTERNAL MEDICINE CLINIC | Facility: CLINIC | Age: 80
End: 2025-07-30

## 2025-07-30 DIAGNOSIS — E78.2 MIXED HYPERLIPIDEMIA: ICD-10-CM

## 2025-07-30 RX ORDER — ROSUVASTATIN CALCIUM 10 MG/1
10 TABLET, COATED ORAL DAILY
Qty: 90 TABLET | Refills: 3 | Status: SHIPPED | OUTPATIENT
Start: 2025-07-30

## 2025-07-30 RX ORDER — AVOBENZONE, HOMOSALATE, OCTISALATE, OCTOCRYLENE 30; 40; 45; 26 MG/ML; MG/ML; MG/ML; MG/ML
1 CREAM TOPICAL DAILY
Qty: 100 EACH | Refills: 5 | Status: SHIPPED | OUTPATIENT
Start: 2025-07-30

## 2025-07-30 NOTE — TELEPHONE ENCOUNTER
Isabelle Dixon called and would like to know if Dr. Mac will send in a glucose monitor with the lancets and test strips so the patient can test her blood sugar. Please AdviseChris Ghosh in [place]

## 2025-07-30 NOTE — TELEPHONE ENCOUNTER
Requested Prescriptions     Pending Prescriptions Disp Refills    rosuvastatin (CRESTOR) 10 MG tablet 90 tablet 3     Sig: Take 1 tablet by mouth daily    Verified rx in last OV date 4/18/25. Pharmacy confirmed. Erx as requested

## 2025-07-30 NOTE — TELEPHONE ENCOUNTER
MEDICATION REFILL REQUEST      Name of Medication:  rosuvastatin  DOSE :10 mg  Frequency:  QD  Quantity:  90  Days' supply:  90 with 3 refills      Pharmacy Name/Location:[phone]

## 2025-08-08 ENCOUNTER — OFFICE VISIT (OUTPATIENT)
Dept: INTERNAL MEDICINE CLINIC | Facility: CLINIC | Age: 80
End: 2025-08-08
Payer: MEDICARE

## 2025-08-08 VITALS
DIASTOLIC BLOOD PRESSURE: 60 MMHG | WEIGHT: 163 LBS | SYSTOLIC BLOOD PRESSURE: 100 MMHG | OXYGEN SATURATION: 98 % | TEMPERATURE: 97.9 F | HEART RATE: 78 BPM | BODY MASS INDEX: 32.92 KG/M2

## 2025-08-08 DIAGNOSIS — I10 ESSENTIAL HYPERTENSION: ICD-10-CM

## 2025-08-08 DIAGNOSIS — R73.01 IFG (IMPAIRED FASTING GLUCOSE): ICD-10-CM

## 2025-08-08 DIAGNOSIS — I50.32 CHRONIC HEART FAILURE WITH PRESERVED EJECTION FRACTION (HCC): ICD-10-CM

## 2025-08-08 DIAGNOSIS — N18.32 STAGE 3B CHRONIC KIDNEY DISEASE (HCC): Primary | ICD-10-CM

## 2025-08-08 PROCEDURE — 3074F SYST BP LT 130 MM HG: CPT | Performed by: INTERNAL MEDICINE

## 2025-08-08 PROCEDURE — 1090F PRES/ABSN URINE INCON ASSESS: CPT | Performed by: INTERNAL MEDICINE

## 2025-08-08 PROCEDURE — 1036F TOBACCO NON-USER: CPT | Performed by: INTERNAL MEDICINE

## 2025-08-08 PROCEDURE — G8427 DOCREV CUR MEDS BY ELIG CLIN: HCPCS | Performed by: INTERNAL MEDICINE

## 2025-08-08 PROCEDURE — G8417 CALC BMI ABV UP PARAM F/U: HCPCS | Performed by: INTERNAL MEDICINE

## 2025-08-08 PROCEDURE — G8399 PT W/DXA RESULTS DOCUMENT: HCPCS | Performed by: INTERNAL MEDICINE

## 2025-08-08 PROCEDURE — 1123F ACP DISCUSS/DSCN MKR DOCD: CPT | Performed by: INTERNAL MEDICINE

## 2025-08-08 PROCEDURE — 99214 OFFICE O/P EST MOD 30 MIN: CPT | Performed by: INTERNAL MEDICINE

## 2025-08-08 PROCEDURE — 1159F MED LIST DOCD IN RCRD: CPT | Performed by: INTERNAL MEDICINE

## 2025-08-08 PROCEDURE — 3078F DIAST BP <80 MM HG: CPT | Performed by: INTERNAL MEDICINE

## 2025-08-08 RX ORDER — LOSARTAN POTASSIUM 50 MG/1
50 TABLET ORAL DAILY
Qty: 90 TABLET | Refills: 1 | Status: SHIPPED | OUTPATIENT
Start: 2025-08-08

## 2025-08-22 ENCOUNTER — OFFICE VISIT (OUTPATIENT)
Dept: SLEEP MEDICINE | Age: 80
End: 2025-08-22
Payer: MEDICARE

## 2025-08-22 VITALS
OXYGEN SATURATION: 96 % | HEART RATE: 60 BPM | SYSTOLIC BLOOD PRESSURE: 149 MMHG | TEMPERATURE: 97.8 F | WEIGHT: 167 LBS | RESPIRATION RATE: 19 BRPM | DIASTOLIC BLOOD PRESSURE: 79 MMHG | BODY MASS INDEX: 33.67 KG/M2 | HEIGHT: 59 IN

## 2025-08-22 DIAGNOSIS — J45.50 SEVERE PERSISTENT ASTHMA WITHOUT COMPLICATION (HCC): ICD-10-CM

## 2025-08-22 DIAGNOSIS — G47.34 NOCTURNAL HYPOXEMIA: ICD-10-CM

## 2025-08-22 DIAGNOSIS — G47.33 OSA (OBSTRUCTIVE SLEEP APNEA): Primary | ICD-10-CM

## 2025-08-22 DIAGNOSIS — G47.10 HYPERSOMNIA, UNSPECIFIED: ICD-10-CM

## 2025-08-22 DIAGNOSIS — E66.9 OBESITY (BMI 30-39.9): ICD-10-CM

## 2025-08-22 PROCEDURE — G8427 DOCREV CUR MEDS BY ELIG CLIN: HCPCS | Performed by: INTERNAL MEDICINE

## 2025-08-22 PROCEDURE — G8417 CALC BMI ABV UP PARAM F/U: HCPCS | Performed by: INTERNAL MEDICINE

## 2025-08-22 PROCEDURE — G8399 PT W/DXA RESULTS DOCUMENT: HCPCS | Performed by: INTERNAL MEDICINE

## 2025-08-22 PROCEDURE — 1090F PRES/ABSN URINE INCON ASSESS: CPT | Performed by: INTERNAL MEDICINE

## 2025-08-22 PROCEDURE — 1123F ACP DISCUSS/DSCN MKR DOCD: CPT | Performed by: INTERNAL MEDICINE

## 2025-08-22 PROCEDURE — 1036F TOBACCO NON-USER: CPT | Performed by: INTERNAL MEDICINE

## 2025-08-22 PROCEDURE — 3077F SYST BP >= 140 MM HG: CPT | Performed by: INTERNAL MEDICINE

## 2025-08-22 PROCEDURE — G2211 COMPLEX E/M VISIT ADD ON: HCPCS | Performed by: INTERNAL MEDICINE

## 2025-08-22 PROCEDURE — 1160F RVW MEDS BY RX/DR IN RCRD: CPT | Performed by: INTERNAL MEDICINE

## 2025-08-22 PROCEDURE — 1159F MED LIST DOCD IN RCRD: CPT | Performed by: INTERNAL MEDICINE

## 2025-08-22 PROCEDURE — 3078F DIAST BP <80 MM HG: CPT | Performed by: INTERNAL MEDICINE

## 2025-08-22 PROCEDURE — 99214 OFFICE O/P EST MOD 30 MIN: CPT | Performed by: INTERNAL MEDICINE

## 2025-08-22 ASSESSMENT — SLEEP AND FATIGUE QUESTIONNAIRES
HOW LIKELY ARE YOU TO NOD OFF OR FALL ASLEEP WHILE SITTING INACTIVE IN A PUBLIC PLACE: WOULD NEVER DOZE
HOW LIKELY ARE YOU TO NOD OFF OR FALL ASLEEP WHILE LYING DOWN TO REST IN THE AFTERNOON WHEN CIRCUMSTANCES PERMIT: WOULD NEVER DOZE
HOW LIKELY ARE YOU TO NOD OFF OR FALL ASLEEP WHILE SITTING AND READING: MODERATE CHANCE OF DOZING
HOW LIKELY ARE YOU TO NOD OFF OR FALL ASLEEP WHILE WATCHING TV: MODERATE CHANCE OF DOZING
HOW LIKELY ARE YOU TO NOD OFF OR FALL ASLEEP IN A CAR, WHILE STOPPED FOR A FEW MINUTES IN TRAFFIC: WOULD NEVER DOZE
ESS TOTAL SCORE: 7
HOW LIKELY ARE YOU TO NOD OFF OR FALL ASLEEP WHEN YOU ARE A PASSENGER IN A CAR FOR AN HOUR WITHOUT A BREAK: WOULD NEVER DOZE
HOW LIKELY ARE YOU TO NOD OFF OR FALL ASLEEP WHILE SITTING AND TALKING TO SOMEONE: WOULD NEVER DOZE
HOW LIKELY ARE YOU TO NOD OFF OR FALL ASLEEP WHILE SITTING QUIETLY AFTER LUNCH WITHOUT ALCOHOL: HIGH CHANCE OF DOZING

## 2025-09-02 ENCOUNTER — OFFICE VISIT (OUTPATIENT)
Dept: PULMONOLOGY | Age: 80
End: 2025-09-02
Payer: MEDICARE

## 2025-09-02 VITALS
BODY MASS INDEX: 34.05 KG/M2 | DIASTOLIC BLOOD PRESSURE: 77 MMHG | HEIGHT: 59 IN | SYSTOLIC BLOOD PRESSURE: 138 MMHG | WEIGHT: 168.9 LBS | TEMPERATURE: 98.1 F | RESPIRATION RATE: 18 BRPM | OXYGEN SATURATION: 98 % | HEART RATE: 61 BPM

## 2025-09-02 DIAGNOSIS — G47.33 OSA TREATED WITH BIPAP: ICD-10-CM

## 2025-09-02 DIAGNOSIS — I27.20 PULMONARY HYPERTENSION (HCC): ICD-10-CM

## 2025-09-02 DIAGNOSIS — J45.50 SEVERE PERSISTENT ASTHMA WITHOUT COMPLICATION (HCC): Primary | ICD-10-CM

## 2025-09-02 DIAGNOSIS — I34.0 MODERATE MITRAL REGURGITATION: Chronic | ICD-10-CM

## 2025-09-02 DIAGNOSIS — J30.1 SEASONAL ALLERGIC RHINITIS DUE TO POLLEN: ICD-10-CM

## 2025-09-02 DIAGNOSIS — I50.32 CHRONIC HEART FAILURE WITH PRESERVED EJECTION FRACTION (HCC): ICD-10-CM

## 2025-09-02 DIAGNOSIS — I48.19 PERSISTENT ATRIAL FIBRILLATION (HCC): ICD-10-CM

## 2025-09-02 LAB
EXPIRATORY TIME: NORMAL
FEF 25-75% %PRED-PRE: NORMAL
FEF 25-75% PRED: NORMAL
FEF 25-75-PRE: NORMAL
FEV1 %PRED-PRE: 85 %
FEV1 PRED: 1.64 L
FEV1/FVC %PRED-PRE: NORMAL
FEV1/FVC PRED: NORMAL
FEV1/FVC: 85 %
FEV1: 1.39 L
FVC %PRED-PRE: 76 %
FVC PRED: 2.15 L
FVC: 1.63 L
PEF %PRED-PRE: NORMAL
PEF PRED: NORMAL
PEF-PRE: NORMAL

## 2025-09-02 PROCEDURE — 1160F RVW MEDS BY RX/DR IN RCRD: CPT | Performed by: NURSE PRACTITIONER

## 2025-09-02 PROCEDURE — 1090F PRES/ABSN URINE INCON ASSESS: CPT | Performed by: NURSE PRACTITIONER

## 2025-09-02 PROCEDURE — 94010 BREATHING CAPACITY TEST: CPT | Performed by: INTERNAL MEDICINE

## 2025-09-02 PROCEDURE — 1036F TOBACCO NON-USER: CPT | Performed by: NURSE PRACTITIONER

## 2025-09-02 PROCEDURE — 3075F SYST BP GE 130 - 139MM HG: CPT | Performed by: NURSE PRACTITIONER

## 2025-09-02 PROCEDURE — G8417 CALC BMI ABV UP PARAM F/U: HCPCS | Performed by: NURSE PRACTITIONER

## 2025-09-02 PROCEDURE — G8399 PT W/DXA RESULTS DOCUMENT: HCPCS | Performed by: NURSE PRACTITIONER

## 2025-09-02 PROCEDURE — 1123F ACP DISCUSS/DSCN MKR DOCD: CPT | Performed by: NURSE PRACTITIONER

## 2025-09-02 PROCEDURE — 3078F DIAST BP <80 MM HG: CPT | Performed by: NURSE PRACTITIONER

## 2025-09-02 PROCEDURE — 99214 OFFICE O/P EST MOD 30 MIN: CPT | Performed by: NURSE PRACTITIONER

## 2025-09-02 PROCEDURE — G8427 DOCREV CUR MEDS BY ELIG CLIN: HCPCS | Performed by: NURSE PRACTITIONER

## 2025-09-02 PROCEDURE — 1159F MED LIST DOCD IN RCRD: CPT | Performed by: NURSE PRACTITIONER

## 2025-09-02 RX ORDER — BUDESONIDE AND FORMOTEROL FUMARATE DIHYDRATE 160; 4.5 UG/1; UG/1
AEROSOL RESPIRATORY (INHALATION)
Qty: 1 EACH | Refills: 11 | Status: SHIPPED | OUTPATIENT
Start: 2025-09-02

## 2025-09-02 RX ORDER — ALBUTEROL SULFATE 90 UG/1
INHALANT RESPIRATORY (INHALATION)
Qty: 18 G | Refills: 11 | Status: SHIPPED | OUTPATIENT
Start: 2025-09-02

## 2025-09-02 RX ORDER — ALBUTEROL SULFATE 0.83 MG/ML
SOLUTION RESPIRATORY (INHALATION)
Qty: 360 ML | Refills: 11 | Status: SHIPPED | OUTPATIENT
Start: 2025-09-02

## 2025-09-02 RX ORDER — BENRALIZUMAB 30 MG/ML
30 INJECTION, SOLUTION SUBCUTANEOUS
Qty: 1 ADJUSTABLE DOSE PRE-FILLED PEN SYRINGE | Refills: 8 | Status: CANCELLED | OUTPATIENT
Start: 2025-09-02

## 2025-09-02 RX ORDER — BENRALIZUMAB 30 MG/ML
30 INJECTION, SOLUTION SUBCUTANEOUS
Qty: 1 ADJUSTABLE DOSE PRE-FILLED PEN SYRINGE | Refills: 6 | Status: ACTIVE | OUTPATIENT
Start: 2025-09-02

## 2025-09-02 ASSESSMENT — PULMONARY FUNCTION TESTS
FVC_PREDICTED: 2.15
FEV1: 1.39
FEV1_PERCENT_PREDICTED_PRE: 85
FVC: 1.63
FEV1_PREDICTED: 1.64
FEV1/FVC: 85
FVC_PERCENT_PREDICTED_PRE: 76

## 2025-09-02 ASSESSMENT — ENCOUNTER SYMPTOMS
COUGH: 0
SPUTUM PRODUCTION: 0
SHORTNESS OF BREATH: 0
WHEEZING: 0

## (undated) DEVICE — SUTURE ETHIBOND EXCEL SZ 0 L30IN NONABSORBABLE GRN L26MM CT-2 X412H

## (undated) DEVICE — CONNECTOR TBNG OD5-7MM O2 END DISP

## (undated) DEVICE — MOUTHPIECE ENDOSCP 20X27MM --

## (undated) DEVICE — INTENDED FOR TISSUE SEPARATION, AND OTHER PROCEDURES THAT REQUIRE A SHARP SURGICAL BLADE TO PUNCTURE OR CUT.: Brand: BARD-PARKER SAFETY BLADES SIZE 10, STERILE

## (undated) DEVICE — AMD ANTIMICROBIAL GAUZE SPONGES,12 PLY USP TYPE VII, 0.2% POLYHEXAMETHYLENE BIGUANIDE HCI (PHMB): Brand: CURITY

## (undated) DEVICE — DRAPE XR C ARM 41X74IN LF --

## (undated) DEVICE — SUTURE VCRL SZ 2-0 L27IN ABSRB UD L36MM CP-1 1/2 CIR REV J266H

## (undated) DEVICE — 18G NG KIT WITH 96IN PROBE COVER (10 PK): Brand: SITE-RITE

## (undated) DEVICE — CATHETER ULTRASOUND NAVIGATIONAL 10 FRX90 CM VIVID I ACUNAV

## (undated) DEVICE — CHECK-FLO PERFORMER INTRODUCER: Brand: PERFORMER

## (undated) DEVICE — KIT ANGIO CNTRST ADMIN W O BWL WORLEY

## (undated) DEVICE — GLIDESHEATH SLENDER STAINLESS STEEL KIT: Brand: GLIDESHEATH SLENDER

## (undated) DEVICE — INTENDED FOR TISSUE SEPARATION, AND OTHER PROCEDURES THAT REQUIRE A SHARP SURGICAL BLADE TO PUNCTURE OR CUT.: Brand: BARD-PARKER SAFETY BLADES SIZE 15, STERILE

## (undated) DEVICE — KENDALL RADIOLUCENT FOAM MONITORING ELECTRODE RECTANGULAR SHAPE: Brand: KENDALL

## (undated) DEVICE — AIRLIFE™ OXYGEN TUBING 7 FEET (2.1 M) CRUSH RESISTANT OXYGEN TUBING, VINYL TIPPED: Brand: AIRLIFE™

## (undated) DEVICE — INTRODUCER SHTH 11FR CANN L11CM DIL TIP 45MM YEL TUNGSTEN

## (undated) DEVICE — Device

## (undated) DEVICE — 1010 S-DRAPE TOWEL DRAPE 10/BX: Brand: STERI-DRAPE™

## (undated) DEVICE — GUIDEWIRE WITH ICE™ HYDROPHILIC COATING: Brand: CHOICE™

## (undated) DEVICE — CATHETER DIAG SM AD 6FR L100CM 0.038IN COR POLYUR JUDKINS L

## (undated) DEVICE — 5.0MM PRECISION ROUND

## (undated) DEVICE — REM POLYHESIVE ADULT PATIENT RETURN ELECTRODE: Brand: VALLEYLAB

## (undated) DEVICE — CONTAINER PREFIL FRMLN 40ML --

## (undated) DEVICE — KENDALL SCD EXPRESS SLEEVES, KNEE LENGTH, MEDIUM: Brand: KENDALL SCD

## (undated) DEVICE — PLASMABLADE X PS210-030S-LIGHT 3.0SL: Brand: PLASMABLADE™ X

## (undated) DEVICE — CANNULA NSL ORAL AD FOR CAPNOFLEX CO2 O2 AIRLFE

## (undated) DEVICE — JAM SHIDI: Brand: XIA PRECISION SYSTEM

## (undated) DEVICE — SUTURE ABSORBABLE BRAIDED 2-0 CT-1 27 IN UD VICRYL J259H

## (undated) DEVICE — PACK PROCEDURE SURG POST LAMINECTOMY CDS

## (undated) DEVICE — NDL PRT INJ NSAF BLNT 18GX1.5 --

## (undated) DEVICE — LAAC ACCESS SOLUTION: Brand: VERSACROSS CONNECT™ LAAC ACCESS SOLUTION

## (undated) DEVICE — RADIFOCUS GLIDEWIRE: Brand: GLIDEWIRE

## (undated) DEVICE — GUIDEWIRE VASC L260CM DIA0.025IN TIP L7CM DIA3MM STD PTFE J

## (undated) DEVICE — GOWN,REINF,POLY,ECL,PP SLV,XL: Brand: MEDLINE

## (undated) DEVICE — SOLUTION IRRIG 1000ML H2O PIC PLAS SHATTERPROOF CONTAINER

## (undated) DEVICE — BLLN KT O RING ENDOSCP US --

## (undated) DEVICE — 2000CC GUARDIAN II: Brand: GUARDIAN

## (undated) DEVICE — MASTISOL ADHESIVE LIQ 2/3ML

## (undated) DEVICE — WAX SURG 2.5GM HEMSTAT BNE BEESWAX PARAFFIN ISO PALMITATE

## (undated) DEVICE — INTRODUCER SPLIT SHEATH PRELUDE SNAP 6FR X 13CM

## (undated) DEVICE — SYR 5ML 1/5 GRAD LL NSAF LF --

## (undated) DEVICE — CATHETER THRMDIL 7FR L110CM STD PULM ART 4 INFUS LUMN SWN

## (undated) DEVICE — CONTAINER,SPECIMEN,O.R.STRL,4.5OZ: Brand: MEDLINE

## (undated) DEVICE — (D)PREP SKN CHLRAPRP APPL 26ML -- CONVERT TO ITEM 371833

## (undated) DEVICE — ACCESS SHEATH WITH DILATOR: Brand: WATCHMAN FXD CURVE™ ACCESS SYSTEM

## (undated) DEVICE — KIT POS W/ FOAM ARM CRADL SHEARGUARD CHST PD CVR FOR SPNL

## (undated) DEVICE — CATHETER COR DIAG PIGTAILS PIG 145 CRV 5FR 110CM 6 SIDE H

## (undated) DEVICE — 20 ML SYRINGE LUER-LOCK TIP: Brand: MONOJECT

## (undated) DEVICE — 3M™ TEGADERM™ TRANSPARENT FILM DRESSING FRAME STYLE, 1629, 8 IN X 12 IN (20 CM X 30 CM), 10/CT 8CT/CASE: Brand: 3M™ TEGADERM™

## (undated) DEVICE — GUIDE COR SNUS L40CM DIA9FR 0.035IN STD CRV ADV UNIQUE

## (undated) DEVICE — FORCEPS BX L240CM JAW DIA2.8MM L CAP W/ NDL MIC MESH TOOTH

## (undated) DEVICE — GAUZE,SPONGE,8"X4",12PLY,XRAY,STRL,LF: Brand: MEDLINE

## (undated) DEVICE — PINNACLE INTRODUCER SHEATH: Brand: PINNACLE

## (undated) DEVICE — CATHETER DIAG 6FR L110CM PIG CRV SZ 6 SIDE H DBL BRAID WIRE

## (undated) DEVICE — SUTURE V-LOC 90 3-0 L9IN ABSRB VLT L26MM V-20 1/2 CIR TAPR VLOCM0644

## (undated) DEVICE — TRAY CATH 16F URIN MTR LTX -- CONVERT TO ITEM 363111

## (undated) DEVICE — GUIDEWIRE 035IN 210CM PTFE COAT FIX COR J TIP 15MM FIRM BODY

## (undated) DEVICE — MODULAR TAP: Brand: XIA 4.5 SYSTEM -  XIA CT

## (undated) DEVICE — SYSTEM CLOSURE 6-12 FR VEN VASC VASCADE MVP

## (undated) DEVICE — PINNACLE TIF INTRODUCER SHEATH: Brand: PINNACLE

## (undated) DEVICE — THE MILL DISPOSABLE - FINE

## (undated) DEVICE — SYR 3ML LL TIP 1/10ML GRAD --

## (undated) DEVICE — BLOCK BITE AD 60FR W/ VELC STRP ADDRESSES MOST PT AND

## (undated) DEVICE — DRESSING POSTOP AG PRISMASEAL 3.5X6IN

## (undated) DEVICE — SNARE POLYP SM W13MMXL240CM SHTH DIA2.4MM OVL FLX DISP

## (undated) DEVICE — SUTURE VCRL SZ 1 L27IN ABSRB UD L36MM CP-1 1/2 CIR REV CUT J268H

## (undated) DEVICE — DRILL BIT: Brand: XIA 4.5 SYSTEM -  XIA CT

## (undated) DEVICE — FLOSEAL MATRIX IS INDICATED IN SURGICAL PROCEDURES (OTHER THAN IN OPHTHALMIC) AS AN ADJUNCT TO HEMOSTASIS WHEN CONTROL OF BLEEDING BY LIGATURE OR CONVENTIONALPROCEDURES IS INEFFECTIVE OR IMPRACTICAL.: Brand: FLOSEAL HEMOSTATIC MATRIX

## (undated) DEVICE — SOLUTION IV 1000ML 0.9% SOD CHL

## (undated) DEVICE — CATHETER ANGIO INFIN 038IN WLLM GRN

## (undated) DEVICE — DRAPE SHT 3 QTR PROXIMA 53X77 --

## (undated) DEVICE — CATHETER ANGIO JL4 0.038 IN AD 6 FRX100 CM STD SUPER TORQUE

## (undated) DEVICE — AGENT HEMSTAT 3GM PURIFIED PLNT STARCH PWD ABSRB ARISTA AH

## (undated) DEVICE — SUTURE VCRL + 3-0 L27IN ABSRB UD PS-2 L19MM 3/8 CIR PRIM VCP427H

## (undated) DEVICE — STERILE (15.2 TAPERED TO 7.6 X 183CM) POLYETHYLENE ACCORDION-FOLDED COVER FOR USE WITH SIEMENS ACUNAV ULTRASOUND CATHETER FAMILY CONNECTOR: Brand: SWIFTLINK TRANSDUCER COVER

## (undated) DEVICE — INTRODUCER LAT VEIN L62CM OD5.5FR ADV TELSCP SYS RENAL

## (undated) DEVICE — CATHETER DIAG 5FR L75CM ID0.046IN HK CRV VEIN SEL UNIQUE

## (undated) DEVICE — GUIDE WIRE WITH HYDROPHILIC COATING: Brand: ACUITY WHISPER VIEW™

## (undated) DEVICE — KIT EVAC 400CC DIA3/16IN H PAT 12.5IN 3 SPR RND SHP PVC DRN

## (undated) DEVICE — 3M™ STERI-STRIP™ REINFORCED ADHESIVE SKIN CLOSURES, R1548, 1 IN X 5 IN (25 MM X 125 MM), 4 STRIPS/ENVELOPE: Brand: 3M™ STERI-STRIP™

## (undated) DEVICE — DEVICE VES SEAL OD 15 FR ID 10-12 FR PERC FEM VEN ACCS SITE